# Patient Record
Sex: FEMALE | Race: WHITE | NOT HISPANIC OR LATINO | Employment: OTHER | URBAN - METROPOLITAN AREA
[De-identification: names, ages, dates, MRNs, and addresses within clinical notes are randomized per-mention and may not be internally consistent; named-entity substitution may affect disease eponyms.]

---

## 2017-01-11 ENCOUNTER — ANTICOAGULATION VISIT (OUTPATIENT)
Dept: CARDIOLOGY | Facility: CLINIC | Age: 67
End: 2017-01-11

## 2017-01-11 ENCOUNTER — OFFICE VISIT (OUTPATIENT)
Dept: CARDIOLOGY | Facility: CLINIC | Age: 67
End: 2017-01-11

## 2017-01-11 VITALS
HEIGHT: 67 IN | DIASTOLIC BLOOD PRESSURE: 50 MMHG | WEIGHT: 186 LBS | BODY MASS INDEX: 29.19 KG/M2 | SYSTOLIC BLOOD PRESSURE: 100 MMHG | HEART RATE: 71 BPM

## 2017-01-11 DIAGNOSIS — E78.5 DYSLIPIDEMIA: ICD-10-CM

## 2017-01-11 DIAGNOSIS — I10 ESSENTIAL HYPERTENSION: ICD-10-CM

## 2017-01-11 DIAGNOSIS — R60.0 LOCALIZED EDEMA: ICD-10-CM

## 2017-01-11 DIAGNOSIS — R00.2 PALPITATIONS: Primary | ICD-10-CM

## 2017-01-11 DIAGNOSIS — Q21.12 PATENT FORAMEN OVALE: ICD-10-CM

## 2017-01-11 DIAGNOSIS — Q21.12 PFO (PATENT FORAMEN OVALE): Primary | ICD-10-CM

## 2017-01-11 LAB — INR PPP: 2.5 (ref 0.9–1.1)

## 2017-01-11 PROCEDURE — 99213 OFFICE O/P EST LOW 20 MIN: CPT | Performed by: INTERNAL MEDICINE

## 2017-01-11 PROCEDURE — 85610 PROTHROMBIN TIME: CPT | Performed by: INTERNAL MEDICINE

## 2017-01-11 RX ORDER — FUROSEMIDE 40 MG/1
40 TABLET ORAL DAILY
Qty: 90 TABLET | Refills: 3 | Status: SHIPPED | OUTPATIENT
Start: 2017-01-11 | End: 2017-01-11 | Stop reason: SDUPTHER

## 2017-01-11 RX ORDER — FUROSEMIDE 40 MG/1
40 TABLET ORAL DAILY
Qty: 90 TABLET | Refills: 1 | Status: SHIPPED | OUTPATIENT
Start: 2017-01-11 | End: 2017-01-12 | Stop reason: SDUPTHER

## 2017-01-11 NOTE — PROGRESS NOTES
Leela Muller  1950  [phone]      01/11/2017    Connor Ritter MD    Chief Complaint   Patient presents with   • Coronary Artery Disease   • Hypertension     IDENTIFICATION:   A 66-year-old white female from [place], [place].    PROBLEM LIST:  1.  Left cerebrovascular accident:  a. Right cerebrovascular accident  in July or August 2010, evaluated at [facility].   b. Admission to [facility] on 09/28/2010 with headache and stuttering, consistent with TIA.   c. Chronic Coumadin therapy, initiated following bilateral pulmonary emboli  in 2007 after fall and hip replacement.  She was already on 81 mg of aspirin as well, 09/2010.   d. MRI of the brain on 09/28/2010 revealing old right parietal cerebrovascular accident.   e. MRA revealing normal carotids, middle anterior and posterior  cerebral arteries with minimal ostial stenosis of both vertebral arteries.   f. GENARO by Dr. Oliver Mobley on 09/28/2010:  patent foramen ovale with a small amount of right to left shunting.  Normal LVEF and normal valvular structures.    g. Patent foramen  ovale closure using a 25 mm. Amplatzer cribriform occluder, 10/05/2010.    h. Echocardiogram, 06/23/2014:  LVEF (55% to 60%) with and Amplatzer device noted to be well-seated in the interatrial septum, trace MR, and mild TR.  2. Abnormal myocardial perfusion study:    a. 08/02/2010, Cardiolite GXT by Dr. Monteiro revealing a small area of ischemia in the mid anteroseptal, mid inferior, and apical lateral regions.  LVEF (68%).  b.  Cardiac catheterization by Dr. Monteiro, 08/09/2010:  Normal coronary arteries with normal LV systolic function.  3.  Type 2 diabetes mellitus.   4. Bilateral pulmonary emboli:  a. Following  hip replacement in 2007.   b. No evidence of prior anti-coagulable workup.   c. Chronic Coumadin therapy.   5.  Hypertension.   6. Dyslipidemia.   7. Pancreatitis.  a. Recent episode  in March 2013.  8. Bowenoid papulosis, followed by   Svetich.    9.  Surgical history:  a. Hip replacement.  b. Hysterectomy.  c. Tonsillectomy.  d.  Appendectomy.  e. Cholecystectomy.    Allergies   Allergen Reactions   • Atorvastatin    • Other      Ranexa nausea   • Tetanus Toxoid        Current Medications:      Current Outpatient Prescriptions:   •  acetaminophen (TYLENOL) 500 MG tablet, Take  by mouth Every 6 (Six) Hours As Needed., Disp: , Rfl:   •  aspirin 81 MG tablet, Take  by mouth daily., Disp: , Rfl:   •  baclofen (LIORESAL) 10 MG tablet, Take 1 tablet by mouth 3 (three) times a day., Disp: 90 tablet, Rfl: 11  •  betamethasone, augmented, (DIPROLENE) 0.05 % cream, Apply  topically., Disp: , Rfl:   •  Brimonidine Tartrate (MIRVASO) 0.33 % gel, Apply  topically., Disp: , Rfl:   •  butalbital-acetaminophen-caffeine (FIORICET, ESGIC) -40 MG per tablet, Take 1 tablet by mouth daily as needed for headaches., Disp: 30 tablet, Rfl: 2  •  clidinium-chlordiazepoxide (LIBRAX) 5-2.5 MG per capsule, Take  by mouth 2 (Two) Times a Day., Disp: , Rfl:   •  DIGOX 250 MCG tablet, TAKE ONE TABLET BY MOUTH EVERY DAY, Disp: 90 tablet, Rfl: 2  •  estradiol (VIVELLE-DOT) 0.05 MG/24HR patch, Place  on the skin., Disp: , Rfl:   •  furosemide (LASIX) 20 MG tablet, Take 1 tablet by mouth Daily., Disp: 90 tablet, Rfl: 1  •  gabapentin (NEURONTIN) 800 MG tablet, Take  by mouth 3 (three) times a day., Disp: , Rfl:   •  glipiZIDE (GLUCOTROL) 10 MG 24 hr tablet, Take 20 mg by mouth., Disp: , Rfl:   •  HYDROcodone-acetaminophen (LORTAB 7.5) 7.5-500 MG per tablet, Take  by mouth Every 8 (Eight) Hours As Needed., Disp: , Rfl:   •  insulin glargine (LANTUS) 100 UNIT/ML injection, Inject  under the skin., Disp: , Rfl:   •  ketorolac (ACULAR) 0.5 % ophthalmic solution, , Disp: , Rfl:   •  meclizine (ANTIVERT) 25 MG tablet, Take  by mouth., Disp: , Rfl:   •  metoclopramide (REGLAN) 10 MG tablet, Take 1 tablet by mouth daily as needed (migraine)., Disp: 30 tablet, Rfl: 11  •  metoprolol  "succinate XL (TOPROL-XL) 50 MG 24 hr tablet, TAKE ONE TABLET BY MOUTH TWICE A DAY, Disp: 180 tablet, Rfl: 1  •  nitroglycerin (NITROSTAT) 0.4 MG SL tablet, Place  under the tongue., Disp: , Rfl:   •  ofloxacin (OCUFLOX) 0.3 % ophthalmic solution, , Disp: , Rfl:   •  oxyCODONE-acetaminophen (PERCOCET) 5-325 MG per tablet, Take  by mouth Every 8 (Eight) Hours As Needed., Disp: , Rfl:   •  pancrelipase, Lip-Prot-Amyl, (PANCREAZE) 96346 UNITS capsule delayed-release particles capsule, Take  by mouth As Needed., Disp: , Rfl:   •  pioglitazone (ACTOS) 30 MG tablet, Take  by mouth Daily., Disp: , Rfl:   •  potassium chloride (KLOR-CON) 10 MEQ CR tablet, Take  by mouth Every Other Day., Disp: , Rfl:   •  promethazine (PHENERGAN) 25 MG tablet, Take  by mouth Every 6 (Six) Hours As Needed., Disp: , Rfl:   •  RABEprazole (ACIPHEX) 20 MG EC tablet, Take  by mouth daily., Disp: , Rfl:   •  rosuvastatin (CRESTOR) 10 MG tablet, Take  by mouth Daily., Disp: , Rfl:   •  senna (SENOKOT) 8.6 MG tablet, Take  by mouth As Needed., Disp: , Rfl:   •  sitaGLIPtin (JANUVIA) 100 MG tablet, Take  by mouth Daily., Disp: , Rfl:   •  topiramate (TOPAMAX) 25 MG tablet, Take 1 tablet by mouth daily., Disp: 30 tablet, Rfl: 11  •  warfarin (COUMADIN) 5 MG tablet, TAKE ONE TABLET DAILY OR AS DIRECTED, Disp: 100 tablet, Rfl: 0  •  metroNIDAZOLE (METROCREAM) 0.75 % cream, Apply  topically., Disp: , Rfl:   •  prednisoLONE acetate (PRED FORTE) 1 % ophthalmic suspension, , Disp: , Rfl:   •  triamcinolone (KENALOG) 0.1 % cream, Apply  topically., Disp: , Rfl:     HPI    Ms. Muller presents today for 12 month follow up with history of CVA, PFO status post closure, bilateral pulmonary emboli, hypertension, and hyperlipidemia. Since last visit, she recounts that she has been experiencing occasional episodes of  palpitations/ \"her heart jumping out of rhythm\" while lying down at night. Episodes occur approximately once per month, and her most recent episode " "occured two nights ago. She notes that her lower extremity edema has greatly improved with lasix. Patient denies chest pain, shortness of breath, PND, orthopnea, dizziness, and syncope. She recently had pneumonia and is recovering.    The following portions of the patient's history were reviewed and updated as appropriate: allergies, current medications and problem list.    Pertinent positives as listed in the HPI.  All other systems reviewed are negative.    Vitals:    01/11/17 1254   BP: 100/50   BP Location: Right arm   Patient Position: Sitting   Pulse: 71   Weight: 186 lb (84.4 kg)   Height: 67\" (170.2 cm)       General: Alert and oriented  Neck: Jugular venous pressure is within normal limits. Carotids have normal upstrokes without bruits.   Cardiovascular: Heart has a nondisplaced focal PMI. Regular rate and rhythm without murmur, gallop or rub.  Lungs: Clear without rales or wheezes. Equal expansion is noted.   Extremities: Shows trace to 1+ edema.  Skin: warm and dry.  Neurologic: nonfocal      Diagnostic Data:    Lab Results   Component Value Date    INR 2.50 (A) 01/11/2017    INR 2.30 12/28/2016    INR 2.60 12/19/2016    PROTIME 10.7 06/30/2014     Lab Results   Component Value Date    GLUCOSE 116 (H) 08/11/2016    CALCIUM 9.2 11/03/2016     11/03/2016    K 4.5 11/03/2016    CO2 27 11/03/2016     11/03/2016    BUN 17 11/03/2016    CREATININE 0.8 11/03/2016    EGFRIFNONA 55 (L) 08/11/2016    BCR 14.0 08/11/2016    ANIONGAP 16 11/03/2016     Procedures    Assessment:      ICD-10-CM ICD-9-CM   1. Palpitations, etiology undetermined  R00.2 785.1   2. Patent foramen ovale, s/p closure Q21.1 745.5   3. Essential hypertension I10 401.9   4. Dyslipidemia E78.5 272.4   5. edema    Plan:    1. Increased lasix to 40 mg daily.   2. Continue all other current medications as prescribed.  3. F/up in 12 months or sooner if needed.    Scribed for Rika Sullivan MD by Rubi Platt. 1/11/2017  2:17 " PM    I Rika Sullivan MD personally performed the services described in this documentation as scribed by the above individual in my presence, and it is both accurate and complete.

## 2017-01-11 NOTE — MR AVS SNAPSHOT
"                        Leela Muller   1/11/2017 1:15 PM   Office Visit    Dept Phone:  [phone]   Encounter #:  [id number]    Provider:  Rika Sullivan MD   Department:  [facility] CARDIOLOGY                Your Full Care Plan              Today's Medication Changes          These changes are accurate as of: 1/11/17  1:50 PM.  If you have any questions, ask your nurse or doctor.               Medication(s)that have changed:     LORTAB 7.5 7.5-500 MG per tablet   Generic drug:  HYDROcodone-acetaminophen   Take  by mouth Every 8 (Eight) Hours As Needed.   What changed:  Another medication with the same name was removed. Continue taking this medication, and follow the directions you see here.   Changed by:  Rafiq Beckett MA         Stop taking medication(s)listed here:     BD INSULIN SYRINGE ULTRAFINE 31G X 5/16\" 0.3 ML misc   Generic drug:  Insulin Syringe-Needle U-100   Stopped by:  Rika Sullivan MD           enoxaparin 80 MG/0.8ML solution syringe   Commonly known as:  LOVENOX   Stopped by:  Rika Sullivan MD           FLONASE 50 MCG/ACT nasal spray   Generic drug:  fluticasone   Stopped by:  Rika Sullivan MD           flunisolide 25 MCG/ACT (0.025%) solution nasal spray   Commonly known as:  NASALIDE   Stopped by:  Rika Sullivan MD           hydrocortisone 25 MG suppository   Commonly known as:  ANUSOL-HC   Stopped by:  Rika Sullivan MD           HYDROmorphone 8 MG tablet   Commonly known as:  DILAUDID   Stopped by:  Rika Sullivan MD           ONE TOUCH ULTRA TEST test strip   Generic drug:  glucose blood   Stopped by:  Rika Sullivan MD           ONETOUCH ULTRA BLUE VI   Stopped by:  Rika Sullivan MD           predniSONE 10 MG tablet   Commonly known as:  DELTASONE   Stopped by:  Rika Sullivan MD           venlafaxine XR 75 MG 24 hr capsule   Commonly known as:  EFFEXOR-XR   Stopped by:  " Rika Sullivan MD                      Your Updated Medication List          This list is accurate as of: 1/11/17  1:50 PM.  Always use your most recent med list.                ACIPHEX 20 MG EC tablet   Generic drug:  RABEprazole       aspirin 81 MG tablet       baclofen 10 MG tablet   Commonly known as:  LIORESAL   Take 1 tablet by mouth 3 (three) times a day.       betamethasone (augmented) 0.05 % cream   Commonly known as:  DIPROLENE       butalbital-acetaminophen-caffeine -40 MG per tablet   Commonly known as:  FIORICET, ESGIC   Take 1 tablet by mouth daily as needed for headaches.       clidinium-chlordiazePOXIDE 5-2.5 MG per capsule   Commonly known as:  LIBRAX       CRESTOR 10 MG tablet   Generic drug:  rosuvastatin       DIGOX 250 MCG tablet   Generic drug:  digoxin   TAKE ONE TABLET BY MOUTH EVERY DAY       furosemide 20 MG tablet   Commonly known as:  LASIX   Take 1 tablet by mouth Daily.       gabapentin 800 MG tablet   Commonly known as:  NEURONTIN       glipiZIDE 10 MG 24 hr tablet   Commonly known as:  GLUCOTROL       JANUVIA 100 MG tablet   Generic drug:  SITagliptin       ketorolac 0.5 % ophthalmic solution   Commonly known as:  ACULAR       KLOR-CON 10 MEQ CR tablet   Generic drug:  potassium chloride       LANTUS 100 UNIT/ML injection   Generic drug:  insulin glargine       LORTAB 7.5 7.5-500 MG per tablet   Generic drug:  HYDROcodone-acetaminophen       meclizine 25 MG tablet   Commonly known as:  ANTIVERT       metoclopramide 10 MG tablet   Commonly known as:  REGLAN   Take 1 tablet by mouth daily as needed (migraine).       metoprolol succinate XL 50 MG 24 hr tablet   Commonly known as:  TOPROL-XL   TAKE ONE TABLET BY MOUTH TWICE A DAY       metroNIDAZOLE 0.75 % cream   Commonly known as:  METROCREAM       MIRVASO 0.33 % gel   Generic drug:  Brimonidine Tartrate       NITROSTAT 0.4 MG SL tablet   Generic drug:  nitroglycerin       ofloxacin 0.3 % ophthalmic solution   Commonly  known as:  OCUFLOX       oxyCODONE-acetaminophen 5-325 MG per tablet   Commonly known as:  PERCOCET       PANCREAZE 45985 UNITS capsule delayed-release particles capsule   Generic drug:  pancrelipase (Lip-Prot-Amyl)       pioglitazone 30 MG tablet   Commonly known as:  ACTOS       prednisoLONE acetate 1 % ophthalmic suspension   Commonly known as:  PRED FORTE       promethazine 25 MG tablet   Commonly known as:  PHENERGAN       SENOKOT 8.6 MG tablet   Generic drug:  senna       topiramate 25 MG tablet   Commonly known as:  TOPAMAX   Take 1 tablet by mouth daily.       triamcinolone 0.1 % cream   Commonly known as:  KENALOG       TYLENOL 500 MG tablet   Generic drug:  acetaminophen       VIVELLE-DOT 0.05 MG/24HR patch   Generic drug:  estradiol       warfarin 5 MG tablet   Commonly known as:  COUMADIN   TAKE ONE TABLET DAILY OR AS DIRECTED               You Were Diagnosed With        Codes Comments    Palpitations    -  Primary ICD-10-CM: R00.2  ICD-9-CM: 785.1     Patent foramen ovale     ICD-10-CM: Q21.1  ICD-9-CM: 745.5     Essential hypertension     ICD-10-CM: I10  ICD-9-CM: 401.9     Dyslipidemia     ICD-10-CM: E78.5  ICD-9-CM: 272.4       Instructions     None    Patient Instructions History      Upcoming Appointments     Visit Type Date Time Department    FOLLOW UP 1/11/2017  1:15 PM [facility]    FOLLOW UP 3/8/2017 11:15 AM [facility]      MyChart Signup     Our records indicate that you have declined [facility] signup. If you would like to sign up for [facility], please email [email or web address] or call [phone] to obtain an activation code.             Other Info from Your Visit           Your Appointments     Mar 08, 2017 11:15 AM EST   Follow Up with Devaughn Lewis MD   [facility] (--)    [address]  [place] [address] [phone]           Arrive 15 minutes prior to appointment.            Jan 17, 2018   "1:30 PM EST   Follow Up with Rika Sullivan MD   [facility] CARDIOLOGY (--)    [address]  [place] [address] [phone]           Arrive 15 minutes prior to appointment.              Allergies     Atorvastatin      Other      Ranexa nausea    Tetanus Toxoid        Reason for Visit     Coronary Artery Disease     Hypertension           Vital Signs     Blood Pressure Pulse Height Weight Body Mass Index Smoking Status    100/50 (BP Location: Right arm, Patient Position: Sitting) 71 67\" (170.2 cm) 186 lb (84.4 kg) 29.13 kg/m2 Never Smoker      Problems and Diagnoses Noted     Dyslipidemia    High blood pressure    Congenital heart disease    Palpitations    -  Primary        "

## 2017-01-12 RX ORDER — FUROSEMIDE 40 MG/1
40 TABLET ORAL DAILY
Qty: 90 TABLET | Refills: 1 | Status: SHIPPED | OUTPATIENT
Start: 2017-01-12 | End: 2017-05-11

## 2017-01-13 ENCOUNTER — HOSPITAL ENCOUNTER (OUTPATIENT)
Dept: GENERAL RADIOLOGY | Facility: HOSPITAL | Age: 67
Discharge: HOME | End: 2017-01-13
Attending: INTERNAL MEDICINE

## 2017-01-23 LAB — INR PPP: 2.6

## 2017-01-24 ENCOUNTER — ANTICOAGULATION VISIT (OUTPATIENT)
Dept: CARDIOLOGY | Facility: CLINIC | Age: 67
End: 2017-01-24

## 2017-02-06 ENCOUNTER — ANTICOAGULATION VISIT (OUTPATIENT)
Dept: CARDIOLOGY | Facility: CLINIC | Age: 67
End: 2017-02-06

## 2017-02-06 LAB — INR PPP: 2.4

## 2017-02-13 RX ORDER — GABAPENTIN 800 MG/1
800 TABLET ORAL 3 TIMES DAILY
Qty: 90 TABLET | Refills: 0 | OUTPATIENT
Start: 2017-02-13 | End: 2017-06-06 | Stop reason: SDUPTHER

## 2017-02-13 RX ORDER — PIOGLITAZONEHYDROCHLORIDE 30 MG/1
30 TABLET ORAL DAILY
Qty: 30 TABLET | Refills: 5 | Status: CANCELLED | OUTPATIENT
Start: 2017-02-13

## 2017-02-14 RX ORDER — GABAPENTIN 800 MG/1
800 TABLET ORAL 3 TIMES DAILY
Qty: 90 TABLET | Refills: 0 | Status: SHIPPED | OUTPATIENT
Start: 2017-02-14 | End: 2017-06-06 | Stop reason: SDUPTHER

## 2017-02-14 RX ORDER — ROSUVASTATIN CALCIUM 10 MG/1
10 TABLET, COATED ORAL DAILY
Qty: 30 TABLET | Refills: 5 | Status: CANCELLED | OUTPATIENT
Start: 2017-02-14

## 2017-02-14 RX ORDER — GLIPIZIDE 10 MG/1
20 TABLET, FILM COATED, EXTENDED RELEASE ORAL DAILY
Qty: 60 TABLET | Refills: 5 | Status: CANCELLED | OUTPATIENT
Start: 2017-02-14

## 2017-02-14 RX ORDER — PIOGLITAZONEHYDROCHLORIDE 30 MG/1
30 TABLET ORAL DAILY
Qty: 30 TABLET | Refills: 5 | Status: CANCELLED | OUTPATIENT
Start: 2017-02-13

## 2017-02-14 RX ORDER — WARFARIN SODIUM 5 MG/1
TABLET ORAL
Qty: 100 TABLET | Refills: 1 | Status: SHIPPED | OUTPATIENT
Start: 2017-02-14 | End: 2017-05-08 | Stop reason: SDUPTHER

## 2017-02-20 ENCOUNTER — ANTICOAGULATION VISIT (OUTPATIENT)
Dept: CARDIOLOGY | Facility: CLINIC | Age: 67
End: 2017-02-20

## 2017-02-20 LAB — INR PPP: 2.2

## 2017-02-22 RX ORDER — ROSUVASTATIN CALCIUM 10 MG/1
10 TABLET, COATED ORAL DAILY
Qty: 30 TABLET | Refills: 5 | Status: CANCELLED | OUTPATIENT
Start: 2017-02-14

## 2017-03-02 RX ORDER — ROSUVASTATIN CALCIUM 10 MG/1
10 TABLET, COATED ORAL DAILY
Qty: 30 TABLET | Refills: 5 | Status: CANCELLED | OUTPATIENT
Start: 2017-02-14

## 2017-03-06 ENCOUNTER — ANTICOAGULATION VISIT (OUTPATIENT)
Dept: CARDIOLOGY | Facility: CLINIC | Age: 67
End: 2017-03-06

## 2017-03-06 LAB — INR PPP: 1.5

## 2017-03-06 RX ORDER — GLIPIZIDE 10 MG/1
20 TABLET, FILM COATED, EXTENDED RELEASE ORAL DAILY
Qty: 60 TABLET | Refills: 5 | Status: CANCELLED | OUTPATIENT
Start: 2017-03-06

## 2017-03-06 RX ORDER — ROSUVASTATIN CALCIUM 10 MG/1
10 TABLET, COATED ORAL DAILY
Qty: 30 TABLET | Refills: 5 | Status: CANCELLED | OUTPATIENT
Start: 2017-02-14

## 2017-03-08 ENCOUNTER — OFFICE VISIT (OUTPATIENT)
Dept: NEUROLOGY | Facility: CLINIC | Age: 67
End: 2017-03-08

## 2017-03-08 VITALS
BODY MASS INDEX: 27.13 KG/M2 | DIASTOLIC BLOOD PRESSURE: 80 MMHG | OXYGEN SATURATION: 99 % | HEIGHT: 68 IN | WEIGHT: 179 LBS | SYSTOLIC BLOOD PRESSURE: 124 MMHG | HEART RATE: 74 BPM

## 2017-03-08 DIAGNOSIS — M54.16 LUMBAR RADICULOPATHY: ICD-10-CM

## 2017-03-08 DIAGNOSIS — G44.209 TENSION HEADACHE: Primary | ICD-10-CM

## 2017-03-08 DIAGNOSIS — M54.12 CERVICAL RADICULOPATHY: ICD-10-CM

## 2017-03-08 DIAGNOSIS — M47.812 SPONDYLOSIS OF CERVICAL REGION WITHOUT MYELOPATHY OR RADICULOPATHY: ICD-10-CM

## 2017-03-08 PROCEDURE — 99213 OFFICE O/P EST LOW 20 MIN: CPT | Performed by: PSYCHIATRY & NEUROLOGY

## 2017-03-08 RX ORDER — DOXEPIN HYDROCHLORIDE 10 MG/1
10 CAPSULE ORAL NIGHTLY
Qty: 30 CAPSULE | Refills: 11 | Status: SHIPPED | OUTPATIENT
Start: 2017-03-08 | End: 2017-08-28 | Stop reason: SDUPTHER

## 2017-03-08 NOTE — PROGRESS NOTES
Subjective:     Patient ID: Leela Muller is a 67 y.o. female.    History of Present Illness  The following portions of the patient's history were reviewed and updated as appropriate: allergies, current medications, past family history, past medical history, past social history, past surgical history and problem list.  Denies new concerns, neck and low back stable, has had gastroenteritis and recurrent pancreatitis. Headaches come and go, has been doing cervical traction at home that appears helps, gets neck massage.  Review of Systems   Constitutional: Negative for chills, fatigue, fever and unexpected weight change.   HENT: Negative for ear pain, hearing loss, nosebleeds, rhinorrhea and sore throat.    Eyes: Negative for photophobia, pain, discharge, itching and visual disturbance.   Respiratory: Negative for cough, chest tightness, shortness of breath and wheezing.    Cardiovascular: Negative for chest pain, palpitations and leg swelling.   Gastrointestinal: Negative for abdominal pain, blood in stool, constipation, diarrhea, nausea and vomiting.   Genitourinary: Negative for dysuria, frequency, hematuria and urgency.   Musculoskeletal: Positive for joint swelling (& STIFFNESS). Negative for arthralgias, back pain, gait problem, myalgias, neck pain and neck stiffness.        LIMB PAIN   Skin: Negative for rash and wound.        DRY SKIN   Allergic/Immunologic: Negative for environmental allergies and food allergies.   Neurological: Positive for headaches. Negative for dizziness, tremors, seizures, syncope, speech difficulty, weakness, light-headedness and numbness.        DIFFICULTY WALKING   Hematological: Negative for adenopathy. Does not bruise/bleed easily.   Psychiatric/Behavioral: Positive for sleep disturbance. Negative for agitation, confusion, decreased concentration, hallucinations and suicidal ideas. The patient is not nervous/anxious.         Objective:    Neurologic Exam     Mental Status   Oriented  to person, place, and time.       Physical Exam   Constitutional: She is oriented to person, place, and time. She appears well-developed and well-nourished.   Cardiovascular: Normal rate and regular rhythm.    Pulmonary/Chest: Effort normal.   Neurological: She is alert and oriented to person, place, and time. She has normal reflexes.   Psychiatric: She has a normal mood and affect. Her behavior is normal. Thought content normal.       Assessment/Plan:     Leela was seen today for difficulty walking.    Diagnoses and all orders for this visit:    Tension headache  -     doxepin (SINEquan) 10 MG capsule; Take 1 capsule by mouth Every Night.    Spondylosis of cervical region without myelopathy or radiculopathy  -     doxepin (SINEquan) 10 MG capsule; Take 1 capsule by mouth Every Night.    Cervical radiculopathy  -     doxepin (SINEquan) 10 MG capsule; Take 1 capsule by mouth Every Night.    Lumbar radiculopathy  -     doxepin (SINEquan) 10 MG capsule; Take 1 capsule by mouth Every Night.

## 2017-03-09 NOTE — PROGRESS NOTES
I have reviewed the anticoagulation track calender, labs, and dosage adjustments made by Mariella Issa RN and I agree.    Electronically signed by WEST Lan 03/09/17 4:14 PM

## 2017-03-13 ENCOUNTER — ANTICOAGULATION VISIT (OUTPATIENT)
Dept: CARDIOLOGY | Facility: CLINIC | Age: 67
End: 2017-03-13

## 2017-03-13 LAB — INR PPP: 1.9

## 2017-03-27 ENCOUNTER — ANTICOAGULATION VISIT (OUTPATIENT)
Dept: CARDIOLOGY | Facility: CLINIC | Age: 67
End: 2017-03-27

## 2017-03-27 LAB — INR PPP: 2.2

## 2017-04-10 ENCOUNTER — ANTICOAGULATION VISIT (OUTPATIENT)
Dept: CARDIOLOGY | Facility: CLINIC | Age: 67
End: 2017-04-10

## 2017-04-10 LAB — INR PPP: 2

## 2017-04-12 ENCOUNTER — OFFICE VISIT (OUTPATIENT)
Dept: NEUROLOGY | Facility: CLINIC | Age: 67
End: 2017-04-12

## 2017-04-12 ENCOUNTER — APPOINTMENT (OUTPATIENT)
Dept: LAB | Facility: HOSPITAL | Age: 67
End: 2017-04-12

## 2017-04-12 VITALS
SYSTOLIC BLOOD PRESSURE: 110 MMHG | BODY MASS INDEX: 27.58 KG/M2 | WEIGHT: 182 LBS | DIASTOLIC BLOOD PRESSURE: 62 MMHG | HEART RATE: 81 BPM | HEIGHT: 68 IN | OXYGEN SATURATION: 99 %

## 2017-04-12 DIAGNOSIS — M54.2 NECK PAIN: ICD-10-CM

## 2017-04-12 DIAGNOSIS — G44.209 TENSION HEADACHE: Primary | ICD-10-CM

## 2017-04-12 LAB — CRP SERPL-MCNC: 0.16 MG/DL (ref 0–1)

## 2017-04-12 PROCEDURE — 36415 COLL VENOUS BLD VENIPUNCTURE: CPT | Performed by: PSYCHIATRY & NEUROLOGY

## 2017-04-12 PROCEDURE — 99213 OFFICE O/P EST LOW 20 MIN: CPT | Performed by: PSYCHIATRY & NEUROLOGY

## 2017-04-12 PROCEDURE — 86140 C-REACTIVE PROTEIN: CPT | Performed by: PSYCHIATRY & NEUROLOGY

## 2017-04-12 RX ORDER — PREDNISONE 10 MG/1
TABLET ORAL
Qty: 30 TABLET | Refills: 1 | Status: SHIPPED | OUTPATIENT
Start: 2017-04-12 | End: 2018-03-08

## 2017-04-12 NOTE — PROGRESS NOTES
Subjective:     Patient ID: Leela Muller is a 67 y.o. female.    History of Present Illness  The following portions of the patient's history were reviewed and updated as appropriate: allergies, current medications, past family history, past medical history, past social history, past surgical history and problem list.  Patient could not tolerate doxepin because of GI upset, has been having more left sided neck pain, has developed left temporal and ear tenderness, some radiation toward left shoulder. Continues on warfarin.  Review of Systems   Constitutional: Negative for chills, fatigue, fever and unexpected weight change.         FEELING POORLY   HENT: Negative for ear pain, hearing loss, nosebleeds, rhinorrhea and sore throat.    Eyes: Negative for photophobia, pain, discharge, itching and visual disturbance.   Respiratory: Positive for cough. Negative for chest tightness, shortness of breath and wheezing.    Cardiovascular: Positive for palpitations. Negative for chest pain and leg swelling.   Gastrointestinal: Negative for abdominal pain, blood in stool, constipation, diarrhea, nausea and vomiting.   Genitourinary: Negative for dysuria, frequency, hematuria and urgency.   Musculoskeletal: Positive for gait problem. Negative for arthralgias, back pain, joint swelling, myalgias, neck pain and neck stiffness.        JOINT STIFFNESS   Skin: Negative for rash and wound.   Allergic/Immunologic: Negative for environmental allergies and food allergies.   Neurological: Positive for headaches. Negative for dizziness, tremors, seizures, syncope, speech difficulty, weakness, light-headedness and numbness.   Hematological: Negative for adenopathy. Does not bruise/bleed easily.   Psychiatric/Behavioral: Positive for sleep disturbance. Negative for agitation, confusion, decreased concentration, hallucinations and suicidal ideas. The patient is not nervous/anxious.         Objective:    Neurologic Exam     Mental Status    Oriented to person, place, and time.       Physical Exam   Constitutional: She is oriented to person, place, and time. She appears well-developed and well-nourished.   HENT:   Left TM clear   Cardiovascular: Normal rate and regular rhythm.    Pulmonary/Chest: Effort normal.   Musculoskeletal:   Some left temporal and retroauricular scalp tenderness, no vascular enlargement noted.   Neurological: She is alert and oriented to person, place, and time. She has normal reflexes.   Psychiatric: She has a normal mood and affect. Her behavior is normal. Thought content normal.       Assessment/Plan:     Leela was seen today for neck pain and headache.    Diagnoses and all orders for this visit:    Tension headache  -     C-reactive Protein  -     predniSONE (DELTASONE) 10 MG tablet; 4/dx3d, 3/dx3d, 2/dx3d, 1/x3d    Neck pain  -     predniSONE (DELTASONE) 10 MG tablet; 4/dx3d, 3/dx3d, 2/dx3d, 1/x3d

## 2017-04-24 ENCOUNTER — ANTICOAGULATION VISIT (OUTPATIENT)
Dept: CARDIOLOGY | Facility: CLINIC | Age: 67
End: 2017-04-24

## 2017-04-24 LAB — INR PPP: 3.1

## 2017-05-02 ENCOUNTER — ANTICOAGULATION VISIT (OUTPATIENT)
Dept: CARDIOLOGY | Facility: CLINIC | Age: 67
End: 2017-05-02

## 2017-05-02 LAB — INR PPP: 3.1

## 2017-05-04 ENCOUNTER — TRANSCRIBE ORDERS (OUTPATIENT)
Dept: ADMINISTRATIVE | Facility: HOSPITAL | Age: 67
End: 2017-05-04

## 2017-05-04 DIAGNOSIS — Z12.31 VISIT FOR SCREENING MAMMOGRAM: Primary | ICD-10-CM

## 2017-05-08 ENCOUNTER — ANTICOAGULATION VISIT (OUTPATIENT)
Dept: CARDIOLOGY | Facility: CLINIC | Age: 67
End: 2017-05-08

## 2017-05-08 LAB — INR PPP: 2.5

## 2017-05-08 RX ORDER — WARFARIN SODIUM 5 MG/1
TABLET ORAL
Qty: 90 TABLET | Refills: 0 | Status: SHIPPED | OUTPATIENT
Start: 2017-05-08 | End: 2017-05-10

## 2017-05-10 RX ORDER — WARFARIN SODIUM 5 MG/1
TABLET ORAL
Qty: 90 TABLET | Refills: 0 | Status: SHIPPED | OUTPATIENT
Start: 2017-05-10 | End: 2017-09-05 | Stop reason: SDUPTHER

## 2017-05-11 RX ORDER — FUROSEMIDE 40 MG/1
40 TABLET ORAL DAILY
Qty: 135 TABLET | Refills: 2 | Status: SHIPPED | OUTPATIENT
Start: 2017-05-11 | End: 2017-05-16 | Stop reason: SDUPTHER

## 2017-05-16 RX ORDER — FUROSEMIDE 40 MG/1
40 TABLET ORAL DAILY
Qty: 135 TABLET | Refills: 2 | Status: SHIPPED | OUTPATIENT
Start: 2017-05-16 | End: 2018-03-02 | Stop reason: SDUPTHER

## 2017-05-22 ENCOUNTER — ANTICOAGULATION VISIT (OUTPATIENT)
Dept: CARDIOLOGY | Facility: CLINIC | Age: 67
End: 2017-05-22

## 2017-05-22 LAB — INR PPP: 2.6

## 2017-05-22 RX ORDER — METOPROLOL SUCCINATE 50 MG/1
TABLET, EXTENDED RELEASE ORAL
Qty: 180 TABLET | Refills: 2 | Status: SHIPPED | OUTPATIENT
Start: 2017-05-22 | End: 2018-04-18 | Stop reason: SDUPTHER

## 2017-05-22 RX ORDER — DIGOXIN 250 MCG
TABLET ORAL
Qty: 90 TABLET | Refills: 2 | Status: SHIPPED | OUTPATIENT
Start: 2017-05-22 | End: 2018-04-18 | Stop reason: SDUPTHER

## 2017-06-06 ENCOUNTER — ANTICOAGULATION VISIT (OUTPATIENT)
Dept: CARDIOLOGY | Facility: CLINIC | Age: 67
End: 2017-06-06

## 2017-06-06 ENCOUNTER — OFFICE VISIT (OUTPATIENT)
Dept: NEUROLOGY | Facility: CLINIC | Age: 67
End: 2017-06-06

## 2017-06-06 VITALS
WEIGHT: 180 LBS | HEIGHT: 68 IN | SYSTOLIC BLOOD PRESSURE: 121 MMHG | DIASTOLIC BLOOD PRESSURE: 64 MMHG | BODY MASS INDEX: 27.28 KG/M2 | HEART RATE: 69 BPM | OXYGEN SATURATION: 98 %

## 2017-06-06 DIAGNOSIS — G43.009 MIGRAINE WITHOUT AURA AND WITHOUT STATUS MIGRAINOSUS, NOT INTRACTABLE: ICD-10-CM

## 2017-06-06 DIAGNOSIS — M54.2 NECK PAIN: ICD-10-CM

## 2017-06-06 LAB — INR PPP: 2.1

## 2017-06-06 PROCEDURE — 99213 OFFICE O/P EST LOW 20 MIN: CPT | Performed by: PSYCHIATRY & NEUROLOGY

## 2017-06-06 RX ORDER — TOPIRAMATE 25 MG/1
25 TABLET ORAL DAILY
Qty: 30 TABLET | Refills: 11 | Status: SHIPPED | OUTPATIENT
Start: 2017-06-06 | End: 2017-06-06 | Stop reason: SDUPTHER

## 2017-06-06 RX ORDER — GABAPENTIN 800 MG/1
800 TABLET ORAL 3 TIMES DAILY
Qty: 90 TABLET | Refills: 5 | Status: SHIPPED | OUTPATIENT
Start: 2017-06-06 | End: 2017-09-18

## 2017-06-06 RX ORDER — BACLOFEN 10 MG/1
10 TABLET ORAL 3 TIMES DAILY
Qty: 90 TABLET | Refills: 11 | Status: SHIPPED | OUTPATIENT
Start: 2017-06-06 | End: 2018-04-13 | Stop reason: SDUPTHER

## 2017-06-06 RX ORDER — BUTALBITAL, ACETAMINOPHEN AND CAFFEINE 50; 325; 40 MG/1; MG/1; MG/1
1 TABLET ORAL DAILY PRN
Qty: 30 TABLET | Refills: 2
Start: 2017-06-06 | End: 2018-04-15 | Stop reason: SDUPTHER

## 2017-06-06 RX ORDER — TOPIRAMATE 25 MG/1
25 TABLET ORAL DAILY
Qty: 30 TABLET | Refills: 11 | Status: SHIPPED | OUTPATIENT
Start: 2017-06-06 | End: 2018-04-13 | Stop reason: SDUPTHER

## 2017-06-06 NOTE — PROGRESS NOTES
Subjective:     Patient ID: Leela Muller is a 67 y.o. female.    History of Present Illness  The following portions of the patient's history were reviewed and updated as appropriate: allergies, current medications, past family history, past medical history, past social history, past surgical history and problem list.  Headaches better with prednisone which has increased her BS, needs refills. Complains of fatigue, drinks lots of water. She has had more frequent chest pains, followed by cardiology.  Review of Systems   Constitutional: Negative for chills, fatigue, fever and unexpected weight change.   HENT: Negative for ear pain, hearing loss, nosebleeds, rhinorrhea and sore throat.    Eyes: Positive for visual disturbance. Negative for photophobia, pain, discharge and itching.   Respiratory: Negative for cough, chest tightness, shortness of breath and wheezing.    Cardiovascular: Negative for chest pain, palpitations and leg swelling.        HEART RATE IS FAST   Gastrointestinal: Negative for abdominal pain, blood in stool, constipation, diarrhea, nausea and vomiting.   Endocrine:        FEELINGS OF WEAKNESS, MUSCLE WEAKNESS   Genitourinary: Negative for dysuria, frequency, hematuria and urgency.   Musculoskeletal: Negative for arthralgias, back pain, gait problem, joint swelling, myalgias, neck pain and neck stiffness.        JOINT STIFNESS   Skin: Negative for rash and wound.   Allergic/Immunologic: Negative for environmental allergies and food allergies.   Neurological: Positive for headaches. Negative for dizziness, tremors, seizures, syncope, speech difficulty, weakness, light-headedness and numbness.        DIFFICULTY WALKING   Hematological: Positive for adenopathy. Does not bruise/bleed easily.   Psychiatric/Behavioral: Positive for sleep disturbance. Negative for agitation, confusion, decreased concentration, hallucinations and suicidal ideas. The patient is not nervous/anxious.          Objective:    Neurologic Exam     Mental Status   Oriented to person, place, and time.       Physical Exam   Constitutional: She is oriented to person, place, and time. She appears well-developed and well-nourished.   Cardiovascular: Normal rate and regular rhythm.    Pulmonary/Chest: Effort normal.   Neurological: She is alert and oriented to person, place, and time. She has normal reflexes.   Psychiatric: She has a normal mood and affect. Her behavior is normal. Thought content normal.       Assessment/Plan:     Leela was seen today for neck pain and headache.    Diagnoses and all orders for this visit:    Migraine without aura and without status migrainosus, not intractable  -     Discontinue: topiramate (TOPAMAX) 25 MG tablet; Take 1 tablet by mouth Daily.  -     butalbital-acetaminophen-caffeine (FIORICET, ESGIC) -40 MG per tablet; Take 1 tablet by mouth Daily As Needed for Headache.  -     topiramate (TOPAMAX) 25 MG tablet; Take 1 tablet by mouth Daily.    Neck pain  -     gabapentin (NEURONTIN) 800 MG tablet; Take 1 tablet by mouth 3 (Three) Times a Day.  -     baclofen (LIORESAL) 10 MG tablet; Take 1 tablet by mouth 3 (three) times a day.       The patient has read and signed the [facility] [employer] Substance Contract.  I will continue to see patient for regular follow up appointments.  They are well controlled on their medication.  [facility] is updated every 3 months. The patient is aware of the potential for addiction and dependence.    The patient has read and signed the [facility] Controlled Substance Contract.  I will continue to see patient for regular follow up appointments.  They are well controlled on their medication.  [facility] is updated every 3 months. The patient is aware of the potential for addiction. The patient has read and signed the [facility] Controlled Substance Contract.  I will continue to see patient for regular follow up appointments.  They are well controlled on  their medication.  [facility] is updated every 3 months. The patient is aware of the potential for addiction and dependence.on and dependence.    Recommended B complex vitamins, follow up with cardiology, call for problems.

## 2017-06-07 ENCOUNTER — TELEPHONE (OUTPATIENT)
Dept: NEUROLOGY | Facility: CLINIC | Age: 67
End: 2017-06-07

## 2017-06-07 NOTE — TELEPHONE ENCOUNTER
PT WAS INFORMED THAT WE WILL NOT REFILL CONTROLLED MEDICATIONS OVER THE PHONE OR THROUGH PHARMACY REQUESTS ANYMORE. PT UNDERSTANDS THAT THEY MUST MAKE THEIR FOLLOW UP APPT IN ORDER TO CONTINUE TO RECEIVE THEIR CONTROLLED MEDICATION. PT VERBALIZED UNDERSTANDING.

## 2017-06-13 ENCOUNTER — APPOINTMENT (OUTPATIENT)
Dept: MAMMOGRAPHY | Facility: HOSPITAL | Age: 67
End: 2017-06-13

## 2017-06-20 ENCOUNTER — APPOINTMENT (OUTPATIENT)
Dept: MAMMOGRAPHY | Facility: HOSPITAL | Age: 67
End: 2017-06-20

## 2017-06-20 ENCOUNTER — ANTICOAGULATION VISIT (OUTPATIENT)
Dept: CARDIOLOGY | Facility: CLINIC | Age: 67
End: 2017-06-20

## 2017-06-20 LAB — INR PPP: 2.4

## 2017-07-05 ENCOUNTER — HOSPITAL ENCOUNTER (OUTPATIENT)
Dept: MAMMOGRAPHY | Facility: HOSPITAL | Age: 67
Discharge: HOME | End: 2017-07-05
Admitting: INTERNAL MEDICINE

## 2017-07-05 ENCOUNTER — ANTICOAGULATION VISIT (OUTPATIENT)
Dept: CARDIOLOGY | Facility: CLINIC | Age: 67
End: 2017-07-05

## 2017-07-05 DIAGNOSIS — Z12.31 VISIT FOR SCREENING MAMMOGRAM: ICD-10-CM

## 2017-07-05 LAB — INR PPP: 1.8

## 2017-07-05 PROCEDURE — G0202 SCR MAMMO BI INCL CAD: HCPCS

## 2017-07-05 PROCEDURE — 77063 BREAST TOMOSYNTHESIS BI: CPT

## 2017-07-05 PROCEDURE — G0202 SCR MAMMO BI INCL CAD: HCPCS | Performed by: RADIOLOGY

## 2017-07-05 PROCEDURE — 77063 BREAST TOMOSYNTHESIS BI: CPT | Performed by: RADIOLOGY

## 2017-07-10 ENCOUNTER — ANTICOAGULATION VISIT (OUTPATIENT)
Dept: CARDIOLOGY | Facility: CLINIC | Age: 67
End: 2017-07-10

## 2017-07-10 LAB — INR PPP: 1.9

## 2017-07-17 ENCOUNTER — ANTICOAGULATION VISIT (OUTPATIENT)
Dept: CARDIOLOGY | Facility: CLINIC | Age: 67
End: 2017-07-17

## 2017-07-17 LAB — INR PPP: 1.8

## 2017-07-19 NOTE — PROGRESS NOTES
I have reviewed the anticoagulation track calender, labs, and dosage adjustments made by Mariella Issa RN and I agree.    Electronically signed by WEST Lan 07/19/17 8:44 AM

## 2017-07-24 ENCOUNTER — ANTICOAGULATION VISIT (OUTPATIENT)
Dept: CARDIOLOGY | Facility: CLINIC | Age: 67
End: 2017-07-24

## 2017-07-24 LAB — INR PPP: 2.3

## 2017-08-07 ENCOUNTER — ANTICOAGULATION VISIT (OUTPATIENT)
Dept: CARDIOLOGY | Facility: CLINIC | Age: 67
End: 2017-08-07

## 2017-08-07 LAB — INR PPP: 3.8

## 2017-08-07 NOTE — PATIENT INSTRUCTIONS
Patient says that she is on a medicine for her Pancreas.  To hold her dose today and then recheck Friday    AH

## 2017-08-09 NOTE — PROGRESS NOTES
I have reviewed the anticoagulation track calender, labs, and dosage adjustments made by Mariella Issa RN and I agree.    Electronically signed by WEST Lan 08/09/17 9:38 AM

## 2017-08-11 ENCOUNTER — ANTICOAGULATION VISIT (OUTPATIENT)
Dept: CARDIOLOGY | Facility: CLINIC | Age: 67
End: 2017-08-11

## 2017-08-11 LAB — INR PPP: 2.7

## 2017-08-14 NOTE — PROGRESS NOTES
I have reviewed the anticoagulation track calender, labs, and dosage adjustments made by Mariella Issa RN and I agree.    Electronically signed by WEST Lan 08/14/17 3:45 PM

## 2017-08-18 ENCOUNTER — TRANSCRIBE ORDERS (OUTPATIENT)
Dept: ADMINISTRATIVE | Facility: HOSPITAL | Age: 67
End: 2017-08-18

## 2017-08-18 ENCOUNTER — ANTICOAGULATION VISIT (OUTPATIENT)
Dept: CARDIOLOGY | Facility: CLINIC | Age: 67
End: 2017-08-18

## 2017-08-18 DIAGNOSIS — Z12.31 VISIT FOR SCREENING MAMMOGRAM: Primary | ICD-10-CM

## 2017-08-18 DIAGNOSIS — I74.9 EMBOLISM AND THROMBOSIS OF UNSPECIFIED SITE: ICD-10-CM

## 2017-08-18 LAB — INR PPP: 2.8

## 2017-08-28 ENCOUNTER — TELEPHONE (OUTPATIENT)
Dept: PHARMACY | Facility: HOSPITAL | Age: 67
End: 2017-08-28

## 2017-08-28 ENCOUNTER — ANTICOAGULATION VISIT (OUTPATIENT)
Dept: PHARMACY | Facility: HOSPITAL | Age: 67
End: 2017-08-28

## 2017-08-28 ENCOUNTER — HOSPITAL ENCOUNTER (OUTPATIENT)
Dept: ULTRASOUND IMAGING | Facility: HOSPITAL | Age: 67
Discharge: HOME | End: 2017-08-28
Attending: INTERNAL MEDICINE | Admitting: INTERNAL MEDICINE

## 2017-08-28 ENCOUNTER — OFFICE VISIT (OUTPATIENT)
Dept: NEUROLOGY | Facility: CLINIC | Age: 67
End: 2017-08-28

## 2017-08-28 ENCOUNTER — TRANSCRIBE ORDERS (OUTPATIENT)
Dept: ADMINISTRATIVE | Facility: HOSPITAL | Age: 67
End: 2017-08-28

## 2017-08-28 VITALS
OXYGEN SATURATION: 97 % | SYSTOLIC BLOOD PRESSURE: 108 MMHG | BODY MASS INDEX: 28.09 KG/M2 | DIASTOLIC BLOOD PRESSURE: 57 MMHG | HEART RATE: 73 BPM | HEIGHT: 67 IN | WEIGHT: 179 LBS

## 2017-08-28 DIAGNOSIS — T14.8XXA HEMATOMA: Primary | ICD-10-CM

## 2017-08-28 DIAGNOSIS — M54.12 CERVICAL RADICULOPATHY: ICD-10-CM

## 2017-08-28 DIAGNOSIS — G44.209 TENSION HEADACHE: ICD-10-CM

## 2017-08-28 DIAGNOSIS — M47.812 SPONDYLOSIS OF CERVICAL REGION WITHOUT MYELOPATHY OR RADICULOPATHY: ICD-10-CM

## 2017-08-28 DIAGNOSIS — G43.009 MIGRAINE WITHOUT AURA AND WITHOUT STATUS MIGRAINOSUS, NOT INTRACTABLE: ICD-10-CM

## 2017-08-28 DIAGNOSIS — I74.9 EMBOLISM AND THROMBOSIS OF UNSPECIFIED SITE: ICD-10-CM

## 2017-08-28 DIAGNOSIS — G89.4 CHRONIC PAIN SYNDROME: Primary | ICD-10-CM

## 2017-08-28 DIAGNOSIS — T14.8XXA HEMATOMA: ICD-10-CM

## 2017-08-28 DIAGNOSIS — M54.16 LUMBAR RADICULOPATHY: ICD-10-CM

## 2017-08-28 DIAGNOSIS — F41.9 ANXIETY: ICD-10-CM

## 2017-08-28 LAB — INR PPP: 2.5

## 2017-08-28 PROCEDURE — 99213 OFFICE O/P EST LOW 20 MIN: CPT | Performed by: PSYCHIATRY & NEUROLOGY

## 2017-08-28 PROCEDURE — 76882 US LMTD JT/FCL EVL NVASC XTR: CPT

## 2017-08-28 RX ORDER — DOXEPIN HYDROCHLORIDE 10 MG/1
10 CAPSULE ORAL NIGHTLY
Qty: 30 CAPSULE | Refills: 11 | Status: SHIPPED | OUTPATIENT
Start: 2017-08-28 | End: 2017-08-28 | Stop reason: SDUPTHER

## 2017-08-28 RX ORDER — DOXEPIN HYDROCHLORIDE 25 MG/1
25 CAPSULE ORAL NIGHTLY
Qty: 30 CAPSULE | Refills: 11 | Status: SHIPPED | OUTPATIENT
Start: 2017-08-28 | End: 2018-03-08

## 2017-08-28 NOTE — PROGRESS NOTES
Anticoagulation Clinic - Remote Progress Note  [facility]   Frequency of Monitorin days    Patient Contact Info: [phone]    Indication: Bilateral PE, stroke syndrome, embolism and thromosis of unspecified site  Referring Provider: Dr. Rika Sullivan  Initial Warfarin Start Date:   Planned Duration of Therapy: lifelong  Goal INR: 2-3  Current Drug Interactions: doxepin d/c'd on ; new antidepressant- patient is unsure of medication  Other: [PREVIOUS ANTICOAGULATION, ETC]  Bleed Risk: [HASBLED, if appropriate; HIGH/MODERATE/LOW + REASON, H/O BLEED]    Diet: [GLV INTAKE]  Alcohol:   Tobacco:       INR History:  Date            Total Weekly Dose 27.5           INR 2.5           Notes hematoma?; stop doxepin               Phone Interview:  Tablet Strength: pt has 5mg tablets    Current Maintenance Dose: 5mg daily except 2.5mg MWF  Patient Findings      Positives Signs/symptoms of bleeding, Change in health, Change in medications, Bruising     Negatives Signs/symptoms of thrombosis, Laboratory test error suspected, Change in alcohol use, Change in activity, Upcoming invasive procedure, Emergency department visit, Upcoming dental procedure, Missed doses, Extra doses, Change in diet/appetite, Hospital admission, Other complaints     Comments Patient stated that she had recently stopped taking Pancrelipase and Doxepin (doxepin per Dr. Lewis's notes ).  Patient also reported that she has been sick recently and has experienced a loss of appetite. Patient is starting new medication (antidepressant) tomorrow from [facility] but she is unsure of the name of the medication. Discussed possible DDI with new medication and D/C of Doxepin. Patient also stated that she has hematoma on her right leg per Dr. Lewis (neurologist). She is going to her PCP today (Dr. Ritter) and is going to show him. Stressed importance of patient going to the ED if unable to show PCP today. Called Pauline and told  patient I will call her back if there are any other changes needed to her regimen.         Addendum: Patient has a confirmed hematoma by Dr. Ritter today. Called Pauline to discuss course of action given patient history of embolisms. Per Pauline Dr. Sullivan does not recommend a dose hold at this time. Will instruct patient to continue warfarin and to contact [facility], cardiology, or go to the ED if the hematoma gets bigger. Patient voiced understanding    Plan:  1. INR is therapeutic at 2.5. Instructed pt to continue 5mg daily except 2.5mg MWF.  2. Repeat INR in one week due to DDI with D/C of doxepin and initiation of new antidepressant. Patient typically checks every 14 days however, she states she has been monitoring more frequently.  3. Discuss refill requests next time speak with patient.  4. Verbal information provided over the phone to Mrs. Muller. Mrs. Muller expresses understanding and has no further questions at this time.      Alanna Bermudez, PharmD  8/28/2017  1:08 PM

## 2017-08-28 NOTE — PROGRESS NOTES
Subjective:     Patient ID: Leela Muller is a 67 y.o. female.    HPI:   History of Present Illness  The following portions of the patient's history were reviewed and updated as appropriate: allergies, current medications, past family history, past medical history, past social history, past surgical history and problem list.     Patient complains of chronic pain, neck and left arm pain, has been on Norco. Complains of easy bruising. She has lipomas on her left forearm, a deep bruise on her right upper thigh. Last INR 2.5. She has had surgery for rectal cancer and pancreatitis.    Past Medical History:   Diagnosis Date   • Anxiety    • Arm pain    • Arthritis    • Depression    • Diabetes mellitus    • Hyperlipidemia    • Hypertension    • Neck pain on left side    • Pancreatitis    • Pulmonary embolism    • Stroke syndrome 9/14/2016    · Assessed By: Devaughn Lewis (Neurology); Last Assessed: 16 Jun 2015  Right cerebrovascular accident in July or August 2010, evaluated at [facility].  Admission to [facility] on 09/28/2010 with headache and stuttering, consistent with TIA.  Chronic Coumadin therapy, initiated following bilateral pulmonary emboli in 2007 after fall and hip replacement.  She was already on 81 mg of aspirin as well, 09/2010.  MRI of the brain on 09/28/2010 revealing old right parietal cerebrovascular accident.  MRA revealing normal carotids, middle anterior and posterior cerebral arteries with minimal ostial stenosis of both vertebral arteries.  GENARO by Dr. Oliver Mobley on 09/28/2010:  patent foramen ovale with a small amount of right to left shunting.  Normal LVEF and normal valvular structures.   Patent foramen ovale closure using a 25 mm. Amplatzer cribriform occluder, 10/05/2010.   Echocardiogram, 06/23/2014:  LVEF (55% to 60%) with and Amplatzer device noted to be well-seated in    • Thrombocytopenia    • Transient cerebral ischemia        Past Surgical History:    Procedure Laterality Date   • APPENDECTOMY     • BREAST BIOPSY Left 2012    benign   • BREAST EXCISIONAL BIOPSY Left     benign   • BREAST EXCISIONAL BIOPSY Right     benign   • CHOLECYSTECTOMY     • HYSTERECTOMY     • TONSILLECTOMY     • TOTAL HIP ARTHROPLASTY REVISION         Social History     Social History   • Marital status:      Spouse name: N/A   • Number of children: N/A   • Years of education: N/A     Occupational History   • Not on file.     Social History Main Topics   • Smoking status: Never Smoker   • Smokeless tobacco: Not on file   • Alcohol use No   • Drug use: No   • Sexual activity: Defer     Other Topics Concern   • Not on file     Social History Narrative       Family History   Problem Relation Age of Onset   • Alzheimer's disease Mother    • Cancer Mother    • Coronary artery disease Other    • Diabetes Other    • Hypertension Other    • Stroke Other    • Cancer Other    • Dementia Other    • Heart attack Father    • Breast cancer Neg Hx    • Ovarian cancer Neg Hx         Review of Systems   Constitutional: Negative for chills, fatigue, fever and unexpected weight change.   HENT: Negative for ear pain, hearing loss, nosebleeds, rhinorrhea and sore throat.    Eyes: Negative for photophobia, pain, discharge, itching and visual disturbance.   Respiratory: Negative for cough, chest tightness, shortness of breath and wheezing.    Cardiovascular: Negative for chest pain, palpitations and leg swelling.   Gastrointestinal: Negative for abdominal pain, blood in stool, constipation, diarrhea, nausea and vomiting.   Genitourinary: Negative for dysuria, frequency, hematuria and urgency.   Musculoskeletal: Positive for arthralgias, back pain, gait problem, neck pain and neck stiffness. Negative for joint swelling and myalgias.   Skin: Negative for rash and wound.   Allergic/Immunologic: Negative for environmental allergies and food allergies.   Neurological: Positive for weakness. Negative for  dizziness, tremors, seizures, syncope, speech difficulty, light-headedness, numbness and headaches.   Hematological: Negative for adenopathy. Does not bruise/bleed easily.   Psychiatric/Behavioral: Negative for agitation, confusion, decreased concentration, hallucinations, sleep disturbance and suicidal ideas. The patient is not nervous/anxious.         Objective:    Neurologic Exam     Mental Status   Oriented to person, place, and time.       Physical Exam   Constitutional: She is oriented to person, place, and time. She appears well-developed and well-nourished.   Cardiovascular: Normal rate and regular rhythm.    Pulmonary/Chest: Effort normal.   Neurological: She is alert and oriented to person, place, and time. She has normal reflexes.   Psychiatric: She has a normal mood and affect. Her behavior is normal. Thought content normal.       Assessment/Plan:       Leela was seen today for migraine.    Diagnoses and all orders for this visit:    Chronic pain syndrome  -     Discontinue: doxepin (SINEquan) 10 MG capsule; Take 1 capsule by mouth Every Night.  -     doxepin (SINEquan) 25 MG capsule; Take 1 capsule by mouth Every Night.    Cervical radiculopathy  -     Discontinue: doxepin (SINEquan) 10 MG capsule; Take 1 capsule by mouth Every Night.  -     doxepin (SINEquan) 25 MG capsule; Take 1 capsule by mouth Every Night.    Migraine without aura and without status migrainosus, not intractable    Anxiety  -     Discontinue: doxepin (SINEquan) 10 MG capsule; Take 1 capsule by mouth Every Night.  -     doxepin (SINEquan) 25 MG capsule; Take 1 capsule by mouth Every Night.    Tension headache  -     Discontinue: doxepin (SINEquan) 10 MG capsule; Take 1 capsule by mouth Every Night.  -     doxepin (SINEquan) 25 MG capsule; Take 1 capsule by mouth Every Night.    Spondylosis of cervical region without myelopathy or radiculopathy  -     Discontinue: doxepin (SINEquan) 10 MG capsule; Take 1 capsule by mouth Every  Night.  -     doxepin (SINEquan) 25 MG capsule; Take 1 capsule by mouth Every Night.    Lumbar radiculopathy  -     Discontinue: doxepin (SINEquan) 10 MG capsule; Take 1 capsule by mouth Every Night.  -     doxepin (SINEquan) 25 MG capsule; Take 1 capsule by mouth Every Night.           Devaughn Lewis MD  8/28/2017

## 2017-09-04 LAB — INR PPP: 2.2

## 2017-09-05 ENCOUNTER — ANTICOAGULATION VISIT (OUTPATIENT)
Dept: PHARMACY | Facility: HOSPITAL | Age: 67
End: 2017-09-05

## 2017-09-05 DIAGNOSIS — I74.9 EMBOLISM AND THROMBOSIS OF UNSPECIFIED SITE: ICD-10-CM

## 2017-09-05 RX ORDER — WARFARIN SODIUM 5 MG/1
TABLET ORAL
Qty: 90 TABLET | Refills: 0 | OUTPATIENT
Start: 2017-09-05 | End: 2017-10-02 | Stop reason: SDUPTHER

## 2017-09-05 NOTE — PROGRESS NOTES
Anticoagulation Clinic - Remote Progress Note  [[facility] vs REMOTE LAB]  Frequency of Monitoring (PST, ONLY): 14 days    Patient Contact Info: [phone]    Indication: Bilateral PE, stroke syndrome, embolism and thromosis of unspecified site  Referring Provider: Dr. Rika Sullivan  Initial Warfarin Start Date:   Planned Duration of Therapy: lifelong  Goal INR: 2-3  Current Drug Interactions: doxepin d/c'd on 8/28; new antidepressant- patient is unsure of medication  Other: [PREVIOUS ANTICOAGULATION, ETC]  Bleed Risk: [HASBLED, if appropriate; HIGH/MODERATE/LOW + REASON, H/O BLEED]    Diet: [GLV INTAKE]  Alcohol:   Tobacco:       INR History:  Date 8/28 9/4          Total Weekly Dose 27.5 27.5          INR 2.5 2.2          Notes hematoma?; stop doxepin Patient stopped Doxepin on 8/29              Phone Interview:  Tablet Strength: pt has 5mg tablets  Current Maintenance Dose: 2.5 mg on Mon, Wed, Fri; 5 mg all other days  Patient Findings      Negatives Signs/symptoms of thrombosis, Signs/symptoms of bleeding, Laboratory test error suspected, Change in health, Change in alcohol use, Change in activity, Upcoming invasive procedure, Emergency department visit, Upcoming dental procedure, Missed doses, Extra doses, Change in medications, Change in diet/appetite, Hospital admission, Bruising, Other complaints     Comments Patient stated that she is having a screening for rectal cancer in a couple of weeks but she does not have to hold her Warfarin for this procedure.        Patient Contact Info: [phone] (Home)              [phone] (Cell)  Lab Contact Info: Romeo     Plan:  1. INR is 2.2. Instructed pt to continue 5MG daily except 2.5 MWF.   2. Repeat INR in 14 days (9/18/17).  3. Pt requests warfarin refills.  4. Verbal information provided over the phone to patient. Patient RBV dosing instructions, expresses understanding, and has no further questions at this time.      Tavni BRODY  Cindy Bermudez, PharmD  9/5/2017  10:31 AM         IAlanna, PharmD, have reviewed the note in full and agree with the assessment and plan.  09/05/17  11:39 AM

## 2017-09-07 ENCOUNTER — TELEPHONE (OUTPATIENT)
Dept: PHARMACY | Facility: HOSPITAL | Age: 67
End: 2017-09-07

## 2017-09-07 RX ORDER — WARFARIN SODIUM 5 MG/1
TABLET ORAL
Qty: 90 TABLET | Refills: 0 | OUTPATIENT
Start: 2017-09-07

## 2017-09-18 DIAGNOSIS — M54.2 NECK PAIN: ICD-10-CM

## 2017-09-18 LAB — INR PPP: 2.4

## 2017-09-18 RX ORDER — GABAPENTIN 800 MG/1
800 TABLET ORAL 3 TIMES DAILY
Qty: 90 TABLET | Refills: 1 | OUTPATIENT
Start: 2017-09-18 | End: 2018-04-15 | Stop reason: SDUPTHER

## 2017-09-19 ENCOUNTER — ANTICOAGULATION VISIT (OUTPATIENT)
Dept: PHARMACY | Facility: HOSPITAL | Age: 67
End: 2017-09-19

## 2017-09-19 DIAGNOSIS — I74.9 EMBOLISM AND THROMBOSIS OF UNSPECIFIED SITE: ICD-10-CM

## 2017-09-19 NOTE — PROGRESS NOTES
Anticoagulation Clinic - Remote Progress Note  [facility]   Frequency of Monitorin days    Indication: Bilateral PE, stroke syndrome, embolism and thromosis of unspecified site  Referring Provider: Dr. Rika Sullivan  Initial Warfarin Start Date:   Planned Duration of Therapy: lifelong  Goal INR: 2-3  Current Drug Interactions: doxepin d/c'd on ; ASA  Other: [PREVIOUS ANTICOAGULATION, ETC]  Bleed Risk: [HASBLED, if appropriate; HIGH/MODERATE/LOW + REASON, H/O BLEED]    Diet: Low GLV intake  Alcohol: None  Tobacco: None    INR History:  Date          Total Weekly Dose 27.5mg 27.5mg 27.5mg         INR 2.5 2.2 2.4         Notes hematoma?; stop doxepin Patient stopped Doxepin on               Phone Interview:  Tablet Strength: pt has 5mg tablets  Current Maintenance Dose: 2.5 mg on Mon, Wed, Fri; 5 mg all other days    Patient Contact Info: [phone] (Home)              [phone] (Cell)  Lab Contact Info: Romeo   Patient Findings      Positives Change in health, Change in medications     Negatives Signs/symptoms of thrombosis, Signs/symptoms of bleeding, Laboratory test error suspected, Change in alcohol use, Change in activity, Upcoming invasive procedure, Emergency department visit, Upcoming dental procedure, Missed doses, Extra doses, Change in diet/appetite, Hospital admission, Bruising, Other complaints     Comments Patient reports that she has been sick (vomiting and diarrhea) last week. She does states she is feeling better and appetite is returning. She took imodium and phenergan. Patient will call the clinic if she starts any abx when she sees PCP on Thursday.     Plan:  1. INR is therapeutic (2.4). Instructed pt to continue 5MG daily except 2.5 MWF.   2. Repeat INR in 7 days (17) to ensure WNL with illness.  3. Pt declines warfarin refills.  4. Verbal information provided over the phone to patient. Patient RBV dosing instructions, expresses understanding,  and has no further questions at this time.      Tanvi Bermudez, PharmD  9/19/2017  12:30 PM         I, Alanna Bermudez, PharmD, have reviewed the note in full and agree with the assessment and plan.  09/19/17  12:44 PM

## 2017-10-02 ENCOUNTER — ANTICOAGULATION VISIT (OUTPATIENT)
Dept: PHARMACY | Facility: HOSPITAL | Age: 67
End: 2017-10-02

## 2017-10-02 LAB — INR PPP: 2.8

## 2017-10-02 RX ORDER — WARFARIN SODIUM 5 MG/1
TABLET ORAL
Qty: 90 TABLET | Refills: 0 | OUTPATIENT
Start: 2017-10-02 | End: 2017-12-11 | Stop reason: SDUPTHER

## 2017-10-02 NOTE — PROGRESS NOTES
"Anticoagulation Clinic - Remote Progress Note  [facility]   Frequency of Monitorin days    Indication: Bilateral PE, stroke syndrome, embolism and thromosis of unspecified site  Referring Provider: Dr. Rika Sullivan  Initial Warfarin Start Date:   Planned Duration of Therapy: lifelong  Goal INR: 2-3  Current Drug Interactions: doxepin d/c'd on ; ASA  Other: [PREVIOUS ANTICOAGULATION, ETC]  Bleed Risk: [HASBLED, if appropriate; HIGH/MODERATE/LOW + REASON, H/O BLEED]    Diet: Low GLV intake  Alcohol: None  Tobacco: None  OTC Pain Medications: APAP    INR History:  Date 8/28 9/4 9/18 10/2        Total Weekly Dose 27.5mg 27.5mg 27.5mg 27.5 mg        INR 2.5 2.2 2.4 2.8        Notes hematoma?; stop doxepin Patient stopped Doxepin on               Phone Interview:  Tablet Strength: pt has 5mg tablets  Current Maintenance Dose: 2.5 mg on Mon, Wed, Fri; 5 mg all other days     Patient Findings      Positives Change in health, Emergency department visit, Change in medications     Negatives Signs/symptoms of thrombosis, Signs/symptoms of bleeding, Laboratory test error suspected, Change in alcohol use, Change in activity, Upcoming invasive procedure, Upcoming dental procedure, Missed doses, Extra doses, Change in diet/appetite, Hospital admission, Bruising, Other complaints     Comments Says she is starting vitamin for 'energy' from Dr. Lewis, however, Mrs. Muller does cannot recall what it is called. Patient reports a large knot/bruise on top of her leg. Her PCP sent her to Hospital for an xray, where it was reported that it is a \"small mass of blood\". Pt reports it is getting smaller/healing, but if it doesn't heal soon her PCP will look further into it.  Mrs. Muller reports falling in a seated position on Thursday, and reports some minor bruising.    Spoke with Rafiq at Dr. Lewis's office and recommended that she take a B vitamin complex.     Patient Contact Info: [phone] (Home)      "         [phone] (Cell)  Lab Contact Info: Abigailre       Plan:  1. INR is therapeutic (2.8). Instructed pt to continue 5mg daily except 2.5mg on MonWedFri.   2. Repeat INR in 2 weeks (10/6/17)   3. Pt requests refills called into [facility] in [place] [phone]  4. Called [facility] retail pharmacy to determine vitamin however, they do not have records of it. Spoke with Rafiq at Dr. Lewis's office and recommended that she take a B vitamin complex.  4. Verbal information provided over the phone to patient. Patient RBV dosing instructions, expresses understanding, and has no further questions at this time.        Alanna Bermudez, PharmD  10/2/2017  9:41 AM

## 2017-10-09 ENCOUNTER — ANTICOAGULATION VISIT (OUTPATIENT)
Dept: PHARMACY | Facility: HOSPITAL | Age: 67
End: 2017-10-09

## 2017-10-09 DIAGNOSIS — I74.9 EMBOLISM AND THROMBOSIS (HCC): ICD-10-CM

## 2017-10-09 LAB — INR PPP: 3.2

## 2017-10-09 NOTE — PROGRESS NOTES
Anticoagulation Clinic - Remote Progress Note  [facility]   Frequency of Monitorin days    Indication: Bilateral PE, stroke syndrome, embolism and thromosis of unspecified site  Referring Provider: Dr. Rika Sullivan  Initial Warfarin Start Date:   Planned Duration of Therapy: lifelong  Goal INR: 2-3  Current Drug Interactions: doxepin d/c'd on ; ASA  Other: [PREVIOUS ANTICOAGULATION, ETC]  Bleed Risk: [HASBLED, if appropriate; HIGH/MODERATE/LOW + REASON, H/O BLEED]    Diet: Low GLV intake  Alcohol: None  Tobacco: None  OTC Pain Medications: APAP    INR History:  Date 8/28 9/4 9/18 10/2        Total Weekly Dose 27.5mg 27.5mg 27.5mg 27.5 mg        INR 2.5 2.2 2.4 2.8        Notes hematoma?; stop doxepin Patient stopped Doxepin on               Phone Interview:  Tablet Strength: pt has 5mg tablets  Current Maintenance Dose: 2.5 mg on Mon, Wed, Fri; 5 mg all other days  Patient Findings      Positives Change in health, Change in medications, Change in diet/appetite, Bruising     Negatives Signs/symptoms of thrombosis, Signs/symptoms of bleeding, Laboratory test error suspected, Change in alcohol use, Change in activity, Upcoming invasive procedure, Emergency department visit, Upcoming dental procedure, Missed doses, Extra doses, Hospital admission, Other complaints     Comments Patient reports that she has experienced N/V/D since last Tuesday. She has been taking phenergan and imodium PRN. She is planning to call PCP for appointment today. She has had a decrease in appetite. Patient was scheduled to have a check up today with oncology, however, she couldn't attend due to illness. Advised patient drink plenty of water/fluids to attempt to avoid dehydration. Patient denies fluid restrictions.     Patient Contact Info: [phone] (Home)              [phone] (Cell)  Lab Contact Info: Romeo       Plan:  1. INR is SUPRAtherapeutic (3.2)- possibly due to N/V/D. Given patient is due for 1/2  tablet tonight, instructed pt to hold dose tonight (9% decrease) then continue 5mg daily except 2.5mg on MonWedFri.   2. Repeat INR Friday to ensure remains WNL prior to going into weekend. (10/6/17) Patient will call the clinic if she is started on an antibiotic.  3. Pt declines refills.  4. Verbal information provided over the phone to patient. Patient RBV dosing instructions, expresses understanding, and has no further questions at this time.      Alanna Bermudez, PharmD  10/9/2017  9:26 AM

## 2017-10-13 ENCOUNTER — ANTICOAGULATION VISIT (OUTPATIENT)
Dept: PHARMACY | Facility: HOSPITAL | Age: 67
End: 2017-10-13

## 2017-10-13 ENCOUNTER — TELEPHONE (OUTPATIENT)
Dept: PHARMACY | Facility: HOSPITAL | Age: 67
End: 2017-10-13

## 2017-10-13 DIAGNOSIS — I74.9 EMBOLISM AND THROMBOSIS (HCC): ICD-10-CM

## 2017-10-13 LAB — INR PPP: 3.2

## 2017-10-13 NOTE — PROGRESS NOTES
Anticoagulation Clinic - Remote Progress Note  [facility]   Frequency of Monitorin days    Indication: Bilateral PE, stroke syndrome, embolism and thromosis of unspecified site  Referring Provider: Dr. Rika Sullivan  Initial Warfarin Start Date:   Planned Duration of Therapy: lifelong  Goal INR: 2-3  Current Drug Interactions: doxepin d/c'd on ; ASA    Diet: Low GLV intake  Alcohol: None  Tobacco: None  OTC Pain Medications: APAP    INR History:  Date 8/28 9/4 9/18 10/2 10/9 10/13      Total Weekly Dose 27.5mg 27.5mg 27.5mg 27.5 mg 27.5 mg 25 mg      INR 2.5 2.2 2.4 2.8 3.2 3.2      Notes hematoma?; stop doxepin Patient stopped Doxepin on               Phone Interview:  Tablet Strength: pt has 5mg tablets  Current Maintenance Dose: 2.5 mg on Mon, Wed, Fri; 5 mg all other days  Patient Findings      Positives Change in health, Missed doses, Change in diet/appetite, Bruising     Negatives Signs/symptoms of thrombosis, Signs/symptoms of bleeding, Laboratory test error suspected, Change in alcohol use, Change in activity, Upcoming invasive procedure, Emergency department visit, Upcoming dental procedure, Extra doses, Change in medications, Hospital admission, Other complaints     Comments PT has been sick this week, which is likely contributing to her increased INR. She reports diarrhea has subsided, but vomited yesterday evening. She held  dose due to the elevation, and has been instructed to hold todays dose as well ( will receive 5mg less than usual this week ). Has noticed she bruised easier this week as well, but reports no signs of bleeding.        Patient Contact Info: [phone] (Home)              [phone] (Cell)  Lab Contact Info: Alere       Plan:  1. INR is SUPRAtherapeutic (3.2) again today- PT remains feeling ill with inadequate appetite, thought better than earlier in the week. PT held dose Monday due to similar circumstance, and I have instructed her to hold  tonights dose (2.5 mg). Will resume home regimen tomorrow, as the INR increase is likely due to acute illness.  2. Repeat INR in 1 week to ensure stabilization following illness.   3. Pt declines refills.  4. Verbal information provided over the phone to patient. Patient RBV dosing instructions, expresses understanding, and has no further questions at this time.    Susie Mathis, PharmD  10/13/17  11:15 AM

## 2017-10-20 ENCOUNTER — ANTICOAGULATION VISIT (OUTPATIENT)
Dept: PHARMACY | Facility: HOSPITAL | Age: 67
End: 2017-10-20

## 2017-10-20 DIAGNOSIS — I74.9 EMBOLISM AND THROMBOSIS (HCC): ICD-10-CM

## 2017-10-20 LAB — INR PPP: 2.2

## 2017-10-20 NOTE — PROGRESS NOTES
Anticoagulation Clinic - Remote Progress Note  [facility]   Frequency of Monitorin days    Indication: Bilateral PE, stroke syndrome, embolism and thromosis of unspecified site  Referring Provider: Dr. Rika Sullivan  Initial Warfarin Start Date:   Planned Duration of Therapy: lifelong  Goal INR: 2-3  Current Drug Interactions: doxepin d/c'd on ; ASA    Diet: Low GLV intake  Alcohol: None  Tobacco: None  OTC Pain Medications: APAP    INR History:  Date 8/28 9/4 9/18 10/2 10/9 10/13 10/20     Total Weekly Dose 27.5mg 27.5mg 27.5mg 27.5 mg 27.5 mg 25 mg 25mg     INR 2.5 2.2 2.4 2.8 3.2 3.2 2.2     Notes hematoma?; stop doxepin Patient stopped Doxepin on               Phone Interview:  Tablet Strength: pt has 5mg tablets  Current Maintenance Dose: 2.5 mg on Mon, Wed, Fri; 5 mg all other days  Patient Findings      Positives Signs/symptoms of bleeding, Change in medications, Change in diet/appetite     Negatives Signs/symptoms of thrombosis, Laboratory test error suspected, Change in health, Change in alcohol use, Change in activity, Upcoming invasive procedure, Emergency department visit, Upcoming dental procedure, Missed doses, Extra doses, Hospital admission, Bruising, Other complaints     Comments Patient reports she is still tired and is taking phenergan for N/V. Patient reports diarrhea has subsided. Mrs. Muller admits to a loss in appetite. She reports she has experienced more bruising, however, she thinks she has seen blood in her urine. She has talked with Dr. Ritter and they will call her back today to follow up with a plan. She plans to go to the ED if it is persistent. Voiced concern of blood in urine, however, patient will wait for instructions per Dr. Ritter. She also reports an ear ache. She may start Claritin.     Patient will likely have spots on skin removed by Dr. Yoo, dermatologist. She will call the clinic when they are scheduled and we will work with Dr. Yoo  office to determine if a dose hold is necessary.     Patient Contact Info: [phone] (Home)              [phone] (Cell)  Lab Contact Info: Romeo     Plan:  1. INR is therapeutic today following illness/dose holds. Mrs. Muller is still not feeling well. Will instruct patient to continue 5mg daily except 2.5mg MWF.  2. Repeat INR in on Wednesday 10/25 to ensure remains WNL. May consider dose decrease to 25mg/ week.   3. Pt declines refills.  4. Verbal information provided over the phone to patient. Patient RBV dosing instructions, expresses understanding, and has no further questions at this time.      Alanna Bermudez, PharmD  10/20/17  1:43 PM

## 2017-10-27 ENCOUNTER — ANTICOAGULATION VISIT (OUTPATIENT)
Dept: PHARMACY | Facility: HOSPITAL | Age: 67
End: 2017-10-27

## 2017-10-27 DIAGNOSIS — I74.9 EMBOLISM AND THROMBOSIS (HCC): ICD-10-CM

## 2017-10-27 LAB — INR PPP: 1.9

## 2017-10-27 NOTE — PROGRESS NOTES
Anticoagulation Clinic - Remote Progress Note  [facility]   Frequency of Monitorin days    Indication: Bilateral PE, stroke syndrome, embolism and thromosis of unspecified site  Referring Provider: Dr. Rika Sullivan  Initial Warfarin Start Date:   Planned Duration of Therapy: lifelong  Goal INR: 2-3  Current Drug Interactions: doxepin d/c'd on ; ASA    Diet: Low GLV intake  Alcohol: None  Tobacco: None  OTC Pain Medications: APAP    INR History:  Date 8/28 9/4 9/18 10/2 10/9 10/13 10/20 10/27    Total Weekly Dose 27.5mg 27.5mg 27.5mg 27.5 mg 27.5 mg 25 mg 25mg 25mg    INR 2.5 2.2 2.4 2.8 3.2 3.2 2.2 1.9    Notes hematoma?; stop doxepin Patient stopped Doxepin on       sick; nose bleed      Phone Interview:  Tablet Strength: pt has 5mg tablets  Current Maintenance Dose: 2.5 mg on Mon, Wed, Fri; 5 mg all other days  Patient Findings 10/20     Positives Signs/symptoms of bleeding, Change in medications, Change in diet/appetite     Negatives Signs/symptoms of thrombosis, Laboratory test error suspected, Change in health, Change in alcohol use, Change in activity, Upcoming invasive procedure, Emergency department visit, Upcoming dental procedure, Missed doses, Extra doses, Hospital admission, Bruising, Other complaints     Comments Patient reports she is still tired and is taking phenergan for N/V. Patient reports diarrhea has subsided. Mrs. Muller admits to a loss in appetite. She reports she has experienced more bruising, however, she thinks she has seen blood in her urine. She has talked with Dr. Ritter and they will call her back today to follow up with a plan. She plans to go to the ED if it is persistent. Voiced concern of blood in urine, however, patient will wait for instructions per Dr. Ritter. She also reports an ear ache. She may start Claritin.     Patient will likely have spots on skin removed by Dr. Yoo, dermatologist. She will call the clinic when they are scheduled and  "we will work with Dr. Yoo office to determine if a dose hold is necessary.     Patient Findings 10/27     Positives Signs/symptoms of bleeding, Change in health, Change in medications     Negatives Signs/symptoms of thrombosis, Laboratory test error suspected, Change in alcohol use, Change in activity, Upcoming invasive procedure, Emergency department visit, Upcoming dental procedure, Missed doses, Extra doses, Change in diet/appetite, Hospital admission, Bruising, Other complaints     Comments Mrs. Muller called reporting that she has been very sick since Mon/Tues with food poisoning -- she was prescribed Phenergan but has otherwise just had much less of an appetite. She continues to have diarrhea but vomiting has resolved. She had a nosebleed this AM, but she denies other s/sx of bleeding (zoila no blood in her stools, which she reports are \"just water\" at this point). She has missed some of her medications due to nausea, but she has not missed any doses of warfarin. She required two test strips today, and she verified that she did re-stick herself for the second test.     Patient Contact Info: [phone] (Home)              [phone] (Cell)    Plan:  1. INR is slightly subtherapeutic today, but given recent trend + pt's ongoing illness / diarrhea, instructed her to continue warfarin 5mg daily except 2.5mg MWF.  2. Repeat INR in one week. Encouraged her to test again sooner, or call us if she experiences any other s/sx of bleeding (epistaxis today).  3. Verbal information provided over the phone to Mrs. Muller. She RBV dosing instructions, expresses understanding, and has no further questions at this time.    Yasmeen River RP  10/27/17  1:43 PM  "

## 2017-11-03 ENCOUNTER — ANTICOAGULATION VISIT (OUTPATIENT)
Dept: PHARMACY | Facility: HOSPITAL | Age: 67
End: 2017-11-03

## 2017-11-03 DIAGNOSIS — I74.9 EMBOLISM AND THROMBOSIS (HCC): ICD-10-CM

## 2017-11-03 LAB — INR PPP: 2.5

## 2017-11-03 RX ORDER — TRIAMCINOLONE ACETONIDE 55 UG/1
2 SPRAY, METERED NASAL
COMMUNITY
End: 2018-03-08

## 2017-11-03 NOTE — PROGRESS NOTES
Anticoagulation Clinic - Remote Progress Note  [facility]   Frequency of Monitorin days    Indication: Bilateral PE, stroke syndrome, embolism and thromosis of unspecified site  Referring Provider: Dr. Rika Sullivan  Initial Warfarin Start Date:   Planned Duration of Therapy: lifelong  Goal INR: 2-3  Current Drug Interactions: doxepin d/c'd on ; ASA    Diet: Low GLV intake  Alcohol: None  Tobacco: None  OTC Pain Medications: APAP    INR History:  Date 8/28 9/4 9/18 10/2 10/9 10/13 10/20 10/27 11/3   Total Weekly Dose 27.5mg 27.5mg 27.5 mg 27.5 mg 27.5 mg 25 mg 25mg 25mg 25mg   INR 2.5 2.2 2.4 2.8 3.2 3.2 2.2 1.9 2.5   Notes hematoma?;stop doxepin stopped doxepin on       sick; nose bleed sick; nose bleed     Phone Interview:  Tablet Strength: pt has 5mg tablets  Current Maintenance Dose: 2.5 mg on Mon, Wed, Fri; 5 mg all other days  Patient Findings      Positives Signs/symptoms of bleeding, Change in diet/appetite     Negatives Signs/symptoms of thrombosis, Laboratory test error suspected, Change in health, Change in alcohol use, Change in activity, Upcoming invasive procedure, Emergency department visit, Upcoming dental procedure, Missed doses, Extra doses, Change in medications, Hospital admission, Bruising, Other complaints     Comments Mrs. Muller reports that she is slowly starting to feel better. She has been eating a little more (baked potato with cheese, otherwise still mostly clear liquids) but continues taking promethazine and is still having some trouble taking / tolerating all her medications (not warfarin, however). She had another nosebleed earlier this week, out of her left nostril. She has been ensuring nares are moist, using Vaseline on a q-tip, so if this continues, may consider cauterization.     Patient Contact Info: [phone] (Home)              [phone] (Cell)    Plan:  1. INR is therapeutic today. Instructed Mrs. Muller to continue warfarin 5mg daily except  2.5mg MWF.  2. Repeat INR in 1 week (more frequently while she continues recovering from her illness).  3. Verbal information provided over the phone to Mrs. Muller. She RBV dosing instructions, expresses understanding, and has no further questions at this time.    Ann Cowden Mayer, PharmD  11/3/2017  12:33 PM

## 2017-11-13 ENCOUNTER — OFFICE VISIT (OUTPATIENT)
Dept: NEUROLOGY | Facility: CLINIC | Age: 67
End: 2017-11-13

## 2017-11-13 ENCOUNTER — HOSPITAL ENCOUNTER (OUTPATIENT)
Dept: CT IMAGING | Facility: HOSPITAL | Age: 67
Discharge: HOME | End: 2017-11-13
Attending: PSYCHIATRY & NEUROLOGY | Admitting: PSYCHIATRY & NEUROLOGY

## 2017-11-13 VITALS
BODY MASS INDEX: 28.09 KG/M2 | DIASTOLIC BLOOD PRESSURE: 44 MMHG | HEIGHT: 67 IN | SYSTOLIC BLOOD PRESSURE: 108 MMHG | OXYGEN SATURATION: 98 % | HEART RATE: 67 BPM | WEIGHT: 179 LBS

## 2017-11-13 DIAGNOSIS — M54.12 CERVICAL RADICULOPATHY: ICD-10-CM

## 2017-11-13 DIAGNOSIS — G44.221 CHRONIC TENSION-TYPE HEADACHE, INTRACTABLE: Primary | ICD-10-CM

## 2017-11-13 DIAGNOSIS — M54.2 NECK PAIN: ICD-10-CM

## 2017-11-13 PROCEDURE — 70450 CT HEAD/BRAIN W/O DYE: CPT

## 2017-11-13 PROCEDURE — 72125 CT NECK SPINE W/O DYE: CPT

## 2017-11-13 PROCEDURE — 99214 OFFICE O/P EST MOD 30 MIN: CPT | Performed by: PSYCHIATRY & NEUROLOGY

## 2017-11-13 NOTE — PROGRESS NOTES
Subjective:     Patient ID: Leela Muller is a 67 y.o. female.    CC:   Chief Complaint   Patient presents with   • Pain     chronic       HPI:   History of Present Illness  The following portions of the patient's history were reviewed and updated as appropriate: allergies, current medications, past family history, past medical history, past social history, past surgical history and problem list.     Complains of persistent mostly tension headaches, neck pain and spasm radiating toward left trapezius and left shoulder. Needs refills on compounded cream. Continues on gabapentin, doxepin, prn Fioricet. Recently had food poisoning.    Past Medical History:   Diagnosis Date   • Anxiety    • Arm pain    • Arthritis    • Depression    • Diabetes mellitus    • Hyperlipidemia    • Hypertension    • Neck pain on left side    • Pancreatitis    • Pulmonary embolism    • Stroke syndrome 9/14/2016    · Assessed By: Devaughn Lewis (Neurology); Last Assessed: 16 Jun 2015  Right cerebrovascular accident in July or August 2010, evaluated at [facility].  Admission to [facility] on 09/28/2010 with headache and stuttering, consistent with TIA.  Chronic Coumadin therapy, initiated following bilateral pulmonary emboli in 2007 after fall and hip replacement.  She was already on 81 mg of aspirin as well, 09/2010.  MRI of the brain on 09/28/2010 revealing old right parietal cerebrovascular accident.  MRA revealing normal carotids, middle anterior and posterior cerebral arteries with minimal ostial stenosis of both vertebral arteries.  GENARO by Dr. Oliver Mobley on 09/28/2010:  patent foramen ovale with a small amount of right to left shunting.  Normal LVEF and normal valvular structures.   Patent foramen ovale closure using a 25 mm. Amplatzer cribriform occluder, 10/05/2010.   Echocardiogram, 06/23/2014:  LVEF (55% to 60%) with and Amplatzer device noted to be well-seated in    • Thrombocytopenia    •  Transient cerebral ischemia        Past Surgical History:   Procedure Laterality Date   • APPENDECTOMY     • BREAST BIOPSY Left 2012    benign   • BREAST EXCISIONAL BIOPSY Left     benign   • BREAST EXCISIONAL BIOPSY Right     benign   • CHOLECYSTECTOMY     • HYSTERECTOMY     • TONSILLECTOMY     • TOTAL HIP ARTHROPLASTY REVISION         Social History     Social History   • Marital status:      Spouse name: N/A   • Number of children: N/A   • Years of education: N/A     Occupational History   • Not on file.     Social History Main Topics   • Smoking status: Never Smoker   • Smokeless tobacco: Not on file   • Alcohol use No   • Drug use: No   • Sexual activity: Defer     Other Topics Concern   • Not on file     Social History Narrative       Family History   Problem Relation Age of Onset   • Alzheimer's disease Mother    • Cancer Mother    • Coronary artery disease Other    • Diabetes Other    • Hypertension Other    • Stroke Other    • Cancer Other    • Dementia Other    • Heart attack Father    • Breast cancer Neg Hx    • Ovarian cancer Neg Hx         Review of Systems   Constitutional: Negative.  Negative for chills, fatigue, fever and unexpected weight change.   HENT: Negative for ear pain, hearing loss, nosebleeds, rhinorrhea and sore throat.    Eyes: Negative.  Negative for photophobia, pain, discharge, itching and visual disturbance.   Respiratory: Negative.  Negative for cough, chest tightness, shortness of breath and wheezing.    Cardiovascular: Positive for leg swelling. Negative for chest pain and palpitations.   Gastrointestinal: Negative.  Negative for abdominal pain, blood in stool, constipation, diarrhea, nausea and vomiting.   Endocrine: Positive for cold intolerance and heat intolerance.   Genitourinary: Positive for difficulty urinating. Negative for dysuria, frequency, hematuria and urgency.   Musculoskeletal: Positive for arthralgias, back pain, joint swelling, neck pain and neck stiffness.  Negative for gait problem and myalgias.   Skin: Negative.  Negative for rash and wound.   Allergic/Immunologic: Negative.  Negative for environmental allergies and food allergies.   Neurological: Positive for headaches. Negative for dizziness, tremors, seizures, syncope, speech difficulty, weakness, light-headedness and numbness.   Hematological: Negative for adenopathy. Bruises/bleeds easily.   Psychiatric/Behavioral: Positive for sleep disturbance. Negative for agitation, confusion, decreased concentration, hallucinations and suicidal ideas. The patient is not nervous/anxious.         Objective:    Neurologic Exam     Mental Status   Oriented to person, place, and time.       Physical Exam   Constitutional: She is oriented to person, place, and time. She appears well-developed and well-nourished.   Cardiovascular: Normal rate and regular rhythm.    Pulmonary/Chest: Effort normal.   Neurological: She is alert and oriented to person, place, and time. She has normal reflexes.   Decreased ROM cspine   Psychiatric: She has a normal mood and affect. Her behavior is normal. Thought content normal.       Assessment/Plan:       Leela was seen today for pain.    Diagnoses and all orders for this visit:    Chronic tension-type headache, intractable  -     CT Head Without Contrast    Neck pain  -     CT Cervical Spine Without Contrast    Cervical radiculopathy  -     CT Cervical Spine Without Contrast           Devaughn Lewis MD  11/13/2017

## 2017-11-16 ENCOUNTER — TELEPHONE (OUTPATIENT)
Dept: NEUROLOGY | Facility: CLINIC | Age: 67
End: 2017-11-16

## 2017-11-16 NOTE — TELEPHONE ENCOUNTER
----- Message from Devaughn Lewis MD sent at 11/14/2017  3:36 PM EST -----  CTs show old stroke and arthritis, keep f/u, thanks.

## 2017-11-16 NOTE — TELEPHONE ENCOUNTER
PATIENT CALLED IN AND I GAVE HER THE RESULTS AND SHE STATED SHE NEW THAT SHE IS WANTING TO KNOW ABOUT THE ARTERY THAT MADE AN ARTERY AND THE BLOOD CLOTS.

## 2017-11-17 ENCOUNTER — TELEPHONE (OUTPATIENT)
Dept: NEUROLOGY | Facility: CLINIC | Age: 67
End: 2017-11-17

## 2017-11-17 NOTE — TELEPHONE ENCOUNTER
----- Message from Dorene Saenz sent at 11/16/2017  3:05 PM EST -----  Regarding: DR. PALOMO  PATIENT CALLED IN, AND WAS WONDERING ABOUT THE STRONGER CREAM DR. PALOMO CALLED IN.  IT WAS VERY EXPENSIVE, SO SHE WAS WANTING 1/2 OF THE DOSAGE.  WAS THE ORDER CORRECTED?  THANKS

## 2017-11-18 LAB — INR PPP: 2.7

## 2017-11-20 ENCOUNTER — ANTICOAGULATION VISIT (OUTPATIENT)
Dept: PHARMACY | Facility: HOSPITAL | Age: 67
End: 2017-11-20

## 2017-11-20 DIAGNOSIS — I74.9 EMBOLISM AND THROMBOSIS (HCC): ICD-10-CM

## 2017-11-20 NOTE — PROGRESS NOTES
Anticoagulation Clinic - Remote Progress Note  [facility]   Frequency of Monitorin days    Indication: Bilateral PE, stroke syndrome, embolism and thromosis of unspecified site  Referring Provider: Dr. Rika Sullivan  Initial Warfarin Start Date:   Planned Duration of Therapy: lifelong  Goal INR: 2-3  Current Drug Interactions: doxepin d/c'd on ; ASA    Diet: Low GLV intake  Alcohol: None  Tobacco: None  OTC Pain Medications: APAP    INR History:  Date 8/28 9/4 9/18 10/2 10/9 10/13 10/20 10/27 11/3 11/18     Total Weekly Dose 27.5mg 27.5mg 27.5 mg 27.5 mg 27.5 mg 25 mg 25mg 25mg 25mg 25mg     INR 2.5 2.2 2.4 2.8 3.2 3.2 2.2 1.9 2.5 2.7     Notes hematoma?;stop doxepin stopped doxepin on       sick; nose bleed sick; nose bleed        Phone Interview:  Tablet Strength: pt has 5mg tablets  Current Maintenance Dose: 2.5 mg on Mon, Wed, Fri; 5 mg all other days  Patient Findings      Negatives Signs/symptoms of thrombosis, Signs/symptoms of bleeding, Laboratory test error suspected, Change in health, Change in alcohol use, Change in activity, Upcoming invasive procedure, Emergency department visit, Upcoming dental procedure, Missed doses, Extra doses, Change in medications, Change in diet/appetite, Hospital admission, Bruising, Other complaints     Patient Contact Info: [phone] (Home)              [phone] (Cell)    Plan:  1. INR is therapeutic today. Instructed Mrs. Muller to continue warfarin 5mg daily except 2.5mg MWF.  2. Repeat INR in 2 weeks ()  3. Verbal information provided over the phone to Mrs. Muller. She RBV dosing instructions, expresses understanding through teachback, and has no further questions at this time.  4. Patient declined warfarin refills      Tanvi White, Pharmacy Technician   2017  8:20 AM     Alanna BOCANEGRA, PharmD, have reviewed the note in full and agree with the assessment and plan.  17  8:43 AM

## 2017-12-04 ENCOUNTER — ANTICOAGULATION VISIT (OUTPATIENT)
Dept: PHARMACY | Facility: HOSPITAL | Age: 67
End: 2017-12-04

## 2017-12-04 DIAGNOSIS — I74.9 EMBOLISM AND THROMBOSIS (HCC): ICD-10-CM

## 2017-12-04 LAB — INR PPP: 2.4

## 2017-12-04 NOTE — PROGRESS NOTES
Anticoagulation Clinic - Remote Progress Note  [facility]   Frequency of Monitorin days    Indication: Bilateral PE, stroke syndrome, embolism and thromosis of unspecified site  Referring Provider: Dr. Rika Sullivan  Initial Warfarin Start Date:   Planned Duration of Therapy: lifelong  Goal INR: 2-3  Current Drug Interactions: doxepin d/c'd on ; ASA    Diet: Low GLV intake  Alcohol: None  Tobacco: None  OTC Pain Medications: APAP    INR History:  Date 8/28 9/4 9/18 10/2 10/9 10/13 10/20 10/27 11/3 11/18 12/4    Total Weekly Dose 27.5mg 27.5mg 27.5 mg 27.5 mg 27.5 mg 25 mg 25mg 25mg 25mg 25mg 25mg    INR 2.5 2.2 2.4 2.8 3.2 3.2 2.2 1.9 2.5 2.7 2.4    Notes hematoma?;stop doxepin stopped doxepin on       sick; nose bleed sick; nose bleed        Phone Interview:  Tablet Strength: pt has 5mg tablets  Current Maintenance Dose: warfarin 5mg daily except 2.5mg MonWedFri  Patient Findings      Positives Signs/symptoms of bleeding, Bruising     Negatives Signs/symptoms of thrombosis, Laboratory test error suspected, Change in health, Change in alcohol use, Change in activity, Upcoming invasive procedure, Emergency department visit, Upcoming dental procedure, Missed doses, Extra doses, Change in medications, Change in diet/appetite, Hospital admission, Other complaints     Comments Pt was concerned about excessive amount of bruising on arms and legs. On the advice of Yasmeen/Alanna, instructed patient to keep eye on bruises and if they get darker, larger, dont appear to be healing, then she should go to her PCP or a [facility].     Says nose has been bleeding, it stops relatively quickly. Pt says she has been using a sodium chloride nasal spray (generic, [employer] brand) to keep her nasal passages moist.     Patient Contact Info: [phone] (Home)              [phone] (Cell)  Lab Contact Info: Romeo    Plan:  1. INR is therapeutic today. Instructed Mrs. Muller to continue warfarin 5mg daily except  2.5mg MonWedFri.  2. Repeat INR in 1 week. Will continue close monitoring given nose bleeds/bruising.    3. Verbal information provided over the phone to Mrs. Muller. She RBV dosing instructions, expresses understanding through teachback, and has no further questions at this time.  4. Patient declined warfarin refills    Apollo Shelton CPhT  12/4/2017  10:03 AM      Alanna BOCANEGRA, PharmD, have reviewed the note in full and agree with the assessment and plan.  12/04/17  10:50 AM

## 2017-12-11 ENCOUNTER — ANTICOAGULATION VISIT (OUTPATIENT)
Dept: PHARMACY | Facility: HOSPITAL | Age: 67
End: 2017-12-11

## 2017-12-11 DIAGNOSIS — I74.9 EMBOLISM AND THROMBOSIS (HCC): ICD-10-CM

## 2017-12-11 LAB — INR PPP: 3

## 2017-12-11 RX ORDER — WARFARIN SODIUM 5 MG/1
TABLET ORAL
Qty: 90 TABLET | Refills: 0 | OUTPATIENT
Start: 2017-12-11 | End: 2018-03-08 | Stop reason: SDUPTHER

## 2017-12-11 NOTE — PROGRESS NOTES
Anticoagulation Clinic - Remote Progress Note  [employer]   Frequency of Monitorin days    Indication: Bilateral PE, stroke syndrome, embolism and thromosis of unspecified site  Referring Provider: Dr. Rika Sullivan  Initial Warfarin Start Date:   Planned Duration of Therapy: lifelong  Goal INR: 2-3  Current Drug Interactions: doxepin d/c'd on ; ASA    Diet: Low GLV intake  Alcohol: None  Tobacco: None  OTC Pain Medications: APAP    INR History:  Date 8/28 9/4 9/18 10/2 10/9 10/13 10/20 10/27 11/3 11/18 12/4 12/11   Total Weekly Dose 27.5mg 27.5mg 27.5 mg 27.5 mg 27.5 mg 25 mg 25mg 25mg 25mg 25mg 27.5mg 27.5mg   INR 2.5 2.2 2.4 2.8 3.2 3.2 2.2 1.9 2.5 2.7 2.4 3.0   Notes hematoma?;stop doxepin stopped doxepin on       sick; nose bleed sick; nose bleed        Date               Total Weekly Dose               INR               Notes                 Phone Interview:  Tablet Strength: pt has 5mg tablets  Current Maintenance Dose: warfarin 5mg daily except 2.5mg MonWedFri    Patient Findings      Positives Signs/symptoms of bleeding     Negatives Signs/symptoms of thrombosis, Laboratory test error suspected, Change in health, Change in alcohol use, Change in activity, Upcoming invasive procedure, Emergency department visit, Upcoming dental procedure, Missed doses, Extra doses, Change in medications, Change in diet/appetite, Hospital admission, Bruising, Other complaints     Comments Last nose bleed was on , says it lasted maybe 5 minutes but 'quite a bit of blood'. Had a few other episodes the week of , usually at night. Requested patient maybe eat some GLV tonight to help bring down INR a bit, but she said she didn't have any in the house and wasn't able to go out to get any.     Also requested patient start a log of when/what time her nose bleeds and for how long it bleeds, will discuss this at next encounter        Patient Contact Info: [phone] (Home)              [phone] (Cell)  Lab Contact Info: Romeo    Plan:  1. INR is therapeutic today. Instructed Mrs. Muller to continue warfarin 5mg daily except 2.5mg MonWedFri.  2. Repeat INR in 1 week. Will continue close monitoring given nose bleeds/bruising.    3. Verbal information provided over the phone to Mrs. Muller. She RBV dosing instructions, expresses understanding through teachback, and has no further questions at this time.  4. Patient requests warfarin refills. Sent refills of warfarin 5 mg tablets into [facility].     Apollo Shelton CPhT  12/11/2017  3:16 PM     IErnestine [facility], have reviewed the note in full and agree with the assessment and plan.  12/11/17  3:49 PM

## 2017-12-13 RX ORDER — WARFARIN SODIUM 5 MG/1
TABLET ORAL
Qty: 90 TABLET | Refills: 0 | Status: SHIPPED | OUTPATIENT
Start: 2017-12-13 | End: 2018-03-08

## 2017-12-18 ENCOUNTER — ANTICOAGULATION VISIT (OUTPATIENT)
Dept: PHARMACY | Facility: HOSPITAL | Age: 67
End: 2017-12-18

## 2017-12-18 DIAGNOSIS — I74.9 EMBOLISM AND THROMBOSIS (HCC): ICD-10-CM

## 2017-12-18 LAB — INR PPP: 2.5

## 2017-12-18 NOTE — PROGRESS NOTES
Anticoagulation Clinic - Remote Progress Note  [employer]   Frequency of Monitorin days    Indication: Bilateral PE, stroke syndrome, embolism and thromosis of unspecified site  Referring Provider: Dr. Rika Sullivan  Initial Warfarin Start Date:   Planned Duration of Therapy: lifelong  Goal INR: 2-3  Current Drug Interactions: doxepin d/c'd on ; ASA    Diet: Low GLV intake  Alcohol: None  Tobacco: None  OTC Pain Medications: APAP    INR History:  Date 8/28 9/4 9/18 10/2 10/9 10/13 10/20 10/27 11/3 11/18 12/4 12/11   Total Weekly Dose 27.5mg 27.5mg 27.5 mg 27.5 mg 27.5 mg 25 mg 25mg 25mg 25mg 25mg 27.5mg 27.5mg   INR 2.5 2.2 2.4 2.8 3.2 3.2 2.2 1.9 2.5 2.7 2.4 3.0   Notes hematoma?;stop doxepin stopped doxepin on       sick; nose bleed sick; nose bleed        Date               Total Weekly Dose 27.5mg              INR 2.5              Notes                 Phone Interview:  Tablet Strength: pt has 5mg tablets  Current Maintenance Dose: warfarin 5mg daily except 2.5mg MonWedFri  Patient Findings      Positives Signs/symptoms of bleeding, Change in diet/appetite     Negatives Signs/symptoms of thrombosis, Laboratory test error suspected, Change in health, Change in alcohol use, Change in activity, Upcoming invasive procedure, Emergency department visit, Upcoming dental procedure, Missed doses, Extra doses, Change in medications, Hospital admission, Bruising, Other complaints     Comments Patient has eaten 2 (15oz) cans of mixed greens (glory simply seasoned mixed greens real and southern style) given nose bleeds out of left nostril. Patient reports feeling weak and not feeling well. Dr. Connor Ritter (PCP) is aware of nose bleeds. She continues nasal saline spray and vasoline ~ 1 week. Discussed possibility of nasal cauterization     Nose bleeds is always out of the left nostril.   Mon @1810, ate half can of mixed greens, again at 2350, 'bad bleed' ~10minutes   Tu@1400,  more mixed greens, again at 2110   Thurs, 1405, very hard time to quit bleeding   Fri, 1330         Patient Contact Info: [phone] (Home)              [phone] (Cell)  Lab Contact Info: Romeo    Plan:  1. INR is therapeutic today, however, experiencing nose bleeds. She discussed that she has started using vasoline and it has helped. No bleeding since Friday. At this time, instructed Mrs. Muller to resume shahzad GLV intake and continue warfarin 5mg daily except 2.5mg MonWedFri.  2. Repeat INR in 1 week. Will continue close monitoring given nose bleeds/bruising.  Patient will recheck INR and call clinic and eat a serving of GLV is supratherapeutic if bleeding resumes.   3. Verbal information provided over the phone to Mrs. Muller. She RBV dosing instructions, expresses understanding through teachback, and has no further questions at this time.      Apollo Shelton CPhT  12/18/2017  3:20 PM    Spoke with patient once bleeding was discussed.   IAlanna, PharmD, assisted in the patient interview. I have reviewed the note in full and agree with the assessment and plan.  12/18/17  3:36 PM

## 2017-12-26 ENCOUNTER — ANTICOAGULATION VISIT (OUTPATIENT)
Dept: PHARMACY | Facility: HOSPITAL | Age: 67
End: 2017-12-26

## 2017-12-26 DIAGNOSIS — I74.9 EMBOLISM AND THROMBOSIS (HCC): ICD-10-CM

## 2017-12-26 LAB — INR PPP: 2.3

## 2017-12-26 NOTE — PROGRESS NOTES
Anticoagulation Clinic - Remote Progress Note  [employer]   Frequency of Monitorin days    Indication: Bilateral PE, stroke syndrome, embolism and thromosis of unspecified site  Referring Provider: Dr. Rika Sullivan  Initial Warfarin Start Date:   Planned Duration of Therapy: lifelong  Goal INR: 2-3  Current Drug Interactions: doxepin d/c'd on ; ASA    Diet: Low GLV intake  Alcohol: None  Tobacco: None  OTC Pain Medications: APAP    INR History:  Date 8/28 9/4 9/18 10/2 10/9 10/13 10/20 10/27 11/3 11/18 12/4 12/11   Total Weekly Dose 27.5mg 27.5mg 27.5 mg 27.5 mg 27.5 mg 25 mg 25mg 25mg 25mg 25mg 27.5mg 27.5mg   INR 2.5 2.2 2.4 2.8 3.2 3.2 2.2 1.9 2.5 2.7 2.4 3.0   Notes hematoma?;stop doxepin stopped doxepin on       sick; nose bleed sick; nose bleed        Date              Total Weekly Dose 27.5mg 27.5             INR 2.5 2.3             Notes                 Phone Interview:  Tablet Strength: pt has 5mg tablets  Current Maintenance Dose: warfarin 5mg daily except 2.5mg MonWedFri  Patient Findings      Negatives Signs/symptoms of thrombosis, Signs/symptoms of bleeding, Laboratory test error suspected, Change in health, Change in alcohol use, Change in activity, Upcoming invasive procedure, Emergency department visit, Upcoming dental procedure, Missed doses, Extra doses, Change in medications, Change in diet/appetite, Hospital admission, Bruising, Other complaints     Comments Patient has eaten spinach artichoke dip and a serving of broccoli casserole. Patient reports feeling weak and not feeling well. Dr. Connor Ritter (PCP) is aware of nose bleeds. She continues nasal saline spray and vasoline ~ 1 week. Discussed possibility of nasal cauterization. She reports that it is now coming from both nostrils and has had 4 nose bleeds within the last week. Patient reports that the longest bleed has been 5 minutes. Patient denies weakness or dizziness.      Patient Contact Info:  [phone] (Home)              [phone] (Cell)  Lab Contact Info: Romeo    Plan:  1. INR is therapeutic today, however, experiencing nose bleeds. Offered to call Dr. Almazan's office however, they are out of the office for the holiday. Instructed Ms. Muller to go to the urgent care/ED if bleeding becomes longer>10 min, or she experiences dizziness/ weakness. From an anticoagulation standpoint, INR is WNL, therefore instructed Ms. Muller to continue warfarin 5mg daily except 2.5mg MonWedFri.  2. Repeat INR in 1 week. Will continue close monitoring given nose bleeds.  Encouraged patient to check INR sooner and call the clinic if she notices more bleeding or is concerned for an elevated INR.   3. Verbal information provided over the phone to Mrs. Muller. She RBV dosing instructions, expresses understanding through teachback, and has no further questions at this time.      Alanna Bermudez, PharmD  12/26/2017  2:46 PM

## 2017-12-31 LAB — INR PPP: 2.2

## 2018-01-02 ENCOUNTER — ANTICOAGULATION VISIT (OUTPATIENT)
Dept: PHARMACY | Facility: HOSPITAL | Age: 68
End: 2018-01-02

## 2018-01-02 DIAGNOSIS — I74.9 EMBOLISM AND THROMBOSIS (HCC): ICD-10-CM

## 2018-01-02 LAB — INR PPP: 2.5

## 2018-01-02 NOTE — PROGRESS NOTES
Anticoagulation Clinic - Remote Progress Note  [employer]   Frequency of Monitorin days    Indication: Bilateral PE, stroke syndrome, embolism and thromosis of unspecified site  Referring Provider: Dr. Rika Sullivan  Initial Warfarin Start Date:   Planned Duration of Therapy: lifelong  Goal INR: 2-3  Current Drug Interactions: doxepin d/c'd on ; ASA    Diet: Low GLV intake  Alcohol: None  Tobacco: None  OTC Pain Medications: APAP    INR History:  Date 8/28 9/4 9/18 10/2 10/9 10/13 10/20 10/27 11/3 11/18 12/4 12/11   Total Weekly Dose 27.5mg 27.5mg 27.5 mg 27.5 mg 27.5 mg 25 mg 25mg 25mg 25mg 25mg 27.5mg 27.5mg   INR 2.5 2.2 2.4 2.8 3.2 3.2 2.2 1.9 2.5 2.7 2.4 3.0   Notes hematoma?;stop doxepin stopped doxepin on       sick; nose bleed sick; nose bleed        Date            Total Weekly Dose 27.5mg 27.5 27.5mg 27.5mg           INR 2.5 2.3 2.2 2.5           Notes                 Phone Interview:  Tablet Strength: pt has 5mg tablets  Current Maintenance Dose: warfarin 5mg daily except 2.5mg MonWedFri  Patient Findings      Positives Signs/symptoms of bleeding     Negatives Signs/symptoms of thrombosis, Laboratory test error suspected, Change in health, Change in alcohol use, Change in activity, Upcoming invasive procedure, Emergency department visit, Upcoming dental procedure, Missed doses, Extra doses, Change in medications, Change in diet/appetite, Hospital admission, Bruising, Other complaints     Comments Patient reports that nose bleeds last about 10-15min. She reports that she blows out the blood clot. She denies being light headed/ weakness. Suggested that patient should get CBC, she reports Dr. Jac Muhammad got CBC one week ago. ([phone]). She reports that he should have sent it to PCP. Connor Ritter (PCP) is aware of nose bleeds. She continues nasal saline spray and vicks vapor rub under nose. Encouraged patient to use vasoline to help keep it moist.  Patient denies weakness or dizziness.   Concern that meter is not working correctly. Patient will plan to go to Dr. Ritter's office to ensure WNL. Patient will also bring meter to appointment on 1/17    Called and LVM for lab results at Dr. De La Paz's office.     Patient Contact Info: [phone] (Home)              [phone] (Cell)  Lab Contact Info: Abigailre    Plan:  1. INR is therapeutic today, however, experiencing nose bleeds. Offered to call Dr. Almazan's office however, they are out of the office for the holiday. Instructed Ms. Muller to go to the urgent care/ED if bleeding becomes longer>10 min, or she experiences dizziness/ weakness. From an anticoagulation standpoint, INR is WNL, however, concerned for bleeding. Patient does not report any, therefore, instructed Ms. Muller to continue warfarin 5mg daily except 2.5mg MonWedFri.  2. Repeat INR in 1 week. Will continue close monitoring given nose bleeds.  Encouraged patient to check INR sooner and call the clinic if she notices more bleeding or is concerned for an elevated INR.   3. Verbal information provided over the phone to Mrs. Muller. She RBV dosing instructions, expresses understanding through teachback, and has no further questions at this time.      Alanna Bermudez, PharmD  1/2/2018  3:18 PM

## 2018-01-10 ENCOUNTER — ANTICOAGULATION VISIT (OUTPATIENT)
Dept: PHARMACY | Facility: HOSPITAL | Age: 68
End: 2018-01-10

## 2018-01-10 DIAGNOSIS — I74.9 EMBOLISM AND THROMBOSIS (HCC): ICD-10-CM

## 2018-01-10 LAB — INR PPP: 2.4

## 2018-01-10 NOTE — PROGRESS NOTES
Anticoagulation Clinic - Remote Progress Note  [employer]   Frequency of Monitorin days    Indication: Bilateral PE, stroke syndrome, embolism and thromosis of unspecified site  Referring Provider: Dr. Rika Sullivan  Initial Warfarin Start Date:   Planned Duration of Therapy: lifelong  Goal INR: 2-3  Current Drug Interactions: doxepin d/c'd on ; ASA    Diet: Low GLV intake  Alcohol: None  Tobacco: None  OTC Pain Medications: APAP    INR History:  Date 8/28 9/4 9/18 10/2 10/9 10/13 10/20 10/27 11/3 11/18 12/4 12/11   Total Weekly Dose 27.5mg 27.5mg 27.5 mg 27.5 mg 27.5 mg 25 mg 25mg 25mg 25mg 25mg 27.5mg 27.5mg   INR 2.5 2.2 2.4 2.8 3.2 3.2 2.2 1.9 2.5 2.7 2.4 3.0   Notes hematoma?;stop doxepin stopped doxepin on       sick; nose bleed sick; nose bleed        Date           Total Weekly Dose 27.5mg 27.5 27.5mg 27.5mg 27.5mg          INR 2.5 2.3 2.2 2.5 2.4          Notes                 Phone Interview:  Tablet Strength: pt has 5mg tablets  Current Maintenance Dose: warfarin 5mg daily except 2.5mg   Patient Findings      Positives Signs/symptoms of bleeding     Negatives Signs/symptoms of thrombosis, Laboratory test error suspected, Change in health, Change in alcohol use, Change in activity, Upcoming invasive procedure, Emergency department visit, Upcoming dental procedure, Missed doses, Extra doses, Change in medications, Change in diet/appetite, Hospital admission, Bruising, Other complaints     Comments Patient is going to see Dr. Noel for pain in her joint in her left arm. She reports that she has had 2-3 nose bleeds this past week that were shorter than normal. She has continued to use a nasal saline, Vaseline, and sleep-right strips.  Encouraged her to discuss symptoms of nose bleeds on Monday with Dr. Ritter. She will go to the ED if bleeding becomes more frequent/ longer/ she is concerned. She denies symptoms of fatigue, syncope, or dizziness.      Contact Info: [phone] (Home)              [phone] (Cell)  Lab Contact Info: Romeo    Plan:  1. INR is therapeutic today, however, experiencing nose bleeds. Pt is scheduled with Dr. Ritter on Monday. Instructed Ms. Muller to go to the urgent care/ED if bleeding becomes longer>10 min, or she experiences dizziness/ weakness. From an anticoagulation standpoint, INR is WNL, however, concerned for bleeding. Instructed Ms. Muller to continue warfarin 5mg daily except 2.5mg MonWedFri.  2. Repeat INR in 1 week. Will continue close monitoring given nose bleeds.  Encouraged patient to check INR sooner and call the clinic if she notices more bleeding or is concerned for an elevated INR.   3. Verbal information provided over the phone to Mrs. Muller. She RBV dosing instructions, expresses understanding through teachback, and has no further questions at this time.      Alanna Bermudez, PharmD  1/10/2018  11:26 AM

## 2018-01-12 ENCOUNTER — TELEPHONE (OUTPATIENT)
Dept: PHARMACY | Facility: HOSPITAL | Age: 68
End: 2018-01-12

## 2018-01-12 NOTE — TELEPHONE ENCOUNTER
Mrs. Muller called to report that she was given two injections in her shoulder yesterday, and her BG was in the 400s this AM. She has spoken with her PCP and was instructed to take extra insulin today -- unable to reach Dr. Carlos Noel, but expect she received steroid injections. We discussed the possible DDI with steroids and warfarin. Mrs. Muller had a slight nose bleed last night but none additionally since then. For now, she will continue her usual warfarin dose, and keep her appt in the clinic next Wednesday.

## 2018-01-17 ENCOUNTER — ANTICOAGULATION VISIT (OUTPATIENT)
Dept: PHARMACY | Facility: HOSPITAL | Age: 68
End: 2018-01-17

## 2018-01-17 ENCOUNTER — APPOINTMENT (OUTPATIENT)
Dept: PHARMACY | Facility: HOSPITAL | Age: 68
End: 2018-01-17

## 2018-01-17 DIAGNOSIS — I74.9 EMBOLISM AND THROMBOSIS (HCC): ICD-10-CM

## 2018-01-17 LAB — INR PPP: 2.9

## 2018-01-17 RX ORDER — NITROGLYCERIN 0.4 MG/1
0.4 TABLET SUBLINGUAL
Qty: 25 TABLET | Refills: 6 | Status: SHIPPED | OUTPATIENT
Start: 2018-01-17 | End: 2019-04-24 | Stop reason: SDUPTHER

## 2018-01-17 NOTE — PROGRESS NOTES
Anticoagulation Clinic - Remote Progress Note  [employer]   Frequency of Monitorin days    Indication: Bilateral PE, stroke syndrome, embolism and thromosis of unspecified site  Referring Provider: Dr. Rika Sullivan  Initial Warfarin Start Date:   Planned Duration of Therapy: lifelong  Goal INR: 2-3  Current Drug Interactions: doxepin d/c'd on ; ASA    Diet: Low GLV intake  Alcohol: None  Tobacco: None  OTC Pain Medications: APAP    INR History:  Date 8/28 9/4 9/18 10/2 10/9 10/13 10/20 10/27 11/3 11/18 12/4 12/11   Total Weekly Dose 27.5mg 27.5mg 27.5 mg 27.5 mg 27.5 mg 25 mg 25mg 25mg 25mg 25mg 27.5mg 27.5mg   INR 2.5 2.2 2.4 2.8 3.2 3.2 2.2 1.9 2.5 2.7 2.4 3.0   Notes hematoma?;stop doxepin stopped doxepin on       sick; nose bleed sick; nose bleed        Date          Total Weekly Dose 27.5mg 27.5 27.5mg 27.5mg 27.5mg 27.5mg         INR 2.5 2.3 2.2 2.5 2.4 2.9         Notes                 Phone Interview:  Tablet Strength: pt has 5mg tablets  Current Maintenance Dose: warfarin 5mg daily except 2.5mg MonWedFri  Patient Findings      Positives Change in health     Negatives Signs/symptoms of thrombosis, Signs/symptoms of bleeding, Laboratory test error suspected, Change in alcohol use, Change in activity, Upcoming invasive procedure, Emergency department visit, Upcoming dental procedure, Missed doses, Extra doses, Change in medications, Change in diet/appetite, Hospital admission, Bruising, Other complaints     Comments Patient attributes higher INR to steroid injections she received last week.      Contact Info: [phone] (Home)              [phone] (Cell)  Lab Contact Info: Romeo    Plan:  1. Patient's INR is therapeutic today at 2.9. Instructed patient to continue maintenance dose of warfarin 5mg daily except 2.5mg MonWedFri unless an [facility] called and said otherwise.   2. Repeat INR in one week.   3. Verbal information provided over the phone  to patient. Patient RBV dosing, expresses understanding through teach back, and has no further questions at this time.       Tanvi White, Pharmacy Technician  1/17/2018  3:55 PM    I, Alanna Bermudez, PharmD, have reviewed the note in full and agree with the assessment and plan.  01/17/18  5:00 PM

## 2018-01-24 ENCOUNTER — ANTICOAGULATION VISIT (OUTPATIENT)
Dept: PHARMACY | Facility: HOSPITAL | Age: 68
End: 2018-01-24

## 2018-01-24 DIAGNOSIS — I74.9 EMBOLISM AND THROMBOSIS (HCC): ICD-10-CM

## 2018-01-24 LAB — INR PPP: 2.6

## 2018-01-24 NOTE — PROGRESS NOTES
Anticoagulation Clinic - Remote Progress Note  [employer]   Frequency of Monitorin days    Indication: Bilateral PE, stroke syndrome, embolism and thromosis of unspecified site  Referring Provider: Dr. Rika Sullivan  Initial Warfarin Start Date:   Planned Duration of Therapy: lifelong  Goal INR: 2-3  Current Drug Interactions: doxepin d/c'd on ; ASA    Diet: Low GLV intake  Alcohol: None  Tobacco: None  OTC Pain Medications: APAP    INR History:  Date 8/28 9/4 9/18 10/2 10/9 10/13 10/20 10/27 11/3 11/18 12/4 12/11   Total Weekly Dose 27.5mg 27.5mg 27.5 mg 27.5 mg 27.5 mg 25 mg 25mg 25mg 25mg 25mg 27.5mg 27.5mg   INR 2.5 2.2 2.4 2.8 3.2 3.2 2.2 1.9 2.5 2.7 2.4 3.0   Notes hematoma?;stop doxepin stopped doxepin on       sick; nose bleed sick; nose bleed        Date         Total Weekly Dose 27.5mg 27.5 27.5mg 27.5mg 27.5mg 27.5mg 27.5mg        INR 2.5 2.3 2.2 2.5 2.4 2.9 2.6        Notes                 Phone Interview:  Tablet Strength: pt has 5mg tablets  Current Maintenance Dose: warfarin 5mg daily except 2.5mg MonWedFri  Patient Findings      Positives Signs/symptoms of bleeding     Negatives Signs/symptoms of thrombosis, Laboratory test error suspected, Change in health, Change in alcohol use, Change in activity, Upcoming invasive procedure, Emergency department visit, Upcoming dental procedure, Missed doses, Extra doses, Change in medications, Change in diet/appetite, Hospital admission, Bruising, Other complaints     Comments  night pt had another nose bleed which has been a reported problem for a few months nut she says this one was worse than the others. Previously recommended different treatments to keep nares moist and to go to the ED if necessary. Patient says she will be scheduling an appt to see ENT in ~one week.      Contact Info: [phone] (Home) // [phone] (Cell)  Lab Contact Info: Romeo    Plan:  1. Patient's INR is  therapeutic today at 2.6. Instructed patient to continue maintenance dose of warfarin 5mg daily except 2.5mg MonWedFri.  2. Repeat INR in one week.   3. Verbal information provided over the phone to patient. Patient RBV dosing, expresses understanding through teach back, and has no further questions at this time.       Sydnie Rivera, Pharmacy Intern  1/24/2018  11:21 AM     .Jennifer BOCANEGRA [facility], have reviewed the note in full and agree with the assessment and plan.  01/24/18  11:26 AM

## 2018-01-31 ENCOUNTER — ANTICOAGULATION VISIT (OUTPATIENT)
Dept: PHARMACY | Facility: HOSPITAL | Age: 68
End: 2018-01-31

## 2018-01-31 DIAGNOSIS — I74.9 EMBOLISM AND THROMBOSIS (HCC): ICD-10-CM

## 2018-01-31 LAB — INR PPP: 3.8

## 2018-01-31 NOTE — PROGRESS NOTES
Anticoagulation Clinic - Remote Progress Note  [employer]ITOR   Frequency of Monitorin days    Indication: Bilateral PE, stroke syndrome, embolism and thromosis of unspecified site  Referring Provider: Dr. Rika Sullivan  Initial Warfarin Start Date:   Planned Duration of Therapy: lifelong  Goal INR: 2-3  Current Drug Interactions: doxepin d/c'd on ; ASA    Diet: Low GLV intake  Alcohol: None  Tobacco: None  OTC Pain Medications: APAP    INR History:  Date 8/28 9/4 9/18 10/2 10/9 10/13 10/20 10/27 11/3 11/18 12/4 12/11   Total Weekly Dose 27.5mg 27.5mg 27.5 mg 27.5 mg 27.5 mg 25 mg 25mg 25mg 25mg 25mg 27.5mg 27.5mg   INR 2.5 2.2 2.4 2.8 3.2 3.2 2.2 1.9 2.5 2.7 2.4 3.0   Notes hematoma?;stop doxepin stopped doxepin on       sick; nose bleed sick; nose bleed        Date        Total Weekly Dose 27.5mg 27.5 27.5mg 27.5mg 27.5mg 27.5mg 27.5mg 27.5mg       INR 2.5 2.3 2.2 2.5 2.4 2.9 2.6 3.8       Notes        Hold; nose bleeds continue         Phone Interview:  Tablet Strength: pt has 5mg tablets  Current Maintenance Dose: warfarin 5mg daily except 2.5mg MonWedFri  Patient Findings      Positives Change in diet/appetite     Negatives Signs/symptoms of thrombosis, Signs/symptoms of bleeding, Laboratory test error suspected, Change in health, Change in alcohol use, Change in activity, Upcoming invasive procedure, Emergency department visit, Upcoming dental procedure, Missed doses, Extra doses, Change in medications, Hospital admission, Bruising, Other complaints     Comments Patient reports nose bleed today ~5 minutes long. She reports that nose bleeds are dripping. Previously recommended different treatments to keep nares moist- she has been using vasoline. Discussed when to go to the ED if necessary. Patient says she will plan to schedule an appointment with ENT tomorrow. Ms. Muller reports that she has been sick recently and her appetite has decreased.      Contact Info: [phone] (Home) // [phone] (Cell)  Lab Contact Info: Romeo    Plan:  1. Patient's INR is SUPRAtherapeutic tonight possibly due to recent illness and decrease in appetite. Instructed patient to HOLD her warfarin tonight and resume GLV intake then continue maintenance dose of warfarin 5mg daily except 2.5mg MonWedFri. Patient is encouraged to call doctor tomorrow to schedule an appointment with and ENT. She confirms this is her plan.  2. Repeat INR in one week.    3. Verbal information provided over the phone to patient. Patient RBV dosing, expresses understanding through teach back, and has no further questions at this time.     Alanna Bermudez, PharmD  1/31/2018  4:26 PM

## 2018-02-07 ENCOUNTER — ANTICOAGULATION VISIT (OUTPATIENT)
Dept: PHARMACY | Facility: HOSPITAL | Age: 68
End: 2018-02-07

## 2018-02-07 DIAGNOSIS — I74.9 EMBOLISM AND THROMBOSIS (HCC): ICD-10-CM

## 2018-02-07 LAB — INR PPP: 2.3

## 2018-02-07 NOTE — PROGRESS NOTES
Anticoagulation Clinic - Remote Progress Note  [employer]   Frequency of Monitorin days    Indication: Bilateral PE, stroke syndrome, embolism and thromosis of unspecified site  Referring Provider: Dr. Rika Sullivan  Initial Warfarin Start Date:   Planned Duration of Therapy: lifelong  Goal INR: 2-3  Current Drug Interactions: doxepin d/c'd on ; ASA    Diet: Low GLV intake  Alcohol: None  Tobacco: None  OTC Pain Medications: APAP    INR History:  Date 8/28 9/4 9/18 10/2 10/9 10/13 10/20 10/27 11/3 11/18 12/4 12/11   Total Weekly Dose 27.5mg 27.5mg 27.5 mg 27.5 mg 27.5 mg 25 mg 25mg 25mg 25mg 25mg 27.5mg 27.5mg   INR 2.5 2.2 2.4 2.8 3.2 3.2 2.2 1.9 2.5 2.7 2.4 3.0   Notes hematoma?;stop doxepin stopped doxepin on       sick; nose bleed sick; nose bleed        Date       Total Weekly Dose 27.5mg 27.5 27.5mg 27.5mg 27.5mg 27.5mg 27.5mg 27.5mg 25mg      INR 2.5 2.3 2.2 2.5 2.4 2.9 2.6 3.8 2.3      Notes        Hold; nose bleeds continue         Phone Interview:  Tablet Strength: pt has 5mg tablets  Current Maintenance Dose: warfarin 5mg daily except 2.5mg MonWedFri  Patient Findings      Positives Change in diet/appetite     Negatives Signs/symptoms of thrombosis, Signs/symptoms of bleeding, Laboratory test error suspected, Change in health, Change in alcohol use, Change in activity, Upcoming invasive procedure, Emergency department visit, Upcoming dental procedure, Missed doses, Extra doses, Change in medications, Hospital admission, Bruising, Other complaints     Comments Patient reports one dripping bleed from nose. She continues using vasoline to keep nares moist. Again encouraged patient to call PCP or ENT to schedule an appointment. Stressed to her the importance. Ms. Muller reports that her appetite has resumed and she has eaten brussel sprouts twice this week, which is more than she typically eats.     Contact Info: [phone] (home) //  [phone] (Cell)  Lab Contact Info: Romeo    Plan:  1. Patient's INR is therapeutic tonight following dose hold. As patient has resumed prior appetite, will instruct Ms. Muller to continue maintenance dose of warfarin 5mg daily except 2.5mg MonWedFri. Patient is encouraged to call doctor tomorrow to schedule an appointment with and ENT. She confirms this is her plan.  2. Repeat INR in one week.    3. Verbal information provided over the phone to patient. Patient RBV dosing, expresses understanding through teach back, and has no further questions at this time.     Alanna Bermudez, PharmD  2/7/2018  4:33 PM

## 2018-02-14 ENCOUNTER — ANTICOAGULATION VISIT (OUTPATIENT)
Dept: PHARMACY | Facility: HOSPITAL | Age: 68
End: 2018-02-14

## 2018-02-14 DIAGNOSIS — I74.9 EMBOLISM AND THROMBOSIS (HCC): ICD-10-CM

## 2018-02-14 LAB — INR PPP: 2.3

## 2018-02-14 NOTE — PROGRESS NOTES
Anticoagulation Clinic - Remote Progress Note  [employer]   Frequency of Monitorin days    Indication: Bilateral PE, stroke syndrome, embolism and thromosis of unspecified site  Referring Provider: Dr. Rika Sullivan  Initial Warfarin Start Date:   Planned Duration of Therapy: lifelong  Goal INR: 2-3  Current Drug Interactions: doxepin d/c'd on ; ASA    Diet: Low GLV intake  Alcohol: None  Tobacco: None  OTC Pain Medications: APAP    INR History:  Date 8/28 9/4 9/18 10/2 10/9 10/13 10/20 10/27 11/3 11/18 12/4 12/11   Total Weekly Dose 27.5mg 27.5mg 27.5 mg 27.5 mg 27.5 mg 25 mg 25mg 25mg 25mg 25mg 27.5mg 27.5mg   INR 2.5 2.2 2.4 2.8 3.2 3.2 2.2 1.9 2.5 2.7 2.4 3.0   Notes hematoma?;stop doxepin stopped doxepin on       sick; nose bleed sick; nose bleed        Date      Total Weekly Dose 27.5mg 27.5 27.5mg 27.5mg 27.5mg 27.5mg 27.5mg 27.5mg 25mg 27.5mg     INR 2.5 2.3 2.2 2.5 2.4 2.9 2.6 3.8 2.3 2.3     Notes        Hold; nose bleeds continue         Phone Interview:  Tablet Strength: pt has 5mg tablets  Current Maintenance Dose: warfarin 5mg daily except 2.5mg   Patient Findings      Positives Signs/symptoms of bleeding, Change in diet/appetite     Negatives Signs/symptoms of thrombosis, Laboratory test error suspected, Change in health, Change in alcohol use, Change in activity, Upcoming invasive procedure, Emergency department visit, Upcoming dental procedure, Missed doses, Extra doses, Change in medications, Hospital admission, Bruising, Other complaints     Comments Patient reports having a nose bleed this week so she ate an extra serving of GLV this past weekend and Monday. Patient reports that she scheduled an appointment with her PCP next Friday to address the nose bleeds. Patient denies any other changes in medications.      Contact Info: [phone] (Home) // [phone] (Cell)  Lab Contact Info: Romeo    Plan:  1.  Patient's INR is therapeutic this week at 2.3. Instructed Ms. Muller to continue maintenance dose of warfarin 5mg daily except 2.5mg MonWedFri.  2. Repeat INR in one week.    3. Verbal information provided over the phone to patient. Patient RBV dosing, expresses understanding through teach back, and has no further questions at this time.     Marlon Ventura, Pharmacy Intern  2/14/2018  4:13 PM     I, Alanna Bermudez, PharmD, have reviewed the note in full and agree with the assessment and plan.  02/14/18  5:16 PM

## 2018-02-15 RX ORDER — ESTRADIOL 0.05 MG/D
FILM, EXTENDED RELEASE TRANSDERMAL
Qty: 8 PATCH | Refills: 12 | OUTPATIENT
Start: 2018-02-15 | End: 2019-04-24

## 2018-02-15 RX ORDER — ESTRADIOL 0.05 MG/D
FILM, EXTENDED RELEASE TRANSDERMAL
Qty: 8 PATCH | Refills: 2 | Status: CANCELLED | OUTPATIENT
Start: 2018-02-15

## 2018-02-21 ENCOUNTER — ANTICOAGULATION VISIT (OUTPATIENT)
Dept: PHARMACY | Facility: HOSPITAL | Age: 68
End: 2018-02-21

## 2018-02-21 DIAGNOSIS — I74.9 EMBOLISM AND THROMBOSIS (HCC): ICD-10-CM

## 2018-02-21 LAB — INR PPP: 1.9

## 2018-02-21 NOTE — PROGRESS NOTES
Anticoagulation Clinic - Remote Progress Note  [employer]   Frequency of Monitorin days    Indication: Bilateral PE, stroke syndrome, embolism and thrombosis of unspecified site  Referring Provider: Dr. Rika Sullivan  Initial Warfarin Start Date:   Planned Duration of Therapy: lifelong  Goal INR: 2-3  Current Drug Interactions: doxepin d/c'd on ; ASA    Diet: Low GLV intake  Alcohol: None  Tobacco: None  OTC Pain Medications: APAP    INR History:  Date 8/28 9/4 9/18 10/2 10/9 10/13 10/20 10/27 11/3 11/18 12/4 12/11   Total Weekly Dose 27.5mg 27.5mg 27.5 mg 27.5 mg 27.5 mg 25 mg 25mg 25mg 25mg 25mg 27.5mg 27.5mg   INR 2.5 2.2 2.4 2.8 3.2 3.2 2.2 1.9 2.5 2.7 2.4 3.0   Notes hematoma?;stop doxepin stopped doxepin on       sick; nose bleed sick; nose bleed        Date     Total Weekly Dose 27.5mg 27.5 27.5mg 27.5mg 27.5mg 27.5mg 27.5mg 27.5mg 25mg 27.5mg 27.5    INR 2.5 2.3 2.2 2.5 2.4 2.9 2.6 3.8 2.3 2.3 1.9    Notes        Hold; nose bleeds continue   ill      Phone Interview:  Tablet Strength: pt has 5mg tablets  Current Maintenance Dose: warfarin 5mg daily except 2.5mg MonWedFri  Patient Findings      Positives Change in health, Change in medications     Negatives Signs/symptoms of thrombosis, Signs/symptoms of bleeding, Laboratory test error suspected, Change in alcohol use, Change in activity, Upcoming invasive procedure, Emergency department visit, Upcoming dental procedure, Missed doses, Extra doses, Change in diet/appetite, Hospital admission, Bruising, Other complaints     Comments Mrs. Muller has NOT had a nose bleed this week. She has an appt with her PCP Dr. Donovan on  who will address her history of nose bleeds. She reports she started feeling ill with episodes of diarrhea this past Friday. She took 2 doses of Immodium and 1 dose of phenergan this past weekend. Her Bowel movements returned to normal yesterday. She  reports that she did have brussels sprouts today. She also reports that her Doctor officially adjusted her Effexor to once a day this week, but she had self adjusted to this dose for two months already.       Contact Info: [phone] (Home) // [phone] (Cell)  Lab Contact Info: Romeo    Plan:  1. Patient's INR is slightly subtherapeutic this week at 1.9. Given patient's epistaxis and recent illness, instructed Ms. Muller to continue maintenance dose of warfarin 5mg daily except 2.5mg MonWedFri.  2. Repeat INR in one week.    3. Verbal information provided over the phone to patient. Patient RBV dosing, expresses understanding through teach back, and has no further questions at this time.     Marlon Ventura, Pharmacy Intern  2/21/2018  1:32 PM     I, Alanna Bermudez, PharmD, have reviewed the note in full and agree with the assessment and plan.  02/21/18  3:24 PM

## 2018-02-28 ENCOUNTER — TELEPHONE (OUTPATIENT)
Dept: PHARMACY | Facility: HOSPITAL | Age: 68
End: 2018-02-28

## 2018-02-28 ENCOUNTER — ANTICOAGULATION VISIT (OUTPATIENT)
Dept: PHARMACY | Facility: HOSPITAL | Age: 68
End: 2018-02-28

## 2018-02-28 DIAGNOSIS — I74.9 EMBOLISM AND THROMBOSIS (HCC): ICD-10-CM

## 2018-02-28 LAB — INR PPP: 2.2

## 2018-03-02 RX ORDER — FUROSEMIDE 40 MG/1
TABLET ORAL
Qty: 75 TABLET | Refills: 0 | Status: SHIPPED | OUTPATIENT
Start: 2018-03-02 | End: 2018-06-29 | Stop reason: SDUPTHER

## 2018-03-07 LAB — INR PPP: 2.1

## 2018-03-08 ENCOUNTER — ANTICOAGULATION VISIT (OUTPATIENT)
Dept: PHARMACY | Facility: HOSPITAL | Age: 68
End: 2018-03-08

## 2018-03-08 DIAGNOSIS — I74.9 EMBOLISM AND THROMBOSIS (HCC): ICD-10-CM

## 2018-03-08 RX ORDER — WARFARIN SODIUM 5 MG/1
TABLET ORAL
Qty: 90 TABLET | Refills: 1 | OUTPATIENT
Start: 2018-03-08 | End: 2018-11-16 | Stop reason: SDUPTHER

## 2018-03-08 RX ORDER — VITAMIN B COMPLEX
1 CAPSULE ORAL DAILY
COMMUNITY
Start: 2018-03-08 | End: 2019-08-28

## 2018-03-08 RX ORDER — CREAM BASE NO.58
CREAM (GRAM) MISCELLANEOUS AS NEEDED
COMMUNITY
Start: 2017-12-05 | End: 2022-08-05

## 2018-03-08 NOTE — PROGRESS NOTES
Anticoagulation Clinic - Remote Progress Note  [employer]   Frequency of Monitorin days    Indication: Bilateral PE, stroke syndrome, embolism and thrombosis of unspecified site   Referring Provider: Dr. Rika Sullivan  Initial Warfarin Start Date:   Planned Duration of Therapy: lifelong  Goal INR: 2-3  Current Drug Interactions: doxepin d/c'd on ; ASA    Diet: Low GLV intake  Alcohol: None  Tobacco: None  OTC Pain Medications: APAP    INR History:  Date 8/28 9/4 9/18 10/2 10/9 10/13 10/20 10/27 11/3 11/18 12/4 12/11   Total Weekly Dose 27.5mg 27.5mg 27.5 mg 27.5 mg 27.5 mg 25 mg 25mg 25mg 25mg 25mg 27.5mg 27.5mg   INR 2.5 2.2 2.4 2.8 3.2 3.2 2.2 1.9 2.5 2.7 2.4 3.0   Notes hematoma?;stop doxepin stopped doxepin on       sick; nose bleed sick; nose bleed        Date  1   Total Weekly Dose 27.5mg 27.5mg 27.5mg 27.5mg 27.5mg 27.5mg 27.5 mg 27.5mg 25mg 27.5mg 27.5 mg 27.5mg   INR 2.5 2.3 2.2 2.5 2.4 2.9 2.6 3.8 2.3 2.3 1.9 2.3   Notes        Hold; nose bleeds continue   ill      Date 3/8 3/15             Total Weekly Dose 27.5mg              INR 2.1              Notes                 Phone Interview:  Tablet Strength: 5mg tablets  Current Maintenance Dose: warfarin 5mg daily except 2.5mg MonWedFri  Contact Info: [phone] (Home) // [phone] (Cell)  Patient Findings      Negatives Signs/symptoms of thrombosis, Signs/symptoms of bleeding, Laboratory test error suspected, Change in health, Change in alcohol use, Change in activity, Upcoming invasive procedure, Emergency department visit, Upcoming dental procedure, Missed doses, Extra doses, Change in medications, Change in diet/appetite, Hospital admission, Bruising, Other complaints     Plan:  1. INR is therapeutic at 2.3, so instructed Ms. Muller to continue warfarin 5mg daily except 2.5mg MonWedFri.  2. Repeat INR in one week (3/15).   3. Verbal information provided over the  phone. Ms. Muller RBV dosing, expresses understanding through teach back, and has no further questions at this time.   4. Patient requests refills on warfarin be called into [facility]  5. The following perioperative dosing instructions were given to patient and will be confirmed with patient again on 3/15:     3/17: Warfarin Hold   3/18: Warfarin Hold  3/19: Warfarin Hold  3/20: Colonoscopy; Warfarin 7.5mg (boost)  3/21: Boost Warfarin; Warfarin 5mg (boost)  3/22: Resume Warfarin 5mg daily except 2.5mg MonWedFri.    Recheck INR on Monday 3/26.     IYasmeen, [facility], have reviewed the note in full and agree with the assessment and plan.  03/08/18  1:50 PM

## 2018-03-15 ENCOUNTER — ANTICOAGULATION VISIT (OUTPATIENT)
Dept: PHARMACY | Facility: HOSPITAL | Age: 68
End: 2018-03-15

## 2018-03-15 DIAGNOSIS — I74.9 EMBOLISM AND THROMBOSIS (HCC): ICD-10-CM

## 2018-03-15 LAB — INR PPP: 1.5

## 2018-03-15 NOTE — PROGRESS NOTES
Anticoagulation Clinic - Remote Progress Note  [employer]   Frequency of Monitorin days    Indication: Bilateral PE, stroke syndrome, embolism and thrombosis of unspecified site   Referring Provider: Dr. Rika Sullivan  Initial Warfarin Start Date:   Planned Duration of Therapy: lifelong  Goal INR: 2-3  Current Drug Interactions: doxepin d/c'd on ; ASA    Diet: Low GLV intake  Alcohol: None  Tobacco: None  OTC Pain Medications: APAP    INR History:  Date 8/28 9/4 9/18 10/2 10/9 10/13 10/20 10/27 11/3 11/18 12/4 12/11   Total Weekly Dose 27.5mg 27.5mg 27.5 mg 27.5 mg 27.5 mg 25 mg 25mg 25mg 25mg 25mg 27.5mg 27.5mg   INR 2.5 2.2 2.4 2.8 3.2 3.2 2.2 1.9 2.5 2.7 2.4 3.0   Notes hematoma?;stop doxepin stopped doxepin on       sick; nose bleed sick; nose bleed        Date    Total Weekly Dose 27.5mg 27.5mg 27.5mg 27.5mg 27.5mg 27.5mg 27.5 mg 27.5mg 25mg 27.5mg 27.5 mg 27.5mg   INR 2.5 2.3 2.2 2.5 2.4 2.9 2.6 3.8 2.3 2.3 1.9 2.3   Notes        Hold; nose bleeds continue   ill      Date 3/8 3/15             Total Weekly Dose 27.5mg 27.5mg             INR 2.1 1.5             Notes                 Phone Interview:  Tablet Strength: 5mg tablets  Current Maintenance Dose: warfarin 5mg daily except 2.5mg MonWedFri  Contact Info: [phone] (Home) // [phone] (Cell)    Plan:  1. INR is subtherapeutic at 1.5. Patient will be holding her warfarin starting Saturday for a colonoscopy- no changes were made to her regimen until then. See plan below regarding restarting instructions- discussed extensively with patient over the phone.  2. Repeat INR 3/26  3. Verbal information provided over the phone. Ms. Muller RBV dosing, expresses understanding through teach back, and has no further questions at this time.   4. The following perioperative dosing instructions were given to patient and was confirmed with patient again on 3/15:     3/17:  Warfarin Hold   3/18: Warfarin Hold  3/19: Warfarin Hold  3/20: Colonoscopy; Warfarin 7.5mg (boost)  3/21: Boost Warfarin; Warfarin 5mg (boost)  3/22: Resume Warfarin 5mg daily except 2.5mg MonWedFri.    Recheck INR on Monday 3/26.       Susie Mathis, PharmD  03/15/18  12:49 PM

## 2018-03-22 NOTE — PROGRESS NOTES
Ms. Muller reports she had bleeding after the colonoscopy and altered her warfarin regimen as listed below. She reports that she is experiencing a lot of diarrhea and her appetite has decreased. Given these variables, will encourage patient to resume prior maintenance dose and repeat INR on Monday.     Tuesday: 5mg  Wednesday: 7.5mg (18% BOOST or prior maintenance dose)  Thur: resume prior maintenance dose

## 2018-03-26 ENCOUNTER — ANTICOAGULATION VISIT (OUTPATIENT)
Dept: PHARMACY | Facility: HOSPITAL | Age: 68
End: 2018-03-26

## 2018-03-26 DIAGNOSIS — I74.9 EMBOLISM AND THROMBOSIS (HCC): ICD-10-CM

## 2018-03-26 LAB — INR PPP: 2.5

## 2018-03-26 NOTE — PROGRESS NOTES
Anticoagulation Clinic - Remote Progress Note  [employer]   Frequency of Monitorin days    Indication: Bilateral PE, stroke syndrome, embolism and thrombosis of unspecified site   Referring Provider: Dr. Rika Sullivan  Initial Warfarin Start Date:   Planned Duration of Therapy: lifelong  Goal INR: 2-3  Current Drug Interactions: doxepin d/c'd on ; ASA    Diet: Low GLV intake  Alcohol: None  Tobacco: None  OTC Pain Medications: APAP    INR History:  Date 8/28 9/4 9/18 10/2 10/9 10/13 10/20 10/27 11/3 11/18 12/4 12/11   Total Weekly Dose 27.5mg 27.5mg 27.5 mg 27.5 mg 27.5 mg 25 mg 25mg 25mg 25mg 25mg 27.5mg 27.5mg   INR 2.5 2.2 2.4 2.8 3.2 3.2 2.2 1.9 2.5 2.7 2.4 3.0   Notes hematoma?;stop doxepin stopped doxepin on       sick; nose bleed sick; nose bleed        Date  1   Total Weekly Dose 27.5mg 27.5mg 27.5mg 27.5mg 27.5mg 27.5mg 27.5 mg 27.5mg 25mg 27.5mg 27.5 mg 27.5mg   INR 2.5 2.3 2.2 2.5 2.4 2.9 2.6 3.8 2.3 2.3 1.9 2.3   Notes        Hold; nose bleeds continue   ill      Date 3/8 3/15 3/26           Total Weekly Dose 27.5mg 27.5mg 30mg           INR 2.1 1.5 2.5           Notes   Boost post colonoscopy             Phone Interview:  Tablet Strength: 5mg tablets  Current Maintenance Dose: warfarin 5mg daily except 2.5mg MonWedFri  Contact Info: [phone] (Home) // [phone] (Cell)    Patient Findings:   Negatives Signs/symptoms of thrombosis, Signs/symptoms of bleeding, Laboratory test error suspected, Change in health, Change in alcohol use, Change in activity, Upcoming invasive procedure, Emergency department visit, Upcoming dental procedure, Missed doses, Extra doses, Change in medications, Change in diet/appetite, Hospital admission, Bruising, Other complaints     Plan:  1. INR is therapeutic today at 2.5. Patient confirmed that she followed the dosing around her colonoscopy perfectly. Instructed patient to resume  maintenance dose of warfarin 5mg daily except 2.5 on MWF.    2. Repeat INR in 1 week. (4/2)  3. Verbal information provided over the phone. Ms. Muller RBV dosing, expresses understanding through teach back, and has no further questions at this time.      Tanvi White  03/26/18  1:56 PM    I, Alanna Bermudez, PharmD, have reviewed the note in full and agree with the assessment and plan.  03/27/18  8:18 AM

## 2018-04-01 LAB — INR PPP: 1.8

## 2018-04-02 ENCOUNTER — ANTICOAGULATION VISIT (OUTPATIENT)
Dept: PHARMACY | Facility: HOSPITAL | Age: 68
End: 2018-04-02

## 2018-04-02 DIAGNOSIS — I74.9 EMBOLISM AND THROMBOSIS (HCC): ICD-10-CM

## 2018-04-02 NOTE — PROGRESS NOTES
Anticoagulation Clinic - Remote Progress Note  [employer]ITOR   Frequency of Monitorin days    Indication: Bilateral PE, stroke syndrome, embolism and thrombosis of unspecified site   Referring Provider: Dr. Rika Sullivan  Initial Warfarin Start Date:   Planned Duration of Therapy: lifelong  Goal INR: 2-3  Current Drug Interactions: doxepin d/c'd on ; ASA    Diet: Low GLV intake  Alcohol: None  Tobacco: None  OTC Pain Medications: APAP    INR History:  Date 8/28 9/4 9/18 10/2 10/9 10/13 10/20 10/27 11/3 11/18 12/4 12/11   Total Weekly Dose 27.5mg 27.5mg 27.5 mg 27.5 mg 27.5 mg 25 mg 25mg 25mg 25mg 25mg 27.5mg 27.5mg   INR 2.5 2.2 2.4 2.8 3.2 3.2 2.2 1.9 2.5 2.7 2.4 3.0   Notes hematoma?;stop doxepin stopped doxepin on       sick; nose bleed sick; nose bleed        Date    Total Weekly Dose 27.5mg 27.5mg 27.5mg 27.5mg 27.5mg 27.5mg 27.5 mg 27.5mg 25mg 27.5mg 27.5 mg 27.5mg   INR 2.5 2.3 2.2 2.5 2.4 2.9 2.6 3.8 2.3 2.3 1.9 2.3   Notes        Hold; nose bleeds continue   ill      Date 3/8 3/15 3/26 4/1          Total Weekly Dose 27.5mg 27.5mg 30mg 30 mg          INR 2.1 1.5 2.5 1.8          Notes   Boost post colonoscopy (boost)            Phone Interview:  Tablet Strength: 5mg tablets  Current Maintenance Dose: warfarin 5mg daily except 2.5mg MonWedFri  Contact Info: [phone] (Home) // [phone] (Cell)    Patient Findings     Positives Change in health   Negatives Signs/symptoms of thrombosis, Signs/symptoms of bleeding, Laboratory test error suspected, Change in alcohol use, Change in activity, Upcoming invasive procedure, Emergency department visit, Upcoming dental procedure, Missed doses, Extra doses, Change in medications, Change in diet/appetite, Hospital admission, Bruising, Other complaints   Comments She has Thursday and Friday appointments with doctors; she has not been feeling as well, and isn't eating as well as she  has in the past.       Plan:  1. INR is subtherapeutic from yesterday at 1.8, despite not eating as well, and not feeling as well. Instructed patient to BOOST today's dose to 5 mg (9% increase) and resume maintenance dose of warfarin 5mg daily except 2.5 on MWF.  Asked her to call us if any medications changed after doctor's appointments on Thursday or Friday.  2. Repeat INR in 1 week. (4/9)  3. Verbal information provided over the phone. Ms. Muller RBV dosing, expresses understanding through teach back, and has no further questions at this time.      Apollo Vicente, [facility]  04/02/18  1:27 PM

## 2018-04-09 ENCOUNTER — ANTICOAGULATION VISIT (OUTPATIENT)
Dept: PHARMACY | Facility: HOSPITAL | Age: 68
End: 2018-04-09

## 2018-04-09 DIAGNOSIS — I74.9 EMBOLISM AND THROMBOSIS (HCC): ICD-10-CM

## 2018-04-09 LAB — INR PPP: 1.5

## 2018-04-09 NOTE — PROGRESS NOTES
Anticoagulation Clinic - Remote Progress Note  [employer] MONIT[employer]   Frequency of Monitorin days    Indication: Bilateral PE, stroke syndrome, embolism and thrombosis of unspecified site   Referring Provider: Dr. Rika Sullivan  Initial Warfarin Start Date:   Planned Duration of Therapy: lifelong  Goal INR: 2-3  Current Drug Interactions: doxepin d/c'd on ; ASA    Diet: Low GLV intake  Alcohol: None  Tobacco: None  OTC Pain Medications: APAP    INR History:  Date 8/28 9/4 9/18 10/2 10/9 10/13 10/20 10/27 11/3 11/18 12/4 12/11   Total Weekly Dose 27.5mg 27.5mg 27.5 mg 27.5 mg 27.5 mg 25 mg 25mg 25mg 25mg 25mg 27.5mg 27.5mg   INR 2.5 2.2 2.4 2.8 3.2 3.2 2.2 1.9 2.5 2.7 2.4 3.0   Notes hematoma?;stop doxepin stopped doxepin on       sick; nose bleed sick; nose bleed        Date    Total Weekly Dose 27.5mg 27.5mg 27.5mg 27.5mg 27.5mg 27.5mg 27.5 mg 27.5mg 25mg 27.5mg 27.5 mg 27.5mg   INR 2.5 2.3 2.2 2.5 2.4 2.9 2.6 3.8 2.3 2.3 1.9 2.3   Notes        Hold; nose bleeds continue   ill      Date 3/8 3/15 3/26 4/1 4/9         Total Weekly Dose 27.5mg 27.5mg 30mg 30 mg 30mg         INR 2.1 1.5 2.5 1.8 1.5         Notes   Boost post colonoscopy (boost)            Phone Interview:  Tablet Strength: 5mg tablets  Current Maintenance Dose: warfarin 5mg daily except 2.5mg MonWedFri  Contact Info: [phone] (Home) // [phone] (Cell)    Patient Findings   Positives Change in health, Change in diet/appetite   Negatives Signs/symptoms of thrombosis, Signs/symptoms of bleeding, Laboratory test error suspected, Change in alcohol use, Change in activity, Upcoming invasive procedure, Emergency department visit, Upcoming dental procedure, Missed doses, Extra doses, Change in medications, Hospital admission, Bruising, Other complaints   Comments Ms. Muller has had a serving of broccoli and [employer] this last week. Discussed vit K content of these  foods can possible cause of decrease, and she does not plan to continue to eat this way. Ms. Muller reports she has been feeling nauseous, however, has not gotten sick.      Plan:  1. INR is subtherapeutic at 1.5, possibly due to increase in GLV this week. Instructed patient to BOOST today's dose to 5 mg (9% increase) and resume maintenance dose of warfarin 5mg daily except 2.5 on MWF.     2. Repeat INR in on 4/13 to ensure back WNL. May need additional boost. Patient is going to see Dr. Sullivan on 4/18- consider in clinic appointment at that time.  3. Verbal information provided over the phone. Ms. Muller RBV dosing, expresses understanding through teach back, and has no further questions at this time.      Alanna Bermudez, PharmD  04/09/18  2:53 PM

## 2018-04-12 ENCOUNTER — OFFICE VISIT (OUTPATIENT)
Dept: NEUROLOGY | Facility: CLINIC | Age: 68
End: 2018-04-12

## 2018-04-12 VITALS — SYSTOLIC BLOOD PRESSURE: 128 MMHG | OXYGEN SATURATION: 97 % | HEART RATE: 87 BPM | DIASTOLIC BLOOD PRESSURE: 64 MMHG

## 2018-04-12 DIAGNOSIS — G43.009 MIGRAINE WITHOUT AURA AND WITHOUT STATUS MIGRAINOSUS, NOT INTRACTABLE: ICD-10-CM

## 2018-04-12 DIAGNOSIS — G44.229 CHRONIC TENSION-TYPE HEADACHE, NOT INTRACTABLE: ICD-10-CM

## 2018-04-12 DIAGNOSIS — M54.12 CERVICAL RADICULOPATHY: ICD-10-CM

## 2018-04-12 DIAGNOSIS — M54.2 NECK PAIN: Primary | ICD-10-CM

## 2018-04-12 PROCEDURE — 99214 OFFICE O/P EST MOD 30 MIN: CPT | Performed by: PSYCHIATRY & NEUROLOGY

## 2018-04-12 NOTE — PROGRESS NOTES
Subjective:     Patient ID: Leela Muller is a 68 y.o. female.    CC:   Chief Complaint   Patient presents with   • Headache     refills       HPI:   History of Present Illness  The following portions of the patient's history were reviewed and updated as appropriate: allergies, current medications, past family history, past medical history, past social history, past surgical history and problem list.     Complains of increase in headaches and neck pain, left shoulder pain, denies stroke symptoms, continues on warfarin.    Past Medical History:   Diagnosis Date   • Anxiety    • Arm pain    • Arthritis    • Depression    • Diabetes mellitus    • Hyperlipidemia    • Hypertension    • Neck pain on left side    • Pancreatitis    • Pulmonary embolism    • Stroke syndrome 9/14/2016    · Assessed By: Devaughn Lewis (Neurology); Last Assessed: 16 Jun 2015  Right cerebrovascular accident in July or August 2010, evaluated at [facility].  Admission to [facility] on 09/28/2010 with headache and stuttering, consistent with TIA.  Chronic Coumadin therapy, initiated following bilateral pulmonary emboli in 2007 after fall and hip replacement.  She was already on 81 mg of aspirin as well, 09/2010.  MRI of the brain on 09/28/2010 revealing old right parietal cerebrovascular accident.  MRA revealing normal carotids, middle anterior and posterior cerebral arteries with minimal ostial stenosis of both vertebral arteries.  GENARO by Dr. Oliver Mobley on 09/28/2010:  patent foramen ovale with a small amount of right to left shunting.  Normal LVEF and normal valvular structures.   Patent foramen ovale closure using a 25 mm. Amplatzer cribriform occluder, 10/05/2010.   Echocardiogram, 06/23/2014:  LVEF (55% to 60%) with and Amplatzer device noted to be well-seated in    • Thrombocytopenia    • Transient cerebral ischemia        Past Surgical History:   Procedure Laterality Date   • APPENDECTOMY     • BREAST  BIOPSY Left 2012    benign   • BREAST EXCISIONAL BIOPSY Left     benign   • BREAST EXCISIONAL BIOPSY Right     benign   • CHOLECYSTECTOMY     • HYSTERECTOMY     • TONSILLECTOMY     • TOTAL HIP ARTHROPLASTY REVISION         Social History     Social History   • Marital status:      Spouse name: N/A   • Number of children: N/A   • Years of education: N/A     Occupational History   • Not on file.     Social History Main Topics   • Smoking status: Never Smoker   • Smokeless tobacco: Not on file   • Alcohol use No   • Drug use: No   • Sexual activity: Defer     Other Topics Concern   • Not on file     Social History Narrative   • No narrative on file       Family History   Problem Relation Age of Onset   • Alzheimer's disease Mother    • Cancer Mother    • Coronary artery disease Other    • Diabetes Other    • Hypertension Other    • Stroke Other    • Cancer Other    • Dementia Other    • Heart attack Father    • Breast cancer Neg Hx    • Ovarian cancer Neg Hx         Review of Systems   Constitutional: Positive for fatigue. Negative for chills, fever and unexpected weight change.   HENT: Negative for ear pain, hearing loss, nosebleeds, rhinorrhea and sore throat.    Eyes: Negative for photophobia, pain, discharge, itching and visual disturbance.   Respiratory: Negative for cough, chest tightness, shortness of breath and wheezing.    Cardiovascular: Positive for chest pain. Negative for palpitations and leg swelling.   Gastrointestinal: Negative for abdominal pain, blood in stool, constipation, diarrhea, nausea and vomiting.   Genitourinary: Negative for dysuria, frequency, hematuria and urgency.   Musculoskeletal: Positive for back pain, gait problem, neck pain and neck stiffness. Negative for arthralgias, joint swelling and myalgias.   Skin: Negative for rash and wound.   Allergic/Immunologic: Negative for environmental allergies and food allergies.   Neurological: Positive for light-headedness and headaches.  Negative for dizziness, tremors, seizures, syncope, speech difficulty, weakness and numbness.   Hematological: Negative for adenopathy. Does not bruise/bleed easily.   Psychiatric/Behavioral: Positive for sleep disturbance. Negative for agitation, confusion, decreased concentration, hallucinations and suicidal ideas. The patient is not nervous/anxious.         Objective:    Neurologic Exam     Mental Status   Oriented to person, place, and time.       Physical Exam   Constitutional: She is oriented to person, place, and time. She appears well-developed and well-nourished.   Cardiovascular: Normal rate and regular rhythm.    Pulmonary/Chest: Effort normal.   Neurological: She is alert and oriented to person, place, and time. She has normal reflexes.   Psychiatric: She has a normal mood and affect. Her behavior is normal. Thought content normal.       Assessment/Plan:       Leela was seen today for headache.    Diagnoses and all orders for this visit:    Neck pain  -     gabapentin (NEURONTIN) 800 MG tablet; Take 1 tablet by mouth 3 (Three) Times a Day.  -     Ambulatory Referral to Physical Therapy    Chronic tension-type headache, not intractable  -     butalbital-acetaminophen-caffeine (FIORICET, ESGIC) -40 MG per tablet; Take 1 - 2 tablets by mouth once daily as needed for headache  -     Ambulatory Referral to Physical Therapy    Cervical radiculopathy  -     Ambulatory Referral to Physical Therapy    Migraine without aura and without status migrainosus, not intractable    The patient has read and signed the [facility] Controlled Substance Contract.  I will continue to see patient for regular follow up appointments.  They are well controlled on their medication.  LORRI is updated every 3 months. The patient is aware of the potential for addiction and dependence.           Devaughn Lewis MD  4/15/2018

## 2018-04-13 ENCOUNTER — ANTICOAGULATION VISIT (OUTPATIENT)
Dept: PHARMACY | Facility: HOSPITAL | Age: 68
End: 2018-04-13

## 2018-04-13 DIAGNOSIS — G43.009 MIGRAINE WITHOUT AURA AND WITHOUT STATUS MIGRAINOSUS, NOT INTRACTABLE: ICD-10-CM

## 2018-04-13 DIAGNOSIS — I74.9 EMBOLISM AND THROMBOSIS (HCC): ICD-10-CM

## 2018-04-13 DIAGNOSIS — M54.2 NECK PAIN: ICD-10-CM

## 2018-04-13 LAB — INR PPP: 1.6

## 2018-04-13 RX ORDER — METOCLOPRAMIDE 10 MG/1
10 TABLET ORAL DAILY PRN
Qty: 30 TABLET | Refills: 11 | Status: SHIPPED | OUTPATIENT
Start: 2018-04-13 | End: 2019-04-23 | Stop reason: SDUPTHER

## 2018-04-13 RX ORDER — BACLOFEN 10 MG/1
10 TABLET ORAL 3 TIMES DAILY
Qty: 90 TABLET | Refills: 11 | Status: SHIPPED | OUTPATIENT
Start: 2018-04-13 | End: 2019-04-23 | Stop reason: SDUPTHER

## 2018-04-13 RX ORDER — TOPIRAMATE 25 MG/1
25 TABLET ORAL DAILY
Qty: 30 TABLET | Refills: 11 | Status: SHIPPED | OUTPATIENT
Start: 2018-04-13 | End: 2019-04-23 | Stop reason: SDUPTHER

## 2018-04-13 NOTE — PROGRESS NOTES
Anticoagulation Clinic - Remote Progress Note  ALERE HOME MONITOR   Frequency of Monitorin days    Indication: Bilateral PE, stroke syndrome, embolism and thrombosis of unspecified site   Referring Provider: Dr. Rika Sullivan  Initial Warfarin Start Date:   Planned Duration of Therapy: lifelong  Goal INR: 2-3  Current Drug Interactions: doxepin d/c'd on ; ASA    Diet: Low GLV intake  Alcohol: None  Tobacco: None  OTC Pain Medications: APAP    INR History:  Date 8/28 9/4 9/18 10/2 10/9 10/13 10/20 10/27 11/3 11/18 12/4 12/11   Total Weekly Dose 27.5mg 27.5mg 27.5 mg 27.5 mg 27.5 mg 25 mg 25mg 25mg 25mg 25mg 27.5mg 27.5mg   INR 2.5 2.2 2.4 2.8 3.2 3.2 2.2 1.9 2.5 2.7 2.4 3.0   Notes hematoma?;stop doxepin stopped doxepin on       sick; nose bleed sick; nose bleed        Date    Total Weekly Dose 27.5mg 27.5mg 27.5mg 27.5mg 27.5mg 27.5mg 27.5 mg 27.5mg 25mg 27.5mg 27.5 mg 27.5 mg   INR 2.5 2.3 2.2 2.5 2.4 2.9 2.6 3.8 2.3 2.3 1.9 2.3   Notes        hold; nose bleeds continue   ill      Date 3/8 3/15 3/26 4/1 4/9 4/13        Total Weekly Dose 27.5mg 27.5mg 30mg 30 mg 30mg 30mg 32.5 mg       INR 2.1 1.5 2.5 1.8 1.5 1.6        Notes   Boost post colonoscopy    clinic         Phone Interview:  Tablet Strength: 5mg tablets  Current Maintenance Dose: warfarin 5mg daily except 2.5mg MonWedFri  Contact Info: [phone] (Home) // [phone] (Cell)    Patient Findings:  Negatives Signs/symptoms of thrombosis, Signs/symptoms of bleeding, Laboratory test error suspected, Change in health, Change in alcohol use, Change in activity, Upcoming invasive procedure, Emergency department visit, Upcoming dental procedure, Missed doses, Extra doses, Change in medications, Change in diet/appetite, Hospital admission, Bruising, Other complaints   Comments Mrs. Muller saw Dr. Lewis yesterday, but he did not make any med changes, only recommended that she  start PT.      Plan:  1. INR is subtherapeutic again today despite boost dose. For now, instructed Mrs. Muller to take warfarin 5mg daily until follow up (32.5mg by recheck).   2. Repeat INR in clinic next Wednesday -- Mrs. Muller will come in before or after her appt with Dr. Sullivan, pending her schedule.  3. Verbal information provided over the phone. Ms. Muller RBV dosing, expresses understanding through teach back, and has no further questions at this time.      Yasmeen River RPH  04/13/18  9:35 AM

## 2018-04-15 RX ORDER — BUTALBITAL, ACETAMINOPHEN AND CAFFEINE 50; 325; 40 MG/1; MG/1; MG/1
TABLET ORAL
Qty: 30 TABLET | Refills: 5 | Status: SHIPPED | OUTPATIENT
Start: 2018-04-15 | End: 2018-07-24 | Stop reason: SDUPTHER

## 2018-04-15 RX ORDER — GABAPENTIN 800 MG/1
800 TABLET ORAL 3 TIMES DAILY
Qty: 90 TABLET | Refills: 1 | OUTPATIENT
Start: 2018-04-15 | End: 2018-07-24 | Stop reason: SDUPTHER

## 2018-04-16 ENCOUNTER — TRANSCRIBE ORDERS (OUTPATIENT)
Dept: PHARMACY | Facility: HOSPITAL | Age: 68
End: 2018-04-16

## 2018-04-16 DIAGNOSIS — I26.99 PE (PULMONARY THROMBOEMBOLISM) (HCC): Primary | ICD-10-CM

## 2018-04-18 ENCOUNTER — ANTICOAGULATION VISIT (OUTPATIENT)
Dept: PHARMACY | Facility: HOSPITAL | Age: 68
End: 2018-04-18

## 2018-04-18 ENCOUNTER — OFFICE VISIT (OUTPATIENT)
Dept: CARDIOLOGY | Facility: CLINIC | Age: 68
End: 2018-04-18

## 2018-04-18 VITALS
SYSTOLIC BLOOD PRESSURE: 138 MMHG | WEIGHT: 187 LBS | HEART RATE: 86 BPM | BODY MASS INDEX: 28.34 KG/M2 | HEIGHT: 68 IN | DIASTOLIC BLOOD PRESSURE: 58 MMHG

## 2018-04-18 DIAGNOSIS — Q21.12 PATENT FORAMEN OVALE: ICD-10-CM

## 2018-04-18 DIAGNOSIS — I10 ESSENTIAL HYPERTENSION: ICD-10-CM

## 2018-04-18 DIAGNOSIS — R00.2 PALPITATIONS: Primary | ICD-10-CM

## 2018-04-18 DIAGNOSIS — I74.9 EMBOLISM AND THROMBOSIS (HCC): ICD-10-CM

## 2018-04-18 DIAGNOSIS — I25.10 CORONARY ARTERY DISEASE INVOLVING NATIVE HEART WITHOUT ANGINA PECTORIS, UNSPECIFIED VESSEL OR LESION TYPE: ICD-10-CM

## 2018-04-18 LAB
INR PPP: 2.5 (ref 0.91–1.09)
PROTHROMBIN TIME: 29.8 SECONDS (ref 10–13.8)

## 2018-04-18 PROCEDURE — 99213 OFFICE O/P EST LOW 20 MIN: CPT | Performed by: NURSE PRACTITIONER

## 2018-04-18 PROCEDURE — 85610 PROTHROMBIN TIME: CPT

## 2018-04-18 PROCEDURE — G0463 HOSPITAL OUTPT CLINIC VISIT: HCPCS

## 2018-04-18 PROCEDURE — 36416 COLLJ CAPILLARY BLOOD SPEC: CPT

## 2018-04-18 RX ORDER — DIGOXIN 250 MCG
TABLET ORAL
Qty: 90 TABLET | Refills: 3 | Status: SHIPPED | OUTPATIENT
Start: 2018-04-18 | End: 2019-05-06

## 2018-04-18 RX ORDER — METOPROLOL SUCCINATE 50 MG/1
TABLET, EXTENDED RELEASE ORAL
Qty: 180 TABLET | Refills: 3 | Status: SHIPPED | OUTPATIENT
Start: 2018-04-18 | End: 2019-02-07 | Stop reason: SDUPTHER

## 2018-04-18 NOTE — PROGRESS NOTES
Leela Muller  1950  [phone]      04/18/2018    Connor Ritter MD    Chief Complaint   Patient presents with   • Coronary Artery Disease       Problem List:  1. Left cerebrovascular accident:  a. Right cerebrovascular accident  in July or August 2010, evaluated at [facility].   b. Admission to [facility] on 09/28/2010 with headache and stuttering, consistent with TIA.   c. Chronic Coumadin therapy, initiated following bilateral pulmonary emboli  in 2007 after fall and hip replacement.  She was already on 81 mg of aspirin as well, 09/2010.   d. MRI of the brain on 09/28/2010 revealing old right parietal cerebrovascular accident.   e. MRA revealing normal carotids, middle anterior and posterior  cerebral arteries with minimal ostial stenosis of both vertebral arteries.   f. GENARO by Dr. Oliver Mobley on 09/28/2010:  patent foramen ovale with a small amount of right to left shunting.  Normal LVEF and normal valvular structures.    g. Patent foramen  ovale closure using a 25 mm. Amplatzer cribriform occluder, 10/05/2010.    h. Echocardiogram, 06/23/2014:  LVEF (55% to 60%) with and Amplatzer device noted to be well-seated in the interatrial septum, trace MR, and mild TR.  2. Abnormal myocardial perfusion study:    a. 08/02/2010, Cardiolite GXT by Dr. Monteiro revealing a small area of ischemia in the mid anteroseptal, mid inferior, and apical lateral regions.  LVEF (68%).  b.  Cardiac catheterization by Dr. Monteiro, 08/09/2010:  Normal coronary arteries with normal LV systolic function.  3.  Type 2 diabetes mellitus.   4. Bilateral pulmonary emboli:  a. Following  hip replacement in 2007.   b. No evidence of prior anti-coagulable workup.   c. Chronic Coumadin therapy.   5.  Hypertension.   6. Dyslipidemia.   7. Pancreatitis.  a. Recent episode  in March 2013.  8. 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    9. Rectal cancer; surgically removed by Dr Martinez.  10.  Surgical  history:  a. Hip replacement.  b. Hysterectomy.  c. Tonsillectomy.  d.  Appendectomy.  e. Cholecystectomy.    Allergies   Allergen Reactions   • Atorvastatin    • Other      Ranexa nausea   • Tetanus Toxoid        Current Medications:      Current Outpatient Prescriptions:   •  acetaminophen (TYLENOL) 500 MG tablet, Take  by mouth Every 6 (Six) Hours As Needed., Disp: , Rfl:   •  aspirin 81 MG tablet, Take  by mouth daily., Disp: , Rfl:   •  B Complex Vitamins (VITAMIN B COMPLEX) capsule capsule, Take 1 tablet by mouth Daily., Disp: , Rfl:   •  baclofen (LIORESAL) 10 MG tablet, Take 1 tablet by mouth 3 (three) times a day., Disp: 90 tablet, Rfl: 11  •  butalbital-acetaminophen-caffeine (FIORICET, ESGIC) -40 MG per tablet, Take 1 - 2 tablets by mouth once daily as needed for headache, Disp: 30 tablet, Rfl: 5  •  clidinium-chlordiazePOXIDE (LIBRAX) 5-2.5 MG per capsule, Take 1 capsule by mouth 3 (Three) Times a Day. (Patient taking differently: Take 1 capsule by mouth 2 (Two) Times a Day.), Disp: 90 capsule, Rfl: 5  •  Cream Base Liposomic (PCCA CUSTOM LIPO-MAX) cream, , Disp: , Rfl:   •  digoxin (LANOXIN) 250 MCG tablet, Take 1 tablet by mouth daily, Disp: 90 tablet, Rfl: 3  •  estradiol (VIVELLE-DOT) 0.05 MG/24HR patch, Apply 1 patch topically twice weekly (Patient taking differently: Place  on the skin 1 (One) Time Per Week.), Disp: 8 patch, Rfl: 12  •  furosemide (LASIX) 40 MG tablet, Take 1 tablet by mouth Daily. May have extra 1/2 to 1 tablet daily if needed for edema, Disp: 75 tablet, Rfl: 0  •  gabapentin (NEURONTIN) 800 MG tablet, Take 1 tablet by mouth 3 (Three) Times a Day., Disp: 90 tablet, Rfl: 1  •  glipiZIDE (GLIPIZIDE XL) 10 MG 24 hr tablet, Take 2 tablets by mouth Daily, Disp: 60 tablet, Rfl: 5  •  glipiZIDE (GLUCOTROL XL) 10 MG 24 hr tablet, Take 2 tablets by mouth Daily., Disp: 60 tablet, Rfl: 5  •  glucose blood (ONE TOUCH ULTRA TEST) test strip, Use to test blood glucose as directed 3 times  a day, Disp: 100 each, Rfl: 5  •  HYDROcodone-acetaminophen (NORCO) 7.5-325 MG per tablet, Take 1 tablet by mouth 3 (Three) Times a Day., Disp: 90 tablet, Rfl: 0  •  hydrocortisone (GRX HICORT 25) 25 MG suppository, Insert 1 Suppository rectally twice a day as needed (remove wrapper and moisten with water), Disp: 12 suppository, Rfl: 1  •  Insulin Glargine (BASAGLAR KWIKPEN) 100 UNIT/ML injection pen, Inject 15 - 20 units subcutaneously once in the morning and 46 - 50 units in the evening at bedtime, Disp: 15 mL, Rfl: 5  •  Insulin Pen Needle (B-D UF III MINI PEN NEEDLES) 31G X 5 MM misc, use as directed to inject insulin twice daily, Disp: 100 each, Rfl: 12  •  Insulin Pen Needle (B-D UF III MINI PEN NEEDLES) 31G X 5 MM misc, Use to inject insulin twice a day as directed, Disp: 100 each, Rfl: 12  •  meclizine (ANTIVERT) 25 MG tablet, TAKE 1 TABLET BY MOUTH 3 (THREE) TIMES A DAY AS NEEDED, Disp: 30 tablet, Rfl: 3  •  metoclopramide (REGLAN) 10 MG tablet, Take 1 tablet by mouth Daily As Needed for migraine., Disp: 30 tablet, Rfl: 11  •  metoprolol succinate XL (TOPROL-XL) 50 MG 24 hr tablet, Take 1 tablet by mouth 2 (two) times a day, Disp: 180 tablet, Rfl: 3  •  metroNIDAZOLE (METROCREAM) 0.75 % cream, Apply to affected areas on the face once daily at bedime, Disp: 45 g, Rfl: 0  •  nitroglycerin (NITROSTAT) 0.4 MG SL tablet, Place 1 tablet under the tongue Every 5 Minutes As Needed for Chest Pain for up to 3 total doses. Call 911 if pain remains after 1 dose., Disp: 25 tablet, Rfl: 6  •  pancrelipase, Lip-Prot-Amyl, (PANCREAZE) 58357 UNITS capsule delayed-release particles capsule, Take 1 capsule by mouth with each meal and each snack, Disp: 100 capsule, Rfl: 11  •  pioglitazone (ACTOS) 15 MG tablet, Take 1 tablet by mouth once Daily, Disp: 30 tablet, Rfl: 2  •  pioglitazone (ACTOS) 15 MG tablet, Take 1 tablet by mouth once Daily, Disp: 30 tablet, Rfl: 2  •  potassium chloride (KLOR-CON) 10 MEQ CR tablet, Take  by  mouth As Needed (pt says takes PRN)., Disp: , Rfl:   •  promethazine (PHENERGAN) 25 MG tablet, TAKE 1 TABLET BY MOUTH 4 (FOUR) TIMES A DAY AS NEEDED FOR NAUSEA, Disp: 30 tablet, Rfl: 3  •  RABEprazole (ACIPHEX) 20 MG EC tablet, Take 1 tablet by mouth Daily., Disp: 30 tablet, Rfl: 11  •  rosuvastatin (CRESTOR) 10 MG tablet, Take 1 tablet by mouth Daily., Disp: 30 tablet, Rfl: 5  •  senna (SENOKOT) 8.6 MG tablet, Take  by mouth As Needed., Disp: , Rfl:   •  SITagliptin (JANUVIA) 100 MG tablet, Take 1 tablet by mouth Daily., Disp: 30 tablet, Rfl: 5  •  topiramate (TOPAMAX) 25 MG tablet, Take 1 tablet by mouth Daily., Disp: 30 tablet, Rfl: 11  •  venlafaxine XR (EFFEXOR-XR) 75 MG 24 hr capsule, Take 1 capsule by mouth once daily., Disp: 30 capsule, Rfl: 12  •  warfarin (COUMADIN) 5 MG tablet, Take 1/2 to 1 tablet by mouth daily or as directed by anticoagulation pharmacist, Disp: 90 tablet, Rfl: 1    HPI    Leela Muller 's a pleasant 68-year-old female who presents today for 12 month follow up of left CVA, hypertension, and dyslipidemia. Since last visit, patient has been having palpitations more frequently typically occurring at night.  She states that they wake her up in the middle of the night and that when she checks her blood pressure, it is low and her heart rate is up over 100.  She states that when this occurs it causes her to feel anxious and then she has difficulty going back to sleep.  She is chronically anticoagulated on Coumadin for history of pulmonary emboli.  Therefore, if this is any kind of recurrence of atrial fibrillation she is protected at this time.  However we still need to discern whether or not this is just episodes of her frequent PVCs or if this is some different arrhythmia.  She currently denies having any chest pain, shortness of breath, dyspnea on exertion, edema, fatigue, dizziness and syncope.      The following portions of the patient's history were reviewed and updated as appropriate:  "allergies, current medications and problem list.    Pertinent positives as listed in the HPI.  All other systems reviewed are negative.    Vitals:    04/18/18 1453   BP: 138/58   BP Location: Right arm   Patient Position: Sitting   Pulse: 86   Weight: 84.8 kg (187 lb)   Height: 172.7 cm (68\")       Physical Exam:  GENERAL: well-developed, well-nourished; in no acute distress.   NECK:  There is no jugular venous distention at 30°.  Carotid upstrokes are 2+ and  symmetrical without bruits.   LUNGS: Clear to auscultation bilaterally without wheezing, rhonchi, or rales noted.   CARDIOVASCULAR: The heart has a regular rate with a normal S1 and S2. There is no murmur, gallop, rub, or click appreciated. The PMI is nondisplaced.   ABDOMEN: Soft and nontender  NEUROLOGICAL: Nonfocal; Alert and oriented  PERIPHERAL VASCULAR:  Posterior tibial and dorsalis pedis pulses are 2+ and symmetrical. There is no peripheral edema.   MUSCULOSKELETAL:  Normal ROM  SKIN:  Warm and dry  PSYCHIATRIC: normal mood and affect; behavior appropriate    Diagnostic Data:    Procedures    Assessment:      ICD-10-CM ICD-9-CM   1. Palpitations R00.2 785.1   2. Coronary artery disease involving native heart without angina pectoris, unspecified vessel or lesion type I25.10 414.01   3. Embolism and thrombosis of unspecified site [I74.9]- on warfarin I74.9 453.9   4. Essential hypertension I10 401.9   5. Patent foramen ovale Q21.1 745.5       Plan:  1. 14 day monitor to assess palpitations  2. Continue current medications.  3. F/up in 12 months or sooner if needed.    Seen independently by WEST Castillo on . 4/18/2018  5:16 PM    "

## 2018-04-18 NOTE — PROGRESS NOTES
Anticoagulation Clinic - Remote Progress Note  ALERE HOME MONITOR   Frequency of Monitorin days    Indication: Bilateral PE, stroke syndrome, embolism and thrombosis of unspecified site   Referring Provider: Dr. Rika Sullivan  Initial Warfarin Start Date:   Planned Duration of Therapy: lifelong  Goal INR: 2-3  Current Drug Interactions: doxepin d/c'd on ; ASA    Diet: Low GLV intake  Alcohol: None  Tobacco: None  OTC Pain Medications: APAP    INR History:  Date 8/28 9/4 9/18 10/2 10/9 10/13 10/20 10/27 11/3 11/18 12/4 12/11   Total Weekly Dose 27.5mg 27.5mg 27.5 mg 27.5 mg 27.5 mg 25 mg 25mg 25mg 25mg 25mg 27.5mg 27.5mg   INR 2.5 2.2 2.4 2.8 3.2 3.2 2.2 1.9 2.5 2.7 2.4 3.0   Notes hematoma?;stop doxepin stopped doxepin on       sick; nose bleed sick; nose bleed        Date  1   Total Weekly Dose 27.5mg 27.5mg 27.5mg 27.5mg 27.5mg 27.5mg 27.5 mg 27.5mg 25mg 27.5mg 27.5 mg 27.5 mg   INR 2.5 2.3 2.2 2.5 2.4 2.9 2.6 3.8 2.3 2.3 1.9 2.3   Notes        hold; nose bleeds continue   ill      Date 3/8 3/15 3/26 4/1 4/9 4/13 4/18       Total Weekly Dose 27.5mg 27.5mg 30mg 30 mg 30mg 30mg 32.5 mg       INR 2.1 1.5 2.5 1.8 1.5 1.6 2.5       Notes   Boost post colonoscopy    Clinic; boost         Phone Interview:  Tablet Strength: 5mg tablets  Current Maintenance Dose: warfarin 5mg daily except 2.5mg MonWedFri  Contact Info: [phone] (Home) // [phone] (Cell)    Patient Findings   Positives Signs/symptoms of bleeding   Negatives Signs/symptoms of thrombosis, Laboratory test error suspected, Change in health, Change in alcohol use, Change in activity, Upcoming invasive procedure, Emergency department visit, Upcoming dental procedure, Missed doses, Extra doses, Change in medications, Change in diet/appetite, Hospital admission, Bruising, Other complaints   Comments Patient is still experiencing some nose bleeds and Dr. Ritter plans to send  Mrs. Muller to ENT for nostril cauterization. She forgot home monitor today, therefore unable to determine if yields similar results as monitor in clinic. She reports that she is going to  begin Physical Therapy on Friday for back pain by Dr. Lewis. Discussed GLV per patient request today. She reports she has been staying away from GLV. Discussed typically is stable therapeutic- therefore, to continue consistency of GLV.      Plan:  1. INR is therapeutic. For now, instructed Mrs. Muller to increase maintenance dose to 5mg daily except 2.5mg WedSat- however, will repeat PT/INR on Monday to determine if new dose is appropriate.  2. Repeat INR using home monitor on Monday 4/23 to ensure WNL.  3. Verbal and written information provided in the clinic. Ms. Muller RBV dosing, expresses understanding through teach back, and has no further questions at this time.      Alanna Bermudez, PharmD  04/18/18  12:43 PM

## 2018-04-23 ENCOUNTER — ANTICOAGULATION VISIT (OUTPATIENT)
Dept: PHARMACY | Facility: HOSPITAL | Age: 68
End: 2018-04-23

## 2018-04-23 DIAGNOSIS — I74.9 EMBOLISM AND THROMBOSIS (HCC): ICD-10-CM

## 2018-04-23 LAB — INR PPP: 2.3

## 2018-04-23 NOTE — PROGRESS NOTES
Anticoagulation Clinic - Remote Progress Note  ALERE HOME MONITOR   Frequency of Monitorin days    Indication: Bilateral PE, stroke syndrome, embolism and thrombosis of unspecified site   Referring Provider: Dr. Rika Sullivan  Initial Warfarin Start Date:   Planned Duration of Therapy: lifelong  Goal INR: 2-3  Current Drug Interactions: doxepin d/c'd on ; ASA    Diet: Low GLV intake  Alcohol: None  Tobacco: None  OTC Pain Medications: APAP    INR History:  Date 8/28 9/4 9/18 10/2 10/9 10/13 10/20 10/27 11/3 11/18 12/4 12/11   Total Weekly Dose 27.5mg 27.5mg 27.5 mg 27.5 mg 27.5 mg 25 mg 25mg 25mg 25mg 25mg 27.5mg 27.5mg   INR 2.5 2.2 2.4 2.8 3.2 3.2 2.2 1.9 2.5 2.7 2.4 3.0   Notes hematoma?;stop doxepin stopped doxepin on       sick; nose bleed sick; nose bleed        Date  1   Total Weekly Dose 27.5mg 27.5mg 27.5mg 27.5mg 27.5mg 27.5mg 27.5 mg 27.5mg 25mg 27.5mg 27.5 mg 27.5 mg   INR 2.5 2.3 2.2 2.5 2.4 2.9 2.6 3.8 2.3 2.3 1.9 2.3   Notes        hold; nose bleeds continue   ill      Date 3/8 3/15 3/26 4/1 4/9 4/13 4/18 4/23      Total Weekly Dose 27.5mg 27.5mg 30mg 30 mg 30mg 30mg 32.5 mg 30mg      INR 2.1 1.5 2.5 1.8 1.5 1.6 2.5 2.3      Notes   Boost post colonoscopy    Clinic; boost         Phone Interview:  Tablet Strength: 5mg tablets  Current Maintenance Dose: 5mg daily except 2.5mg WedSat  Contact Info: [phone] (Home) // [phone] (Cell)    Patient Findings:  Negatives Signs/symptoms of thrombosis, Signs/symptoms of bleeding, Laboratory test error suspected, Change in health, Change in alcohol use, Change in activity, Upcoming invasive procedure, Emergency department visit, Upcoming dental procedure, Missed doses, Extra doses, Change in medications, Change in diet/appetite, Hospital admission, Bruising, Other complaints     Plan:  1. INR is therapeutic today at 2.3. For now, instructed Mrs. Muller to continue new  maintenance dose of warfarin 5mg daily except 2.5mg WedSat.  2. Repeat INR in 1 week.  3. Verbal information provided over the phone. Mrs. Muller RBV dosing instructions, expresses understanding by teach back, and has no further questions at this time.    Apollo Shelton, Dot  4/23/2018  10:26 AM    IYasmeen [facility], have reviewed the note in full and agree with the assessment and plan.  04/23/18  10:48 AM

## 2018-04-30 ENCOUNTER — ANTICOAGULATION VISIT (OUTPATIENT)
Dept: PHARMACY | Facility: HOSPITAL | Age: 68
End: 2018-04-30

## 2018-04-30 DIAGNOSIS — I74.9 EMBOLISM AND THROMBOSIS (HCC): ICD-10-CM

## 2018-04-30 LAB — INR PPP: 2.3

## 2018-05-04 NOTE — PROGRESS NOTES
I have reviewed the progress note and plan below, and agree.     I, Shaunna Campbell [facility], have reviewed the note in full and agree with the assessment and plan.  05/04/18  2:44 PM

## 2018-05-14 ENCOUNTER — ANTICOAGULATION VISIT (OUTPATIENT)
Dept: PHARMACY | Facility: HOSPITAL | Age: 68
End: 2018-05-14

## 2018-05-14 DIAGNOSIS — I74.9 EMBOLISM AND THROMBOSIS (HCC): ICD-10-CM

## 2018-05-14 LAB — INR PPP: 2.3

## 2018-05-14 NOTE — PROGRESS NOTES
Anticoagulation Clinic - Remote Progress Note  [facility]   Frequency of Monitorin days    Indication: Bilateral PE, stroke syndrome, embolism and thrombosis of unspecified site   Referring Provider: Dr. Rika Sullivan  Initial Warfarin Start Date:   Planned Duration of Therapy: lifelong  Goal INR: 2-3  Current Drug Interactions: doxepin d/c'd on ; ASA    Diet: Low GLV intake  Alcohol: None  Tobacco: None  OTC Pain Medications: APAP    INR History:  Date 8/28 9/4 9/18 10/2 10/9 10/13 10/20 10/27 11/3 11/18 12/4 12/11   Total Weekly Dose 27.5mg 27.5mg 27.5 mg 27.5 mg 27.5 mg 25 mg 25mg 25mg 25mg 25mg 27.5mg 27.5mg   INR 2.5 2.2 2.4 2.8 3.2 3.2 2.2 1.9 2.5 2.7 2.4 3.0   Notes hematoma?;stop doxepin stopped doxepin on       sick; nose bleed sick; nose bleed        Date  1   Total Weekly Dose 27.5mg 27.5mg 27.5mg 27.5mg 27.5mg 27.5mg 27.5 mg 27.5mg 25mg 27.5mg 27.5 mg 27.5 mg   INR 2.5 2.3 2.2 2.5 2.4 2.9 2.6 3.8 2.3 2.3 1.9 2.3   Notes        hold; nose bleeds continue   ill      Date 3/8 3/15 3/26 4/1 4/9 4/13 4/18 4/23 4/30 5/14    Total Weekly Dose 27.5mg 27.5mg 30mg 30 mg 30mg 30mg 32.5 mg 30mg 30mg 30mg    INR 2.1 1.5 2.5 1.8 1.5 1.6 2.5 2.3 2.3 2.3    Notes   Boost post colonoscopy    Clinic; boost         Phone Interview:  Tablet Strength: 5mg tablets  Current Maintenance Dose: 5mg daily except 2.5mg WedSat  Contact Info: [phone] (Home) // [phone] (Cell)    Patient Findings:   Negatives Signs/symptoms of thrombosis, Signs/symptoms of bleeding, Laboratory test error suspected, Change in health, Change in alcohol use, Change in activity, Upcoming invasive procedure, Emergency department visit, Upcoming dental procedure, Missed doses, Extra doses, Change in medications, Change in diet/appetite, Hospital admission, Bruising, Other complaints   Comments Mrs. Muller had a steroid shot in her shoulder on Friday, but she  otherwise denies changes or issues since last check.      Plan:  1. INR is therapeutic again today, stable at 2.3. Instructed Mrs. Muller to continue warfarin 5mg daily except 2.5mg WedSat.  2. Repeat INR in two weeks.  3. Verbal information provided over the phone. Mrs. Muller expresses understanding through teach back and has no further questions at this time.     Ann Cowden Mayer, PharmD  5/14/2018  2:51 PM

## 2018-05-16 ENCOUNTER — TELEPHONE (OUTPATIENT)
Dept: CARDIOLOGY | Facility: CLINIC | Age: 68
End: 2018-05-16

## 2018-05-24 ENCOUNTER — TELEPHONE (OUTPATIENT)
Dept: PHARMACY | Facility: HOSPITAL | Age: 68
End: 2018-05-24

## 2018-05-29 ENCOUNTER — ANTICOAGULATION VISIT (OUTPATIENT)
Dept: PHARMACY | Facility: HOSPITAL | Age: 68
End: 2018-05-29

## 2018-05-29 DIAGNOSIS — I74.9 EMBOLISM AND THROMBOSIS (HCC): ICD-10-CM

## 2018-05-29 LAB — INR PPP: 1.9

## 2018-06-04 ENCOUNTER — ANTICOAGULATION VISIT (OUTPATIENT)
Dept: PHARMACY | Facility: HOSPITAL | Age: 68
End: 2018-06-04

## 2018-06-04 DIAGNOSIS — I74.9 EMBOLISM AND THROMBOSIS (HCC): ICD-10-CM

## 2018-06-04 LAB — INR PPP: 2.3

## 2018-06-04 NOTE — PROGRESS NOTES
Anticoagulation Clinic - Remote Progress Note  [facility]   Frequency of Monitorin days    Indication: Bilateral PE, stroke syndrome, embolism and thrombosis of unspecified site   Referring Provider: Dr. Rika Sullivan  Initial Warfarin Start Date:   Planned Duration of Therapy: lifelong  Goal INR: 2-3  Current Drug Interactions: doxepin d/c'd on ; ASA    Diet: Low GLV intake  Alcohol: None  Tobacco: None  OTC Pain Medications: APAP    INR History:  Date 8/28 9/4 9/18 10/2 10/9 10/13 10/20 10/27 11/3 11/18 12/4 12/11   Total Weekly Dose 27.5mg 27.5mg 27.5 mg 27.5 mg 27.5 mg 25 mg 25mg 25mg 25mg 25mg 27.5mg 27.5mg   INR 2.5 2.2 2.4 2.8 3.2 3.2 2.2 1.9 2.5 2.7 2.4 3.0   Notes hematoma?;stop doxepin stopped doxepin on       sick; nose bleed sick; nose bleed        Date    Total Weekly Dose 27.5mg 27.5mg 27.5mg 27.5mg 27.5mg 27.5mg 27.5 mg 27.5mg 25mg 27.5mg 27.5 mg 27.5 mg   INR 2.5 2.3 2.2 2.5 2.4 2.9 2.6 3.8 2.3 2.3 1.9 2.3   Notes        hold; nose bleeds continue   ill      Date 3/8 3/15 3/26 4/1 4/9 4/13 4/18 4/23 4/30 5/14 5/29   Total Weekly Dose 27.5mg 27.5mg 30mg 30 mg 30mg 30mg 32.5 mg 30mg 30mg 30mg 30 mg   INR 2.1 1.5 2.5 1.8 1.5 1.6 2.5 2.3 2.3 2.3 1.9   Notes   Boost post colonoscopy    Clinic; boost         Date      Total Weekly Dose 32.5mg     INR 2.3     Notes        Phone Interview:  Tablet Strength: 5mg tablets  Current Maintenance Dose: 5mg daily except 2.5mg WedSa  Contact Info: [phone] (Home) // [phone] (Cell)    Patient Findings:  Negatives:   Signs/symptoms of thrombosis, Signs/symptoms of bleeding, Laboratory test error suspected, Change in health, Change in alcohol use, Change in activity, Upcoming invasive procedure, Emergency department visit, Upcoming dental procedure, Missed doses, Extra doses, Change in medications, Change in diet/appetite, Hospital admission, Bruising, Other complaints    Comments:   Spoke with patient who states she finished her medication for shingles, otherwise no changes since last encounter     Plan:  1. INR is back WNL today (6/4) at 2.3. Instructed Mrs. Muller to continue warfarin 5mg daily except 2.5mg WedSat.  2. Repeat INR in two weeks (6/18).  3. Verbal information provided over the phone. Mrs. Muller expresses understanding through teach back and has no further questions at this time.   4. Patient declines refills at this time.    Glory Reyes, Dot  6/4/2018  11:46 AM     I, Alanna Bermudez, PharmD, have reviewed the note in full and agree with the assessment and plan.  06/04/18  3:22 PM

## 2018-06-19 LAB — INR PPP: 2.9

## 2018-06-20 ENCOUNTER — ANTICOAGULATION VISIT (OUTPATIENT)
Dept: PHARMACY | Facility: HOSPITAL | Age: 68
End: 2018-06-20

## 2018-06-20 DIAGNOSIS — I74.9 EMBOLISM AND THROMBOSIS (HCC): ICD-10-CM

## 2018-06-20 NOTE — PROGRESS NOTES
Anticoagulation Clinic - Remote Progress Note  [facility]   Frequency of Monitorin days    Indication: Bilateral PE, stroke syndrome, embolism and thrombosis of unspecified site   Referring Provider: Dr. Rika Sullivan  Initial Warfarin Start Date:   Planned Duration of Therapy: lifelong  Goal INR: 2-3  Current Drug Interactions: doxepin d/c'd on ; ASA    Diet: Low GLV intake  Alcohol: None  Tobacco: None  OTC Pain Medications: APAP    INR History:  Date 8/28 9/4 9/18 10/2 10/9 10/13 10/20 10/27 11/3 11/18 12/4 12/11   Total Weekly Dose 27.5mg 27.5mg 27.5 mg 27.5 mg 27.5 mg 25 mg 25mg 25mg 25mg 25mg 27.5mg 27.5mg   INR 2.5 2.2 2.4 2.8 3.2 3.2 2.2 1.9 2.5 2.7 2.4 3.0   Notes hematoma?;stop doxepin stopped doxepin on       sick; nose bleed sick; nose bleed        Date  1   Total Weekly Dose 27.5mg 27.5mg 27.5mg 27.5mg 27.5mg 27.5mg 27.5 mg 27.5mg 25mg 27.5mg 27.5 mg 27.5 mg   INR 2.5 2.3 2.2 2.5 2.4 2.9 2.6 3.8 2.3 2.3 1.9 2.3   Notes        hold; nose bleeds continue   ill      Date 3/8 3/15 3/26 4/1 4/9 4/13 4/18 4/23 4/30 5/14 5/29   Total Weekly Dose 27.5mg 27.5mg 30mg 30 mg 30mg 30mg 32.5 mg 30mg 30mg 30mg 30 mg   INR 2.1 1.5 2.5 1.8 1.5 1.6 2.5 2.3 2.3 2.3 1.9   Notes   Boost post colonoscopy    Clinic; boost         Date     Total Weekly Dose 32.5mg 30mg    INR 2.3 2.9    Notes        Phone Interview:  Tablet Strength: 5mg tablets  Current Maintenance Dose: 5mg daily except 2.5mg WedSat  Contact Info: [phone] (Home) // [phone] (Cell)    Patient Findings:  Negatives:   Signs/symptoms of thrombosis, Signs/symptoms of bleeding, Laboratory test error suspected, Change in health, Change in alcohol use, Change in activity, Upcoming invasive procedure, Emergency department visit, Upcoming dental procedure, Missed doses, Extra doses, Change in medications, Change in diet/appetite, Hospital admission, Bruising, Other  complaints   Comments:   Spoke with patient who denies any changes since last encounter     Plan:  1. INR was therapeutic on 6/19 at 2.9. Instructed Mrs. Muller to continue warfarin 5mg daily except 2.5mg WedSat.  2. Repeat INR in two weeks (7/3).  3. Verbal information provided over the phone. Mrs. Muller expresses understanding through teach back and has no further questions at this time.   4. Patient declines refills at this time.    Glory Reyes, Dot  6/20/2018  8:07 AM     I, Apollo Vicente, [facility], have reviewed the note in full and agree with the assessment and plan.  06/20/18  8:30 AM

## 2018-06-29 RX ORDER — FUROSEMIDE 40 MG/1
TABLET ORAL
Qty: 75 TABLET | Refills: 0 | Status: SHIPPED | OUTPATIENT
Start: 2018-06-29 | End: 2018-08-02 | Stop reason: SDUPTHER

## 2018-07-02 ENCOUNTER — ANTICOAGULATION VISIT (OUTPATIENT)
Dept: PHARMACY | Facility: HOSPITAL | Age: 68
End: 2018-07-02

## 2018-07-02 DIAGNOSIS — I74.9 EMBOLISM AND THROMBOSIS (HCC): ICD-10-CM

## 2018-07-02 LAB — INR PPP: 2.6

## 2018-07-02 NOTE — PROGRESS NOTES
Anticoagulation Clinic - Remote Progress Note  [facility]   Frequency of Monitorin days    Indication: Bilateral PE, stroke syndrome, embolism and thrombosis of unspecified site   Referring Provider: Dr. Rika Sullivan  Initial Warfarin Start Date:   Planned Duration of Therapy: lifelong  Goal INR: 2-3  Current Drug Interactions: doxepin d/c'd on ; ASA    Diet: Low GLV intake  Alcohol: None  Tobacco: None  OTC Pain Medications: APAP    INR History:  Date 8/28 9/4 9/18 10/2 10/9 10/13 10/20 10/27 11/3 11/18 12/4 12/11   Total Weekly Dose 27.5mg 27.5mg 27.5 mg 27.5 mg 27.5 mg 25 mg 25mg 25mg 25mg 25mg 27.5mg 27.5mg   INR 2.5 2.2 2.4 2.8 3.2 3.2 2.2 1.9 2.5 2.7 2.4 3.0   Notes hematoma?;stop doxepin stopped doxepin on       sick; nose bleed sick; nose bleed        Date    Total Weekly Dose 27.5mg 27.5mg 27.5mg 27.5mg 27.5mg 27.5mg 27.5 mg 27.5mg 25mg 27.5mg 27.5 mg 27.5 mg   INR 2.5 2.3 2.2 2.5 2.4 2.9 2.6 3.8 2.3 2.3 1.9 2.3   Notes        hold; nose bleeds continue   ill      Date 3/8 3/15 3/26 4/1 4/9 4/13 4/18 4/23 4/30 5/14 5/29   Total Weekly Dose 27.5mg 27.5mg 30mg 30 mg 30mg 30mg 32.5 mg 30mg 30mg 30mg 30 mg   INR 2.1 1.5 2.5 1.8 1.5 1.6 2.5 2.3 2.3 2.3 1.9   Notes   Boost post colonoscopy    Clinic; boost         Date    Total Weekly Dose 32.5mg 30mg 30mg   INR 2.3 2.9 2.6   Notes        Phone Interview:  Tablet Strength: 5mg tablets  Current Maintenance Dose: 5mg daily except 2.5mg WedSat  Contact Info: [phone] (Home) // [phone] (Cell)    Patient Findings:  Positives:   Signs/symptoms of bleeding, Change in health, Change in medications, Change in diet/appetite   Negatives:   Signs/symptoms of thrombosis, Laboratory test error suspected, Change in alcohol use, Change in activity, Upcoming invasive procedure, Emergency department visit, Upcoming dental procedure, Missed doses, Extra doses, Hospital  admission, Bruising, Other complaints   Comments:   Patient states she has an inner ear problem for which she has been prescribed Meclizine TID prn. According to [employer], there are no DDI. States she had a nose bleed this last week, so she ate some broccoli last Wednesday.      Plan:  1. INR is therapeutic today at 2.6. Instructed Mrs. Muller to continue warfarin 5mg daily except 2.5mg WedSat.  2. Repeat INR in two weeks (7/16).  3. Verbal information provided over the phone. Mrs. Muller expresses understanding through teach back and has no further questions at this time.   4. Patient declines refills at this time.    Glory Reyes, Dot  7/2/2018  2:11 PM     IYasmeen, [facility], have reviewed the note in full and agree with the assessment and plan.  07/02/18  6:20 PM

## 2018-07-06 ENCOUNTER — HOSPITAL ENCOUNTER (OUTPATIENT)
Dept: MAMMOGRAPHY | Facility: HOSPITAL | Age: 68
Discharge: HOME | End: 2018-07-06
Admitting: INTERNAL MEDICINE

## 2018-07-06 DIAGNOSIS — Z12.31 VISIT FOR SCREENING MAMMOGRAM: ICD-10-CM

## 2018-07-06 PROCEDURE — 77063 BREAST TOMOSYNTHESIS BI: CPT | Performed by: RADIOLOGY

## 2018-07-06 PROCEDURE — 77063 BREAST TOMOSYNTHESIS BI: CPT

## 2018-07-06 PROCEDURE — 77067 SCR MAMMO BI INCL CAD: CPT | Performed by: RADIOLOGY

## 2018-07-06 PROCEDURE — 77067 SCR MAMMO BI INCL CAD: CPT

## 2018-07-16 ENCOUNTER — HOSPITAL ENCOUNTER (OUTPATIENT)
Dept: MAMMOGRAPHY | Facility: HOSPITAL | Age: 68
Discharge: HOME | End: 2018-07-16
Admitting: RADIOLOGY

## 2018-07-16 ENCOUNTER — HOSPITAL ENCOUNTER (OUTPATIENT)
Dept: ULTRASOUND IMAGING | Facility: HOSPITAL | Age: 68
Discharge: HOME | End: 2018-07-16

## 2018-07-16 DIAGNOSIS — R92.8 ABNORMAL MAMMOGRAM: ICD-10-CM

## 2018-07-16 PROCEDURE — G0279 TOMOSYNTHESIS, MAMMO: HCPCS

## 2018-07-16 PROCEDURE — 76642 ULTRASOUND BREAST LIMITED: CPT

## 2018-07-16 PROCEDURE — 76642 ULTRASOUND BREAST LIMITED: CPT | Performed by: RADIOLOGY

## 2018-07-16 PROCEDURE — 77065 DX MAMMO INCL CAD UNI: CPT

## 2018-07-16 PROCEDURE — 77065 DX MAMMO INCL CAD UNI: CPT | Performed by: RADIOLOGY

## 2018-07-16 PROCEDURE — G0279 TOMOSYNTHESIS, MAMMO: HCPCS | Performed by: RADIOLOGY

## 2018-07-17 ENCOUNTER — ANTICOAGULATION VISIT (OUTPATIENT)
Dept: PHARMACY | Facility: HOSPITAL | Age: 68
End: 2018-07-17

## 2018-07-17 DIAGNOSIS — I74.9 EMBOLISM AND THROMBOSIS (HCC): ICD-10-CM

## 2018-07-17 LAB — INR PPP: 3.4

## 2018-07-17 NOTE — PROGRESS NOTES
Anticoagulation Clinic - Remote Progress Note  [facility]   Frequency of Monitorin days    Indication: Bilateral PE, stroke syndrome, embolism and thrombosis of unspecified site   Referring Provider: Dr. Rika Sullivan  Initial Warfarin Start Date:   Planned Duration of Therapy: lifelong  Goal INR: 2-3  Current Drug Interactions: doxepin d/c'd on ; ASA    Diet: Low GLV intake  Alcohol: None  Tobacco: None  OTC Pain Medications: APAP    INR History:  Date 8/28 9/4 9/18 10/2 10/9 10/13 10/20 10/27 11/3 11/18 12/4 12/11   Total Weekly Dose 27.5mg 27.5mg 27.5 mg 27.5 mg 27.5 mg 25 mg 25mg 25mg 25mg 25mg 27.5mg 27.5mg   INR 2.5 2.2 2.4 2.8 3.2 3.2 2.2 1.9 2.5 2.7 2.4 3.0   Notes hematoma?;stop doxepin stopped doxepin on       sick; nose bleed sick; nose bleed        Date    Total Weekly Dose 27.5mg 27.5mg 27.5mg 27.5mg 27.5mg 27.5mg 27.5 mg 27.5mg 25mg 27.5mg 27.5 mg 27.5 mg   INR 2.5 2.3 2.2 2.5 2.4 2.9 2.6 3.8 2.3 2.3 1.9 2.3   Notes        hold; nose bleeds continue   ill      Date 3/8 3/15 3/26 4/1 4/9 4/13 4/18 4/23 4/30 5/14 5/29   Total Weekly Dose 27.5mg 27.5mg 30mg 30 mg 30mg 30mg 32.5 mg 30mg 30mg 30mg 30 mg   INR 2.1 1.5 2.5 1.8 1.5 1.6 2.5 2.3 2.3 2.3 1.9   Notes   Boost post colonoscopy    Clinic; boost         Date       Total Weekly Dose 32.5mg 30mg 30mg 30mg 27.5mg     INR 2.3 2.9 2.6 3.4      Notes            Phone Interview:  Tablet Strength: 5mg tablets  Current Maintenance Dose: 5mg daily except 2.5mg WedSat  Contact Info: [phone] (Home) // [phone] (Cell)    Patient Findings:  Positives:   Signs/symptoms of bleeding, Bruising   Negatives:   Signs/symptoms of thrombosis, Laboratory test error suspected, Change in health, Change in alcohol use, Change in activity, Upcoming invasive procedure, Emergency department visit, Upcoming dental procedure, Missed doses, Extra doses, Change in  medications, Change in diet/appetite, Hospital admission, Other complaints   Comments:   Mrs. Muller reports having a nose bleed over the weekend that has resolved and has a large bruise on her arm and leg. She plans to eat some brussel sprouts today due to higher INR but otherwise her diet has remained consistent. Reports no other changes.        Plan:  1. INR is supratherapeutic at 3.4. Mrs. Muller reports having a nose bleed over the weekend but no other changes in her diet/meidcations. Instructed Mrs. Muller to take Warfarin 2.5mg today and continue warfarin 5mg daily except 2.5mg WedSat.  2. Repeat INR in one week (7/24).  3. Verbal information provided over the phone. Mrs. Muller expresses understanding through teach back and has no further questions at this time.   4. Patient declines refills at this time.    Dania Weiner, PharmD  Pharmacy Resident  7/17/2018  10:16 AM

## 2018-07-24 ENCOUNTER — APPOINTMENT (OUTPATIENT)
Dept: LAB | Facility: HOSPITAL | Age: 68
End: 2018-07-24

## 2018-07-24 ENCOUNTER — OFFICE VISIT (OUTPATIENT)
Dept: NEUROLOGY | Facility: CLINIC | Age: 68
End: 2018-07-24

## 2018-07-24 VITALS
BODY MASS INDEX: 27.67 KG/M2 | DIASTOLIC BLOOD PRESSURE: 65 MMHG | OXYGEN SATURATION: 98 % | HEART RATE: 72 BPM | WEIGHT: 182 LBS | SYSTOLIC BLOOD PRESSURE: 135 MMHG

## 2018-07-24 DIAGNOSIS — Z51.81 MEDICATION MONITORING ENCOUNTER: Primary | ICD-10-CM

## 2018-07-24 DIAGNOSIS — M54.2 NECK PAIN: ICD-10-CM

## 2018-07-24 DIAGNOSIS — G44.229 CHRONIC TENSION-TYPE HEADACHE, NOT INTRACTABLE: ICD-10-CM

## 2018-07-24 LAB
AMPHET+METHAMPHET UR QL: NEGATIVE
AMPHETAMINES UR QL: NEGATIVE
BARBITURATES UR QL SCN: POSITIVE
BENZODIAZ UR QL SCN: POSITIVE
BUPRENORPHINE SERPL-MCNC: NEGATIVE NG/ML
CANNABINOIDS SERPL QL: NEGATIVE
COCAINE UR QL: NEGATIVE
METHADONE UR QL SCN: NEGATIVE
OPIATES UR QL: POSITIVE
OXYCODONE UR QL SCN: NEGATIVE
PCP UR QL SCN: NEGATIVE
PROPOXYPH UR QL: NEGATIVE
TRICYCLICS UR QL SCN: NEGATIVE

## 2018-07-24 PROCEDURE — 36415 COLL VENOUS BLD VENIPUNCTURE: CPT | Performed by: PSYCHIATRY & NEUROLOGY

## 2018-07-24 PROCEDURE — 80306 DRUG TEST PRSMV INSTRMNT: CPT | Performed by: PSYCHIATRY & NEUROLOGY

## 2018-07-24 PROCEDURE — 80171 DRUG SCREEN QUANT GABAPENTIN: CPT | Performed by: PSYCHIATRY & NEUROLOGY

## 2018-07-24 PROCEDURE — 99213 OFFICE O/P EST LOW 20 MIN: CPT | Performed by: PSYCHIATRY & NEUROLOGY

## 2018-07-24 RX ORDER — GABAPENTIN 800 MG/1
800 TABLET ORAL 3 TIMES DAILY
Qty: 90 TABLET | Refills: 1
Start: 2018-07-24 | End: 2018-09-25 | Stop reason: SDUPTHER

## 2018-07-24 RX ORDER — BUTALBITAL, ACETAMINOPHEN AND CAFFEINE 50; 325; 40 MG/1; MG/1; MG/1
TABLET ORAL
Qty: 30 TABLET | Refills: 2
Start: 2018-07-24 | End: 2018-09-19

## 2018-07-24 NOTE — PROGRESS NOTES
Subjective:     Patient ID: Leela Muller is a 68 y.o. female.    CC:   Chief Complaint   Patient presents with   • Neck Pain   • Headache       HPI:   History of Present Illness  The following portions of the patient's history were reviewed and updated as appropriate: allergies, current medications, past family history, past medical history, past social history, past surgical history and problem list.     Reports increase in tension headaches, recent break out of shingles on her left shoulder, needs refills.      Past Medical History:   Diagnosis Date   • Anxiety    • Arm pain    • Arthritis    • Depression    • Diabetes mellitus (CMS/HCC)    • Hyperlipidemia    • Hypertension    • Neck pain on left side    • Palpitations 4/18/2018   • Pancreatitis    • Pulmonary embolism (CMS/HCC)    • Stroke syndrome (CMS/HCC) 9/14/2016    · Assessed By: Devaughn Lewis (Neurology); Last Assessed: 16 Jun 2015  Right cerebrovascular accident in July or August 2010, evaluated at [facility].  Admission to [facility] on 09/28/2010 with headache and stuttering, consistent with TIA.  Chronic Coumadin therapy, initiated following bilateral pulmonary emboli in 2007 after fall and hip replacement.  She was already on 81 mg of aspirin as well, 09/2010.  MRI of the brain on 09/28/2010 revealing old right parietal cerebrovascular accident.  MRA revealing normal carotids, middle anterior and posterior cerebral arteries with minimal ostial stenosis of both vertebral arteries.  GENARO by Dr. Oliver Mobley on 09/28/2010:  patent foramen ovale with a small amount of right to left shunting.  Normal LVEF and normal valvular structures.   Patent foramen ovale closure using a 25 mm. Amplatzer cribriform occluder, 10/05/2010.   Echocardiogram, 06/23/2014:  LVEF (55% to 60%) with and Amplatzer device noted to be well-seated in    • Thrombocytopenia (CMS/HCC)    • Transient cerebral ischemia        Past Surgical History:    Procedure Laterality Date   • APPENDECTOMY     • BREAST BIOPSY Left 2012    benign   • BREAST EXCISIONAL BIOPSY Left     benign   • BREAST EXCISIONAL BIOPSY Right     benign   • CHOLECYSTECTOMY     • HYSTERECTOMY     • OOPHORECTOMY     • TONSILLECTOMY     • TOTAL HIP ARTHROPLASTY REVISION         Social History     Social History   • Marital status:      Spouse name: N/A   • Number of children: N/A   • Years of education: N/A     Occupational History   • Not on file.     Social History Main Topics   • Smoking status: Never Smoker   • Smokeless tobacco: Never Used   • Alcohol use No   • Drug use: No   • Sexual activity: Defer     Other Topics Concern   • Not on file     Social History Narrative   • No narrative on file       Family History   Problem Relation Age of Onset   • Alzheimer's disease Mother    • Cancer Mother    • Coronary artery disease Other    • Diabetes Other    • Hypertension Other    • Stroke Other    • Cancer Other    • Dementia Other    • Heart attack Father    • Breast cancer Neg Hx    • Ovarian cancer Neg Hx         Review of Systems   Constitutional: Negative for chills, fatigue, fever and unexpected weight change.   HENT: Positive for nosebleeds. Negative for ear pain, hearing loss, rhinorrhea and sore throat.    Eyes: Positive for visual disturbance. Negative for photophobia, pain, discharge and itching.   Respiratory: Positive for cough. Negative for chest tightness, shortness of breath and wheezing.    Cardiovascular: Positive for leg swelling. Negative for chest pain and palpitations.   Gastrointestinal: Negative for abdominal pain, blood in stool, constipation, diarrhea, nausea and vomiting.   Genitourinary: Negative for dysuria, frequency, hematuria and urgency.   Musculoskeletal: Positive for arthralgias, back pain, joint swelling, myalgias, neck pain and neck stiffness. Negative for gait problem.   Skin: Negative for rash and wound.   Allergic/Immunologic: Negative for  environmental allergies and food allergies.   Neurological: Negative for dizziness, tremors, seizures, syncope, speech difficulty, weakness, light-headedness, numbness and headaches.   Hematological: Negative for adenopathy. Bruises/bleeds easily.   Psychiatric/Behavioral: Positive for self-injury and sleep disturbance. Negative for agitation, confusion, decreased concentration, hallucinations and suicidal ideas. The patient is not nervous/anxious.    All other systems reviewed and are negative.       Objective:    Neurologic Exam     Mental Status   Oriented to person, place, and time.       Physical Exam   Constitutional: She is oriented to person, place, and time. She appears well-developed and well-nourished.   Cardiovascular: Normal rate and regular rhythm.    Pulmonary/Chest: Effort normal.   Neurological: She is alert and oriented to person, place, and time. She has normal reflexes.   Psychiatric: She has a normal mood and affect. Her behavior is normal. Thought content normal.       Assessment/Plan:       Leela was seen today for neck pain and headache.    Diagnoses and all orders for this visit:    Medication monitoring encounter  -     Urine Drug Screen - Urine, Clean Catch  -     Gabapentin Level    Neck pain  -     gabapentin (NEURONTIN) 800 MG tablet; Take 1 tablet by mouth 3 (Three) Times a Day.    Chronic tension-type headache, not intractable  -     butalbital-acetaminophen-caffeine (FIORICET, ESGIC) -40 MG per tablet; Take 1 - 2 tablets by mouth once daily as needed for headache         The patient has read and signed the [facility] Controlled Substance Contract.  I will continue to see patient for regular follow up appointments.  They are well controlled on their medication.  LORRI is updated every 3 months. The patient is aware of the potential for addiction and dependence.      Devaughn Lewis MD  7/24/2018

## 2018-07-25 ENCOUNTER — ANTICOAGULATION VISIT (OUTPATIENT)
Dept: PHARMACY | Facility: HOSPITAL | Age: 68
End: 2018-07-25

## 2018-07-25 DIAGNOSIS — I74.9 EMBOLISM AND THROMBOSIS (HCC): ICD-10-CM

## 2018-07-25 LAB — INR PPP: 2.3

## 2018-07-25 NOTE — PROGRESS NOTES
Anticoagulation Clinic - Remote Progress Note  [facility]   Frequency of Monitorin days    Indication: Bilateral PE, stroke syndrome, embolism and thrombosis of unspecified site   Referring Provider: Dr. Rika Sullivan  Initial Warfarin Start Date:   Planned Duration of Therapy: lifelong  Goal INR: 2-3  Current Drug Interactions: doxepin d/c'd on ; ASA    Diet: Low GLV intake  Alcohol: None  Tobacco: None  OTC Pain Medications: APAP    INR History:  Date 8/28 9/4 9/18 10/2 10/9 10/13 10/20 10/27 11/3 11/18 12/4 12/11   Total Weekly Dose 27.5mg 27.5mg 27.5 mg 27.5 mg 27.5 mg 25 mg 25mg 25mg 25mg 25mg 27.5mg 27.5mg   INR 2.5 2.2 2.4 2.8 3.2 3.2 2.2 1.9 2.5 2.7 2.4 3.0   Notes hematoma?;stop doxepin stopped doxepin on       sick; nose bleed sick; nose bleed        Date    Total Weekly Dose 27.5mg 27.5mg 27.5mg 27.5mg 27.5mg 27.5mg 27.5 mg 27.5mg 25mg 27.5mg 27.5 mg 27.5 mg   INR 2.5 2.3 2.2 2.5 2.4 2.9 2.6 3.8 2.3 2.3 1.9 2.3   Notes        hold; nose bleeds continue   ill      Date 3/8 3/15 3/26 4/1 4/9 4/13 4/18 4/23 4/30 5/14 5/29   Total Weekly Dose 27.5mg 27.5mg 30mg 30 mg 30mg 30mg 32.5 mg 30mg 30mg 30mg 30 mg   INR 2.1 1.5 2.5 1.8 1.5 1.6 2.5 2.3 2.3 2.3 1.9   Notes   Boost post colonoscopy    Clinic; boost         Date      Total Weekly Dose 32.5mg 30mg 30mg 30mg 30mg     INR 2.3 2.9 2.6 3.4 2.3     Notes            Phone Interview:  Tablet Strength: 5mg tablets  Current Maintenance Dose: 5mg daily except 2.5mg WedSat  Contact Info: [phone] (Home) // [phone] (Cell)    Patient Findings:  Positives:   Change in diet/appetite   Negatives:   Signs/symptoms of thrombosis, Signs/symptoms of bleeding, Laboratory test error suspected, Change in health, Change in alcohol use, Change in activity, Upcoming invasive procedure, Emergency department visit, Upcoming dental procedure, Missed doses, Extra  doses, Change in medications, Hospital admission, Bruising, Other complaints   Comments:   Patient ate some brussel sprouts as instructed per last encounter. Otherwise no changes.     Plan:  1. INR is back WNL today at 2.3. Instructed Mrs. Muller to continue maintenance dose of warfarin 5mg daily except 2.5mg WedSat.  2. Repeat INR in one week, 8/1.  3. Verbal information provided over the phone. Mrs. Muller expresses understanding through teach back and has no further questions at this time.   4. Patient declines refills at this time.    Glory Reyes, Dot  7/25/2018  2:37 PM    IZenaida, PharmD, have reviewed the note in full and agree with the assessment and plan.  07/25/18  2:50 PM

## 2018-07-26 ENCOUNTER — TELEPHONE (OUTPATIENT)
Dept: NEUROLOGY | Facility: CLINIC | Age: 68
End: 2018-07-26

## 2018-07-26 NOTE — TELEPHONE ENCOUNTER
----- Message from Devaughn Lewis MD sent at 7/25/2018  3:56 PM EDT -----  Regarding: UDS  Ok to call in gabapentin and Fioricet with refills, thanks.  ----- Message -----  From: Lab, Background User  Sent: 7/24/2018   7:51 PM  To: Devaughn Lewis MD

## 2018-07-27 LAB — GABAPENTIN SERPLBLD-MCNC: 1.8 UG/ML (ref 4–16)

## 2018-08-02 DIAGNOSIS — I74.9 EMBOLIC INFARCTION (HCC): ICD-10-CM

## 2018-08-02 DIAGNOSIS — Z79.899 HIGH RISK MEDICATION USE: Primary | ICD-10-CM

## 2018-08-02 RX ORDER — FUROSEMIDE 40 MG/1
TABLET ORAL
Qty: 135 TABLET | Refills: 0 | Status: SHIPPED | OUTPATIENT
Start: 2018-08-02 | End: 2019-04-24 | Stop reason: SDUPTHER

## 2018-08-08 ENCOUNTER — ANTICOAGULATION VISIT (OUTPATIENT)
Dept: PHARMACY | Facility: HOSPITAL | Age: 68
End: 2018-08-08

## 2018-08-08 DIAGNOSIS — I74.9 EMBOLISM AND THROMBOSIS (HCC): ICD-10-CM

## 2018-08-08 LAB — INR PPP: 2.3

## 2018-08-08 NOTE — PROGRESS NOTES
Anticoagulation Clinic - Remote Progress Note  [facility]   Frequency of Monitorin days    Indication: Bilateral PE, stroke syndrome, embolism and thrombosis of unspecified site   Referring Provider: Dr. Rika Sullivan  Initial Warfarin Start Date:   Planned Duration of Therapy: lifelong  Goal INR: 2-3  Current Drug Interactions: doxepin d/c'd on ; ASA    Diet: Low GLV intake  Alcohol: None  Tobacco: None  OTC Pain Medications: APAP    INR History:  Date 8/28 9/4 9/18 10/2 10/9 10/13 10/20 10/27 11/3 11/18 12/4 12/11   Total Weekly Dose 27.5mg 27.5mg 27.5 mg 27.5 mg 27.5 mg 25 mg 25mg 25mg 25mg 25mg 27.5mg 27.5mg   INR 2.5 2.2 2.4 2.8 3.2 3.2 2.2 1.9 2.5 2.7 2.4 3.0   Notes hematoma?;stop doxepin stopped doxepin on       sick; nose bleed sick; nose bleed        Date    Total Weekly Dose 27.5mg 27.5mg 27.5mg 27.5mg 27.5mg 27.5mg 27.5 mg 27.5mg 25mg 27.5mg 27.5 mg 27.5 mg   INR 2.5 2.3 2.2 2.5 2.4 2.9 2.6 3.8 2.3 2.3 1.9 2.3   Notes        hold; nose bleeds continue   ill      Date 3/8 3/15 3/26 4/1 4/9 4/13 4/18 4/23 4/30 5/14 5/29   Total Weekly Dose 27.5mg 27.5mg 30mg 30 mg 30mg 30mg 32.5 mg 30mg 30mg 30mg 30 mg   INR 2.1 1.5 2.5 1.8 1.5 1.6 2.5 2.3 2.3 2.3 1.9   Notes   Boost post colonoscopy    Clinic; boost         Date     Total Weekly Dose 32.5mg 30mg 30mg 30mg 30mg  30 mg    INR 2.3 2.9 2.6 3.4 2.3  2.3    Notes            Phone Interview:  Tablet Strength: 5mg tablets  Current Maintenance Dose: 5mg daily except 2.5mg WedSat  Contact Info: [phone] (Home) // [phone] (Cell)    Patient Findings     Positives:   Signs/symptoms of bleeding, Upcoming dental procedure, Change in diet/appetite   Negatives:   Signs/symptoms of thrombosis, Laboratory test error suspected, Change in health, Change in alcohol use, Change in activity, Upcoming invasive procedure, Emergency department visit, Missed  doses, Extra doses, Change in medications, Hospital admission, Bruising, Other complaints   Comments:   Denies changes with the exception of eating brussel sprouts and broccoli this week because she had a nose bleed.   She stated that she is trying to schedule an appointment with her dentist.  She thinks she may need a root canal or tooth removed.  I asked her to contact the clinic as soon as possible with information so a plan can be developed with her dentist and Dr. Sullivan.      Plan:  1. INR is back WNL today at 2.3. Instructed Mrs. Muller to continue maintenance dose of warfarin 5mg daily except 2.5mg WedSat.  She may need to have a dental procedure done.  She agreed to call the clinic as soon as she knows the plan.  2. Repeat INR in two weeks, 8/21.  3. Verbal information provided over the phone. Mrs. Muller expresses understanding through teach back and has no further questions at this time.   4. Patient declines refills at this time.    Zenaida Live, PharmD  8/8/2018  1:32 PM

## 2018-08-20 ENCOUNTER — ANTICOAGULATION VISIT (OUTPATIENT)
Dept: PHARMACY | Facility: HOSPITAL | Age: 68
End: 2018-08-20

## 2018-08-20 DIAGNOSIS — I74.9 EMBOLISM AND THROMBOSIS (HCC): ICD-10-CM

## 2018-08-20 LAB — INR PPP: 2.7

## 2018-08-20 NOTE — PROGRESS NOTES
Anticoagulation Clinic - Remote Progress Note  [facility]   Frequency of Monitorin days    Indication: Bilateral PE, stroke syndrome, embolism and thrombosis of unspecified site   Referring Provider: Dr. Rika Sullivan  Initial Warfarin Start Date:   Planned Duration of Therapy: lifelong  Goal INR: 2-3  Current Drug Interactions: doxepin d/c'd on ; ASA    Diet: Low GLV intake  Alcohol: None  Tobacco: None  OTC Pain Medications: APAP    INR History:  Date 8/28 9/4 9/18 10/2 10/9 10/13 10/20 10/27 11/3 11/18 12/4 12/11   Total Weekly Dose 27.5mg 27.5mg 27.5 mg 27.5 mg 27.5 mg 25 mg 25mg 25mg 25mg 25mg 27.5mg 27.5mg   INR 2.5 2.2 2.4 2.8 3.2 3.2 2.2 1.9 2.5 2.7 2.4 3.0   Notes hematoma?;stop doxepin stopped doxepin on       sick; nose bleed sick; nose bleed        Date    Total Weekly Dose 27.5mg 27.5mg 27.5mg 27.5mg 27.5mg 27.5mg 27.5 mg 27.5mg 25mg 27.5mg 27.5 mg 27.5 mg   INR 2.5 2.3 2.2 2.5 2.4 2.9 2.6 3.8 2.3 2.3 1.9 2.3   Notes        hold; nose bleeds continue   ill      Date 3/8 3/15 3/26 4/1 4/9 4/13 4/18 4/23 4/30 5/14 5/29   Total Weekly Dose 27.5mg 27.5mg 30mg 30 mg 30mg 30mg 32.5 mg 30mg 30mg 30mg 30 mg   INR 2.1 1.5 2.5 1.8 1.5 1.6 2.5 2.3 2.3 2.3 1.9   Notes   Boost post colonoscopy    Clinic; boost         Date        Total Weekly Dose 32.5mg 30mg 30mg 30mg 30mg  30 mg 30mg       INR 2.3 2.9 2.6 3.4 2.3  2.3 2.7       Notes                Phone Interview:  Tablet Strength: 5mg tablets  Current Maintenance Dose: 5mg daily except 2.5mg WedSat  Contact Info: [phone] (Home) // [phone] (Cell)    Patient Findings:  Negatives:   Signs/symptoms of thrombosis, Signs/symptoms of bleeding, Laboratory test error suspected, Change in health, Change in alcohol use, Change in activity, Upcoming invasive procedure, Emergency department visit, Upcoming dental procedure, Missed doses, Extra  doses, Change in medications, Change in diet/appetite, Hospital admission, Bruising, Other complaints   Comments:   Patient denies any changes since last encounter - she states she did recently have a biopsy of her rectum done.     Plan:  1. INR is therapeutic today at 2.7. Instructed Mrs. Muller to continue maintenance dose of warfarin 5mg daily except 2.5mg WedSat.  2. Repeat INR in two weeks, 9/4 (closed the 3rd for Labor Day).  3. Verbal information provided over the phone. Mrs. Muller expresses understanding through teach back and has no further questions at this time.   4. Patient declines refills at this time.    Glory Reyes, Dot  8/20/2018  9:28 AM    I, Zenaida Live, PharmD, have reviewed the note in full and agree with the assessment and plan.  08/20/18  11:48 AM

## 2018-09-04 LAB — INR PPP: 1.7

## 2018-09-05 ENCOUNTER — ANTICOAGULATION VISIT (OUTPATIENT)
Dept: PHARMACY | Facility: HOSPITAL | Age: 68
End: 2018-09-05

## 2018-09-05 DIAGNOSIS — I74.9 EMBOLISM AND THROMBOSIS (HCC): ICD-10-CM

## 2018-09-05 NOTE — PROGRESS NOTES
Anticoagulation Clinic - Remote Progress Note  [facility]   Frequency of Monitorin days    Indication: Bilateral PE, stroke syndrome, embolism and thrombosis of unspecified site   Referring Provider: Dr. Rika Sullivan  Initial Warfarin Start Date:   Planned Duration of Therapy: lifelong  Goal INR: 2-3  Current Drug Interactions: doxepin d/c'd on ; ASA    Diet: Low GLV intake  Alcohol: None  Tobacco: None  OTC Pain Medications: APAP    INR History:  Date 8/28 9/4 9/18 10/2 10/9 10/13 10/20 10/27 11/3 11/18 12/4 12/11   Total Weekly Dose 27.5mg 27.5mg 27.5 mg 27.5 mg 27.5 mg 25 mg 25mg 25mg 25mg 25mg 27.5mg 27.5mg   INR 2.5 2.2 2.4 2.8 3.2 3.2 2.2 1.9 2.5 2.7 2.4 3.0   Notes hematoma?;stop doxepin stopped doxepin on       sick; nose bleed sick; nose bleed        Date    Total Weekly Dose 27.5mg 27.5mg 27.5mg 27.5mg 27.5mg 27.5mg 27.5 mg 27.5mg 25mg 27.5mg 27.5 mg 27.5 mg   INR 2.5 2.3 2.2 2.5 2.4 2.9 2.6 3.8 2.3 2.3 1.9 2.3   Notes        hold; nose bleeds continue   ill      Date 3/8 3/15 3/26 4/1 4/9 4/13 4/18 4/23 4/30 5/14 5/29   Total Weekly Dose 27.5mg 27.5mg 30mg 30 mg 30mg 30mg 32.5 mg 30mg 30mg 30mg 30 mg   INR 2.1 1.5 2.5 1.8 1.5 1.6 2.5 2.3 2.3 2.3 1.9   Notes   Boost post colonoscopy    Clinic; boost         Date       Total Weekly Dose 32.5mg 30mg 30mg 30mg 30mg  30 mg 30mg 30mg      INR 2.3 2.9 2.6 3.4 2.3  2.3 2.7 1.7      Notes        acyclovir        Phone Interview:  Tablet Strength: 5mg tablets  Current Maintenance Dose: 5mg daily except 2.5mg WedSat  Contact Info: [phone] (Home) // [phone] (Cell)    Patient Findings:  Positives:   Change in health, Change in medications   Negatives:   Signs/symptoms of thrombosis, Signs/symptoms of bleeding, Laboratory test error suspected, Change in alcohol use, Change in activity, Upcoming invasive procedure, Emergency department  "visit, Upcoming dental procedure, Missed doses, Extra doses, Change in diet/appetite, Hospital admission, Bruising, Other complaints   Comments:   Patient recently started on acyclovir 8/23 for shingles--states she will be taking it for \"some time.\" Patient endorses diarrhea with this medication, recommended over the counter Imodium if patient desires treatment. Patient denies other changes since last encounter.      Plan:  1. INR is subtherapeutic today at 1.7. Instructed Mrs. Muller to boost dose to 5 mg tonight, then continue maintenance dose of warfarin 5mg daily except 2.5mg WedSat.  2. Repeat INR in one week to ensure WNL, 9/11.  3. Verbal information provided over the phone. Mrs. Muller expresses understanding through teach back and has no further questions at this time.   4. Patient declines refills at this time.    Shaunna Campbell, PharmD  9/5/2018  8:07 AM   "

## 2018-09-11 ENCOUNTER — ANTICOAGULATION VISIT (OUTPATIENT)
Dept: PHARMACY | Facility: HOSPITAL | Age: 68
End: 2018-09-11

## 2018-09-11 DIAGNOSIS — I74.9 EMBOLISM AND THROMBOSIS (HCC): ICD-10-CM

## 2018-09-11 LAB — INR PPP: 2.7

## 2018-09-11 NOTE — PROGRESS NOTES
Anticoagulation Clinic - Remote Progress Note  [facility]   Frequency of Monitorin days    Indication: Bilateral PE, stroke syndrome, embolism and thrombosis of unspecified site   Referring Provider: Dr. Rika Sullivan  Initial Warfarin Start Date:   Planned Duration of Therapy: lifelong  Goal INR: 2.0-3.0  Current Drug Interactions: doxepin d/c'd on ; ASA    Diet: Low GLV intake  Alcohol: None  Tobacco: None  OTC Pain Medications: APAP    INR History:  Date 8/28 9/4 9/18 10/2 10/9 10/13 10/20 10/27 11/3 11/18 12/4 12/11   Total Weekly Dose 27.5mg 27.5mg 27.5 mg 27.5 mg 27.5 mg 25 mg 25mg 25mg 25mg 25mg 27.5mg 27.5mg   INR 2.5 2.2 2.4 2.8 3.2 3.2 2.2 1.9 2.5 2.7 2.4 3.0   Notes hematoma?;stop doxepin stopped doxepin on       sick; nose bleed sick; nose bleed        Date    Total Weekly Dose 27.5mg 27.5mg 27.5mg 27.5mg 27.5mg 27.5mg 27.5 mg 27.5mg 25mg 27.5mg 27.5 mg 27.5 mg   INR 2.5 2.3 2.2 2.5 2.4 2.9 2.6 3.8 2.3 2.3 1.9 2.3   Notes        hold; nose bleeds continue   ill      Date 3/8 3/15 3/26 4/1 4/9 4/13 4/18 4/23 4/30 5/14 5/29   Total Weekly Dose 27.5mg 27.5mg 30mg 30mg 30mg 30mg 32.5mg 30mg 30mg 30mg 30 mg   INR 2.1 1.5 2.5 1.8 1.5 1.6 2.5 2.3 2.3 2.3 1.9   Notes   Boost post colonoscopy    Clinic; boost         Date       Total Weekly Dose 32.5mg 30mg 30mg 30mg 30mg  30 mg 30mg 30mg 32.5  mg      INR 2.3 2.9 2.6 3.4 2.3  2.3 2.7 1.7 2.7      Notes        acyclovir         Phone Interview:  Tablet Strength: 5mg tablets  Current Maintenance Dose: 5mg daily except 2.5mg WedSat  Contact Info: [phone] (Home) // [phone] (Cell)    Patient Findings:  Positives:   Change in medications   Negatives:   Signs/symptoms of thrombosis, Signs/symptoms of bleeding, Laboratory test error suspected, Change in health, Change in alcohol use, Change in activity, Upcoming invasive procedure,  Emergency department visit, Upcoming dental procedure, Missed doses, Extra doses, Change in diet/appetite, Hospital admission, Bruising, Other complaints   Comments:   Continues to take acyclovir 800mg QD for shingles. 30 day Rx was filled on 8/23 but patient did not start until 8/29.      Plan:  1. INR is therapeutic and back WNL today at 2.7. Instructed Mrs. Muller to continue maintenance dose of warfarin 5mg daily except 2.5mg WedSat.  2. Repeat INR in one week to ensure WNL, 9/18.  3. Verbal information provided over the phone. Leela Muller RBV dosing instructions, expresses understanding by teach back, and has no further questions at this time.  4. Patient declines the need for warfain refills at this time.    Apollo Shelton CPhT  9/11/2018  4:01 PM     I, Yasmeen River, [facility], have reviewed the note in full and agree with the assessment and plan.  09/12/18  7:57 AM

## 2018-09-18 ENCOUNTER — ANTICOAGULATION VISIT (OUTPATIENT)
Dept: PHARMACY | Facility: HOSPITAL | Age: 68
End: 2018-09-18

## 2018-09-18 DIAGNOSIS — I74.9 EMBOLISM AND THROMBOSIS (HCC): ICD-10-CM

## 2018-09-18 LAB — INR PPP: 2.3

## 2018-09-18 NOTE — PROGRESS NOTES
Anticoagulation Clinic - Remote Progress Note  [facility]   Frequency of Monitorin days    Indication: Bilateral PE, stroke syndrome, embolism and thrombosis of unspecified site   Referring Provider: Dr. Rika Sullivan  Initial Warfarin Start Date:   Planned Duration of Therapy: lifelong  Goal INR: 2.0-3.0  Current Drug Interactions: doxepin d/c'd on ; ASA    Diet: Low GLV intake  Alcohol: None  Tobacco: None  OTC Pain Medications: APAP    INR History:  Date 8/28 9/4 9/18 10/2 10/9 10/13 10/20 10/27 11/3 11/18 12/4 12/11   Total Weekly Dose 27.5mg 27.5mg 27.5 mg 27.5 mg 27.5 mg 25 mg 25mg 25mg 25mg 25mg 27.5mg 27.5mg   INR 2.5 2.2 2.4 2.8 3.2 3.2 2.2 1.9 2.5 2.7 2.4 3.0   Notes hematoma?;stop doxepin stopped doxepin on       sick; nose bleed sick; nose bleed        Date    Total Weekly Dose 27.5mg 27.5mg 27.5mg 27.5mg 27.5mg 27.5mg 27.5 mg 27.5mg 25mg 27.5mg 27.5 mg 27.5 mg   INR 2.5 2.3 2.2 2.5 2.4 2.9 2.6 3.8 2.3 2.3 1.9 2.3   Notes        hold; nose bleeds continue   ill      Date 3/8 3/15 3/26 4/1 4/9 4/13 4/18 4/23 4/30 5/14 5/29   Total Weekly Dose 27.5mg 27.5mg 30mg 30mg 30mg 30mg 32.5mg 30mg 30mg 30mg 30 mg   INR 2.1 1.5 2.5 1.8 1.5 1.6 2.5 2.3 2.3 2.3 1.9   Notes   Boost post colonoscopy    Clinic; boost         Date      Total Weekly Dose 32.5mg 30mg 30mg 30mg 30mg  30 mg 30mg 30mg 32.5  mg 30mg     INR 2.3 2.9 2.6 3.4 2.3  2.3 2.7 1.7 2.7 2.3     Notes        acyclovir acyclovir; boost acyclovir       Phone Interview:  Tablet Strength: 5mg tablets  Current Maintenance Dose: 5mg daily except 2.5mg WedSat  Contact Info: [phone] (Home) // [phone] (Cell)    Patient Findings:  Positives:   Change in medications   Negatives:   Signs/symptoms of thrombosis, Signs/symptoms of bleeding, Laboratory test error suspected, Change in health, Change in alcohol use, Change in  activity, Upcoming invasive procedure, Emergency department visit, Upcoming dental procedure, Missed doses, Extra doses, Change in diet/appetite, Hospital admission, Bruising, Other complaints   Comments:   Continues to take acyclovir 800mg QD for shingles. 30 day Rx was filled on 8/23 but patient did not start until 8/29.     Patient says she has been eating a few small dill pickles recently and was curious how it may have affected her results. Dill pickels have 22.5mg per ~1 cup, patient doesn't believe she's eaten anywhere near a cup of pickles yet.     Plan:  1. INR is therapeutic today at 2.3. Instructed Mrs. Muller to continue maintenance dose of warfarin 5mg daily except 2.5mg WedSat.  2. Repeat INR in one week, 9/25  3. Verbal information provided over the phone. Leela Muller RBV dosing instructions, expresses understanding by teach back, and has no further questions at this time.  4. Patient declines the need for warfain refills at this time.    Apollo Shelton CPhT  9/18/2018  1:20 PM     Verify med list at follow up, if possible. Deleted a few duplicates from Epic list -- is pt taking Invokana, Zyrtec?  I, Yasmeen River, [facility], have reviewed the note in full and agree with the assessment and plan.  09/19/18  8:40 AM

## 2018-09-25 ENCOUNTER — ANTICOAGULATION VISIT (OUTPATIENT)
Dept: PHARMACY | Facility: HOSPITAL | Age: 68
End: 2018-09-25

## 2018-09-25 DIAGNOSIS — I74.9 EMBOLISM AND THROMBOSIS (HCC): ICD-10-CM

## 2018-09-25 LAB — INR PPP: 2.4

## 2018-09-25 RX ORDER — POLYETHYLENE GLYCOL 3350 17 G/17G
17 POWDER, FOR SOLUTION ORAL AS NEEDED
COMMUNITY
End: 2021-09-28

## 2018-09-25 NOTE — PROGRESS NOTES
Anticoagulation Clinic - Remote Progress Note  [facility]   Frequency of Monitorin days    Indication: Bilateral PE, stroke syndrome, embolism and thrombosis of unspecified site   Referring Provider: Dr. Rika Sullivan  Initial Warfarin Start Date:   Planned Duration of Therapy: lifelong  Goal INR: 2.0-3.0  Current Drug Interactions: doxepin d/c'd on ; ASA    Diet: Low GLV intake  Alcohol: None  Tobacco: None  OTC Pain Medications: APAP    INR History:  Date 8/28 9/4 9/18 10/2 10/9 10/13 10/20 10/27 11/3 11/18 12/4 12/11   Total Weekly Dose 27.5mg 27.5mg 27.5 mg 27.5 mg 27.5 mg 25 mg 25mg 25mg 25mg 25mg 27.5mg 27.5mg   INR 2.5 2.2 2.4 2.8 3.2 3.2 2.2 1.9 2.5 2.7 2.4 3.0   Notes hematoma?;stop doxepin stopped doxepin on       sick; nose bleed sick; nose bleed        Date    Total Weekly Dose 27.5mg 27.5mg 27.5mg 27.5mg 27.5mg 27.5mg 27.5 mg 27.5mg 25mg 27.5mg 27.5 mg 27.5 mg   INR 2.5 2.3 2.2 2.5 2.4 2.9 2.6 3.8 2.3 2.3 1.9 2.3   Notes        hold; nose bleeds continue   ill      Date 3/8 3/15 3/26 4/1 4/9 4/13 4/18 4/23 4/30 5/14 5/29   Total Weekly Dose 27.5mg 27.5mg 30mg 30mg 30mg 30mg 32.5mg 30mg 30mg 30mg 30 mg   INR 2.1 1.5 2.5 1.8 1.5 1.6 2.5 2.3 2.3 2.3 1.9   Notes   Boost post colonoscopy    Clinic; boost         Date  9/25    Total Weekly Dose 32.5mg 30mg 30mg 30mg 30mg  30 mg 30mg 30mg 32.5  mg 30mg 30mg    INR 2.3 2.9 2.6 3.4 2.3  2.3 2.7 1.7 2.7 2.3 2.4    Notes        acyclovir acyclovir; boost acyclovir       Phone Interview:  Tablet Strength: 5mg tablets  Current Maintenance Dose: 5mg daily except 2.5mg WedSat  Contact Info: [phone] (Home) // [phone] (Cell)    Patient Findings   Positives:   Signs/symptoms of thrombosis, Change in diet/appetite, Other complaints   Negatives:   Signs/symptoms of bleeding, Laboratory test error suspected, Change in health,  Change in alcohol use, Change in activity, Upcoming invasive procedure, Emergency department visit, Upcoming dental procedure, Missed doses, Extra doses, Change in medications, Hospital admission, Bruising   Comments:   She reports that her pancreas has flared up since Sunday. She has been in contact with her doctor. She did have her GLV before this flared up. She has had an increase in diarrhea. She does report a nose bleed last week but no other sign or symptoms of bleeding. She reports that she does have two more doses of acyclovir 800mg QD for shingles. She has not been eating food at this time.     Plan:  1. INR is therapeutic today at 2.3. Instructed Mrs. Muller to continue maintenance dose of warfarin 5mg daily except 2.5mg WedSat.  2. Repeat INR on Friday to ensure WNL given pancrease flare and loss of appetite.  3. Medications reviewed and updated in epic.   3. Verbal information provided over the phone. Leela Muller RBV dosing instructions, expresses understanding by teach back, and has no further questions at this time.  4. Patient declines the need for warfain refills at this time.    Apollo Shelton, Dot  9/25/2018  1:35 PM     Alanna BOCANEGRA, PharmD, have reviewed the note in full and agree with the assessment and plan.  09/25/18  1:48 PM

## 2018-09-28 ENCOUNTER — ANTICOAGULATION VISIT (OUTPATIENT)
Dept: PHARMACY | Facility: HOSPITAL | Age: 68
End: 2018-09-28

## 2018-09-28 DIAGNOSIS — I74.9 EMBOLISM AND THROMBOSIS (HCC): ICD-10-CM

## 2018-09-28 LAB — INR PPP: 2.8

## 2018-09-28 NOTE — PROGRESS NOTES
Anticoagulation Clinic - Remote Progress Note  [facility]   Frequency of Monitorin days    Indication: Bilateral PE, stroke syndrome, embolism and thrombosis of unspecified site   Referring Provider: Dr. Rika Sullivan  Initial Warfarin Start Date:   Planned Duration of Therapy: lifelong  Goal INR: 2.0-3.0  Current Drug Interactions: doxepin d/c'd on ; ASA    Diet: Low GLV intake  Alcohol: None  Tobacco: None  OTC Pain Medications: APAP    INR History:  Date 8/28 9/4 9/18 10/2 10/9 10/13 10/20 10/27 11/3 11/18 12/4 12/11   Total Weekly Dose 27.5mg 27.5mg 27.5 mg 27.5 mg 27.5 mg 25 mg 25mg 25mg 25mg 25mg 27.5mg 27.5mg   INR 2.5 2.2 2.4 2.8 3.2 3.2 2.2 1.9 2.5 2.7 2.4 3.0   Notes hematoma?;stop doxepin stopped doxepin on       sick; nose bleed sick; nose bleed        Date    Total Weekly Dose 27.5mg 27.5mg 27.5mg 27.5mg 27.5mg 27.5mg 27.5 mg 27.5mg 25mg 27.5mg 27.5 mg 27.5 mg   INR 2.5 2.3 2.2 2.5 2.4 2.9 2.6 3.8 2.3 2.3 1.9 2.3   Notes        hold; nose bleeds continue   ill      Date 3/8 3/15 3/26 4/1 4/9 4/13 4/18 4/23 4/30 5/14 5/29   Total Weekly Dose 27.5mg 27.5mg 30mg 30mg 30mg 30mg 32.5mg 30mg 30mg 30mg 30 mg   INR 2.1 1.5 2.5 1.8 1.5 1.6 2.5 2.3 2.3 2.3 1.9   Notes   Boost post colonoscopy    Clinic; boost         Date  9/25    Total Weekly Dose 32.5mg 30mg 30mg 30mg 30mg  30 mg 30mg 30mg 32.5  mg 30mg 30mg    INR 2.3 2.9 2.6 3.4 2.3  2.3 2.7 1.7 2.7 2.3 2.4    Notes        acyclovir acyclovir; boost acyclovir acyclovir      Phone Interview:  Tablet Strength: 5mg tablets  Current Maintenance Dose: 5mg daily except 2.5mg WedSat  Contact Info: [phone] (Home) // [phone] (Cell)    Patient Findings:  Negatives:   Signs/symptoms of thrombosis, Signs/symptoms of bleeding, Laboratory test error suspected, Change in health, Change in alcohol use, Change in activity, Upcoming  invasive procedure, Emergency department visit, Upcoming dental procedure, Missed doses, Extra doses, Change in medications, Change in diet/appetite, Hospital admission, Bruising, Other complaints   Comments:   Mrs. Muller will take her last dose of acyclovir today (stopped by Dr. Almazan with the hopes that this would help resolve her diarrhea). She otherwise denies any additional nosebleeds -- diarrhea continues, but she denies signs of blood in her stool.      Plan:  1. INR is therapeutic again today, so instructed Mrs. Muller to continue warfarin 5mg daily except 2.5mg WedSat.  2. Repeat INR in one week.  3. Verbal information provided over the phone. Leela Muller RBV dosing instructions, expresses understanding by teach back, and has no further questions at this time.    Ann Cowden Mayer, PharmD  9/28/2018  12:58 PM

## 2018-10-02 ENCOUNTER — OFFICE VISIT (OUTPATIENT)
Dept: NEUROLOGY | Facility: CLINIC | Age: 68
End: 2018-10-02

## 2018-10-02 VITALS
DIASTOLIC BLOOD PRESSURE: 74 MMHG | BODY MASS INDEX: 27.22 KG/M2 | OXYGEN SATURATION: 98 % | HEART RATE: 74 BPM | WEIGHT: 179 LBS | SYSTOLIC BLOOD PRESSURE: 130 MMHG

## 2018-10-02 DIAGNOSIS — G43.009 MIGRAINE WITHOUT AURA AND WITHOUT STATUS MIGRAINOSUS, NOT INTRACTABLE: Primary | ICD-10-CM

## 2018-10-02 DIAGNOSIS — R27.0 ATAXIA: ICD-10-CM

## 2018-10-02 DIAGNOSIS — M54.40 CHRONIC LOW BACK PAIN WITH SCIATICA, SCIATICA LATERALITY UNSPECIFIED, UNSPECIFIED BACK PAIN LATERALITY: ICD-10-CM

## 2018-10-02 DIAGNOSIS — M54.12 CERVICAL RADICULOPATHY: ICD-10-CM

## 2018-10-02 DIAGNOSIS — G89.29 CHRONIC LOW BACK PAIN WITH SCIATICA, SCIATICA LATERALITY UNSPECIFIED, UNSPECIFIED BACK PAIN LATERALITY: ICD-10-CM

## 2018-10-02 PROCEDURE — 99214 OFFICE O/P EST MOD 30 MIN: CPT | Performed by: PSYCHIATRY & NEUROLOGY

## 2018-10-02 NOTE — PROGRESS NOTES
Subjective:     Patient ID: Leela Muller is a 68 y.o. female.    CC:   Chief Complaint   Patient presents with   • Gait Problem   • Headache       HPI:   History of Present Illness  The following portions of the patient's history were reviewed and updated as appropriate: allergies, current medications, past family history, past medical history, past social history, past surgical history and problem list.     Patient has been to the ER for rectal pain/lesion/infection, HSV? She reports that left shoulder pain related to shingles has improved. She has taken an antiviral medication. On Sunday she had a headache and diarrhea, on Monday she had a headache and neck pain, improved with Fioricet. Her gait is unsteady, she has had a another fall, thinks she cracked some ribs.      Past Medical History:   Diagnosis Date   • Anxiety    • Arm pain    • Arthritis    • Depression    • Diabetes mellitus (CMS/HCC)    • Hyperlipidemia    • Hypertension    • Neck pain on left side    • Palpitations 4/18/2018   • Pancreatitis    • Pulmonary embolism (CMS/HCC)    • Stroke syndrome 9/14/2016    · Assessed By: Devaughn Lewis (Neurology); Last Assessed: 16 Jun 2015  Right cerebrovascular accident in July or August 2010, evaluated at [facility].  Admission to [facility] on 09/28/2010 with headache and stuttering, consistent with TIA.  Chronic Coumadin therapy, initiated following bilateral pulmonary emboli in 2007 after fall and hip replacement.  She was already on 81 mg of aspirin as well, 09/2010.  MRI of the brain on 09/28/2010 revealing old right parietal cerebrovascular accident.  MRA revealing normal carotids, middle anterior and posterior cerebral arteries with minimal ostial stenosis of both vertebral arteries.  GENARO by Dr. Oliver Mobley on 09/28/2010:  patent foramen ovale with a small amount of right to left shunting.  Normal LVEF and normal valvular structures.   Patent foramen ovale closure  using a 25 mm. Amplatzer cribriform occluder, 10/05/2010.   Echocardiogram, 06/23/2014:  LVEF (55% to 60%) with and Amplatzer device noted to be well-seated in    • Thrombocytopenia (CMS/HCC)    • Transient cerebral ischemia        Past Surgical History:   Procedure Laterality Date   • APPENDECTOMY     • BREAST BIOPSY Left 2012    benign   • BREAST EXCISIONAL BIOPSY Left     benign   • BREAST EXCISIONAL BIOPSY Right     benign   • CHOLECYSTECTOMY     • HYSTERECTOMY     • OOPHORECTOMY     • TONSILLECTOMY     • TOTAL HIP ARTHROPLASTY REVISION         Social History     Social History   • Marital status:      Spouse name: N/A   • Number of children: N/A   • Years of education: N/A     Occupational History   • Not on file.     Social History Main Topics   • Smoking status: Never Smoker   • Smokeless tobacco: Never Used   • Alcohol use No   • Drug use: No   • Sexual activity: Defer     Other Topics Concern   • Not on file     Social History Narrative   • No narrative on file       Family History   Problem Relation Age of Onset   • Alzheimer's disease Mother    • Cancer Mother    • Coronary artery disease Other    • Diabetes Other    • Hypertension Other    • Stroke Other    • Cancer Other    • Dementia Other    • Heart attack Father    • Breast cancer Neg Hx    • Ovarian cancer Neg Hx         Review of Systems   Constitutional: Positive for fatigue. Negative for chills, fever and unexpected weight change.   HENT: Positive for nosebleeds. Negative for ear pain, hearing loss, rhinorrhea and sore throat.    Eyes: Negative for photophobia, pain, discharge, itching and visual disturbance.   Respiratory: Negative for cough, chest tightness, shortness of breath and wheezing.    Cardiovascular: Positive for leg swelling. Negative for chest pain and palpitations.   Gastrointestinal: Negative for abdominal pain, blood in stool, constipation, diarrhea, nausea and vomiting.   Genitourinary: Negative for dysuria, frequency,  hematuria and urgency.   Musculoskeletal: Positive for arthralgias, back pain, gait problem, joint swelling, myalgias, neck pain and neck stiffness.   Skin: Negative for rash and wound.   Allergic/Immunologic: Negative for environmental allergies and food allergies.   Neurological: Positive for headaches. Negative for dizziness, tremors, seizures, syncope, speech difficulty, weakness, light-headedness and numbness.   Hematological: Negative for adenopathy. Bruises/bleeds easily.   Psychiatric/Behavioral: Positive for sleep disturbance. Negative for agitation, confusion, decreased concentration, hallucinations and suicidal ideas. The patient is not nervous/anxious.    All other systems reviewed and are negative.       Objective:    Neurologic Exam     Mental Status   Oriented to person, place, and time.       Physical Exam   Constitutional: She is oriented to person, place, and time. She appears well-developed and well-nourished.   Cardiovascular: Normal rate and regular rhythm.    Pulmonary/Chest: Effort normal.   Musculoskeletal:   Uses a cane, gait unsteady.   Neurological: She is alert and oriented to person, place, and time. She has normal reflexes.   Psychiatric: She has a normal mood and affect. Her behavior is normal. Thought content normal.       Assessment/Plan:       Leela was seen today for gait problem and headache.    Diagnoses and all orders for this visit:    Migraine without aura and without status migrainosus, not intractable    Cervical radiculopathy  -     Ambulatory Referral to Physical Therapy    Chronic low back pain with sciatica, sciatica laterality unspecified, unspecified back pain laterality  -     Ambulatory Referral to Physical Therapy    Ataxia  -     Ambulatory Referral to Physical Therapy             Devaughn Lewis MD  10/2/2018

## 2018-10-08 ENCOUNTER — ANTICOAGULATION VISIT (OUTPATIENT)
Dept: PHARMACY | Facility: HOSPITAL | Age: 68
End: 2018-10-08

## 2018-10-08 DIAGNOSIS — I74.9 EMBOLISM AND THROMBOSIS (HCC): ICD-10-CM

## 2018-10-08 LAB — INR PPP: 2.3

## 2018-10-08 NOTE — PROGRESS NOTES
Anticoagulation Clinic - Remote Progress Note  [facility]   Frequency of Monitorin days    Indication: Bilateral PE, stroke syndrome, embolism and thrombosis of unspecified site   Referring Provider: Dr. Rika Sullivan  Initial Warfarin Start Date:   Planned Duration of Therapy: lifelong  Goal INR: 2.0-3.0  Current Drug Interactions: doxepin d/c'd on ; ASA    Diet: Low GLV intake  Alcohol: None  Tobacco: None  OTC Pain Medications: APAP    INR History:  Date 8/28 9/4 9/18 10/2 10/9 10/13 10/20 10/27 11/3 11/18 12/4 12/11   Total Weekly Dose 27.5mg 27.5mg 27.5 mg 27.5 mg 27.5 mg 25mg 25mg 25mg 25mg 25mg 27.5mg 27.5mg   INR 2.5 2.2 2.4 2.8 3.2 3.2 2.2 1.9 2.5 2.7 2.4 3.0   Notes hematoma?;stop doxepin stopped doxepin on       sick; nose bleed sick; nose bleed        Date    Total Weekly Dose 27.5mg 27.5mg 27.5mg 27.5mg 27.5mg 27.5mg 27.5 mg 27.5mg 25mg 27.5mg 27.5 mg 27.5mg   INR 2.5 2.3 2.2 2.5 2.4 2.9 2.6 3.8 2.3 2.3 1.9 2.3   Notes        hold; nose bleeds continue   ill      Date 3/8 3/15 3/26 4/1 4/9 4/13 4/18 4/23 4/30 5/14 5/29   Total Weekly Dose 27.5mg 27.5mg 30mg 30mg 30mg 30mg 32.5mg 30mg 30mg 30mg 30mg   INR 2.1 1.5 2.5 1.8 1.5 1.6 2.5 2.3 2.3 2.3 1.9   Notes   Boost post colonoscopy    Clinic; boost         Date  9/25 10/8   Total Weekly Dose 32.5mg 30mg 30mg 30mg 30mg  30mg 30mg 30mg 32.5  mg 30mg 30mg 30mg   INR 2.3 2.9 2.6 3.4 2.3  2.3 2.7 1.7 2.7 2.3 2.4 2.3   Notes        acyclovir acyclovir; boost acyclovir acyclovir nosebleed     Date               Total Weekly Dose               INR               Notes                 Phone Interview:  Tablet Strength: 5mg tablets  Current Maintenance Dose: 5mg daily except 2.5mg   Contact Info: [phone] (Home) // [phone] (Cell)    Patient Findings   Positives:   Signs/symptoms of bleeding, Change in medications    Negatives:   Signs/symptoms of thrombosis, Laboratory test error suspected, Change in health, Change in alcohol use, Change in activity, Upcoming invasive procedure, Emergency department visit, Upcoming dental procedure, Missed doses, Extra doses, Change in diet/appetite, Hospital admission, Bruising, Other complaints   Comments:   Dr Ritter had patient stop acyclovir on Fri, 10/5.     Patient said she had another nosebleed on Sun, lasted ~30min. Patient was afraid her INR was too high and had ~1/2 cup of broccoli that night. Patient has a hx of nosebleeds, again advised patient that if the nosebleed last >10min that we would advise patient to go ED. Patient voiced understanding.     Plan:  1. INR is therapeutic today at 2.3. Instructed Mrs. Muller to continue warfarin 5mg daily except 2.5mg WedSat.  2. Repeat INR in one week, 10/15. If therapeutic at next check, consider [id number]  3. Verbal information provided over the phone. Leela Muller RBV dosing instructions, expresses understanding by teach back, and has no further questions at this time.    Apollo Shelton, Dot  10/8/2018  10:18 AM    IAlanna, PharmD, have reviewed the note in full and agree with the assessment and plan.  10/08/18  10:23 AM

## 2018-10-15 ENCOUNTER — ANTICOAGULATION VISIT (OUTPATIENT)
Dept: PHARMACY | Facility: HOSPITAL | Age: 68
End: 2018-10-15

## 2018-10-15 DIAGNOSIS — I74.9 EMBOLISM AND THROMBOSIS (HCC): ICD-10-CM

## 2018-10-15 LAB — INR PPP: 2.8

## 2018-10-15 NOTE — PROGRESS NOTES
Anticoagulation Clinic - Remote Progress Note  [facility]   Frequency of Monitorin days    Indication: Bilateral PE, stroke syndrome, embolism and thrombosis of unspecified site   Referring Provider: Dr. Rika Sullivan  Initial Warfarin Start Date:   Planned Duration of Therapy: lifelong  Goal INR: 2.0-3.0  Current Drug Interactions: doxepin d/c'd on ; ASA    Diet: Low GLV intake  Alcohol: None  Tobacco: None  OTC Pain Medications: APAP    INR History:  Date 8/28 9/4 9/18 10/2 10/9 10/13 10/20 10/27 11/3 11/18 12/4 12/11   Total Weekly Dose 27.5mg 27.5mg 27.5 mg 27.5 mg 27.5 mg 25mg 25mg 25mg 25mg 25mg 27.5mg 27.5mg   INR 2.5 2.2 2.4 2.8 3.2 3.2 2.2 1.9 2.5 2.7 2.4 3.0   Notes hematoma?;stop doxepin stopped doxepin on       sick; nose bleed sick; nose bleed        Date    Total Weekly Dose 27.5mg 27.5mg 27.5mg 27.5mg 27.5mg 27.5mg 27.5 mg 27.5mg 25mg 27.5mg 27.5 mg 27.5mg   INR 2.5 2.3 2.2 2.5 2.4 2.9 2.6 3.8 2.3 2.3 1.9 2.3   Notes        hold; nose bleeds continue   ill      Date 3/8 3/15 3/26 4/1 4/9 4/13 4/18 4/23 4/30 5/14 5/29   Total Weekly Dose 27.5mg 27.5mg 30mg 30mg 30mg 30mg 32.5mg 30mg 30mg 30mg 30mg   INR 2.1 1.5 2.5 1.8 1.5 1.6 2.5 2.3 2.3 2.3 1.9   Notes   Boost post colonoscopy    Clinic; boost         Date  9/25 10/8   Total Weekly Dose 32.5mg 30mg 30mg 30mg 30mg  30mg 30mg 30mg 32.5  mg 30mg 30mg 30mg   INR 2.3 2.9 2.6 3.4 2.3  2.3 2.7 1.7 2.7 2.3 2.4 2.3   Notes        acyclovir acyclovir; boost acyclovir acyclovir nosebleed     Date 10/15              Total Weekly Dose 30mg              INR 2.8              Notes                 Phone Interview:  Tablet Strength: 5mg tablets  Current Maintenance Dose: 5mg daily except 2.5mg WedSat  Contact Info: [phone] (Home) // [phone] (Cell)    Patient Findings   Negatives:   Signs/symptoms of thrombosis,  Signs/symptoms of bleeding, Laboratory test error suspected, Change in health, Change in alcohol use, Change in activity, Upcoming invasive procedure, Emergency department visit, Upcoming dental procedure, Missed doses, Extra doses, Change in medications, Change in diet/appetite, Hospital admission, Bruising, Other complaints   Comments:   Mrs. Muller reports that she is having issues with her pancreas, and Dr. Paul continues to follow with her. She also noted nose bleed last week that lasted <10min. In the past have discussed possibility of cauterization if PCP is agreeable.     Plan:  1. INR is therapeutic today at 2.8. Instructed Mrs. Muller to continue warfarin 5mg daily except 2.5mg WedSat.  2. Repeat INR in one week, 10/22. If therapeutic at next check, consider [id number]  3. Verbal information provided over the phone. Leela LOBO Renzo RBV dosing instructions, expresses understanding by teach back, and has no further questions at this time.    Alanna Bermudez, PharmD  10/15/2018  10:45 AM

## 2018-10-22 ENCOUNTER — ANTICOAGULATION VISIT (OUTPATIENT)
Dept: PHARMACY | Facility: HOSPITAL | Age: 68
End: 2018-10-22

## 2018-10-22 DIAGNOSIS — I74.9 EMBOLISM AND THROMBOSIS (HCC): ICD-10-CM

## 2018-10-22 LAB — INR PPP: 2.2

## 2018-10-22 NOTE — PROGRESS NOTES
Anticoagulation Clinic - Remote Progress Note  [facility]   Frequency of Monitorin days    Indication: Bilateral PE, stroke syndrome, embolism and thrombosis of unspecified site   Referring Provider: Dr. Rika Sullivan  Initial Warfarin Start Date:   Planned Duration of Therapy: lifelong  Goal INR: 2.0-3.0  Current Drug Interactions: doxepin d/c'd on ; ASA    Diet: Low GLV intake  Alcohol: None  Tobacco: None  OTC Pain Medications: APAP    INR History:  Date 8/28 9/4 9/18 10/2 10/9 10/13 10/20 10/27 11/3 11/18 12/4 12/11   Total Weekly Dose 27.5mg 27.5mg 27.5 mg 27.5 mg 27.5 mg 25mg 25mg 25mg 25mg 25mg 27.5mg 27.5mg   INR 2.5 2.2 2.4 2.8 3.2 3.2 2.2 1.9 2.5 2.7 2.4 3.0   Notes hematoma?;stop doxepin stopped doxepin on       sick; nose bleed sick; nose bleed        Date    Total Weekly Dose 27.5mg 27.5mg 27.5mg 27.5mg 27.5mg 27.5mg 27.5 mg 27.5mg 25mg 27.5mg 27.5 mg 27.5mg   INR 2.5 2.3 2.2 2.5 2.4 2.9 2.6 3.8 2.3 2.3 1.9 2.3   Notes        hold; nose bleeds continue   ill      Date 3/8 3/15 3/26 4/1 4/9 4/13 4/18 4/23 4/30 5/14 5/29   Total Weekly Dose 27.5mg 27.5mg 30mg 30mg 30mg 30mg 32.5mg 30mg 30mg 30mg 30mg   INR 2.1 1.5 2.5 1.8 1.5 1.6 2.5 2.3 2.3 2.3 1.9   Notes   Boost post colonoscopy    Clinic; boost         Date  9/25 10/8   Total Weekly Dose 32.5mg 30mg 30mg 30mg 30mg  30mg 30mg 30mg 32.5  mg 30mg 30mg 30mg   INR 2.3 2.9 2.6 3.4 2.3  2.3 2.7 1.7 2.7 2.3 2.4 2.3   Notes        acyclovir acyclovir; boost acyclovir acyclovir nosebleed     Date 10/15 10/22              Total Weekly Dose 30mg 30mg              INR 2.8 2.2              Notes                  Phone Interview:  Tablet Strength: 5mg tablets  Current Maintenance Dose: 5mg daily except 2.5mg WedSat  Contact Info: [phone] (Home) // [phone] (Cell)  Lab Contact Info: Romeo    Patient Findings   Negatives:    Signs/symptoms of thrombosis, Signs/symptoms of bleeding, Laboratory test error suspected, Change in health, Change in alcohol use, Change in activity, Upcoming invasive procedure, Emergency department visit, Upcoming dental procedure, Missed doses, Extra doses, Change in medications, Change in diet/appetite, Hospital admission, Bruising, Other complaints     Plan:  1. INR is therapeutic today at 2.2. Instructed Mrs. Muller to continue warfarin 5mg daily except 2.5mg WedSat.  2. Repeat INR in 2 weeks, 11/5  3. Verbal information provided over the phone. Leela Muller RBV dosing instructions, expresses understanding by teach back, and has no further questions at this time.    Apollo Shelton, Dot  10/22/2018  3:22 PM    I, Zenaida Live, PharmD, have reviewed the note in full and agree with the assessment and plan.  10/22/18  4:16 PM

## 2018-11-05 ENCOUNTER — ANTICOAGULATION VISIT (OUTPATIENT)
Dept: PHARMACY | Facility: HOSPITAL | Age: 68
End: 2018-11-05

## 2018-11-05 DIAGNOSIS — I74.9 EMBOLISM AND THROMBOSIS (HCC): ICD-10-CM

## 2018-11-05 LAB — INR PPP: 2

## 2018-11-05 NOTE — PROGRESS NOTES
Anticoagulation Clinic - Remote Progress Note  [facility]   Frequency of Monitorin days    Indication: Bilateral PE, stroke syndrome, embolism and thrombosis of unspecified site   Referring Provider: Dr. Rika Sullivan  Initial Warfarin Start Date:   Planned Duration of Therapy: lifelong  Goal INR: 2.0-3.0  Current Drug Interactions: doxepin d/c'd on ; ASA    Diet: Low GLV intake  Alcohol: None  Tobacco: None  OTC Pain Medications: APAP    INR History:  Date 8/28 9/4 9/18 10/2 10/9 10/13 10/20 10/27 11/3 11/18 12/4 12/11   Total Weekly Dose 27.5mg 27.5mg 27.5 mg 27.5 mg 27.5 mg 25mg 25mg 25mg 25mg 25mg 27.5mg 27.5mg   INR 2.5 2.2 2.4 2.8 3.2 3.2 2.2 1.9 2.5 2.7 2.4 3.0   Notes hematoma?;stop doxepin stopped doxepin on       sick; nose bleed sick; nose bleed        Date    Total Weekly Dose 27.5mg 27.5mg 27.5mg 27.5mg 27.5mg 27.5mg 27.5 mg 27.5mg 25mg 27.5mg 27.5 mg 27.5mg   INR 2.5 2.3 2.2 2.5 2.4 2.9 2.6 3.8 2.3 2.3 1.9 2.3   Notes        hold; nose bleeds continue   ill      Date 3/8 3/15 3/26 4/1 4/9 4/13 4/18 4/23 4/30 5/14 5/29   Total Weekly Dose 27.5mg 27.5mg 30mg 30mg 30mg 30mg 32.5mg 30mg 30mg 30mg 30mg   INR 2.1 1.5 2.5 1.8 1.5 1.6 2.5 2.3 2.3 2.3 1.9   Notes   Boost post colonoscopy    Clinic; boost         Date  9/25 10/8   Total Weekly Dose 32.5mg 30mg 30mg 30mg 30mg  30mg 30mg 30mg 32.5  mg 30mg 30mg 30mg   INR 2.3 2.9 2.6 3.4 2.3  2.3 2.7 1.7 2.7 2.3 2.4 2.3   Notes        acyclovir acyclovir; boost acyclovir acyclovir nosebleed     Date 10/15 10/22 11/5             Total Weekly Dose 30mg 30mg 30mg             INR 2.8 2.2 2.0             Notes                  Phone Interview:  Tablet Strength: 5mg tablets  Contact Info: [phone] (Home) // [phone] (Cell)  Lab Contact Info: Alere    Patient Findings   Positives:   Signs/symptoms of bleeding   Negatives:    Signs/symptoms of thrombosis, Laboratory test error suspected, Change in health, Change in alcohol use, Change in activity, Upcoming invasive procedure, Emergency department visit, Upcoming dental procedure, Missed doses, Extra doses, Change in medications, Change in diet/appetite, Hospital admission, Bruising, Other complaints   Comments:   Patient has had more nosebleeds recently, FriSatSun, that lasted ~10 minutes. Has spoken to her PCP about this     Plan:  1. INR is therapeutic today at 2.0, though it is at the lower limit of her INR goal range. Instructed Mrs. Muller to continue warfarin 5mg daily except 2.5mg WedSat.  2. Repeat INR in 1 week, 11/13   3. Verbal information provided over the phone. Leela Muller RBV dosing instructions, expresses understanding by teach back, and has no further questions at this time.  4. Leela Muller reports that her Roche Test Strip lot number is [id number]. This is one of the lot numbers that is affected by the FDA Class 1 and Roche recall. Leela Muller will no longer use these test strips and voices understanding. she will request new test strips from [employer] [facility] [employer]. she will plan to call us the day before her next scheduled INR if she has not yet received a new lot. We will then send in a standing order to a lab of her preference.      Apollo Shelton CPhT  11/5/2018  1:20 PM

## 2018-11-16 ENCOUNTER — ANTICOAGULATION VISIT (OUTPATIENT)
Dept: PHARMACY | Facility: HOSPITAL | Age: 68
End: 2018-11-16

## 2018-11-16 DIAGNOSIS — I74.9 EMBOLISM AND THROMBOSIS (HCC): ICD-10-CM

## 2018-11-16 LAB — INR PPP: 2.3

## 2018-11-16 RX ORDER — WARFARIN SODIUM 5 MG/1
TABLET ORAL
Qty: 90 TABLET | Refills: 1 | Status: SHIPPED | OUTPATIENT
Start: 2018-11-16 | End: 2019-04-24 | Stop reason: SDUPTHER

## 2018-11-16 NOTE — PROGRESS NOTES
Anticoagulation Clinic - Remote Progress Note  [facility]   Frequency of Monitorin days    Indication: Bilateral PE, stroke syndrome, embolism and thrombosis of unspecified site   Referring Provider: Dr. Rika Sullivan  Initial Warfarin Start Date:   Planned Duration of Therapy: lifelong  Goal INR: 2.0-3.0  Current Drug Interactions: doxepin d/c'd on ; ASA    Diet: Low GLV intake  Alcohol: None  Tobacco: None  OTC Pain Medications: APAP    INR History:  Date 8/28 9/4 9/18 10/2 10/9 10/13 10/20 10/27 11/3 11/18 12/4 12/11   Total Weekly Dose 27.5mg 27.5mg 27.5 mg 27.5 mg 27.5 mg 25mg 25mg 25mg 25mg 25mg 27.5mg 27.5mg   INR 2.5 2.2 2.4 2.8 3.2 3.2 2.2 1.9 2.5 2.7 2.4 3.0   Notes hematoma?;stop doxepin stopped doxepin on       sick; nose bleed sick; nose bleed        Date    Total Weekly Dose 27.5mg 27.5mg 27.5mg 27.5mg 27.5mg 27.5mg 27.5 mg 27.5mg 25mg 27.5mg 27.5 mg 27.5mg   INR 2.5 2.3 2.2 2.5 2.4 2.9 2.6 3.8 2.3 2.3 1.9 2.3   Notes        hold; nose bleeds continue   ill      Date 3/8 3/15 3/26 4/1 4/9 4/13 4/18 4/23 4/30 5/14 5/29   Total Weekly Dose 27.5mg 27.5mg 30mg 30mg 30mg 30mg 32.5mg 30mg 30mg 30mg 30mg   INR 2.1 1.5 2.5 1.8 1.5 1.6 2.5 2.3 2.3 2.3 1.9   Notes   Boost post colonoscopy    Clinic; boost         Date  9/25 10/8   Total Weekly Dose 32.5mg 30mg 30mg 30mg 30mg  30mg 30mg 30mg 32.5  mg 30mg 30mg 30mg   INR 2.3 2.9 2.6 3.4 2.3  2.3 2.7 1.7 2.7 2.3 2.4 2.3   Notes        acyclovir acyclovir; boost acyclovir acyclovir nosebleed     Date 10/15 10/22 11/5 11/15            Total Weekly Dose 30mg 30mg 30mg 30mg            INR 2.8 2.2 2.0 2.3            Notes                  Phone Interview:  Tablet Strength: 5mg tablets  Contact Info: [phone] (Home) // [phone] (Cell)  Lab Contact Info: Romeo    Patient Findings:  Negatives:  Signs/symptoms of thrombosis,  Signs/symptoms of bleeding, Laboratory test error suspected, Change in health, Change in alcohol use, Change in activity, Upcoming invasive procedure, Emergency department visit, Upcoming dental procedure, Missed doses, Extra doses, Change in medications, Change in diet/appetite, Hospital admission, Bruising, Other complaints     Plan:  1. INR was therapeutic on 11/15 at 2.3. Instructed Mrs. Muller to continue warfarin 5mg daily except 2.5mg WedSat.  2. Repeat INR in two weeks, 11/29.  3. Verbal information provided over the phone. Leela Muller RBV dosing instructions, expresses understanding by teach back, and has no further questions at this time.  4. Patient requests refills of warfarin 5mg be sent to [facility]. Separate encounter created.  5. Of note, patient received new test strips earlier this week.    Glory Reyes CPhT  11/16/2018  8:04 AM     Refills sent  I, Alanna Bermudez, PharmD, have reviewed the note in full and agree with the assessment and plan.  11/16/18  8:31 AM

## 2018-11-16 NOTE — TELEPHONE ENCOUNTER
Patient requests refills of warfarin 5mg be sent to [facility] - patient states she is completely out of warfarin.

## 2018-11-27 ENCOUNTER — ANTICOAGULATION VISIT (OUTPATIENT)
Dept: PHARMACY | Facility: HOSPITAL | Age: 68
End: 2018-11-27

## 2018-11-27 DIAGNOSIS — I74.9 EMBOLISM AND THROMBOSIS (HCC): ICD-10-CM

## 2018-11-27 LAB — INR PPP: 3.3

## 2018-11-27 NOTE — PROGRESS NOTES
Anticoagulation Clinic - Remote Progress Note  [facility]   Frequency of Monitorin days    Indication: Bilateral PE, stroke syndrome, embolism and thrombosis of unspecified site   Referring Provider: Dr. Rika Sullivan  Initial Warfarin Start Date:   Planned Duration of Therapy: lifelong  Goal INR: 2.0-3.0  Current Drug Interactions: doxepin d/c'd on ; ASA    Diet: Low GLV intake  Alcohol: None  Tobacco: None  OTC Pain Medications: APAP    INR History:  Date 8/28 9/4 9/18 10/2 10/9 10/13 10/20 10/27 11/3 11/18 12/4 12/11   Total Weekly Dose 27.5mg 27.5mg 27.5 mg 27.5 mg 27.5 mg 25mg 25mg 25mg 25mg 25mg 27.5mg 27.5mg   INR 2.5 2.2 2.4 2.8 3.2 3.2 2.2 1.9 2.5 2.7 2.4 3.0   Notes hematoma?;stop doxepin stopped doxepin on       sick; nose bleed sick; nose bleed        Date    Total Weekly Dose 27.5mg 27.5mg 27.5mg 27.5mg 27.5mg 27.5mg 27.5 mg 27.5mg 25mg 27.5mg 27.5 mg 27.5mg   INR 2.5 2.3 2.2 2.5 2.4 2.9 2.6 3.8 2.3 2.3 1.9 2.3   Notes        hold; nose bleeds continue   ill      Date 3/8 3/15 3/26 4/1 4/9 4/13 4/18 4/23 4/30 5/14 5/29   Total Weekly Dose 27.5mg 27.5mg 30mg 30mg 30mg 30mg 32.5mg 30mg 30mg 30mg 30mg   INR 2.1 1.5 2.5 1.8 1.5 1.6 2.5 2.3 2.3 2.3 1.9   Notes   Boost post colonoscopy    Clinic; boost         Date  9/25 10/8   Total Weekly Dose 32.5mg 30mg 30mg 30mg 30mg  30mg 30mg 30mg 32.5  mg 30mg 30mg 30mg   INR 2.3 2.9 2.6 3.4 2.3  2.3 2.7 1.7 2.7 2.3 2.4 2.3   Notes        acyclovir acyclovir; boost acyclovir acyclovir nosebleed     Date 10/15 10/22 11/5 11/15 11/27           Total Weekly Dose 30mg 30mg 30mg 30mg 30mg           INR 2.8 2.2 2.0 2.3 3.3           Notes     pancreatitis             Phone Interview:  Tablet Strength: 5mg tablets  Contact Info: [phone] (Home) // [phone] (Cell)  Lab Contact Info: Romeo    Patient Findings   Positives:  Change in  health   Negatives:  Signs/symptoms of thrombosis, Signs/symptoms of bleeding, Laboratory test error suspected, Change in alcohol use, Change in activity, Upcoming invasive procedure, Emergency department visit, Upcoming dental procedure, Missed doses, Extra doses, Change in medications, Change in diet/appetite, Hospital admission, Bruising, Other complaints   Comments:  Mrs. Muller reports that she has had pancreatitis x 4 weeks. She reports that she had a nose bleed last night and over the weekend that lasted 3-4 minutes. She reports that she has talked with PCP about nose bleeds and he gave her a cream to put intranasally to stop the bleeding. MRs. Muller reports it has been a while since she has had the nose bleeds and encouraged her to talk with her PCP if she continues.      Plan:  1. INR is SUPRAtherapeutic today. Instructed Mrs. Muller to continue warfarin 5mg daily except 2.5mg WedSat.  2. Repeat INR in one week.  3. Verbal information provided over the phone. Leela Muller RBV dosing instructions, expresses understanding by teach back, and has no further questions at this time.  4. Of note, patient received new test strips earlier this week.    Glory Reyes, Dot  11/27/2018  9:16 AM     I, Alanna Bermudez, PharmD, have reviewed the note in full and agree with the assessment and plan.  11/27/18  12:19 PM

## 2018-12-10 LAB — INR PPP: 2.4

## 2018-12-11 ENCOUNTER — ANTICOAGULATION VISIT (OUTPATIENT)
Dept: PHARMACY | Facility: HOSPITAL | Age: 68
End: 2018-12-11

## 2018-12-11 DIAGNOSIS — I74.9 EMBOLISM AND THROMBOSIS (HCC): ICD-10-CM

## 2018-12-11 NOTE — PROGRESS NOTES
Anticoagulation Clinic - Remote Progress Note  [facility]   Frequency of Monitorin days    Indication: Bilateral PE, stroke syndrome, embolism and thrombosis of unspecified site   Referring Provider: Dr. Rika Sullivan  Initial Warfarin Start Date:   Planned Duration of Therapy: lifelong  Goal INR: 2.0-3.0  Current Drug Interactions: doxepin d/c'd on ; ASA    Diet: Low GLV intake  Alcohol: None  Tobacco: None  OTC Pain Medications: APAP    INR History:  Date 8/28 9/4 9/18 10/2 10/9 10/13 10/20 10/27 11/3 11/18 12/4 12/11   Total Weekly Dose 27.5mg 27.5mg 27.5 mg 27.5 mg 27.5 mg 25mg 25mg 25mg 25mg 25mg 27.5mg 27.5mg   INR 2.5 2.2 2.4 2.8 3.2 3.2 2.2 1.9 2.5 2.7 2.4 3.0   Notes hematoma?;stop doxepin stopped doxepin on       sick; nose bleed sick; nose bleed        Date    Total Weekly Dose 27.5mg 27.5mg 27.5mg 27.5mg 27.5mg 27.5mg 27.5 mg 27.5mg 25mg 27.5mg 27.5 mg 27.5mg   INR 2.5 2.3 2.2 2.5 2.4 2.9 2.6 3.8 2.3 2.3 1.9 2.3   Notes        hold; nose bleeds continue   ill      Date 3/8 3/15 3/26 4/1 4/9 4/13 4/18 4/23 4/30 5/14 5/29   Total Weekly Dose 27.5mg 27.5mg 30mg 30mg 30mg 30mg 32.5mg 30mg 30mg 30mg 30mg   INR 2.1 1.5 2.5 1.8 1.5 1.6 2.5 2.3 2.3 2.3 1.9   Notes   Boost post colonoscopy    Clinic; boost         Date  9/25 10/8   Total Weekly Dose 32.5mg 30mg 30mg 30mg 30mg  30mg 30mg 30mg 32.5  mg 30mg 30mg 30mg   INR 2.3 2.9 2.6 3.4 2.3  2.3 2.7 1.7 2.7 2.3 2.4 2.3   Notes        acyclovir acyclovir; boost acyclovir acyclovir nosebleed     Date 10/15 10/22 11/5 11/15 11/27 12/11         Total Weekly Dose 30mg 30mg 30mg 30mg 30mg 30mg         INR 2.8 2.2 2.0 2.3 3.3 2.4         Notes     pancreatitis            Phone Interview:  Tablet Strength: 5mg tablets  Contact Info: [phone] (Home) // [phone] (Cell)  Lab Contact Info: Romeo    Patient Findings   Positives:   Signs/symptoms of bleeding, Change in medications   Negatives:  Signs/symptoms of thrombosis, Laboratory test error suspected, Change in health, Change in alcohol use, Change in activity, Upcoming invasive procedure, Emergency department visit, Upcoming dental procedure, Missed doses, Extra doses, Change in diet/appetite, Hospital admission, Bruising, Other complaints   Comments:  Patient reports she's been trying to stay very consistent with her GLV in take, did report she had 1-2 cups broccoli on Fri, 12/7.     Patient also reports having another nosebleed on Sun, 12/10, lasting <5 minutes.      Plan:  1. INR is therapeutic and back WNL today at 2.4. Instructed Mrs. Muller to continue warfarin 5mg daily except 2.5mg WedSat.  2. Repeat INR in one week, 12/18, to make sure patient stays WNL  3. Verbal information provided over the phone. Leela Muller RBV dosing instructions, expresses understanding by teach back, and has no further questions at this time.    Apollo Shelton, Dot  12/11/2018  9:07 AM    IAlanna, PharmD, have reviewed the note in full and agree with the assessment and plan.  12/11/18  9:34 AM

## 2018-12-18 ENCOUNTER — ANTICOAGULATION VISIT (OUTPATIENT)
Dept: PHARMACY | Facility: HOSPITAL | Age: 68
End: 2018-12-18

## 2018-12-18 DIAGNOSIS — I74.9 EMBOLISM AND THROMBOSIS (HCC): ICD-10-CM

## 2018-12-18 LAB — INR PPP: 2.4

## 2018-12-18 NOTE — PROGRESS NOTES
Anticoagulation Clinic - Remote Progress Note  [facility]   Frequency of Monitorin days    Indication: Bilateral PE, stroke syndrome, embolism and thrombosis of unspecified site   Referring Provider: Dr. Rika Sullivan  Initial Warfarin Start Date:   Planned Duration of Therapy: lifelong  Goal INR: 2.0-3.0  Current Drug Interactions: doxepin d/c'd on ; ASA    Diet: Low GLV intake  Alcohol: None  Tobacco: None  OTC Pain Medications: APAP    INR History:  Date 8/28 9/4 9/18 10/2 10/9 10/13 10/20 10/27 11/3 11/18 12/4 12/11   Total Weekly Dose 27.5mg 27.5mg 27.5 mg 27.5 mg 27.5 mg 25mg 25mg 25mg 25mg 25mg 27.5mg 27.5mg   INR 2.5 2.2 2.4 2.8 3.2 3.2 2.2 1.9 2.5 2.7 2.4 3.0   Notes hematoma?;stop doxepin stopped doxepin on       sick; nose bleed sick; nose bleed        Date    Total Weekly Dose 27.5mg 27.5mg 27.5mg 27.5mg 27.5mg 27.5mg 27.5 mg 27.5mg 25mg 27.5mg 27.5 mg 27.5mg   INR 2.5 2.3 2.2 2.5 2.4 2.9 2.6 3.8 2.3 2.3 1.9 2.3   Notes        hold; nose bleeds continue   ill      Date 3/8 3/15 3/26 4/1 4/9 4/13 4/18 4/23 4/30 5/14 5/29   Total Weekly Dose 27.5mg 27.5mg 30mg 30mg 30mg 30mg 32.5mg 30mg 30mg 30mg 30mg   INR 2.1 1.5 2.5 1.8 1.5 1.6 2.5 2.3 2.3 2.3 1.9   Notes   Boost post colonoscopy    Clinic; boost         Date  9/25 10/8   Total Weekly Dose 32.5mg 30mg 30mg 30mg 30mg  30mg 30mg 30mg 32.5  mg 30mg 30mg 30mg   INR 2.3 2.9 2.6 3.4 2.3  2.3 2.7 1.7 2.7 2.3 2.4 2.3   Notes        acyclovir acyclovir; boost acyclovir acyclovir nosebleed     Date 10/15 10/22 11/5 11/15 11/27 12/11 12/17        Total Weekly Dose 30mg 30mg 30mg 30mg 30mg 30mg 30 mg        INR 2.8 2.2 2.0 2.3 3.3 2.4 2.4        Notes     pancreatitis            Phone Interview:  Tablet Strength: 5mg tablets  Contact Info: [phone] (Home) // [phone] (Cell)  Lab Contact Info: Alere    Patient Findings    Positives:  Change in medications   Negatives:  Signs/symptoms of thrombosis, Signs/symptoms of bleeding, Laboratory test error suspected, Change in health, Change in alcohol use, Change in activity, Upcoming invasive procedure, Emergency department visit, Upcoming dental procedure, Missed doses, Extra doses, Change in diet/appetite, Hospital admission, Bruising, Other complaints   Comments:  Steroid shot in arm on Friday with Dr. Noel.   No recent nosebleeds.       Plan:  1. INR is therapeutic and back WNL today at 2.4. As Ms. Muller is having a steroid injection on Friday, so will reduce her dose on Monday from 5 to 2.5 mg in an attempt to help moderate and change in INR that the injection may cause. Instructed Mrs. Muller to reduce her 12/24 dose to 2.5 mg and otherwise continue warfarin 5mg daily except 2.5mg WedSat.  She has taken the steroid injection previously in May 2018 but it did not seem to affect her INR.   2. Repeat INR in one week, 12/26 to make sure patient stays WNL and see the change the injection may cause on INR.  3. Verbal information provided over the phone. Leela LOBO Muller RBV dosing instructions, expresses understanding by teach back, and has no further questions at this time.    Apollo Vicente [facility]  12/18/2018  11:06 AM

## 2019-01-07 ENCOUNTER — ANTICOAGULATION VISIT (OUTPATIENT)
Dept: PHARMACY | Facility: HOSPITAL | Age: 69
End: 2019-01-07

## 2019-01-07 DIAGNOSIS — I74.9 EMBOLISM AND THROMBOSIS (HCC): ICD-10-CM

## 2019-01-07 LAB — INR PPP: 2.3

## 2019-01-07 NOTE — TELEPHONE ENCOUNTER
She called about starting the acyclovir for 10 days for active shingles. Recommend to continue with Monday's INR test. Consider Shingrix in the near future.   chlorhexidine

## 2019-01-07 NOTE — PROGRESS NOTES
Anticoagulation Clinic - Remote Progress Note  [facility]   Frequency of Monitorin days    Indication: Bilateral PE, stroke syndrome, embolism and thrombosis of unspecified site   Referring Provider: Dr. Rika Sullivan  Initial Warfarin Start Date:   Planned Duration of Therapy: lifelong  Goal INR: 2.0-3.0  Current Drug Interactions: doxepin d/c'd on ; ASA    Diet: Low GLV intake  Alcohol: None  Tobacco: None  OTC Pain Medications: APAP    INR History:  Date 8/28 9/4 9/18 10/2 10/9 10/13 10/20 10/27 11/3 11/18 12/4 12/11   Total Weekly Dose 27.5mg 27.5mg 27.5 mg 27.5 mg 27.5 mg 25mg 25mg 25mg 25mg 25mg 27.5mg 27.5mg   INR 2.5 2.2 2.4 2.8 3.2 3.2 2.2 1.9 2.5 2.7 2.4 3.0   Notes hematoma?;stop doxepin stopped doxepin on       sick; nose bleed sick; nose bleed        Date    Total Weekly Dose 27.5mg 27.5mg 27.5mg 27.5mg 27.5mg 27.5mg 27.5 mg 27.5mg 25mg 27.5mg 27.5 mg 27.5mg   INR 2.5 2.3 2.2 2.5 2.4 2.9 2.6 3.8 2.3 2.3 1.9 2.3   Notes        hold; nose bleeds continue   ill      Date 3/8 3/15 3/26 4/1 4/9 4/13 4/18 4/23 4/30 5/14 5/29   Total Weekly Dose 27.5mg 27.5mg 30mg 30mg 30mg 30mg 32.5mg 30mg 30mg 30mg 30mg   INR 2.1 1.5 2.5 1.8 1.5 1.6 2.5 2.3 2.3 2.3 1.9   Notes   Boost post colonoscopy    Clinic; boost         Date  9/25 10/8   Total Weekly Dose 32.5mg 30mg 30mg 30mg 30mg  30mg 30mg 30mg 32.5  mg 30mg 30mg 30mg   INR 2.3 2.9 2.6 3.4 2.3  2.3 2.7 1.7 2.7 2.3 2.4 2.3   Notes        acyclovir acyclovir; boost acyclovir acyclovir nosebleed     Date 10/15 10/22 11/5 11/15 11/27 12/11 12/17 1/7/19       Total Weekly Dose 30mg 30mg 30mg 30mg 30mg 30mg 30 mg 30mg       INR 2.8 2.2 2.0 2.3 3.3 2.4 2.4 2.3       Notes     pancreatitis   sick         Phone Interview:  Tablet Strength: 5mg tablets  Contact Info: [phone] (Home) // [phone] (Cell)  Lab Contact Info:  "Alere    Patient Findings:  Positives:  Change in health, Change in medications   Negatives:  Signs/symptoms of thrombosis, Signs/symptoms of bleeding, Laboratory test error suspected, Change in alcohol use, Change in activity, Upcoming invasive procedure, Emergency department visit, Upcoming dental procedure, Missed doses, Extra doses, Change in diet/appetite, Hospital admission, Bruising, Other complaints   Comments:  Patient is 12 days out post \"rectal surgery\". She states she is not having any complications from this procedure and only has some pain from time to time. She currently has a sinus infection and has been using an OTC \"nasal decongestant\" (possibly Sudafed, patient did not have box with her). She is calling her physician today to check in and may possibly be put on new medications for this. She will call us if any new medication is started. Patient advised she may have to recheck INR sooner if this happens.     Plan:  1. INR is therapeutic today at 2.3. Instructed Mrs. Muller to continue warfarin 5mg daily except 2.5mg WedSat.   2. Repeat INR in two weeks, 1/21.  3. Verbal information provided over the phone. Leela Muller RBV dosing instructions, expresses understanding by teach back, and has no further questions at this time.    Glory Reyes,  1/7/2019  9:07 AM      I, Apollo Vicente [facility], have reviewed the note in full and agree with the assessment and plan.  01/07/19  10:27 AM      "

## 2019-01-08 ENCOUNTER — OFFICE VISIT (OUTPATIENT)
Dept: NEUROLOGY | Facility: CLINIC | Age: 69
End: 2019-01-08

## 2019-01-08 VITALS
HEART RATE: 82 BPM | BODY MASS INDEX: 27.13 KG/M2 | WEIGHT: 179 LBS | OXYGEN SATURATION: 98 % | HEIGHT: 68 IN | DIASTOLIC BLOOD PRESSURE: 76 MMHG | SYSTOLIC BLOOD PRESSURE: 130 MMHG

## 2019-01-08 DIAGNOSIS — M54.40 CHRONIC LOW BACK PAIN WITH SCIATICA, SCIATICA LATERALITY UNSPECIFIED, UNSPECIFIED BACK PAIN LATERALITY: ICD-10-CM

## 2019-01-08 DIAGNOSIS — M54.12 CERVICAL RADICULOPATHY: Primary | ICD-10-CM

## 2019-01-08 DIAGNOSIS — G89.29 CHRONIC LOW BACK PAIN WITH SCIATICA, SCIATICA LATERALITY UNSPECIFIED, UNSPECIFIED BACK PAIN LATERALITY: ICD-10-CM

## 2019-01-08 PROCEDURE — 99213 OFFICE O/P EST LOW 20 MIN: CPT | Performed by: PSYCHIATRY & NEUROLOGY

## 2019-01-08 RX ORDER — CETIRIZINE HYDROCHLORIDE 10 MG/1
1 TABLET ORAL AS NEEDED
COMMUNITY
End: 2021-06-24

## 2019-01-08 NOTE — PROGRESS NOTES
Subjective:     Patient ID: Leela Muller is a 68 y.o. female.    CC:   Chief Complaint   Patient presents with   • Migraine       HPI:   History of Present Illness  The following portions of the patient's history were reviewed and updated as appropriate: allergies, current medications, past family history, past medical history, past social history, past surgical history and problem list.     Patient went to PT, had rectal surgery since last seen, had to stop PT, ready to resume she has had shingles. She continues on Topamax, baclofen, gabapentin, prn Fioricet, currently does not need refills. She has had a left shoulder injection by Dr. Noel, reports doing better.    Past Medical History:   Diagnosis Date   • Anxiety    • Arm pain    • Arthritis    • Depression    • Diabetes mellitus (CMS/[facility])    • Hyperlipidemia    • Hypertension    • Neck pain on left side    • Palpitations 4/18/2018   • Pancreatitis    • Pulmonary embolism (CMS/[facility])    • Stroke syndrome 9/14/2016    · Assessed By: Devaughn Lewis (Neurology); Last Assessed: 16 Jun 2015  Right cerebrovascular accident in July or August 2010, evaluated at [facility].  Admission to [facility] on 09/28/2010 with headache and stuttering, consistent with TIA.  Chronic Coumadin therapy, initiated following bilateral pulmonary emboli in 2007 after fall and hip replacement.  She was already on 81 mg of aspirin as well, 09/2010.  MRI of the brain on 09/28/2010 revealing old right parietal cerebrovascular accident.  MRA revealing normal carotids, middle anterior and posterior cerebral arteries with minimal ostial stenosis of both vertebral arteries.  GENARO by Dr. Oliver Mobley on 09/28/2010:  patent foramen ovale with a small amount of right to left shunting.  Normal LVEF and normal valvular structures.   Patent foramen ovale closure using a 25 mm. Amplatzer cribriform occluder, 10/05/2010.   Echocardiogram, 06/23/2014:  LVEF (55% to 60%)  with and Amplatzer device noted to be well-seated in    • Thrombocytopenia (CMS/HCC)    • Transient cerebral ischemia        Past Surgical History:   Procedure Laterality Date   • APPENDECTOMY     • BREAST BIOPSY Left 2012    benign   • BREAST EXCISIONAL BIOPSY Left     benign   • BREAST EXCISIONAL BIOPSY Right     benign   • CHOLECYSTECTOMY     • HYSTERECTOMY     • OOPHORECTOMY     • TONSILLECTOMY     • TOTAL HIP ARTHROPLASTY REVISION         Social History     Socioeconomic History   • Marital status:      Spouse name: Not on file   • Number of children: Not on file   • Years of education: Not on file   • Highest education level: Not on file   Social Needs   • Financial resource strain: Not on file   • Food insecurity - worry: Not on file   • Food insecurity - inability: Not on file   • Transportation needs - medical: Not on file   • Transportation needs - non-medical: Not on file   Occupational History   • Not on file   Tobacco Use   • Smoking status: Never Smoker   • Smokeless tobacco: Never Used   Substance and Sexual Activity   • Alcohol use: No   • Drug use: No   • Sexual activity: Defer   Other Topics Concern   • Not on file   Social History Narrative   • Not on file       Family History   Problem Relation Age of Onset   • Alzheimer's disease Mother    • Cancer Mother    • Coronary artery disease Other    • Diabetes Other    • Hypertension Other    • Stroke Other    • Cancer Other    • Dementia Other    • Heart attack Father    • Breast cancer Neg Hx    • Ovarian cancer Neg Hx         Review of Systems   Constitutional: Negative for chills, fatigue, fever and unexpected weight change.   HENT: Negative for ear pain, hearing loss, nosebleeds, rhinorrhea and sore throat.    Eyes: Negative for photophobia, pain, discharge, itching and visual disturbance.   Respiratory: Negative for cough, chest tightness, shortness of breath and wheezing.    Cardiovascular: Negative for chest pain, palpitations and leg  swelling.   Gastrointestinal: Negative for abdominal pain, blood in stool, constipation, diarrhea, nausea and vomiting.   Genitourinary: Negative for dysuria, frequency, hematuria and urgency.   Musculoskeletal: Negative for arthralgias, back pain, gait problem, joint swelling, myalgias, neck pain and neck stiffness.   Skin: Negative for rash and wound.   Allergic/Immunologic: Negative for environmental allergies and food allergies.   Neurological: Negative for dizziness, tremors, seizures, syncope, speech difficulty, weakness, light-headedness, numbness and headaches.   Hematological: Negative for adenopathy. Does not bruise/bleed easily.   Psychiatric/Behavioral: Negative for agitation, confusion, decreased concentration, hallucinations, sleep disturbance and suicidal ideas. The patient is not nervous/anxious.         Objective:    Neurologic Exam     Mental Status   Oriented to person, place, and time.       Physical Exam   Constitutional: She is oriented to person, place, and time. She appears well-developed and well-nourished.   Cardiovascular: Normal rate and regular rhythm.   Pulmonary/Chest: Effort normal.   Musculoskeletal:   Walks with a cane.   Neurological: She is alert and oriented to person, place, and time. She has normal reflexes.   Psychiatric: She has a normal mood and affect. Her behavior is normal. Thought content normal.       Assessment/Plan:       Leela was seen today for migraine.    Diagnoses and all orders for this visit:    Cervical radiculopathy  -     Ambulatory Referral to Physical Therapy    Chronic low back pain with sciatica, sciatica laterality unspecified, unspecified back pain laterality  -     Ambulatory Referral to Physical Therapy             Devaughn Lewis MD  1/8/2019

## 2019-01-14 ENCOUNTER — ANTICOAGULATION VISIT (OUTPATIENT)
Dept: PHARMACY | Facility: HOSPITAL | Age: 69
End: 2019-01-14

## 2019-01-14 DIAGNOSIS — I74.9 EMBOLISM AND THROMBOSIS (HCC): ICD-10-CM

## 2019-01-14 LAB — INR PPP: 2.6

## 2019-01-14 NOTE — PROGRESS NOTES
Anticoagulation Clinic - Remote Progress Note  [facility]   Frequency of Monitorin days    Indication: Bilateral PE, stroke syndrome, embolism and thrombosis of unspecified site   Referring Provider: Dr. Rika Sullivan  Initial Warfarin Start Date:   Planned Duration of Therapy: lifelong  Goal INR: 2.0-3.0  Current Drug Interactions: doxepin d/c'd on ; ASA    Diet: Low GLV intake  Alcohol: None  Tobacco: None  OTC Pain Medications: APAP    INR History:  Date 8/28 9/4 9/18 10/2 10/9 10/13 10/20 10/27 11/3 11/18 12/4 12/11   Total Weekly Dose 27.5mg 27.5mg 27.5 mg 27.5 mg 27.5 mg 25mg 25mg 25mg 25mg 25mg 27.5mg 27.5mg   INR 2.5 2.2 2.4 2.8 3.2 3.2 2.2 1.9 2.5 2.7 2.4 3.0   Notes hematoma?;stop doxepin stopped doxepin on       sick; nose bleed sick; nose bleed        Date    Total Weekly Dose 27.5mg 27.5mg 27.5mg 27.5mg 27.5mg 27.5mg 27.5 mg 27.5mg 25mg 27.5mg 27.5 mg 27.5mg   INR 2.5 2.3 2.2 2.5 2.4 2.9 2.6 3.8 2.3 2.3 1.9 2.3   Notes        hold; nose bleeds continue   ill      Date 3/8 3/15 3/26 4/1 4/9 4/13 4/18 4/23 4/30 5/14 5/29   Total Weekly Dose 27.5mg 27.5mg 30mg 30mg 30mg 30mg 32.5mg 30mg 30mg 30mg 30mg   INR 2.1 1.5 2.5 1.8 1.5 1.6 2.5 2.3 2.3 2.3 1.9   Notes   Boost post colonoscopy    Clinic; boost         Date  9/25 10/8   Total Weekly Dose 32.5mg 30mg 30mg 30mg 30mg  30mg 30mg 30mg 32.5  mg 30mg 30mg 30mg   INR 2.3 2.9 2.6 3.4 2.3  2.3 2.7 1.7 2.7 2.3 2.4 2.3   Notes        acyclovir acyclovir; boost acyclovir acyclovir nosebleed     Date 10/15 10/22 11/5 11/15 11/27 12/11 12/17 1/7/19 1/14      Total Weekly Dose 30mg 30mg 30mg 30mg 30mg 30mg 30 mg 30mg 30mg      INR 2.8 2.2 2.0 2.3 3.3 2.4 2.4 2.3 2.6      Notes     pancreatitis   sick sick, Ceftin, fall        Phone Interview:  Tablet Strength: 5mg tablets  Contact Info: [phone] (Home) // [phone] (Cell)  Lab  "Contact Info: Romeo    Patient Findings:  Positives:  Signs/symptoms of bleeding, Change in health, Change in activity, Change in medications, Bruising, Other complaints   Negatives:  Signs/symptoms of thrombosis, Laboratory test error suspected, Change in alcohol use, Upcoming invasive procedure, Emergency department visit, Upcoming dental procedure, Missed doses, Extra doses, Change in diet/appetite, Hospital admission   Comments:  Patient was put on Ceftin last Thursday for sinus infection (did not call clinic) but she has been instructed to stop today by PCP due to severe stomach upset caused by this antibiotic. Since last encounter, she has also had a bad fall in which she injured her arm, back and leg. She had a scan of her back Friday and goes to see a \"bone specialist\" this Friday because she might have a \"crack\" in her leg bone. She states she can barely walk and has not been very mobile - her foot is so swollen she cannot put her shoe on. She states she has had very bad bruising to her arm and leg from this epidsode as well. She is taking some left over hydrocodone (roughly one half tablet twice daily) for pain management from this fall. This past Sunday at [facility] she had a small nose bleed that resolved quickly on it's own.     Plan:  1. INR is therapeutic today. Instructed Mrs. Muller to continue warfarin 5mg daily except 2.5mg WedSat.   2. Repeat INR in one week, 1/21.  3. Verbal information provided over the phone. Leela Muller RBV dosing instructions, expresses understanding by teach back, and has no further questions at this time.    Glory Reyes, Dot  1/14/2019  1:37 PM    Mrs. Muller is aware of the importance of in-depth evaluation after any fall, given her higher risk of bleeding. She has follow up with her PCP and the \"bone specialist\" later this week. Will continue to monitor her closely. She affirms that she will call our clinic with any further changes (zoila. medications).  IYasmeen, " RP, have reviewed the note in full and agree with the assessment and plan.  01/14/19  2:09 PM

## 2019-01-28 ENCOUNTER — ANTICOAGULATION VISIT (OUTPATIENT)
Dept: PHARMACY | Facility: HOSPITAL | Age: 69
End: 2019-01-28

## 2019-01-28 DIAGNOSIS — I74.9 EMBOLISM AND THROMBOSIS (HCC): ICD-10-CM

## 2019-01-28 LAB — INR PPP: 2.4

## 2019-01-28 NOTE — PROGRESS NOTES
Anticoagulation Clinic - Remote Progress Note  [facility]   Frequency of Monitorin days    Indication: Bilateral PE, stroke syndrome, embolism and thrombosis of unspecified site   Referring Provider: Dr. Rika Sullivan  Initial Warfarin Start Date:   Planned Duration of Therapy: lifelong  Goal INR: 2.0-3.0  Current Drug Interactions: doxepin d/c'd on ; ASA    Diet: Low GLV intake  Alcohol: None  Tobacco: None  OTC Pain Medications: APAP    INR History:  Date 8/28 9/4 9/18 10/2 10/9 10/13 10/20 10/27 11/3 11/18 12/4 12/11   Total Weekly Dose 27.5mg 27.5mg 27.5 mg 27.5 mg 27.5 mg 25mg 25mg 25mg 25mg 25mg 27.5mg 27.5mg   INR 2.5 2.2 2.4 2.8 3.2 3.2 2.2 1.9 2.5 2.7 2.4 3.0   Notes hematoma?;stop doxepin stopped doxepin on       sick; nose bleed sick; nose bleed        Date    Total Weekly Dose 27.5mg 27.5mg 27.5mg 27.5mg 27.5mg 27.5mg 27.5 mg 27.5mg 25mg 27.5mg 27.5 mg 27.5mg   INR 2.5 2.3 2.2 2.5 2.4 2.9 2.6 3.8 2.3 2.3 1.9 2.3   Notes        hold; nose bleeds continue   ill      Date 3/8 3/15 3/26 4/1 4/9 4/13 4/18 4/23 4/30 5/14 5/29   Total Weekly Dose 27.5mg 27.5mg 30mg 30mg 30mg 30mg 32.5mg 30mg 30mg 30mg 30mg   INR 2.1 1.5 2.5 1.8 1.5 1.6 2.5 2.3 2.3 2.3 1.9   Notes   Boost post colonoscopy    Clinic; boost         Date  9/25 10/8   Total Weekly Dose 32.5mg 30mg 30mg 30mg 30mg  30mg 30mg 30mg 32.5  mg 30mg 30mg 30mg   INR 2.3 2.9 2.6 3.4 2.3  2.3 2.7 1.7 2.7 2.3 2.4 2.3   Notes        acyclovir acyclovir; boost acyclovir acyclovir nosebleed     Date 10/15 10/22 11/5 11/15 11/27 12/11 12/17 1/7/19 1/14 1/28     Total Weekly Dose 30mg 30mg 30mg 30mg 30mg 30mg 30 mg 30mg 30mg 30 mg     INR 2.8 2.2 2.0 2.3 3.3 2.4 2.4 2.3 2.6 2.4     Notes     pancreatitis   sick sick, Ceftin, fall fluconx1       Phone Interview:  Tablet Strength: 5mg tablets  Contact Info: [phone] (Home) // [phone] (Cell)  Lab Contact Info: Romeo    Patient Findings:  Positives:  Change in activity, Change in medications   Negatives:  Signs/symptoms of thrombosis, Signs/symptoms of bleeding, Laboratory test error suspected, Change in health, Change in alcohol use, Upcoming invasive procedure, Emergency department visit, Upcoming dental procedure, Missed doses, Extra doses, Change in diet/appetite, Hospital admission, Bruising, Other complaints   Comments:  No broken bones but the bone was indented, really bad fall from last visit. She did have a hematoma on the leg and she says a clot below the hematoma. She will wear compression stockings for another week. She goes 2/1, Friday for a follow up of her leg. She is to continue on the warfarin and has a few excercises that she does in her bed as she isn't moving much currently due to the fall. She also had a onetime dose of fluconazole last week.        Plan:  1. INR is therapeutic today at 2.4. Instructed Mrs. Muller to continue warfarin 5mg daily except 2.5mg WedSat.  The fluconazole seems to not have had an effect on her INR. She is to call us on Friday with any changes to her medications or any additional information that she may have.  2. Repeat INR in two weeks 2/11..  3. Verbal information provided over the phone. Leela Muller RBV dosing instructions, expresses understanding by teach back, and has no further questions at this time.    Apollo Vicente, [facility]  1/28/2019  9:37 AM      She calls back to mention that she has had to take two nitroglycerin tablets last week in the middle of the night and her chest pain subsided after the second dose. She sees her family doctor on Friday in addition to ortho and will mention it to him.  9:55 AM

## 2019-02-04 RX ORDER — INSULIN GLARGINE 100 [IU]/ML
INJECTION, SOLUTION SUBCUTANEOUS
Qty: 15 ML | Refills: 5 | Status: CANCELLED | OUTPATIENT
Start: 2019-02-04

## 2019-02-05 ENCOUNTER — TELEPHONE (OUTPATIENT)
Dept: CARDIOLOGY | Facility: CLINIC | Age: 69
End: 2019-02-05

## 2019-02-05 NOTE — TELEPHONE ENCOUNTER
Lm on vm to call me back - we got referral from PCP regarding CP- I would like to get more info from PT - will await her return her return call

## 2019-02-06 NOTE — TELEPHONE ENCOUNTER
PT has had 2 episodes of CP over the last month- 1 episode she was asleep and it woke her up- she took 2 NTG and the pain was relieved- she states that she checked her BP and BP was 160's/50's . The second episode happened while she was dusting the house- she took 1 NTG and the pain was relieved- both episodes that pain was in the center of her chest and did not radiate- BP was 160's/60's - she saw PCP 2 days ago and BP was 150's/?? HR 80's- she does not check her BP regularly at home but will start checking it for me- she also reports that she has been battling shingles for the past 3 months but the shingles and pain with them are in her back-   She saw PCP this week and he thought it was worth mentioning and to see if she needs any further testing- he next f/u is in April

## 2019-02-07 RX ORDER — METOPROLOL SUCCINATE 50 MG/1
TABLET, EXTENDED RELEASE ORAL
Qty: 270 TABLET | Refills: 1 | Status: SHIPPED | OUTPATIENT
Start: 2019-02-07 | End: 2019-04-24 | Stop reason: SDUPTHER

## 2019-02-11 ENCOUNTER — ANTICOAGULATION VISIT (OUTPATIENT)
Dept: PHARMACY | Facility: HOSPITAL | Age: 69
End: 2019-02-11

## 2019-02-11 DIAGNOSIS — I74.9 EMBOLISM AND THROMBOSIS (HCC): ICD-10-CM

## 2019-02-11 LAB — INR PPP: 2.5

## 2019-02-11 NOTE — PROGRESS NOTES
Anticoagulation Clinic - Remote Progress Note  [facility]   Frequency of Monitorin days    Indication: Bilateral PE, stroke syndrome, embolism and thrombosis of unspecified site   Referring Provider: Dr. Rika Sullivan  Initial Warfarin Start Date:   Planned Duration of Therapy: lifelong  Goal INR: 2.0-3.0  Current Drug Interactions: doxepin d/c'd on ; ASA    Diet: Low GLV intake  Alcohol: None  Tobacco: None  OTC Pain Medications: APAP    INR History:  Date 8/28 9/4 9/18 10/2 10/9 10/13 10/20 10/27 11/3 11/18 12/4 12/11   Total Weekly Dose 27.5mg 27.5mg 27.5 mg 27.5 mg 27.5 mg 25mg 25mg 25mg 25mg 25mg 27.5mg 27.5mg   INR 2.5 2.2 2.4 2.8 3.2 3.2 2.2 1.9 2.5 2.7 2.4 3.0   Notes hematoma?;stop doxepin stopped doxepin on       sick; nose bleed sick; nose bleed        Date    Total Weekly Dose 27.5mg 27.5mg 27.5mg 27.5mg 27.5mg 27.5mg 27.5 mg 27.5mg 25mg 27.5mg 27.5 mg 27.5mg   INR 2.5 2.3 2.2 2.5 2.4 2.9 2.6 3.8 2.3 2.3 1.9 2.3   Notes        hold; nose bleeds continue   ill      Date 3/8 3/15 3/26 4/1 4/9 4/13 4/18 4/23 4/30 5/14 5/29   Total Weekly Dose 27.5mg 27.5mg 30mg 30mg 30mg 30mg 32.5mg 30mg 30mg 30mg 30mg   INR 2.1 1.5 2.5 1.8 1.5 1.6 2.5 2.3 2.3 2.3 1.9   Notes   Boost post colonoscopy    Clinic; boost         Date  9/25 10/8   Total Weekly Dose 32.5mg 30mg 30mg 30mg 30mg  30mg 30mg 30mg 32.5  mg 30mg 30mg 30mg   INR 2.3 2.9 2.6 3.4 2.3  2.3 2.7 1.7 2.7 2.3 2.4 2.3   Notes        acyclovir acyclovir; boost acyclovir acyclovir nosebleed     Date 10/15 10/22 11/5 11/15 11/27 12/11 12/17 1/7/19 1/14 1/28 2/11    Total Weekly Dose 30mg 30mg 30mg 30mg 30mg 30mg 30 mg 30mg 30mg 30 mg 30mg    INR 2.8 2.2 2.0 2.3 3.3 2.4 2.4 2.3 2.6 2.4 2.5    Notes     pancreatitis   sick sick, Ceftin, fall fluconx1       Phone Interview:  Tablet Strength: 5mg tablets  Contact Info: [phone]  (Home) // [phone] (Cell)  Lab Contact Info: Romeo     Patient Findings   Negatives:  Signs/symptoms of thrombosis, Signs/symptoms of bleeding, Laboratory test error suspected, Change in health, Change in alcohol use, Change in activity, Upcoming invasive procedure, Emergency department visit, Upcoming dental procedure, Missed doses, Extra doses, Change in medications, Change in diet/appetite, Hospital admission, Bruising, Other complaints   Comments:  She continues to wear compression stockings.      Plan:  1. INR is therapeutic today. Instructed Mrs. Muller to continue warfarin 5mg daily except 2.5mg WedSat.  2. Repeat INR in two weeks.  3. Verbal information provided over the phone. Leela Muller RBV dosing instructions, expresses understanding by teach back, and has no further questions at this time.    Alanna Bermudez, PharmD  2/11/2019  3:42 PM

## 2019-02-25 ENCOUNTER — ANTICOAGULATION VISIT (OUTPATIENT)
Dept: PHARMACY | Facility: HOSPITAL | Age: 69
End: 2019-02-25

## 2019-02-25 DIAGNOSIS — I74.9 EMBOLISM AND THROMBOSIS (HCC): ICD-10-CM

## 2019-02-25 LAB — INR PPP: 2.2

## 2019-02-25 NOTE — PROGRESS NOTES
Anticoagulation Clinic - Remote Progress Note  [facility]   Frequency of Monitorin days    Indication: Bilateral PE, stroke syndrome, embolism and thrombosis of unspecified site   Referring Provider: Dr. Rika Sullivan  Initial Warfarin Start Date:   Planned Duration of Therapy: lifelong  Goal INR: 2.0-3.0  Current Drug Interactions: doxepin d/c'd on ; ASA    Diet: Low GLV intake  Alcohol: None  Tobacco: None  OTC Pain Medications: APAP    INR History:  Date 8/28 9/4 9/18 10/2 10/9 10/13 10/20 10/27 11/3 11/18 12/4 12/11   Total Weekly Dose 27.5mg 27.5mg 27.5 mg 27.5 mg 27.5 mg 25mg 25mg 25mg 25mg 25mg 27.5mg 27.5mg   INR 2.5 2.2 2.4 2.8 3.2 3.2 2.2 1.9 2.5 2.7 2.4 3.0   Notes hematoma?;stop doxepin stopped doxepin on       sick; nose bleed sick; nose bleed        Date    Total Weekly Dose 27.5mg 27.5mg 27.5mg 27.5mg 27.5mg 27.5mg 27.5 mg 27.5mg 25mg 27.5mg 27.5 mg 27.5mg   INR 2.5 2.3 2.2 2.5 2.4 2.9 2.6 3.8 2.3 2.3 1.9 2.3   Notes        hold; nose bleeds continue   ill      Date 3/8 3/15 3/26 4/1 4/9 4/13 4/18 4/23 4/30 5/14 5/29   Total Weekly Dose 27.5mg 27.5mg 30mg 30mg 30mg 30mg 32.5mg 30mg 30mg 30mg 30mg   INR 2.1 1.5 2.5 1.8 1.5 1.6 2.5 2.3 2.3 2.3 1.9   Notes   Boost post colonoscopy    Clinic; boost         Date  9/25 10/8   Total Weekly Dose 32.5mg 30mg 30mg 30mg 30mg  30mg 30mg 30mg 32.5  mg 30mg 30mg 30mg   INR 2.3 2.9 2.6 3.4 2.3  2.3 2.7 1.7 2.7 2.3 2.4 2.3   Notes        acyclovir acyclovir; boost acyclovir acyclovir nosebleed     Date 10/15 10/22 11/5 11/15 11/27 12/11 12/17 1/7/19 1/14 1/28 2/11 2/25   Total Weekly Dose 30mg 30mg 30mg 30mg 30mg 30mg 30 mg 30mg 30mg 30mg 30mg 30mg   INR 2.8 2.2 2.0 2.3 3.3 2.4 2.4 2.3 2.6 2.4 2.5 2.2   Notes     pancreatitis   sick sick, Ceftin, fall flucon x1       Date               Total Weekly Dose               INR                Notes                 Phone Interview:  Tablet Strength: 5mg tablets  Contact Info: [phone] (Home) // [phone] (Cell)  Lab Contact Info: Romeo    Patient Findings   Negatives:  Signs/symptoms of thrombosis, Signs/symptoms of bleeding, Laboratory test error suspected, Change in health, Change in alcohol use, Change in activity, Upcoming invasive procedure, Emergency department visit, Upcoming dental procedure, Missed doses, Extra doses, Change in medications, Change in diet/appetite, Hospital admission, Bruising, Other complaints     Plan:  1. INR is therapeutic today. Instructed Mrs. Muller to continue warfarin 5mg daily except 2.5mg WedSat.  2. Repeat INR in two weeks  3. Verbal information provided over the phone. Leela Muller RBV dosing instructions, expresses understanding by teach back, and has no further questions at this time.    Apollo Shelton CPhT  2/25/2019  1:59 PM    I, Apollo Vicente, [facility], have reviewed the note in full and agree with the assessment and plan.  02/25/19  3:17 PM

## 2019-02-28 ENCOUNTER — TRANSCRIBE ORDERS (OUTPATIENT)
Dept: ADMINISTRATIVE | Facility: HOSPITAL | Age: 69
End: 2019-02-28

## 2019-03-07 RX ORDER — GABAPENTIN 800 MG/1
800 TABLET ORAL 3 TIMES DAILY
Qty: 90 TABLET | Refills: 5 | OUTPATIENT
Start: 2019-03-07

## 2019-03-11 ENCOUNTER — ANTICOAGULATION VISIT (OUTPATIENT)
Dept: PHARMACY | Facility: HOSPITAL | Age: 69
End: 2019-03-11

## 2019-03-11 DIAGNOSIS — I74.9 EMBOLISM AND THROMBOSIS (HCC): ICD-10-CM

## 2019-03-11 LAB — INR PPP: 2.3

## 2019-03-11 NOTE — PROGRESS NOTES
Anticoagulation Clinic - Remote Progress Note  [facility]   Frequency of Monitorin days    Indication: Bilateral PE, stroke syndrome, embolism and thrombosis of unspecified site   Referring Provider: Dr. Rika Sullivan  Initial Warfarin Start Date:   Planned Duration of Therapy: lifelong  Goal INR: 2.0-3.0  Current Drug Interactions: doxepin d/c'd on ; ASA    Diet: Low GLV intake  Alcohol: None  Tobacco: None  OTC Pain Medications: APAP    INR History:  Date 8/28 9/4 9/18 10/2 10/9 10/13 10/20 10/27 11/3 11/18 12/4 12/11   Total Weekly Dose 27.5mg 27.5mg 27.5 mg 27.5 mg 27.5 mg 25mg 25mg 25mg 25mg 25mg 27.5mg 27.5mg   INR 2.5 2.2 2.4 2.8 3.2 3.2 2.2 1.9 2.5 2.7 2.4 3.0   Notes hematoma?;stop doxepin stopped doxepin on       sick; nose bleed sick; nose bleed        Date    Total Weekly Dose 27.5mg 27.5mg 27.5mg 27.5mg 27.5mg 27.5mg 27.5 mg 27.5mg 25mg 27.5mg 27.5 mg 27.5mg   INR 2.5 2.3 2.2 2.5 2.4 2.9 2.6 3.8 2.3 2.3 1.9 2.3   Notes        hold; nose bleeds continue   ill      Date 3/8 3/15 3/26 4/1 4/9 4/13 4/18 4/23 4/30 5/14 5/29   Total Weekly Dose 27.5mg 27.5mg 30mg 30mg 30mg 30mg 32.5mg 30mg 30mg 30mg 30mg   INR 2.1 1.5 2.5 1.8 1.5 1.6 2.5 2.3 2.3 2.3 1.9   Notes   Boost post colonoscopy    Clinic; boost         Date  9/25 10/8   Total Weekly Dose 32.5mg 30mg 30mg 30mg 30mg  30mg 30mg 30mg 32.5  mg 30mg 30mg 30mg   INR 2.3 2.9 2.6 3.4 2.3  2.3 2.7 1.7 2.7 2.3 2.4 2.3   Notes        acyclovir acyclovir; boost acyclovir acyclovir nosebleed     Date 10/15 10/22 11/5 11/15 11/27 12/11 12/17 1/7/19 1/14 1/28 2/11 2/25   Total Weekly Dose 30mg 30mg 30mg 30mg 30mg 30mg 30 mg 30mg 30mg 30mg 30mg 30mg   INR 2.8 2.2 2.0 2.3 3.3 2.4 2.4 2.3 2.6 2.4 2.5 2.2   Notes     pancreatitis   sick sick, Ceftin, fall flucon x1       Date 3/11              Total Weekly Dose 30mg              INR 2.3               Notes                 Phone Interview:  Tablet Strength: 5mg tablets  Contact Info: [phone] (Home) // [phone] (Cell)    Patient Findings   Negatives:  Signs/symptoms of thrombosis, Signs/symptoms of bleeding, Laboratory test error suspected, Change in health, Change in alcohol use, Change in activity, Upcoming invasive procedure, Emergency department visit, Upcoming dental procedure, Missed doses, Extra doses, Change in medications, Change in diet/appetite, Hospital admission, Bruising, Other complaints     Plan:  1. INR is therapeutic again today, so instructed Mrs. Muller to continue warfarin 5mg daily except 2.5mg WedSat.  2. Repeat INR in two weeks.  3. Verbal information provided over the phone. Leela Muller RBV dosing instructions, expresses understanding by teach back, and has no further questions at this time.    Ann Cowden Mayer, PharmD  3/11/2019  12:18 PM

## 2019-03-25 LAB — INR PPP: 2.4

## 2019-03-25 RX ORDER — VENLAFAXINE HYDROCHLORIDE 75 MG/1
CAPSULE, EXTENDED RELEASE ORAL
Qty: 30 CAPSULE | Refills: 12 | Status: CANCELLED | OUTPATIENT
Start: 2019-03-25

## 2019-03-26 ENCOUNTER — ANTICOAGULATION VISIT (OUTPATIENT)
Dept: PHARMACY | Facility: HOSPITAL | Age: 69
End: 2019-03-26

## 2019-03-26 DIAGNOSIS — I74.9 EMBOLISM AND THROMBOSIS (HCC): ICD-10-CM

## 2019-03-26 NOTE — PROGRESS NOTES
Anticoagulation Clinic - Remote Progress Note  [facility]   Frequency of Monitorin days    Indication: Bilateral PE, stroke syndrome, embolism and thrombosis of unspecified site   Referring Provider: Dr. Rika Sullivan  Initial Warfarin Start Date:   Planned Duration of Therapy: lifelong  Goal INR: 2.0-3.0  Current Drug Interactions: doxepin d/c'd on ; ASA    Diet: Low GLV intake  Alcohol: None  Tobacco: None  OTC Pain Medications: APAP    INR History:  Date 8/28 9/4 9/18 10/2 10/9 10/13 10/20 10/27 11/3 11/18 12/4 12/11   Total Weekly Dose 27.5mg 27.5mg 27.5 mg 27.5 mg 27.5 mg 25mg 25mg 25mg 25mg 25mg 27.5mg 27.5mg   INR 2.5 2.2 2.4 2.8 3.2 3.2 2.2 1.9 2.5 2.7 2.4 3.0   Notes hematoma?;stop doxepin stopped doxepin on       sick; nose bleed sick; nose bleed        Date    Total Weekly Dose 27.5mg 27.5mg 27.5mg 27.5mg 27.5mg 27.5mg 27.5 mg 27.5mg 25mg 27.5mg 27.5 mg 27.5mg   INR 2.5 2.3 2.2 2.5 2.4 2.9 2.6 3.8 2.3 2.3 1.9 2.3   Notes        hold; nose bleeds continue   ill      Date 3/8 3/15 3/26 4/1 4/9 4/13 4/18 4/23 4/30 5/14 5/29   Total Weekly Dose 27.5mg 27.5mg 30mg 30mg 30mg 30mg 32.5mg 30mg 30mg 30mg 30mg   INR 2.1 1.5 2.5 1.8 1.5 1.6 2.5 2.3 2.3 2.3 1.9   Notes   Boost post colonoscopy    Clinic; boost         Date 18 9/25 10/8   Total Weekly Dose 32.5mg 30mg 30mg 30mg 30mg  30mg 30mg 30mg 32.5  mg 30mg 30mg 30mg   INR 2.3 2.9 2.6 3.4 2.3  2.3 2.7 1.7 2.7 2.3 2.4 2.3   Notes        acyclovir acyclovir; boost acyclovir acyclovir nosebleed     Date 10/15 10/22 11/5 11/15 11/27 12/11 12/17 1/7/19 1/14 1/28 2/11 2/25   Total Weekly Dose 30mg 30mg 30mg 30mg 30mg 30mg 30 mg 30mg 30mg 30mg 30mg 30mg   INR 2.8 2.2 2.0 2.3 3.3 2.4 2.4 2.3 2.6 2.4 2.5 2.2   Notes     pancreatitis   sick sick, ceftin, fall flucon x1       Date 3/11 3/25             Total Weekly Dose 30mg 30mg             INR  2.3 2.4             Notes                 Phone Interview:  Tablet Strength: 5mg tablets  Contact Info: [phone] (Home) // [phone] (Cell)    Patient Findings   Negatives:  Signs/symptoms of thrombosis, Signs/symptoms of bleeding, Laboratory test error suspected, Change in health, Change in alcohol use, Change in activity, Upcoming invasive procedure, Emergency department visit, Upcoming dental procedure, Missed doses, Extra doses, Change in medications, Change in diet/appetite, Hospital admission, Bruising, Other complaints     Plan:  1. INR is therapeutic again at 2.4. Instructed Mrs. Muller to continue warfarin 5mg daily except 2.5mg WedSat.  2. Repeat INR in 2 weeks.  3. Verbal information provided over the phone. Leela Muller RBV dosing instructions, expresses understanding by teach back, and has no further questions at this time.    Apollo Shelton CPhT  3/26/2019  9:36 AM     IKenneth, [facility], have reviewed the note in full and agree with the assessment and plan.  03/26/19  11:41 AM

## 2019-04-08 ENCOUNTER — ANTICOAGULATION VISIT (OUTPATIENT)
Dept: PHARMACY | Facility: HOSPITAL | Age: 69
End: 2019-04-08

## 2019-04-08 DIAGNOSIS — I74.9 EMBOLISM AND THROMBOSIS (HCC): ICD-10-CM

## 2019-04-08 LAB — INR PPP: 4

## 2019-04-08 NOTE — PROGRESS NOTES
Anticoagulation Clinic - Remote Progress Note  [facility]   Frequency of Monitorin days    Indication: Bilateral PE, stroke syndrome, embolism and thrombosis of unspecified site   Referring Provider: Dr. Rika Sullivan  Initial Warfarin Start Date:   Planned Duration of Therapy: lifelong  Goal INR: 2.0-3.0  Current Drug Interactions: doxepin d/c'd on ; ASA    Diet: Low GLV intake  Alcohol: None  Tobacco: None  OTC Pain Medications: APAP    INR History:  Date 8/28 9/4 9/18 10/2 10/9 10/13 10/20 10/27 11/3 11/18 12/4 12/11   Total Weekly Dose 27.5mg 27.5mg 27.5 mg 27.5 mg 27.5 mg 25mg 25mg 25mg 25mg 25mg 27.5mg 27.5mg   INR 2.5 2.2 2.4 2.8 3.2 3.2 2.2 1.9 2.5 2.7 2.4 3.0   Notes hematoma?;stop doxepin stopped doxepin on       sick; nose bleed sick; nose bleed        Date    Total Weekly Dose 27.5mg 27.5mg 27.5mg 27.5mg 27.5mg 27.5mg 27.5 mg 27.5mg 25mg 27.5mg 27.5 mg 27.5mg   INR 2.5 2.3 2.2 2.5 2.4 2.9 2.6 3.8 2.3 2.3 1.9 2.3   Notes        hold; nose bleeds continue   ill      Date 3/8 3/15 3/26 4/1 4/9 4/13 4/18 4/23 4/30 5/14 5/29   Total Weekly Dose 27.5mg 27.5mg 30mg 30mg 30mg 30mg 32.5mg 30mg 30mg 30mg 30mg   INR 2.1 1.5 2.5 1.8 1.5 1.6 2.5 2.3 2.3 2.3 1.9   Notes   Boost post colonoscopy    Clinic; boost         Date 18 9/25 10/8   Total Weekly Dose 32.5mg 30mg 30mg 30mg 30mg  30mg 30mg 30mg 32.5  mg 30mg 30mg 30mg   INR 2.3 2.9 2.6 3.4 2.3  2.3 2.7 1.7 2.7 2.3 2.4 2.3   Notes        acyclovir acyclovir; boost acyclovir acyclovir nosebleed     Date 10/15 10/22 11/5 11/15 11/27 12/11 12/17 1/7/19 1/14 1/28 2/11 2/25   Total Weekly Dose 30mg 30mg 30mg 30mg 30mg 30mg 30 mg 30mg 30mg 30mg 30mg 30mg   INR 2.8 2.2 2.0 2.3 3.3 2.4 2.4 2.3 2.6 2.4 2.5 2.2   Notes     pancreatitis   sick sick, ceftin, fall flucon x1       Date 3/11 3/25 4/8            Total Weekly Dose 30mg 30mg 30 mg             INR 2.3 2.4 4.0            Notes   Steroid inj              Phone Interview:  Tablet Strength: 5mg tablets  Contact Info: [phone] (Home) // [phone] (Cell)    Patient Findings:   Positives:  Change in health, Change in medications, Bruising   Negatives:  Signs/symptoms of thrombosis, Signs/symptoms of bleeding, Laboratory test error suspected, Change in alcohol use, Change in activity, Upcoming invasive procedure, Emergency department visit, Upcoming dental procedure, Missed doses, Extra doses, Change in diet/appetite, Hospital admission, Other complaints   Comments:  Mrs. Muller reports she has been receiving steroid shots after a fall. Her first shot was in her shoulder on 3/29 and her second shot was in her leg on 4/4. She will have to repeat the shot in her arm but is not sure when. She will call to let us know when the next shot is scheduled. She is also wearing compression socks to help with the swelling and reduce the risk for clots. She did have bruising from the fall but it is healing.  Discussed the potential DDI interaction between warfarin and steroids. Also encouraged patient to call when any changes are made with medications, diet, or health.        Plan:  1. INR is supratherapeutic today at 4.0. Instructed Mrs. Muller to hold warfarin tonight then continue warfarin 5mg daily except 2.5mg WedSat.  2. Repeat INR on Thursday.  3. Verbal information provided over the phone. Leela Muller RBV dosing instructions, expresses understanding by teach back, and has no further questions at this time.    Dania Weiner, PharmD  Pharmacy Resident  4/8/2019  10:22 AM

## 2019-04-11 ENCOUNTER — ANTICOAGULATION VISIT (OUTPATIENT)
Dept: PHARMACY | Facility: HOSPITAL | Age: 69
End: 2019-04-11

## 2019-04-11 DIAGNOSIS — I74.9 EMBOLISM AND THROMBOSIS (HCC): ICD-10-CM

## 2019-04-11 LAB — INR PPP: 2.5

## 2019-04-11 NOTE — PROGRESS NOTES
Anticoagulation Clinic - Remote Progress Note  [facility]   Frequency of Monitorin days    Indication: Bilateral PE, stroke syndrome, embolism and thrombosis of unspecified site   Referring Provider: Dr. Rika Sullivan  Initial Warfarin Start Date:   Planned Duration of Therapy: lifelong  Goal INR: 2.0-3.0  Current Drug Interactions: doxepin d/c'd on ; ASA    Diet: Low GLV intake  Alcohol: None  Tobacco: None  OTC Pain Medications: APAP    INR History:  Date 8/28 9/4 9/18 10/2 10/9 10/13 10/20 10/27 11/3 11/18 12/4 12/11   Total Weekly Dose 27.5mg 27.5mg 27.5 mg 27.5 mg 27.5 mg 25mg 25mg 25mg 25mg 25mg 27.5mg 27.5mg   INR 2.5 2.2 2.4 2.8 3.2 3.2 2.2 1.9 2.5 2.7 2.4 3.0   Notes hematoma?;stop doxepin stopped doxepin on       sick; nose bleed sick; nose bleed        Date    Total Weekly Dose 27.5mg 27.5mg 27.5mg 27.5mg 27.5mg 27.5mg 27.5 mg 27.5mg 25mg 27.5mg 27.5 mg 27.5mg   INR 2.5 2.3 2.2 2.5 2.4 2.9 2.6 3.8 2.3 2.3 1.9 2.3   Notes        hold; nose bleeds continue   ill      Date 3/8 3/15 3/26 4/1 4/9 4/13 4/18 4/23 4/30 5/14 5/29   Total Weekly Dose 27.5mg 27.5mg 30mg 30mg 30mg 30mg 32.5mg 30mg 30mg 30mg 30mg   INR 2.1 1.5 2.5 1.8 1.5 1.6 2.5 2.3 2.3 2.3 1.9   Notes   Boost post colonoscopy    Clinic; boost         Date 18 9/25 10/8   Total Weekly Dose 32.5mg 30mg 30mg 30mg 30mg  30mg 30mg 30mg 32.5  mg 30mg 30mg 30mg   INR 2.3 2.9 2.6 3.4 2.3  2.3 2.7 1.7 2.7 2.3 2.4 2.3   Notes        acyclovir acyclovir; boost acyclovir acyclovir nosebleed     Date 10/15 10/22 11/5 11/15 11/27 12/11 12/17 1/7/19 1/14 1/28 2/11 2/25   Total Weekly Dose 30mg 30mg 30mg 30mg 30mg 30mg 30 mg 30mg 30mg 30mg 30mg 30mg   INR 2.8 2.2 2.0 2.3 3.3 2.4 2.4 2.3 2.6 2.4 2.5 2.2   Notes     pancreatitis   sick sick, ceftin, fall flucon x1       Date 3/11 3/25 4/8 4/11           Total Weekly Dose 30mg 30mg 30 mg  25mg 30mg          INR 2.3 2.4 4.0 2.5           Notes   Steroid inj              Phone Interview:  Tablet Strength: 5mg tablets  Contact Info: [phone] (Home) // [phone] (Cell)    Patient Findings:   Negatives:  Signs/symptoms of thrombosis, Signs/symptoms of bleeding, Laboratory test error suspected, Change in health, Change in alcohol use, Change in activity, Upcoming invasive procedure, Emergency department visit, Upcoming dental procedure, Missed doses, Extra doses, Change in medications, Change in diet/appetite, Hospital admission, Bruising, Other complaints   Comments:  Patient states she is still waiting to get her last steroid shot. She has to call and set up an appointment which she hasn't been able to do. She will let us know when she sets up an appointment.     Plan:  1. INR is therapeutic today at 2.5. Instructed Mrs. Muller to continue warfarin 5mg daily except 2.5mg WedSat.   2. Repeat INR in one week.  3. Verbal information provided over the phone. Leela Muller RBV dosing instructions, expresses understanding by teach back, and has no further questions at this time.    Rayshawn Lala, PharmD Candidate  4/11/2019  8:17 AM     I, Apollo Vicente, [facility], have reviewed the note in full and agree with the assessment and plan.  04/11/19  4:01 PM

## 2019-04-22 ENCOUNTER — ANTICOAGULATION VISIT (OUTPATIENT)
Dept: PHARMACY | Facility: HOSPITAL | Age: 69
End: 2019-04-22

## 2019-04-22 DIAGNOSIS — I74.9 EMBOLISM AND THROMBOSIS (HCC): ICD-10-CM

## 2019-04-22 LAB — INR PPP: 3.2

## 2019-04-22 NOTE — PROGRESS NOTES
Anticoagulation Clinic - Remote Progress Note  [facility]   Frequency of Monitorin days    Indication: Bilateral PE, stroke syndrome, embolism and thrombosis of unspecified site   Referring Provider: Dr. Rika Sullivan  Initial Warfarin Start Date:   Planned Duration of Therapy: lifelong  Goal INR: 2.0-3.0  Current Drug Interactions: doxepin d/c'd on ; ASA    Diet: Low GLV intake  Alcohol: None  Tobacco: None  OTC Pain Medications: APAP    INR History:  Date 8/28 9/4 9/18 10/2 10/9 10/13 10/20 10/27 11/3 11/18 12/4 12/11   Total Weekly Dose 27.5mg 27.5mg 27.5 mg 27.5 mg 27.5 mg 25mg 25mg 25mg 25mg 25mg 27.5mg 27.5mg   INR 2.5 2.2 2.4 2.8 3.2 3.2 2.2 1.9 2.5 2.7 2.4 3.0   Notes hematoma?;stop doxepin stopped doxepin on       sick; nose bleed sick; nose bleed        Date    Total Weekly Dose 27.5mg 27.5mg 27.5mg 27.5mg 27.5mg 27.5mg 27.5 mg 27.5mg 25mg 27.5mg 27.5 mg 27.5mg   INR 2.5 2.3 2.2 2.5 2.4 2.9 2.6 3.8 2.3 2.3 1.9 2.3   Notes        hold; nose bleeds continue   ill      Date 3/8 3/15 3/26 4/1 4/9 4/13 4/18 4/23 4/30 5/14 5/29   Total Weekly Dose 27.5mg 27.5mg 30mg 30mg 30mg 30mg 32.5mg 30mg 30mg 30mg 30mg   INR 2.1 1.5 2.5 1.8 1.5 1.6 2.5 2.3 2.3 2.3 1.9   Notes   Boost post colonoscopy    Clinic; boost         Date 18 9/25 10/8   Total Weekly Dose 32.5mg 30mg 30mg 30mg 30mg  30mg 30mg 30mg 32.5  mg 30mg 30mg 30mg   INR 2.3 2.9 2.6 3.4 2.3  2.3 2.7 1.7 2.7 2.3 2.4 2.3   Notes        acyclovir acyclovir; boost acyclovir acyclovir nosebleed     Date 10/15 10/22 11/5 11/15 11/27 12/11 12/17 1/7/19 1/14 1/28 2/11 2/25   Total Weekly Dose 30mg 30mg 30mg 30mg 30mg 30mg 30mg 30mg 30mg 30mg 30mg 30mg   INR 2.8 2.2 2.0 2.3 3.3 2.4 2.4 2.3 2.6 2.4 2.5 2.2   Notes     pancreatitis   sick sick, ceftin, fall flucon x1       Date 3/11 3/25 4/8 4/11 4/22          Total Weekly Dose 30mg 30mg 30mg  "25mg 30mg          INR 2.3 2.4 4.0 2.5 3.2          Notes   Steroid inj   1x decr dose           Phone Interview:  Tablet Strength: 5mg tablets  Contact Info: [phone] (Home) // [phone] (Cell)      Patient Findings   Negatives:  Signs/symptoms of thrombosis, Signs/symptoms of bleeding, Laboratory test error suspected, Change in health, Change in alcohol use, Change in activity, Upcoming invasive procedure, Emergency department visit, Upcoming dental procedure, Missed doses, Extra doses, Change in medications, Change in diet/appetite, Hospital admission, Bruising, Other complaints   Comments:  Patient reports eating a \"bite\" of kale and broccoli casserole last week while out at a restaurant, otherwise denies all findings.     Patients f/u steroid inj has not been scheduled yet, will call and let us know when she decides to have it done     Plan:  1. INR is SUPRAtherapeutic today. After consulting with Alanna Bermudez, PharmD, instructed Mrs. Muller to DECREASE tonight's dose to warfarin 2.5mg and then continue warfarin 5mg daily except 2.5mg WedSat.   2. Repeat INR in 2 weeks  3. Verbal information provided over the phone. Leela Muller RBV dosing instructions, expresses understanding by teach back, and has no further questions at this time.    Apollo Shelton CPhT  4/22/2019  11:03 AM     I, Apollo Vicente [facility], have reviewed the note in full and agree with the assessment and plan.  04/22/19  11:38 AM      "

## 2019-04-23 ENCOUNTER — OFFICE VISIT (OUTPATIENT)
Dept: NEUROLOGY | Facility: CLINIC | Age: 69
End: 2019-04-23

## 2019-04-23 VITALS
OXYGEN SATURATION: 97 % | SYSTOLIC BLOOD PRESSURE: 140 MMHG | BODY MASS INDEX: 27.58 KG/M2 | WEIGHT: 182 LBS | HEIGHT: 68 IN | HEART RATE: 75 BPM | DIASTOLIC BLOOD PRESSURE: 76 MMHG

## 2019-04-23 DIAGNOSIS — G43.009 MIGRAINE WITHOUT AURA AND WITHOUT STATUS MIGRAINOSUS, NOT INTRACTABLE: ICD-10-CM

## 2019-04-23 DIAGNOSIS — B02.29 POSTHERPETIC NEURALGIA: ICD-10-CM

## 2019-04-23 DIAGNOSIS — E08.42 DIABETIC POLYNEUROPATHY ASSOCIATED WITH DIABETES MELLITUS DUE TO UNDERLYING CONDITION (HCC): Primary | ICD-10-CM

## 2019-04-23 DIAGNOSIS — M54.16 LUMBAR RADICULOPATHY: ICD-10-CM

## 2019-04-23 DIAGNOSIS — M54.2 NECK PAIN: ICD-10-CM

## 2019-04-23 PROCEDURE — 99214 OFFICE O/P EST MOD 30 MIN: CPT | Performed by: PSYCHIATRY & NEUROLOGY

## 2019-04-23 RX ORDER — BUTALBITAL, ACETAMINOPHEN AND CAFFEINE 50; 325; 40 MG/1; MG/1; MG/1
1 TABLET ORAL DAILY PRN
Qty: 30 TABLET | Refills: 2
Start: 2019-04-23 | End: 2019-05-13

## 2019-04-23 RX ORDER — BACLOFEN 10 MG/1
10 TABLET ORAL 3 TIMES DAILY
Qty: 90 TABLET | Refills: 11 | Status: SHIPPED | OUTPATIENT
Start: 2019-04-23 | End: 2020-02-04 | Stop reason: SDUPTHER

## 2019-04-23 RX ORDER — TOPIRAMATE 25 MG/1
25 TABLET ORAL DAILY
Qty: 30 TABLET | Refills: 11 | Status: CANCELLED | OUTPATIENT
Start: 2019-04-23

## 2019-04-23 RX ORDER — GABAPENTIN 800 MG/1
800 TABLET ORAL 3 TIMES DAILY
Qty: 90 TABLET | Refills: 5
Start: 2019-04-23 | End: 2019-04-24

## 2019-04-23 RX ORDER — VENLAFAXINE HYDROCHLORIDE 75 MG/1
CAPSULE, EXTENDED RELEASE ORAL
Qty: 30 CAPSULE | Refills: 12 | Status: CANCELLED | OUTPATIENT
Start: 2019-04-23

## 2019-04-23 RX ORDER — BACLOFEN 10 MG/1
10 TABLET ORAL 3 TIMES DAILY
Qty: 90 TABLET | Refills: 11 | Status: CANCELLED | OUTPATIENT
Start: 2019-04-23

## 2019-04-23 RX ORDER — TOPIRAMATE 25 MG/1
25 TABLET ORAL DAILY
Qty: 30 TABLET | Refills: 11 | Status: SHIPPED | OUTPATIENT
Start: 2019-04-23 | End: 2020-02-05 | Stop reason: SDUPTHER

## 2019-04-23 RX ORDER — METOCLOPRAMIDE 10 MG/1
10 TABLET ORAL DAILY PRN
Qty: 30 TABLET | Refills: 5 | Status: SHIPPED | OUTPATIENT
Start: 2019-04-23 | End: 2019-04-23 | Stop reason: SDUPTHER

## 2019-04-23 RX ORDER — CHLORDIAZEPOXIDE HYDROCHLORIDE AND CLIDINIUM BROMIDE 5; 2.5 MG/1; MG/1
1 CAPSULE ORAL 3 TIMES DAILY
Qty: 90 CAPSULE | Refills: 5 | Status: CANCELLED | OUTPATIENT
Start: 2019-04-23

## 2019-04-23 NOTE — PROGRESS NOTES
Subjective:     Patient ID: Lelea Muller is a 69 y.o. female.    CC:   Chief Complaint   Patient presents with   • Neck Pain       HPI:   History of Present Illness  The following portions of the patient's history were reviewed and updated as appropriate: allergies, current medications, past family history, past medical history, past social history, past surgical history and problem list.     Patient reports burning pain on the right triceps and midthoracic region where she has shingles, has a neuropathy cream. Neck and back pain, headaches unchanged. She has tried stopping her gabapentin and her symptoms worsened, also started having increase in leg pain.    Past Medical History:   Diagnosis Date   • Anxiety    • Arm pain    • Arthritis    • Depression    • Diabetes mellitus (CMS/HCC)    • Hyperlipidemia    • Hypertension    • Neck pain on left side    • Palpitations 4/18/2018   • Pancreatitis    • Pulmonary embolism (CMS/HCC)    • Stroke syndrome 9/14/2016    · Assessed By: Devaughn Lewis (Neurology); Last Assessed: 16 Jun 2015  Right cerebrovascular accident in July or August 2010, evaluated at [facility].  Admission to [facility] on 09/28/2010 with headache and stuttering, consistent with TIA.  Chronic Coumadin therapy, initiated following bilateral pulmonary emboli in 2007 after fall and hip replacement.  She was already on 81 mg of aspirin as well, 09/2010.  MRI of the brain on 09/28/2010 revealing old right parietal cerebrovascular accident.  MRA revealing normal carotids, middle anterior and posterior cerebral arteries with minimal ostial stenosis of both vertebral arteries.  GENARO by Dr. Oliver Mobley on 09/28/2010:  patent foramen ovale with a small amount of right to left shunting.  Normal LVEF and normal valvular structures.   Patent foramen ovale closure using a 25 mm. Amplatzer cribriform occluder, 10/05/2010.   Echocardiogram, 06/23/2014:  LVEF (55% to 60%) with and  Amplatzer device noted to be well-seated in    • Thrombocytopenia (CMS/HCC)    • Transient cerebral ischemia        Past Surgical History:   Procedure Laterality Date   • APPENDECTOMY     • BREAST BIOPSY Left 2012    benign   • BREAST EXCISIONAL BIOPSY Left     benign   • BREAST EXCISIONAL BIOPSY Right     benign   • CHOLECYSTECTOMY     • HYSTERECTOMY     • OOPHORECTOMY     • TONSILLECTOMY     • TOTAL HIP ARTHROPLASTY REVISION         Social History     Socioeconomic History   • Marital status:      Spouse name: Not on file   • Number of children: Not on file   • Years of education: Not on file   • Highest education level: Not on file   Tobacco Use   • Smoking status: Never Smoker   • Smokeless tobacco: Never Used   Substance and Sexual Activity   • Alcohol use: No   • Drug use: No   • Sexual activity: Defer       Family History   Problem Relation Age of Onset   • Alzheimer's disease Mother    • Cancer Mother    • Coronary artery disease Other    • Diabetes Other    • Hypertension Other    • Stroke Other    • Cancer Other    • Dementia Other    • Heart attack Father    • Breast cancer Neg Hx    • Ovarian cancer Neg Hx         Review of Systems   Constitutional: Negative for chills, fatigue, fever and unexpected weight change.   HENT: Negative for ear pain, hearing loss, nosebleeds, rhinorrhea and sore throat.    Eyes: Negative for photophobia, pain, discharge, itching and visual disturbance.   Respiratory: Negative for cough, chest tightness, shortness of breath and wheezing.    Cardiovascular: Negative for chest pain, palpitations and leg swelling.   Gastrointestinal: Negative for abdominal pain, blood in stool, constipation, diarrhea, nausea and vomiting.   Genitourinary: Negative for dysuria, frequency, hematuria and urgency.   Musculoskeletal: Positive for neck pain. Negative for arthralgias, back pain, gait problem, joint swelling, myalgias and neck stiffness.        SHOULDER PAIN   Skin: Negative for  rash and wound.   Allergic/Immunologic: Negative for environmental allergies and food allergies.   Neurological: Negative for dizziness, tremors, seizures, syncope, speech difficulty, weakness, light-headedness, numbness and headaches.   Hematological: Negative for adenopathy. Does not bruise/bleed easily.   Psychiatric/Behavioral: Negative for agitation, confusion, decreased concentration, hallucinations, sleep disturbance and suicidal ideas. The patient is not nervous/anxious.         Objective:    Neurologic Exam     Mental Status   Oriented to person, place, and time.       Physical Exam   Constitutional: She is oriented to person, place, and time. She appears well-developed and well-nourished.   Cardiovascular: Normal rate and regular rhythm.   Pulmonary/Chest: Effort normal.   Neurological: She is alert and oriented to person, place, and time.   Psychiatric: She has a normal mood and affect. Her behavior is normal. Thought content normal.   Vitals reviewed.      Assessment/Plan:       Leela was seen today for neck pain.    Diagnoses and all orders for this visit:    Diabetic polyneuropathy associated with diabetes mellitus due to underlying condition (CMS/[facility])  -     gabapentin (NEURONTIN) 800 MG tablet; Take 1 tablet by mouth 3 (Three) Times a Day.    Neck pain  -     baclofen (LIORESAL) 10 MG tablet; Take 1 tablet by mouth 3 (three) times a day.  -     gabapentin (NEURONTIN) 800 MG tablet; Take 1 tablet by mouth 3 (Three) Times a Day.    Migraine without aura and without status migrainosus, not intractable  -     butalbital-acetaminophen-caffeine (FIORICET, ESGIC) -40 MG per tablet; Take 1 tablet by mouth Daily As Needed for headache  -     topiramate (TOPAMAX) 25 MG tablet; Take 1 tablet by mouth Daily.    Postherpetic neuralgia  -     gabapentin (NEURONTIN) 800 MG tablet; Take 1 tablet by mouth 3 (Three) Times a Day.    Lumbar radiculopathy  -     gabapentin (NEURONTIN) 800 MG tablet; Take 1 tablet by  mouth 3 (Three) Times a Day.    The patient has read and signed the [facility] Controlled Substance Contract.  I will continue to see patient for regular follow up appointments.  They are well controlled on their medication.  LORRI is updated every 3 months. The patient is aware of the potential for addiction and dependence.         Devaughn Lewis MD  4/23/2019

## 2019-04-24 ENCOUNTER — OFFICE VISIT (OUTPATIENT)
Dept: CARDIOLOGY | Facility: CLINIC | Age: 69
End: 2019-04-24

## 2019-04-24 ENCOUNTER — LAB (OUTPATIENT)
Dept: LAB | Facility: HOSPITAL | Age: 69
End: 2019-04-24

## 2019-04-24 VITALS
SYSTOLIC BLOOD PRESSURE: 120 MMHG | WEIGHT: 193.2 LBS | BODY MASS INDEX: 29.28 KG/M2 | HEART RATE: 72 BPM | DIASTOLIC BLOOD PRESSURE: 62 MMHG | HEIGHT: 68 IN

## 2019-04-24 DIAGNOSIS — R06.02 SHORTNESS OF BREATH: ICD-10-CM

## 2019-04-24 DIAGNOSIS — R00.2 PALPITATIONS: ICD-10-CM

## 2019-04-24 DIAGNOSIS — R06.09 DYSPNEA ON EXERTION: Primary | ICD-10-CM

## 2019-04-24 DIAGNOSIS — I10 ESSENTIAL HYPERTENSION: ICD-10-CM

## 2019-04-24 DIAGNOSIS — Q21.12 PATENT FORAMEN OVALE: ICD-10-CM

## 2019-04-24 DIAGNOSIS — E78.5 DYSLIPIDEMIA: ICD-10-CM

## 2019-04-24 DIAGNOSIS — Z86.711 HISTORY OF PULMONARY EMBOLISM: ICD-10-CM

## 2019-04-24 LAB
ANION GAP SERPL CALCULATED.3IONS-SCNC: 9 MMOL/L
BUN BLD-MCNC: 14 MG/DL (ref 8–23)
BUN/CREAT SERPL: 15.2 (ref 7–25)
CALCIUM SPEC-SCNC: 8.6 MG/DL (ref 8.6–10.5)
CHLORIDE SERPL-SCNC: 99 MMOL/L (ref 98–107)
CO2 SERPL-SCNC: 28 MMOL/L (ref 22–29)
CREAT BLD-MCNC: 0.92 MG/DL (ref 0.57–1)
GFR SERPL CREATININE-BSD FRML MDRD: 60 ML/MIN/1.73
GLUCOSE BLD-MCNC: 227 MG/DL (ref 65–99)
POTASSIUM BLD-SCNC: 4.7 MMOL/L (ref 3.5–5.2)
SODIUM BLD-SCNC: 136 MMOL/L (ref 136–145)

## 2019-04-24 PROCEDURE — 99214 OFFICE O/P EST MOD 30 MIN: CPT | Performed by: INTERNAL MEDICINE

## 2019-04-24 PROCEDURE — 80048 BASIC METABOLIC PNL TOTAL CA: CPT

## 2019-04-24 RX ORDER — FUROSEMIDE 40 MG/1
TABLET ORAL
Qty: 135 TABLET | Refills: 2 | Status: SHIPPED | OUTPATIENT
Start: 2019-04-24 | End: 2020-05-08

## 2019-04-24 RX ORDER — WARFARIN SODIUM 5 MG/1
TABLET ORAL
Qty: 90 TABLET | Refills: 0 | Status: SHIPPED | OUTPATIENT
Start: 2019-04-24 | End: 2019-06-11 | Stop reason: SDUPTHER

## 2019-04-24 RX ORDER — POTASSIUM CHLORIDE 750 MG/1
10 TABLET, EXTENDED RELEASE ORAL DAILY
Qty: 90 TABLET | Refills: 2 | Status: SHIPPED | OUTPATIENT
Start: 2019-04-24 | End: 2021-06-25 | Stop reason: SDUPTHER

## 2019-04-24 RX ORDER — METOCLOPRAMIDE 10 MG/1
10 TABLET ORAL DAILY PRN
Qty: 30 TABLET | Refills: 5 | Status: SHIPPED | OUTPATIENT
Start: 2019-04-24 | End: 2020-05-08

## 2019-04-24 RX ORDER — METOPROLOL SUCCINATE 50 MG/1
TABLET, EXTENDED RELEASE ORAL
Qty: 270 TABLET | Refills: 3 | Status: ON HOLD | OUTPATIENT
Start: 2019-04-24 | End: 2019-07-02 | Stop reason: SDUPTHER

## 2019-04-24 RX ORDER — NITROGLYCERIN 0.4 MG/1
0.4 TABLET SUBLINGUAL
Qty: 25 TABLET | Refills: 6 | Status: SHIPPED | OUTPATIENT
Start: 2019-04-24 | End: 2021-06-29 | Stop reason: SDUPTHER

## 2019-04-24 NOTE — PROGRESS NOTES
Leela Muller  1950  69 y.o.  [phone]  [phone]      Date: 04/24/2019    PCP: Connor Ritter MD    Chief Complaint   Patient presents with   • Palpitations   • Shortness of Breath       Problem List:  1. Cerebrovascular accident:  a. Right CVA in July or August 2010, evaluated at [facility].   b. Admission to [facility], 09/28/2010 with headache and stuttering, c/w TIA.   c. Chronic Coumadin therapy, initiated following bilateral pulmonary emboli  in 2007 after fall and hip replacement. She was already on 81 mg of aspirin as well, 09/2010.   d. MRI of the brain, 09/28/2010 revealing old right parietal CVA.   e. MRA revealing normal carotids, middle anterior and posterior cerebral arteries with minimal ostial stenosis of both vertebral arteries.   f. GENARO by Dr. Oliver Mobley, 09/28/2010: PFO with a small amount of right to left shunting. Normal LVEF and normal valvular structures.  g. PFO closure using a 25 mm. Amplatzer cribriform occluder, 10/05/2010.    h. Echocardiogram, 06/23/2014: EF 55-60% with and Amplatzer device noted to be well-seated in the interatrial septum, trace MR, and mild TR.  2. Abnormal myocardial perfusion study:    a. Cardiolite GXT, 08/02/2010, Dr. Monteiro: Small area of ischemia in the mid anteroseptal, mid inferior, and apical lateral regions. EF 68%.  b. LHC, 08/09/2010, Dr. Monteiro: Normal coronary arteries with normal LV systolic function.  3. Type 2 diabetes mellitus.   4. Bilateral pulmonary emboli:  a. Following hip replacement in 2007.   b. No evidence of prior anti-coagulable workup.   c. Chronic Coumadin therapy.   5. Palpitations:  a. 2 week Holter, 04/23/2018: Normal study.  6. Hypertension.   7. Dyslipidemia.   8. Pancreatitis:  a. Recent episode  in March 2013.  9. 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    10. Rectal cancer; surgically removed by Dr Martinez.  11.  Surgical history:  a. Hip  replacement.  b. Hysterectomy.  c. Tonsillectomy  d. Appendectomy.  e. Cholecystectomy.    Allergies   Allergen Reactions   • Atorvastatin    • Other      Ranexa nausea   • Tetanus Toxoid        Current Medications:      Current Outpatient Medications:   •  acetaminophen (TYLENOL) 500 MG tablet, Take  by mouth Every 6 (Six) Hours As Needed., Disp: , Rfl:   •  acyclovir (ZOVIRAX) 5 % ointment, Apply 1 unit topically to the affected area twice a day, Disp: 30 g, Rfl: 2  •  aspirin 81 MG tablet, Take  by mouth daily., Disp: , Rfl:   •  B Complex Vitamins (VITAMIN B COMPLEX) capsule capsule, Take 1 tablet by mouth Daily., Disp: , Rfl:   •  baclofen (LIORESAL) 10 MG tablet, Take 1 tablet by mouth 3 (three) times a day., Disp: 90 tablet, Rfl: 11  •  butalbital-acetaminophen-caffeine (FIORICET, ESGIC) -40 MG per tablet, Take 1 tablet by mouth Daily As Needed for headache, Disp: 30 tablet, Rfl: 2  •  butalbital-acetaminophen-caffeine (FIORICET, ESGIC) -40 MG per tablet, Take 1-2 tablets by mouth Daily as needed, Disp: 30 tablet, Rfl: 2  •  cetirizine (zyrTEC) 10 MG tablet, Take 1 tablet by mouth Daily., Disp: , Rfl:   •  clidinium-chlordiazePOXIDE (LIBRAX) 5-2.5 MG per capsule, Take 1 capsule by mouth 3 (Three) Times a Day., Disp: 90 capsule, Rfl: 5  •  Cream Base Liposomic (PCCA CUSTOM LIPO-MAX) cream, , Disp: , Rfl:   •  digoxin (LANOXIN) 250 MCG tablet, Take 1 tablet by mouth daily, Disp: 90 tablet, Rfl: 3  •  fluticasone (FLONASE) 50 MCG/ACT nasal spray, Instill 2 sprays in each nostril once a day, Disp: 16 g, Rfl: 3  •  furosemide (LASIX) 40 MG tablet, Take 1 tablet by mouth Daily. May have extra 1/2 to 1 tablet daily if needed for edema, Disp: 135 tablet, Rfl: 0  •  gabapentin (NEURONTIN) 800 MG tablet, Take 1 tablet by mouth 3 (Three) Times a Day., Disp: 90 tablet, Rfl: 5  •  glipiZIDE (GLUCOTROL XL) 10 MG 24 hr tablet, Take 2 tablets by mouth Daily, Disp: 60 tablet, Rfl: 5  •  glucose blood (ONE TOUCH  ULTRA TEST) test strip, Use to test blood glucose as directed 3 times a day, Disp: 100 each, Rfl: 5  •  HYDROcodone-acetaminophen (NORCO) 7.5-325 MG per tablet, Take 1 tablet by mouth 3 (Three) Times a Day., Disp: 90 tablet, Rfl: 0  •  hydrocortisone (GRX HICORT 25) 25 MG suppository, Insert 1 Suppository rectally twice a day as needed (remove wrapper and moisten with water), Disp: 12 suppository, Rfl: 1  •  Insulin Glargine (BASAGLAR KWIKPEN) 100 UNIT/ML injection pen, Inject 15 - 20 units subcutaneously once in the morning and 46 - 50 units in the evening at bedtime, Disp: 15 mL, Rfl: 5  •  Insulin Glargine (BASAGLAR KWIKPEN) 100 UNIT/ML injection pen, Inject 25 units under the skin once every morning, then 65 units in the evening at bedtime., Disp: 30 mL, Rfl: 5  •  Insulin Pen Needle (B-D UF III MINI PEN NEEDLES) 31G X 5 MM misc, Use to inject insulin twice a day as directed, Disp: 100 each, Rfl: 12  •  Insulin Syringe-Needle U-100 29G 0.5 ML misc, Inject 0.3 mL as directed Daily., Disp: , Rfl:   •  meclizine (ANTIVERT) 25 MG tablet, TAKE 1 TABLET BY MOUTH 3 (THREE) TIMES A DAY AS NEEDED, Disp: 30 tablet, Rfl: 3  •  meclizine (ANTIVERT) 25 MG tablet, Take 1 tablet by mouth 3 (Three) Times a Day as needed, Disp: 30 tablet, Rfl: 3  •  metoclopramide (REGLAN) 10 MG tablet, Take 1 tablet by mouth Daily As Needed for migraine., Disp: 30 tablet, Rfl: 5  •  metoprolol succinate XL (TOPROL-XL) 50 MG 24 hr tablet, Take 1 & 1/2 tablet by mouth every 12 hours, Disp: 270 tablet, Rfl: 1  •  metroNIDAZOLE (METROCREAM) 0.75 % cream, Apply to affected areas on the face once daily at bedime, Disp: 45 g, Rfl: 0  •  nitroglycerin (NITROSTAT) 0.4 MG SL tablet, Place 1 tablet under the tongue Every 5 Minutes As Needed for Chest Pain for up to 3 total doses. Call 911 if pain remains after 1 dose., Disp: 25 tablet, Rfl: 6  •  pancrelipase, Lip-Prot-Amyl, (PANCREAZE) 82846 UNITS capsule delayed-release particles capsule, Take 1 capsule  by mouth with each meal and each snack, Disp: 100 capsule, Rfl: 11  •  pioglitazone (ACTOS) 15 MG tablet, Take 1 tablet by mouth once Daily, Disp: 30 tablet, Rfl: 5  •  polyethylene glycol (MIRALAX) packet, Take 17 g by mouth Daily., Disp: , Rfl:   •  potassium chloride (KLOR-CON) 10 MEQ CR tablet, Take  by mouth As Needed (pt says takes PRN)., Disp: , Rfl:   •  promethazine (PHENERGAN) 25 MG tablet, TAKE 1 TABLET BY MOUTH 4 (FOUR) TIMES A DAY AS NEEDED FOR NAUSEA, Disp: 30 tablet, Rfl: 3  •  promethazine (PHENERGAN) 25 MG tablet, Take 1 tablet by mouth 4 (Four) Times a Day As Needed for nausea, Disp: 30 tablet, Rfl: 3  •  RABEprazole (ACIPHEX) 20 MG EC tablet, Take 1 tablet by mouth Daily., Disp: 30 tablet, Rfl: 11  •  rosuvastatin (CRESTOR) 10 MG tablet, Take 1 tablet by mouth Daily., Disp: 30 tablet, Rfl: 5  •  SITagliptin (JANUVIA) 100 MG tablet, Take 1 tablet by mouth once Daily, Disp: 30 tablet, Rfl: 5  •  topiramate (TOPAMAX) 25 MG tablet, Take 1 tablet by mouth Daily., Disp: 30 tablet, Rfl: 11  •  venlafaxine XR (EFFEXOR-XR) 75 MG 24 hr capsule, Take 1 capsule by mouth once daily., Disp: 30 capsule, Rfl: 12  •  warfarin (COUMADIN) 5 MG tablet, Take 1/2 to 1 tablet by mouth daily or as directed by anticoagulation pharmacist, Disp: 90 tablet, Rfl: 1    HPI    Leela Muller is a 69 y.o. female who presents today for annual follow up of palpitations, PFO, hypertension, dyslipidemia, and history of bilateral PE. She has been experiencing some lower extremity edema and increased shortness of breath on exertion for the past six months. She has also been experiencing some left leg pain.  She did have a fall and hit her left leg.  She states that her left leg swelled quite a bit for about 2 weeks.  Patient denies chest pain, palpitations, dizziness, orthopnea, PND, and syncope.  She only sleeps on one pillow at night.    The following portions of the patient's history were reviewed and updated as appropriate:  "allergies, current medications and problem list.    Pertinent positives as listed in the HPI.  All other systems reviewed are negative.    Vitals:    04/24/19 1532   BP: 120/62   BP Location: Right arm   Patient Position: Sitting   Pulse: 72   Weight: 87.6 kg (193 lb 3.2 oz)   Height: 172.7 cm (68\")       Physical Exam:    General: Alert and oriented.  Neck: Jugular venous pressure is within normal limits. Carotids have normal upstrokes without bruits.   Cardiovascular: Heart has a nondisplaced focal PMI. Regular rate and rhythm without murmur, gallop or rub.  Lungs: Clear without rales or wheezes. Equal expansion is noted.   Extremities: Show trace edema.  Skin: Warm and dry.  Neurologic: Nonfocal.    Diagnostic Data:  No recent lab results available for review.     Procedures    Assessment:      ICD-10-CM ICD-9-CM   1. Dyspnea on exertion R06.09 786.09   2. Patent foramen ovale Q21.1 745.5   3. Palpitations R00.2 785.1   4. History of pulmonary embolism Z86.711 V12.55   5. Essential hypertension I10 401.9   6. Dyslipidemia E78.5 272.4   7. Shortness of breath R06.02 786.05     Lab results found above were reviewed with the patient.    Plan:    1. Repeat echocardiogram for shortness of breath on exertion.  2. BMP today to evaluate her renal function and her potassium.  3. Continue metoprolol and furosemide for hypertension and edema.  4. Continue warfarin 5 mg for history of PE.  5. Continue rosuvastatin 10 mg for dyslipidemia  6. Continue all other current medications.  7. F/up in 2 months or sooner if needed.    Scribed for Rika Sullivan MD by Baylee Menendez. 4/24/2019  3:56 PM     I Rika Sullivan MD personally performed the services described in this documentation as scribed by the above individual in my presence, and it is both accurate and complete.    Rika Sullivan MD, FACC              "

## 2019-05-01 ENCOUNTER — HOSPITAL ENCOUNTER (OUTPATIENT)
Dept: CARDIOLOGY | Facility: HOSPITAL | Age: 69
End: 2019-05-01

## 2019-05-06 ENCOUNTER — ANTICOAGULATION VISIT (OUTPATIENT)
Dept: PHARMACY | Facility: HOSPITAL | Age: 69
End: 2019-05-06

## 2019-05-06 ENCOUNTER — HOSPITAL ENCOUNTER (OUTPATIENT)
Dept: CARDIOLOGY | Facility: HOSPITAL | Age: 69
Discharge: HOME | End: 2019-05-06
Admitting: INTERNAL MEDICINE

## 2019-05-06 VITALS
HEIGHT: 68 IN | WEIGHT: 180.78 LBS | DIASTOLIC BLOOD PRESSURE: 66 MMHG | SYSTOLIC BLOOD PRESSURE: 128 MMHG | BODY MASS INDEX: 27.4 KG/M2

## 2019-05-06 DIAGNOSIS — R06.02 SHORTNESS OF BREATH: ICD-10-CM

## 2019-05-06 DIAGNOSIS — Q21.12 PATENT FORAMEN OVALE: ICD-10-CM

## 2019-05-06 DIAGNOSIS — I74.9 EMBOLISM AND THROMBOSIS (HCC): ICD-10-CM

## 2019-05-06 LAB — INR PPP: 2.1

## 2019-05-06 PROCEDURE — 25010000002 SULFUR HEXAFLUORIDE MICROSPH 60.7-25 MG RECONSTITUTED SUSPENSION: Performed by: INTERNAL MEDICINE

## 2019-05-06 PROCEDURE — 93306 TTE W/DOPPLER COMPLETE: CPT

## 2019-05-06 PROCEDURE — 93306 TTE W/DOPPLER COMPLETE: CPT | Performed by: INTERNAL MEDICINE

## 2019-05-06 RX ORDER — DIGOXIN 250 MCG
TABLET ORAL
Qty: 90 TABLET | Refills: 3 | Status: SHIPPED | OUTPATIENT
Start: 2019-05-06 | End: 2020-05-08

## 2019-05-06 RX ADMIN — SULFUR HEXAFLUORIDE 2 ML: KIT at 14:46

## 2019-05-06 NOTE — PROGRESS NOTES
Anticoagulation Clinic - Remote Progress Note  [facility]   Frequency of Monitorin days    Indication: Bilateral PE, stroke syndrome, embolism and thrombosis of unspecified site   Referring Provider: Dr. Rika Sullivan  Initial Warfarin Start Date:   Planned Duration of Therapy: lifelong  Goal INR: 2.0-3.0  Current Drug Interactions: doxepin d/c'd on ; ASA    Diet: Low GLV intake  Alcohol: None  Tobacco: None  OTC Pain Medications: APAP    INR History:  Date 8/28 9/4 9/18 10/2 10/9 10/13 10/20 10/27 11/3 11/18 12/4 12/11   Total Weekly Dose 27.5mg 27.5mg 27.5 mg 27.5 mg 27.5 mg 25mg 25mg 25mg 25mg 25mg 27.5mg 27.5mg   INR 2.5 2.2 2.4 2.8 3.2 3.2 2.2 1.9 2.5 2.7 2.4 3.0   Notes hematoma?;stop doxepin stopped doxepin on       sick; nose bleed sick; nose bleed        Date 18   Total Weekly Dose 27.5  mg 27.5  mg 27.5  mg 27.5  mg 27.5  mg 27.5  mg 27.5 mg 27.5  mg 25mg 27.5  mg 27.5 mg 27.5mg   INR 2.5 2.3 2.2 2.5 2.4 2.9 2.6 3.8 2.3 2.3 1.9 2.3   Notes        hold; nose bleeds continue   ill      Date 3/8 3/15 3/26 4/1 4/9 4/13 4/18 4/23 4/30 5/14 5/29   Total Weekly Dose 27.5mg 27.5mg 30mg 30mg 30mg 30mg 32.5mg 30mg 30mg 30mg 30mg   INR 2.1 1.5 2.5 1.8 1.5 1.6 2.5 2.3 2.3 2.3 1.9   Notes   Boost post colonoscopy    Clinic; boost         Date  9/18 9/25 10/8   Total Weekly Dose 32.5mg 30mg 30mg 30mg 30mg  30mg 30mg 30mg 32.5  mg 30mg 30mg 30mg   INR 2.3 2.9 2.6 3.4 2.3  2.3 2.7 1.7 2.7 2.3 2.4 2.3   Notes        acyclovir acyclovir; boost acyclovir acyclovir nosebleed     Date 10/15 10/22 11/5 11/15 11/27 12/11 12/17 1/7/19 1/14 1/28 2/11 2/25   Total Weekly Dose 30mg 30mg 30mg 30mg 30mg 30mg 30mg 30mg 30mg 30mg 30mg 30mg   INR 2.8 2.2 2.0 2.3 3.3 2.4 2.4 2.3 2.6 2.4 2.5 2.2   Notes     pancreatitis   sick sick, ceftin, fall flucon x1       Date 3/11 3/25 4/8 4/11 4/22 5/6         Total  Weekly Dose 30mg 30mg 30mg 25mg 30mg 30mg         INR 2.3 2.4 4.0 2.5 3.2 2.1         Notes   Steroid inj   1x decr dose           Phone Interview:  Tablet Strength: 5mg tablets  Contact Info: [phone] (Home) // [phone] (Cell)    Patient Findings   Negatives:  Signs/symptoms of thrombosis, Signs/symptoms of bleeding, Laboratory test error suspected, Change in health, Change in alcohol use, Change in activity, Upcoming invasive procedure, Emergency department visit, Upcoming dental procedure, Missed doses, Extra doses, Change in medications, Change in diet/appetite, Hospital admission, Bruising, Other complaints     Plan:  1. INR is therapeutic and back WNL today. Instructed Mrs. Muller to continue warfarin 5mg daily except 2.5mg WedSat.   2. Repeat INR in 1 week  3. Verbal information provided over the phone. Leela Muller RBV dosing instructions, expresses understanding by teach back, and has no further questions at this time.    Aopllo Shelton, Dot  5/6/2019  1:16 PM     IAlanna, PharmD, have reviewed the note in full and agree with the assessment and plan.  05/06/19  3:48 PM

## 2019-05-07 LAB
BH CV ECHO MEAS - AO MAX PG (FULL): 4 MMHG
BH CV ECHO MEAS - AO MAX PG: 7 MMHG
BH CV ECHO MEAS - AO MEAN PG (FULL): 1 MMHG
BH CV ECHO MEAS - AO MEAN PG: 3 MMHG
BH CV ECHO MEAS - AO ROOT AREA (BSA CORRECTED): 1.5
BH CV ECHO MEAS - AO ROOT AREA: 6.6 CM^2
BH CV ECHO MEAS - AO ROOT DIAM: 2.9 CM
BH CV ECHO MEAS - AO V2 MAX: 136 CM/SEC
BH CV ECHO MEAS - AO V2 MEAN: 84 CM/SEC
BH CV ECHO MEAS - AO V2 VTI: 27.3 CM
BH CV ECHO MEAS - AVA(I,A): 2 CM^2
BH CV ECHO MEAS - AVA(I,D): 2 CM^2
BH CV ECHO MEAS - AVA(V,A): 1.8 CM^2
BH CV ECHO MEAS - AVA(V,D): 1.8 CM^2
BH CV ECHO MEAS - BSA(HAYCOCK): 2 M^2
BH CV ECHO MEAS - BSA: 2 M^2
BH CV ECHO MEAS - BZI_BMI: 27.7 KILOGRAMS/M^2
BH CV ECHO MEAS - BZI_METRIC_HEIGHT: 172.7 CM
BH CV ECHO MEAS - BZI_METRIC_WEIGHT: 82.6 KG
BH CV ECHO MEAS - EDV(CUBED): 48.2 ML
BH CV ECHO MEAS - EDV(TEICH): 55.9 ML
BH CV ECHO MEAS - EF(CUBED): 66.8 %
BH CV ECHO MEAS - EF(MOD-BP): 60 %
BH CV ECHO MEAS - EF(TEICH): 59.3 %
BH CV ECHO MEAS - ESV(CUBED): 16 ML
BH CV ECHO MEAS - ESV(TEICH): 22.8 ML
BH CV ECHO MEAS - FS: 30.8 %
BH CV ECHO MEAS - IVS/LVPW: 0.88
BH CV ECHO MEAS - IVSD: 0.71 CM
BH CV ECHO MEAS - LA DIMENSION: 3.8 CM
BH CV ECHO MEAS - LA/AO: 1.3
BH CV ECHO MEAS - LAD MAJOR: 5.3 CM
BH CV ECHO MEAS - LAT PEAK E' VEL: 13.2 CM/SEC
BH CV ECHO MEAS - LATERAL E/E' RATIO: 5.8
BH CV ECHO MEAS - LV MASS(C)D: 74.5 GRAMS
BH CV ECHO MEAS - LV MASS(C)DI: 37.9 GRAMS/M^2
BH CV ECHO MEAS - LV MAX PG: 3 MMHG
BH CV ECHO MEAS - LV MEAN PG: 2 MMHG
BH CV ECHO MEAS - LV V1 MAX: 86.9 CM/SEC
BH CV ECHO MEAS - LV V1 MEAN: 56.6 CM/SEC
BH CV ECHO MEAS - LV V1 VTI: 18.9 CM
BH CV ECHO MEAS - LVIDD: 3.6 CM
BH CV ECHO MEAS - LVIDS: 2.5 CM
BH CV ECHO MEAS - LVOT AREA (M): 2.8 CM^2
BH CV ECHO MEAS - LVOT AREA: 2.8 CM^2
BH CV ECHO MEAS - LVOT DIAM: 1.9 CM
BH CV ECHO MEAS - LVPWD: 0.81 CM
BH CV ECHO MEAS - MED PEAK E' VEL: 4.8 CM/SEC
BH CV ECHO MEAS - MEDIAL E/E' RATIO: 16
BH CV ECHO MEAS - MV A MAX VEL: 97.1 CM/SEC
BH CV ECHO MEAS - MV DEC SLOPE: 350 CM/SEC^2
BH CV ECHO MEAS - MV DEC TIME: 0.16 SEC
BH CV ECHO MEAS - MV E MAX VEL: 76.8 CM/SEC
BH CV ECHO MEAS - MV E/A: 0.79
BH CV ECHO MEAS - MV P1/2T MAX VEL: 98.8 CM/SEC
BH CV ECHO MEAS - MV P1/2T: 82.7 MSEC
BH CV ECHO MEAS - MVA P1/2T LCG: 2.2 CM^2
BH CV ECHO MEAS - MVA(P1/2T): 2.7 CM^2
BH CV ECHO MEAS - PA ACC SLOPE: 846 CM/SEC^2
BH CV ECHO MEAS - PA ACC TIME: 0.15 SEC
BH CV ECHO MEAS - PA PR(ACCEL): 12.4 MMHG
BH CV ECHO MEAS - RAP SYSTOLE: 3 MMHG
BH CV ECHO MEAS - RVSP: 20 MMHG
BH CV ECHO MEAS - SI(AO): 91.8 ML/M^2
BH CV ECHO MEAS - SI(CUBED): 16.4 ML/M^2
BH CV ECHO MEAS - SI(LVOT): 27.3 ML/M^2
BH CV ECHO MEAS - SI(TEICH): 16.9 ML/M^2
BH CV ECHO MEAS - SV(AO): 180.3 ML
BH CV ECHO MEAS - SV(CUBED): 32.2 ML
BH CV ECHO MEAS - SV(LVOT): 53.6 ML
BH CV ECHO MEAS - SV(TEICH): 33.1 ML
BH CV ECHO MEAS - TAPSE (>1.6): 2.3 CM2
BH CV ECHO MEAS - TR MAX PG: 17 MMHG
BH CV ECHO MEAS - TR MAX VEL: 206 CM/SEC
BH CV ECHO MEASUREMENTS AVERAGE E/E' RATIO: 8.53
BH CV VAS BP LEFT ARM: NORMAL MMHG
BH CV XLRA - RV BASE: 2.8 CM
BH CV XLRA - RV LENGTH: 5.4 CM
BH CV XLRA - RV MID: 2.3 CM
LEFT ATRIUM VOLUME INDEX: 34.6 ML/M^2
LEFT ATRIUM VOLUME: 68 ML
LV EF 2D ECHO EST: 60 %
MAXIMAL PREDICTED HEART RATE: 149 BPM
STRESS TARGET HR: 127 BPM

## 2019-05-13 ENCOUNTER — ANTICOAGULATION VISIT (OUTPATIENT)
Dept: PHARMACY | Facility: HOSPITAL | Age: 69
End: 2019-05-13

## 2019-05-13 DIAGNOSIS — I74.9 EMBOLISM AND THROMBOSIS (HCC): ICD-10-CM

## 2019-05-13 LAB — INR PPP: 1.9

## 2019-05-13 RX ORDER — LOPERAMIDE HYDROCHLORIDE 2 MG/1
2 CAPSULE ORAL 4 TIMES DAILY PRN
COMMUNITY
End: 2021-09-24 | Stop reason: SDUPTHER

## 2019-05-13 NOTE — PROGRESS NOTES
Anticoagulation Clinic - Remote Progress Note  [facility]   Frequency of Monitorin days    Indication: Bilateral PE, stroke syndrome, embolism and thrombosis of unspecified site   Referring Provider: Dr. Rika Sullivan  Planned Duration of Therapy: lifelong  Goal INR: 2.0-3.0  Current Drug Interactions: ASA    Diet: Low GLV intake  Alcohol: None  Tobacco: None  OTC Pain Medications: APAP    INR History:  Date 8/28 9/4 9/18 10/2 10/9 10/13 10/20 10/27 11/3 11/18 12/4 12/11   Total Weekly Dose 27.5mg 27.5mg 27.5 mg 27.5 mg 27.5 mg 25mg 25mg 25mg 25mg 25mg 27.5mg 27.5mg   INR 2.5 2.2 2.4 2.8 3.2 3.2 2.2 1.9 2.5 2.7 2.4 3.0   Notes hematoma?;stop doxepin stopped doxepin on       sick; nose bleed sick; nose bleed        Date 18   Total Weekly Dose 27.5  mg 27.5  mg 27.5  mg 27.5  mg 27.5  mg 27.5  mg 27.5 mg 27.5  mg 25mg 27.5  mg 27.5 mg 27.5mg   INR 2.5 2.3 2.2 2.5 2.4 2.9 2.6 3.8 2.3 2.3 1.9 2.3   Notes        hold; nose bleeds continue   ill      Date 3/8 3/15 3/26 4/1 4/9 4/13 4/18 4/23 4/30 5/14 5/29   Total Weekly Dose 27.5mg 27.5mg 30mg 30mg 30mg 30mg 32.5mg 30mg 30mg 30mg 30mg   INR 2.1 1.5 2.5 1.8 1.5 1.6 2.5 2.3 2.3 2.3 1.9   Notes   Boost post colonoscopy    Clinic; boost         Date 6/4 6/19 7/2 7/16 7/25  8/8 8/20 9/4 9/11 9/18 9/25 10/8   Total Weekly Dose 32.5mg 30mg 30mg 30mg 30mg  30mg 30mg 30mg 32.5  mg 30mg 30mg 30mg   INR 2.3 2.9 2.6 3.4 2.3  2.3 2.7 1.7 2.7 2.3 2.4 2.3   Notes        acyclovir acyclovir; boost acyclovir acyclovir nosebleed     Date 10/15 10/22 11/5 11/15 11/27 12/11 12/17 1/7/19 1/14 1/28 2/11 2/25   Total Weekly Dose 30mg 30mg 30mg 30mg 30mg 30mg 30mg 30mg 30mg 30mg 30mg 30mg   INR 2.8 2.2 2.0 2.3 3.3 2.4 2.4 2.3 2.6 2.4 2.5 2.2   Notes     pancreatitis   sick sick, ceftin, fall flucon x1       Date 3/11 3/25 4/8 4/11 4/22 5/6 5/13        Total Weekly Dose 30mg 30mg 30mg 25mg 30mg 30mg 30mg 30mg        INR 2.3 2.4 4.0 2.5 3.2 2.1 1.9        Notes   Steroid inj    diarrhea          Phone Interview:  Tablet Strength: 5mg tablets  Contact Info: [phone] (Home) // [phone] (Cell)    Patient Findings:  Positives:  Change in health, Change in medications, Change in diet/appetite   Negatives:  Signs/symptoms of thrombosis, Signs/symptoms of bleeding, Laboratory test error suspected, Change in alcohol use, Change in activity, Upcoming invasive procedure, Emergency department visit, Upcoming dental procedure, Missed doses, Extra doses, Hospital admission, Bruising, Other complaints   Comments:  Mrs. Muller reports she had diarrhea yesterday and took some Imodium -- now resolved. She has also been eating less over the past few days.      Plan:  1. INR is slightly subtherapeutic today, but based on recent trend and diet, instructed Mrs. Muller to continue warfarin 5mg daily except 2.5mg WedSat.   2. Repeat INR in 1 week.  3. Verbal information provided over the phone. Leela Muller RBV dosing instructions, expresses understanding by teach back, and has no further questions at this time.    Ann Cowden Mayer, PharmD  5/13/2019  3:03 PM

## 2019-05-20 ENCOUNTER — ANTICOAGULATION VISIT (OUTPATIENT)
Dept: PHARMACY | Facility: HOSPITAL | Age: 69
End: 2019-05-20

## 2019-05-20 DIAGNOSIS — I74.9 EMBOLISM AND THROMBOSIS (HCC): ICD-10-CM

## 2019-05-20 LAB — INR PPP: 3

## 2019-05-20 NOTE — PROGRESS NOTES
Anticoagulation Clinic - Remote Progress Note  [facility]   Frequency of Monitorin days    Indication: Bilateral PE, stroke syndrome, embolism and thrombosis of unspecified site   Referring Provider: Dr. Rika Sullivan  Planned Duration of Therapy: lifelong  Goal INR: 2.0-3.0  Current Drug Interactions: ASA    Diet: Low GLV intake  Alcohol: None  Tobacco: None  OTC Pain Medications: APAP    INR History:  Date 8/28 9/4 9/18 10/2 10/9 10/13 10/20 10/27 11/3 11/18 12/4 12/11   Total Weekly Dose 27.5mg 27.5mg 27.5 mg 27.5 mg 27.5 mg 25mg 25mg 25mg 25mg 25mg 27.5mg 27.5mg   INR 2.5 2.2 2.4 2.8 3.2 3.2 2.2 1.9 2.5 2.7 2.4 3.0   Notes hematoma?;stop doxepin stopped doxepin on       sick; nose bleed sick; nose bleed        Date 18   Total Weekly Dose 27.5  mg 27.5  mg 27.5  mg 27.5  mg 27.5  mg 27.5  mg 27.5 mg 27.5  mg 25mg 27.5  mg 27.5 mg 27.5mg   INR 2.5 2.3 2.2 2.5 2.4 2.9 2.6 3.8 2.3 2.3 1.9 2.3   Notes        hold; nose bleeds continue   ill      Date 3/8 3/15 3/26 4/1 4/9 4/13 4/18 4/23 4/30 5/14 5/29   Total Weekly Dose 27.5mg 27.5mg 30mg 30mg 30mg 30mg 32.5mg 30mg 30mg 30mg 30mg   INR 2.1 1.5 2.5 1.8 1.5 1.6 2.5 2.3 2.3 2.3 1.9   Notes   Boost post colonoscopy    Clinic; boost         Date 6/4 6/19 7/2 7/16 7/25  8/8 8/20 9/4 9/11 9/18 9/25 10/8   Total Weekly Dose 32.5mg 30mg 30mg 30mg 30mg  30mg 30mg 30mg 32.5  mg 30mg 30mg 30mg   INR 2.3 2.9 2.6 3.4 2.3  2.3 2.7 1.7 2.7 2.3 2.4 2.3   Notes        acyclovir acyclovir; boost acyclovir acyclovir nosebleed     Date 10/15 10/22 11/5 11/15 11/27 12/11 12/17 1/7/19 1/14 1/28 2/11 2/25   Total Weekly Dose 30mg 30mg 30mg 30mg 30mg 30mg 30mg 30mg 30mg 30mg 30mg 30mg   INR 2.8 2.2 2.0 2.3 3.3 2.4 2.4 2.3 2.6 2.4 2.5 2.2   Notes     pancreatitis   sick sick, ceftin, fall flucon x1       Date 3/11 3/25 4/8 4/11 4/22 5/6 5/13 5/20       Total Weekly Dose 30mg 30mg 30mg 25mg 30mg 30mg 30mg 30mg        INR 2.3 2.4 4.0 2.5 3.2 2.1 1.9 3.0       Notes   Steroid inj    diarrhea          Phone Interview:  Tablet Strength: 5mg tablets  Contact Info: [phone] (Home) // [phone] (Cell)    Patient Findings:  Negatives:  Signs/symptoms of thrombosis, Signs/symptoms of bleeding, Laboratory test error suspected, Change in health, Change in alcohol use, Change in activity, Upcoming invasive procedure, Emergency department visit, Upcoming dental procedure, Missed doses, Extra doses, Change in medications, Change in diet/appetite, Hospital admission, Bruising, Other complaints   Comments:  Going to dentist for tooth cleaning, does not expect any procedure to occur. Ms. Muller always reminds dentist that she is taking warfairn.     Plan:  1. INR at high end of goal range at 3.0. INR showing lability as her current dose also gave her an INR of 1.9 last week. Due to previously low INR and today's INR being on high end of range, instructed Mrs. Muller to continue warfarin 5mg daily except 2.5mg WedSat.   2. Repeat INR in 1 week, 5/28.  3. Verbal information provided over the phone. Leela Muller RBV dosing instructions, expresses understanding by teach back, and has no further questions at this time.    Davion Encarnacion, PharmD  Pharmacy Resident  5/20/2019  2:12 PM

## 2019-05-28 ENCOUNTER — ANTICOAGULATION VISIT (OUTPATIENT)
Dept: PHARMACY | Facility: HOSPITAL | Age: 69
End: 2019-05-28

## 2019-05-28 DIAGNOSIS — I74.9 EMBOLISM AND THROMBOSIS (HCC): ICD-10-CM

## 2019-05-28 LAB — INR PPP: 3.2

## 2019-05-28 NOTE — PROGRESS NOTES
Anticoagulation Clinic - Remote Progress Note  [facility]   Frequency of Monitorin days    Indication: Bilateral PE, stroke syndrome, embolism and thrombosis of unspecified site   Referring Provider: Dr. Rika Sullivan  Planned Duration of Therapy: lifelong  Goal INR: 2.0-3.0  Current Drug Interactions: ASA    Diet: Low GLV intake  Alcohol: None  Tobacco: None  OTC Pain Medications: APAP    INR History:  Date 8/28 9/4 9/18 10/2 10/9 10/13 10/20 10/27 11/3 11/18 12/4 12/11   Total Weekly Dose 27.5mg 27.5mg 27.5 mg 27.5 mg 27.5 mg 25mg 25mg 25mg 25mg 25mg 27.5mg 27.5mg   INR 2.5 2.2 2.4 2.8 3.2 3.2 2.2 1.9 2.5 2.7 2.4 3.0   Notes hematoma?;stop doxepin stopped doxepin on       sick; nose bleed sick; nose bleed        Date 18   Total Weekly Dose 27.5  mg 27.5  mg 27.5  mg 27.5  mg 27.5  mg 27.5  mg 27.5 mg 27.5  mg 25mg 27.5  mg 27.5 mg 27.5mg   INR 2.5 2.3 2.2 2.5 2.4 2.9 2.6 3.8 2.3 2.3 1.9 2.3   Notes        hold; nose bleeds continue   ill      Date 3/8 3/15 3/26 4/1 4/9 4/13 4/18 4/23 4/30 5/14 5/29   Total Weekly Dose 27.5mg 27.5mg 30mg 30mg 30mg 30mg 32.5mg 30mg 30mg 30mg 30mg   INR 2.1 1.5 2.5 1.8 1.5 1.6 2.5 2.3 2.3 2.3 1.9   Notes   Boost post colonoscopy    Clinic; boost         Date 6/4 6/19 7/2 7/16 7/25  8/8 8/20 9/4 9/11 9/18 9/25 10/8   Total Weekly Dose 32.5mg 30mg 30mg 30mg 30mg  30mg 30mg 30mg 32.5  mg 30mg 30mg 30mg   INR 2.3 2.9 2.6 3.4 2.3  2.3 2.7 1.7 2.7 2.3 2.4 2.3   Notes        acyclovir acyclovir; boost acyclovir acyclovir nosebleed     Date 10/15 10/22 11/5 11/15 11/27 12/11 12/17 1/7/19 1/14 1/28 2/11 2/25   Total Weekly Dose 30mg 30mg 30mg 30mg 30mg 30mg 30mg 30mg 30mg 30mg 30mg 30mg   INR 2.8 2.2 2.0 2.3 3.3 2.4 2.4 2.3 2.6 2.4 2.5 2.2   Notes     pancreatitis   sick sick, ceftin, fall flucon x1       Date 3/11 3/25 4/8 4/11 4/22 5/6 5/13 5/20 5/28      Total Weekly Dose 30mg 30mg 30mg 25mg 30mg 30mg 30mg  30mg 30mg      INR 2.3 2.4 4.0 2.5 3.2 2.1 1.9 3.0 3.2      Notes   Steroid inj   x1 decr dose diarrhea          Phone Interview:  Tablet Strength: 5mg tablets  Contact Info: [phone] (Home) // [phone] (Cell)    Patient Findings:  Positives:  Bruising   Negatives:  Signs/symptoms of thrombosis, Signs/symptoms of bleeding, Laboratory test error suspected, Change in health, Change in alcohol use, Change in activity, Upcoming invasive procedure, Emergency department visit, Upcoming dental procedure, Missed doses, Extra doses, Change in medications, Change in diet/appetite, Hospital admission, Other complaints   Comments:  Mrs. Muller hit her leg last week on something and said she has a bruise on it. She stated it is healing and not getting any worse. She reported no other changes.     Plan:  1. INR is slightly supratherapeutic today at 3.2. Instructed Mrs. Muller to decrease tonight's dose to 2.5mg (8.3% dec) and then continue warfarin 5mg daily except 2.5mg WedSat.   2. Repeat INR in 1 week, 6/3.  3. Verbal information provided over the phone. Leela Muller RBV dosing instructions, expresses understanding by teach back, and has no further questions at this time.    Calos Zhong, Pharmacy Intern  5/28/2019  4:06 PM     Given recent history- may need to consider dose decrease to 27.5mg/week.  IAlanna, PharmD, have reviewed the note in full and agree with the assessment and plan.  05/28/19  5:10 PM

## 2019-06-03 ENCOUNTER — ANTICOAGULATION VISIT (OUTPATIENT)
Dept: PHARMACY | Facility: HOSPITAL | Age: 69
End: 2019-06-03

## 2019-06-03 DIAGNOSIS — I74.9 EMBOLISM AND THROMBOSIS (HCC): ICD-10-CM

## 2019-06-03 LAB — INR PPP: 2

## 2019-06-03 NOTE — PROGRESS NOTES
Anticoagulation Clinic - Remote Progress Note  [facility]   Frequency of Monitorin days    Indication: Bilateral PE, stroke syndrome, embolism and thrombosis of unspecified site   Referring Provider: Dr. Rika Sullivan  Planned Duration of Therapy: lifelong  Goal INR: 2.0-3.0  Current Drug Interactions: ASA    Diet: Low GLV intake  Alcohol: None  Tobacco: None  OTC Pain Medications: APAP    INR History:  Date 8/28 9/4 9/18 10/2 10/9 10/13 10/20 10/27 11/3 11/18 12/4 12/11   Total Weekly Dose 27.5mg 27.5mg 27.5 mg 27.5 mg 27.5 mg 25mg 25mg 25mg 25mg 25mg 27.5mg 27.5mg   INR 2.5 2.2 2.4 2.8 3.2 3.2 2.2 1.9 2.5 2.7 2.4 3.0   Notes hematoma?;stop doxepin stopped doxepin on       sick; nose bleed sick; nose bleed        Date 18   Total Weekly Dose 27.5  mg 27.5  mg 27.5  mg 27.5  mg 27.5  mg 27.5  mg 27.5 mg 27.5  mg 25mg 27.5  mg 27.5 mg 27.5mg   INR 2.5 2.3 2.2 2.5 2.4 2.9 2.6 3.8 2.3 2.3 1.9 2.3   Notes        hold; nose bleeds continue   ill      Date 3/8 3/15 3/26 4/1 4/9 4/13 4/18 4/23 4/30 5/14 5/29   Total Weekly Dose 27.5mg 27.5mg 30mg 30mg 30mg 30mg 32.5mg 30mg 30mg 30mg 30mg   INR 2.1 1.5 2.5 1.8 1.5 1.6 2.5 2.3 2.3 2.3 1.9   Notes   Boost post colonoscopy    Clinic; boost         Date 6/4 6/19 7/2 7/16 7/25  8/8 8/20 9/4 9/11 9/18 9/25 10/8   Total Weekly Dose 32.5mg 30mg 30mg 30mg 30mg  30mg 30mg 30mg 32.5  mg 30mg 30mg 30mg   INR 2.3 2.9 2.6 3.4 2.3  2.3 2.7 1.7 2.7 2.3 2.4 2.3   Notes        acyclovir acyclovir; boost acyclovir acyclovir nosebleed     Date 10/15 10/22 11/5 11/15 11/27 12/11 12/17 1/7/19 1/14 1/28 2/11 2/25   Total Weekly Dose 30mg 30mg 30mg 30mg 30mg 30mg 30mg 30mg 30mg 30mg 30mg 30mg   INR 2.8 2.2 2.0 2.3 3.3 2.4 2.4 2.3 2.6 2.4 2.5 2.2   Notes     pancreatitis   sick sick, ceftin, fall flucon x1       Date 3/11 3/25 4/8 4/11 4/22 5/6 5/13 5/20 5/28 6/3     Total Weekly Dose 30mg 30mg 30mg 25mg 30mg 30mg  30mg 30mg 30mg 27.5 mg     INR 2.3 2.4 4.0 2.5 3.2 2.1 1.9 3.0 3.2 2.0     Notes   Steroid inj   x1 decr dose diarrhea   1 dose reduction       Phone Interview:  Tablet Strength: 5mg tablets  Contact Info: [phone] (Home) // [phone] (Cell)    Patient Findings:  Positives:  Signs/symptoms of bleeding, Change in diet/appetite   Negatives:  Signs/symptoms of thrombosis, Laboratory test error suspected, Change in health, Change in alcohol use, Change in activity, Upcoming invasive procedure, Emergency department visit, Upcoming dental procedure, Missed doses, Extra doses, Change in medications, Hospital admission, Bruising, Other complaints   Comments:  She did experience a nose bleed this past week. She applied pressure and will call us if it occurs again.   She was to take only 2.5 mg last Tuesday but forgot and instead reduced her dose on Thursday, otherwise followed our instructions.     She also has been eating more dannon light and fit yogurt peach flavor sometimes twice a day. (minimal vitamin K). She continues with the iceberg lettuce and tomatoes salad with balsamic vinaigerette.        Plan:  1. INR is therapeutic today at 2.0. Instructed Mrs. Muller to continue warfarin 5mg daily except 2.5mg WedSat.   2. Repeat INR in 1 week, 6/10.   3. Verbal information provided over the phone. Leela Muller RBV dosing instructions, expresses understanding by teach back, and has no further questions at this time.    Apollo Vicente RPH  6/3/2019  10:53 AM

## 2019-06-05 ENCOUNTER — TRANSCRIBE ORDERS (OUTPATIENT)
Dept: ADMINISTRATIVE | Facility: HOSPITAL | Age: 69
End: 2019-06-05

## 2019-06-05 DIAGNOSIS — Z12.31 VISIT FOR SCREENING MAMMOGRAM: Primary | ICD-10-CM

## 2019-06-11 ENCOUNTER — ANTICOAGULATION VISIT (OUTPATIENT)
Dept: PHARMACY | Facility: HOSPITAL | Age: 69
End: 2019-06-11

## 2019-06-11 DIAGNOSIS — I74.9 EMBOLISM AND THROMBOSIS (HCC): ICD-10-CM

## 2019-06-11 LAB — INR PPP: 2.1

## 2019-06-11 RX ORDER — WARFARIN SODIUM 5 MG/1
TABLET ORAL
Qty: 90 TABLET | Refills: 0 | Status: SHIPPED | OUTPATIENT
Start: 2019-06-11 | End: 2019-07-02 | Stop reason: HOSPADM

## 2019-06-11 NOTE — TELEPHONE ENCOUNTER
Warfarin 5 mg tablet #90 Rx sent to [facility] pharmacy per patient request.    Zenaida Live, PharmD

## 2019-06-11 NOTE — PROGRESS NOTES
Anticoagulation Clinic - Remote Progress Note  [facility]   Frequency of Monitorin days    Indication: Bilateral PE, stroke syndrome, embolism and thrombosis of unspecified site   Referring Provider: Dr. Rika Sullivan  Planned Duration of Therapy: lifelong  Goal INR: 2.0-3.0  Current Drug Interactions: ASA    Diet: Low GLV intake  Alcohol: None  Tobacco: None  OTC Pain Medications: APAP    INR History:  Date 8/28 9/4 9/18 10/2 10/9 10/13 10/20 10/27 11/3 11/18 12/4 12/11   Total Weekly Dose 27.5mg 27.5mg 27.5 mg 27.5 mg 27.5 mg 25mg 25mg 25mg 25mg 25mg 27.5mg 27.5mg   INR 2.5 2.2 2.4 2.8 3.2 3.2 2.2 1.9 2.5 2.7 2.4 3.0   Notes hematoma?;stop doxepin stopped doxepin on       sick; nose bleed sick; nose bleed        Date 18   Total Weekly Dose 27.5  mg 27.5  mg 27.5  mg 27.5  mg 27.5  mg 27.5  mg 27.5 mg 27.5  mg 25mg 27.5  mg 27.5 mg 27.5mg   INR 2.5 2.3 2.2 2.5 2.4 2.9 2.6 3.8 2.3 2.3 1.9 2.3   Notes        hold; nose bleeds continue   ill      Date 3/8 3/15 3/26 4/1 4/9 4/13 4/18 4/23 4/30 5/14 5/29   Total Weekly Dose 27.5mg 27.5mg 30mg 30mg 30mg 30mg 32.5mg 30mg 30mg 30mg 30mg   INR 2.1 1.5 2.5 1.8 1.5 1.6 2.5 2.3 2.3 2.3 1.9   Notes   Boost post colonoscopy    Clinic; boost         Date 6/4 6/19 7/2 7/16 7/25  8/8 8/20 9/4 9/11 9/18 9/25 10/8   Total Weekly Dose 32.5mg 30mg 30mg 30mg 30mg  30mg 30mg 30mg 32.5  mg 30mg 30mg 30mg   INR 2.3 2.9 2.6 3.4 2.3  2.3 2.7 1.7 2.7 2.3 2.4 2.3   Notes        acyclovir acyclovir; boost acyclovir acyclovir nosebleed     Date 10/15 10/22 11/5 11/15 11/27 12/11 12/17 1/7/19 1/14 1/28 2/11 2/25   Total Weekly Dose 30mg 30mg 30mg 30mg 30mg 30mg 30mg 30mg 30mg 30mg 30mg 30mg   INR 2.8 2.2 2.0 2.3 3.3 2.4 2.4 2.3 2.6 2.4 2.5 2.2   Notes     pancreatitis   sick sick, ceftin, fall flucon x1       Date 3/11 3/25 4/8 4/11 4/22 5/6 5/13 5/20 5/28 6/3     Total Weekly Dose 30mg 30mg 30mg 25mg 30mg 30mg  30mg 30mg 30mg 27.5 mg     INR 2.3 2.4 4.0 2.5 3.2 2.1 1.9 3.0 3.2 2.0     Notes   Steroid inj   x1 decr dose diarrhea   1 dose reduction       Phone Interview:  Tablet Strength: 5mg tablets  Contact Info: [phone] (Home) // [phone] (Cell)    Patient Findings     Positives:  Change in health   Negatives:  Signs/symptoms of thrombosis, Signs/symptoms of bleeding, Laboratory test error suspected, Change in alcohol use, Change in activity, Upcoming invasive procedure, Emergency department visit, Upcoming dental procedure, Missed doses, Extra doses, Change in medications, Change in diet/appetite, Hospital admission, Bruising, Other complaints   Comments:  She had to take her pancreaze MT last night as sick to her stomach and doesn't feel well.  She feels like her pancreas has flared up again.  If she goes to the doctor and has any changes she will let us know.     She stated that she has not had any nose bleeds since last INR check.    She has 2 MD appointments next week so may have some changes at that time.  Her oncologist is Monday and next Thursday she is scheduled to receive a steroid shot in her arm.         Plan:  1. INR is therapeutic today at 2.0. Instructed Mrs. Muller to continue warfarin 5mg oral daily except 2.5mg WedSat.   2. Repeat INR in 1 week, 6/17.   3. Verbal information provided over the phone. Leela Muller RBV dosing instructions, expresses understanding by teach back, and has no further questions at this time.    Zenaida Live, PharmD  6/11/2019  9:27 AM

## 2019-06-17 ENCOUNTER — ANTICOAGULATION VISIT (OUTPATIENT)
Dept: PHARMACY | Facility: HOSPITAL | Age: 69
End: 2019-06-17

## 2019-06-17 DIAGNOSIS — I74.9 EMBOLISM AND THROMBOSIS (HCC): ICD-10-CM

## 2019-06-17 LAB — INR PPP: 2.7

## 2019-06-17 NOTE — PROGRESS NOTES
Anticoagulation Clinic - Remote Progress Note  [facility]   Frequency of Monitorin days    Indication: Bilateral PE, stroke syndrome, embolism and thrombosis of unspecified site   Referring Provider: Dr. Rika Sullivan  Planned Duration of Therapy: lifelong  Goal INR: 2.0-3.0  Current Drug Interactions: ASA    Diet: Low GLV intake (19)  Alcohol: None  Tobacco: None  OTC Pain Medications: APAP    INR History:  Date 8/28 9/4 9/18 10/2 10/9 10/13 10/20 10/27 11/3 11/18 12/4 12/11   Total Weekly Dose 27.5mg 27.5mg 27.5 mg 27.5 mg 27.5 mg 25mg 25mg 25mg 25mg 25mg 27.5mg 27.5mg   INR 2.5 2.2 2.4 2.8 3.2 3.2 2.2 1.9 2.5 2.7 2.4 3.0   Notes hematoma?;stop doxepin stopped doxepin on       sick; nose bleed sick; nose bleed        Date 18   Total Weekly Dose 27.5  mg 27.5  mg 27.5  mg 27.5  mg 27.5  mg 27.5  mg 27.5 mg 27.5  mg 25mg 27.5  mg 27.5 mg 27.5mg   INR 2.5 2.3 2.2 2.5 2.4 2.9 2.6 3.8 2.3 2.3 1.9 2.3   Notes        hold; nose bleeds continue   ill      Date 3/8 3/15 3/26 4/1 4/9 4/13 4/18 4/23 4/30 5/14 5/29   Total Weekly Dose 27.5mg 27.5mg 30mg 30mg 30mg 30mg 32.5mg 30mg 30mg 30mg 30mg   INR 2.1 1.5 2.5 1.8 1.5 1.6 2.5 2.3 2.3 2.3 1.9   Notes   Boost post colonoscopy    Clinic; boost         Date 6/4 6/19 7/2 7/16 7/25  8/8 8/20 9/4 9/11 9/18 9/25 10/8   Total Weekly Dose 32.5mg 30mg 30mg 30mg 30mg  30mg 30mg 30mg 32.5  mg 30mg 30mg 30mg   INR 2.3 2.9 2.6 3.4 2.3  2.3 2.7 1.7 2.7 2.3 2.4 2.3   Notes        acyclovir acyclovir; boost acyclovir acyclovir nosebleed     Date 10/15 10/22 11/5 11/15 11/27 12/11 12/17 1/7/19 1/14 1/28 2/11 2/25   Total Weekly Dose 30mg 30mg 30mg 30mg 30mg 30mg 30mg 30mg 30mg 30mg 30mg 30mg   INR 2.8 2.2 2.0 2.3 3.3 2.4 2.4 2.3 2.6 2.4 2.5 2.2   Notes     pancreatitis   sick sick, ceftin, fall flucon x1       Date 3/11 3/25 4/8 4/11 4/22 5/6 5/13 5/20 5/28 6/3 6/17    Total Weekly Dose 30mg 30mg 30mg  25mg 30mg 30mg 30mg 30mg 30mg 27.5 mg 30mg    INR 2.3 2.4 4.0 2.5 3.2 2.1 1.9 3.0 3.2 2.0 2.7    Notes   Steroid inj   x1 decr dose diarrhea   1 dose reduction diarrhea      Phone Interview:  Tablet Strength: 5mg tablets  Contact Info: [phone] (Home) // [phone] (Cell)    Patient Findings:  Positives:  Change in health, Change in diet/appetite   Negatives:  Signs/symptoms of thrombosis, Signs/symptoms of bleeding, Laboratory test error suspected, Change in alcohol use, Change in activity, Upcoming invasive procedure, Emergency department visit, Upcoming dental procedure, Missed doses, Extra doses, Change in medications, Hospital admission, Bruising, Other complaints   Comments:  Patient is having a flare up of pancreatitis. She has had diarrhea on and off for about a week. She is trying to stay hydrated and has been taking Imodium AD as needed. As a result of not feeling well, her appetite has also decreased. She is trying to eat at least something every day.     Plan:  1. INR is therapeutic today. Instructed Mrs. Muller to continue warfarin 5mg daily except 2.5mg WedSat.   2. Repeat INR on Friday.   3. Verbal information provided over the phone. Leela Muller RBV dosing instructions, expresses understanding by teach back, and has no further questions at this time.    Glory Reyes, Dot  6/17/2019  11:43 AM     Spoke with Mrs. Muller and she reports that she has spoken with her MD and they are aware. This has been going on for a week.  She is going to repeat her INR on Friday given she has recently lost her appetite, had diarrhea, and have seen a increase in her INR results.  I, Alanna Bermudez, PharmD, have reviewed the note in full and agree with the assessment and plan.  06/17/19  5:02 PM

## 2019-06-21 ENCOUNTER — ANTICOAGULATION VISIT (OUTPATIENT)
Dept: PHARMACY | Facility: HOSPITAL | Age: 69
End: 2019-06-21

## 2019-06-21 DIAGNOSIS — I74.9 EMBOLISM AND THROMBOSIS (HCC): ICD-10-CM

## 2019-06-21 LAB — INR PPP: 2.7

## 2019-06-21 NOTE — PROGRESS NOTES
Anticoagulation Clinic - Remote Progress Note  [facility]   Frequency of Monitorin days    Indication: Bilateral PE, stroke syndrome, embolism and thrombosis of unspecified site   Referring Provider: Dr. Rika Sullivan  Planned Duration of Therapy: lifelong  Goal INR: 2.0-3.0  Current Drug Interactions: ASA    Diet: Low GLV intake (19)  Alcohol: None  Tobacco: None  OTC Pain Medications: APAP    INR History:  Date 8/28 9/4 9/18 10/2 10/9 10/13 10/20 10/27 11/3 11/18 12/4 12/11   Total Weekly Dose 27.5mg 27.5mg 27.5 mg 27.5 mg 27.5 mg 25mg 25mg 25mg 25mg 25mg 27.5mg 27.5mg   INR 2.5 2.2 2.4 2.8 3.2 3.2 2.2 1.9 2.5 2.7 2.4 3.0   Notes hematoma?;stop doxepin stopped doxepin on       sick; nose bleed sick; nose bleed        Date 18   Total Weekly Dose 27.5  mg 27.5  mg 27.5  mg 27.5  mg 27.5  mg 27.5  mg 27.5 mg 27.5  mg 25mg 27.5  mg 27.5 mg 27.5mg   INR 2.5 2.3 2.2 2.5 2.4 2.9 2.6 3.8 2.3 2.3 1.9 2.3   Notes        hold; nose bleeds continue   ill      Date 3/8 3/15 3/26 4/1 4/9 4/13 4/18 4/23 4/30 5/14 5/29   Total Weekly Dose 27.5mg 27.5mg 30mg 30mg 30mg 30mg 32.5mg 30mg 30mg 30mg 30mg   INR 2.1 1.5 2.5 1.8 1.5 1.6 2.5 2.3 2.3 2.3 1.9   Notes   Boost post colonoscopy    Clinic; boost         Date 6/4 6/19 7/2 7/16 7/25  8/8 8/20 9/4 9/11 9/18 9/25 10/8   Total Weekly Dose 32.5mg 30mg 30mg 30mg 30mg  30mg 30mg 30mg 32.5  mg 30mg 30mg 30mg   INR 2.3 2.9 2.6 3.4 2.3  2.3 2.7 1.7 2.7 2.3 2.4 2.3   Notes        acyclovir acyclovir; boost acyclovir acyclovir nosebleed     Date 10/15 10/22 11/5 11/15 11/27 12/11 12/17 1/7/19 1/14 1/28 2/11 2/25   Total Weekly Dose 30mg 30mg 30mg 30mg 30mg 30mg 30mg 30mg 30mg 30mg 30mg 30mg   INR 2.8 2.2 2.0 2.3 3.3 2.4 2.4 2.3 2.6 2.4 2.5 2.2   Notes     pancreatitis   sick sick, ceftin, fall flucon x1       Date 3/11 3/25 4/8 4/11 4/22 5/6 5/13 5/20 5/28 6/3 6/17 6/21   Total Weekly Dose 30mg 30mg 30mg  25mg 30mg 30mg 30mg 30mg 30mg 27.5 mg 30mg 30mg   INR 2.3 2.4 4.0 2.5 3.2 2.1 1.9 3.0 3.2 2.0 2.7 2.7   Notes   Steroid inj   x1 decr dose diarrhea   1 dose reduction diarrhea Steroid inj; pancreatitis     Phone Interview:  Tablet Strength: 5mg tablets  Contact Info: [phone] (Home) // [phone] (Cell)    Patient Findings  Positives:  Change in medications   Negatives:  Signs/symptoms of thrombosis, Signs/symptoms of bleeding, Laboratory test error suspected, Change in health, Change in alcohol use, Change in activity, Upcoming invasive procedure, Emergency department visit, Upcoming dental procedure, Missed doses, Extra doses, Change in diet/appetite, Hospital admission, Bruising, Other complaints   Comments:  Mrs. Muller reports she is still not feeling well and does not have much of an appetite due to her pancreatitis. She also reports she received a steroid injection in her shoulder this week. Mrs. Muller feels like her blood is on the thinner side because she bled more while testing her INR today.     Plan:  1. INR is therapeutic today. Instructed Mrs. Muller to continue warfarin 5mg daily except 2.5mg on WedSat.   2. Repeat INR in 1 week due to pancreatitis / decreased appetite.   3. Verbal information provided over the phone. Leela Muller RBV dosing instructions, expresses understanding by teach back, and has no further questions at this time.    Kenneth Alexandre, PharmD, BCPS  6/21/2019  1:26 PM

## 2019-06-27 ENCOUNTER — APPOINTMENT (OUTPATIENT)
Dept: CT IMAGING | Facility: HOSPITAL | Age: 69
End: 2019-06-27

## 2019-06-27 ENCOUNTER — APPOINTMENT (OUTPATIENT)
Dept: MRI IMAGING | Facility: HOSPITAL | Age: 69
End: 2019-06-27

## 2019-06-27 ENCOUNTER — OFFICE VISIT (OUTPATIENT)
Dept: NEUROLOGY | Facility: CLINIC | Age: 69
End: 2019-06-27

## 2019-06-27 ENCOUNTER — TELEPHONE (OUTPATIENT)
Dept: NEUROLOGY | Facility: CLINIC | Age: 69
End: 2019-06-27

## 2019-06-27 ENCOUNTER — HOSPITAL ENCOUNTER (OUTPATIENT)
Facility: HOSPITAL | Age: 69
Setting detail: OBSERVATION
Discharge: HOME HEALTH | End: 2019-07-02
Attending: INTERNAL MEDICINE | Admitting: INTERNAL MEDICINE

## 2019-06-27 VITALS
DIASTOLIC BLOOD PRESSURE: 70 MMHG | WEIGHT: 180 LBS | HEIGHT: 68 IN | SYSTOLIC BLOOD PRESSURE: 122 MMHG | BODY MASS INDEX: 27.28 KG/M2

## 2019-06-27 DIAGNOSIS — I95.1 ORTHOSTATIC HYPOTENSION: ICD-10-CM

## 2019-06-27 DIAGNOSIS — E86.0 DEHYDRATION: ICD-10-CM

## 2019-06-27 DIAGNOSIS — Z91.81 STATUS POST FALL: ICD-10-CM

## 2019-06-27 DIAGNOSIS — Z74.09 IMPAIRED FUNCTIONAL MOBILITY, BALANCE, GAIT, AND ENDURANCE: ICD-10-CM

## 2019-06-27 DIAGNOSIS — Z91.81 STATUS POST FALL: Primary | ICD-10-CM

## 2019-06-27 DIAGNOSIS — Z74.09 IMPAIRED MOBILITY AND ADLS: Primary | ICD-10-CM

## 2019-06-27 DIAGNOSIS — R27.0 ATAXIA: ICD-10-CM

## 2019-06-27 DIAGNOSIS — Z78.9 IMPAIRED MOBILITY AND ADLS: Primary | ICD-10-CM
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
## 2019-06-27 PROBLEM — R29.6 FALLS: Status: ACTIVE | Noted: 2019-06-27

## 2019-06-27 LAB
ALBUMIN SERPL-MCNC: 3.9 G/DL (ref 3.5–5.2)
ALBUMIN/GLOB SERPL: 1.2 G/DL
ALP SERPL-CCNC: 77 U/L (ref 39–117)
ALT SERPL W P-5'-P-CCNC: 30 U/L (ref 1–33)
ANION GAP SERPL CALCULATED.3IONS-SCNC: 12 MMOL/L (ref 5–15)
AST SERPL-CCNC: 27 U/L (ref 1–32)
BASOPHILS # BLD AUTO: 0.07 10*3/MM3 (ref 0–0.2)
BASOPHILS NFR BLD AUTO: 0.7 % (ref 0–1.5)
BILIRUB SERPL-MCNC: 0.2 MG/DL (ref 0.2–1.2)
BUN BLD-MCNC: 15 MG/DL (ref 8–23)
BUN/CREAT SERPL: 14.3 (ref 7–25)
CALCIUM SPEC-SCNC: 9.1 MG/DL (ref 8.6–10.5)
CHLORIDE SERPL-SCNC: 100 MMOL/L (ref 98–107)
CO2 SERPL-SCNC: 25 MMOL/L (ref 22–29)
CREAT BLD-MCNC: 1.05 MG/DL (ref 0.57–1)
DEPRECATED RDW RBC AUTO: 48.2 FL (ref 37–54)
EOSINOPHIL # BLD AUTO: 0.25 10*3/MM3 (ref 0–0.4)
EOSINOPHIL NFR BLD AUTO: 2.4 % (ref 0.3–6.2)
ERYTHROCYTE [DISTWIDTH] IN BLOOD BY AUTOMATED COUNT: 14.5 % (ref 12.3–15.4)
GFR SERPL CREATININE-BSD FRML MDRD: 52 ML/MIN/1.73
GLOBULIN UR ELPH-MCNC: 3.2 GM/DL
GLUCOSE BLD-MCNC: 168 MG/DL (ref 65–99)
GLUCOSE BLDC GLUCOMTR-MCNC: 171 MG/DL (ref 70–130)
HBA1C MFR BLD: 7.9 % (ref 4.8–5.6)
HCT VFR BLD AUTO: 45 % (ref 34–46.6)
HGB BLD-MCNC: 14.6 G/DL (ref 12–15.9)
IMM GRANULOCYTES # BLD AUTO: 0.02 10*3/MM3 (ref 0–0.05)
IMM GRANULOCYTES NFR BLD AUTO: 0.2 % (ref 0–0.5)
INR PPP: 1.82 (ref 0.85–1.16)
LYMPHOCYTES # BLD AUTO: 3.32 10*3/MM3 (ref 0.7–3.1)
LYMPHOCYTES NFR BLD AUTO: 32.1 % (ref 19.6–45.3)
MCH RBC QN AUTO: 29.3 PG (ref 26.6–33)
MCHC RBC AUTO-ENTMCNC: 32.4 G/DL (ref 31.5–35.7)
MCV RBC AUTO: 90.2 FL (ref 79–97)
MONOCYTES # BLD AUTO: 0.84 10*3/MM3 (ref 0.1–0.9)
MONOCYTES NFR BLD AUTO: 8.1 % (ref 5–12)
NEUTROPHILS # BLD AUTO: 5.85 10*3/MM3 (ref 1.7–7)
NEUTROPHILS NFR BLD AUTO: 56.5 % (ref 42.7–76)
NRBC BLD AUTO-RTO: 0 /100 WBC (ref 0–0.2)
PLATELET # BLD AUTO: 202 10*3/MM3 (ref 140–450)
PMV BLD AUTO: 12.2 FL (ref 6–12)
POTASSIUM BLD-SCNC: 4.6 MMOL/L (ref 3.5–5.2)
PROT SERPL-MCNC: 7.1 G/DL (ref 6–8.5)
PROTHROMBIN TIME: 20.3 SECONDS (ref 11.2–14.3)
RBC # BLD AUTO: 4.99 10*6/MM3 (ref 3.77–5.28)
SODIUM BLD-SCNC: 137 MMOL/L (ref 136–145)
TSH SERPL DL<=0.05 MIU/L-ACNC: 2.21 MIU/ML (ref 0.27–4.2)
WBC NRBC COR # BLD: 10.35 10*3/MM3 (ref 3.4–10.8)

## 2019-06-27 PROCEDURE — 85025 COMPLETE CBC W/AUTO DIFF WBC: CPT | Performed by: NURSE PRACTITIONER

## 2019-06-27 PROCEDURE — 63710000001 INSULIN LISPRO (HUMAN) PER 5 UNITS: Performed by: NURSE PRACTITIONER

## 2019-06-27 PROCEDURE — 93005 ELECTROCARDIOGRAM TRACING: CPT | Performed by: NURSE PRACTITIONER

## 2019-06-27 PROCEDURE — 70551 MRI BRAIN STEM W/O DYE: CPT

## 2019-06-27 PROCEDURE — 85610 PROTHROMBIN TIME: CPT | Performed by: NURSE PRACTITIONER

## 2019-06-27 PROCEDURE — 83036 HEMOGLOBIN GLYCOSYLATED A1C: CPT | Performed by: NURSE PRACTITIONER

## 2019-06-27 PROCEDURE — G0378 HOSPITAL OBSERVATION PER HR: HCPCS

## 2019-06-27 PROCEDURE — 82962 GLUCOSE BLOOD TEST: CPT

## 2019-06-27 PROCEDURE — 99214 OFFICE O/P EST MOD 30 MIN: CPT | Performed by: PSYCHIATRY & NEUROLOGY

## 2019-06-27 PROCEDURE — 70486 CT MAXILLOFACIAL W/O DYE: CPT

## 2019-06-27 PROCEDURE — 99220 PR INITIAL OBSERVATION CARE/DAY 70 MINUTES: CPT | Performed by: INTERNAL MEDICINE

## 2019-06-27 PROCEDURE — 70548 MR ANGIOGRAPHY NECK W/DYE: CPT

## 2019-06-27 PROCEDURE — 93010 ELECTROCARDIOGRAM REPORT: CPT | Performed by: INTERNAL MEDICINE

## 2019-06-27 PROCEDURE — 84443 ASSAY THYROID STIM HORMONE: CPT | Performed by: NURSE PRACTITIONER

## 2019-06-27 PROCEDURE — 0 GADOBENATE DIMEGLUMINE 529 MG/ML SOLUTION: Performed by: INTERNAL MEDICINE

## 2019-06-27 PROCEDURE — 63710000001 INSULIN DETEMIR PER 5 UNITS: Performed by: NURSE PRACTITIONER

## 2019-06-27 PROCEDURE — 80053 COMPREHEN METABOLIC PANEL: CPT | Performed by: NURSE PRACTITIONER

## 2019-06-27 PROCEDURE — A9577 INJ MULTIHANCE: HCPCS | Performed by: INTERNAL MEDICINE

## 2019-06-27 PROCEDURE — 70544 MR ANGIOGRAPHY HEAD W/O DYE: CPT

## 2019-06-27 RX ORDER — PANTOPRAZOLE SODIUM 40 MG/1
40 TABLET, DELAYED RELEASE ORAL
Status: DISCONTINUED | OUTPATIENT
Start: 2019-06-28 | End: 2019-07-02 | Stop reason: HOSPADM

## 2019-06-27 RX ORDER — NICOTINE POLACRILEX 4 MG
15 LOZENGE BUCCAL
Status: DISCONTINUED | OUTPATIENT
Start: 2019-06-27 | End: 2019-07-02 | Stop reason: HOSPADM

## 2019-06-27 RX ORDER — ONDANSETRON 4 MG/1
4 TABLET, FILM COATED ORAL EVERY 6 HOURS PRN
Status: DISCONTINUED | OUTPATIENT
Start: 2019-06-27 | End: 2019-07-02 | Stop reason: HOSPADM

## 2019-06-27 RX ORDER — SODIUM CHLORIDE 0.9 % (FLUSH) 0.9 %
3-10 SYRINGE (ML) INJECTION AS NEEDED
Status: DISCONTINUED | OUTPATIENT
Start: 2019-06-27 | End: 2019-07-02 | Stop reason: HOSPADM

## 2019-06-27 RX ORDER — SODIUM CHLORIDE 0.9 % (FLUSH) 0.9 %
3 SYRINGE (ML) INJECTION EVERY 12 HOURS SCHEDULED
Status: DISCONTINUED | OUTPATIENT
Start: 2019-06-27 | End: 2019-07-02 | Stop reason: HOSPADM

## 2019-06-27 RX ORDER — CHLORDIAZEPOXIDE HYDROCHLORIDE AND CLIDINIUM BROMIDE 5; 2.5 MG/1; MG/1
1 CAPSULE ORAL 3 TIMES DAILY
Status: DISCONTINUED | OUTPATIENT
Start: 2019-06-27 | End: 2019-07-02 | Stop reason: HOSPADM

## 2019-06-27 RX ORDER — ACETAMINOPHEN 325 MG/1
650 TABLET ORAL EVERY 4 HOURS PRN
Status: DISCONTINUED | OUTPATIENT
Start: 2019-06-27 | End: 2019-07-02 | Stop reason: HOSPADM

## 2019-06-27 RX ORDER — MECLIZINE HYDROCHLORIDE 25 MG/1
25 TABLET ORAL 3 TIMES DAILY
Status: DISCONTINUED | OUTPATIENT
Start: 2019-06-27 | End: 2019-06-27

## 2019-06-27 RX ORDER — ACETAMINOPHEN 650 MG/1
650 SUPPOSITORY RECTAL EVERY 4 HOURS PRN
Status: DISCONTINUED | OUTPATIENT
Start: 2019-06-27 | End: 2019-07-02 | Stop reason: HOSPADM

## 2019-06-27 RX ORDER — MECLIZINE HYDROCHLORIDE 25 MG/1
25 TABLET ORAL 3 TIMES DAILY PRN
Status: DISCONTINUED | OUTPATIENT
Start: 2019-06-27 | End: 2019-06-30

## 2019-06-27 RX ORDER — BISACODYL 10 MG
10 SUPPOSITORY, RECTAL RECTAL DAILY PRN
Status: DISCONTINUED | OUTPATIENT
Start: 2019-06-27 | End: 2019-07-02 | Stop reason: HOSPADM

## 2019-06-27 RX ORDER — DEXTROSE MONOHYDRATE 25 G/50ML
25 INJECTION, SOLUTION INTRAVENOUS
Status: DISCONTINUED | OUTPATIENT
Start: 2019-06-27 | End: 2019-07-02 | Stop reason: HOSPADM

## 2019-06-27 RX ORDER — ASPIRIN 81 MG/1
81 TABLET, CHEWABLE ORAL DAILY
Status: DISCONTINUED | OUTPATIENT
Start: 2019-06-28 | End: 2019-07-02 | Stop reason: HOSPADM

## 2019-06-27 RX ORDER — SENNA AND DOCUSATE SODIUM 50; 8.6 MG/1; MG/1
2 TABLET, FILM COATED ORAL 2 TIMES DAILY PRN
Status: DISCONTINUED | OUTPATIENT
Start: 2019-06-27 | End: 2019-07-02 | Stop reason: HOSPADM

## 2019-06-27 RX ORDER — GABAPENTIN 400 MG/1
400 CAPSULE ORAL
Status: DISCONTINUED | OUTPATIENT
Start: 2019-06-28 | End: 2019-06-28

## 2019-06-27 RX ORDER — TOPIRAMATE 25 MG/1
25 TABLET ORAL DAILY
Status: DISCONTINUED | OUTPATIENT
Start: 2019-06-28 | End: 2019-06-28

## 2019-06-27 RX ORDER — BISACODYL 5 MG/1
5 TABLET, DELAYED RELEASE ORAL DAILY PRN
Status: DISCONTINUED | OUTPATIENT
Start: 2019-06-27 | End: 2019-07-02 | Stop reason: HOSPADM

## 2019-06-27 RX ORDER — HYDROCODONE BITARTRATE AND ACETAMINOPHEN 7.5; 325 MG/1; MG/1
1 TABLET ORAL EVERY 8 HOURS PRN
Status: DISCONTINUED | OUTPATIENT
Start: 2019-06-27 | End: 2019-07-02 | Stop reason: HOSPADM

## 2019-06-27 RX ORDER — GABAPENTIN 400 MG/1
800 CAPSULE ORAL DAILY
Status: DISCONTINUED | OUTPATIENT
Start: 2019-06-28 | End: 2019-06-28

## 2019-06-27 RX ORDER — DIGOXIN 250 MCG
250 TABLET ORAL
Status: DISCONTINUED | OUTPATIENT
Start: 2019-06-28 | End: 2019-07-02 | Stop reason: HOSPADM

## 2019-06-27 RX ORDER — VENLAFAXINE HYDROCHLORIDE 75 MG/1
75 CAPSULE, EXTENDED RELEASE ORAL DAILY
Status: DISCONTINUED | OUTPATIENT
Start: 2019-06-28 | End: 2019-07-02 | Stop reason: HOSPADM

## 2019-06-27 RX ORDER — ROSUVASTATIN CALCIUM 10 MG/1
10 TABLET, COATED ORAL NIGHTLY
Status: DISCONTINUED | OUTPATIENT
Start: 2019-06-27 | End: 2019-07-02 | Stop reason: HOSPADM

## 2019-06-27 RX ORDER — ONDANSETRON 2 MG/ML
4 INJECTION INTRAMUSCULAR; INTRAVENOUS EVERY 6 HOURS PRN
Status: DISCONTINUED | OUTPATIENT
Start: 2019-06-27 | End: 2019-07-02 | Stop reason: HOSPADM

## 2019-06-27 RX ADMIN — METRONIDAZOLE: 7.5 CREAM TOPICAL at 22:33

## 2019-06-27 RX ADMIN — HYDROCODONE BITARTRATE AND ACETAMINOPHEN 1 TABLET: 7.5; 325 TABLET ORAL at 22:34

## 2019-06-27 RX ADMIN — SODIUM CHLORIDE, PRESERVATIVE FREE 3 ML: 5 INJECTION INTRAVENOUS at 22:34

## 2019-06-27 RX ADMIN — INSULIN LISPRO 2 UNITS: 100 INJECTION, SOLUTION INTRAVENOUS; SUBCUTANEOUS at 22:33

## 2019-06-27 RX ADMIN — INSULIN DETEMIR 10 UNITS: 100 INJECTION, SOLUTION SUBCUTANEOUS at 22:35

## 2019-06-27 RX ADMIN — GADOBENATE DIMEGLUMINE 16 ML: 529 INJECTION, SOLUTION INTRAVENOUS at 21:30

## 2019-06-27 RX ADMIN — ROSUVASTATIN CALCIUM 10 MG: 10 TABLET, COATED ORAL at 22:34

## 2019-06-27 NOTE — PROGRESS NOTES
Subjective:     Patient ID: Leela Muller is a 69 y.o. female.    CC:   Chief Complaint   Patient presents with   • Peripheral Neuropathy       HPI:   History of Present Illness  The following portions of the patient's history were reviewed and updated as appropriate: allergies, current medications, past family history, past medical history, past social history, past surgical history and problem list.     Patient returns for urgern follow up. She has had multiple falls in recent days, one today at [facility]. She feels dizzy on standing up, also some vertigo on turning in bed. She has multiple bruises, has hat her head, bit her lip, broke her nose, bruised her right kness. She describes a loss of balance with a tendency to fall to the right, denies syncope or focal symptoms. Denies being physically abused. She has had a recent bout of recurrent pancreatitis, has not been eating or drinking well, has had diarrhea. She is on warfarin for h/o multiple PEs.      Past Medical History:   Diagnosis Date   • Anxiety    • Arm pain    • Arthritis    • Depression    • Diabetes mellitus (CMS/HCC)    • Hyperlipidemia    • Hypertension    • Neck pain on left side    • Palpitations 4/18/2018   • Pancreatitis    • Pulmonary embolism (CMS/HCC)    • Rectal cancer (CMS/HCC)    • Stroke syndrome 9/14/2016    · Assessed By: Devaughn Lewis (Neurology); Last Assessed: 16 Jun 2015  Right cerebrovascular accident in July or August 2010, evaluated at [facility].  Admission to [facility] on 09/28/2010 with headache and stuttering, consistent with TIA.  Chronic Coumadin therapy, initiated following bilateral pulmonary emboli in 2007 after fall and hip replacement.  She was already on 81 mg of aspirin as well, 09/2010.  MRI of the brain on 09/28/2010 revealing old right parietal cerebrovascular accident.  MRA revealing normal carotids, middle anterior and posterior cerebral arteries with minimal ostial  stenosis of both vertebral arteries.  GENARO by Dr. Oliver Mobley on 09/28/2010:  patent foramen ovale with a small amount of right to left shunting.  Normal LVEF and normal valvular structures.   Patent foramen ovale closure using a 25 mm. Amplatzer cribriform occluder, 10/05/2010.   Echocardiogram, 06/23/2014:  LVEF (55% to 60%) with and Amplatzer device noted to be well-seated in    • Thrombocytopenia (CMS/HCC)    • Transient cerebral ischemia        Past Surgical History:   Procedure Laterality Date   • APPENDECTOMY     • BREAST BIOPSY Left 2012    benign   • BREAST EXCISIONAL BIOPSY Left     benign   • BREAST EXCISIONAL BIOPSY Right     benign   • CHOLECYSTECTOMY     • HYSTERECTOMY     • OOPHORECTOMY     • RECTAL SURGERY     • TONSILLECTOMY     • TOTAL HIP ARTHROPLASTY REVISION         Social History     Socioeconomic History   • Marital status:      Spouse name: Not on file   • Number of children: Not on file   • Years of education: Not on file   • Highest education level: Not on file   Tobacco Use   • Smoking status: Never Smoker   • Smokeless tobacco: Never Used   Substance and Sexual Activity   • Alcohol use: No   • Drug use: No   • Sexual activity: Defer       Family History   Problem Relation Age of Onset   • Alzheimer's disease Mother    • Cancer Mother    • Coronary artery disease Other    • Diabetes Other    • Hypertension Other    • Stroke Other    • Cancer Other    • Dementia Other    • Heart attack Father    • Breast cancer Neg Hx    • Ovarian cancer Neg Hx         Review of Systems   Constitutional: Negative for chills, fatigue, fever and unexpected weight change.   HENT: Negative for ear pain, hearing loss, nosebleeds, rhinorrhea and sore throat.    Eyes: Negative for photophobia, pain, discharge, itching and visual disturbance.   Respiratory: Negative for cough, chest tightness, shortness of breath and wheezing.    Cardiovascular: Negative for chest pain, palpitations and leg swelling.    Gastrointestinal: Negative for abdominal pain, blood in stool, constipation, diarrhea, nausea and vomiting.   Genitourinary: Negative for dysuria, frequency, hematuria and urgency.   Musculoskeletal: Positive for back pain and gait problem. Negative for arthralgias, joint swelling, myalgias, neck pain and neck stiffness.   Skin: Negative for rash and wound.   Allergic/Immunologic: Negative for environmental allergies and food allergies.   Neurological: Negative for dizziness, tremors, seizures, syncope, speech difficulty, weakness, light-headedness, numbness and headaches.   Hematological: Negative for adenopathy. Does not bruise/bleed easily.   Psychiatric/Behavioral: Negative for agitation, confusion, decreased concentration, hallucinations, sleep disturbance and suicidal ideas. The patient is not nervous/anxious.         Objective:    Neurologic Exam     Mental Status   Oriented to person, place, and time.     Motor Exam     Strength   Strength 5/5 throughout.       Physical Exam   Constitutional: She is oriented to person, place, and time. She appears well-developed and well-nourished.   Cardiovascular: Normal rate and regular rhythm.   BP falls from 130/80 to 110/60 in left arm from sitting to standing   Pulmonary/Chest: Effort normal.   Neurological: She is alert and oriented to person, place, and time. She has normal strength and normal reflexes. She displays normal reflexes. No cranial nerve deficit or sensory deficit. She exhibits normal muscle tone. Coordination normal. She displays no Babinski's sign on the right side. She displays no Babinski's sign on the left side.   Ataxic gait, uses a cane, nonfocal exam.   Skin:   Multiple bruises on the nose, right lip, right knee, all limbs.   Psychiatric: She has a normal mood and affect. Her behavior is normal. Thought content normal.       Assessment/Plan:       Leela was seen today for peripheral neuropathy.    Diagnoses and all orders for this  visit:    Status post fall    - Admit for observation  Ataxia with loss of balance    - MRI brain, MRA head and neck.    - Fall precautions.  Dehydration   - IVF  Orthostatic hypotension   - IVF       The patient is on long term AC and at risk for internal hemorrhage with recurrent falls with head trauma. She is to be admitted for observation, stroke work up, IVF. Suspect that we need to reevaluate the safety of warfarin therapy and need for inpatient rehab.    Devaughn Lewis MD  6/27/2019

## 2019-06-27 NOTE — TELEPHONE ENCOUNTER
Pt is scheduled to see you on 7/09 and she stated that she is having spells where she can't control her balance and has fallen once and almost fell into a fireplace. She wants to know what she needs to do.

## 2019-06-28 ENCOUNTER — APPOINTMENT (OUTPATIENT)
Dept: NEUROLOGY | Facility: HOSPITAL | Age: 69
End: 2019-06-28

## 2019-06-28 ENCOUNTER — APPOINTMENT (OUTPATIENT)
Dept: MRI IMAGING | Facility: HOSPITAL | Age: 69
End: 2019-06-28

## 2019-06-28 ENCOUNTER — APPOINTMENT (OUTPATIENT)
Dept: CARDIOLOGY | Facility: HOSPITAL | Age: 69
End: 2019-06-28

## 2019-06-28 LAB
ANION GAP SERPL CALCULATED.3IONS-SCNC: 8 MMOL/L (ref 5–15)
BASOPHILS # BLD AUTO: 0.05 10*3/MM3 (ref 0–0.2)
BASOPHILS NFR BLD AUTO: 0.6 % (ref 0–1.5)
BH CV LOWER VASCULAR LEFT COMMON FEMORAL AUGMENT: NORMAL
BH CV LOWER VASCULAR LEFT COMMON FEMORAL COMPRESS: NORMAL
BH CV LOWER VASCULAR LEFT COMMON FEMORAL PHASIC: NORMAL
BH CV LOWER VASCULAR LEFT COMMON FEMORAL SPONT: NORMAL
BH CV LOWER VASCULAR LEFT DISTAL FEMORAL AUGMENT: NORMAL
BH CV LOWER VASCULAR LEFT DISTAL FEMORAL COMPRESS: NORMAL
BH CV LOWER VASCULAR LEFT DISTAL FEMORAL PHASIC: NORMAL
BH CV LOWER VASCULAR LEFT DISTAL FEMORAL SPONT: NORMAL
BH CV LOWER VASCULAR LEFT GASTRONEMIUS COMPRESS: NORMAL
BH CV LOWER VASCULAR LEFT GREATER SAPH AK COMPRESS: NORMAL
BH CV LOWER VASCULAR LEFT GREATER SAPH BK COMPRESS: NORMAL
BH CV LOWER VASCULAR LEFT LESSER SAPH COMPRESS: NORMAL
BH CV LOWER VASCULAR LEFT MID FEMORAL AUGMENT: NORMAL
BH CV LOWER VASCULAR LEFT MID FEMORAL COMPRESS: NORMAL
BH CV LOWER VASCULAR LEFT MID FEMORAL PHASIC: NORMAL
BH CV LOWER VASCULAR LEFT MID FEMORAL SPONT: NORMAL
BH CV LOWER VASCULAR LEFT PERONEAL COMPRESS: NORMAL
BH CV LOWER VASCULAR LEFT POPLITEAL AUGMENT: NORMAL
BH CV LOWER VASCULAR LEFT POPLITEAL COMPRESS: NORMAL
BH CV LOWER VASCULAR LEFT POPLITEAL PHASIC: NORMAL
BH CV LOWER VASCULAR LEFT POPLITEAL SPONT: NORMAL
BH CV LOWER VASCULAR LEFT POSTERIOR TIBIAL COMPRESS: NORMAL
BH CV LOWER VASCULAR LEFT PROFUNDA FEMORAL AUGMENT: NORMAL
BH CV LOWER VASCULAR LEFT PROFUNDA FEMORAL COMPRESS: NORMAL
BH CV LOWER VASCULAR LEFT PROFUNDA FEMORAL PHASIC: NORMAL
BH CV LOWER VASCULAR LEFT PROFUNDA FEMORAL SPONT: NORMAL
BH CV LOWER VASCULAR LEFT PROXIMAL FEMORAL AUGMENT: NORMAL
BH CV LOWER VASCULAR LEFT PROXIMAL FEMORAL COMPRESS: NORMAL
BH CV LOWER VASCULAR LEFT PROXIMAL FEMORAL PHASIC: NORMAL
BH CV LOWER VASCULAR LEFT PROXIMAL FEMORAL SPONT: NORMAL
BH CV LOWER VASCULAR LEFT SAPHENOFEMORAL JUNCTION AUGMENT: NORMAL
BH CV LOWER VASCULAR LEFT SAPHENOFEMORAL JUNCTION COMPRESS: NORMAL
BH CV LOWER VASCULAR LEFT SAPHENOFEMORAL JUNCTION PHASIC: NORMAL
BH CV LOWER VASCULAR LEFT SAPHENOFEMORAL JUNCTION SPONT: NORMAL
BH CV LOWER VASCULAR RIGHT COMMON FEMORAL AUGMENT: NORMAL
BH CV LOWER VASCULAR RIGHT COMMON FEMORAL COMPRESS: NORMAL
BH CV LOWER VASCULAR RIGHT COMMON FEMORAL PHASIC: NORMAL
BH CV LOWER VASCULAR RIGHT COMMON FEMORAL SPONT: NORMAL
BH CV LOWER VASCULAR RIGHT DISTAL FEMORAL AUGMENT: NORMAL
BH CV LOWER VASCULAR RIGHT DISTAL FEMORAL COMPRESS: NORMAL
BH CV LOWER VASCULAR RIGHT DISTAL FEMORAL PHASIC: NORMAL
BH CV LOWER VASCULAR RIGHT DISTAL FEMORAL SPONT: NORMAL
BH CV LOWER VASCULAR RIGHT GASTRONEMIUS COMPRESS: NORMAL
BH CV LOWER VASCULAR RIGHT GREATER SAPH AK COMPRESS: NORMAL
BH CV LOWER VASCULAR RIGHT GREATER SAPH BK COMPRESS: NORMAL
BH CV LOWER VASCULAR RIGHT LESSER SAPH COMPRESS: NORMAL
BH CV LOWER VASCULAR RIGHT MID FEMORAL AUGMENT: NORMAL
BH CV LOWER VASCULAR RIGHT MID FEMORAL COMPRESS: NORMAL
BH CV LOWER VASCULAR RIGHT MID FEMORAL PHASIC: NORMAL
BH CV LOWER VASCULAR RIGHT MID FEMORAL SPONT: NORMAL
BH CV LOWER VASCULAR RIGHT PERONEAL COMPRESS: NORMAL
BH CV LOWER VASCULAR RIGHT POPLITEAL AUGMENT: NORMAL
BH CV LOWER VASCULAR RIGHT POPLITEAL COMPRESS: NORMAL
BH CV LOWER VASCULAR RIGHT POPLITEAL PHASIC: NORMAL
BH CV LOWER VASCULAR RIGHT POPLITEAL SPONT: NORMAL
BH CV LOWER VASCULAR RIGHT POSTERIOR TIBIAL COMPRESS: NORMAL
BH CV LOWER VASCULAR RIGHT PROFUNDA FEMORAL AUGMENT: NORMAL
BH CV LOWER VASCULAR RIGHT PROFUNDA FEMORAL COMPRESS: NORMAL
BH CV LOWER VASCULAR RIGHT PROFUNDA FEMORAL PHASIC: NORMAL
BH CV LOWER VASCULAR RIGHT PROFUNDA FEMORAL SPONT: NORMAL
BH CV LOWER VASCULAR RIGHT PROXIMAL FEMORAL AUGMENT: NORMAL
BH CV LOWER VASCULAR RIGHT PROXIMAL FEMORAL COMPRESS: NORMAL
BH CV LOWER VASCULAR RIGHT PROXIMAL FEMORAL PHASIC: NORMAL
BH CV LOWER VASCULAR RIGHT PROXIMAL FEMORAL SPONT: NORMAL
BH CV LOWER VASCULAR RIGHT SAPHENOFEMORAL JUNCTION AUGMENT: NORMAL
BH CV LOWER VASCULAR RIGHT SAPHENOFEMORAL JUNCTION COMPRESS: NORMAL
BH CV LOWER VASCULAR RIGHT SAPHENOFEMORAL JUNCTION PHASIC: NORMAL
BH CV LOWER VASCULAR RIGHT SAPHENOFEMORAL JUNCTION SPONT: NORMAL
BUN BLD-MCNC: 13 MG/DL (ref 8–23)
BUN/CREAT SERPL: 16.9 (ref 7–25)
CALCIUM SPEC-SCNC: 8.5 MG/DL (ref 8.6–10.5)
CHLORIDE SERPL-SCNC: 105 MMOL/L (ref 98–107)
CHOLEST SERPL-MCNC: 97 MG/DL (ref 0–200)
CO2 SERPL-SCNC: 27 MMOL/L (ref 22–29)
CREAT BLD-MCNC: 0.77 MG/DL (ref 0.57–1)
DEPRECATED RDW RBC AUTO: 48.6 FL (ref 37–54)
EOSINOPHIL # BLD AUTO: 0.15 10*3/MM3 (ref 0–0.4)
EOSINOPHIL NFR BLD AUTO: 1.7 % (ref 0.3–6.2)
ERYTHROCYTE [DISTWIDTH] IN BLOOD BY AUTOMATED COUNT: 14.4 % (ref 12.3–15.4)
GFR SERPL CREATININE-BSD FRML MDRD: 74 ML/MIN/1.73
GLUCOSE BLD-MCNC: 137 MG/DL (ref 65–99)
GLUCOSE BLDC GLUCOMTR-MCNC: 113 MG/DL (ref 70–130)
GLUCOSE BLDC GLUCOMTR-MCNC: 137 MG/DL (ref 70–130)
GLUCOSE BLDC GLUCOMTR-MCNC: 213 MG/DL (ref 70–130)
GLUCOSE BLDC GLUCOMTR-MCNC: 265 MG/DL (ref 70–130)
HCT VFR BLD AUTO: 41.3 % (ref 34–46.6)
HDLC SERPL-MCNC: 34 MG/DL (ref 40–60)
HGB BLD-MCNC: 13 G/DL (ref 12–15.9)
IMM GRANULOCYTES # BLD AUTO: 0.03 10*3/MM3 (ref 0–0.05)
IMM GRANULOCYTES NFR BLD AUTO: 0.3 % (ref 0–0.5)
INR PPP: 1.63 (ref 0.85–1.16)
LDLC SERPL CALC-MCNC: 29 MG/DL (ref 0–100)
LDLC/HDLC SERPL: 0.85 {RATIO}
LYMPHOCYTES # BLD AUTO: 2.83 10*3/MM3 (ref 0.7–3.1)
LYMPHOCYTES NFR BLD AUTO: 32.1 % (ref 19.6–45.3)
MCH RBC QN AUTO: 28.8 PG (ref 26.6–33)
MCHC RBC AUTO-ENTMCNC: 31.5 G/DL (ref 31.5–35.7)
MCV RBC AUTO: 91.6 FL (ref 79–97)
MONOCYTES # BLD AUTO: 0.82 10*3/MM3 (ref 0.1–0.9)
MONOCYTES NFR BLD AUTO: 9.3 % (ref 5–12)
NEUTROPHILS # BLD AUTO: 4.93 10*3/MM3 (ref 1.7–7)
NEUTROPHILS NFR BLD AUTO: 56 % (ref 42.7–76)
NRBC BLD AUTO-RTO: 0 /100 WBC (ref 0–0.2)
PLATELET # BLD AUTO: 154 10*3/MM3 (ref 140–450)
PMV BLD AUTO: 12 FL (ref 6–12)
POTASSIUM BLD-SCNC: 4 MMOL/L (ref 3.5–5.2)
PROTHROMBIN TIME: 18.6 SECONDS (ref 11.2–14.3)
RBC # BLD AUTO: 4.51 10*6/MM3 (ref 3.77–5.28)
SODIUM BLD-SCNC: 140 MMOL/L (ref 136–145)
TRIGL SERPL-MCNC: 171 MG/DL (ref 0–150)
VLDLC SERPL-MCNC: 34.2 MG/DL
WBC NRBC COR # BLD: 8.81 10*3/MM3 (ref 3.4–10.8)

## 2019-06-28 PROCEDURE — 80061 LIPID PANEL: CPT | Performed by: NURSE PRACTITIONER

## 2019-06-28 PROCEDURE — 99215 OFFICE O/P EST HI 40 MIN: CPT | Performed by: PSYCHIATRY & NEUROLOGY

## 2019-06-28 PROCEDURE — 72141 MRI NECK SPINE W/O DYE: CPT

## 2019-06-28 PROCEDURE — 99214 OFFICE O/P EST MOD 30 MIN: CPT | Performed by: INTERNAL MEDICINE

## 2019-06-28 PROCEDURE — 63710000001 INSULIN LISPRO (HUMAN) PER 5 UNITS: Performed by: NURSE PRACTITIONER

## 2019-06-28 PROCEDURE — 85610 PROTHROMBIN TIME: CPT | Performed by: NURSE PRACTITIONER

## 2019-06-28 PROCEDURE — G0108 DIAB MANAGE TRN  PER INDIV: HCPCS | Performed by: REGISTERED NURSE

## 2019-06-28 PROCEDURE — G0378 HOSPITAL OBSERVATION PER HR: HCPCS

## 2019-06-28 PROCEDURE — 93970 EXTREMITY STUDY: CPT

## 2019-06-28 PROCEDURE — 63710000001 INSULIN DETEMIR PER 5 UNITS: Performed by: NURSE PRACTITIONER

## 2019-06-28 PROCEDURE — 85025 COMPLETE CBC W/AUTO DIFF WBC: CPT | Performed by: NURSE PRACTITIONER

## 2019-06-28 PROCEDURE — 95886 MUSC TEST DONE W/N TEST COMP: CPT

## 2019-06-28 PROCEDURE — 93970 EXTREMITY STUDY: CPT | Performed by: INTERNAL MEDICINE

## 2019-06-28 PROCEDURE — 97165 OT EVAL LOW COMPLEX 30 MIN: CPT

## 2019-06-28 PROCEDURE — 97116 GAIT TRAINING THERAPY: CPT

## 2019-06-28 PROCEDURE — 99225 PR SBSQ OBSERVATION CARE/DAY 25 MINUTES: CPT | Performed by: INTERNAL MEDICINE

## 2019-06-28 PROCEDURE — 97535 SELF CARE MNGMENT TRAINING: CPT

## 2019-06-28 PROCEDURE — 92523 SPEECH SOUND LANG COMPREHEN: CPT

## 2019-06-28 PROCEDURE — 80048 BASIC METABOLIC PNL TOTAL CA: CPT | Performed by: NURSE PRACTITIONER

## 2019-06-28 PROCEDURE — 95910 NRV CNDJ TEST 7-8 STUDIES: CPT

## 2019-06-28 PROCEDURE — 97163 PT EVAL HIGH COMPLEX 45 MIN: CPT

## 2019-06-28 PROCEDURE — 82962 GLUCOSE BLOOD TEST: CPT

## 2019-06-28 RX ORDER — FLUDROCORTISONE ACETATE 0.1 MG/1
100 TABLET ORAL DAILY
Status: DISCONTINUED | OUTPATIENT
Start: 2019-06-28 | End: 2019-07-02 | Stop reason: HOSPADM

## 2019-06-28 RX ORDER — GABAPENTIN 400 MG/1
400 CAPSULE ORAL EVERY 8 HOURS SCHEDULED
Status: DISCONTINUED | OUTPATIENT
Start: 2019-06-28 | End: 2019-07-02 | Stop reason: HOSPADM

## 2019-06-28 RX ADMIN — ACETAMINOPHEN 650 MG: 325 TABLET, FILM COATED ORAL at 13:26

## 2019-06-28 RX ADMIN — SODIUM CHLORIDE, PRESERVATIVE FREE 3 ML: 5 INJECTION INTRAVENOUS at 08:40

## 2019-06-28 RX ADMIN — GABAPENTIN 800 MG: 400 CAPSULE ORAL at 08:40

## 2019-06-28 RX ADMIN — PANTOPRAZOLE SODIUM 40 MG: 40 TABLET, DELAYED RELEASE ORAL at 08:40

## 2019-06-28 RX ADMIN — ROSUVASTATIN CALCIUM 10 MG: 10 TABLET, COATED ORAL at 21:16

## 2019-06-28 RX ADMIN — INSULIN LISPRO 3 UNITS: 100 INJECTION, SOLUTION INTRAVENOUS; SUBCUTANEOUS at 12:29

## 2019-06-28 RX ADMIN — CHLORDIAZEPOXIDE HYDROCHLORIDE AND CLIDINIUM BROMIDE 1 CAPSULE: 5; 2.5 CAPSULE ORAL at 15:01

## 2019-06-28 RX ADMIN — INSULIN LISPRO 3 UNITS: 100 INJECTION, SOLUTION INTRAVENOUS; SUBCUTANEOUS at 21:17

## 2019-06-28 RX ADMIN — INSULIN DETEMIR 10 UNITS: 100 INJECTION, SOLUTION SUBCUTANEOUS at 21:17

## 2019-06-28 RX ADMIN — GABAPENTIN 400 MG: 400 CAPSULE ORAL at 14:52

## 2019-06-28 RX ADMIN — SODIUM CHLORIDE, PRESERVATIVE FREE 3 ML: 5 INJECTION INTRAVENOUS at 21:18

## 2019-06-28 RX ADMIN — CHLORDIAZEPOXIDE HYDROCHLORIDE AND CLIDINIUM BROMIDE 1 CAPSULE: 5; 2.5 CAPSULE ORAL at 01:03

## 2019-06-28 RX ADMIN — CHLORDIAZEPOXIDE HYDROCHLORIDE AND CLIDINIUM BROMIDE 1 CAPSULE: 5; 2.5 CAPSULE ORAL at 21:16

## 2019-06-28 RX ADMIN — PANCRELIPASE 18000 UNITS OF LIPASE: 30000; 6000; 19000 CAPSULE, DELAYED RELEASE PELLETS ORAL at 08:40

## 2019-06-28 RX ADMIN — FLUDROCORTISONE ACETATE 100 MCG: 0.1 TABLET ORAL at 12:25

## 2019-06-28 RX ADMIN — VENLAFAXINE HYDROCHLORIDE 75 MG: 75 CAPSULE, EXTENDED RELEASE ORAL at 08:40

## 2019-06-28 RX ADMIN — TOPIRAMATE 25 MG: 25 TABLET, FILM COATED ORAL at 08:40

## 2019-06-28 RX ADMIN — DIGOXIN 250 MCG: 250 TABLET ORAL at 12:25

## 2019-06-28 RX ADMIN — PANCRELIPASE 18000 UNITS OF LIPASE: 30000; 6000; 19000 CAPSULE, DELAYED RELEASE PELLETS ORAL at 12:25

## 2019-06-28 RX ADMIN — HYDROCODONE BITARTRATE AND ACETAMINOPHEN 1 TABLET: 7.5; 325 TABLET ORAL at 14:52

## 2019-06-28 RX ADMIN — PANCRELIPASE 18000 UNITS OF LIPASE: 30000; 6000; 19000 CAPSULE, DELAYED RELEASE PELLETS ORAL at 17:48

## 2019-06-28 RX ADMIN — ACETAMINOPHEN 650 MG: 325 TABLET, FILM COATED ORAL at 02:05

## 2019-06-28 RX ADMIN — ASPIRIN 81 MG CHEWABLE TABLET 81 MG: 81 TABLET CHEWABLE at 08:40

## 2019-06-28 RX ADMIN — CHLORDIAZEPOXIDE HYDROCHLORIDE AND CLIDINIUM BROMIDE 1 CAPSULE: 5; 2.5 CAPSULE ORAL at 08:40

## 2019-06-28 RX ADMIN — GABAPENTIN 400 MG: 400 CAPSULE ORAL at 21:17

## 2019-06-29 LAB
ANION GAP SERPL CALCULATED.3IONS-SCNC: 12 MMOL/L (ref 5–15)
BASOPHILS # BLD AUTO: 0.05 10*3/MM3 (ref 0–0.2)
BASOPHILS NFR BLD AUTO: 0.9 % (ref 0–1.5)
BUN BLD-MCNC: 12 MG/DL (ref 8–23)
BUN/CREAT SERPL: 16.9 (ref 7–25)
CALCIUM SPEC-SCNC: 8.7 MG/DL (ref 8.6–10.5)
CHLORIDE SERPL-SCNC: 101 MMOL/L (ref 98–107)
CO2 SERPL-SCNC: 25 MMOL/L (ref 22–29)
CREAT BLD-MCNC: 0.71 MG/DL (ref 0.57–1)
DEPRECATED RDW RBC AUTO: 48.6 FL (ref 37–54)
EOSINOPHIL # BLD AUTO: 0.14 10*3/MM3 (ref 0–0.4)
EOSINOPHIL NFR BLD AUTO: 2.5 % (ref 0.3–6.2)
ERYTHROCYTE [DISTWIDTH] IN BLOOD BY AUTOMATED COUNT: 14.5 % (ref 12.3–15.4)
GFR SERPL CREATININE-BSD FRML MDRD: 81 ML/MIN/1.73
GLUCOSE BLD-MCNC: 200 MG/DL (ref 65–99)
GLUCOSE BLDC GLUCOMTR-MCNC: 213 MG/DL (ref 70–130)
GLUCOSE BLDC GLUCOMTR-MCNC: 226 MG/DL (ref 70–130)
GLUCOSE BLDC GLUCOMTR-MCNC: 248 MG/DL (ref 70–130)
GLUCOSE BLDC GLUCOMTR-MCNC: 265 MG/DL (ref 70–130)
HCT VFR BLD AUTO: 40.8 % (ref 34–46.6)
HGB BLD-MCNC: 12.8 G/DL (ref 12–15.9)
IMM GRANULOCYTES # BLD AUTO: 0.01 10*3/MM3 (ref 0–0.05)
IMM GRANULOCYTES NFR BLD AUTO: 0.2 % (ref 0–0.5)
INR PPP: 1.39 (ref 0.85–1.16)
LYMPHOCYTES # BLD AUTO: 1.6 10*3/MM3 (ref 0.7–3.1)
LYMPHOCYTES NFR BLD AUTO: 28.4 % (ref 19.6–45.3)
MCH RBC QN AUTO: 28.4 PG (ref 26.6–33)
MCHC RBC AUTO-ENTMCNC: 31.4 G/DL (ref 31.5–35.7)
MCV RBC AUTO: 90.7 FL (ref 79–97)
MONOCYTES # BLD AUTO: 0.58 10*3/MM3 (ref 0.1–0.9)
MONOCYTES NFR BLD AUTO: 10.3 % (ref 5–12)
NEUTROPHILS # BLD AUTO: 3.25 10*3/MM3 (ref 1.7–7)
NEUTROPHILS NFR BLD AUTO: 57.7 % (ref 42.7–76)
NRBC BLD AUTO-RTO: 0 /100 WBC (ref 0–0.2)
PLATELET # BLD AUTO: 141 10*3/MM3 (ref 140–450)
PMV BLD AUTO: 12.3 FL (ref 6–12)
POTASSIUM BLD-SCNC: 4 MMOL/L (ref 3.5–5.2)
PROTHROMBIN TIME: 16.4 SECONDS (ref 11.2–14.3)
RBC # BLD AUTO: 4.5 10*6/MM3 (ref 3.77–5.28)
SODIUM BLD-SCNC: 138 MMOL/L (ref 136–145)
WBC NRBC COR # BLD: 5.63 10*3/MM3 (ref 3.4–10.8)

## 2019-06-29 PROCEDURE — 99214 OFFICE O/P EST MOD 30 MIN: CPT | Performed by: PHYSICIAN ASSISTANT

## 2019-06-29 PROCEDURE — 63710000001 INSULIN DETEMIR PER 5 UNITS: Performed by: NURSE PRACTITIONER

## 2019-06-29 PROCEDURE — 99213 OFFICE O/P EST LOW 20 MIN: CPT | Performed by: PSYCHIATRY & NEUROLOGY

## 2019-06-29 PROCEDURE — 82962 GLUCOSE BLOOD TEST: CPT

## 2019-06-29 PROCEDURE — 85025 COMPLETE CBC W/AUTO DIFF WBC: CPT | Performed by: NURSE PRACTITIONER

## 2019-06-29 PROCEDURE — 99225 PR SBSQ OBSERVATION CARE/DAY 25 MINUTES: CPT | Performed by: INTERNAL MEDICINE

## 2019-06-29 PROCEDURE — 80048 BASIC METABOLIC PNL TOTAL CA: CPT | Performed by: NURSE PRACTITIONER

## 2019-06-29 PROCEDURE — G0378 HOSPITAL OBSERVATION PER HR: HCPCS

## 2019-06-29 PROCEDURE — 85610 PROTHROMBIN TIME: CPT | Performed by: NURSE PRACTITIONER

## 2019-06-29 PROCEDURE — 63710000001 INSULIN LISPRO (HUMAN) PER 5 UNITS: Performed by: NURSE PRACTITIONER

## 2019-06-29 RX ORDER — TOPIRAMATE 25 MG/1
25 TABLET ORAL DAILY
Status: DISCONTINUED | OUTPATIENT
Start: 2019-06-29 | End: 2019-07-02 | Stop reason: HOSPADM

## 2019-06-29 RX ORDER — TOPIRAMATE 25 MG/1
25 TABLET ORAL NIGHTLY
Status: DISCONTINUED | OUTPATIENT
Start: 2019-06-29 | End: 2019-06-29

## 2019-06-29 RX ADMIN — ROSUVASTATIN CALCIUM 10 MG: 10 TABLET, COATED ORAL at 21:43

## 2019-06-29 RX ADMIN — CHLORDIAZEPOXIDE HYDROCHLORIDE AND CLIDINIUM BROMIDE 1 CAPSULE: 5; 2.5 CAPSULE ORAL at 08:55

## 2019-06-29 RX ADMIN — VENLAFAXINE HYDROCHLORIDE 75 MG: 75 CAPSULE, EXTENDED RELEASE ORAL at 08:56

## 2019-06-29 RX ADMIN — INSULIN LISPRO 3 UNITS: 100 INJECTION, SOLUTION INTRAVENOUS; SUBCUTANEOUS at 08:57

## 2019-06-29 RX ADMIN — DIGOXIN 250 MCG: 250 TABLET ORAL at 12:00

## 2019-06-29 RX ADMIN — PANCRELIPASE 18000 UNITS OF LIPASE: 30000; 6000; 19000 CAPSULE, DELAYED RELEASE PELLETS ORAL at 17:03

## 2019-06-29 RX ADMIN — INSULIN LISPRO 3 UNITS: 100 INJECTION, SOLUTION INTRAVENOUS; SUBCUTANEOUS at 12:00

## 2019-06-29 RX ADMIN — GABAPENTIN 400 MG: 400 CAPSULE ORAL at 06:34

## 2019-06-29 RX ADMIN — TOPIRAMATE 25 MG: 25 TABLET, FILM COATED ORAL at 17:03

## 2019-06-29 RX ADMIN — INSULIN LISPRO 4 UNITS: 100 INJECTION, SOLUTION INTRAVENOUS; SUBCUTANEOUS at 17:03

## 2019-06-29 RX ADMIN — HYDROCODONE BITARTRATE AND ACETAMINOPHEN 1 TABLET: 7.5; 325 TABLET ORAL at 02:05

## 2019-06-29 RX ADMIN — ASPIRIN 81 MG CHEWABLE TABLET 81 MG: 81 TABLET CHEWABLE at 08:59

## 2019-06-29 RX ADMIN — METOPROLOL TARTRATE 25 MG: 25 TABLET ORAL at 21:43

## 2019-06-29 RX ADMIN — METOPROLOL TARTRATE 25 MG: 25 TABLET ORAL at 08:57

## 2019-06-29 RX ADMIN — PANTOPRAZOLE SODIUM 40 MG: 40 TABLET, DELAYED RELEASE ORAL at 06:34

## 2019-06-29 RX ADMIN — INSULIN LISPRO 3 UNITS: 100 INJECTION, SOLUTION INTRAVENOUS; SUBCUTANEOUS at 21:43

## 2019-06-29 RX ADMIN — GABAPENTIN 400 MG: 400 CAPSULE ORAL at 14:01

## 2019-06-29 RX ADMIN — PANCRELIPASE 18000 UNITS OF LIPASE: 30000; 6000; 19000 CAPSULE, DELAYED RELEASE PELLETS ORAL at 08:58

## 2019-06-29 RX ADMIN — INSULIN DETEMIR 10 UNITS: 100 INJECTION, SOLUTION SUBCUTANEOUS at 21:43

## 2019-06-29 RX ADMIN — CHLORDIAZEPOXIDE HYDROCHLORIDE AND CLIDINIUM BROMIDE 1 CAPSULE: 5; 2.5 CAPSULE ORAL at 21:43

## 2019-06-29 RX ADMIN — PANCRELIPASE 18000 UNITS OF LIPASE: 30000; 6000; 19000 CAPSULE, DELAYED RELEASE PELLETS ORAL at 12:00

## 2019-06-29 RX ADMIN — SODIUM CHLORIDE, PRESERVATIVE FREE 3 ML: 5 INJECTION INTRAVENOUS at 21:46

## 2019-06-29 RX ADMIN — FLUDROCORTISONE ACETATE 100 MCG: 0.1 TABLET ORAL at 08:57

## 2019-06-29 RX ADMIN — HYDROCODONE BITARTRATE AND ACETAMINOPHEN 1 TABLET: 7.5; 325 TABLET ORAL at 21:47

## 2019-06-29 RX ADMIN — ACETAMINOPHEN 650 MG: 325 TABLET, FILM COATED ORAL at 14:01

## 2019-06-29 RX ADMIN — CHLORDIAZEPOXIDE HYDROCHLORIDE AND CLIDINIUM BROMIDE 1 CAPSULE: 5; 2.5 CAPSULE ORAL at 17:03

## 2019-06-29 RX ADMIN — SODIUM CHLORIDE, PRESERVATIVE FREE 3 ML: 5 INJECTION INTRAVENOUS at 08:58

## 2019-06-29 RX ADMIN — MECLIZINE HYDROCHLORIDE 25 MG: 25 TABLET ORAL at 15:03

## 2019-06-29 RX ADMIN — GABAPENTIN 400 MG: 400 CAPSULE ORAL at 21:43

## 2019-06-30 LAB
ANION GAP SERPL CALCULATED.3IONS-SCNC: 11 MMOL/L (ref 5–15)
BASOPHILS # BLD AUTO: 0.04 10*3/MM3 (ref 0–0.2)
BASOPHILS NFR BLD AUTO: 0.8 % (ref 0–1.5)
BUN BLD-MCNC: 12 MG/DL (ref 8–23)
BUN/CREAT SERPL: 17.4 (ref 7–25)
CALCIUM SPEC-SCNC: 8.8 MG/DL (ref 8.6–10.5)
CHLORIDE SERPL-SCNC: 103 MMOL/L (ref 98–107)
CO2 SERPL-SCNC: 26 MMOL/L (ref 22–29)
CREAT BLD-MCNC: 0.69 MG/DL (ref 0.57–1)
DEPRECATED RDW RBC AUTO: 48.7 FL (ref 37–54)
EOSINOPHIL # BLD AUTO: 0.13 10*3/MM3 (ref 0–0.4)
EOSINOPHIL NFR BLD AUTO: 2.6 % (ref 0.3–6.2)
ERYTHROCYTE [DISTWIDTH] IN BLOOD BY AUTOMATED COUNT: 14.4 % (ref 12.3–15.4)
GFR SERPL CREATININE-BSD FRML MDRD: 84 ML/MIN/1.73
GLUCOSE BLD-MCNC: 239 MG/DL (ref 65–99)
GLUCOSE BLDC GLUCOMTR-MCNC: 225 MG/DL (ref 70–130)
GLUCOSE BLDC GLUCOMTR-MCNC: 270 MG/DL (ref 70–130)
GLUCOSE BLDC GLUCOMTR-MCNC: 302 MG/DL (ref 70–130)
GLUCOSE BLDC GLUCOMTR-MCNC: 320 MG/DL (ref 70–130)
HCT VFR BLD AUTO: 40.4 % (ref 34–46.6)
HGB BLD-MCNC: 12.7 G/DL (ref 12–15.9)
IMM GRANULOCYTES # BLD AUTO: 0 10*3/MM3 (ref 0–0.05)
IMM GRANULOCYTES NFR BLD AUTO: 0 % (ref 0–0.5)
INR PPP: 1.19 (ref 0.85–1.16)
LYMPHOCYTES # BLD AUTO: 1.81 10*3/MM3 (ref 0.7–3.1)
LYMPHOCYTES NFR BLD AUTO: 36.6 % (ref 19.6–45.3)
MCH RBC QN AUTO: 28.9 PG (ref 26.6–33)
MCHC RBC AUTO-ENTMCNC: 31.4 G/DL (ref 31.5–35.7)
MCV RBC AUTO: 92 FL (ref 79–97)
MONOCYTES # BLD AUTO: 0.51 10*3/MM3 (ref 0.1–0.9)
MONOCYTES NFR BLD AUTO: 10.3 % (ref 5–12)
NEUTROPHILS # BLD AUTO: 2.46 10*3/MM3 (ref 1.7–7)
NEUTROPHILS NFR BLD AUTO: 49.7 % (ref 42.7–76)
NRBC BLD AUTO-RTO: 0 /100 WBC (ref 0–0.2)
PLATELET # BLD AUTO: 139 10*3/MM3 (ref 140–450)
PMV BLD AUTO: 12.3 FL (ref 6–12)
POTASSIUM BLD-SCNC: 4.1 MMOL/L (ref 3.5–5.2)
PROTHROMBIN TIME: 14.6 SECONDS (ref 11.2–14.3)
RBC # BLD AUTO: 4.39 10*6/MM3 (ref 3.77–5.28)
SODIUM BLD-SCNC: 140 MMOL/L (ref 136–145)
WBC NRBC COR # BLD: 4.95 10*3/MM3 (ref 3.4–10.8)

## 2019-06-30 PROCEDURE — 85610 PROTHROMBIN TIME: CPT | Performed by: NURSE PRACTITIONER

## 2019-06-30 PROCEDURE — 63710000001 INSULIN LISPRO (HUMAN) PER 5 UNITS: Performed by: NURSE PRACTITIONER

## 2019-06-30 PROCEDURE — 99212 OFFICE O/P EST SF 10 MIN: CPT | Performed by: PSYCHIATRY & NEUROLOGY

## 2019-06-30 PROCEDURE — 99212 OFFICE O/P EST SF 10 MIN: CPT | Performed by: PHYSICIAN ASSISTANT

## 2019-06-30 PROCEDURE — 80048 BASIC METABOLIC PNL TOTAL CA: CPT | Performed by: NURSE PRACTITIONER

## 2019-06-30 PROCEDURE — 82962 GLUCOSE BLOOD TEST: CPT

## 2019-06-30 PROCEDURE — G0378 HOSPITAL OBSERVATION PER HR: HCPCS

## 2019-06-30 PROCEDURE — 63710000001 INSULIN DETEMIR PER 5 UNITS: Performed by: PHYSICIAN ASSISTANT

## 2019-06-30 PROCEDURE — 99213 OFFICE O/P EST LOW 20 MIN: CPT | Performed by: PHYSICIAN ASSISTANT

## 2019-06-30 PROCEDURE — 85025 COMPLETE CBC W/AUTO DIFF WBC: CPT | Performed by: NURSE PRACTITIONER

## 2019-06-30 PROCEDURE — 99225 PR SBSQ OBSERVATION CARE/DAY 25 MINUTES: CPT | Performed by: PHYSICIAN ASSISTANT

## 2019-06-30 RX ORDER — BUTALBITAL, ACETAMINOPHEN AND CAFFEINE 50; 325; 40 MG/1; MG/1; MG/1
1 TABLET ORAL DAILY PRN
Status: DISCONTINUED | OUTPATIENT
Start: 2019-06-30 | End: 2019-07-02 | Stop reason: HOSPADM

## 2019-06-30 RX ORDER — MECLIZINE HYDROCHLORIDE 25 MG/1
25 TABLET ORAL EVERY 8 HOURS SCHEDULED
Status: DISCONTINUED | OUTPATIENT
Start: 2019-06-30 | End: 2019-07-02 | Stop reason: HOSPADM

## 2019-06-30 RX ORDER — METOCLOPRAMIDE 10 MG/1
10 TABLET ORAL DAILY PRN
Status: DISCONTINUED | OUTPATIENT
Start: 2019-06-30 | End: 2019-07-02 | Stop reason: HOSPADM

## 2019-06-30 RX ADMIN — ACETAMINOPHEN 650 MG: 325 TABLET, FILM COATED ORAL at 11:42

## 2019-06-30 RX ADMIN — BUTALBITAL, ACETAMINOPHEN AND CAFFEINE 1 TABLET: 50; 325; 40 TABLET ORAL at 13:52

## 2019-06-30 RX ADMIN — ASPIRIN 81 MG CHEWABLE TABLET 81 MG: 81 TABLET CHEWABLE at 08:29

## 2019-06-30 RX ADMIN — PANCRELIPASE 18000 UNITS OF LIPASE: 30000; 6000; 19000 CAPSULE, DELAYED RELEASE PELLETS ORAL at 18:42

## 2019-06-30 RX ADMIN — SODIUM CHLORIDE, PRESERVATIVE FREE 3 ML: 5 INJECTION INTRAVENOUS at 08:30

## 2019-06-30 RX ADMIN — GABAPENTIN 400 MG: 400 CAPSULE ORAL at 06:07

## 2019-06-30 RX ADMIN — HYDROCODONE BITARTRATE AND ACETAMINOPHEN 1 TABLET: 7.5; 325 TABLET ORAL at 20:46

## 2019-06-30 RX ADMIN — VENLAFAXINE HYDROCHLORIDE 75 MG: 75 CAPSULE, EXTENDED RELEASE ORAL at 08:30

## 2019-06-30 RX ADMIN — METOPROLOL TARTRATE 25 MG: 25 TABLET ORAL at 20:46

## 2019-06-30 RX ADMIN — MECLIZINE HYDROCHLORIDE 25 MG: 25 TABLET ORAL at 20:45

## 2019-06-30 RX ADMIN — GABAPENTIN 400 MG: 400 CAPSULE ORAL at 13:52

## 2019-06-30 RX ADMIN — CHLORDIAZEPOXIDE HYDROCHLORIDE AND CLIDINIUM BROMIDE 1 CAPSULE: 5; 2.5 CAPSULE ORAL at 20:46

## 2019-06-30 RX ADMIN — DIGOXIN 250 MCG: 250 TABLET ORAL at 11:42

## 2019-06-30 RX ADMIN — PANCRELIPASE 18000 UNITS OF LIPASE: 30000; 6000; 19000 CAPSULE, DELAYED RELEASE PELLETS ORAL at 11:42

## 2019-06-30 RX ADMIN — ROSUVASTATIN CALCIUM 10 MG: 10 TABLET, COATED ORAL at 20:46

## 2019-06-30 RX ADMIN — TOPIRAMATE 25 MG: 25 TABLET, FILM COATED ORAL at 08:30

## 2019-06-30 RX ADMIN — FLUDROCORTISONE ACETATE 100 MCG: 0.1 TABLET ORAL at 08:30

## 2019-06-30 RX ADMIN — PANTOPRAZOLE SODIUM 40 MG: 40 TABLET, DELAYED RELEASE ORAL at 06:07

## 2019-06-30 RX ADMIN — INSULIN LISPRO 5 UNITS: 100 INJECTION, SOLUTION INTRAVENOUS; SUBCUTANEOUS at 18:42

## 2019-06-30 RX ADMIN — GABAPENTIN 400 MG: 400 CAPSULE ORAL at 20:46

## 2019-06-30 RX ADMIN — INSULIN LISPRO 3 UNITS: 100 INJECTION, SOLUTION INTRAVENOUS; SUBCUTANEOUS at 08:35

## 2019-06-30 RX ADMIN — INSULIN LISPRO 4 UNITS: 100 INJECTION, SOLUTION INTRAVENOUS; SUBCUTANEOUS at 20:45

## 2019-06-30 RX ADMIN — CHLORDIAZEPOXIDE HYDROCHLORIDE AND CLIDINIUM BROMIDE 1 CAPSULE: 5; 2.5 CAPSULE ORAL at 16:36

## 2019-06-30 RX ADMIN — CHLORDIAZEPOXIDE HYDROCHLORIDE AND CLIDINIUM BROMIDE 1 CAPSULE: 5; 2.5 CAPSULE ORAL at 08:29

## 2019-06-30 RX ADMIN — METOPROLOL TARTRATE 25 MG: 25 TABLET ORAL at 08:29

## 2019-06-30 RX ADMIN — METRONIDAZOLE: 7.5 CREAM TOPICAL at 20:45

## 2019-06-30 RX ADMIN — INSULIN LISPRO 5 UNITS: 100 INJECTION, SOLUTION INTRAVENOUS; SUBCUTANEOUS at 11:44

## 2019-06-30 RX ADMIN — INSULIN DETEMIR 16 UNITS: 100 INJECTION, SOLUTION SUBCUTANEOUS at 20:45

## 2019-06-30 RX ADMIN — MECLIZINE HYDROCHLORIDE 25 MG: 25 TABLET ORAL at 08:30

## 2019-06-30 RX ADMIN — PANCRELIPASE 18000 UNITS OF LIPASE: 30000; 6000; 19000 CAPSULE, DELAYED RELEASE PELLETS ORAL at 08:29

## 2019-06-30 RX ADMIN — ACETAMINOPHEN 650 MG: 325 TABLET, FILM COATED ORAL at 02:16

## 2019-07-01 LAB
ANION GAP SERPL CALCULATED.3IONS-SCNC: 10 MMOL/L (ref 5–15)
BASOPHILS # BLD AUTO: 0.05 10*3/MM3 (ref 0–0.2)
BASOPHILS NFR BLD AUTO: 0.9 % (ref 0–1.5)
BUN BLD-MCNC: 12 MG/DL (ref 8–23)
BUN/CREAT SERPL: 15.6 (ref 7–25)
CALCIUM SPEC-SCNC: 8.6 MG/DL (ref 8.6–10.5)
CHLORIDE SERPL-SCNC: 103 MMOL/L (ref 98–107)
CO2 SERPL-SCNC: 28 MMOL/L (ref 22–29)
CREAT BLD-MCNC: 0.77 MG/DL (ref 0.57–1)
DEPRECATED RDW RBC AUTO: 47.5 FL (ref 37–54)
EOSINOPHIL # BLD AUTO: 0.14 10*3/MM3 (ref 0–0.4)
EOSINOPHIL NFR BLD AUTO: 2.6 % (ref 0.3–6.2)
ERYTHROCYTE [DISTWIDTH] IN BLOOD BY AUTOMATED COUNT: 14 % (ref 12.3–15.4)
GFR SERPL CREATININE-BSD FRML MDRD: 74 ML/MIN/1.73
GLUCOSE BLD-MCNC: 278 MG/DL (ref 65–99)
GLUCOSE BLDC GLUCOMTR-MCNC: 234 MG/DL (ref 70–130)
GLUCOSE BLDC GLUCOMTR-MCNC: 281 MG/DL (ref 70–130)
GLUCOSE BLDC GLUCOMTR-MCNC: 319 MG/DL (ref 70–130)
GLUCOSE BLDC GLUCOMTR-MCNC: 372 MG/DL (ref 70–130)
HCT VFR BLD AUTO: 40.7 % (ref 34–46.6)
HGB BLD-MCNC: 12.6 G/DL (ref 12–15.9)
IMM GRANULOCYTES # BLD AUTO: 0.01 10*3/MM3 (ref 0–0.05)
IMM GRANULOCYTES NFR BLD AUTO: 0.2 % (ref 0–0.5)
INR PPP: 1.07 (ref 0.85–1.16)
LYMPHOCYTES # BLD AUTO: 1.77 10*3/MM3 (ref 0.7–3.1)
LYMPHOCYTES NFR BLD AUTO: 32.8 % (ref 19.6–45.3)
MCH RBC QN AUTO: 28.6 PG (ref 26.6–33)
MCHC RBC AUTO-ENTMCNC: 31 G/DL (ref 31.5–35.7)
MCV RBC AUTO: 92.3 FL (ref 79–97)
MONOCYTES # BLD AUTO: 0.49 10*3/MM3 (ref 0.1–0.9)
MONOCYTES NFR BLD AUTO: 9.1 % (ref 5–12)
NEUTROPHILS # BLD AUTO: 2.94 10*3/MM3 (ref 1.7–7)
NEUTROPHILS NFR BLD AUTO: 54.4 % (ref 42.7–76)
NRBC BLD AUTO-RTO: 0 /100 WBC (ref 0–0.2)
PLATELET # BLD AUTO: 139 10*3/MM3 (ref 140–450)
PMV BLD AUTO: 12.5 FL (ref 6–12)
POTASSIUM BLD-SCNC: 4.5 MMOL/L (ref 3.5–5.2)
PROTHROMBIN TIME: 13.4 SECONDS (ref 11.2–14.3)
RBC # BLD AUTO: 4.41 10*6/MM3 (ref 3.77–5.28)
SODIUM BLD-SCNC: 141 MMOL/L (ref 136–145)
WBC NRBC COR # BLD: 5.4 10*3/MM3 (ref 3.4–10.8)

## 2019-07-01 PROCEDURE — 82962 GLUCOSE BLOOD TEST: CPT

## 2019-07-01 PROCEDURE — 99213 OFFICE O/P EST LOW 20 MIN: CPT | Performed by: INTERNAL MEDICINE

## 2019-07-01 PROCEDURE — G0378 HOSPITAL OBSERVATION PER HR: HCPCS

## 2019-07-01 PROCEDURE — 97116 GAIT TRAINING THERAPY: CPT

## 2019-07-01 PROCEDURE — 97530 THERAPEUTIC ACTIVITIES: CPT

## 2019-07-01 PROCEDURE — 63710000001 INSULIN LISPRO (HUMAN) PER 5 UNITS: Performed by: NURSE PRACTITIONER

## 2019-07-01 PROCEDURE — 99225 PR SBSQ OBSERVATION CARE/DAY 25 MINUTES: CPT | Performed by: INTERNAL MEDICINE

## 2019-07-01 PROCEDURE — 80048 BASIC METABOLIC PNL TOTAL CA: CPT | Performed by: NURSE PRACTITIONER

## 2019-07-01 PROCEDURE — 85025 COMPLETE CBC W/AUTO DIFF WBC: CPT | Performed by: NURSE PRACTITIONER

## 2019-07-01 PROCEDURE — 85610 PROTHROMBIN TIME: CPT | Performed by: NURSE PRACTITIONER

## 2019-07-01 PROCEDURE — 63710000001 INSULIN DETEMIR PER 5 UNITS: Performed by: PHYSICIAN ASSISTANT

## 2019-07-01 PROCEDURE — 97110 THERAPEUTIC EXERCISES: CPT

## 2019-07-01 PROCEDURE — 99212 OFFICE O/P EST SF 10 MIN: CPT | Performed by: PSYCHIATRY & NEUROLOGY

## 2019-07-01 RX ORDER — CYCLOBENZAPRINE HCL 10 MG
10 TABLET ORAL EVERY 8 HOURS SCHEDULED
Status: DISCONTINUED | OUTPATIENT
Start: 2019-07-01 | End: 2019-07-02 | Stop reason: HOSPADM

## 2019-07-01 RX ORDER — BACLOFEN 10 MG/1
5 TABLET ORAL 3 TIMES DAILY
Status: DISCONTINUED | OUTPATIENT
Start: 2019-07-01 | End: 2019-07-02 | Stop reason: HOSPADM

## 2019-07-01 RX ORDER — BACLOFEN 10 MG/1
10 TABLET ORAL 3 TIMES DAILY
Status: DISCONTINUED | OUTPATIENT
Start: 2019-07-01 | End: 2019-07-01

## 2019-07-01 RX ADMIN — MECLIZINE HYDROCHLORIDE 25 MG: 25 TABLET ORAL at 20:28

## 2019-07-01 RX ADMIN — ASPIRIN 81 MG CHEWABLE TABLET 81 MG: 81 TABLET CHEWABLE at 08:23

## 2019-07-01 RX ADMIN — FLUDROCORTISONE ACETATE 100 MCG: 0.1 TABLET ORAL at 08:23

## 2019-07-01 RX ADMIN — PANCRELIPASE 18000 UNITS OF LIPASE: 30000; 6000; 19000 CAPSULE, DELAYED RELEASE PELLETS ORAL at 17:24

## 2019-07-01 RX ADMIN — INSULIN LISPRO 6 UNITS: 100 INJECTION, SOLUTION INTRAVENOUS; SUBCUTANEOUS at 17:26

## 2019-07-01 RX ADMIN — MECLIZINE HYDROCHLORIDE 25 MG: 25 TABLET ORAL at 14:41

## 2019-07-01 RX ADMIN — CYCLOBENZAPRINE HYDROCHLORIDE 10 MG: 10 TABLET, FILM COATED ORAL at 12:59

## 2019-07-01 RX ADMIN — BACLOFEN 5 MG: 10 TABLET ORAL at 20:28

## 2019-07-01 RX ADMIN — PANCRELIPASE 18000 UNITS OF LIPASE: 30000; 6000; 19000 CAPSULE, DELAYED RELEASE PELLETS ORAL at 12:59

## 2019-07-01 RX ADMIN — METOPROLOL TARTRATE 25 MG: 25 TABLET ORAL at 08:23

## 2019-07-01 RX ADMIN — METOPROLOL TARTRATE 25 MG: 25 TABLET ORAL at 20:27

## 2019-07-01 RX ADMIN — INSULIN LISPRO 5 UNITS: 100 INJECTION, SOLUTION INTRAVENOUS; SUBCUTANEOUS at 20:28

## 2019-07-01 RX ADMIN — INSULIN DETEMIR 16 UNITS: 100 INJECTION, SOLUTION SUBCUTANEOUS at 20:31

## 2019-07-01 RX ADMIN — CHLORDIAZEPOXIDE HYDROCHLORIDE AND CLIDINIUM BROMIDE 1 CAPSULE: 5; 2.5 CAPSULE ORAL at 08:23

## 2019-07-01 RX ADMIN — METRONIDAZOLE: 7.5 CREAM TOPICAL at 20:38

## 2019-07-01 RX ADMIN — PANTOPRAZOLE SODIUM 40 MG: 40 TABLET, DELAYED RELEASE ORAL at 06:06

## 2019-07-01 RX ADMIN — ROSUVASTATIN CALCIUM 10 MG: 10 TABLET, COATED ORAL at 20:28

## 2019-07-01 RX ADMIN — INSULIN LISPRO 4 UNITS: 100 INJECTION, SOLUTION INTRAVENOUS; SUBCUTANEOUS at 13:00

## 2019-07-01 RX ADMIN — CHLORDIAZEPOXIDE HYDROCHLORIDE AND CLIDINIUM BROMIDE 1 CAPSULE: 5; 2.5 CAPSULE ORAL at 17:24

## 2019-07-01 RX ADMIN — BACLOFEN 5 MG: 10 TABLET ORAL at 17:25

## 2019-07-01 RX ADMIN — INSULIN LISPRO 3 UNITS: 100 INJECTION, SOLUTION INTRAVENOUS; SUBCUTANEOUS at 08:25

## 2019-07-01 RX ADMIN — CHLORDIAZEPOXIDE HYDROCHLORIDE AND CLIDINIUM BROMIDE 1 CAPSULE: 5; 2.5 CAPSULE ORAL at 20:27

## 2019-07-01 RX ADMIN — PANCRELIPASE 18000 UNITS OF LIPASE: 30000; 6000; 19000 CAPSULE, DELAYED RELEASE PELLETS ORAL at 08:23

## 2019-07-01 RX ADMIN — CYCLOBENZAPRINE HYDROCHLORIDE 10 MG: 10 TABLET, FILM COATED ORAL at 20:28

## 2019-07-01 RX ADMIN — TOPIRAMATE 25 MG: 25 TABLET, FILM COATED ORAL at 08:23

## 2019-07-01 RX ADMIN — VENLAFAXINE HYDROCHLORIDE 75 MG: 75 CAPSULE, EXTENDED RELEASE ORAL at 08:22

## 2019-07-01 RX ADMIN — DIGOXIN 250 MCG: 250 TABLET ORAL at 12:59

## 2019-07-01 RX ADMIN — BACLOFEN 10 MG: 10 TABLET ORAL at 08:23

## 2019-07-01 RX ADMIN — MECLIZINE HYDROCHLORIDE 25 MG: 25 TABLET ORAL at 06:06

## 2019-07-01 RX ADMIN — GABAPENTIN 400 MG: 400 CAPSULE ORAL at 20:28

## 2019-07-01 RX ADMIN — GABAPENTIN 400 MG: 400 CAPSULE ORAL at 06:06

## 2019-07-01 RX ADMIN — GABAPENTIN 400 MG: 400 CAPSULE ORAL at 14:41

## 2019-07-01 RX ADMIN — PANTOPRAZOLE SODIUM 40 MG: 40 TABLET, DELAYED RELEASE ORAL at 08:23

## 2019-07-01 NOTE — PROGRESS NOTES
"[facility] Daily Note       LOS: 0 days   Patient Care Team:  Connor Ritter MD as PCP - General  Connor Ritter MD as PCP - Family Medicine  Knuckles, Danya LEONARDO MD as PCP - Claims Attributed    Chief Complaint: Falls, orthostasis, long-term anticoagulation for remote bilateral pulmonary emboli    Subjective     Subjective:     Review of Systems:   As above.    Medications:    aspirin 81 mg Oral Daily   clidinium-chlordiazePOXIDE 1 capsule Oral TID   digoxin 250 mcg Oral Daily   fludrocortisone 100 mcg Oral Daily   gabapentin 400 mg Oral Q8H   insulin detemir 16 Units Subcutaneous Nightly   insulin lispro 0-7 Units Subcutaneous 4x Daily With Meals & Nightly   meclizine 25 mg Oral Q8H   metoprolol tartrate 25 mg Oral Q12H   metroNIDAZOLE  Topical Nightly   pancrelipase (Lip-Prot-Amyl) 18,000 units of lipase Oral TID With Meals   pantoprazole 40 mg Oral QAM AC   rosuvastatin 10 mg Oral Nightly   sodium chloride 3 mL Intravenous Q12H   topiramate 25 mg Oral Daily   venlafaxine XR 75 mg Oral Daily       Objective     Vital Sign Min/Max for last 24 hours  Temp  Min: 97.6 °F (36.4 °C)  Max: 97.9 °F (36.6 °C)   BP  Min: 115/60  Max: 147/64   Pulse  Min: 66  Max: 79   Resp  Min: 16  Max: 18   SpO2  Min: 93 %  Max: 97 %   No Data Recorded   No Data Recorded    No intake or output data in the 24 hours ending 07/01/19 0801     Flowsheet Rows      First Filed Value   Admission Height  172.7 cm (68\") Documented at 06/27/2019 1804   Admission Weight  83.9 kg (185 lb) Documented at 06/27/2019 1804          Physical Exam:    General: Alert and oriented.   Cardiovascular: Heart has a nondisplaced focal PMI. Regular rate and rhythm without murmur, gallop or rub.  Lungs: Clear without rales or wheezes. Equal expansion is noted.   Abdomen: Soft, nontender.  Extremities: Show no edema.   Skin: warm and dry.     Results Review:    I reviewed the patient's new clinical results.  EKG:  Tele:    Labs:    Results " from last 7 days   Lab Units 07/01/19 0443 06/30/19  0528 06/29/19  0421  06/27/19  1811   SODIUM mmol/L 141 140 138   < > 137   POTASSIUM mmol/L 4.5 4.1 4.0   < > 4.6   CHLORIDE mmol/L 103 103 101   < > 100   CO2 mmol/L 28.0 26.0 25.0   < > 25.0   BUN mg/dL 12 12 12   < > 15   CREATININE mg/dL 0.77 0.69 0.71   < > 1.05*   CALCIUM mg/dL 8.6 8.8 8.7   < > 9.1   BILIRUBIN mg/dL  --   --   --   --  0.2   ALK PHOS U/L  --   --   --   --  77   ALT (SGPT) U/L  --   --   --   --  30   AST (SGOT) U/L  --   --   --   --  27   GLUCOSE mg/dL 278* 239* 200*   < > 168*    < > = values in this interval not displayed.     Results from last 7 days   Lab Units 07/01/19 0443 06/30/19 0528 06/29/19  0421   WBC 10*3/mm3 5.40 4.95 5.63   HEMOGLOBIN g/dL 12.6 12.7 12.8   HEMATOCRIT % 40.7 40.4 40.8   PLATELETS 10*3/mm3 139* 139* 141     No results found for: TROPONINI, TROPONINT  Lab Results   Component Value Date    CHOL 97 06/28/2019     Lab Results   Component Value Date    TRIG 171 (H) 06/28/2019     Lab Results   Component Value Date    HDL 34 (L) 06/28/2019     No components found for: LDLCALC  Lab Results   Component Value Date    INR 1.07 07/01/2019    INR 1.19 (H) 06/30/2019    INR 1.39 (H) 06/29/2019    PROTIME 13.4 07/01/2019    PROTIME 14.6 (H) 06/30/2019    PROTIME 16.4 (H) 06/29/2019         Ejection Fraction:  60%    Assessment   Assessment:    1. Frequent falls with injury  2. History of bilateral pulmonary embolism following surgery in 2007 on chronic Coumadin therapy  3. History of CVA  4. Status post PFO closure 10/2010 using an Amplatzer PFO closure device  5. Echo 5/2019: EF 60% with Amplatzer device well-seated without flow across device  6. Hypertension  7. Hyperlipidemia      Plan:    Continue Florinef at 0.1 mg daily  Continue metoprolol 25 mg every 12 hours  Warfarin has been discontinued as the pulmonary emboli were postoperative her and her bilateral lower extremity venous duplex this admission was negative  for DVT.      Rika Sullivan MD  07/01/19  8:01 AM

## 2019-07-01 NOTE — PLAN OF CARE
Problem: Patient Care Overview  Goal: Plan of Care Review  Outcome: Ongoing (interventions implemented as appropriate)   07/01/19 4114   Plan of Care Review   Progress improving   OTHER   Outcome Summary VSS; Pt supervision for bed mobility, SBA for STS and CGA/RW EOB to chair. Pt tolerated BUE strengthening ex's and gentle neck stretches to address headache. Pt progressing toward fxl goals. Continue per OT POC.    Coping/Psychosocial   Plan of Care Reviewed With patient

## 2019-07-01 NOTE — PLAN OF CARE
Problem: Patient Care Overview  Goal: Plan of Care Review  Outcome: Ongoing (interventions implemented as appropriate)   07/01/19 8970   Plan of Care Review   Progress improving   OTHER   Outcome Summary INCREASED DISTANCE AMBULATED WITH R WALKER  FEET BUT POOR SAFETY AWARENESS AND HAD 2 EPISODES OF LOB WITHOUT WARNING. BP STABLE IN STANDING.  CONTINUES TO C/O HA/NECK DISCOMFORT. RECOMMEND HOME WITH FAMILY ASSIST, HHPT AND R WALKER.    Coping/Psychosocial   Plan of Care Reviewed With patient;spouse

## 2019-07-01 NOTE — PROGRESS NOTES
Neurology       Patient Care Team:  Connor Ritter MD as PCP - General  Connor Ritter MD as PCP - Family Medicine  KnuckDanya blount MD as PCP - Claims Attributed    Chief complaint: Neck pain and headache    History: Patient's cervical spine shows some mild cervical stenosis.    Neurosurgery seen her and do not feel that she is an operative candidate.    She complains of pain in her neck rating up the back of her head.    It is not throbbing.    She got some relief with massage from the physical therapist today.  Past Medical History:   Diagnosis Date   • Anxiety    • Arm pain    • Arthritis    • Depression    • Diabetes mellitus (CMS/HCC)    • Hyperlipidemia    • Hypertension    • Neck pain on left side    • Palpitations 4/18/2018   • Pancreatitis    • Pulmonary embolism (CMS/HCC)    • Rectal cancer (CMS/HCC)    • Stroke syndrome 9/14/2016    · Assessed By: Devaughn Lewis (Neurology); Last Assessed: 16 Jun 2015  Right cerebrovascular accident in July or August 2010, evaluated at [facility].  Admission to [facility] on 09/28/2010 with headache and stuttering, consistent with TIA.  Chronic Coumadin therapy, initiated following bilateral pulmonary emboli in 2007 after fall and hip replacement.  She was already on 81 mg of aspirin as well, 09/2010.  MRI of the brain on 09/28/2010 revealing old right parietal cerebrovascular accident.  MRA revealing normal carotids, middle anterior and posterior cerebral arteries with minimal ostial stenosis of both vertebral arteries.  GENARO by Dr. Oliver Mobley on 09/28/2010:  patent foramen ovale with a small amount of right to left shunting.  Normal LVEF and normal valvular structures.   Patent foramen ovale closure using a 25 mm. Amplatzer cribriform occluder, 10/05/2010.   Echocardiogram, 06/23/2014:  LVEF (55% to 60%) with and Amplatzer device noted to be well-seated in    • Thrombocytopenia (CMS/HCC)    • Transient cerebral  ischemia        Vital Signs   Vitals:    06/30/19 1908 07/01/19 0312 07/01/19 0700 07/01/19 1115   BP: 119/57 132/58 147/64 128/53   BP Location: Right arm Right arm Right arm Right arm   Patient Position: Lying Lying Lying Sitting   Pulse: 66 76 73    Resp: 16 16 16 16   Temp: 97.6 °F (36.4 °C) 97.8 °F (36.6 °C) 97.9 °F (36.6 °C) 97.8 °F (36.6 °C)   TempSrc: Oral Oral Oral Oral   SpO2: 95% 97% 93% 96%   Weight:       Height:           Physical Exam:   General: Pleasant and alert              Neuro: Tenderness in the trapezius muscles bilaterally.    Results Review:  Cervical MRI is personally reviewed and shows a minimal retrolithiasis C4 on C5 likely congenital stenosis greatest at C4-5 and 5 6.      Results from last 7 days   Lab Units 07/01/19  0443   WBC 10*3/mm3 5.40   HEMOGLOBIN g/dL 12.6   HEMATOCRIT % 40.7   PLATELETS 10*3/mm3 139*     Results from last 7 days   Lab Units 07/01/19  0443 06/30/19  0528 06/29/19  0421  06/27/19  1811   SODIUM mmol/L 141 140 138   < > 137   POTASSIUM mmol/L 4.5 4.1 4.0   < > 4.6   CHLORIDE mmol/L 103 103 101   < > 100   CO2 mmol/L 28.0 26.0 25.0   < > 25.0   BUN mg/dL 12 12 12   < > 15   CREATININE mg/dL 0.77 0.69 0.71   < > 1.05*   CALCIUM mg/dL 8.6 8.8 8.7   < > 9.1   BILIRUBIN mg/dL  --   --   --   --  0.2   ALK PHOS U/L  --   --   --   --  77   ALT (SGPT) U/L  --   --   --   --  30   AST (SGOT) U/L  --   --   --   --  27   GLUCOSE mg/dL 278* 239* 200*   < > 168*    < > = values in this interval not displayed.       Imaging Results (last 24 hours)     ** No results found for the last 24 hours. **          Assessment:  Cervicogenic headaches    Plan:  Flexeril 10 mg every 8 hours.    Decrease baclofen to 5 mg every 8 hours.        Comment:  Hopefully home soon          I discussed the patients findings and my recommendations with patient    Wesly Joiner MD  07/01/19  12:41 PM

## 2019-07-01 NOTE — PROGRESS NOTES
Continued Stay Note  [facility]     Patient Name: Leela Muller  MRN: [id number]  Today's Date: 7/1/2019    Admit Date: 6/27/2019    Discharge Plan     Row Name 07/01/19 1317       Plan    Plan  Home w/ [facility]     Patient/Family in Agreement with Plan  yes    Plan Comments  Spoke w/ patient at bedside. Plan is still home w/ HH. Denies any needs at this time. CM will continue to follow.    Final Discharge Disposition Code  06 - home with home health care        Discharge Codes    No documentation.       Expected Discharge Date and Time     Expected Discharge Date Expected Discharge Time    Jul 1, 2019             Heriberto Muller RN

## 2019-07-01 NOTE — PROGRESS NOTES
[facility] Medicine Services  PROGRESS NOTE    Patient Name: Leela Muller  : 1950  MRN: [id number]    Date of Admission: 2019  Length of Stay: 0  Primary Care Physician: Connor Ritter MD    Subjective     CC: f/u frequent falls    HPI:  Reports that she has a terrible HA with neck spasms this morning. Turned PT away due to pain. States she is not ready to leave the hospital. BP has been adequate, no orthostasis noted but patient did not ambulate with PT yesterday.     Review of Systems      Otherwise ROS is negative except as mentioned in the HPI.    Objective     Vital Signs:   Temp:  [97.6 °F (36.4 °C)-97.9 °F (36.6 °C)] 97.9 °F (36.6 °C)  Heart Rate:  [66-79] 73  Resp:  [16-18] 16  BP: (117-147)/(55-68) 147/64  Total (NIH Stroke Scale): 0     Physical Exam:  GEN- in visible distress, resting in chair, reports significant AH   HEENT- bruising present over nose as well as upper lip   NECK- supple, trachea midline, no masses  RESP: ctab, normal effort  CV: no murmurs, s1/s2, rrr, /90  MSK: no edema noted, spontaneous movement of all extremities  NEURO: alert, oriented, no focal deficits  SKIN: no rashes  PSYCH: appropriate mood and affect     Results Reviewed:  I have personally reviewed current lab, radiology, and data and agree.    Results from last 7 days   Lab Units 19   WBC 10*3/mm3 5.40 4.95 5.63   HEMOGLOBIN g/dL 12.6 12.7 12.8   HEMATOCRIT % 40.7 40.4 40.8   PLATELETS 10*3/mm3 139* 139* 141   INR  1.07 1.19* 1.39*     Results from last 7 days   Lab Units 19  1811   SODIUM mmol/L 141 140 138   < > 137   POTASSIUM mmol/L 4.5 4.1 4.0   < > 4.6   CHLORIDE mmol/L 103 103 101   < > 100   CO2 mmol/L 28.0 26.0 25.0   < > 25.0   BUN mg/dL 12 12 12   < > 15   CREATININE mg/dL 0.77 0.69 0.71   < > 1.05*   GLUCOSE mg/dL 278* 239* 200*   < > 168*   CALCIUM mg/dL 8.6 8.8 8.7    < > 9.1   ALT (SGPT) U/L  --   --   --   --  30   AST (SGOT) U/L  --   --   --   --  27    < > = values in this interval not displayed.     Estimated Creatinine Clearance: 73.2 mL/min (by C-G formula based on SCr of 0.77 mg/dL).    Microbiology Results Abnormal     None        Imaging Results (last 24 hours)     ** No results found for the last 24 hours. **        Results for orders placed during the hospital encounter of 05/06/19   Adult Transthoracic Echo Complete W/ Cont if Necessary Per Protocol    Narrative · Estimated EF = 60%.  · Left ventricular systolic function is normal.  · Trace mitral and tricuspid regurgitation  · Intact Amplatzer septal occluder with no evidence of flow across the   device.        I have reviewed the medications:  Scheduled Meds:    aspirin 81 mg Oral Daily   baclofen 10 mg Oral TID   clidinium-chlordiazePOXIDE 1 capsule Oral TID   digoxin 250 mcg Oral Daily   fludrocortisone 100 mcg Oral Daily   gabapentin 400 mg Oral Q8H   insulin detemir 16 Units Subcutaneous Nightly   insulin lispro 0-7 Units Subcutaneous 4x Daily With Meals & Nightly   meclizine 25 mg Oral Q8H   metoprolol tartrate 25 mg Oral Q12H   metroNIDAZOLE  Topical Nightly   pancrelipase (Lip-Prot-Amyl) 18,000 units of lipase Oral TID With Meals   pantoprazole 40 mg Oral QAM AC   rosuvastatin 10 mg Oral Nightly   sodium chloride 3 mL Intravenous Q12H   topiramate 25 mg Oral Daily   venlafaxine XR 75 mg Oral Daily     Continuous Infusions:      PRN Meds:  •  acetaminophen **OR** acetaminophen  •  bisacodyl  •  bisacodyl  •  butalbital-acetaminophen-caffeine  •  dextrose  •  dextrose  •  glucagon (human recombinant)  •  HYDROcodone-acetaminophen  •  metoclopramide  •  ondansetron **OR** ondansetron  •  sennosides-docusate sodium  •  sodium chloride    Assessment / Plan     Active Hospital Problems    Diagnosis POA   • Falls [W19.XXXA] Yes   • Dyslipidemia [E78.5] Yes   • Hypertension [I10] Yes     Low diastolic pressure       • Ataxia [R27.0] Yes     · Assessed By: Devaughn Lewis (Neurology); Last Assessed: 04 Nov 2014     • CVA (cerebral vascular accident) (CMS/[facility]) [I63.9] Unknown     · Assessed By: Devaughn Lewis (Neurology); Last Assessed: 16 Jun 2015   a. Right cerebrovascular accident in July or August 2010, evaluated at [facility].   b. Admission to [facility] on 09/28/2010 with headache and stuttering, consistent with TIA.   c. Chronic Coumadin therapy, initiated following bilateral pulmonary emboli in 2007 after fall and hip replacement.  She was already on 81 mg of aspirin as well, 09/2010.   d. MRI of the brain on 09/28/2010 revealing old right parietal cerebrovascular accident.   e. MRA revealing normal carotids, middle anterior and posterior cerebral arteries with minimal ostial stenosis of both vertebral arteries.   f. GENARO by Dr. Oliver Mobley on 09/28/2010:  patent foramen ovale with a small amount of right to left shunting.  Normal LVEF and normal valvular structures.    g. Patent foramen ovale closure using a 25 mm. Amplatzer cribriform occluder, 10/05/2010.    h. Echocardiogram, 06/23/2014:  LVEF (55% to 60%) with and Amplatzer device noted to be well-seated in the interatrial septum, trace MR, and mild TR.          Brief Hospital Course to date:  Leela Muller is a 69 y.o. female directly admitted to [facility] from Dr. Lewis's office on 6/27/19 for evaluation of multiple falls in several days. Orthostatic in the office (130/80 - 110/60 with sitting to standing)    Frequent falls  Orthostatic Hypotension  Traumatic possible nasal bone fracture  ---stroke workup obtained on admission essentially negative (negative MRI/MRA/TTE-deferred given patient had recent one in May 2019- this ECHO reviewed)  ---CT facial boneswith some concern about possible non-displaced fracture in setting of trauma  ---PT/OT to see, HH at d/c recommended and patient agreeable   ---CT C-spine obtained 6/28  with some concern of spinal stenosis-NSGY opinion requested regarding this read and rec for outpatient f/u no surgical management at this time   ---holding coumadin indefinitely given frequent falls, appreciate help of Dr Sullivan as well as neurology in this case   ---have restarted metoprolol at half home dose (25 BID) and florinef initiated 6/28     History of multiple blood clots   --on chronic anticoagulation as above, holding   - BLE duplex on 6/28 was negative     HA  -in part favored to be traumatic s/p fall- a/w neck pain and muscle spasms-- have restarted home fioricet as well as home baclofen, monitor    HTN  --have added back metoprolol at half home dose as above     HLD  ---continue statin    DM2  ---continue basal/bolus, monitor and titrate as needed    DVT Prophylaxis: Mechanical   Disposition: I expect the patient to be discharged home with HH/PT/OT tomorrow. She has declined inpatient rehabilitation. Reports not feeling well enough to leave the hospital today but am hopeful for next 24h discharge.     CODE STATUS:   Code Status and Medical Interventions:   Ordered at: 06/27/19 9951     Level Of Support Discussed With:    Patient     Code Status:    CPR     Medical Interventions (Level of Support Prior to Arrest):    Full         Electronically signed by Itzel Storey MD, 07/01/19, 10:23 AM.

## 2019-07-01 NOTE — THERAPY TREATMENT NOTE
Acute Care - Physical Therapy Treatment Note  [facility]     Patient Name: Leela Muller  : 1950  MRN: [id number]  Today's Date: 2019  Onset of Illness/Injury or Date of Surgery: 19  Date of Referral to PT: 19  Referring Physician: WEST Rodrigues    Admit Date: 2019    Visit Dx:    ICD-10-CM ICD-9-CM   1. Impaired mobility and ADLs Z74.09 799.89   2. Ataxia R27.0 781.3   3. Status post fall Z91.81 V15.88   4. Impaired functional mobility, balance, gait, and endurance Z74.09 V49.89     Patient Active Problem List   Diagnosis   • Migraine without aura and without status migrainosus, not intractable   • Neck pain   • Chronic low back pain   • Anxiety   • Ataxia   • Atypical chest pain   • Coronary artery disease   • Headache   • Lumbar radiculopathy   • Myalgia and myositis   • Nausea   • Numbness   • Patent foramen ovale   • Status post fall   • CVA (cerebral vascular accident) (CMS/HCC)   • Pancreatitis   • Tingling   • Cervical radiculopathy   • Dyslipidemia   • Hypertension   • Edema   • Embolism and thrombosis of unspecified site [I74.9]   • Palpitations   • Medication monitoring encounter   • Diabetic polyneuropathy associated with diabetes mellitus due to underlying condition (CMS/HCC)   • Postherpetic neuralgia   • History of pulmonary embolism   • Dyspnea on exertion   • Dehydration   • Falls       Therapy Treatment    Rehabilitation Treatment Summary     Row Name 19 1400 19 0801          Treatment Time/Intention    Discipline  physical therapist  -CD  occupational therapist  -TA     Document Type  therapy note (daily note)  -CD  therapy note (daily note)  -TA     Subjective Information  complains of;weakness;pain;dizziness DIZZINESS ON 2 EPISODES DURING GAIT IN [place].   -CD  complains of;pain HA  -TA     Mode of Treatment  physical therapy  -CD  occupational therapy  -TA     Patient/Family Observations  PT ATTEMPTING TO GET OOB WITHOUT ASSIST DUE TO NEEDING TO GET TO  BATHROOM QUICKLY FOR BM. C/O STOMACH IS CRAMPING.   -CD  Pt supine in bed, RA, SCDs  -TA     Care Plan Review  care plan/treatment goals reviewed;patient/other agree to care plan  -CD  care plan/treatment goals reviewed;risks/benefits reviewed;patient/other agree to care plan  -TA     Therapy Frequency (PT Clinical Impression)  daily  -CD  --     Patient Effort  good  -CD  good  -TA     Existing Precautions/Restrictions  fall PT IS IMPULSIVE WITH POOR SAFETY AWARENESS.   -CD  fall  -TA     Recorded by [CD] Sherin Chaney, PT 07/01/19 1449 [TA] Mike Matthews, OT 07/01/19 0925     Row Name 07/01/19 1400 07/01/19 0801          Vital Signs    Pre Systolic BP Rehab  --  147  -TA     Pre Treatment Diastolic BP  --  64  -TA     Post Systolic BP Rehab  138  -CD  --     Post Treatment Diastolic BP  61  -CD  --     Pretreatment Heart Rate (beats/min)  --  78  -TA     Posttreatment Heart Rate (beats/min)  95  -CD  --     Pre SpO2 (%)  --  94  -TA     O2 Delivery Pre Treatment  --  room air  -TA     Intra SpO2 (%)  --  94  -TA     O2 Delivery Intra Treatment  --  room air  -TA     Post SpO2 (%)  94  -CD  96  -TA     O2 Delivery Post Treatment  room air  -CD  room air  -TA     Pre Patient Position  Sitting  -CD  Supine  -TA     Intra Patient Position  Standing  -CD  Standing  -TA     Post Patient Position  Supine  -CD  Sitting  -TA     Recorded by [CD] Sherin Chaney, PT 07/01/19 1449 [TA] Mike Matthews, OT 07/01/19 0925     Row Name 07/01/19 1400 07/01/19 0801          Cognitive Assessment/Intervention- PT/OT    Affect/Mental Status (Cognitive)  WFL  -CD  WFL  -TA     Orientation Status (Cognition)  oriented x 4  -CD  oriented x 4  -TA     Follows Commands (Cognition)  --  WFL  -TA     Cognitive Function (Cognitive)  safety deficit  -CD  safety deficit  -TA     Safety Deficit (Cognitive)  impulsivity;at risk behavior observed;insight into deficits/self awareness;safety precautions follow-through/compliance  -CD   mild deficit;safety precautions awareness;safety precautions follow-through/compliance  -TA     Personal Safety Interventions  fall prevention program maintained;gait belt;muscle strengthening facilitated;nonskid shoes/slippers when out of bed;supervised activity  -CD  fall prevention program maintained;gait belt;muscle strengthening facilitated;nonskid shoes/slippers when out of bed;supervised activity  -TA     Recorded by [CD] Sherin Chaney, PT 07/01/19 1449 [TA] Mike Matthews, OT 07/01/19 0925     Row Name 07/01/19 1400 07/01/19 0801          Safety Issues, Functional Mobility    Safety Issues Affecting Function (Mobility)  safety precaution awareness;insight into deficits/self awareness;impulsivity;safety precautions follow-through/compliance  -CD  safety precaution awareness;safety precautions follow-through/compliance  -TA     Impairments Affecting Function (Mobility)  balance;endurance/activity tolerance;strength;pain  -CD  balance;endurance/activity tolerance;pain;sensation/sensory awareness;strength  -TA     Recorded by [CD] Sherin Chaney, PT 07/01/19 1449 [TA] Mike Matthews, OT 07/01/19 0925     Row Name 07/01/19 1400 07/01/19 0801          Bed Mobility Assessment/Treatment    Bed Mobility Assessment/Treatment  supine-sit;sit-supine  -CD  supine-sit  -TA     Rolling Left [place] (Bed Mobility)  supervision  -CD  --     Rolling Right [place] (Bed Mobility)  supervision  -CD  --     Supine-Sit [place] (Bed Mobility)  --  supervision  -TA     Bed Mobility, Safety Issues  --  decreased use of arms for pushing/pulling;decreased use of legs for bridging/pushing  -TA     Assistive Device (Bed Mobility)  bed rails;head of bed elevated  -CD  bed rails;head of bed elevated  -TA     Comment (Bed Mobility)  INCREASED TIME AND EFFORT.   -CD  --     Recorded by [CD] Sherin Chaney, PT 07/01/19 1449 [TA] Mike Matthews, OT 07/01/19 0925     Row Name 07/01/19 0801             Functional  Mobility    Functional Mobility- Ind. Level  contact guard assist;verbal cues required  -TA      Functional Mobility- Device  rolling walker  -TA      Functional Mobility-Distance (Feet)  -- EOB to chair  -TA      Functional Mobility- Safety Issues  step length decreased  -TA      Recorded by [TA] Mike Matthews, OT 07/01/19 0925      Row Name 07/01/19 1400 07/01/19 0801          Transfer Assessment/Treatment    Transfer Assessment/Treatment  sit-stand transfer;stand-sit transfer;toilet transfer  -CD  sit-stand transfer;stand-sit transfer  -TA     Comment (Transfers)  CUES FOR HAND PLACEMENT AND SAFETY WITH WALKER.   -CD  VCs for safe technique  -TA     Recorded by [CD] Sherin Chaney, PT 07/01/19 1449 [TA] Mike Matthews, OT 07/01/19 0925     Row Name 07/01/19 1400 07/01/19 0801          Sit-Stand Transfer    Sit-Stand [place] (Transfers)  contact guard;verbal cues  -CD  stand by assist;verbal cues  -TA     Assistive Device (Sit-Stand Transfers)  walker, front-wheeled  -CD  walker, front-wheeled  -TA     Recorded by [CD] Sherin Chaney, PT 07/01/19 1449 [TA] Mike Matthews, OT 07/01/19 0925     Row Name 07/01/19 1400 07/01/19 0801          Stand-Sit Transfer    Stand-Sit [place] (Transfers)  contact guard;verbal cues  -CD  stand by assist;verbal cues  -TA     Assistive Device (Stand-Sit Transfers)  walker, front-wheeled  -CD  walker, front-wheeled  -TA     Recorded by [CD] Sherin Chaney, PT 07/01/19 1449 [TA] Mike Matthews, OT 07/01/19 0925     Row Name 07/01/19 1400             Toilet Transfer    Type (Toilet Transfer)  sit-stand;stand-sit PERFORMED X 2 REPS.   -CD      [place] Level (Toilet Transfer)  contact guard  -CD      Assistive Device (Toilet Transfer)  walker, front-wheeled;grab bars/safety frame  -CD      Recorded by [CD] Sherin Chaney, PT 07/01/19 1449      Row Name 07/01/19 1400             Gait/Stairs Assessment/Training    16495 - Gait Training Minutes   15  -CD       Gait/Stairs Assessment/Training  gait/ambulation independence;gait/ambulation assistive device;distance ambulated;gait pattern  -CD      [place] Level (Gait)  minimum assist (75% patient effort)  -CD      Assistive Device (Gait)  walker, front-wheeled  -CD      Distance in Feet (Gait)  350   -CD      Pattern (Gait)  step-through  -CD      Deviations/Abnormal Patterns (Gait)  stride length decreased;jerrod decreased  -CD      Bilateral Gait Deviations  forward flexed posture  -CD      Comment (Gait/Stairs)  PRIMARILY CGA BUT REQUIRED MIN ASSIST ON 2 OCCASIONS DUE TO LOB WITHOUT WARNING. PT C/O DIZZINESS/LIGHTHEADEDNESS. BP IN STANDING STABLE.   -CD      Recorded by [CD] Sherin Chaney, PT 07/01/19 1449      Row Name 07/01/19 0801             ADL Assessment/Intervention    BADL Assessment/Intervention  upper body dressing  -TA      Recorded by [TA] Mike Matthews, OT 07/01/19 0925      Row Name 07/01/19 0801             Upper Body Dressing Assessment/Training    Upper Body Dressing [place] Level  don;front opening garment;supervision;set up  -TA      Upper Body Dressing Position  edge of bed sitting  -TA      Recorded by [TA] Mike Matthews, OT 07/01/19 0925      Row Name 07/01/19 0801             BADL Safety/Performance    Impairments, BADL Safety/Performance  balance;endurance/activity tolerance  -TA      Skilled BADL Treatment/Intervention  energy conservation  -TA      Recorded by [TA] Mike Matthews, OT 07/01/19 0925      Row Name 07/01/19 1400 07/01/19 0801          Motor Skills Assessment/Interventions    Additional Documentation  Balance (Group);Balance Interventions (Group)  -CD  Balance (Group);Balance Interventions (Group);Therapeutic Exercise (Group);Therapeutic Exercise Interventions (Group)  -TA     Recorded by [CD] Sherin Chaney, PT 07/01/19 1449 [TA] Mike Matthews, OT 07/01/19 0925     Row Name 07/01/19 1400 07/01/19 0801          Therapeutic Exercise    Therapeutic  Exercise  --  seated, upper extremities  -TA     Additional Documentation  --  Therapeutic Exercise (Row)  -TA     92210 - PT Therapeutic Activity Minutes  14  -CD  --     23644 - OT Therapeutic Exercise Minutes  --  11  -TA     05039 - OT Therapeutic Activity Minutes  --  12  -TA     Recorded by [CD] Sherin Chaney, PT 07/01/19 1449 [TA] Mike Matthews, OT 07/01/19 0925     Row Name 07/01/19 0801             Upper Extremity Seated Therapeutic Exercise    Performed, Seated Upper Extremity (Therapeutic Exercise)  shoulder flexion/extension;shoulder horizontal abduction/adduction;scapular protraction/retraction;elbow flexion/extension;other (see comments) gentle lateral cervical stretches, neck rolls  -TA      Exercise Type, Seated Upper Extremity (Therapeutic Exercise)  AROM (active range of motion)  -TA      Expected Outcomes, Seated Upper Extremity (Therapeutic Exercise)  improve functional tolerance, self-care activity;improve performance, BADLs;improve performance, transfer skills  -TA      Sets/Reps Detail, Seated Upper Extremity (Therapeutic Exercise)  1/10  -TA      Recorded by [TA] Mike Matthews, OT 07/01/19 0925      Row Name 07/01/19 0801             Balance    Balance  static sitting balance;dynamic standing balance  -TA      Recorded by [TA] Mike Matthews, OT 07/01/19 0925      Row Name 07/01/19 0801             Static Sitting Balance    Level of [place] (Unsupported Sitting, Static Balance)  conditional independence  -TA      Sitting Position (Unsupported Sitting, Static Balance)  sitting on edge of bed  -TA      Time Able to Maintain Position (Unsupported Sitting, Static Balance)  more than 5 minutes  -TA      Recorded by [TA] Mike Matthews, OT 07/01/19 0925      Row Name 07/01/19 1400             Static Standing Balance    Level of [place] (Supported Standing, Static Balance)  contact guard assist  -CD      Assistive Device Utilized (Supported Standing, Static Balance)   walker, rolling  -CD      Recorded by [CD] Sherin Chaney, PT 07/01/19 1449      Row Name 07/01/19 1400 07/01/19 0801          Dynamic Standing Balance    Level of [place], Reaches Outside Midline (Standing, Dynamic Balance)  minimal assist, 75% patient effort  -CD  contact guard assist  -TA     Time Able to Maintain Position, Reaches Outside Midline (Standing, Dynamic Balance)  --  1 to 2 minutes  -TA     Assistive Device Utilized (Supported Standing, Dynamic Balance)  walker, rolling  -CD  walker, rolling  -TA     Recorded by [CD] Sherin Chaney, PT 07/01/19 1449 [TA] Mike Matthews, OT 07/01/19 0925     Row Name 07/01/19 1400 07/01/19 0801          Positioning and Restraints    Pre-Treatment Position  in bed  -CD  in bed  -TA     Post Treatment Position  bed  -CD  chair  -TA     In Bed  supine;call light within reach;exit alarm on;with family/caregiver PILLOW UNDER L HIP TO UNWEIGHT BUTTOCKS/COCCYX  -CD  --     In Chair  --  notified nsg;reclined;call light within reach;encouraged to call for assist;exit alarm on;legs elevated  -TA     Recorded by [CD] Sherin Chaney, PT 07/01/19 1449 [TA] Mike Matthews, OT 07/01/19 0925     Row Name 07/01/19 0801             Pain Assessment    Additional Documentation  Pain Scale: Numbers Pre/Post-Treatment (Group)  -TA      Recorded by [TA] Mike Matthews, OT 07/01/19 0925      Row Name 07/01/19 1400 07/01/19 0801          Pain Scale: Numbers Pre/Post-Treatment    Pain Scale: Numbers, Pretreatment  8/10  -CD  6/10  -TA     Pain Scale: Numbers, Post-Treatment  8/10  -CD  6/10  -TA     Pain Location - Side  --  Bilateral  -TA     Pain Location - Orientation  posterior  -CD  posterior  -TA     Pain Location  hand  -CD  head  -TA     Pre/Post Treatment Pain Comment  --  Premedicated for tx, tolerated  -TA     Pain Intervention(s)  Ambulation/increased activity;Repositioned NOTIFIED NSG PT REQUESTING MASSAGE THERAPIST AS PER O.T.   -CD   Repositioned;Ambulation/increased activity;Back rub  -TA     Recorded by [CD] Sherin Chaney, PT 07/01/19 1449 [TA] Mike Matthews, OT 07/01/19 0925     Row Name 07/01/19 0801             Coping    Observed Emotional State  calm;cooperative  -TA      Verbalized Emotional State  acceptance  -TA      Recorded by [TA] Mike Matthews, OT 07/01/19 0925      Row Name 07/01/19 1400 07/01/19 0801          Plan of Care Review    Plan of Care Reviewed With  patient;spouse CONTACTED CM TO ORDER R WALKER FOR HOME.   -CD  patient  -TA     Recorded by [CD] Sherin Chaney, PT 07/01/19 1449 [TA] Mike Matthews, OT 07/01/19 0925     Row Name 07/01/19 0801             Outcome Summary/Treatment Plan (OT)    Daily Summary of Progress (OT)  progress toward functional goals is good  -TA      Plan for Continued Treatment (OT)  Continue per OT POC  -TA      Anticipated Discharge Disposition (OT)  home with assist;home with home health  -TA      Recorded by [TA] Mike Matthews, OT 07/01/19 0925      Row Name 07/01/19 1400             Outcome Summary/Treatment Plan (PT)    Daily Summary of Progress (PT)  progress toward functional goals is good  -CD      Recorded by [CD] Sherin Chaney, PT 07/01/19 1449        User Key  (r) = Recorded By, (t) = Taken By, (c) = Cosigned By    Initials Name Effective Dates Discipline    CD Sherin Chaney, PT 06/19/15 -  PT    TA Mike Matthews, OT 03/14/16 -  OT                   Physical Therapy Education     Title: PT OT SLP Therapies (Done)     Topic: Physical Therapy (Done)     Point: Mobility training (Done)     Learning Progress Summary           Patient Acceptance, E, VU,NR by CD at 7/1/2019  2:49 PM    Comment:  SAFETY AWARENESS WITH USE OF R WALKER AT ALL TIMES, CALLING OUT FOR ASSIST, D/C PLANNING, GAIT TRAINING WITH R WALKER.                   Point: Home exercise program (Done)     Learning Progress Summary           Patient Acceptance, E, VU,NR by CD at 7/1/2019  2:49 PM     Comment:  SAFETY AWARENESS WITH USE OF R WALKER AT ALL TIMES, CALLING OUT FOR ASSIST, D/C PLANNING, GAIT TRAINING WITH R WALKER.                   Point: Body mechanics (Done)     Learning Progress Summary           Patient Acceptance, E, VU,NR by CD at 7/1/2019  2:49 PM    Comment:  SAFETY AWARENESS WITH USE OF R WALKER AT ALL TIMES, CALLING OUT FOR ASSIST, D/C PLANNING, GAIT TRAINING WITH R WALKER.                   Point: Precautions (Done)     Learning Progress Summary           Patient Acceptance, E, VU,NR by CD at 7/1/2019  2:49 PM    Comment:  SAFETY AWARENESS WITH USE OF R WALKER AT ALL TIMES, CALLING OUT FOR ASSIST, D/C PLANNING, GAIT TRAINING WITH R WALKER.                               User Key     Initials Effective Dates Name Provider Type Discipline    CD 06/19/15 -  Sherin Chaney, PT Physical Therapist PT                PT Recommendation and Plan  Therapy Frequency (PT Clinical Impression): daily  Outcome Summary/Treatment Plan (PT)  Daily Summary of Progress (PT): progress toward functional goals is good  Plan of Care Reviewed With: patient, spouse  Progress: improving  Outcome Summary: INCREASED DISTANCE AMBULATED WITH R WALKER BUT POOR SAFETY AWARENESS. CONTINUES TO C/O HA/NECK DISCOMFORT. RECOMMEND HOME WITH FAMILY ASSIST, HHPT AND R WALKER.   Outcome Measures     Row Name 07/01/19 1400 07/01/19 0801          How much help from another person do you currently need...    Turning from your back to your side while in flat bed without using bedrails?  4  -CD  --     Moving from lying on back to sitting on the side of a flat bed without bedrails?  4  -CD  --     Moving to and from a bed to a chair (including a wheelchair)?  3  -CD  --     Standing up from a chair using your arms (e.g., wheelchair, bedside chair)?  3  -CD  --     Climbing 3-5 steps with a railing?  2  -CD  --     To walk in hospital room?  3  -CD  --     AM-PAC 6 Clicks Score  19  -CD  --        How much help from another is  currently needed...    Putting on and taking off regular lower body clothing?  --  3  -TA     Bathing (including washing, rinsing, and drying)  --  3  -TA     Toileting (which includes using toilet bed pan or urinal)  --  3  -TA     Putting on and taking off regular upper body clothing  --  4  -TA     Taking care of personal grooming (such as brushing teeth)  --  4  -TA     Eating meals  --  4  -TA     Score  --  21  -TA        Functional Assessment    Outcome Measure Options  AM-PAC 6 Clicks Basic Mobility (PT)  -CD  AM-PAC 6 Clicks Daily Activity (OT)  -TA       User Key  (r) = Recorded By, (t) = Taken By, (c) = Cosigned By    Initials Name Provider Type    Sherin Mays, PT Physical Therapist    TA Mike Matthews, OT Occupational Therapist         Time Calculation:   PT Charges     Row Name 07/01/19 1400             Time Calculation    Start Time  1400  -CD      PT Received On  07/01/19  -CD      PT Goal Re-Cert Due Date  08/08/19  -CD         Time Calculation- PT    Total Timed Code Minutes- PT  29 minute(s)  -CD         Timed Charges    97492 - Gait Training Minutes   15  -CD      55559 - PT Therapeutic Activity Minutes  14  -CD        User Key  (r) = Recorded By, (t) = Taken By, (c) = Cosigned By    Initials Name Provider Type    Sherin Mays, PT Physical Therapist        Therapy Charges for Today     Code Description Service Date Service Provider Modifiers Qty    96762607791 HC GAIT TRAINING EA 15 MIN 7/1/2019 Sherin Chaney, PT GP 1    20942068161 HC PT THERAPEUTIC ACT EA 15 MIN 7/1/2019 Sherin Chaney, PT GP 1          PT G-Codes  Outcome Measure Options: AM-PAC 6 Clicks Basic Mobility (PT)  AM-PAC 6 Clicks Score: 19  Score: 21    Sherin Chaney, PT  7/1/2019

## 2019-07-01 NOTE — THERAPY TREATMENT NOTE
Acute Care - Occupational Therapy Treatment Note  [facility]     Patient Name: Leela Muller  : 1950  MRN: [id number]  Today's Date: 2019  Onset of Illness/Injury or Date of Surgery: 19  Date of Referral to OT: 19  Referring Physician: WEST Rodrigues    Admit Date: 2019       ICD-10-CM ICD-9-CM   1. Impaired mobility and ADLs Z74.09 799.89   2. Ataxia R27.0 781.3   3. Status post fall Z91.81 V15.88   4. Impaired functional mobility, balance, gait, and endurance Z74.09 V49.89     Patient Active Problem List   Diagnosis   • Migraine without aura and without status migrainosus, not intractable   • Neck pain   • Chronic low back pain   • Anxiety   • Ataxia   • Atypical chest pain   • Coronary artery disease   • Headache   • Lumbar radiculopathy   • Myalgia and myositis   • Nausea   • Numbness   • Patent foramen ovale   • Status post fall   • CVA (cerebral vascular accident) (CMS/HCC)   • Pancreatitis   • Tingling   • Cervical radiculopathy   • Dyslipidemia   • Hypertension   • Edema   • Embolism and thrombosis of unspecified site [I74.9]   • Palpitations   • Medication monitoring encounter   • Diabetic polyneuropathy associated with diabetes mellitus due to underlying condition (CMS/HCC)   • Postherpetic neuralgia   • History of pulmonary embolism   • Dyspnea on exertion   • Dehydration   • Falls     Past Medical History:   Diagnosis Date   • Anxiety    • Arm pain    • Arthritis    • Depression    • Diabetes mellitus (CMS/HCC)    • Hyperlipidemia    • Hypertension    • Neck pain on left side    • Palpitations 2018   • Pancreatitis    • Pulmonary embolism (CMS/HCC)    • Rectal cancer (CMS/HCC)    • Stroke syndrome 2016    · Assessed By: Devaughn Lewis (Neurology); Last Assessed: 2015  Right cerebrovascular accident in July or 2010, evaluated at [facility].  Admission to [facility] on 2010 with headache and stuttering,  consistent with TIA.  Chronic Coumadin therapy, initiated following bilateral pulmonary emboli in 2007 after fall and hip replacement.  She was already on 81 mg of aspirin as well, 09/2010.  MRI of the brain on 09/28/2010 revealing old right parietal cerebrovascular accident.  MRA revealing normal carotids, middle anterior and posterior cerebral arteries with minimal ostial stenosis of both vertebral arteries.  GENARO by Dr. Oliver Mobley on 09/28/2010:  patent foramen ovale with a small amount of right to left shunting.  Normal LVEF and normal valvular structures.   Patent foramen ovale closure using a 25 mm. Amplatzer cribriform occluder, 10/05/2010.   Echocardiogram, 06/23/2014:  LVEF (55% to 60%) with and Amplatzer device noted to be well-seated in    • Thrombocytopenia (CMS/HCC)    • Transient cerebral ischemia      Past Surgical History:   Procedure Laterality Date   • APPENDECTOMY     • BREAST BIOPSY Left 2012    benign   • BREAST EXCISIONAL BIOPSY Left     benign   • BREAST EXCISIONAL BIOPSY Right     benign   • CHOLECYSTECTOMY     • HYSTERECTOMY     • OOPHORECTOMY     • RECTAL SURGERY     • TONSILLECTOMY     • TOTAL HIP ARTHROPLASTY REVISION         Therapy Treatment    Rehabilitation Treatment Summary     Row Name 07/01/19 0801             Treatment Time/Intention    Discipline  occupational therapist  -TA      Document Type  therapy note (daily note)  -TA      Subjective Information  complains of;pain HA  -TA      Mode of Treatment  occupational therapy  -TA      Patient/Family Observations  Pt supine in bed, RA, SCDs  -TA      Care Plan Review  care plan/treatment goals reviewed;risks/benefits reviewed;patient/other agree to care plan  -TA      Patient Effort  good  -TA      Existing Precautions/Restrictions  fall  -TA      Recorded by [TA] Mike Matthews OT 07/01/19 0978      Row Name 07/01/19 0801             Vital Signs    Pre Systolic BP Rehab  147  -TA      Pre Treatment Diastolic BP  64  -TA       Pretreatment Heart Rate (beats/min)  78  -TA      Pre SpO2 (%)  94  -TA      O2 Delivery Pre Treatment  room air  -TA      Intra SpO2 (%)  94  -TA      O2 Delivery Intra Treatment  room air  -TA      Post SpO2 (%)  96  -TA      O2 Delivery Post Treatment  room air  -TA      Pre Patient Position  Supine  -TA      Intra Patient Position  Standing  -TA      Post Patient Position  Sitting  -TA      Recorded by [TA] Mike Matthews, OT 07/01/19 0925      Row Name 07/01/19 0801             Cognitive Assessment/Intervention- PT/OT    Affect/Mental Status (Cognitive)  WFL  -TA      Orientation Status (Cognition)  oriented x 4  -TA      Follows Commands (Cognition)  WFL  -TA      Cognitive Function (Cognitive)  safety deficit  -TA      Safety Deficit (Cognitive)  mild deficit;safety precautions awareness;safety precautions follow-through/compliance  -TA      Personal Safety Interventions  fall prevention program maintained;gait belt;muscle strengthening facilitated;nonskid shoes/slippers when out of bed;supervised activity  -TA      Recorded by [TA] Mike Matthews, OT 07/01/19 0925      Row Name 07/01/19 0801             Safety Issues, Functional Mobility    Safety Issues Affecting Function (Mobility)  safety precaution awareness;safety precautions follow-through/compliance  -TA      Impairments Affecting Function (Mobility)  balance;endurance/activity tolerance;pain;sensation/sensory awareness;strength  -TA      Recorded by [TA] Mike Matthews, OT 07/01/19 0925      Row Name 07/01/19 0801             Bed Mobility Assessment/Treatment    Bed Mobility Assessment/Treatment  supine-sit  -TA      Supine-Sit [place] (Bed Mobility)  supervision  -TA      Bed Mobility, Safety Issues  decreased use of arms for pushing/pulling;decreased use of legs for bridging/pushing  -TA      Assistive Device (Bed Mobility)  bed rails;head of bed elevated  -TA      Recorded by [TA] Mike Matthews, OT 07/01/19 0925       Row Name 07/01/19 0801             Functional Mobility    Functional Mobility- Ind. Level  contact guard assist;verbal cues required  -TA      Functional Mobility- Device  rolling walker  -TA      Functional Mobility-Distance (Feet)  -- EOB to chair  -TA      Functional Mobility- Safety Issues  step length decreased  -TA      Recorded by [TA] Mike Matthews, OT 07/01/19 0925      Row Name 07/01/19 0801             Transfer Assessment/Treatment    Transfer Assessment/Treatment  sit-stand transfer;stand-sit transfer  -TA      Comment (Transfers)  VCs for safe technique  -TA      Recorded by [TA] Mike Matthews, OT 07/01/19 0925      Row Name 07/01/19 0801             Sit-Stand Transfer    Sit-Stand [place] (Transfers)  stand by assist;verbal cues  -TA      Assistive Device (Sit-Stand Transfers)  walker, front-wheeled  -TA      Recorded by [TA] Mike Mathtews, OT 07/01/19 0925      Row Name 07/01/19 0801             Stand-Sit Transfer    Stand-Sit Kindred (Transfers)  stand by assist;verbal cues  -TA      Assistive Device (Stand-Sit Transfers)  walker, front-wheeled  -TA      Recorded by [TA] Mike Matthews, OT 07/01/19 0925      Row Name 07/01/19 0801             ADL Assessment/Intervention    BADL Assessment/Intervention  upper body dressing  -TA      Recorded by [TA] Mike Matthews, OT 07/01/19 0925      Row Name 07/01/19 0801             Upper Body Dressing Assessment/Training    Upper Body Dressing Kindred Level  don;front opening garment;supervision;set up  -TA      Upper Body Dressing Position  edge of bed sitting  -TA      Recorded by [TA] Mike Matthews, OT 07/01/19 0925      Row Name 07/01/19 0801             BADL Safety/Performance    Impairments, BADL Safety/Performance  balance;endurance/activity tolerance  -TA      Skilled BADL Treatment/Intervention  energy conservation  -TA      Recorded by [TA] Mike Matthews, OT 07/01/19 0925      Row Name 07/01/19  0801             Motor Skills Assessment/Interventions    Additional Documentation  Balance (Group);Balance Interventions (Group);Therapeutic Exercise (Group);Therapeutic Exercise Interventions (Group)  -TA      Recorded by [TA] Mike Matthews, OT 07/01/19 0925      Row Name 07/01/19 0801             Therapeutic Exercise    Therapeutic Exercise  seated, upper extremities  -TA      Additional Documentation  Therapeutic Exercise (Row)  -TA      82121 - OT Therapeutic Exercise Minutes  11  -TA      72979 - OT Therapeutic Activity Minutes  12  -TA      Recorded by [TA] Mike Matthews, OT 07/01/19 0925      Row Name 07/01/19 0801             Upper Extremity Seated Therapeutic Exercise    Performed, Seated Upper Extremity (Therapeutic Exercise)  shoulder flexion/extension;shoulder horizontal abduction/adduction;scapular protraction/retraction;elbow flexion/extension;other (see comments) gentle lateral cervical stretches, neck rolls  -TA      Exercise Type, Seated Upper Extremity (Therapeutic Exercise)  AROM (active range of motion)  -TA      Expected Outcomes, Seated Upper Extremity (Therapeutic Exercise)  improve functional tolerance, self-care activity;improve performance, BADLs;improve performance, transfer skills  -TA      Sets/Reps Detail, Seated Upper Extremity (Therapeutic Exercise)  1/10  -TA      Recorded by [TA] Mike Matthews, OT 07/01/19 0925      Row Name 07/01/19 0801             Balance    Balance  static sitting balance;dynamic standing balance  -TA      Recorded by [TA] Mike Matthews, OT 07/01/19 0925      Row Name 07/01/19 0801             Static Sitting Balance    Level of [place] (Unsupported Sitting, Static Balance)  conditional independence  -TA      Sitting Position (Unsupported Sitting, Static Balance)  sitting on edge of bed  -TA      Time Able to Maintain Position (Unsupported Sitting, Static Balance)  more than 5 minutes  -TA      Recorded by [TA] Mike Matthews,  OT 07/01/19 0925      Row Name 07/01/19 0801             Dynamic Standing Balance    Level of [place], Reaches Outside Midline (Standing, Dynamic Balance)  contact guard assist  -TA      Time Able to Maintain Position, Reaches Outside Midline (Standing, Dynamic Balance)  1 to 2 minutes  -TA      Assistive Device Utilized (Supported Standing, Dynamic Balance)  walker, rolling  -TA      Recorded by [TA] Mike Matthews, OT 07/01/19 0925      Row Name 07/01/19 0801             Positioning and Restraints    Pre-Treatment Position  in bed  -TA      Post Treatment Position  chair  -TA      In Chair  notified nsg;reclined;call light within reach;encouraged to call for assist;exit alarm on;legs elevated  -TA      Recorded by [TA] Mike Matthews, OT 07/01/19 0925      Row Name 07/01/19 0801             Pain Assessment    Additional Documentation  Pain Scale: Numbers Pre/Post-Treatment (Group)  -TA      Recorded by [TA] Mike Matthews, OT 07/01/19 0925      Row Name 07/01/19 0801             Pain Scale: Numbers Pre/Post-Treatment    Pain Scale: Numbers, Pretreatment  6/10  -TA      Pain Scale: Numbers, Post-Treatment  6/10  -TA      Pain Location - Side  Bilateral  -TA      Pain Location - Orientation  posterior  -TA      Pain Location  head  -TA      Pre/Post Treatment Pain Comment  Premedicated for tx, tolerated  -TA      Pain Intervention(s)  Repositioned;Ambulation/increased activity;Back rub  -TA      Recorded by [TA] Mike Matthews, OT 07/01/19 0925      Row Name 07/01/19 0801             Coping    Observed Emotional State  calm;cooperative  -TA      Verbalized Emotional State  acceptance  -TA      Recorded by [TA] Mike Matthews, OT 07/01/19 0925      Row Name 07/01/19 0801             Plan of Care Review    Plan of Care Reviewed With  patient  -TA      Recorded by [TA] Mike Matthews, OT 07/01/19 0925      Row Name 07/01/19 0801             Outcome Summary/Treatment Plan (OT)    Daily  Summary of Progress (OT)  progress toward functional goals is good  -TA      Plan for Continued Treatment (OT)  Continue per OT POC  -TA      Anticipated Discharge Disposition (OT)  home with assist;home with home health  -TA      Recorded by [TA] Mike Matthews OT 07/01/19 0925        User Key  (r) = Recorded By, (t) = Taken By, (c) = Cosigned By    Initials Name Effective Dates Discipline    TA Mike Matthews OT 03/14/16 -  OT             Occupational Therapy Education     Title: PT OT SLP Therapies (In Progress)     Topic: Occupational Therapy (Done)     Point: ADL training (Done)     Description: Instruct learner(s) on proper safety adaptation and remediation techniques during self care or transfers.   Instruct in proper use of assistive devices.    Learning Progress Summary           Patient Eager, E,D, VU,NR by AZAM at 6/28/2019 11:39 AM    Acceptance, E, VU by NOHEMY at 6/28/2019  8:08 AM    Comment:  reason for consult, noted deficits, BP drop and danger                   Point: Home exercise program (Done)     Description: Instruct learner(s) on appropriate technique for monitoring, assisting and/or progressing therapeutic exercises/activities.    Learning Progress Summary           Patient Acceptance, E,D, VU,DU by TA at 7/1/2019  9:26 AM    Comment:  BUE HEP and gentle neck stretches; reinforced need for call for assist with OOB activities.                   Point: Precautions (Done)     Description: Instruct learner(s) on prescribed precautions during self-care and functional transfers.    Learning Progress Summary           Patient Eager, E,D, VU,NR by AA at 6/28/2019 11:39 AM    Acceptance, E, VU by NOHEMY at 6/28/2019  8:08 AM    Comment:  reason for consult, noted deficits, BP drop and danger                   Point: Body mechanics (Done)     Description: Instruct learner(s) on proper positioning and spine alignment during self-care, functional mobility activities and/or exercises.    Learning  Progress Summary           Patient Jonahwilliam, PEARL,IGLESIA, VU,NR by AA at 6/28/2019 11:39 AM                               User Key     Initials Effective Dates Name Provider Type Discipline    NOHEMY 06/08/18 -  Fallon Everett, OT Occupational Therapist OT    TA 03/14/16 -  Mike Matthews, OT Occupational Therapist OT    AZAM 04/02/18 -  Flakita Leal, PT Physical Therapist PT                OT Recommendation and Plan  Outcome Summary/Treatment Plan (OT)  Daily Summary of Progress (OT): progress toward functional goals is good  Plan for Continued Treatment (OT): Continue per OT POC  Anticipated Discharge Disposition (OT): home with assist, home with home health  Daily Summary of Progress (OT): progress toward functional goals is good  Plan of Care Review  Plan of Care Reviewed With: patient  Plan of Care Reviewed With: patient  Outcome Summary: VSS; Pt supervision for bed mobility, SBA for STS and CGA/RW EOB to chair. Pt tolerated BUE strengthening ex's and gentle neck stretches to address headache. Pt progressing toward fxl goals. Continue per OT POC.   Outcome Measures     Row Name 07/01/19 0801 06/28/19 1038          How much help from another person do you currently need...    Turning from your back to your side while in flat bed without using bedrails?  --  4  -AA     Moving from lying on back to sitting on the side of a flat bed without bedrails?  --  4  -AA     Moving to and from a bed to a chair (including a wheelchair)?  --  3  -AA     Standing up from a chair using your arms (e.g., wheelchair, bedside chair)?  --  3  -AA     Climbing 3-5 steps with a railing?  --  2  -AA     To walk in hospital room?  --  3  -AA     AM-PAC 6 Clicks Score  --  19  -AA        How much help from another is currently needed...    Putting on and taking off regular lower body clothing?  3  -TA  --     Bathing (including washing, rinsing, and drying)  3  -TA  --     Toileting (which includes using toilet bed pan or urinal)  3  -TA  --      Putting on and taking off regular upper body clothing  4  -TA  --     Taking care of personal grooming (such as brushing teeth)  4  -TA  --     Eating meals  4  -TA  --     Score  21  -TA  --        Functional Assessment    Outcome Measure Options  AM-PAC 6 Clicks Daily Activity (OT)  -TA  AM-PAC 6 Clicks Basic Mobility (PT)  -AA       User Key  (r) = Recorded By, (t) = Taken By, (c) = Cosigned By    Initials Name Provider Type    Mike England OT Occupational Therapist    Flakita Del Valle, PT Physical Therapist           Time Calculation:   Time Calculation- OT     Row Name 07/01/19 0929 07/01/19 0801          Time Calculation- OT    OT Start Time  0801 ttc 24 minutes  -TA  --     Total Timed Code Minutes- OT  24 minute(s)  -TA  --     OT Received On  07/01/19  -TA  --     OT Goal Re-Cert Due Date  07/08/19  -TA  --        Timed Charges    53612 - OT Therapeutic Exercise Minutes  --  11  -TA     89089 - OT Therapeutic Activity Minutes  --  12  -TA       User Key  (r) = Recorded By, (t) = Taken By, (c) = Cosigned By    Initials Name Provider Type    Mike England OT Occupational Therapist        Therapy Charges for Today     Code Description Service Date Service Provider Modifiers Qty    [id number]  OT THER PROC EA 15 MIN 7/1/2019 Mike Matthews OT GO 1    [id number]  OT THERAPEUTIC ACT EA 15 MIN 7/1/2019 Mike Matthews OT GO 1               Mike Matthews OT  7/1/2019

## 2019-07-01 NOTE — PROGRESS NOTES
Continued Stay Note  [facility]     Patient Name: Leela Muller  MRN: [id number]  Today's Date: 7/1/2019    Admit Date: 6/27/2019    Discharge Plan     Row Name 07/01/19 1443       Plan    Plan  Home w/ [facility]    Patient/Family in Agreement with Plan  yes    Plan Comments  Spoke w/ Kate from PT and pt needs a rolling walker. Called Aguila at [facility] and he will deliver the walker today. CM will continue tomfollow.    Final Discharge Disposition Code  06 - home with home health care    Row Name 07/01/19 1317       Plan    Plan  Home w/ [facility]    Patient/Family in Agreement with Plan  yes    Plan Comments  Spoke w/ patient at bedside. Plan is still home w/ HH. Denies any needs at this time. CM will continue to follow.    Final Discharge Disposition Code  06 - home with home health care        Discharge Codes    No documentation.       Expected Discharge Date and Time     Expected Discharge Date Expected Discharge Time    Jul 1, 2019             Heriberto Muller RN

## 2019-07-01 NOTE — DISCHARGE PLACEMENT REQUEST
"Britt Alvarenga (69 y.o. Female)   Hermilo Alvarenga RN Case Management  [phone]    Date of Birth Social Security Number Address Home Phone MRN    1950  [address] [place] [id number] [phone] [id number]    Amish Marital Status          Yarsani        Admission Date Admission Type Admitting Provider Attending Provider Department, Room/Bed    6/27/19 Elective Itzel Storey MD Hunter, Sarah M, MD [facility] 3E, S331/1    Discharge Date Discharge Disposition Discharge Destination                       Attending Provider:  Itzel Storey MD    Allergies:  Atorvastatin, Ranexa [Ranolazine Er], Tetanus Toxoid    Isolation:  None   Infection:  None   Code Status:  CPR    Ht:  172.7 cm (68\")   Wt:  83.9 kg (185 lb)    Admission Cmt:  None   Principal Problem:  None                Active Insurance as of 6/27/2019     Primary Coverage     Payor Plan Insurance Group Employer/Plan Group    MEDICARE MEDICARE A & B      Payor Plan Address Payor Plan Phone Number Payor Plan Fax Number Effective Dates    [address] [phone]  4/1/2008 - None Entered    [facility] [id number]       Subscriber Name Subscriber Birth Date Member ID       BRITT ALVARENGA 1950 [id number]           Secondary Coverage     Payor Plan Insurance Group Employer/Plan Group    [place] [id number]     Payor Plan Address Payor Plan Phone Number Payor Plan Fax Number Effective Dates    [address]   1/3/1999 - None Entered    [facility] [id number]       Subscriber Name Subscriber Birth Date Member ID       BRITT ALVARENGA 1950 [id number]                 Emergency Contacts      (Rel.) Home Phone Work Phone Mobile Phone    Talat Alvarenga (Spouse) [phone] -- [phone]        [facility] 3E  [address] [facility] [address]  Dept. Phone:  [phone]  Dept. Fax:  [phone] Date Ordered: Jul 1, 2019         Patient:  Britt Alvarenga MRN:  [id number] " "[address] [address] :  1950  SSN:    Phone: [phone] Sex:  F     Weight: 83.9 kg (185 lb)         Ht Readings from Last 1 Encounters:   19 172.7 cm (68\")         Walker               (Order ID: [id number])    Diagnosis:  Impaired mobility and ADLs (Z74.09 [ICD-10-CM] 799.89 [ICD-9-CM])  Status post fall (Z91.81 [ICD-10-CM] V15.88 [ICD-9-CM])  Impaired functional mobility, balance, gait, and endurance (Z74.09 [ICD-10-CM] V49.89 [ICD-9-CM])   Quantity:  1     Equipment:  Walker Folding with Wheels  Length of Need (99 Months = Lifetime): 99 Months = Lifetime        Authorizing Provider's Phone: [phone]   Verbal Order Mode: Verbal with readback   Authorizing Provider: Itzel Storey MD  Authorizing Provider's NPI: [id number]     Order Entered By: Heriberto Muller RN 2019  2:40 PM     Electronically signed by:                   History & Physical      Delano Hightower MD at 2019  5:43 PM              [facility] Medicine Services  HISTORY AND PHYSICAL    Patient Name: Leela Muller  : 1950  MRN: [id number]  Primary Care Physician: Connor Ritter MD  Date of admission: 2019      Subjective   Subjective     Chief Complaint:  Falls, ataxia with loss of balance     HPI:  Leela Muller is a 69 y.o. female with PMH of anxiety, CAD, DM, HLD, HTN, pancreatis, PE, rectal cancer, Stroke. Patient was sent as a direct admit from Dr. Lewis office for falls, ataxia and orthostatic blood pressures in office. Patient had a recent multiple falls mostly recent one was today. She has multiple bruises from falls.  Patient has been occ feeling dizzy when standing up and vertigo when turning in bed. She feels unbalanced when she stands up and tends to go to the right or fall forward. She has been noticing BLE weakness, she chronically had numbness and weakness in right LE since hip surgery. She does have decreased sensation on " right side of face which is new. She denies any change in vision or speech difficulty.     Review of Systems   Constitutional: Positive for activity change. Negative for appetite change and fever.   Respiratory: Negative for shortness of breath.    Cardiovascular: Positive for chest pain. Negative for leg swelling.   Gastrointestinal: Positive for diarrhea. Negative for abdominal pain, nausea and vomiting.   Genitourinary: Negative for dysuria.   Musculoskeletal: Positive for gait problem.   Neurological: Positive for dizziness, weakness, numbness and headaches. Negative for syncope, facial asymmetry and speech difficulty.   Psychiatric/Behavioral: Negative for confusion.        Otherwise complete ROS reviewed and is negative except as mentioned in the HPI.    Personal History     Past Medical History:   Diagnosis Date   • Anxiety    • Arm pain    • Arthritis    • Depression    • Diabetes mellitus (CMS/HCC)    • Hyperlipidemia    • Hypertension    • Neck pain on left side    • Palpitations 4/18/2018   • Pancreatitis    • Pulmonary embolism (CMS/HCC)    • Rectal cancer (CMS/HCC)    • Stroke syndrome 9/14/2016    · Assessed By: Devaughn Lewis (Neurology); Last Assessed: 16 Jun 2015  Right cerebrovascular accident in July or August 2010, evaluated at [facility].  Admission to [facility] on 09/28/2010 with headache and stuttering, consistent with TIA.  Chronic Coumadin therapy, initiated following bilateral pulmonary emboli in 2007 after fall and hip replacement.  She was already on 81 mg of aspirin as well, 09/2010.  MRI of the brain on 09/28/2010 revealing old right parietal cerebrovascular accident.  MRA revealing normal carotids, middle anterior and posterior cerebral arteries with minimal ostial stenosis of both vertebral arteries.  GENARO by Dr. Oliver Mobley on 09/28/2010:  patent foramen ovale with a small amount of right to left shunting.  Normal LVEF and normal valvular  structures.   Patent foramen ovale closure using a 25 mm. Amplatzer cribriform occluder, 10/05/2010.   Echocardiogram, 06/23/2014:  LVEF (55% to 60%) with and Amplatzer device noted to be well-seated in    • Thrombocytopenia (CMS/HCC)    • Transient cerebral ischemia        Past Surgical History:   Procedure Laterality Date   • APPENDECTOMY     • BREAST BIOPSY Left 2012    benign   • BREAST EXCISIONAL BIOPSY Left     benign   • BREAST EXCISIONAL BIOPSY Right     benign   • CHOLECYSTECTOMY     • HYSTERECTOMY     • OOPHORECTOMY     • RECTAL SURGERY     • TONSILLECTOMY     • TOTAL HIP ARTHROPLASTY REVISION         Family History: family history includes Alzheimer's disease in her mother; Cancer in her mother and other; Coronary artery disease in her other; Dementia in her other; Diabetes in her other; Heart attack in her father; Hypertension in her other; Stroke in her other. Otherwise pertinent FHx was reviewed and unremarkable.     Social History:  reports that she has never smoked. She has never used smokeless tobacco. She reports that she does not drink alcohol or use drugs.  Social History     Social History Narrative   • Not on file       Medications:    Available home medication information reviewed.  Medications Prior to Admission   Medication Sig Dispense Refill Last Dose   • acetaminophen (TYLENOL) 500 MG tablet Take  by mouth Every 6 (Six) Hours As Needed.   Past Week at Unknown time   • acyclovir (ZOVIRAX) 5 % ointment Apply 1 unit topically to the affected area twice a day 30 g 2 Past Week at Unknown time   • aspirin 81 MG tablet Take  by mouth daily.   6/26/2019 at Unknown time   • B Complex Vitamins (VITAMIN B COMPLEX) capsule capsule Take 1 tablet by mouth Daily.   Past Week at Unknown time   • baclofen (LIORESAL) 10 MG tablet Take 1 tablet by mouth 3 (three) times a day. 90 tablet 11 6/26/2019 at Unknown time   • butalbital-acetaminophen-caffeine (FIORICET, ESGIC) -40 MG per tablet Take 1-2  tablets by mouth Daily as needed 30 tablet 2 Past Week at Unknown time   • cetirizine (zyrTEC) 10 MG tablet Take 1 tablet by mouth Daily As Needed.   Past Week at Unknown time   • clidinium-chlordiazePOXIDE (LIBRAX) 5-2.5 MG per capsule Take 1 capsule by mouth 3 (Three) Times a Day. 90 capsule 5 6/26/2019 at Unknown time   • digoxin (LANOXIN) 250 MCG tablet Take 1 tablet by mouth daily 90 tablet 3 6/26/2019 at Unknown time   • fluticasone (FLONASE) 50 MCG/ACT nasal spray Instill 2 sprays in each nostril once a day 16 g 3 Past Week at Unknown time   • furosemide (LASIX) 40 MG tablet Take 1 tablet by mouth Daily. May have extra 1/2 to 1 tablet daily if needed for edema 135 tablet 2 6/26/2019 at Unknown time   • gabapentin (NEURONTIN) 800 MG tablet Take 1 tablet by mouth 3 (Three) Times a Day. (Patient taking differently: Take 800 mg by mouth Daily.) 90 tablet 5 6/26/2019 at Unknown time   • glipiZIDE (GLUCOTROL XL) 10 MG 24 hr tablet Take 2 tablets by mouth Daily 60 tablet 5 6/26/2019 at Unknown time   • HYDROcodone-acetaminophen (NORCO) 7.5-325 MG per tablet Take 1 tablet by mouth three times a day 90 tablet 0 6/27/2019 at Unknown time   • hydrocortisone (GRX HICORT 25) 25 MG suppository Insert 1 Suppository rectally twice a day as needed (remove wrapper and moisten with water) 12 suppository 1 Past Month at Unknown time   • Insulin Glargine (BASAGLAR KWIKPEN) 100 UNIT/ML injection pen Inject 25 units under the skin once every morning, then 65 units in the evening at bedtime. 30 mL 5 6/27/2019 at 0800   • loperamide (IMODIUM) 2 MG capsule Take 2 mg by mouth 4 (Four) Times a Day As Needed for Diarrhea.   Past Week at Unknown time   • meclizine (ANTIVERT) 25 MG tablet Take 1 tablet by mouth 3 (Three) Times a Day as needed 30 tablet 3 Past Week at Unknown time   • metoclopramide (REGLAN) 10 MG tablet Take 1 tablet by mouth Daily As Needed for migraine. 30 tablet 5 Past Week at Unknown time   • metoprolol succinate XL  (TOPROL-XL) 50 MG 24 hr tablet Take 1 & 1/2 tablet by mouth every 12 hours (Patient taking differently: Take 50 mg by mouth 2 (Two) Times a Day. Take 1 & 1/2 tablet by mouth every 12 hours) 270 tablet 3 6/27/2019 at 0800   • metroNIDAZOLE (METROCREAM) 0.75 % cream Apply to affected areas on the face once daily at bedime 45 g 0 6/26/2019 at Unknown time   • nitroglycerin (NITROSTAT) 0.4 MG SL tablet Place 1 tablet under the tongue Every 5 Minutes As Needed for Chest Pain for up to 3 total doses. Call 911 if pain remains after 1 dose. 25 tablet 6 Past Week at Unknown time   • pancrelipase, Lip-Prot-Amyl, (PANCREAZE) 51710 UNITS capsule delayed-release particles capsule Take 1 capsule by mouth with each meal and each snack 100 capsule 11 6/26/2019 at Unknown time   • pioglitazone (ACTOS) 15 MG tablet Take 1 tablet by mouth once Daily 30 tablet 5 6/26/2019 at Unknown time   • polyethylene glycol (MIRALAX) packet Take 17 g by mouth Daily.   Past Month at Unknown time   • potassium chloride (KLOR-CON) 10 MEQ CR tablet Take 1 tablet by mouth Daily as directed 90 tablet 2 Past Week at Unknown time   • promethazine (PHENERGAN) 25 MG tablet Take 1 tablet by mouth 4 (Four) Times a Day As Needed for nausea 30 tablet 3 6/26/2019 at Unknown time   • RABEprazole (ACIPHEX) 20 MG EC tablet Take 1 tablet by mouth Daily. 30 tablet 11 6/26/2019 at Unknown time   • rosuvastatin (CRESTOR) 10 MG tablet Take 1 tablet by mouth Daily. 30 tablet 5 6/26/2019 at Unknown time   • SITagliptin (JANUVIA) 100 MG tablet Take 1 tablet by mouth once Daily 30 tablet 5 6/26/2019 at Unknown time   • topiramate (TOPAMAX) 25 MG tablet Take 1 tablet by mouth Daily. 30 tablet 11 6/26/2019 at Unknown time   • venlafaxine XR (EFFEXOR-XR) 75 MG 24 hr capsule Take 1 capsule by mouth Daily. 30 capsule 11 6/26/2019 at Unknown time   • warfarin (COUMADIN) 5 MG tablet Take 1/2 to 1 tablet by mouth daily or as directed by anti-coagulation pharmacist (Patient taking  differently: Take 5 mg by mouth Every Night. Take 1/2 to 1 tablet by mouth daily or as directed by anti-coagulation pharmacist) 90 tablet 0 6/26/2019 at Unknown time   • Cream Base Liposomic (PCCA CUSTOM LIPO-MAX) cream    Taking   • glucose blood (ONE TOUCH ULTRA TEST) test strip Use to test blood glucose as directed 3 times a day 100 each 5 Taking   • Insulin Pen Needle (B-D UF III MINI PEN NEEDLES) 31G X 5 MM misc Use to inject insulin twice a day as directed 100 each 12 Taking   • Insulin Syringe-Needle U-100 29G 0.5 ML misc Inject 0.3 mL as directed Daily.   Taking       Allergies   Allergen Reactions   • Atorvastatin Swelling   • Other Nausea And Vomiting     Ranexa nausea   • Tetanus Toxoid Hives       Objective   Objective     Vital Signs:   Temp:  [98.6 °F (37 °C)] 98.6 °F (37 °C)  Heart Rate:  [85] 85  Resp:  [16] 16  BP: (122-150)/(70-73) 150/73   Total (NIH Stroke Scale): 0    Physical Exam   Constitutional: Awake, alert  Eyes: PERRLA, sclerae anicteric, no conjunctival injection  HENT: NCAT, mucous membranes moist, bruise under right eye and bridge of nose, slight swelling noted on nose   Neck: Supple, trachea midline  Respiratory: Clear to auscultation bilaterally, nonlabored respirations, room air    Cardiovascular: RRR, no murmurs, rubs, or gallops, palpable pedal pulses bilaterally  Gastrointestinal: Positive bowel sounds, soft, nontender, nondistended  Musculoskeletal: No bilateral ankle edema, no clubbing or cyanosis to extremities  Psychiatric: Appropriate affect, cooperative  Neurologic: Oriented x 3, strength symmetric in all extremities, Cranial Nerves grossly intact to confrontation, speech clear, decreased sensation on right side of face, chronic weakness and decreased sensation in RLE. BUE hand  equal RLE weaker than LLE  Skin: No rashes    Results Reviewed:  I have personally reviewed current lab, radiology, and data and agree.    Results from last 7 days   Lab Units 06/27/19  1957    WBC 10*3/mm3 10.35   HEMOGLOBIN g/dL 14.6   HEMATOCRIT % 45.0   PLATELETS 10*3/mm3 202   INR  1.82*     Results from last 7 days   Lab Units 06/27/19  1811   SODIUM mmol/L 137   POTASSIUM mmol/L 4.6   CHLORIDE mmol/L 100   CO2 mmol/L 25.0   BUN mg/dL 15   CREATININE mg/dL 1.05*   GLUCOSE mg/dL 168*   CALCIUM mg/dL 9.1   ALT (SGPT) U/L 30   AST (SGOT) U/L 27     Estimated Creatinine Clearance: 55.8 mL/min (A) (by C-G formula based on SCr of 1.05 mg/dL (H)).  Brief Urine Lab Results     None        Imaging Results (last 24 hours)     ** No results found for the last 24 hours. **        Results for orders placed during the hospital encounter of 05/06/19   Adult Transthoracic Echo Complete W/ Cont if Necessary Per Protocol    Narrative · Estimated EF = 60%.  · Left ventricular systolic function is normal.  · Trace mitral and tricuspid regurgitation  · Intact Amplatzer septal occluder with no evidence of flow across the   device.          Assessment/Plan   Assessment / Plan     Active Hospital Problems    Diagnosis POA   • Falls [W19.XXXA] Yes   • Dyslipidemia [E78.5] Yes   • Hypertension [I10] Yes     Low diastolic pressure      • Ataxia [R27.0] Yes     · Assessed By: Devaughn Lewis (Neurology); Last Assessed: 04 Nov 2014     • CVA (cerebral vascular accident) (CMS/[facility]) [I63.9] Unknown     · Assessed By: Devaughn Lewis (Neurology); Last Assessed: 16 Jun 2015   a. Right cerebrovascular accident in July or August 2010, evaluated at [facility].   b. Admission to [facility] on 09/28/2010 with headache and stuttering, consistent with TIA.   c. Chronic Coumadin therapy, initiated following bilateral pulmonary emboli in 2007 after fall and hip replacement.  She was already on 81 mg of aspirin as well, 09/2010.   d. MRI of the brain on 09/28/2010 revealing old right parietal cerebrovascular accident.   e. MRA revealing normal carotids, middle anterior and posterior cerebral arteries  with minimal ostial stenosis of both vertebral arteries.   f. GENARO by Dr. Oliver Mobley on 09/28/2010:  patent foramen ovale with a small amount of right to left shunting.  Normal LVEF and normal valvular structures.    g. Patent foramen ovale closure using a 25 mm. Amplatzer cribriform occluder, 10/05/2010.    h. Echocardiogram, 06/23/2014:  LVEF (55% to 60%) with and Amplatzer device noted to be well-seated in the interatrial septum, trace MR, and mild TR.         Falls  Ataxia/orthostasic bp   Rule out stroke hx of previous stroke 2010  -- consult neurology  -- consult PT/OT/Cm  -- MRA of head and neck  -- MRI of brain   --ECHO   -- hold coumadin for now   -- had PFO closure on 2010  -- had Holter monitor in 2018 normal study  --check CT of facial bones   -- cont 81 mg ASA and statin crestor     Hx PE 2007  -- hold coumadin for now     Dehydration  -- check labs  -- may need IVF's overnight   -- hold lasix for now     DM  -- hold home meds  -- check A1C   --  levemir 10 units  and low dose s/s insulin   -- follows with endocrinologist  -- cont diab educator     HTN  -- hold bp meds for now   -- follows with Dr. Sullivan     Hx of palpitations  -- Holter monitor 2018 normal study  -- cont digoxin   -- follows with Dr. Sullivan     HLD  -- check lipids  -- cont statin crestor   --cannot take lipitor severe reaction per pt     Hx of pancreatitis  --cont pancreaze     DVT prophylaxis:  [facility] coumadin on hold for now     CODE STATUS:    Code Status and Medical Interventions:   Ordered at: 06/27/19 1809     Level Of Support Discussed With:    Patient     Code Status:    CPR     Medical Interventions (Level of Support Prior to Arrest):    Full       Electronically signed by WEST Holliday, 06/27/19, 5:43 PM.    Brief Attending Admission Attestation     I have seen and examined the patient, performing an independent face-to-face diagnostic evaluation with plan of care reviewed and developed with the  advanced practice clinician (APC).      Brief Summary Statement:   Leela Muller is a 69 y.o. female, very pleasant, but presents to the emergency room with complaint of multiple falls in several days.  Last one was today at [facility].  She was sent by Dr. Lewis for direct admission.  Patient tells me she does not feel dizzy but she unable to control her balance and she falls either forward or to the right. She has also experienced some vertigo when she turns in bed. She has multiple bruises and believes she has broken her nose.  Vitals obtain in the primary provide's office today showed static hypotension.  MRI of the brain, head and neck obtained upon presentation at [facility] with essentially unremarkable.  However, there is evidence of volume loss with the right occipital cortical encephalomalacia from old infarct or injury.  CT scan of the facial bone is currently pending.  Patient has no focal neurologic deficits.  She is on warfarin for h/o multiple PEs.  Will admit for observation and consult neurology in the morning.  Also, PT and OT assessment.  We will continue gentle IV hydration and monitor blood pressure closely.  Fall precautions.  Further treatment plan as noted above.   Remainder of detailed HPI is as noted above and has been reviewed and/or edited by me for completeness.      Attending Physical Exam:  Constitutional: Awake, alert and oriented x4.  Eyes: PERRLA, EOMI, sclerae anicteric, no conjunctival injection  HENT: Normocephalic, the bridge of nose is very tender to palpation and appears swollen, mucous membranes moist.  There is right facial bruise.  Neck: Supple, no thyromegaly, no lymphadenopathy, trachea midline  Respiratory: Clear to auscultation bilaterally, nonlabored respirations   Cardiovascular: RRR, no murmurs, rubs, or gallops, palpable pedal pulses bilaterally  Gastrointestinal: Positive bowel sounds, soft, nontender, nondistended  Musculoskeletal: No bilateral  ankle edema, no clubbing or cyanosis to extremities  Psychiatric: Appropriate affect, cooperative  Neurologic: Strength symmetric in all extremities, Cranial Nerves grossly intact to confrontation, speech clear  Skin: Multiple recent bruises noted over the extremities.    Brief Assessment/Plan :  See above for further detailed assessment and plan developed with APC which I have reviewed and/or edited for completeness.      Electronically signed by Delano Hightower MD, 06/27/19, 10:29 PM.             Electronically signed by Delano Hightower MD at 6/27/2019 11:03 PM       ICU Vital Signs     Row Name 07/01/19 1200 07/01/19 1115 07/01/19 0800 07/01/19 0700 07/01/19 0400       Vitals    Temp  --  97.8 °F (36.6 °C)  --  97.9 °F (36.6 °C)  --    Temp src  --  Oral  --  Oral  --    Pulse  --  --  --  73  --    Heart Rate Source  --  Monitor  --  Monitor  --    Resp  --  16  --  16  --    Resp Rate Source  --  Visual  --  Visual  --    BP  --  128/53  --  147/64  --    Noninvasive MAP (mmHg)  --  88  --  90  --    BP Location  --  Right arm  --  Right arm  --    BP Method  --  Automatic  --  Automatic  --    Patient Position  --  Sitting  --  Lying  --       Oxygen Therapy    SpO2  --  96 %  --  93 %  --    Device (Oxygen Therapy)  room air  room air  room air  room air  room air    Row Name 07/01/19 0312 07/01/19 0200 07/01/19 0005 06/30/19 2200 06/30/19 2000       Vitals    Temp  97.8 °F (36.6 °C)  --  --  --  --    Temp src  Oral  --  --  --  --    Pulse  76  --  --  --  --    Heart Rate Source  Monitor  --  --  --  --    Resp  16  --  --  --  --    Resp Rate Source  Visual  --  --  --  --    BP  132/58  --  --  --  --    Noninvasive MAP (mmHg)  92  --  --  --  --    BP Location  Right arm  --  --  --  --    BP Method  Automatic  --  --  --  --    Patient Position  Lying  --  --  --  --       Oxygen Therapy    SpO2  97 %  --  --  --  --    Device (Oxygen Therapy)  --  room air  room air  room air  room air    Row  Name 06/30/19 1908 06/30/19 1646 06/30/19 1644 06/30/19 1642 06/30/19 1546       Vitals    Temp  97.6 °F (36.4 °C)  --  --  --  97.9 °F (36.6 °C)    Temp src  Oral  --  --  --  Oral    Pulse  66  --  --  --  79    Heart Rate Source  Monitor  --  --  --  Monitor    Resp  16  --  --  --  18    Resp Rate Source  Visual  --  --  --  Visual    BP  119/57  117/68  --  118/55  123/56    Noninvasive MAP (mmHg)  67  --  --  --  79    BP Location  Right arm  --  --  --  Right arm    BP Method  Automatic  --  --  --  Automatic    Patient Position  Lying  Standing  Sitting  Lying  Lying       Oxygen Therapy    SpO2  95 %  --  --  --  93 %    Device (Oxygen Therapy)  --  --  --  --  room air           Physician Progress Notes (most recent note)      Wesly Joiner MD at 7/1/2019 12:40 PM          Neurology       Patient Care Team:  Connor Ritter MD as PCP - General  Connor Rtiter MD as PCP - Family Medicine  KnucklesDanya MD as PCP - Claims Attributed    Chief complaint: Neck pain and headache    History: Patient's cervical spine shows some mild cervical stenosis.    Neurosurgery seen her and do not feel that she is an operative candidate.    She complains of pain in her neck rating up the back of her head.    It is not throbbing.    She got some relief with massage from the physical therapist today.  Past Medical History:   Diagnosis Date   • Anxiety    • Arm pain    • Arthritis    • Depression    • Diabetes mellitus (CMS/HCC)    • Hyperlipidemia    • Hypertension    • Neck pain on left side    • Palpitations 4/18/2018   • Pancreatitis    • Pulmonary embolism (CMS/HCC)    • Rectal cancer (CMS/HCC)    • Stroke syndrome 9/14/2016    · Assessed By: Devaughn Lewis (Neurology); Last Assessed: 16 Jun 2015  Right cerebrovascular accident in July or August 2010, evaluated at [facility].  Admission to [facility] on 09/28/2010 with headache and stuttering, consistent with  TIA.  Chronic Coumadin therapy, initiated following bilateral pulmonary emboli in 2007 after fall and hip replacement.  She was already on 81 mg of aspirin as well, 09/2010.  MRI of the brain on 09/28/2010 revealing old right parietal cerebrovascular accident.  MRA revealing normal carotids, middle anterior and posterior cerebral arteries with minimal ostial stenosis of both vertebral arteries.  GENARO by Dr. Oliver Mobley on 09/28/2010:  patent foramen ovale with a small amount of right to left shunting.  Normal LVEF and normal valvular structures.   Patent foramen ovale closure using a 25 mm. Amplatzer cribriform occluder, 10/05/2010.   Echocardiogram, 06/23/2014:  LVEF (55% to 60%) with and Amplatzer device noted to be well-seated in    • Thrombocytopenia (CMS/HCC)    • Transient cerebral ischemia        Vital Signs   Vitals:    06/30/19 1908 07/01/19 0312 07/01/19 0700 07/01/19 1115   BP: 119/57 132/58 147/64 128/53   BP Location: Right arm Right arm Right arm Right arm   Patient Position: Lying Lying Lying Sitting   Pulse: 66 76 73    Resp: 16 16 16 16   Temp: 97.6 °F (36.4 °C) 97.8 °F (36.6 °C) 97.9 °F (36.6 °C) 97.8 °F (36.6 °C)   TempSrc: Oral Oral Oral Oral   SpO2: 95% 97% 93% 96%   Weight:       Height:           Physical Exam:   General: Pleasant and alert              Neuro: Tenderness in the trapezius muscles bilaterally.    Results Review:  Cervical MRI is personally reviewed and shows a minimal retrolithiasis C4 on C5 likely congenital stenosis greatest at C4-5 and 5 6.      Results from last 7 days   Lab Units 07/01/19  0443   WBC 10*3/mm3 5.40   HEMOGLOBIN g/dL 12.6   HEMATOCRIT % 40.7   PLATELETS 10*3/mm3 139*     Results from last 7 days   Lab Units 07/01/19  0443 06/30/19  0528 06/29/19  0421  06/27/19  1811   SODIUM mmol/L 141 140 138   < > 137   POTASSIUM mmol/L 4.5 4.1 4.0   < > 4.6   CHLORIDE mmol/L 103 103 101   < > 100   CO2 mmol/L 28.0 26.0 25.0   < > 25.0   BUN mg/dL 12 12 12   < > 15    CREATININE mg/dL 0.77 0.69 0.71   < > 1.05*   CALCIUM mg/dL 8.6 8.8 8.7   < > 9.1   BILIRUBIN mg/dL  --   --   --   --  0.2   ALK PHOS U/L  --   --   --   --  77   ALT (SGPT) U/L  --   --   --   --  30   AST (SGOT) U/L  --   --   --   --  27   GLUCOSE mg/dL 278* 239* 200*   < > 168*    < > = values in this interval not displayed.       Imaging Results (last 24 hours)     ** No results found for the last 24 hours. **          Assessment:  Cervicogenic headaches    Plan:  Flexeril 10 mg every 8 hours.    Decrease baclofen to 5 mg every 8 hours.        Comment:  Hopefully home soon          I discussed the patients findings and my recommendations with patient    Wesly Joiner MD  19  12:41 PM          Electronically signed by Wesly Joiner MD at 2019 12:43 PM          Physical Therapy Notes (most recent note)      Flakita Leal PT at 2019 11:40 AM  Version 1 of 1         Acute Care - Physical Therapy Initial Evaluation  [facility]     Patient Name: Leela Muller  : 1950  MRN: [id number]  Today's Date: 2019   Onset of Illness/Injury or Date of Surgery: 19  Date of Referral to PT: 19  Referring Physician: WEST Rodrigues      Admit Date: 2019    Visit Dx:     ICD-10-CM ICD-9-CM   1. Impaired mobility and ADLs Z74.09 799.89   2. Ataxia R27.0 781.3   3. Status post fall Z91.81 V15.88   4. Impaired functional mobility, balance, gait, and endurance Z74.09 V49.89     Patient Active Problem List   Diagnosis   • Migraine without aura and without status migrainosus, not intractable   • Neck pain   • Chronic low back pain   • Anxiety   • Ataxia   • Atypical chest pain   • Coronary artery disease   • Headache   • Lumbar radiculopathy   • Myalgia and myositis   • Nausea   • Numbness   • Patent foramen ovale   • Status post fall   • CVA (cerebral vascular accident) (CMS/HCC)   • Pancreatitis   • Tingling   • Cervical radiculopathy   • Dyslipidemia   • Hypertension   •  Edema   • Embolism and thrombosis of unspecified site [I74.9]   • Palpitations   • Medication monitoring encounter   • Diabetic polyneuropathy associated with diabetes mellitus due to underlying condition (CMS/HCC)   • Postherpetic neuralgia   • History of pulmonary embolism   • Dyspnea on exertion   • Dehydration   • Falls     Past Medical History:   Diagnosis Date   • Anxiety    • Arm pain    • Arthritis    • Depression    • Diabetes mellitus (CMS/HCC)    • Hyperlipidemia    • Hypertension    • Neck pain on left side    • Palpitations 4/18/2018   • Pancreatitis    • Pulmonary embolism (CMS/HCC)    • Rectal cancer (CMS/HCC)    • Stroke syndrome 9/14/2016    · Assessed By: Devaughn Lewis (Neurology); Last Assessed: 16 Jun 2015  Right cerebrovascular accident in July or August 2010, evaluated at [facility].  Admission to [facility] on 09/28/2010 with headache and stuttering, consistent with TIA.  Chronic Coumadin therapy, initiated following bilateral pulmonary emboli in 2007 after fall and hip replacement.  She was already on 81 mg of aspirin as well, 09/2010.  MRI of the brain on 09/28/2010 revealing old right parietal cerebrovascular accident.  MRA revealing normal carotids, middle anterior and posterior cerebral arteries with minimal ostial stenosis of both vertebral arteries.  GENARO by Dr. Oliver Mobley on 09/28/2010:  patent foramen ovale with a small amount of right to left shunting.  Normal LVEF and normal valvular structures.   Patent foramen ovale closure using a 25 mm. Amplatzer cribriform occluder, 10/05/2010.   Echocardiogram, 06/23/2014:  LVEF (55% to 60%) with and Amplatzer device noted to be well-seated in    • Thrombocytopenia (CMS/HCC)    • Transient cerebral ischemia      Past Surgical History:   Procedure Laterality Date   • APPENDECTOMY     • BREAST BIOPSY Left 2012    benign   • BREAST EXCISIONAL BIOPSY Left     benign   • BREAST EXCISIONAL BIOPSY Right      benign   • CHOLECYSTECTOMY     • HYSTERECTOMY     • OOPHORECTOMY     • RECTAL SURGERY     • TONSILLECTOMY     • TOTAL HIP ARTHROPLASTY REVISION          PT ASSESSMENT (last 12 hours)      Physical Therapy Evaluation     Row Name 06/28/19 1038          PT Evaluation Time/Intention    Subjective Information  complains of;pain;weakness  -AA     Document Type  evaluation  -AA     Mode of Treatment  physical therapy;individual therapy  -AA     Patient Effort  good  -AA     Row Name 06/28/19 1038          General Information    Patient Profile Reviewed?  yes  -AA     Onset of Illness/Injury or Date of Surgery  06/27/19  -AA     Referring Physician  WEST Rodrigues  -AZAM     Patient Observations  alert;cooperative;agree to therapy  -AA     Patient/Family Observations  no family present  -AA     General Observations of Patient  pt supine, exit alarm on, SCDs applied  -AA     Prior Level of Function  independent:;all household mobility;community mobility  -AA     Equipment Currently Used at Home  grab bar;cane, straight  -AA     Pertinent History of Current Functional Problem  Pt presents to ED with frequent falls, ataxia, and LOB.  No dizziness reported.  Vertigo in bed reported.    -AA     Existing Precautions/Restrictions  fall;other (see comments) sudden falls with no symptoms per pt report prior to fall  -AA     Limitations/Impairments  sensory  -AA     Risks Reviewed  patient:;LOB;nausea/vomiting;dizziness;increased discomfort;change in vital signs  -AA     Benefits Reviewed  patient:;improve function;increase independence;increase strength;increase balance  -AA     Barriers to Rehab  none identified  -AA     Row Name 06/28/19 1038          Relationship/Environment    Primary Source of Support/Comfort  spouse  -AA     Lives With  spouse  -AA     Family Caregiver if Needed  spouse  -AA     Row Name 06/28/19 1038          Resource/Environmental Concerns    Current Living Arrangements  home/apartment/condo  -AA      Resource/Environmental Concerns  home accessibility  -AA     Home Accessibility Concerns  stairs to enter home  -AA     Transportation Concerns  car, none  -     Row Name 06/28/19 1038          Home Main Entrance    Number of Stairs, Main Entrance  two  -AA     Stair Railings, Main Entrance  railing on left side (ascending)  -     Row Name 06/28/19 1038          Cognitive Assessment/Interventions    Additional Documentation  Cognitive Assessment/Intervention (Group)  -     Row Name 06/28/19 1038          Cognitive Assessment/Intervention- PT/OT    Affect/Mental Status (Cognitive)  WFL  -AA     Orientation Status (Cognition)  oriented x 4  -AA     Follows Commands (Cognition)  WFL  -AA     Cognitive Function (Cognitive)  safety deficit  -     Safety Deficit (Cognitive)  mild deficit;safety precautions awareness  -     Personal Safety Interventions  fall prevention program maintained;gait belt;muscle strengthening facilitated;nonskid shoes/slippers when out of bed  -AA     Row Name 06/28/19 1038          Safety Issues, Functional Mobility    Safety Issues Affecting Function (Mobility)  safety precaution awareness;insight into deficits/self awareness  -     Impairments Affecting Function (Mobility)  balance;sensation/sensory awareness;strength;pain;endurance/activity tolerance  -     Row Name 06/28/19 1038          Bed Mobility Assessment/Treatment    Bed Mobility Assessment/Treatment  rolling left;rolling right;scooting/bridging;supine-sit;sit-supine  -AA     Rolling Left [place] (Bed Mobility)  conditional independence  -AA     Rolling Right [place] (Bed Mobility)  conditional independence  -AA     Scooting/Bridging [place] (Bed Mobility)  independent  -AA     Supine-Sit [place] (Bed Mobility)  supervision;verbal cues  -AA     Sit-Supine [place] (Bed Mobility)  verbal cues;supervision  -     Bed Mobility, Safety Issues  impaired trunk control for bed mobility;decreased use of  legs for bridging/pushing;decreased use of arms for pushing/pulling  -AA     Assistive Device (Bed Mobility)  bed rails;head of bed elevated  -AA     Comment (Bed Mobility)  VC for slow movement to decrease risk of falls  -AA     Row Name 06/28/19 1038          Transfer Assessment/Treatment    Transfer Assessment/Treatment  sit-stand transfer;stand-sit transfer  -AA     Comment (Transfers)  VC for hand and foot placement and posture  -AA     Sit-Stand [place] (Transfers)  contact guard;verbal cues  -AA     Stand-Sit [place] (Transfers)  contact guard;verbal cues  -AA     Row Name 06/28/19 1038          Sit-Stand Transfer    Assistive Device (Sit-Stand Transfers)  walker, front-wheeled  -AA     Row Name 06/28/19 1038          Stand-Sit Transfer    Assistive Device (Stand-Sit Transfers)  walker, front-wheeled  -AA     Row Name 06/28/19 1038          Gait/Stairs Assessment/Training    87925 - Gait Training Minutes   12  -AA     Gait/Stairs Assessment/Training  gait/ambulation independence;distance ambulated;gait pattern  -AA     [place] Level (Gait)  minimum assist (75% patient effort);verbal cues  -AA     Assistive Device (Gait)  walker, front-wheeled  -AA     Distance in Feet (Gait)  50x2  -AA     Pattern (Gait)  step-through  -AA     Deviations/Abnormal Patterns (Gait)  bilateral deviations;jerrod decreased;stride length decreased;steppage  -AA     Bilateral Gait Deviations  forward flexed posture  -AA     Comment (Gait/Stairs)  pt ambulated with RW and min A with cues for proper stepping during turns and 3 LOB during turns, no dizziness reported, VC for proper stepping and posture.  No drop in BP during gait.    -AA     Row Name 06/28/19 1038          General ROM    GENERAL ROM COMMENTS  BLE WNL  -AA     Row Name 06/28/19 1038          MMT (Manual Muscle Testing)    Rt Lower Ext  Rt Hip Flexion;Rt Hip ABduction;Rt Hip ADduction;Rt Knee Extension;Rt Knee Flexion;Rt Ankle Plantarflexion;Rt Ankle  Dorsiflexion  -AA     Lt Lower Ext  Lt Hip Flexion;Lt Hip ABduction;Lt Hip ADduction;Lt Knee Extension;Lt Knee Flexion;Lt Ankle Plantarflexion;Lt Ankle Dorsiflexion  -AA     Row Name 06/28/19 1038          MMT Right Lower Ext    Rt Hip Flexion MMT, Gross Movement  (3+/5) fair plus  -AA     Rt Hip ABduction MMT, Gross Movement  (4/5) good  -AA     Rt Hip ADduction MMT, Gross Movement  (4-/5) good minus  -AA     Rt Knee Extension MMT, Gross Movement  (4-/5) good minus  -AA     Rt Knee Flexion MMT, Gross Movement  (4-/5) good minus  -AA     Rt Ankle Plantarflexion MMT, Gross Movement  (4-/5) good minus  -AA     Rt Ankle Dorsiflexion MMT, Gross Movement  (3+/5) fair plus  -AA     Row Name 06/28/19 1038          MMT Left Lower Ext    Lt Hip Flexion MMT, Gross Movement  (4-/5) good minus  -AA     Lt Hip ABduction MMT, Gross Movement  (4/5) good  -AA     Lt Hip ADduction MMT, Gross Movement  (4-/5) good minus  -AA     Lt Knee Extension MMT, Gross Movement  (4/5) good  -AA     Lt Knee Flexion MMT, Gross Movement  (4/5) good  -AA     Lt Ankle Plantarflexion MMT, Gross Movement  (4/5) good  -AA     Lt Ankle Dorsiflexion MMT, Gross Movement  (4-/5) good minus  -AA     Row Name 06/28/19 1038          Motor Assessment/Intervention    Additional Documentation  Balance (Group)  -     Row Name 06/28/19 1038          Balance    Balance  static sitting balance;static standing balance;dynamic sitting balance;dynamic standing balance  -[facility] Name 06/28/19 1038          Static Sitting Balance    Level of [place] (Unsupported Sitting, Static Balance)  conditional independence  -AA     Sitting Position (Unsupported Sitting, Static Balance)  sitting on edge of bed  -AA     Time Able to Maintain Position (Unsupported Sitting, Static Balance)  more than 5 minutes  -     Row Name 06/28/19 1038          Dynamic Sitting Balance    Level of [place], Reaches Outside Midline (Sitting, Dynamic Balance)  supervision  -AA      Sitting Position, Reaches Outside Midline (Sitting, Dynamic Balance)  sitting on edge of bed  -AA     [facility] Name 06/28/19 1038          Static Standing Balance    Level of [place] (Supported Standing, Static Balance)  contact guard assist  -AA     Time Able to Maintain Position (Supported Standing, Static Balance)  3 to 4 minutes  -AA     Assistive Device Utilized (Supported Standing, Static Balance)  walker, rolling  -AA     Row Name 06/28/19 1038          Dynamic Standing Balance    Level of [place], Reaches Outside Midline (Standing, Dynamic Balance)  minimal assist, 75% patient effort  -AA     Time Able to Maintain Position, Reaches Outside Midline (Standing, Dynamic Balance)  1 to 2 minutes  -AA     Assistive Device Utilized (Supported Standing, Dynamic Balance)  walker, rolling  -AA     Row Name 06/28/19 1038          Sensory Assessment/Intervention    Sensory General Assessment  light touch sensation deficits identified  -AA     Row Name 06/28/19 1038          Light Touch Sensation Assessment    Right Lower Extremity: Light Touch Sensation Assessment  moderate impairment, 50 to 74% correct responses  -AA     Comment, Right Lower Extremity: Light Touch Sensation Assessment  lateral foot, plantar surface  -AA     Row Name 06/28/19 1038          Pain Assessment    Additional Documentation  Pain Scale: Numbers Pre/Post-Treatment (Group)  -AA     Row Name 06/28/19 1038          Pain Scale: Numbers Pre/Post-Treatment    Pain Scale: Numbers, Pretreatment  6/10  -AA     Pain Scale: Numbers, Post-Treatment  6/10  -AA     Pain Location - Side  Bilateral  -AA     Pain Location - Orientation  posterior  -AA     Pain Location  neck  -AA     Pre/Post Treatment Pain Comment  pain 7/10 RLE during standing  -AA     Pain Intervention(s)  Repositioned  -AA     Row Name 06/28/19 1038          Coping    Observed Emotional State  accepting;pleasant;calm;cooperative  -AA     Verbalized Emotional State  acceptance  -AA      Row Name 06/28/19 1038          Plan of Care Review    Plan of Care Reviewed With  patient  -AA     Row Name 06/28/19 1038          Physical Therapy Clinical Impression    Date of Referral to PT  06/27/19  -AA     PT Diagnosis (PT Clinical Impression)  weakness  -AA     Criteria for Skilled Interventions Met (PT Clinical Impression)  yes;treatment indicated  -AA     Impairments Found (describe specific impairments)  gait, locomotion, and balance  -AA     Rehab Potential (PT Clinical Summary)  good, to achieve stated therapy goals  -AA     Predicted Duration of Therapy (PT)  10 days  -AA     Care Plan Review (PT)  evaluation/treatment results reviewed;care plan/treatment goals reviewed;risks/benefits reviewed;current/potential barriers reviewed;patient/other agree to care plan  -AA     Row Name 06/28/19 1038          Vital Signs    Pre Systolic BP Rehab  145  -AA     Pre Treatment Diastolic BP  67  -AA     Intra Systolic BP Rehab  159  -AA     Intra Treatment Diastolic BP  66  -AA     Post Systolic BP Rehab  159  -AA     Post Treatment Diastolic BP  68  -AA     Pretreatment Heart Rate (beats/min)  73  -AA     Intratreatment Heart Rate (beats/min)  84  -AA     Posttreatment Heart Rate (beats/min)  80  -AA     Pre SpO2 (%)  99  -AA     O2 Delivery Pre Treatment  room air  -AA     Intra SpO2 (%)  97  -AA     O2 Delivery Intra Treatment  room air  -AA     Post SpO2 (%)  99  -AA     O2 Delivery Post Treatment  room air  -AA     Pre Patient Position  Supine  -AA     Intra Patient Position  Standing  -AA     Post Patient Position  Supine  -AA     Row Name 06/28/19 1038          Physical Therapy Goals    Transfer Goal Selection (PT)  transfer, PT goal 1  -AA     Gait Training Goal Selection (PT)  gait training, PT goal 1  -AA     Stairs Goal Selection (PT)  stairs, PT goal 1  -AA     Additional Documentation  Stairs Goal Selection (PT) (Row)  -AA     Row Name 06/28/19 1038          Transfer Goal 1 (PT)    Activity/Assistive  Device (Transfer Goal 1, PT)  sit-to-stand/stand-to-sit;bed-to-chair/chair-to-bed  -AA     [place] Level/Cues Needed (Transfer Goal 1, PT)  conditional independence  -AA     Time Frame (Transfer Goal 1, PT)  10 days  -AA     Row Name 06/28/19 1038          Gait Training Goal 1 (PT)    Activity/Assistive Device (Gait Training Goal 1, PT)  gait (walking locomotion);improve balance and speed;increase endurance/gait distance;walker, rolling  -AA     [place] Level (Gait Training Goal 1, PT)  conditional independence  -AA     Distance (Gait Goal 1, PT)  500 feet  -AA     Time Frame (Gait Training Goal 1, PT)  10 days  -AA     Row Name 06/28/19 1038          Stairs Goal 1 (PT)    Activity/Assistive Device (Stairs Goal 1, PT)  stairs, all skills  -AA     [place] Level/Cues Needed (Stairs Goal 1, PT)  supervision required  -AA     Number of Stairs (Stairs Goal 1, PT)  2  -AA     Time Frame (Stairs Goal 1, PT)  10 days  -AA     Row Name 06/28/19 1038          Positioning and Restraints    Pre-Treatment Position  in bed  -AA     Post Treatment Position  bed  -AA     In Bed  notified nsg;fowlers;call light within reach;encouraged to call for assist;exit alarm on;SCD pump applied  -AA     Row Name 06/28/19 1038          Living Environment    Home Accessibility  stairs to enter home  -AA       User Key  (r) = Recorded By, (t) = Taken By, (c) = Cosigned By    Initials Name Provider Type    Flakita Del Valle L, PT Physical Therapist          PT Recommendation and Plan  Anticipated Discharge Disposition (PT): home with assist, home with home health  Therapy Frequency (PT Clinical Impression): daily  Outcome Summary/Treatment Plan (PT)  Anticipated Discharge Disposition (PT): home with assist, home with home health  Plan of Care Reviewed With: patient  Progress: improving  Outcome Summary: PT eval complete.  Pt presents with deficits including gait, balance, pain, and strength warranting skilled IPPT services.  Pt with  "no BP deficits during standing or gait.  Reports of ringing in the R ear with standing that subsides asfter 30 sec.  RLE pain that pt reports as \"cramping\" that subsides with rest.  Pt ambulated 50 feet x2 with RW and min A with 3 LOB noted and no dizziness reported.  Recommend DC to home with assist and home health upon DC   Outcome Measures     Row Name 06/28/19 1038 06/28/19 0808          How much help from another person do you currently need...    Turning from your back to your side while in flat bed without using bedrails?  4  -AA  --     Moving from lying on back to sitting on the side of a flat bed without bedrails?  4  -AA  --     Moving to and from a bed to a chair (including a wheelchair)?  3  -AA  --     Standing up from a chair using your arms (e.g., wheelchair, bedside chair)?  3  -AA  --     Climbing 3-5 steps with a railing?  2  -AA  --     To walk in hospital room?  3  -AA  --     AM-PAC 6 Clicks Score  19  -AA  --        How much help from another is currently needed...    Putting on and taking off regular lower body clothing?  --  3 all standing task pt. needs S to CGA due to high fall risk  -NOHEMY     Bathing (including washing, rinsing, and drying)  --  3  -NOHEMY     Toileting (which includes using toilet bed pan or urinal)  --  3  -NOHEMY     Putting on and taking off regular upper body clothing  --  4  -NOHEMY     Taking care of personal grooming (such as brushing teeth)  --  3  -NOHEMY     Eating meals  --  4  -NOHEMY     Score  --  20  -NOHEMY        Functional Assessment    Outcome Measure Options  AM-PAC 6 Clicks Basic Mobility (PT)  -AA  AM-PAC 6 Clicks Daily Activity (OT)  -NHOEMY       User Key  (r) = Recorded By, (t) = Taken By, (c) = Cosigned By    Initials Name Provider Type    Fallon Ray, OT Occupational Therapist    Flakita Del Valle, PT Physical Therapist         Time Calculation:   PT Charges     Row Name 06/28/19 1038             Time Calculation    Start Time  1038  -AA      PT Received On  06/28/19 "  -AA      PT Goal Re-Cert Due Date  19  -AA         Time Calculation- PT    Total Timed Code Minutes- PT  12 minute(s)  -AA         Timed Charges    74680 - Gait Training Minutes   12  -AA        User Key  (r) = Recorded By, (t) = Taken By, (c) = Cosigned By    Initials Name Provider Type    AA Flakita Leal, PT Physical Therapist        Therapy Charges for Today     Code Description Service Date Service Provider Modifiers Qty    31446675238 HC GAIT TRAINING EA 15 MIN 2019 Flakita Leal, PT GP 1    76355876435 HC PT EVAL HIGH COMPLEXITY 4 2019 Flakita Leal, PT GP 1          PT G-Codes  Outcome Measure Options: AM-PAC 6 Clicks Basic Mobility (PT)  AM-PAC 6 Clicks Score: 19  Score: 20      Flakita Leal PT  2019         Electronically signed by Flakita Leal PT at 2019 11:40 AM          Occupational Therapy Notes (most recent note)      Mike Matthews, OT at 2019  9:30 AM          Acute Care - Occupational Therapy Treatment Note  [facility]     Patient Name: Leela Muller  : 1950  MRN: [id number]  Today's Date: 2019  Onset of Illness/Injury or Date of Surgery: 19  Date of Referral to OT: 19  Referring Physician: WEST Rodrigues    Admit Date: 2019       ICD-10-CM ICD-9-CM   1. Impaired mobility and ADLs Z74.09 799.89   2. Ataxia R27.0 781.3   3. Status post fall Z91.81 V15.88   4. Impaired functional mobility, balance, gait, and endurance Z74.09 V49.89     Patient Active Problem List   Diagnosis   • Migraine without aura and without status migrainosus, not intractable   • Neck pain   • Chronic low back pain   • Anxiety   • Ataxia   • Atypical chest pain   • Coronary artery disease   • Headache   • Lumbar radiculopathy   • Myalgia and myositis   • Nausea   • Numbness   • Patent foramen ovale   • Status post fall   • CVA (cerebral vascular accident) (CMS/HCC)   • Pancreatitis   • Tingling   • Cervical radiculopathy   • Dyslipidemia   • Hypertension    • Edema   • Embolism and thrombosis of unspecified site [I74.9]   • Palpitations   • Medication monitoring encounter   • Diabetic polyneuropathy associated with diabetes mellitus due to underlying condition (CMS/HCC)   • Postherpetic neuralgia   • History of pulmonary embolism   • Dyspnea on exertion   • Dehydration   • Falls     Past Medical History:   Diagnosis Date   • Anxiety    • Arm pain    • Arthritis    • Depression    • Diabetes mellitus (CMS/HCC)    • Hyperlipidemia    • Hypertension    • Neck pain on left side    • Palpitations 4/18/2018   • Pancreatitis    • Pulmonary embolism (CMS/HCC)    • Rectal cancer (CMS/HCC)    • Stroke syndrome 9/14/2016    · Assessed By: Devaughn Lewis (Neurology); Last Assessed: 16 Jun 2015  Right cerebrovascular accident in July or August 2010, evaluated at [facility].  Admission to [facility] on 09/28/2010 with headache and stuttering, consistent with TIA.  Chronic Coumadin therapy, initiated following bilateral pulmonary emboli in 2007 after fall and hip replacement.  She was already on 81 mg of aspirin as well, 09/2010.  MRI of the brain on 09/28/2010 revealing old right parietal cerebrovascular accident.  MRA revealing normal carotids, middle anterior and posterior cerebral arteries with minimal ostial stenosis of both vertebral arteries.  GENARO by Dr. Oliver Mobley on 09/28/2010:  patent foramen ovale with a small amount of right to left shunting.  Normal LVEF and normal valvular structures.   Patent foramen ovale closure using a 25 mm. Amplatzer cribriform occluder, 10/05/2010.   Echocardiogram, 06/23/2014:  LVEF (55% to 60%) with and Amplatzer device noted to be well-seated in    • Thrombocytopenia (CMS/HCC)    • Transient cerebral ischemia      Past Surgical History:   Procedure Laterality Date   • APPENDECTOMY     • BREAST BIOPSY Left 2012    benign   • BREAST EXCISIONAL BIOPSY Left     benign   • BREAST EXCISIONAL BIOPSY Right      benign   • CHOLECYSTECTOMY     • HYSTERECTOMY     • OOPHORECTOMY     • RECTAL SURGERY     • TONSILLECTOMY     • TOTAL HIP ARTHROPLASTY REVISION         Therapy Treatment    Rehabilitation Treatment Summary     Row Name 07/01/19 0801             Treatment Time/Intention    Discipline  occupational therapist  -TA      Document Type  therapy note (daily note)  -TA      Subjective Information  complains of;pain HA  -TA      Mode of Treatment  occupational therapy  -TA      Patient/Family Observations  Pt supine in bed, RA, SCDs  -TA      Care Plan Review  care plan/treatment goals reviewed;risks/benefits reviewed;patient/other agree to care plan  -TA      Patient Effort  good  -TA      Existing Precautions/Restrictions  fall  -TA      Recorded by [TA] Mike Matthews, OT 07/01/19 0925      Row Name 07/01/19 0801             Vital Signs    Pre Systolic BP Rehab  147  -TA      Pre Treatment Diastolic BP  64  -TA      Pretreatment Heart Rate (beats/min)  78  -TA      Pre SpO2 (%)  94  -TA      O2 Delivery Pre Treatment  room air  -TA      Intra SpO2 (%)  94  -TA      O2 Delivery Intra Treatment  room air  -TA      Post SpO2 (%)  96  -TA      O2 Delivery Post Treatment  room air  -TA      Pre Patient Position  Supine  -TA      Intra Patient Position  Standing  -TA      Post Patient Position  Sitting  -TA      Recorded by [TA] Mike Matthews, OT 07/01/19 0925      Row Name 07/01/19 0801             Cognitive Assessment/Intervention- PT/OT    Affect/Mental Status (Cognitive)  WFL  -TA      Orientation Status (Cognition)  oriented x 4  -TA      Follows Commands (Cognition)  WFL  -TA      Cognitive Function (Cognitive)  safety deficit  -TA      Safety Deficit (Cognitive)  mild deficit;safety precautions awareness;safety precautions follow-through/compliance  -TA      Personal Safety Interventions  fall prevention program maintained;gait belt;muscle strengthening facilitated;nonskid shoes/slippers when out of  bed;supervised activity  -TA      Recorded by [TA] Mkie Matthews, OT 07/01/19 0925      Row Name 07/01/19 0801             Safety Issues, Functional Mobility    Safety Issues Affecting Function (Mobility)  safety precaution awareness;safety precautions follow-through/compliance  -TA      Impairments Affecting Function (Mobility)  balance;endurance/activity tolerance;pain;sensation/sensory awareness;strength  -TA      Recorded by [TA] Mike Matthews, OT 07/01/19 0925      Row Name 07/01/19 0801             Bed Mobility Assessment/Treatment    Bed Mobility Assessment/Treatment  supine-sit  -TA      Supine-Sit [place] (Bed Mobility)  supervision  -TA      Bed Mobility, Safety Issues  decreased use of arms for pushing/pulling;decreased use of legs for bridging/pushing  -TA      Assistive Device (Bed Mobility)  bed rails;head of bed elevated  -TA      Recorded by [TA] Mike Matthews, OT 07/01/19 0925      Row Name 07/01/19 0801             Functional Mobility    Functional Mobility- Ind. Level  contact guard assist;verbal cues required  -TA      Functional Mobility- Device  rolling walker  -TA      Functional Mobility-Distance (Feet)  -- EOB to chair  -TA      Functional Mobility- Safety Issues  step length decreased  -TA      Recorded by [TA] Mike Matthews, OT 07/01/19 0925      Row Name 07/01/19 0801             Transfer Assessment/Treatment    Transfer Assessment/Treatment  sit-stand transfer;stand-sit transfer  -TA      Comment (Transfers)  VCs for safe technique  -TA      Recorded by [TA] Mike Matthews, OT 07/01/19 0925      Row Name 07/01/19 0801             Sit-Stand Transfer    Sit-Stand [place] (Transfers)  stand by assist;verbal cues  -TA      Assistive Device (Sit-Stand Transfers)  walker, front-wheeled  -TA      Recorded by [TA] Mike Matthews, OT 07/01/19 0925      Row Name 07/01/19 0801             Stand-Sit Transfer    Stand-Sit [place] (Transfers)  stand by  assist;verbal cues  -TA      Assistive Device (Stand-Sit Transfers)  walker, front-wheeled  -TA      Recorded by [TA] Mike Matthews, OT 07/01/19 0925      Row Name 07/01/19 0801             ADL Assessment/Intervention    BADL Assessment/Intervention  upper body dressing  -TA      Recorded by [TA] Mike Matthews, OT 07/01/19 0925      Row Name 07/01/19 0801             Upper Body Dressing Assessment/Training    Upper Body Dressing [place] Level  don;front opening garment;supervision;set up  -TA      Upper Body Dressing Position  edge of bed sitting  -TA      Recorded by [TA] Mike Matthews, OT 07/01/19 0925      Row Name 07/01/19 0801             BADL Safety/Performance    Impairments, BADL Safety/Performance  balance;endurance/activity tolerance  -TA      Skilled BADL Treatment/Intervention  energy conservation  -TA      Recorded by [TA] Mike Matthews, OT 07/01/19 0925      Row Name 07/01/19 0801             Motor Skills Assessment/Interventions    Additional Documentation  Balance (Group);Balance Interventions (Group);Therapeutic Exercise (Group);Therapeutic Exercise Interventions (Group)  -TA      Recorded by [TA] Mike Matthews, OT 07/01/19 0925      Row Name 07/01/19 0801             Therapeutic Exercise    Therapeutic Exercise  seated, upper extremities  -TA      Additional Documentation  Therapeutic Exercise (Row)  -TA      44498 - OT Therapeutic Exercise Minutes  11  -TA      69863 - OT Therapeutic Activity Minutes  12  -TA      Recorded by [TA] Mike Matthews, OT 07/01/19 0925      Row Name 07/01/19 0801             Upper Extremity Seated Therapeutic Exercise    Performed, Seated Upper Extremity (Therapeutic Exercise)  shoulder flexion/extension;shoulder horizontal abduction/adduction;scapular protraction/retraction;elbow flexion/extension;other (see comments) gentle lateral cervical stretches, neck rolls  -TA      Exercise Type, Seated Upper Extremity (Therapeutic  Exercise)  AROM (active range of motion)  -TA      Expected Outcomes, Seated Upper Extremity (Therapeutic Exercise)  improve functional tolerance, self-care activity;improve performance, BADLs;improve performance, transfer skills  -TA      Sets/Reps Detail, Seated Upper Extremity (Therapeutic Exercise)  1/10  -TA      Recorded by [TA] Mike Matthews, OT 07/01/19 0925      Row Name 07/01/19 0801             Balance    Balance  static sitting balance;dynamic standing balance  -TA      Recorded by [TA] Mike Matthews, OT 07/01/19 0925      Row Name 07/01/19 0801             Static Sitting Balance    Level of [place] (Unsupported Sitting, Static Balance)  conditional independence  -TA      Sitting Position (Unsupported Sitting, Static Balance)  sitting on edge of bed  -TA      Time Able to Maintain Position (Unsupported Sitting, Static Balance)  more than 5 minutes  -TA      Recorded by [TA] Mike Matthews, OT 07/01/19 0925      Row Name 07/01/19 0801             Dynamic Standing Balance    Level of [place], Reaches Outside Midline (Standing, Dynamic Balance)  contact guard assist  -TA      Time Able to Maintain Position, Reaches Outside Midline (Standing, Dynamic Balance)  1 to 2 minutes  -TA      Assistive Device Utilized (Supported Standing, Dynamic Balance)  walker, rolling  -TA      Recorded by [TA] Mike Matthews, OT 07/01/19 0925      Row Name 07/01/19 0801             Positioning and Restraints    Pre-Treatment Position  in bed  -TA      Post Treatment Position  chair  -TA      In Chair  notified nsg;reclined;call light within reach;encouraged to call for assist;exit alarm on;legs elevated  -TA      Recorded by [TA] Mike Matthews, OT 07/01/19 0925      Row Name 07/01/19 0801             Pain Assessment    Additional Documentation  Pain Scale: Numbers Pre/Post-Treatment (Group)  -TA      Recorded by [TA] Mike Matthews, OT 07/01/19 0925      Row Name 07/01/19 0801              Pain Scale: Numbers Pre/Post-Treatment    Pain Scale: Numbers, Pretreatment  6/10  -TA      Pain Scale: Numbers, Post-Treatment  6/10  -TA      Pain Location - Side  Bilateral  -TA      Pain Location - Orientation  posterior  -TA      Pain Location  head  -TA      Pre/Post Treatment Pain Comment  Premedicated for tx, tolerated  -TA      Pain Intervention(s)  Repositioned;Ambulation/increased activity;Back rub  -TA      Recorded by [TA] Mike Matthews, OT 07/01/19 0925      Row Name 07/01/19 0801             Coping    Observed Emotional State  calm;cooperative  -TA      Verbalized Emotional State  acceptance  -TA      Recorded by [TA] Mike Matthews, OT 07/01/19 0925      Row Name 07/01/19 0801             Plan of Care Review    Plan of Care Reviewed With  patient  -TA      Recorded by [TA] Mike Matthews, OT 07/01/19 0925      Row Name 07/01/19 0801             Outcome Summary/Treatment Plan (OT)    Daily Summary of Progress (OT)  progress toward functional goals is good  -TA      Plan for Continued Treatment (OT)  Continue per OT POC  -TA      Anticipated Discharge Disposition (OT)  home with assist;home with home health  -TA      Recorded by [TA] Mike Matthews, OT 07/01/19 0925        User Key  (r) = Recorded By, (t) = Taken By, (c) = Cosigned By    Initials Name Effective Dates Discipline    TA Mike Matthews, OT 03/14/16 -  OT             Occupational Therapy Education     Title: PT OT SLP Therapies (In Progress)     Topic: Occupational Therapy (Done)     Point: ADL training (Done)     Description: Instruct learner(s) on proper safety adaptation and remediation techniques during self care or transfers.   Instruct in proper use of assistive devices.    Learning Progress Summary           Patient PEARL Cullen D, LUDWIG,NR by AZAM at 6/28/2019 11:39 AM    PEARL Franklin VU by NOHEMY at 6/28/2019  8:08 AM    Comment:  reason for consult, noted deficits, BP drop and danger                   Point: Home  exercise program (Done)     Description: Instruct learner(s) on appropriate technique for monitoring, assisting and/or progressing therapeutic exercises/activities.    Learning Progress Summary           Patient Acceptance, PEARL,IGLESIA, LUDWIG,DU by RETA at 7/1/2019  9:26 AM    Comment:  BUE HEP and gentle neck stretches; reinforced need for call for assist with OOB activities.                   Point: Precautions (Done)     Description: Instruct learner(s) on prescribed precautions during self-care and functional transfers.    Learning Progress Summary           Patient Subhash, PEARL,IGLESIA, VU,NR by AZAM at 6/28/2019 11:39 AM    Acceptance, LUDWIG KANG by NOHEMY at 6/28/2019  8:08 AM    Comment:  reason for consult, noted deficits, BP drop and danger                   Point: Body mechanics (Done)     Description: Instruct learner(s) on proper positioning and spine alignment during self-care, functional mobility activities and/or exercises.    Learning Progress Summary           Patient PEARL Cullen D, LUDWIG,NR by AZAM at 6/28/2019 11:39 AM                               User Key     Initials Effective Dates Name Provider Type Discipline    NOHEMY 06/08/18 -  Fallon Everett, OT Occupational Therapist OT    RETA 03/14/16 -  Mike Matthews OT Occupational Therapist OT    AZAM 04/02/18 -  Flakita Leal, PT Physical Therapist PT                OT Recommendation and Plan  Outcome Summary/Treatment Plan (OT)  Daily Summary of Progress (OT): progress toward functional goals is good  Plan for Continued Treatment (OT): Continue per OT POC  Anticipated Discharge Disposition (OT): home with assist, home with home health  Daily Summary of Progress (OT): progress toward functional goals is good  Plan of Care Review  Plan of Care Reviewed With: patient  Plan of Care Reviewed With: patient  Outcome Summary: VSS; Pt supervision for bed mobility, SBA for STS and CGA/RW EOB to chair. Pt tolerated BUE strengthening ex's and gentle neck stretches to address headache. Pt  progressing toward fxl goals. Continue per OT POC.   Outcome Measures     Row Name 07/01/19 0801 06/28/19 1038          How much help from another person do you currently need...    Turning from your back to your side while in flat bed without using bedrails?  --  4  -AA     Moving from lying on back to sitting on the side of a flat bed without bedrails?  --  4  -AA     Moving to and from a bed to a chair (including a wheelchair)?  --  3  -AA     Standing up from a chair using your arms (e.g., wheelchair, bedside chair)?  --  3  -AA     Climbing 3-5 steps with a railing?  --  2  -AA     To walk in hospital room?  --  3  -AA     AM-PAC 6 Clicks Score  --  19  -AA        How much help from another is currently needed...    Putting on and taking off regular lower body clothing?  3  -TA  --     Bathing (including washing, rinsing, and drying)  3  -TA  --     Toileting (which includes using toilet bed pan or urinal)  3  -TA  --     Putting on and taking off regular upper body clothing  4  -TA  --     Taking care of personal grooming (such as brushing teeth)  4  -TA  --     Eating meals  4  -TA  --     Score  21  -TA  --        Functional Assessment    Outcome Measure Options  AM-PAC 6 Clicks Daily Activity (OT)  -TA  AM-PAC 6 Clicks Basic Mobility (PT)  -AA       User Key  (r) = Recorded By, (t) = Taken By, (c) = Cosigned By    Initials Name Provider Type    TA Mike Matthews, OT Occupational Therapist    Flakita Del Valle, PT Physical Therapist           Time Calculation:   Time Calculation- OT     Row Name 07/01/19 0929 07/01/19 0801          Time Calculation- OT    OT Start Time  0801 ttc 24 minutes  -TA  --     Total Timed Code Minutes- OT  24 minute(s)  -TA  --     OT Received On  07/01/19  -TA  --     OT Goal Re-Cert Due Date  07/08/19  -TA  --        Timed Charges    69485 - OT Therapeutic Exercise Minutes  --  11  -TA     85710 - OT Therapeutic Activity Minutes  --  12  -TA       User Key  (r) = Recorded By,  (t) = Taken By, (c) = Cosigned By    Initials Name Provider Type    TA Mike Matthews, OT Occupational Therapist        Therapy Charges for Today     Code Description Service Date Service Provider Modifiers Qty    [id number]  OT THER PROC EA 15 MIN 7/1/2019 Mike Matthews, OT GO 1    [id number]  OT THERAPEUTIC ACT EA 15 MIN 7/1/2019 Mike Matthews, OT GO 1               Mike Matthews OT  7/1/2019    Electronically signed by Mike Matthews OT at 7/1/2019  9:31 AM

## 2019-07-02 VITALS
OXYGEN SATURATION: 95 % | HEART RATE: 86 BPM | SYSTOLIC BLOOD PRESSURE: 128 MMHG | HEIGHT: 68 IN | DIASTOLIC BLOOD PRESSURE: 51 MMHG | TEMPERATURE: 98.4 F | WEIGHT: 185 LBS | RESPIRATION RATE: 18 BRPM | BODY MASS INDEX: 28.04 KG/M2

## 2019-07-02 LAB
ANION GAP SERPL CALCULATED.3IONS-SCNC: 11 MMOL/L (ref 5–15)
BASOPHILS # BLD AUTO: 0.04 10*3/MM3 (ref 0–0.2)
BASOPHILS NFR BLD AUTO: 0.6 % (ref 0–1.5)
BUN BLD-MCNC: 11 MG/DL (ref 8–23)
BUN/CREAT SERPL: 16.2 (ref 7–25)
CALCIUM SPEC-SCNC: 8.7 MG/DL (ref 8.6–10.5)
CHLORIDE SERPL-SCNC: 103 MMOL/L (ref 98–107)
CO2 SERPL-SCNC: 25 MMOL/L (ref 22–29)
CREAT BLD-MCNC: 0.68 MG/DL (ref 0.57–1)
DEPRECATED RDW RBC AUTO: 45.8 FL (ref 37–54)
EOSINOPHIL # BLD AUTO: 0.16 10*3/MM3 (ref 0–0.4)
EOSINOPHIL NFR BLD AUTO: 2.4 % (ref 0.3–6.2)
ERYTHROCYTE [DISTWIDTH] IN BLOOD BY AUTOMATED COUNT: 13.8 % (ref 12.3–15.4)
GFR SERPL CREATININE-BSD FRML MDRD: 85 ML/MIN/1.73
GLUCOSE BLD-MCNC: 248 MG/DL (ref 65–99)
GLUCOSE BLDC GLUCOMTR-MCNC: 238 MG/DL (ref 70–130)
GLUCOSE BLDC GLUCOMTR-MCNC: 379 MG/DL (ref 70–130)
HCT VFR BLD AUTO: 40.3 % (ref 34–46.6)
HGB BLD-MCNC: 12.9 G/DL (ref 12–15.9)
IMM GRANULOCYTES # BLD AUTO: 0.01 10*3/MM3 (ref 0–0.05)
IMM GRANULOCYTES NFR BLD AUTO: 0.2 % (ref 0–0.5)
LYMPHOCYTES # BLD AUTO: 2.01 10*3/MM3 (ref 0.7–3.1)
LYMPHOCYTES NFR BLD AUTO: 30.2 % (ref 19.6–45.3)
MCH RBC QN AUTO: 29.1 PG (ref 26.6–33)
MCHC RBC AUTO-ENTMCNC: 32 G/DL (ref 31.5–35.7)
MCV RBC AUTO: 90.8 FL (ref 79–97)
MONOCYTES # BLD AUTO: 0.53 10*3/MM3 (ref 0.1–0.9)
MONOCYTES NFR BLD AUTO: 8 % (ref 5–12)
NEUTROPHILS # BLD AUTO: 3.9 10*3/MM3 (ref 1.7–7)
NEUTROPHILS NFR BLD AUTO: 58.6 % (ref 42.7–76)
NRBC BLD AUTO-RTO: 0 /100 WBC (ref 0–0.2)
PLATELET # BLD AUTO: 131 10*3/MM3 (ref 140–450)
PMV BLD AUTO: 12.2 FL (ref 6–12)
POTASSIUM BLD-SCNC: 4 MMOL/L (ref 3.5–5.2)
RBC # BLD AUTO: 4.44 10*6/MM3 (ref 3.77–5.28)
SODIUM BLD-SCNC: 139 MMOL/L (ref 136–145)
WBC NRBC COR # BLD: 6.65 10*3/MM3 (ref 3.4–10.8)

## 2019-07-02 PROCEDURE — 99217 PR OBSERVATION CARE DISCHARGE MANAGEMENT: CPT | Performed by: INTERNAL MEDICINE

## 2019-07-02 PROCEDURE — 99214 OFFICE O/P EST MOD 30 MIN: CPT | Performed by: NURSE PRACTITIONER

## 2019-07-02 PROCEDURE — 63710000001 INSULIN LISPRO (HUMAN) PER 5 UNITS: Performed by: NURSE PRACTITIONER

## 2019-07-02 PROCEDURE — 85025 COMPLETE CBC W/AUTO DIFF WBC: CPT | Performed by: NURSE PRACTITIONER

## 2019-07-02 PROCEDURE — 80048 BASIC METABOLIC PNL TOTAL CA: CPT | Performed by: NURSE PRACTITIONER

## 2019-07-02 PROCEDURE — 82962 GLUCOSE BLOOD TEST: CPT

## 2019-07-02 PROCEDURE — 99212 OFFICE O/P EST SF 10 MIN: CPT | Performed by: PSYCHIATRY & NEUROLOGY

## 2019-07-02 PROCEDURE — G0378 HOSPITAL OBSERVATION PER HR: HCPCS

## 2019-07-02 RX ORDER — FLUDROCORTISONE ACETATE 0.1 MG/1
0.1 TABLET ORAL DAILY
Qty: 30 TABLET | Refills: 1 | Status: SHIPPED | OUTPATIENT
Start: 2019-07-03 | End: 2019-08-05

## 2019-07-02 RX ORDER — METOPROLOL SUCCINATE 50 MG/1
50 TABLET, EXTENDED RELEASE ORAL DAILY
Qty: 270 TABLET | Refills: 3 | Status: SHIPPED | OUTPATIENT
Start: 2019-07-02 | End: 2019-08-28

## 2019-07-02 RX ORDER — GABAPENTIN 800 MG/1
400 TABLET ORAL 3 TIMES DAILY
Qty: 60 TABLET | Refills: 1 | Status: SHIPPED | OUTPATIENT
Start: 2019-07-02 | End: 2020-02-04 | Stop reason: SDUPTHER

## 2019-07-02 RX ORDER — CYCLOBENZAPRINE HCL 10 MG
10 TABLET ORAL 3 TIMES DAILY PRN
Qty: 30 TABLET | Refills: 1 | Status: SHIPPED | OUTPATIENT
Start: 2019-07-02 | End: 2019-08-28

## 2019-07-02 RX ADMIN — CHLORDIAZEPOXIDE HYDROCHLORIDE AND CLIDINIUM BROMIDE 1 CAPSULE: 5; 2.5 CAPSULE ORAL at 08:22

## 2019-07-02 RX ADMIN — FLUDROCORTISONE ACETATE 100 MCG: 0.1 TABLET ORAL at 08:22

## 2019-07-02 RX ADMIN — CYCLOBENZAPRINE HYDROCHLORIDE 10 MG: 10 TABLET, FILM COATED ORAL at 05:53

## 2019-07-02 RX ADMIN — VENLAFAXINE HYDROCHLORIDE 75 MG: 75 CAPSULE, EXTENDED RELEASE ORAL at 08:21

## 2019-07-02 RX ADMIN — METOPROLOL TARTRATE 25 MG: 25 TABLET ORAL at 08:22

## 2019-07-02 RX ADMIN — HYDROCODONE BITARTRATE AND ACETAMINOPHEN 1 TABLET: 7.5; 325 TABLET ORAL at 03:52

## 2019-07-02 RX ADMIN — PANCRELIPASE 18000 UNITS OF LIPASE: 30000; 6000; 19000 CAPSULE, DELAYED RELEASE PELLETS ORAL at 08:21

## 2019-07-02 RX ADMIN — GABAPENTIN 400 MG: 400 CAPSULE ORAL at 05:53

## 2019-07-02 RX ADMIN — TOPIRAMATE 25 MG: 25 TABLET, FILM COATED ORAL at 08:22

## 2019-07-02 RX ADMIN — PANTOPRAZOLE SODIUM 40 MG: 40 TABLET, DELAYED RELEASE ORAL at 08:22

## 2019-07-02 RX ADMIN — INSULIN LISPRO 3 UNITS: 100 INJECTION, SOLUTION INTRAVENOUS; SUBCUTANEOUS at 08:23

## 2019-07-02 RX ADMIN — BACLOFEN 5 MG: 10 TABLET ORAL at 08:22

## 2019-07-02 RX ADMIN — ASPIRIN 81 MG CHEWABLE TABLET 81 MG: 81 TABLET CHEWABLE at 08:22

## 2019-07-02 RX ADMIN — SODIUM CHLORIDE, PRESERVATIVE FREE 3 ML: 5 INJECTION INTRAVENOUS at 08:24

## 2019-07-02 RX ADMIN — MECLIZINE HYDROCHLORIDE 25 MG: 25 TABLET ORAL at 05:53

## 2019-07-02 NOTE — PROGRESS NOTES
"[facility] Daily Note       LOS: 0 days   Patient Care Team:  Connor Ritter MD as PCP - General  Connor Ritter MD as PCP - Family Medicine  KnucklesDanya MD as PCP - Claims Attributed    Chief Complaint: Falls, orthostasis, long-term anticoagulation for remote bilateral pulmonary emboli         Subjective     Subjective: BP averaging around 130's systolic.  Ambulated with PT yesterday and did get dizzy; no syncope.  Anxious regarding paper given to her yesterday regarding cost of inpatient room.      Review of Systems:   As above.    Medications:    aspirin 81 mg Oral Daily   baclofen 5 mg Oral TID   clidinium-chlordiazePOXIDE 1 capsule Oral TID   cyclobenzaprine 10 mg Oral Q8H   digoxin 250 mcg Oral Daily   fludrocortisone 100 mcg Oral Daily   gabapentin 400 mg Oral Q8H   insulin detemir 16 Units Subcutaneous Nightly   insulin lispro 0-7 Units Subcutaneous 4x Daily With Meals & Nightly   meclizine 25 mg Oral Q8H   metoprolol tartrate 25 mg Oral Q12H   metroNIDAZOLE  Topical Nightly   pancrelipase (Lip-Prot-Amyl) 18,000 units of lipase Oral TID With Meals   pantoprazole 40 mg Oral QAM AC   rosuvastatin 10 mg Oral Nightly   sodium chloride 3 mL Intravenous Q12H   topiramate 25 mg Oral Daily   venlafaxine XR 75 mg Oral Daily       Objective     Vital Sign Min/Max for last 24 hours  Temp  Min: 97.4 °F (36.3 °C)  Max: 98.6 °F (37 °C)   BP  Min: 128/53  Max: 145/64   Pulse  Min: 68  Max: 85   Resp  Min: 16  Max: 18   SpO2  Min: 94 %  Max: 97 %   No Data Recorded   No Data Recorded    No intake or output data in the 24 hours ending 07/02/19 0824     Flowsheet Rows      First Filed Value   Admission Height  172.7 cm (68\") Documented at 06/27/2019 1804   Admission Weight  83.9 kg (185 lb) Documented at 06/27/2019 1804          Physical Exam:  GENERAL: well-developed, well-nourished; in no acute distress.   NECK:  Carotid upstrokes are 2+ and  symmetrical without bruits.   LUNGS: Clear to " auscultation bilaterally without wheezing, rhonchi, or rales noted.   CARDIOVASCULAR: The heart has a regular rate with a normal S1 and S2. There is no murmur, gallop, rub, or click appreciated. The PMI is nondisplaced.   NEUROLOGICAL: Nonfocal; Alert and oriented  PERIPHERAL VASCULAR:  Posterior tibial pulses are 2+ and symmetrical. There is no peripheral edema.   SKIN:  Warm and dry       Results Review:    I reviewed the patient's new clinical results.    Tele:  NSR @     Labs:    Results from last 7 days   Lab Units 07/01/19 0443 06/30/19 0528 06/29/19 0421 06/27/19  1811   SODIUM mmol/L 141 140 138   < > 137   POTASSIUM mmol/L 4.5 4.1 4.0   < > 4.6   CHLORIDE mmol/L 103 103 101   < > 100   CO2 mmol/L 28.0 26.0 25.0   < > 25.0   BUN mg/dL 12 12 12   < > 15   CREATININE mg/dL 0.77 0.69 0.71   < > 1.05*   CALCIUM mg/dL 8.6 8.8 8.7   < > 9.1   BILIRUBIN mg/dL  --   --   --   --  0.2   ALK PHOS U/L  --   --   --   --  77   ALT (SGPT) U/L  --   --   --   --  30   AST (SGOT) U/L  --   --   --   --  27   GLUCOSE mg/dL 278* 239* 200*   < > 168*    < > = values in this interval not displayed.     Results from last 7 days   Lab Units 07/01/19 0443 06/30/19 0528 06/29/19  0421   WBC 10*3/mm3 5.40 4.95 5.63   HEMOGLOBIN g/dL 12.6 12.7 12.8   HEMATOCRIT % 40.7 40.4 40.8   PLATELETS 10*3/mm3 139* 139* 141     No results found for: TROPONINI, TROPONINT  Lab Results   Component Value Date    CHOL 97 06/28/2019     Lab Results   Component Value Date    TRIG 171 (H) 06/28/2019     Lab Results   Component Value Date    HDL 34 (L) 06/28/2019     No components found for: LDLCALC  Lab Results   Component Value Date    INR 1.07 07/01/2019    INR 1.19 (H) 06/30/2019    INR 1.39 (H) 06/29/2019    PROTIME 13.4 07/01/2019    PROTIME 14.6 (H) 06/30/2019    PROTIME 16.4 (H) 06/29/2019         Ejection Fraction:  60%     Assessment   Assessment:  1. Frequent falls with injury  2. History of bilateral pulmonary embolism following  surgery in 2007   1. on chronic Coumadin therapy  2. Coumadin dc'd this admission for negative BLE duplex for DVT  3. History of CVA  4. Status post PFO closure 10/2010 using an Amplatzer PFO closure device  5. Echo 5/2019: EF 60% with Amplatzer device well-seated without flow across device  6. Hypertension  7. Hyperlipidemia        Plan:  Continue Florinef 0.1mg daily  Continue metoprolol 25mg BID  Encouraged ambulation with PT  Nothing further from the cardiac standpoint.   Can follow up in 4-6 weeks in our office    WEST Lan  07/02/19  8:24 AM

## 2019-07-02 NOTE — PROGRESS NOTES
Neurology       Patient Care Team:  Connor Ritter MD as PCP - General  Connor Ritter MD as PCP - Family Medicine  KnuckDanya blount MD as PCP - [facility]  Jac Santiago MD as Consulting Physician (Endocrinology)  Rika Sullivan MD as Consulting Physician (Cardiology)    Chief complaint: Falls    History: Patient's neck pain is significantly better with Flexeril 10 mg 3 times daily.    Blood pressure is staying up with Florinef 0.1 mg daily.      Past Medical History:   Diagnosis Date   • Anxiety    • Arm pain    • Arthritis    • Depression    • Diabetes mellitus (CMS/HCC)    • Hyperlipidemia    • Hypertension    • Neck pain on left side    • Palpitations 4/18/2018   • Pancreatitis    • Pulmonary embolism (CMS/HCC)    • Rectal cancer (CMS/HCC)    • Stroke syndrome 9/14/2016    · Assessed By: Devaughn Lewis (Neurology); Last Assessed: 16 Jun 2015  Right cerebrovascular accident in July or August 2010, evaluated at [facility].  Admission to [facility] on 09/28/2010 with headache and stuttering, consistent with TIA.  Chronic Coumadin therapy, initiated following bilateral pulmonary emboli in 2007 after fall and hip replacement.  She was already on 81 mg of aspirin as well, 09/2010.  MRI of the brain on 09/28/2010 revealing old right parietal cerebrovascular accident.  MRA revealing normal carotids, middle anterior and posterior cerebral arteries with minimal ostial stenosis of both vertebral arteries.  GENARO by Dr. Oliver Mobley on 09/28/2010:  patent foramen ovale with a small amount of right to left shunting.  Normal LVEF and normal valvular structures.   Patent foramen ovale closure using a 25 mm. Amplatzer cribriform occluder, 10/05/2010.   Echocardiogram, 06/23/2014:  LVEF (55% to 60%) with and Amplatzer device noted to be well-seated in    • Thrombocytopenia (CMS/HCC)    • Transient cerebral ischemia        Vital Signs   Vitals:     07/01/19 1538 07/01/19 2000 07/02/19 0345 07/02/19 0652   BP: 145/64 136/73 143/70 129/57   BP Location: Right arm Right arm Right arm Left arm   Patient Position: Lying Lying Lying Lying   Pulse: 85 74 69 68   Resp: 16 16 16 18   Temp: 97.4 °F (36.3 °C) 98.4 °F (36.9 °C) 98.6 °F (37 °C) 97.5 °F (36.4 °C)   TempSrc: Oral Oral Oral Oral   SpO2:  94% 94% 97%   Weight:       Height:           Physical Exam:   General: Awake and alert              Neuro: Oriented to person place and time.    Speech is articulate.     are equal.        Results Review:  Labs reviewed  Results from last 7 days   Lab Units 07/02/19  0730   WBC 10*3/mm3 6.65   HEMOGLOBIN g/dL 12.9   HEMATOCRIT % 40.3   PLATELETS 10*3/mm3 131*     Results from last 7 days   Lab Units 07/02/19  0730 07/01/19  0443 06/30/19  0528  06/27/19  1811   SODIUM mmol/L 139 141 140   < > 137   POTASSIUM mmol/L 4.0 4.5 4.1   < > 4.6   CHLORIDE mmol/L 103 103 103   < > 100   CO2 mmol/L 25.0 28.0 26.0   < > 25.0   BUN mg/dL 11 12 12   < > 15   CREATININE mg/dL 0.68 0.77 0.69   < > 1.05*   CALCIUM mg/dL 8.7 8.6 8.8   < > 9.1   BILIRUBIN mg/dL  --   --   --   --  0.2   ALK PHOS U/L  --   --   --   --  77   ALT (SGPT) U/L  --   --   --   --  30   AST (SGOT) U/L  --   --   --   --  27   GLUCOSE mg/dL 248* 278* 239*   < > 168*    < > = values in this interval not displayed.       Imaging Results (last 24 hours)     ** No results found for the last 24 hours. **          Assessment:  Fall secondary to orthostatic hypotension, improved    Deconditioning.    Cervical spondylosis with cervicogenic headaches    Plan:  Okay to discharge.    She can use Flexeril 10 mg every 8 hours as needed for the neck pain headache and neck spasms.    Continue Florinef 0.1 mg daily.    Weightbearing exercise (walking) 10 minutes daily.    She is advised to build up in increments to regain her strength doing 1 to 3 minutes at a time and adding up to 10 minutes a day every day.    Comment:  Okay  to discharge         I discussed the patients findings and my recommendations with patient, family and primary care team    Wesly Joiner MD  07/02/19  12:01 PM

## 2019-07-02 NOTE — DISCHARGE SUMMARY
[facility] Medicine Services  DISCHARGE SUMMARY    Patient Name: Leela Muller  : 1950  MRN: [id number]    Date of Admission: 2019  Date of Discharge: 19  Length of Stay: 0  Primary Care Physician: Connor Ritter MD    Consults     Date and Time Order Name Status Description    2019 0752 Inpatient Neurosurgery Consult      2019 0917 Inpatient Cardiology Consult Completed     2019 0708 Inpatient Neurology Consult Stroke Completed         Hospital Course     Presenting Problem:   Falls [W19.XXXA]      Active Hospital Problems    Diagnosis  POA   • Falls [W19.XXXA]  Yes   • Dyslipidemia [E78.5]  Yes   • Hypertension [I10]  Yes   • Ataxia [R27.0]  Yes   • CVA (cerebral vascular accident) (CMS/[facility]) [I63.9]  No      Resolved Hospital Problems   No resolved problems to display.          Hospital Course:  Brief Hospital Course to date:  Leela Muller is a 69 y.o. female directly admitted to [facility] from Dr. Lewis's office on 19 for evaluation of multiple falls in several days. Orthostatic in the office (130/80 - 110/60 with sitting to standing)     Frequent falls  Orthostatic Hypotension  Traumatic possible nasal bone fracture  ---stroke workup obtained on admission essentially negative (negative MRI/MRA/TTE-deferred given patient had recent one in May 2019- this ECHO reviewed)  ---CT facial boneswith some concern about possible non-displaced fracture in setting of trauma  ---PT/OT to see, HH at d/c recommended and patient agreeable   ---CT C-spine obtained  with some concern of spinal stenosis-NSGY opinion requested regarding this read and rec for outpatient f/u no surgical management at this time   ---holding coumadin indefinitely given frequent falls, appreciate help of Dr Sullivan as well as neurology in this case   ---have restarted metoprolol at half home dose (25 BID) and florinef initiated       History of multiple blood clots   --on  chronic anticoagulation as above, holding   - BLE duplex on 6/28 was negative      HA  -in part favored to be traumatic s/p fall- a/w neck pain and muscle spasms-- have restarted home fioricet as well as home baclofen, monitor     HTN  --have added back metoprolol at half home dose as above      HLD  ---continue statin     DM2  ---continue basal/bolus, monitor and titrate as needed                 Day of Discharge     HPI:   She says that she slept better and feels better and is ready to be discharged home.  Vital Signs:   Temp:  [97.4 °F (36.3 °C)-98.6 °F (37 °C)] 97.5 °F (36.4 °C)  Heart Rate:  [68-85] 68  Resp:  [16-18] 18  BP: (128-145)/(53-73) 129/57     Physical Exam:  Patient is alert and talkative in no distress at rest  Neck is without mass or JVD  Heart is Reg wo murmur  Lungs are clear wo wheeze or crackle  Abd is soft without HSM or mass, not tender or distended  MAEW  Skin is without rash  Neurologic exam in nonfocal   Mood is appropriate--anxious and apprehensive      Pertinent  and/or Most Recent Results       Results from last 7 days   Lab Units 07/02/19  0730 07/01/19  0443 06/30/19  0528 06/29/19  0421 06/28/19  0526 06/28/19  0525 06/27/19  1811   WBC 10*3/mm3 6.65 5.40 4.95 5.63 8.81  --  10.35   HEMOGLOBIN g/dL 12.9 12.6 12.7 12.8 13.0  --  14.6   HEMATOCRIT % 40.3 40.7 40.4 40.8 41.3  --  45.0   PLATELETS 10*3/mm3 131* 139* 139* 141 154  --  202   SODIUM mmol/L 139 141 140 138  --  140 137   POTASSIUM mmol/L 4.0 4.5 4.1 4.0  --  4.0 4.6   CHLORIDE mmol/L 103 103 103 101  --  105 100   CO2 mmol/L 25.0 28.0 26.0 25.0  --  27.0 25.0   BUN mg/dL 11 12 12 12  --  13 15   CREATININE mg/dL 0.68 0.77 0.69 0.71  --  0.77 1.05*   GLUCOSE mg/dL 248* 278* 239* 200*  --  137* 168*   CALCIUM mg/dL 8.7 8.6 8.8 8.7  --  8.5* 9.1     Results from last 7 days   Lab Units 07/01/19  0443 06/30/19  0528 06/29/19  0421 06/28/19  0525 06/27/19  1811   BILIRUBIN mg/dL  --   --   --   --  0.2   ALK PHOS U/L  --   --    --   --  77   ALT (SGPT) U/L  --   --   --   --  30   AST (SGOT) U/L  --   --   --   --  27   PROTIME Seconds 13.4 14.6* 16.4* 18.6* 20.3*   INR  1.07 1.19* 1.39* 1.63* 1.82*      Results from last 7 days   Lab Units 06/28/19  0525   CHOLESTEROL mg/dL 97   TRIGLYCERIDES mg/dL 171*   HDL CHOL mg/dL 34*   LDL CHOL mg/dL 29     Results from last 7 days   Lab Units 06/27/19  1811   TSH mIU/mL 2.210   HEMOGLOBIN A1C % 7.90*     Brief Urine Lab Results     None               Mri Angiogram Head Without Contrast    Result Date: 6/27/2019  Impression: Normal head MRA. Signer Name: Mike Calderón MD  Signed: 6/27/2019 8:43 PM  Workstation Name: [id number]     Mri Angiogram Neck With Contrast    Result Date: 6/27/2019  Impression: Normal neck MRA with 0% stenosis in both internal carotid arteries by NASCET criteria. Signer Name: Mike Calderón MD  Signed: 6/27/2019 8:45 PM  Workstation Name: [id number]     Mri Cervical Spine Without Contrast    Result Date: 6/29/2019  Impression: Grossly stable alignment of minimal retrolisthesis C4 on C5 from 2011 comparison raises suspicion of likely congenital spinal canal narrowing particularly in the C4-C6 levels with superimposed spondylitic changes greatest at the C4-C5 and C5-C6 levels. C4-C5 demonstrates moderate left paracentral spinal canal stenosis with moderate right and moderate to severe left neuroforaminal stenosis. At the C5-C6 level there is only moderate spinal canal stenosis however contact and likely indentation of the anterior cord with a focus of T2 increased signal within the central cord at this level raises suspicion for edema or developing myelomalacia not as clearly evident on the remote 2011 comparison. Lack of intravenous contrast limits full evaluation of this increased signal with demyelinating process not entirely excluded however less favored given the adjacent spondylitic changes.   DICTATED:   06/28/2019 EDITED/ls :   06/28/2019  This report was  finalized on 6/29/2019 7:01 PM by Dr. Sage Renner.      Ct Facial Bones Without Contrast    Result Date: 6/28/2019  Impression: Mild irregularity of the bilateral nasal bones anteriorly without depression or nasal septal involvement may represent nondepressed nasal bone fractures in the setting of recent trauma. Facial bones and paranasal sinuses otherwise are grossly preserved.  D:  06/28/2019 E:  06/28/2019   This report was finalized on 6/28/2019 1:28 PM by Dr. Sage Renner.        Results for orders placed during the hospital encounter of 05/06/19   Adult Transthoracic Echo Complete W/ Cont if Necessary Per Protocol    Narrative · Estimated EF = 60%.  · Left ventricular systolic function is normal.  · Trace mitral and tricuspid regurgitation  · Intact Amplatzer septal occluder with no evidence of flow across the   device.        Results for orders placed during the hospital encounter of 06/27/19   Duplex Venous Lower Extremity - Bilateral CAR    Narrative · No evidence of deep or superficial venous thrombosis in the right or   left lower extremities.          Discharge Details        Discharge Medications      New Medications      Instructions Start Date   cyclobenzaprine 10 MG tablet  Commonly known as:  FLEXERIL   10 mg, Oral, 3 Times Daily PRN      fludrocortisone 0.1 MG tablet   0.1 mg, Oral, Daily   Start Date:  7/3/2019        Changes to Medications      Instructions Start Date   gabapentin 800 MG tablet  Commonly known as:  NEURONTIN  What changed:  how much to take   400 mg, Oral, 3 Times Daily      metoprolol succinate XL 50 MG 24 hr tablet  Commonly known as:  TOPROL-XL  What changed:  additional instructions   1 tab daily         Continue These Medications      Instructions Start Date   acyclovir 5 % ointment  Commonly known as:  ZOVIRAX   Topical      ANUCORT-HC 25 MG suppository  Generic drug:  hydrocortisone   Rectal      aspirin 81 MG tablet   Oral, Daily      B-D UF III MINI PEN NEEDLES 31G X 5  MM misc  Generic drug:  Insulin Pen Needle   Use to inject insulin twice a day as directed      baclofen 10 MG tablet  Commonly known as:  LIORESAL   10 mg, Oral, 3 Times Daily      butalbital-acetaminophen-caffeine -40 MG per tablet  Commonly known as:  FIORICET, ESGIC   Take 1-2 tablets by mouth Daily as needed      cetirizine 10 MG tablet  Commonly known as:  zyrTEC   1 tablet, Oral, Daily PRN      clidinium-chlordiazePOXIDE 5-2.5 MG per capsule  Commonly known as:  LIBRAX   1 capsule, Oral, 3 Times Daily      digoxin 250 MCG tablet  Commonly known as:  LANOXIN   Take 1 tablet by mouth daily      fluticasone 50 MCG/ACT nasal spray  Commonly known as:  FLONASE   Instill 2 sprays in each nostril once a day      furosemide 40 MG tablet  Commonly known as:  LASIX   Take 1 tablet by mouth Daily. May have extra 1/2 to 1 tablet daily if needed for edema      glipiZIDE 10 MG 24 hr tablet  Commonly known as:  GLUCOTROL XL   20 mg, Oral, Daily      HYDROcodone-acetaminophen 7.5-325 MG per tablet  Commonly known as:  NORCO   Take 1 tablet by mouth three times a day      Insulin Glargine 100 UNIT/ML injection pen  Commonly known as:  BASAGLAR KWIKPEN   Subcutaneous      Insulin Syringe-Needle U-100 29G 0.5 ML misc   0.3 mL, Injection, Daily      JANUVIA 100 MG tablet  Generic drug:  SITagliptin   100 mg, Oral, Daily      loperamide 2 MG capsule  Commonly known as:  IMODIUM   2 mg, Oral, 4 Times Daily PRN      meclizine 25 MG tablet  Commonly known as:  ANTIVERT   25 mg, Oral, 3 Times Daily      metoclopramide 10 MG tablet  Commonly known as:  REGLAN   10 mg, Oral, Daily PRN      metroNIDAZOLE 0.75 % cream  Commonly known as:  METROCREAM   Topical      nitroglycerin 0.4 MG SL tablet  Commonly known as:  NITROSTAT   0.4 mg, Sublingual, Every 5 Minutes PRN      ONE TOUCH ULTRA TEST test strip  Generic drug:  glucose blood   Use to test blood glucose as directed 3 times a day      PANCREAZE 81662 units capsule  delayed-release particles capsule  Generic drug:  pancrelipase (Lip-Prot-Amyl)   Take 1 capsule by mouth with each meal and each snack      PCCA CUSTOM LIPO-MAX cream   No dose, route, or frequency recorded.      polyethylene glycol packet  Commonly known as:  MIRALAX   17 g, Oral, Daily      potassium chloride 10 MEQ CR tablet  Commonly known as:  K-DUR,KLOR-CON   10 mEq, Oral, Daily      promethazine 25 MG tablet  Commonly known as:  PHENERGAN   25 mg, Oral, 4 Times Daily PRN      RABEprazole 20 MG EC tablet  Commonly known as:  ACIPHEX   20 mg, Oral, Daily      rosuvastatin 10 MG tablet  Commonly known as:  CRESTOR   10 mg, Oral, Daily      topiramate 25 MG tablet  Commonly known as:  TOPAMAX   25 mg, Oral, Daily      TYLENOL 500 MG tablet  Generic drug:  acetaminophen   Oral, Every 6 Hours PRN      venlafaxine XR 75 MG 24 hr capsule  Commonly known as:  EFFEXOR-XR   75 mg, Oral, Daily      vitamin b complex capsule capsule   1 tablet, Oral, Daily         Stop These Medications    pioglitazone 15 MG tablet  Commonly known as:  ACTOS     warfarin 5 MG tablet  Commonly known as:  COUMADIN              Discharge Disposition:  Home-Health Care Svc    Discharge Diet:    Regular  Discharge Activity:    As Tolerated  Special Instructions:    Future Appointments   Date Time Provider Department Center   7/9/2019  2:00 PM Devaughn Lewis MD [facility] None   9/4/2019  3:00 PM [facility] S[facility]       Additional Instructions for the Follow-ups that You Need to Schedule     Ambulatory Referral to Home Health   As directed      Face to Face Visit Date:  6/28/2019    Follow-up Provider for Plan of Care?:  I treated the patient in an acute care facility and will not continue treatment after discharge.    Follow-up Provider:  DAVE PERALTA [[id number]]    Reason/Clinical Findings:  Falls    Describe mobility limitations that make leaving home difficult:  Frequent falls    Nursing/Therapeutic Services  Requested:  Physical Therapy Occupational Therapy    PT orders:  Gait Training Strengthening Home safety assessment    Weight Bearing Status:  Full Weight Bearing    Occupational orders:  Activities of daily living Strengthening Home safety assessment Fine motor    Frequency:  1 Week 1         Discharge Follow-up with PCP   As directed       Currently Documented PCP:    Connor Ritter MD    PCP Phone Number:    [phone]     Follow Up Details:  in one week             Would recommend neurosurgery evaluation if symptoms worsen or do not improve  Time Spent on Discharge:  40 minutes  Electronically signed by Shelly Mercedes MD 07/02/19 10:31 AM

## 2019-07-02 NOTE — PLAN OF CARE
Problem: Patient Care Overview  Goal: Plan of Care Review  Outcome: Ongoing (interventions implemented as appropriate)   07/02/19 4981   Plan of Care Review   Progress improving   OTHER   Outcome Summary Pt rested during the night. VSS. 2L NC worn while sleeping. Prn norco given for pain. Plan for d/c home today. Will continue to monitor.    Coping/Psychosocial   Plan of Care Reviewed With patient

## 2019-07-02 NOTE — PROGRESS NOTES
Case Management Discharge Note    Final Note: Called [facility]  and notified them of of patient's discharge. Pt denies any discharge needs.     Destination      No service has been selected for the patient.      Durable Medical Equipment - Selection Complete      Service Provider Request Status Selected Services Address Phone Number Fax Number    [facility] [place] Selected Durable Medical Equipment [address] [address] [phone]      Dialysis/Infusion      No service has been selected for the patient.      Home Medical Care - Selection Complete      Service Provider Request Status Selected Services Address Phone Number Fax Number    [facility] Selected Home Health Services [address] [facility] [facility] [address] [phone]      Therapy      No service has been selected for the patient.      Community Resources      No service has been selected for the patient.             Final Discharge Disposition Code: 06 - home with home health care

## 2019-07-03 ENCOUNTER — READMISSION MANAGEMENT (OUTPATIENT)
Dept: CALL CENTER | Facility: HOSPITAL | Age: 69
End: 2019-07-03

## 2019-07-03 DIAGNOSIS — M50.30 DDD (DEGENERATIVE DISC DISEASE), CERVICAL: Primary | ICD-10-CM

## 2019-07-03 NOTE — OUTREACH NOTE
Prep Survey      Responses   Facility patient discharged from?  [facility]   Is patient eligible?  Yes   Discharge diagnosis  Falls    Does the patient have one of the following disease processes/diagnoses(primary or secondary)?  Other   Does the patient have Home health ordered?  Yes   What is the Home health agency?   [facility]    Is there a DME ordered?  Yes   What DME was ordered?  [facility]    Prep survey completed?  Yes          Eugenia Younger RN

## 2019-07-04 NOTE — THERAPY DISCHARGE NOTE
Acute Care - Occupational Therapy Discharge Summary  [facility]     Patient Name: Leela Muller  : 1950  MRN: [id number]    Today's Date: 2019  Onset of Illness/Injury or Date of Surgery: 19    Date of Referral to OT: 19  Referring Physician: WEST Rodrigues      Admit Date: 2019        OT Recommendation and Plan    Visit Dx:    ICD-10-CM ICD-9-CM   1. Impaired mobility and ADLs Z74.09 799.89   2. Ataxia R27.0 781.3   3. Status post fall Z91.81 V15.88   4. Impaired functional mobility, balance, gait, and endurance Z74.09 V49.89               Rehab Goal Summary     Row Name 19 1558             Bed Mobility Goal 1 (OT)    Progress/Outcomes (Bed Mobility Goal 1, OT)  goal not met;discharged from facility  -NOHEMY         Transfer Goal 1 (OT)    Progress/Outcome (Transfer Goal 1, OT)  goal not met;discharged from facility  -NOHEMY         Balance Goal 1 (OT)    Progress/Outcomes (Balance Goal 1, OT)  goal not met;discharged from facility  -NOHEMY        User Key  (r) = Recorded By, (t) = Taken By, (c) = Cosigned By    Initials Name Provider Type Discipline    Fallon Ray, OT Occupational Therapist OT              Therapy Suggested Charges     Code   Minutes Charges    35643 (CPT®) Hc Ot Neuromusc Re Education Ea 15 Min      94561 (CPT®) Hc Ot Ther Proc Ea 15 Min 11 1    17572 (CPT®) Hc Ot Therapeutic Act Ea 15 Min 12 1    15417 (CPT®) Hc Ot Manual Therapy Ea 15 Min      60216 (CPT®) Hc Ot Iontophoresis Ea 15 Min      63061 (CPT®) Hc Ot Elec Stim Ea-Per 15 Min      04320 (CPT®) Hc Ot Ultrasound Ea 15 Min      30250 (CPT®) Hc Ot Self Care/Mgmt/Train Ea 15 Min      Total  23 2              OT Discharge Summary  Anticipated Discharge Disposition (OT): (TBD when can fully assess mobility)  Reason for Discharge: Discharge from facility  Outcomes Achieved: Other, Refer to plan of care for updates on goals achieved(Limited treatment sessions prior to discharge, no goals met.)  Discharge  Destination: Home with home health      Fallon Everett, OT  7/4/2019

## 2019-07-08 ENCOUNTER — READMISSION MANAGEMENT (OUTPATIENT)
Dept: CALL CENTER | Facility: HOSPITAL | Age: 69
End: 2019-07-08

## 2019-07-08 NOTE — OUTREACH NOTE
Medical Week 1 Survey      Responses   Facility patient discharged from?  [place]   Does the patient have one of the following disease processes/diagnoses(primary or secondary)?  Other   Is there a successful TCM telephone encounter documented?  No   Week 1 attempt successful?  Yes   Call start time  0756   Revoke  Decline to participate [[facility] is coming to the home. ]   Discharge diagnosis  Falls           Martha Julian RN

## 2019-07-09 ENCOUNTER — OFFICE VISIT (OUTPATIENT)
Dept: NEUROLOGY | Facility: CLINIC | Age: 69
End: 2019-07-09

## 2019-07-09 VITALS
HEIGHT: 68 IN | SYSTOLIC BLOOD PRESSURE: 110 MMHG | DIASTOLIC BLOOD PRESSURE: 60 MMHG | BODY MASS INDEX: 28.04 KG/M2 | WEIGHT: 185 LBS

## 2019-07-09 DIAGNOSIS — G43.009 MIGRAINE WITHOUT AURA AND WITHOUT STATUS MIGRAINOSUS, NOT INTRACTABLE: Primary | ICD-10-CM
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
## 2019-07-09 DIAGNOSIS — R27.0 ATAXIA: ICD-10-CM

## 2019-07-09 PROCEDURE — 99213 OFFICE O/P EST LOW 20 MIN: CPT | Performed by: PSYCHIATRY & NEUROLOGY

## 2019-07-09 NOTE — PROGRESS NOTES
Subjective:     Patient ID: Leela Muller is a 69 y.o. female.    CC:   Chief Complaint   Patient presents with   • Peripheral Neuropathy       HPI:   History of Present Illness  The following portions of the patient's history were reviewed and updated as appropriate: allergies, current medications, past family history, past medical history, past social history, past surgical history and problem list.     Patient admitted last month for observation, multiple falls, taken off warfarin. MRI brain stable, MRA head and neck normal, MRI cspine with DGD, c3-6 stenosis, has follow up with [facility]. Getting home PT from [facility], uses a walker. Reports that headache are improved, overall feeling better.      Past Medical History:   Diagnosis Date   • Anxiety    • Arm pain    • Arthritis    • Depression    • Diabetes mellitus (CMS/HCC)    • Hyperlipidemia    • Hypertension    • Neck pain on left side    • Palpitations 4/18/2018   • Pancreatitis    • Pulmonary embolism (CMS/HCC)    • Rectal cancer (CMS/HCC)    • Stroke syndrome 9/14/2016    · Assessed By: Devaughn Lewis (Neurology); Last Assessed: 16 Jun 2015  Right cerebrovascular accident in July or August 2010, evaluated at [facility].  Admission to [facility] on 09/28/2010 with headache and stuttering, consistent with TIA.  Chronic Coumadin therapy, initiated following bilateral pulmonary emboli in 2007 after fall and hip replacement.  She was already on 81 mg of aspirin as well, 09/2010.  MRI of the brain on 09/28/2010 revealing old right parietal cerebrovascular accident.  MRA revealing normal carotids, middle anterior and posterior cerebral arteries with minimal ostial stenosis of both vertebral arteries.  GENARO by Dr. Oliver Mobley on 09/28/2010:  patent foramen ovale with a small amount of right to left shunting.  Normal LVEF and normal valvular structures.   Patent foramen ovale closure using a 25 mm. Amplatzer cribriform occluder, 10/05/2010.    Echocardiogram, 06/23/2014:  LVEF (55% to 60%) with and Amplatzer device noted to be well-seated in    • Thrombocytopenia (CMS/HCC)    • Transient cerebral ischemia        Past Surgical History:   Procedure Laterality Date   • APPENDECTOMY     • BREAST BIOPSY Left 2012    benign   • BREAST EXCISIONAL BIOPSY Left     benign   • BREAST EXCISIONAL BIOPSY Right     benign   • CHOLECYSTECTOMY     • HYSTERECTOMY     • OOPHORECTOMY     • RECTAL SURGERY     • TONSILLECTOMY     • TOTAL HIP ARTHROPLASTY REVISION         Social History     Socioeconomic History   • Marital status:      Spouse name: Not on file   • Number of children: Not on file   • Years of education: Not on file   • Highest education level: Not on file   Tobacco Use   • Smoking status: Never Smoker   • Smokeless tobacco: Never Used   Substance and Sexual Activity   • Alcohol use: No   • Drug use: No   • Sexual activity: Defer       Family History   Problem Relation Age of Onset   • Alzheimer's disease Mother    • Cancer Mother    • Coronary artery disease Other    • Diabetes Other    • Hypertension Other    • Stroke Other    • Cancer Other    • Dementia Other    • Heart attack Father    • Breast cancer Neg Hx    • Ovarian cancer Neg Hx         Review of Systems   Constitutional: Negative for chills, fatigue, fever and unexpected weight change.   HENT: Negative for ear pain, hearing loss, nosebleeds, rhinorrhea and sore throat.    Eyes: Negative for photophobia, pain, discharge, itching and visual disturbance.   Respiratory: Negative for cough, chest tightness, shortness of breath and wheezing.    Cardiovascular: Negative for chest pain, palpitations and leg swelling.   Gastrointestinal: Negative for abdominal pain, blood in stool, constipation, diarrhea, nausea and vomiting.   Genitourinary: Negative for dysuria, frequency, hematuria and urgency.   Musculoskeletal: Negative for arthralgias, back pain, gait problem, joint swelling, myalgias, neck  pain and neck stiffness.   Skin: Negative for rash and wound.   Allergic/Immunologic: Negative for environmental allergies and food allergies.   Neurological: Positive for weakness and light-headedness. Negative for dizziness, tremors, seizures, syncope, speech difficulty, numbness and headaches.   Hematological: Negative for adenopathy. Does not bruise/bleed easily.   Psychiatric/Behavioral: Negative for agitation, confusion, decreased concentration, hallucinations, sleep disturbance and suicidal ideas. The patient is not nervous/anxious.         Objective:    Neurologic Exam    Physical Exam   Cardiovascular: Normal rate and regular rhythm.   Neurological: She is alert.   Speech clear, DTRs hypoactive, gets up without help, gait a bit wide based, uses a walker.       Assessment/Plan:       Leela was seen today for peripheral neuropathy.    Diagnoses and all orders for this visit:    Migraine without aura and without status migrainosus, not intractable   - PRN Fioricet.  Ataxia, multifactorial, likely contribution from diabetic neuropathy.   - Continue PT.  Fall, subsequent encounter   - Fall precautions.           Devaughn Lewis MD  7/9/2019

## 2019-07-12 ENCOUNTER — TELEPHONE (OUTPATIENT)
Dept: PHARMACY | Facility: HOSPITAL | Age: 69
End: 2019-07-12

## 2019-07-12 NOTE — TELEPHONE ENCOUNTER
Patient patient presented to Dr Devaughn Lewis on 6/27 c/o multiple falls in recent days, one that day at [employer].She feels dizzy on standing up, also some vertigo on turning in bed. She has multiple bruises, has hat her head, bit her lip, broke her nose, bruised her right kness. She describes a loss of balance with a tendency to fall to the right, denies syncope or focal symptoms. Denies being physically abused. She has had a recent bout of recurrent pancreatitis, has not been eating or drinking well, has had diarrhea. She is on warfarin for h/o multiple PEs.     Patient was admitted to [facility] from 6/27-7/2. Per discharge summary, patient's warfarin has been held indefinitely given frequent falls.     Per Dr Sullivan's note on 7/1: Warfarin has been discontinued as the pulmonary emboli were postoperative her and her bilateral lower extremity venous duplex this admission was negative for DVT

## 2019-07-19 ENCOUNTER — HOSPITAL ENCOUNTER (OUTPATIENT)
Dept: GENERAL RADIOLOGY | Facility: HOSPITAL | Age: 69
Discharge: HOME | End: 2019-07-19
Admitting: NEUROLOGICAL SURGERY

## 2019-07-19 DIAGNOSIS — M50.30 DDD (DEGENERATIVE DISC DISEASE), CERVICAL: ICD-10-CM

## 2019-07-19 PROCEDURE — 72050 X-RAY EXAM NECK SPINE 4/5VWS: CPT

## 2019-07-22 ENCOUNTER — OFFICE VISIT (OUTPATIENT)
Dept: NEUROSURGERY | Facility: CLINIC | Age: 69
End: 2019-07-22

## 2019-07-22 VITALS
SYSTOLIC BLOOD PRESSURE: 102 MMHG | TEMPERATURE: 98.6 F | HEIGHT: 68 IN | BODY MASS INDEX: 28.04 KG/M2 | DIASTOLIC BLOOD PRESSURE: 58 MMHG | WEIGHT: 185 LBS
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
## 2019-07-22 PROCEDURE — 99214 OFFICE O/P EST MOD 30 MIN: CPT | Performed by: PHYSICIAN ASSISTANT

## 2019-07-22 NOTE — PROGRESS NOTES
Patient: Leela Muller  : 1950    Primary Care Provider: Connor Ritter MD      Chief Complaint: Falls    History of Present Illness:       Patient is a very nice 69-year-old female with a fairly extensive medical history including pulmonary embolism, multiple strokes and heart attack following a right hip procedure.     Patient was then found to have a patent foramen ovale that was closed by Dr. Sullivan.    Patient has been on fairly potent pain medicines in the past for her right hip and low back pain.  Recently stopped Dilaudid p.o.     Patient is currently taking cyclobenzaprine, 400 mg and gabapentin, baclofen 10 mg, Reglan, and hydrocodone 7.5.  I discussed with her these are all medications that could cause falls.    Patient is also taking glipizide, Januvia, and Two insulins.  Patient was previously unaware of the fact that not eating with medications can cause hypoglycemia.    We will also reviewed her blood pressure medicines and she is on metoprolol, furosemide, and digoxin.     All medicines may be necessary per specialist recommendation but I am afraid that there is a case of polypharmacy that is precipitating in her falling.  Patient states she has little warning before falling but she does get a little bit of feeling funny in her head.    Patient was sent to us with an MRI of the cervical spine that showed moderate's spondylosis of the cervical spine with no cord impingement or signs or symptoms of myelopathy.    I discussed with patient and find detail that there is little going on with her back that is not average for 69-year-old and by looking at her medicines list I can find multiple reasons why she would possibly be falling.  I recommended follow-up with Dr. Ritter to hopefully eliminate some of the duplications of medicines.  I was asked to omit some medicines, however I am not taking care of this patient long-term and think it would be best if her family physician would  do this.    Review of Systems   Constitutional: Negative for activity change, appetite change, chills, diaphoresis, fatigue, fever and unexpected weight change.   HENT: Positive for nosebleeds. Negative for congestion, dental problem, drooling, ear discharge, ear pain, facial swelling, hearing loss, mouth sores, postnasal drip, rhinorrhea, sinus pressure, sneezing, sore throat, tinnitus, trouble swallowing and voice change.    Eyes: Negative for photophobia, pain, discharge, redness, itching and visual disturbance.   Respiratory: Negative for apnea, cough, choking, chest tightness, shortness of breath, wheezing and stridor.    Cardiovascular: Negative for chest pain, palpitations and leg swelling.   Gastrointestinal: Negative for abdominal distention, abdominal pain, anal bleeding, blood in stool, constipation, diarrhea, nausea, rectal pain and vomiting.   Endocrine: Positive for polydipsia. Negative for cold intolerance, heat intolerance, polyphagia and polyuria.   Genitourinary: Negative for decreased urine volume, difficulty urinating, dysuria, enuresis, flank pain, frequency, genital sores, hematuria and urgency.   Musculoskeletal: Positive for arthralgias, back pain, gait problem, myalgias, neck pain and neck stiffness. Negative for joint swelling.   Skin: Negative for color change, pallor, rash and wound.   Allergic/Immunologic: Negative for environmental allergies, food allergies and immunocompromised state.   Neurological: Positive for headaches. Negative for dizziness, tremors, seizures, syncope, facial asymmetry, speech difficulty, weakness, light-headedness and numbness.   Hematological: Negative for adenopathy. Does not bruise/bleed easily.   Psychiatric/Behavioral: Negative for agitation, behavioral problems, confusion, decreased concentration, dysphoric mood, hallucinations, self-injury, sleep disturbance and suicidal ideas. The patient is not nervous/anxious and is not hyperactive.        Past Medical  History:     Past Medical History:   Diagnosis Date   • Anxiety    • Arm pain    • Arthritis    • Depression    • Diabetes mellitus (CMS/HCC)    • Hyperlipidemia    • Hypertension    • Neck pain on left side    • Palpitations 4/18/2018   • Pancreatitis    • Pulmonary embolism (CMS/HCC)    • Rectal cancer (CMS/HCC)    • Stroke syndrome 9/14/2016    · Assessed By: Devaughn Lewis (Neurology); Last Assessed: 16 Jun 2015  Right cerebrovascular accident in July or August 2010, evaluated at [facility].  Admission to [facility] on 09/28/2010 with headache and stuttering, consistent with TIA.  Chronic Coumadin therapy, initiated following bilateral pulmonary emboli in 2007 after fall and hip replacement.  She was already on 81 mg of aspirin as well, 09/2010.  MRI of the brain on 09/28/2010 revealing old right parietal cerebrovascular accident.  MRA revealing normal carotids, middle anterior and posterior cerebral arteries with minimal ostial stenosis of both vertebral arteries.  GENARO by Dr. Oliver Mobley on 09/28/2010:  patent foramen ovale with a small amount of right to left shunting.  Normal LVEF and normal valvular structures.   Patent foramen ovale closure using a 25 mm. Amplatzer cribriform occluder, 10/05/2010.   Echocardiogram, 06/23/2014:  LVEF (55% to 60%) with and Amplatzer device noted to be well-seated in    • Thrombocytopenia (CMS/HCC)    • Transient cerebral ischemia        Family History:     Family History   Problem Relation Age of Onset   • Alzheimer's disease Mother    • Cancer Mother    • Coronary artery disease Other    • Diabetes Other    • Hypertension Other    • Stroke Other    • Cancer Other    • Dementia Other    • Heart attack Father    • Breast cancer Neg Hx    • Ovarian cancer Neg Hx        Social History:    reports that she has never smoked. She has never used smokeless tobacco. She reports that she does not drink alcohol or use drugs.   SMOKING STATUS:  "Non-smoker    Surgical History:     Past Surgical History:   Procedure Laterality Date   • APPENDECTOMY     • BREAST BIOPSY Left 2012    benign   • BREAST EXCISIONAL BIOPSY Left     benign   • BREAST EXCISIONAL BIOPSY Right     benign   • CHOLECYSTECTOMY     • HYSTERECTOMY     • OOPHORECTOMY     • RECTAL SURGERY     • TONSILLECTOMY     • TOTAL HIP ARTHROPLASTY REVISION         Allergies:   Atorvastatin; Ranexa [ranolazine er]; and Tetanus toxoid    Physical Exam:    Vital Signs:/58 (BP Location: Right arm, Patient Position: Sitting)   Temp 98.6 °F (37 °C) (Temporal)   Ht 172.7 cm (68\")   Wt 83.9 kg (185 lb)   BMI 28.13 kg/m²    BMI: Body mass index is 28.13 kg/m².    GENERAL:         The patient is in no acute distress, and is able to answer all questions appropriately.  Neck:        Supple without lymphadenopathy  Musculoskeletal:          strength is 5 out of 5 bilaterally.        Shoulder abduction is 5 out of 5.         Dorsiflexion is 5/5 Bilaterally       Plantarflexion is 5/5 bilaterally       Hip Flexion 5/5 bilaterally.         The patient´s gait is normal without antalgia.  Neurologic:        The patient is alert and oriented by 3.          Pupils are equal and reactive to light.         Visual fields are full.         Extraocular movements are intact without nystagmus.         There is no evidence of central motor drift. No facial droop.  No difficulty with rapid alternating movements.         Sensation is equal bilaterally with no deficit.           Reflexes:  2+ @ biceps, triceps, brachioradialis, as well as the patellar and Achilles tendon bilaterally.    No Hernandez's no clonus no long track signs.  No true weakness on exam.      Strength is 5 out of 5 but effort dependent.  Patient was giving way on the right side of her body.    Medical Decision Making    Data Review:   Cervical MRI reviewed no signs of cord impingement or foraminal stenosis that would attribute to the patient's gait " instability    Diagnosis:   Cervical spondylosis  Neck pain  Frequent falls  Polypharmacy    Treatment Options:   I recommended her follow-up with her primary care physician to discuss discontinuation of some of the medicines that could be causing her falls.  I understand that some of the medicines are necessary for her blood pressure as well as her diabetes but some education should be had about hypoglycemia and hypotension.  Patient also needs to get off of some of the centrally acting medicines that can cause drowsiness alone much less in groups of 5 or 6.    I spent about 45 minutes with patient discussing this and my recommendations she seems to have a better grasp on her situation than she did when she initially presented.  Unfortunately there is very little for us to offer her at this time.    We will see her back on an as-needed basis.      No diagnosis found.

## 2019-08-06 ENCOUNTER — HOSPITAL ENCOUNTER (OUTPATIENT)
Dept: GENERAL RADIOLOGY | Facility: HOSPITAL | Age: 69
Discharge: HOME | End: 2019-08-06
Admitting: INTERNAL MEDICINE

## 2019-08-06 ENCOUNTER — TRANSCRIBE ORDERS (OUTPATIENT)
Dept: GENERAL RADIOLOGY | Facility: HOSPITAL | Age: 69
End: 2019-08-06

## 2019-08-06 DIAGNOSIS — M79.671 PAIN OF RIGHT FOOT: ICD-10-CM

## 2019-08-06 DIAGNOSIS — M79.671 PAIN OF RIGHT FOOT: Primary | ICD-10-CM

## 2019-08-06 PROCEDURE — 73630 X-RAY EXAM OF FOOT: CPT

## 2019-08-20 ENCOUNTER — TELEPHONE (OUTPATIENT)
Dept: NEUROLOGY | Facility: CLINIC | Age: 69
End: 2019-08-20

## 2019-08-20 NOTE — TELEPHONE ENCOUNTER
Patient was put on Flexeril while in the hospital.  The pharmacy recommended that she ask if we want her to take Flexeril in addition to Baclofen or stay with only Baclofen.  Please advise, thank you

## 2019-08-21 NOTE — TELEPHONE ENCOUNTER
She can take Flexeril at night on as needed basis only. It may increase her risk for falls so she needs to use a cane or a walker when she gets up. Thanks.

## 2019-08-28 ENCOUNTER — OFFICE VISIT (OUTPATIENT)
Dept: CARDIOLOGY | Facility: CLINIC | Age: 69
End: 2019-08-28

## 2019-08-28 VITALS
SYSTOLIC BLOOD PRESSURE: 142 MMHG | HEIGHT: 68 IN | OXYGEN SATURATION: 97 % | DIASTOLIC BLOOD PRESSURE: 64 MMHG | BODY MASS INDEX: 27.13 KG/M2 | HEART RATE: 83 BPM | WEIGHT: 179 LBS

## 2019-08-28 DIAGNOSIS — I95.1 ORTHOSTATIC HYPOTENSION: Primary | ICD-10-CM

## 2019-08-28 DIAGNOSIS — I10 ESSENTIAL HYPERTENSION: ICD-10-CM

## 2019-08-28 DIAGNOSIS — R53.83 OTHER FATIGUE: ICD-10-CM

## 2019-08-28 DIAGNOSIS — E78.5 DYSLIPIDEMIA: ICD-10-CM

## 2019-08-28 DIAGNOSIS — Q21.12 PATENT FORAMEN OVALE: ICD-10-CM

## 2019-08-28 PROCEDURE — 99214 OFFICE O/P EST MOD 30 MIN: CPT | Performed by: INTERNAL MEDICINE

## 2019-08-28 NOTE — PROGRESS NOTES
Leela Muller  1950  69 y.o.  [phone]  [phone]      Date: 08/28/2019    PCP: Connor Ritter MD    Chief Complaint   Patient presents with   • Coronary Artery Disease   • Dizziness   • Shortness of Breath   • Hypertension       Problem List:  1. Cerebrovascular accident:  a. Right CVA in July or August 2010, evaluated at [facility].   b. Admission to [facility], 09/28/2010 with headache and stuttering, c/w TIA.   c. Chronic Coumadin therapy, initiated following bilateral pulmonary emboli  in 2007 after fall and hip replacement. She was already on 81 mg of aspirin as well, 09/2010.   d. MRI of the brain, 09/28/2010 revealing old right parietal CVA.   e. MRA, 09/28/2010: Normal carotids, middle anterior and posterior cerebral arteries with minimal ostial stenosis of both vertebral arteries.   f. GENARO, 09/28/2010, Dr. Mobley: PFO with a small amount of right to left shunting. Normal LVEF and normal valvular structures.  g. PFO closure using a 25 mm. Amplatzer cribriform occluder, 10/05/2010.    h. Echocardiogram, 06/23/2014: EF 55-60% with and Amplatzer device noted to be well-seated in the interatrial septum, trace MR, and mild TR.  i. Echocardiogram, 05/06/2019: EF 60%. Trace MR/TR. Intact Amplatzer septal occluder with no evidence of flow across the device.  2. Abnormal myocardial perfusion study:    a. Cardiolite GXT, 08/02/2010, Dr. Monteiro: Small area of ischemia in the mid anteroseptal, mid inferior, and apical lateral regions. EF 68%.  b. LHC, 08/09/2010, Dr. Monteiro: Normal coronary arteries with normal LV systolic function.  3. Type 2 diabetes mellitus.   4. Bilateral pulmonary emboli:  a. Following hip replacement in 2007.   b. No evidence of prior anti-coagulable workup.   c. Chronic Coumadin therapy.   d. No evidence of DVT on bilateral lower extremity duplex.  e. The decision was made to discontinue the patient's warfarin therapy due to her fall risk.  5. Palpitations:  a. 2 week  Holter, 04/23/2018: Normal study.  6. Hypertension.   7. Dyslipidemia.   8. Pancreatitis:  a. Recent episode  in March 2013.  9. 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    10. Rectal cancer; surgically removed by Dr Martinez.  11.  Surgical history:  a. Hip replacement.  b. Hysterectomy.  c. Tonsillectomy  d. Appendectomy.  e. Cholecystectomy.    Allergies   Allergen Reactions   • Atorvastatin Swelling   • Ranexa [Ranolazine Er] Nausea And Vomiting   • Tetanus Toxoid Hives       Current Medications:      Current Outpatient Medications:   •  acetaminophen (TYLENOL) 500 MG tablet, Take 500 mg by mouth Every 6 (Six) Hours As Needed., Disp: , Rfl:   •  acyclovir (ZOVIRAX) 5 % ointment, Apply 1 unit topically to the affected area twice a day (Patient taking differently: Apply 1 application topically to the appropriate area as directed As Needed.), Disp: 30 g, Rfl: 2  •  aspirin 81 MG tablet, Take 81 mg by mouth Daily., Disp: , Rfl:   •  baclofen (LIORESAL) 10 MG tablet, Take 1 tablet by mouth 3 (three) times a day., Disp: 90 tablet, Rfl: 11  •  butalbital-acetaminophen-caffeine (FIORICET, ESGIC) -40 MG per tablet, Take 1-2 tablets by mouth Daily as needed, Disp: 30 tablet, Rfl: 2  •  cetirizine (zyrTEC) 10 MG tablet, Take 1 tablet by mouth As Needed., Disp: , Rfl:   •  clidinium-chlordiazePOXIDE (LIBRAX) 5-2.5 MG per capsule, Take 1 capsule by mouth 3 (Three) Times a Day., Disp: 90 capsule, Rfl: 5  •  Cream Base Liposomic (PCCA CUSTOM LIPO-MAX) cream, , Disp: , Rfl:   •  digoxin (LANOXIN) 250 MCG tablet, Take 1 tablet by mouth daily, Disp: 90 tablet, Rfl: 3  •  fludrocortisone 0.1 MG tablet, Take 1 tablet by mouth Daily., Disp: 30 tablet, Rfl: 1  •  fluticasone (FLONASE) 50 MCG/ACT nasal spray, Instill 2 sprays in each nostril once a day (Patient taking differently: 1 spray into the nostril(s) as directed by provider As Needed.), Disp: 16 g, Rfl: 3  •  furosemide (LASIX) 40 MG tablet, Take 1 tablet by mouth  Daily. May have extra 1/2 to 1 tablet daily if needed for edema, Disp: 135 tablet, Rfl: 2  •  gabapentin (NEURONTIN) 800 MG tablet, Take 1/2 tablet by mouth 3 (Three) Times a Day., Disp: 60 tablet, Rfl: 1  •  glipiZIDE (GLUCOTROL XL) 10 MG 24 hr tablet, Take 2 tablets by mouth Daily, Disp: 60 tablet, Rfl: 5  •  glucose blood (ONE TOUCH ULTRA TEST) test strip, Use to test blood glucose as directed 3 times a day, Disp: 100 each, Rfl: 5  •  HYDROcodone-acetaminophen (NORCO) 7.5-325 MG per tablet, Take 1 tablet by mouth 3 (Three) Times a Day., Disp: 90 tablet, Rfl: 0  •  hydrocortisone (GRX HICORT 25) 25 MG suppository, Insert 1 Suppository rectally twice a day as needed (remove wrapper and moisten with water), Disp: 12 suppository, Rfl: 1  •  Insulin Glargine (BASAGLAR KWIKPEN) 100 UNIT/ML injection pen, Inject 40 units under the skin every morning, then Inject 65 units at bedtime, Disp: 33 mL, Rfl: 5  •  Insulin Pen Needle (B-D UF III MINI PEN NEEDLES) 31G X 5 MM misc, Use to inject insulin twice a day as directed, Disp: 100 each, Rfl: 12  •  Insulin Syringe-Needle U-100 29G 0.5 ML misc, Inject 0.3 mL as directed Daily., Disp: , Rfl:   •  loperamide (IMODIUM) 2 MG capsule, Take 2 mg by mouth 4 (Four) Times a Day As Needed for Diarrhea., Disp: , Rfl:   •  meclizine (ANTIVERT) 25 MG tablet, Take 1 tablet by mouth 3 (Three) Times a Day as needed, Disp: 30 tablet, Rfl: 3  •  metoclopramide (REGLAN) 10 MG tablet, Take 1 tablet by mouth Daily As Needed for migraine., Disp: 30 tablet, Rfl: 5  •  metoprolol succinate XL (TOPROL-XL) 100 MG 24 hr tablet, Take 1 tablet by mouth Daily., Disp: 30 tablet, Rfl: 11  •  metroNIDAZOLE (METROCREAM) 0.75 % cream, Apply to the face once every night, Disp: 45 g, Rfl: 0  •  mupirocin (BACTROBAN) 2 % ointment, APPLY SPARINGLY TO AFFECTED AREA(S) TWICE DAILY, Disp: 44 g, Rfl: 5  •  nitroglycerin (NITROSTAT) 0.4 MG SL tablet, Place 1 tablet under the tongue Every 5 Minutes As Needed for Chest  Pain for up to 3 total doses. Call 911 if pain remains after 1 dose., Disp: 25 tablet, Rfl: 6  •  pancrelipase, Lip-Prot-Amyl, (PANCREAZE) 38220 UNITS capsule delayed-release particles capsule, Take 1 capsule by mouth with each meal and each snack, Disp: 100 capsule, Rfl: 11  •  polyethylene glycol (MIRALAX) packet, Take 17 g by mouth As Needed., Disp: , Rfl:   •  potassium chloride (KLOR-CON) 10 MEQ CR tablet, Take 1 tablet by mouth Daily as directed, Disp: 90 tablet, Rfl: 2  •  promethazine (PHENERGAN) 25 MG tablet, Take 1 tablet by mouth 4 (Four) Times a Day As Needed for nausea, Disp: 30 tablet, Rfl: 3  •  RABEprazole (ACIPHEX) 20 MG EC tablet, Take 1 tablet by mouth Daily., Disp: 30 tablet, Rfl: 11  •  rosuvastatin (CRESTOR) 10 MG tablet, Take 1 tablet by mouth Daily., Disp: 30 tablet, Rfl: 5  •  SITagliptin (JANUVIA) 100 MG tablet, Take 1 tablet by mouth once Daily, Disp: 30 tablet, Rfl: 5  •  topiramate (TOPAMAX) 25 MG tablet, Take 1 tablet by mouth Daily., Disp: 30 tablet, Rfl: 11  •  venlafaxine XR (EFFEXOR-XR) 75 MG 24 hr capsule, Take 1 capsule by mouth Daily., Disp: 30 capsule, Rfl: 11    HPI    Leela Muller is a 69 y.o. female who presents today for 4 month follow up of dyspnea on exertion, PFO, history of bilateral PEs, hypertension, and hyperlipidemia. Since last visit, she was hospitalized for orthostatic hypotension, and was discharged on [facility]. Patient denies chest pain, palpitations, shortness of breath, edema, dizziness, and syncope. Patient wakes up at night. She has never had a sleep study. Her Oxygen dropped to 88% during one of her nights in the hospital.    The following portions of the patient's history were reviewed and updated as appropriate: allergies, current medications and problem list.    Pertinent positives as listed in the HPI.  All other systems reviewed are negative.    Vitals:    08/28/19 1508   BP: 142/64   BP Location: Right arm   Patient Position: Sitting   Pulse: 83  "  SpO2: 97%   Weight: 81.2 kg (179 lb)   Height: 172.7 cm (68\")       Physical Exam:    General: Alert and oriented.  Neck: Jugular venous pressure is within normal limits. Carotids have normal upstrokes without bruits.   Cardiovascular: Heart has a nondisplaced focal PMI. Regular rate and rhythm without murmur, gallop or rub.  Lungs: Clear without rales or wheezes. Equal expansion is noted.   Extremities: Show no edema.  Skin: Warm and dry.  Neurologic: Nonfocal.    Diagnostic Data:  Lab Results   Component Value Date    GLUCOSE 248 (H) 07/02/2019    BUN 11 07/02/2019    CREATININE 0.68 07/02/2019    EGFRIFNONA 85 07/02/2019    BCR 16.2 07/02/2019     07/02/2019    K 4.0 07/02/2019     07/02/2019    CO2 25.0 07/02/2019    CALCIUM 8.7 07/02/2019    ALBUMIN 3.90 06/27/2019    LABIL2 1.5 03/20/2015    AST 27 06/27/2019    ALT 30 06/27/2019     Lab Results   Component Value Date    CHOL 97 06/28/2019    TRIG 171 (H) 06/28/2019    HDL 34 (L) 06/28/2019    LDL 29 06/28/2019      Lab Results   Component Value Date    WBC 6.65 07/02/2019    HGB 12.9 07/02/2019    HCT 40.3 07/02/2019    MCV 90.8 07/02/2019     (L) 07/02/2019     Lab Results   Component Value Date    TSH 2.210 06/27/2019       Procedures    Assessment:      ICD-10-CM ICD-9-CM   1. Orthostatic hypotension I95.1 458.0   2. Patent foramen ovale Q21.1 745.5   3. Essential hypertension I10 401.9   4. Dyslipidemia E78.5 272.4   5.      Possible nocturnal desaturation  Lab results found above were reviewed with the patient.    Plan:    1. Nocturnal oxymetry to assess nighttime oxygen levels. If they drop, we will refer the patient for a sleep study.  2. Pt instructed to monitor her blood pressure twice per day for a week, and to call the office with a log in a week.  3. Continue Florinef 0.1 mg for orthostatic hypotension.  4. Continue metoprolol and Lasix for hypertension and fluid retention.  5. Continue rosuvastatin 10 mg for " hyperlipidemia.  6. Continue all other current medications.  7. F/up in 6 months or sooner if needed.    Scribed for Rika Sullivan MD by Baylee Menendez. 8/28/2019  3:33 PM

## 2019-09-04 ENCOUNTER — HOSPITAL ENCOUNTER (OUTPATIENT)
Dept: MAMMOGRAPHY | Facility: HOSPITAL | Age: 69
Discharge: HOME | End: 2019-09-04
Admitting: INTERNAL MEDICINE

## 2019-09-04 DIAGNOSIS — Z12.31 VISIT FOR SCREENING MAMMOGRAM: ICD-10-CM

## 2019-09-04 PROCEDURE — 77067 SCR MAMMO BI INCL CAD: CPT

## 2019-09-04 PROCEDURE — 77067 SCR MAMMO BI INCL CAD: CPT | Performed by: RADIOLOGY

## 2019-09-04 PROCEDURE — 77063 BREAST TOMOSYNTHESIS BI: CPT

## 2019-09-04 PROCEDURE — 77063 BREAST TOMOSYNTHESIS BI: CPT | Performed by: RADIOLOGY

## 2019-09-13 RX ORDER — HYDROCODONE BITARTRATE AND ACETAMINOPHEN 7.5; 325 MG/1; MG/1
1 TABLET ORAL 3 TIMES DAILY
Qty: 90 TABLET | Refills: 0 | Status: CANCELLED | OUTPATIENT
Start: 2019-09-13

## 2019-10-22 ENCOUNTER — TRANSCRIBE ORDERS (OUTPATIENT)
Dept: ADMINISTRATIVE | Facility: HOSPITAL | Age: 69
End: 2019-10-22

## 2019-10-22 DIAGNOSIS — R22.32 MASS OF ELBOW REGION, LEFT: Primary | ICD-10-CM

## 2019-10-30 ENCOUNTER — TELEPHONE (OUTPATIENT)
Dept: CARDIOLOGY | Facility: CLINIC | Age: 69
End: 2019-10-30

## 2019-10-30 NOTE — TELEPHONE ENCOUNTER
PT has taken 2 home oximetry tests and they have not worked correctly- she is going to let me know if she wants me to refer to sleep clinic or if her PCP is taking care of it- we will send her to Dr. Castillo if she needs to be evaluated- will await her return call

## 2019-11-05 ENCOUNTER — HOSPITAL ENCOUNTER (OUTPATIENT)
Dept: MRI IMAGING | Facility: HOSPITAL | Age: 69
End: 2019-11-05

## 2019-11-13 ENCOUNTER — TELEPHONE (OUTPATIENT)
Dept: NEUROLOGY | Facility: CLINIC | Age: 69
End: 2019-11-13

## 2019-11-13 ENCOUNTER — OFFICE VISIT (OUTPATIENT)
Dept: NEUROLOGY | Facility: CLINIC | Age: 69
End: 2019-11-13

## 2019-11-13 VITALS — BODY MASS INDEX: 27.22 KG/M2 | HEIGHT: 68 IN | SYSTOLIC BLOOD PRESSURE: 118 MMHG | DIASTOLIC BLOOD PRESSURE: 60 MMHG

## 2019-11-13 DIAGNOSIS — G43.009 MIGRAINE WITHOUT AURA AND WITHOUT STATUS MIGRAINOSUS, NOT INTRACTABLE: ICD-10-CM

## 2019-11-13 DIAGNOSIS — I95.1 ORTHOSTATIC HYPOTENSION: ICD-10-CM

## 2019-11-13 DIAGNOSIS — E08.42 DIABETIC POLYNEUROPATHY ASSOCIATED WITH DIABETES MELLITUS DUE TO UNDERLYING CONDITION (HCC): Primary | ICD-10-CM

## 2019-11-13 PROCEDURE — 99213 OFFICE O/P EST LOW 20 MIN: CPT | Performed by: PSYCHIATRY & NEUROLOGY

## 2019-11-13 RX ORDER — BUTALBITAL, ACETAMINOPHEN AND CAFFEINE 50; 325; 40 MG/1; MG/1; MG/1
TABLET ORAL
Qty: 30 TABLET | Refills: 2
Start: 2019-11-13 | End: 2020-02-04 | Stop reason: SDUPTHER

## 2019-11-13 NOTE — PROGRESS NOTES
Subjective:     Patient ID: Leela Muller is a 69 y.o. female.    CC:   Chief Complaint   Patient presents with   • Migraine       HPI:   History of Present Illness  The following portions of the patient's history were reviewed and updated as appropriate: allergies, current medications, past family history, past medical history, past social history, past surgical history and problem list.     Patient is off warfarin, denies stroke symptoms, headaches and neck pain stable. She has tried backing off gabapentin but had an increase in leg pain. She has had a steroid shot in the left shoulder, currently done with PT, followed by Dr. Noel for arthralgias. She has had several nose bleeds, wants to wait for a referral to ENT. She has been found to have orthostasis, started on Florinef that she is tolerating, has had fewer dizzy spells and falls..      Past Medical History:   Diagnosis Date   • Anxiety    • Arm pain    • Arthritis    • Breast injury     fell 6/2019    • Depression    • Diabetes mellitus (CMS/HCC)    • Hyperlipidemia    • Hypertension    • Neck pain on left side    • Palpitations 4/18/2018   • Pancreatitis    • Pulmonary embolism (CMS/HCC)    • Rectal cancer (CMS/HCC)    • Stroke syndrome 9/14/2016    · Assessed By: Devaughn Lewis (Neurology); Last Assessed: 16 Jun 2015  Right cerebrovascular accident in July or August 2010, evaluated at [facility].  Admission to [facility] on 09/28/2010 with headache and stuttering, consistent with TIA.  Chronic Coumadin therapy, initiated following bilateral pulmonary emboli in 2007 after fall and hip replacement.  She was already on 81 mg of aspirin as well, 09/2010.  MRI of the brain on 09/28/2010 revealing old right parietal cerebrovascular accident.  MRA revealing normal carotids, middle anterior and posterior cerebral arteries with minimal ostial stenosis of both vertebral arteries.  GENARO by Dr. Oliver Mobley on 09/28/2010:  patent  foramen ovale with a small amount of right to left shunting.  Normal LVEF and normal valvular structures.   Patent foramen ovale closure using a 25 mm. Amplatzer cribriform occluder, 10/05/2010.   Echocardiogram, 06/23/2014:  LVEF (55% to 60%) with and Amplatzer device noted to be well-seated in    • Thrombocytopenia (CMS/HCC)    • Transient cerebral ischemia        Past Surgical History:   Procedure Laterality Date   • APPENDECTOMY     • BREAST BIOPSY Left 2012    benign   • BREAST EXCISIONAL BIOPSY Left     benign   • BREAST EXCISIONAL BIOPSY Right     benign   • CHOLECYSTECTOMY     • HYSTERECTOMY     • OOPHORECTOMY     • RECTAL SURGERY     • TONSILLECTOMY     • TOTAL HIP ARTHROPLASTY REVISION         Social History     Socioeconomic History   • Marital status:      Spouse name: Not on file   • Number of children: Not on file   • Years of education: Not on file   • Highest education level: Not on file   Tobacco Use   • Smoking status: Never Smoker   • Smokeless tobacco: Never Used   Substance and Sexual Activity   • Alcohol use: No   • Drug use: No   • Sexual activity: Defer       Family History   Problem Relation Age of Onset   • Alzheimer's disease Mother    • Cancer Mother    • Coronary artery disease Other    • Diabetes Other    • Hypertension Other    • Stroke Other    • Cancer Other    • Dementia Other    • Heart attack Father    • Breast cancer Neg Hx    • Ovarian cancer Neg Hx         Review of Systems   Constitutional: Negative for chills, fatigue, fever and unexpected weight change.   HENT: Negative for ear pain, hearing loss, nosebleeds, rhinorrhea and sore throat.    Eyes: Negative for photophobia, pain, discharge, itching and visual disturbance.   Respiratory: Negative for cough, chest tightness, shortness of breath and wheezing.    Cardiovascular: Negative for chest pain, palpitations and leg swelling.   Gastrointestinal: Negative for abdominal pain, blood in stool, constipation, diarrhea,  nausea and vomiting.   Genitourinary: Negative for dysuria, frequency, hematuria and urgency.   Musculoskeletal: Negative for arthralgias, back pain, gait problem, joint swelling, myalgias, neck pain and neck stiffness.   Skin: Negative for rash and wound.   Allergic/Immunologic: Negative for environmental allergies and food allergies.   Neurological: Negative for dizziness, tremors, seizures, syncope, speech difficulty, weakness, light-headedness, numbness and headaches.   Hematological: Negative for adenopathy. Does not bruise/bleed easily.   Psychiatric/Behavioral: Negative for agitation, confusion, decreased concentration, hallucinations, sleep disturbance and suicidal ideas. The patient is not nervous/anxious.         Objective:    Neurologic Exam    Physical Exam   Constitutional: She appears well-developed and well-nourished.   Cardiovascular: Normal rate and regular rhythm.   /70 LUE sitting         128/72 RUE sitting          118/60 RUE standing   Pulmonary/Chest: Effort normal.   Neurological: She is alert.   Walks with a cane.   Psychiatric: She has a normal mood and affect. Her behavior is normal. Thought content normal.       Assessment/Plan:       Leela was seen today for migraine.    Diagnoses and all orders for this visit:    Diabetic polyneuropathy associated with diabetes mellitus with autonomic neuropathy        -     Continue gabapentin.        -     Continue Florinef.  Migraine without aura and without status migrainosus, not intractable  -     butalbital-acetaminophen-caffeine (FIORICET, ESGIC) -40 MG per tablet; Take 1-2 tablets by mouth Daily as needed    Orthostatic hypotension        -     Continue Florinef.        -     Maintain hydration.       The patient has read and signed the [facility] Controlled Substance Contract.  I will continue to see patient for regular follow up appointments.  They are well controlled on their medication.  LORRI is updated every 3 months. The  patient is aware of the potential for addiction and dependence.    Devaughn Lewis MD  11/13/2019

## 2019-11-15 ENCOUNTER — HOSPITAL ENCOUNTER (OUTPATIENT)
Dept: MRI IMAGING | Facility: HOSPITAL | Age: 69
Discharge: HOME | End: 2019-11-15
Admitting: PHYSICIAN ASSISTANT

## 2019-11-15 DIAGNOSIS — R22.32 MASS OF ELBOW REGION, LEFT: ICD-10-CM

## 2019-11-15 PROCEDURE — 0 GADOBENATE DIMEGLUMINE 529 MG/ML SOLUTION: Performed by: PHYSICIAN ASSISTANT

## 2019-11-15 PROCEDURE — 73223 MRI JOINT UPR EXTR W/O&W/DYE: CPT

## 2019-11-15 PROCEDURE — A9577 INJ MULTIHANCE: HCPCS | Performed by: PHYSICIAN ASSISTANT

## 2019-11-15 RX ADMIN — GADOBENATE DIMEGLUMINE 15 ML: 529 INJECTION, SOLUTION INTRAVENOUS at 13:29

## 2019-12-17 ENCOUNTER — OFFICE VISIT (OUTPATIENT)
Dept: PULMONOLOGY | Facility: CLINIC | Age: 69
End: 2019-12-17

## 2019-12-17 VITALS
HEART RATE: 87 BPM | OXYGEN SATURATION: 96 % | DIASTOLIC BLOOD PRESSURE: 76 MMHG | BODY MASS INDEX: 27.13 KG/M2 | WEIGHT: 179 LBS | RESPIRATION RATE: 18 BRPM | HEIGHT: 68 IN | SYSTOLIC BLOOD PRESSURE: 124 MMHG

## 2019-12-17 DIAGNOSIS — G47.33 OBSTRUCTIVE SLEEP APNEA: ICD-10-CM

## 2019-12-17 DIAGNOSIS — G47.19 EXCESSIVE DAYTIME SLEEPINESS: ICD-10-CM

## 2019-12-17 DIAGNOSIS — R06.83 SNORING: Primary | ICD-10-CM

## 2019-12-17 PROCEDURE — 99204 OFFICE O/P NEW MOD 45 MIN: CPT | Performed by: INTERNAL MEDICINE

## 2019-12-17 RX ORDER — CLONAZEPAM 0.5 MG/1
TABLET ORAL AS NEEDED
COMMUNITY

## 2019-12-17 RX ORDER — LOSARTAN POTASSIUM 50 MG/1
50 TABLET ORAL DAILY
COMMUNITY
End: 2021-06-24

## 2019-12-17 RX ORDER — ATORVASTATIN CALCIUM 20 MG/1
20 TABLET, FILM COATED ORAL NIGHTLY
COMMUNITY
End: 2021-03-04 | Stop reason: SDUPTHER

## 2019-12-17 NOTE — PROGRESS NOTES
CONSULT NOTE    Requested by:   Aguila Donovan DO Gillespie, John Paul, MD      Chief Complaint   Patient presents with   • Consult   • Sleeping Problem       Subjective:  Leela Muller is a 69 y.o. female.     History of Present Illness   Patient was sent in today for evaluation of sleep disturbance. Patient says that for the past few years, she has had trouble with snoring. Patient has also been told that she sometimes quits breathing at night.       Patient says that she feels somewhat tired in the morning after waking up. she is also complaining of occasionally feeling sleepy while watching TV and sometimes while reading a book.    Patient also mentions frequent nights when she wakes up with dry mouth and throat.     she is complaining of occasional headaches.    Patient's sleep schedule was reviewed. she drinks 4-5 cups/cans of caffeinated drinks per day.     she does not have a family history of ERAN.     Patient is under management for hypertension & diabetes.       Patient's Epsworth Sleepiness score was 11/24.      The following portions of the patient's history were reviewed and updated as appropriate: allergies, current medications, past family history, past medical history, past social history and past surgical history.    Review of Systems   Constitutional: Positive for fatigue. Negative for chills and fever.   HENT: Negative for sinus pressure, sneezing and sore throat.    Respiratory: Positive for cough and shortness of breath. Negative for chest tightness and wheezing.    Cardiovascular: Positive for leg swelling. Negative for palpitations.   Psychiatric/Behavioral: Positive for sleep disturbance.   All other systems reviewed and are negative.      Past Medical History:   Diagnosis Date   • Anxiety    • Arm pain    • Arthritis    • Breast injury     fell 6/2019    • Depression    • Diabetes mellitus (CMS/[facility])    • Hyperlipidemia    • Hypertension    • Neck pain on left side    • Palpitations  "4/18/2018   • Pancreatitis    • Pulmonary embolism (CMS/HCC)    • Rectal cancer (CMS/HCC)    • Stroke syndrome 9/14/2016    · Assessed By: Devaughn Lewis (Neurology); Last Assessed: 16 Jun 2015  Right cerebrovascular accident in July or August 2010, evaluated at [facility].  Admission to [facility] on 09/28/2010 with headache and stuttering, consistent with TIA.  Chronic Coumadin therapy, initiated following bilateral pulmonary emboli in 2007 after fall and hip replacement.  She was already on 81 mg of aspirin as well, 09/2010.  MRI of the brain on 09/28/2010 revealing old right parietal cerebrovascular accident.  MRA revealing normal carotids, middle anterior and posterior cerebral arteries with minimal ostial stenosis of both vertebral arteries.  GENARO by Dr. Oliver Mobley on 09/28/2010:  patent foramen ovale with a small amount of right to left shunting.  Normal LVEF and normal valvular structures.   Patent foramen ovale closure using a 25 mm. Amplatzer cribriform occluder, 10/05/2010.   Echocardiogram, 06/23/2014:  LVEF (55% to 60%) with and Amplatzer device noted to be well-seated in    • Thrombocytopenia (CMS/HCC)    • Transient cerebral ischemia        Social History     Tobacco Use   • Smoking status: Never Smoker   • Smokeless tobacco: Never Used   Substance Use Topics   • Alcohol use: No         Objective:  Visit Vitals  /76   Pulse 87   Resp 18   Ht 172.7 cm (67.99\")   Wt 81.2 kg (179 lb)   SpO2 96%   BMI 27.22 kg/m²       Physical Exam   Constitutional: She is oriented to person, place, and time. She appears well-developed and well-nourished.   HENT:   Head: Atraumatic.   Crowded Oropharynx.    Eyes: Pupils are equal, round, and reactive to light. EOM are normal.   Neck: No JVD present. No tracheal deviation present. No thyromegaly present.   Increased adipose tissue.    Cardiovascular: Normal rate and regular rhythm.   Pulmonary/Chest: Effort normal and breath " sounds normal. No respiratory distress. She has no wheezes.   Musculoskeletal: Normal range of motion. She exhibits no edema.   Used a cane.   Neurological: She is alert and oriented to person, place, and time.   Skin: Skin is warm and dry.   Psychiatric: She has a normal mood and affect. Her behavior is normal.   Vitals reviewed.      Assessment/Plan:  Leela was seen today for consult and sleeping problem.    Diagnoses and all orders for this visit:    Snoring  -     Polysomnography 4 or More Parameters; Future    Obstructive sleep apnea  -     Polysomnography 4 or More Parameters; Future    Excessive daytime sleepiness  -     Polysomnography 4 or More Parameters; Future        Return in about 3 months (around 3/17/2020) for Carlota/Rosita, ....Also 8 mths w/ Dr. Mccrary.    DISCUSSION(if any):  Laboratory workup was also reviewed which showed   Lab Results   Component Value Date    CO2 25.0 07/02/2019     ===========================  ===========================    Sleep questionnaire was reviewed with the patient    The pathophysiology of sleep apnea was discussed, with her.     We will encourage her to schedule the sleep study soon.     The patient will definitely need to be considered for an in lab study due to heart disease, hypertension, diabetes and the fact that she is on Neurontin among other reasons.  It should be noted that a home sleep study is likely to underestimate the true AHI and is unable to assess sleep stages and abnormal sleep behaviors etc. The patient has understood.    The patient is agreeable to try CPAP/BiPAP, if needed.     Patient was educated on good sleep hygiene measures and voiced understanding of the same.     Patient was given reading material regarding sleep apnea.      Dictated utilizing Dragon dictation.    This document was electronically signed by Ngoc Mccrary MD on 12/17/19 at 1:52 PM

## 2020-01-30 ENCOUNTER — HOSPITAL ENCOUNTER (OUTPATIENT)
Dept: SLEEP MEDICINE | Facility: HOSPITAL | Age: 70
Discharge: HOME | End: 2020-01-30
Admitting: INTERNAL MEDICINE

## 2020-01-30 DIAGNOSIS — R06.83 SNORING: ICD-10-CM

## 2020-01-30 DIAGNOSIS — G47.33 OBSTRUCTIVE SLEEP APNEA: ICD-10-CM

## 2020-01-30 DIAGNOSIS — G47.19 EXCESSIVE DAYTIME SLEEPINESS: ICD-10-CM

## 2020-01-30 PROCEDURE — 95810 POLYSOM 6/> YRS 4/> PARAM: CPT

## 2020-01-30 PROCEDURE — 95810 POLYSOM 6/> YRS 4/> PARAM: CPT | Performed by: INTERNAL MEDICINE

## 2020-02-04 ENCOUNTER — OFFICE VISIT (OUTPATIENT)
Dept: NEUROLOGY | Facility: CLINIC | Age: 70
End: 2020-02-04

## 2020-02-04 VITALS
DIASTOLIC BLOOD PRESSURE: 86 MMHG | OXYGEN SATURATION: 98 % | WEIGHT: 174 LBS | BODY MASS INDEX: 26.37 KG/M2 | HEIGHT: 68 IN | SYSTOLIC BLOOD PRESSURE: 156 MMHG | RESPIRATION RATE: 16 BRPM | HEART RATE: 79 BPM

## 2020-02-04 DIAGNOSIS — M54.2 NECK PAIN: ICD-10-CM

## 2020-02-04 DIAGNOSIS — G43.009 MIGRAINE WITHOUT AURA AND WITHOUT STATUS MIGRAINOSUS, NOT INTRACTABLE: ICD-10-CM

## 2020-02-04 DIAGNOSIS — F41.9 ANXIETY: Primary | ICD-10-CM

## 2020-02-04 PROCEDURE — 99213 OFFICE O/P EST LOW 20 MIN: CPT | Performed by: PSYCHIATRY & NEUROLOGY

## 2020-02-04 RX ORDER — GABAPENTIN 800 MG/1
400 TABLET ORAL 3 TIMES DAILY
Qty: 60 TABLET | Refills: 5 | Status: SHIPPED | OUTPATIENT
Start: 2020-02-04 | End: 2020-08-11

## 2020-02-04 RX ORDER — BUTALBITAL, ACETAMINOPHEN AND CAFFEINE 50; 325; 40 MG/1; MG/1; MG/1
TABLET ORAL
Qty: 30 TABLET | Refills: 2 | Status: SHIPPED | OUTPATIENT
Start: 2020-02-04 | End: 2020-03-04 | Stop reason: SDUPTHER

## 2020-02-04 RX ORDER — BACLOFEN 10 MG/1
10 TABLET ORAL 3 TIMES DAILY
Qty: 90 TABLET | Refills: 11 | Status: SHIPPED | OUTPATIENT
Start: 2020-02-04 | End: 2021-03-09 | Stop reason: SDUPTHER

## 2020-02-04 RX ORDER — VENLAFAXINE HYDROCHLORIDE 75 MG/1
75 CAPSULE, EXTENDED RELEASE ORAL DAILY
Qty: 30 CAPSULE | Refills: 11 | Status: SHIPPED | OUTPATIENT
Start: 2020-02-04 | End: 2021-02-09 | Stop reason: SDUPTHER

## 2020-02-04 NOTE — PROGRESS NOTES
Tristian Muller is a 70 y.o. female.   No referring provider defined for this encounter.    History of Present Illness     The following portions of the patient's history were reviewed and updated as appropriate: allergies, current medications, past family history, past medical history, past social history, past surgical history and problem list.     Headaches better, has been feeling tired, wants to try decreasing meds. Denies any syncope, still gets dizzy at times.    Active Ambulatory Problems     Diagnosis Date Noted   • Migraine without aura and without status migrainosus, not intractable 06/07/2016   • Neck pain 06/07/2016   • Chronic low back pain 06/07/2016   • Anxiety 06/07/2016   • Ataxia 09/14/2016   • Atypical chest pain 09/14/2016   • Coronary artery disease 09/14/2016   • Headache 09/14/2016   • Lumbar radiculopathy 09/14/2016   • Myalgia and myositis 09/14/2016   • Nausea 09/14/2016   • Numbness 09/14/2016   • Patent foramen ovale 09/14/2016   • Status post fall 09/14/2016   • CVA (cerebral vascular accident) (CMS/[facility]) 09/14/2016   • Pancreatitis 09/14/2016   • Tingling 09/14/2016   • Cervical radiculopathy 09/14/2016   • Dyslipidemia 12/16/2016   • Hypertension 12/16/2016   • Edema 12/16/2016   • Embolism and thrombosis of unspecified site [I74.9] 08/18/2017   • Palpitations 04/18/2018   • Medication monitoring encounter 07/24/2018   • Diabetic polyneuropathy associated with diabetes mellitus due to underlying condition (CMS/[facility]) 04/23/2019   • Postherpetic neuralgia 04/23/2019   • History of pulmonary embolism 04/24/2019   • Dyspnea on exertion 04/24/2019   • Dehydration 06/27/2019   • Falls 06/27/2019   • Orthostatic hypotension 08/28/2019     Resolved Ambulatory Problems     Diagnosis Date Noted   • Migraine headache 09/14/2016   • Spondylosis of cervical region without myelopathy or radiculopathy 09/14/2016     Past Medical History:   Diagnosis Date   • Arm pain    • Arthritis    •  Breast injury    • Depression    • Diabetes mellitus (CMS/HCC)    • Hyperlipidemia    • Neck pain on left side    • Pulmonary embolism (CMS/HCC)    • Rectal cancer (CMS/HCC)    • Stroke syndrome 9/14/2016   • Thrombocytopenia (CMS/HCC)    • Transient cerebral ischemia      Social History     Socioeconomic History   • Marital status:      Spouse name: Not on file   • Number of children: Not on file   • Years of education: Not on file   • Highest education level: Not on file   Tobacco Use   • Smoking status: Never Smoker   • Smokeless tobacco: Never Used   Substance and Sexual Activity   • Alcohol use: No   • Drug use: No   • Sexual activity: Defer     Family History   Problem Relation Age of Onset   • Alzheimer's disease Mother    • Cancer Mother    • Coronary artery disease Other    • Diabetes Other    • Hypertension Other    • Stroke Other    • Cancer Other    • Dementia Other    • Heart attack Father    • Breast cancer Neg Hx    • Ovarian cancer Neg Hx      Current Outpatient Medications on File Prior to Visit   Medication Sig Dispense Refill   • acetaminophen (TYLENOL) 500 MG tablet Take 500 mg by mouth Every 6 (Six) Hours As Needed.     • acyclovir (ZOVIRAX) 5 % ointment Apply 1 unit topically to the affected area twice a day (Patient taking differently: Apply 1 application topically to the appropriate area as directed As Needed.) 30 g 2   • albuterol sulfate  (90 Base) MCG/ACT inhaler Inhale 2 puffs by mouth every 4 (Four) hours 8.5 g 3   • aspirin 81 MG tablet Take 81 mg by mouth Daily.     • atorvastatin (LIPITOR) 20 MG tablet atorvastatin 20 mg tablet     • butalbital-acetaminophen-caffeine (FIORICET, ESGIC) -40 MG per tablet Take 1 tablet by mouth Daily As Needed for headache 30 tablet 2   • cetirizine (zyrTEC) 10 MG tablet Take 1 tablet by mouth As Needed.     • clidinium-chlordiazePOXIDE (LIBRAX) 5-2.5 MG per capsule Take 1 capsule by mouth 3 (Three) Times a Day. 90 capsule 5   •  clonazePAM (KlonoPIN) 0.5 MG tablet clonazepam 0.5 mg tablet     • Cream Base Liposomic (PCCA CUSTOM LIPO-MAX) cream      • diclofenac (FLECTOR) 1.3 % patch patch Flector 1.3 % transdermal 12 hour patch     • digoxin (LANOXIN) 250 MCG tablet Take 1 tablet by mouth daily 90 tablet 3   • enoxaparin (LOVENOX) 80 MG/0.8ML solution syringe enoxaparin 80 mg/0.8 mL subcutaneous syringe     • fluconazole (DIFLUCAN) 150 MG tablet Take 1 tablet by mouth Daily for 2 days 2 tablet 0   • fludrocortisone 0.1 MG tablet Take 1 tablet by mouth Daily. 30 tablet 1   • fludrocortisone 0.1 MG tablet Take 1 tablet by mouth Daily. 30 tablet 11   • fluticasone (FLONASE) 50 MCG/ACT nasal spray Instill 2 sprays in each nostril once a day (Patient taking differently: 1 spray into the nostril(s) as directed by provider As Needed.) 16 g 3   • furosemide (LASIX) 40 MG tablet Take 1 tablet by mouth Daily. May have extra 1/2 to 1 tablet daily if needed for edema 135 tablet 2   • glipizide (GLUCOTROL XL) 10 MG 24 hr tablet Take 2 tablets by mouth Daily 60 tablet 5   • glucose blood (ONE TOUCH ULTRA TEST) test strip Use to test blood glucose as directed 3 times a day 100 each 5   • glucose blood (ONETOUCH VERIO) test strip Use to test blood glucose 3 times a day as directed 300 each 3   • hepatitis A (HAVRIX) 1440 EL U/ML vaccine Inject as directed per protocol and repeat in 6 months 1 mL 1   • HYDROcodone-acetaminophen (NORCO) 7.5-325 MG per tablet Take 1 tablet by mouth 3 (Three) Times a Day. 90 tablet 0   • HYDROcodone-acetaminophen (NORCO) 7.5-325 MG per tablet Take 1 tablet by mouth 3 (Three) Times a Day. 90 tablet 0   • hydrocortisone (GRX HICORT 25) 25 MG suppository Insert 1 Suppository rectally twice a day as needed (remove wrapper and moisten with water) 12 suppository 1   • Insulin Glargine (BASAGLAR KWIKPEN) 100 UNIT/ML injection pen Inject 40 units under the skin every morning, then Inject 65 units at bedtime 33 mL 5   • Insulin  Glargine (BASAGLAR KWIKPEN) 100 UNIT/ML injection pen Inject 40 units under the skin in the morning and 70 units in the evening at bedtime 33 mL 5   • Insulin Pen Needle (B-D UF III MINI PEN NEEDLES) 31G X 5 MM misc Use to inject insulin twice a day as directed 100 each 12   • Insulin Syringe-Needle U-100 29G 0.5 ML misc Inject 0.3 mL as directed Daily.     • loperamide (IMODIUM) 2 MG capsule Take 2 mg by mouth 4 (Four) Times a Day As Needed for Diarrhea.     • losartan (COZAAR) 50 MG tablet losartan 50 mg tablet     • meclizine (ANTIVERT) 25 MG tablet Take 1 tablet by mouth 3 (Three) Times a Day as needed 30 tablet 3   • metoclopramide (REGLAN) 10 MG tablet Take 1 tablet by mouth Daily As Needed for migraine. 30 tablet 5   • metoprolol succinate XL (TOPROL-XL) 100 MG 24 hr tablet Take 1 tablet by mouth Daily. 30 tablet 11   • metoprolol tartrate (LOPRESSOR) 25 MG tablet metoprolol tartrate 25 mg tablet     • metroNIDAZOLE (METROCREAM) 0.75 % cream Apply to the face once every night 45 g 0   • mometasone (ELOCON) 0.1 % cream APPLY A THIN LAYER TO THE AFFECTED AREA(S) TOPICALLY ONCE DAILY 45 g 3   • mupirocin (BACTROBAN) 2 % ointment APPLY SPARINGLY TO AFFECTED AREA(S) TWICE DAILY 44 g 5   • nitroglycerin (NITROSTAT) 0.4 MG SL tablet Place 1 tablet under the tongue Every 5 Minutes As Needed for Chest Pain for up to 3 total doses. Call 911 if pain remains after 1 dose. 25 tablet 6   • pancrelipase, Lip-Prot-Amyl, (PANCREAZE) 67784 UNITS capsule delayed-release particles capsule Take 1 capsule by mouth with each meal and each snack 100 capsule 11   • polyethylene glycol (MIRALAX) packet Take 17 g by mouth As Needed.     • potassium chloride (KLOR-CON) 10 MEQ CR tablet Take 1 tablet by mouth Daily as directed 90 tablet 2   • promethazine (PHENERGAN) 25 MG tablet Take 1 tablet by mouth 4 (Four) Times a Day As Needed for nausea 30 tablet 3   • RABEprazole (ACIPHEX) 20 MG EC tablet Take 1 tablet by mouth Daily. 30 tablet  "11   • rosuvastatin (CRESTOR) 10 MG tablet Take 1 tablet by mouth once daily 30 tablet 11   • SITagliptin (JANUVIA) 100 MG tablet Take 1 tablet by mouth once Daily 30 tablet 5   • topiramate (TOPAMAX) 25 MG tablet Take 1 tablet by mouth Daily. 30 tablet 11     No current facility-administered medications on file prior to visit.      Allergies   Allergen Reactions   • Atorvastatin Swelling   • Ranexa [Ranolazine Er] Nausea And Vomiting   • Tetanus Toxoid Hives         Review of Systems   Constitutional: Negative.    Eyes: Negative.    Respiratory: Negative.    Cardiovascular: Negative.    Gastrointestinal: Negative.    Endocrine: Negative.    Genitourinary: Positive for pelvic pain.   Musculoskeletal: Positive for arthralgias, back pain, joint swelling and neck pain.   Skin: Negative.    Allergic/Immunologic: Negative.    Neurological: Positive for dizziness, syncope, light-headedness and headaches.   Hematological: Negative.    Psychiatric/Behavioral: Negative.        Objective   Blood pressure 156/86, pulse 79, resp. rate 16, height 172.7 cm (67.99\"), weight 78.9 kg (174 lb), SpO2 98 %, not currently breastfeeding.  Physical Exam   Constitutional: She is oriented to person, place, and time. She appears well-developed and well-nourished.   Cardiovascular: Normal rate and regular rhythm.   /80 in both arms sitting, 135/75 standing.   Pulmonary/Chest: Effort normal.   Neurological: She is alert and oriented to person, place, and time. She has normal reflexes. She displays normal reflexes. No cranial nerve deficit or sensory deficit. She exhibits normal muscle tone. Coordination normal.   Psychiatric: She has a normal mood and affect. Her behavior is normal. Thought content normal.       Assessment/Plan   Leela was seen today for diabetic polyneuropathy associated with diabetes mellitus du.    Diagnoses and all orders for this visit:    Anxiety  -     venlafaxine XR (EFFEXOR-XR) 75 MG 24 hr capsule; Take 1 capsule " by mouth Daily.    Neck pain  -     gabapentin (NEURONTIN) 800 MG tablet; Take 1/2 tablet by mouth 3 (Three) Times a Day, try to decrease to bid as tolerated.  -     baclofen (LIORESAL) 10 MG tablet; Take 1 tablet by mouth 3 (three) times a day, try to decrease to bid as tolerated.    Migraine without aura and without status migrainosus, not intractable  -     venlafaxine XR (EFFEXOR-XR) 75 MG 24 hr capsule; Take 1 capsule by mouth Daily.  -     butalbital-acetaminophen-caffeine (FIORICET, ESGIC) -40 MG per tablet; Take 1-2 tablets by mouth Daily as needed    The patient has read and signed the [facility] Controlled Substance Contract.  I will continue to see patient for regular follow up appointments.  They are well controlled on their medication.  LORRI is updated every 3 months. The patient is aware of the potential for addiction and dependence.

## 2020-02-05 RX ORDER — TOPIRAMATE 25 MG/1
25 TABLET ORAL DAILY
Qty: 30 TABLET | Refills: 11 | Status: SHIPPED | OUTPATIENT
Start: 2020-02-05 | End: 2020-09-01 | Stop reason: SDUPTHER

## 2020-03-04 ENCOUNTER — OFFICE VISIT (OUTPATIENT)
Dept: CARDIOLOGY | Facility: CLINIC | Age: 70
End: 2020-03-04

## 2020-03-04 VITALS
HEART RATE: 76 BPM | SYSTOLIC BLOOD PRESSURE: 120 MMHG | HEIGHT: 68 IN | WEIGHT: 174 LBS | BODY MASS INDEX: 26.37 KG/M2 | OXYGEN SATURATION: 96 % | DIASTOLIC BLOOD PRESSURE: 58 MMHG

## 2020-03-04 DIAGNOSIS — I10 ESSENTIAL HYPERTENSION: ICD-10-CM

## 2020-03-04 DIAGNOSIS — Q21.12 PATENT FORAMEN OVALE: ICD-10-CM

## 2020-03-04 DIAGNOSIS — E78.5 DYSLIPIDEMIA: ICD-10-CM

## 2020-03-04 DIAGNOSIS — I95.1 ORTHOSTATIC HYPOTENSION: Primary | ICD-10-CM

## 2020-03-04 PROCEDURE — 99214 OFFICE O/P EST MOD 30 MIN: CPT | Performed by: INTERNAL MEDICINE

## 2020-03-04 NOTE — PROGRESS NOTES
[facility] Cardiology    Patient ID: Leela Muller is a 70 y.o.   female.  : 1950   Contact: [phone]    Encounter date: 2020    PCP: Connor Ritter MD      Chief complaint:   Chief Complaint   Patient presents with   • orthostatic hypotension       Problem List:  1. Cerebrovascular accident:  a. Right CVA in July or 2010, evaluated at [facility].   b. Admission to [facility], 2010 with headache and stuttering, c/w TIA.   c. Chronic Coumadin therapy, initiated following bilateral PE in  after fall and hip replacement. She was already on 81 mg of aspirin as well, 2010.   d. MRI brain, 2010: Old right parietal CVA.   e. MRA, 2010: Normal carotids, middle anterior and posterior cerebral arteries with minimal ostial stenosis of both vertebral arteries.   f. GENARO, 2010, Dr. Mobley: PFO with a small amount of right to left shunting. Normal LVEF and normal valves.  g. PFO closure, 10/05/2010: 25 mm Amplatzer cribriform occluder.    h. Echocardiogram, 2014: Amplatzer device is well-seated in the interatrial septum. EF 55-60%. Trace MR and mild TR.  i. Echocardiogram, 2019: Intact Amplatzer septal occluder with no evidence of flow across the device. EF 60%. Trace MR/TR.   2. Abnormal myocardial perfusion study:    a. Cardiolite GXT, 2010, Dr. Monteiro: Small area of ischemia in the mid anteroseptal, mid inferior, and apical lateral regions. EF 68%.  b. LHC, 2010, Dr. Monteiro: Normal coronary arteries with normal LVEF.  3. DM2.   4. Bilateral pulmonary emboli:  a. Following hip replacement in .   b. No evidence of prior anti-coagulable workup.   c. Chronic Coumadin therapy.   d. No evidence of DVT on bilateral lower extremity duplex.  e. The decision was made to discontinue the patient's warfarin therapy due to her fall risk.  5. Palpitations:  a. 2 week Holter, 2018: Normal  study.  6. Hypertension.   7. Dyslipidemia.   8. Pancreatitis:  a. Recent episode  in March 2013.  9. 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    10. Rectal cancer; surgically removed by Dr Martinez.  11.  Surgical history:  a. Hip replacement.  b. Hysterectomy.  c. Tonsillectomy  d. Appendectomy.  e. Cholecystectomy.    Allergies   Allergen Reactions   • Atorvastatin Swelling   • Tetanus Toxoid Hives   • Ranexa [Ranolazine Er] Nausea And Vomiting       Current Medications:    Current Outpatient Medications:   •  acetaminophen (TYLENOL) 500 MG tablet, Take 500 mg by mouth Every 6 (Six) Hours As Needed., Disp: , Rfl:   •  albuterol sulfate  (90 Base) MCG/ACT inhaler, Inhale 2 puffs by mouth every 4 (Four) hours, Disp: 8.5 g, Rfl: 3  •  aspirin 81 MG tablet, Take 81 mg by mouth Daily., Disp: , Rfl:   •  atorvastatin (LIPITOR) 20 MG tablet, Daily., Disp: , Rfl:   •  baclofen (LIORESAL) 10 MG tablet, Take 1 tablet by mouth 3 (three) times a day. (Patient taking differently: Take 10 mg by mouth 2 (Two) Times a Day.), Disp: 90 tablet, Rfl: 11  •  butalbital-acetaminophen-caffeine (FIORICET, ESGIC) -40 MG per tablet, Take 1 tablet by mouth Daily As Needed for headache, Disp: 30 tablet, Rfl: 2  •  cetirizine (zyrTEC) 10 MG tablet, Take 1 tablet by mouth As Needed., Disp: , Rfl:   •  clidinium-chlordiazePOXIDE (LIBRAX) 5-2.5 MG per capsule, Take 1 capsule by mouth 3 (Three) Times a Day., Disp: 90 capsule, Rfl: 5  •  clonazePAM (KlonoPIN) 0.5 MG tablet, As Needed., Disp: , Rfl:   •  Cream Base Liposomic (PCCA CUSTOM LIPO-MAX) cream, , Disp: , Rfl:   •  digoxin (LANOXIN) 250 MCG tablet, Take 1 tablet by mouth daily, Disp: 90 tablet, Rfl: 3  •  enoxaparin (LOVENOX) 80 MG/0.8ML solution syringe, Every 12 (Twelve) Hours., Disp: , Rfl:   •  fludrocortisone 0.1 MG tablet, Take 1 tablet by mouth Daily., Disp: 30 tablet, Rfl: 1  •  furosemide (LASIX) 40 MG tablet, Take 1 tablet by mouth Daily. May have extra 1/2 to 1  tablet daily if needed for edema (Patient taking differently: As Needed.), Disp: 135 tablet, Rfl: 2  •  gabapentin (NEURONTIN) 800 MG tablet, Take 1/2 tablet by mouth 3 (Three) Times a Day. (Patient taking differently: Take 800 mg by mouth 2 (Two) Times a Day As Needed.), Disp: 60 tablet, Rfl: 5  •  glipizide (GLUCOTROL XL) 10 MG 24 hr tablet, Take 2 tablets by mouth Daily, Disp: 60 tablet, Rfl: 5  •  glucose blood (ONE TOUCH ULTRA TEST) test strip, Use to test blood glucose as directed 3 times a day, Disp: 100 each, Rfl: 5  •  glucose blood (ONETOUCH VERIO) test strip, Use to test blood glucose 3 times a day as directed, Disp: 300 each, Rfl: 3  •  hepatitis A (HAVRIX) 1440 EL U/ML vaccine, Inject as directed per protocol and repeat in 6 months, Disp: 1 mL, Rfl: 1  •  HYDROcodone-acetaminophen (NORCO) 7.5-325 MG per tablet, Take 1 tablet by mouth 3 (Three) Times a Day., Disp: 90 tablet, Rfl: 0  •  hydrocortisone (GRX HICORT 25) 25 MG suppository, Insert 1 Suppository rectally twice a day as needed (remove wrapper and moisten with water), Disp: 12 suppository, Rfl: 1  •  Insulin Glargine (BASAGLAR KWIKPEN) 100 UNIT/ML injection pen, Inject 40 units under the skin every morning, then Inject 65 units at bedtime, Disp: 33 mL, Rfl: 5  •  Insulin Pen Needle (B-D UF III MINI PEN NEEDLES) 31G X 5 MM misc, Use to inject insulin twice a day as directed, Disp: 100 each, Rfl: 12  •  Insulin Syringe-Needle U-100 29G 0.5 ML misc, Inject 0.3 mL as directed Daily., Disp: , Rfl:   •  loperamide (IMODIUM) 2 MG capsule, Take 2 mg by mouth 4 (Four) Times a Day As Needed for Diarrhea., Disp: , Rfl:   •  losartan (COZAAR) 50 MG tablet, Daily., Disp: , Rfl:   •  meclizine (ANTIVERT) 25 MG tablet, Take 1 tablet by mouth 3 (Three) Times a Day as needed, Disp: 30 tablet, Rfl: 3  •  metoclopramide (REGLAN) 10 MG tablet, Take 1 tablet by mouth Daily As Needed for migraine., Disp: 30 tablet, Rfl: 5  •  metoprolol succinate XL (TOPROL-XL) 100 MG  24 hr tablet, Take 1 tablet by mouth Daily., Disp: 30 tablet, Rfl: 11  •  metroNIDAZOLE (METROCREAM) 0.75 % cream, Apply to the face once every night (Patient taking differently: Apply  topically to the appropriate area as directed As Needed.), Disp: 45 g, Rfl: 0  •  mometasone (ELOCON) 0.1 % cream, APPLY A THIN LAYER TO THE AFFECTED AREA(S) TOPICALLY ONCE DAILY, Disp: 45 g, Rfl: 3  •  nitroglycerin (NITROSTAT) 0.4 MG SL tablet, Place 1 tablet under the tongue Every 5 Minutes As Needed for Chest Pain for up to 3 total doses. Call 911 if pain remains after 1 dose., Disp: 25 tablet, Rfl: 6  •  pancrelipase, Lip-Prot-Amyl, (PANCREAZE) 75460 UNITS capsule delayed-release particles capsule, Take 1 capsule by mouth with each meal and each snack (Patient taking differently: As Needed.), Disp: 100 capsule, Rfl: 11  •  polyethylene glycol (MIRALAX) packet, Take 17 g by mouth As Needed., Disp: , Rfl:   •  potassium chloride (KLOR-CON) 10 MEQ CR tablet, Take 1 tablet by mouth Daily as directed (Patient taking differently: Take 10 mEq by mouth As Needed.), Disp: 90 tablet, Rfl: 2  •  promethazine (PHENERGAN) 25 MG tablet, Take 1 tablet by mouth 4 (Four) Times a Day As Needed for nausea, Disp: 30 tablet, Rfl: 3  •  RABEprazole (ACIPHEX) 20 MG EC tablet, Take 1 tablet by mouth Daily., Disp: 30 tablet, Rfl: 11  •  rosuvastatin (CRESTOR) 10 MG tablet, Take 1 tablet by mouth once daily, Disp: 30 tablet, Rfl: 11  •  SITagliptin (JANUVIA) 100 MG tablet, Take 1 tablet by mouth once Daily, Disp: 30 tablet, Rfl: 5  •  topiramate (TOPAMAX) 25 MG tablet, Take 1 tablet by mouth Daily., Disp: 30 tablet, Rfl: 11  •  venlafaxine XR (EFFEXOR-XR) 75 MG 24 hr capsule, Take 1 capsule by mouth Daily., Disp: 30 capsule, Rfl: 11    HPI    Leela Muller is a 70 y.o. female who presents today for 6 month follow up of hypertension, hyperlipidemia, PFO s/p closure, and orthostatic hypotension. Since last visit, she has been feeling well overall from a  "cardiovascular standpoint. She has been taking her Lasix as needed for swelling. She has been having GI issues, with periods of constipation followed by persistent diarrhea.  She does not recall her blood pressure going lower than 100 mmHg systolic.  She does note that she had one high reading at 170 mmHg systolic.  She has not been checking her pressure at home.  Patient denies chest pain, shortness of breath, palpitations, and syncope.          The following portions of the patient's history were reviewed and updated as appropriate: allergies, current medications and problem list.    Pertinent positives as listed in the HPI.  All other systems reviewed are negative.         Vitals:    03/04/20 1101 03/04/20 1111   BP: 124/64 120/58   BP Location: Left arm Left arm   Patient Position: Sitting Standing   Pulse: 73 76   SpO2: 96%    Weight: 78.9 kg (174 lb)    Height: 172.7 cm (68\")        Physical Exam:  General: Alert and oriented.  Neck: Jugular venous pressure is within normal limits. Carotids have normal upstrokes without bruits.   Cardiovascular: Heart has a nondisplaced focal PMI. Regular rate and rhythm. No murmur, gallop or rub.  Lungs: Clear, no rales or wheezes. Equal expansion is noted.   Extremities: Show no edema.  Skin: Warm and dry.  Neurologic: Nonfocal.     Diagnostic Data (reviewed with patient):  Lab Results   Component Value Date    GLUCOSE 248 (H) 07/02/2019    BUN 11 07/02/2019    CREATININE 0.68 07/02/2019    EGFRIFNONA 85 07/02/2019    BCR 16.2 07/02/2019     07/02/2019    K 4.0 07/02/2019     07/02/2019    CO2 25.0 07/02/2019    CALCIUM 8.7 07/02/2019    ALBUMIN 3.90 06/27/2019    ALKPHOS 77 06/27/2019    AST 27 06/27/2019    ALT 30 06/27/2019     Lab Results   Component Value Date    CHOL 97 06/28/2019    TRIG 171 (H) 06/28/2019    HDL 34 (L) 06/28/2019    LDL 29 06/28/2019      Lab Results   Component Value Date    WBC 6.65 07/02/2019    RBC 4.44 07/02/2019    HGB 12.9 " 07/02/2019    HCT 40.3 07/02/2019    MCV 90.8 07/02/2019     (L) 07/02/2019      Lab Results   Component Value Date    TSH 2.210 06/27/2019        Procedures      Assessment:    ICD-10-CM ICD-9-CM   1. Orthostatic hypotension I95.1 458.0   2. Patent foramen ovale Q21.1 745.5   3. Essential hypertension I10 401.9   4. Dyslipidemia E78.5 272.4         Plan:  1. Check BP at home about 3 times a week, goal systolic -140 mmHg.  2. Continue Florinef for orthostatic hypotension.  3. Continue aspirin for PFO s/p closure.  4. Continue losartan and metoprolol for hypertension.  5. Continue Lasix as needed for fluid retention.  6. Continue atorvastatin 20 mg for hyperlipidemia.  7. Continue all other current medications.  8. F/up in 8 months, sooner if needed.  Call with blood pressures if they are greater than 140 mmHg and will make appropriate medication adjustments.      Scribed for Rika Sullivan MD by Baylee Menendez. 3/4/2020  11:23 AM     I Rika Sullivan MD personally performed the services described in this documentation as scribed by the above individual in my presence, and it is both accurate and complete.    Rika Sulliavn MD, FACC

## 2020-03-08 ENCOUNTER — APPOINTMENT (OUTPATIENT)
Dept: CT IMAGING | Facility: HOSPITAL | Age: 70
End: 2020-03-08

## 2020-03-08 ENCOUNTER — HOSPITAL ENCOUNTER (EMERGENCY)
Facility: HOSPITAL | Age: 70
Discharge: HOME | End: 2020-03-08
Attending: EMERGENCY MEDICINE | Admitting: EMERGENCY MEDICINE

## 2020-03-08 VITALS
OXYGEN SATURATION: 98 % | BODY MASS INDEX: 25.31 KG/M2 | SYSTOLIC BLOOD PRESSURE: 144 MMHG | HEART RATE: 98 BPM | WEIGHT: 167 LBS | HEIGHT: 68 IN | TEMPERATURE: 98.2 F | RESPIRATION RATE: 18 BRPM | DIASTOLIC BLOOD PRESSURE: 72 MMHG

## 2020-03-08 DIAGNOSIS — E87.6 HYPOKALEMIA: ICD-10-CM

## 2020-03-08 DIAGNOSIS — K52.9 GASTROENTERITIS: Primary | ICD-10-CM

## 2020-03-08 LAB
ALBUMIN SERPL-MCNC: 4 G/DL (ref 3.5–5.2)
ALBUMIN/GLOB SERPL: 1.4 G/DL
ALP SERPL-CCNC: 68 U/L (ref 39–117)
ALT SERPL W P-5'-P-CCNC: 17 U/L (ref 1–33)
ANION GAP SERPL CALCULATED.3IONS-SCNC: 13.1 MMOL/L (ref 5–15)
AST SERPL-CCNC: 28 U/L (ref 1–32)
BASOPHILS # BLD AUTO: 0.05 10*3/MM3 (ref 0–0.2)
BASOPHILS NFR BLD AUTO: 0.6 % (ref 0–1.5)
BILIRUB SERPL-MCNC: 0.3 MG/DL (ref 0.2–1.2)
BUN BLD-MCNC: 7 MG/DL (ref 8–23)
BUN/CREAT SERPL: 10.9 (ref 7–25)
CALCIUM SPEC-SCNC: 9.1 MG/DL (ref 8.6–10.5)
CHLORIDE SERPL-SCNC: 104 MMOL/L (ref 98–107)
CO2 SERPL-SCNC: 25.9 MMOL/L (ref 22–29)
CREAT BLD-MCNC: 0.64 MG/DL (ref 0.57–1)
DEPRECATED RDW RBC AUTO: 44.4 FL (ref 37–54)
EOSINOPHIL # BLD AUTO: 0.22 10*3/MM3 (ref 0–0.4)
EOSINOPHIL NFR BLD AUTO: 2.4 % (ref 0.3–6.2)
ERYTHROCYTE [DISTWIDTH] IN BLOOD BY AUTOMATED COUNT: 14.4 % (ref 12.3–15.4)
GFR SERPL CREATININE-BSD FRML MDRD: 91 ML/MIN/1.73
GLOBULIN UR ELPH-MCNC: 2.9 GM/DL
GLUCOSE BLD-MCNC: 146 MG/DL (ref 65–99)
HCT VFR BLD AUTO: 46.7 % (ref 34–46.6)
HGB BLD-MCNC: 15.5 G/DL (ref 12–15.9)
HOLD SPECIMEN: NORMAL
HOLD SPECIMEN: NORMAL
IMM GRANULOCYTES # BLD AUTO: 0.05 10*3/MM3 (ref 0–0.05)
IMM GRANULOCYTES NFR BLD AUTO: 0.6 % (ref 0–0.5)
LIPASE SERPL-CCNC: 8 U/L (ref 13–60)
LYMPHOCYTES # BLD AUTO: 3.36 10*3/MM3 (ref 0.7–3.1)
LYMPHOCYTES NFR BLD AUTO: 37 % (ref 19.6–45.3)
MCH RBC QN AUTO: 28.1 PG (ref 26.6–33)
MCHC RBC AUTO-ENTMCNC: 33.2 G/DL (ref 31.5–35.7)
MCV RBC AUTO: 84.6 FL (ref 79–97)
MONOCYTES # BLD AUTO: 0.86 10*3/MM3 (ref 0.1–0.9)
MONOCYTES NFR BLD AUTO: 9.5 % (ref 5–12)
NEUTROPHILS # BLD AUTO: 4.53 10*3/MM3 (ref 1.7–7)
NEUTROPHILS NFR BLD AUTO: 49.9 % (ref 42.7–76)
NRBC BLD AUTO-RTO: 0 /100 WBC (ref 0–0.2)
PLATELET # BLD AUTO: 190 10*3/MM3 (ref 140–450)
PMV BLD AUTO: 12.2 FL (ref 6–12)
POTASSIUM BLD-SCNC: 3.2 MMOL/L (ref 3.5–5.2)
PROT SERPL-MCNC: 6.9 G/DL (ref 6–8.5)
RBC # BLD AUTO: 5.52 10*6/MM3 (ref 3.77–5.28)
SODIUM BLD-SCNC: 143 MMOL/L (ref 136–145)
WBC NRBC COR # BLD: 9.07 10*3/MM3 (ref 3.4–10.8)
WHOLE BLOOD HOLD SPECIMEN: NORMAL

## 2020-03-08 PROCEDURE — 74177 CT ABD & PELVIS W/CONTRAST: CPT

## 2020-03-08 PROCEDURE — 99283 EMERGENCY DEPT VISIT LOW MDM: CPT

## 2020-03-08 PROCEDURE — 85025 COMPLETE CBC W/AUTO DIFF WBC: CPT | Performed by: EMERGENCY MEDICINE

## 2020-03-08 PROCEDURE — 96361 HYDRATE IV INFUSION ADD-ON: CPT

## 2020-03-08 PROCEDURE — 25010000002 MORPHINE PER 10 MG: Performed by: EMERGENCY MEDICINE

## 2020-03-08 PROCEDURE — 25010000002 IOPAMIDOL 61 % SOLUTION: Performed by: EMERGENCY MEDICINE

## 2020-03-08 PROCEDURE — 80053 COMPREHEN METABOLIC PANEL: CPT | Performed by: EMERGENCY MEDICINE

## 2020-03-08 PROCEDURE — 96374 THER/PROPH/DIAG INJ IV PUSH: CPT

## 2020-03-08 PROCEDURE — 25010000002 PROMETHAZINE PER 50 MG: Performed by: PHYSICIAN ASSISTANT

## 2020-03-08 PROCEDURE — 83690 ASSAY OF LIPASE: CPT | Performed by: EMERGENCY MEDICINE

## 2020-03-08 PROCEDURE — 96375 TX/PRO/DX INJ NEW DRUG ADDON: CPT

## 2020-03-08 RX ORDER — PROMETHAZINE HYDROCHLORIDE 25 MG/1
25 TABLET ORAL EVERY 6 HOURS PRN
Qty: 12 TABLET | Refills: 0 | Status: SHIPPED | OUTPATIENT
Start: 2020-03-08 | End: 2021-03-04 | Stop reason: SDUPTHER

## 2020-03-08 RX ORDER — MORPHINE SULFATE 4 MG/ML
4 INJECTION, SOLUTION INTRAMUSCULAR; INTRAVENOUS ONCE
Status: COMPLETED | OUTPATIENT
Start: 2020-03-08 | End: 2020-03-08

## 2020-03-08 RX ORDER — SODIUM CHLORIDE 0.9 % (FLUSH) 0.9 %
10 SYRINGE (ML) INJECTION AS NEEDED
Status: DISCONTINUED | OUTPATIENT
Start: 2020-03-08 | End: 2020-03-08 | Stop reason: HOSPADM

## 2020-03-08 RX ORDER — PROMETHAZINE HYDROCHLORIDE 25 MG/ML
12.5 INJECTION, SOLUTION INTRAMUSCULAR; INTRAVENOUS ONCE
Status: COMPLETED | OUTPATIENT
Start: 2020-03-08 | End: 2020-03-08

## 2020-03-08 RX ORDER — POTASSIUM CHLORIDE 750 MG/1
20 TABLET, FILM COATED, EXTENDED RELEASE ORAL 2 TIMES DAILY
Qty: 12 TABLET | Refills: 0 | Status: SHIPPED | OUTPATIENT
Start: 2020-03-08 | End: 2020-03-13

## 2020-03-08 RX ADMIN — MORPHINE SULFATE 4 MG: 4 INJECTION, SOLUTION INTRAMUSCULAR; INTRAVENOUS at 14:02

## 2020-03-08 RX ADMIN — SODIUM CHLORIDE 1000 ML: 9 INJECTION, SOLUTION INTRAVENOUS at 14:02

## 2020-03-08 RX ADMIN — IOPAMIDOL 100 ML: 612 INJECTION, SOLUTION INTRAVENOUS at 15:53

## 2020-03-08 RX ADMIN — PROMETHAZINE HYDROCHLORIDE 12.5 MG: 25 INJECTION INTRAMUSCULAR; INTRAVENOUS at 14:02

## 2020-03-08 NOTE — ED NOTES
Lab called at this time requesting redrawn labs per CLEVE Mendoza.     Deedee Leija  03/08/20 [id number]

## 2020-03-08 NOTE — ED PROVIDER NOTES
Subjective   70-year-old female presents with abdominal pain, she said abdominal pain for 3 days.  She states the pain started after eating fried fish at a restaurant.  She has a history of pancreatitis and gets flareups, this is similar.  She has medication for pancreatitis, but it is .  She initially began to have pancreatitis after an ERCP that was performed several years ago.  She has had a decreased appetite, nausea, and vomiting.      History provided by:  Patient   used: No        Review of Systems   Gastrointestinal: Positive for abdominal pain, nausea and vomiting.   All other systems reviewed and are negative.      Past Medical History:   Diagnosis Date   • Anxiety    • Arm pain    • Arthritis    • Breast injury     fell 2019    • Chronic pancreatitis (CMS/HCC)    • Depression    • Diabetes mellitus (CMS/HCC)    • Hyperlipidemia    • Hypertension    • Neck pain on left side    • Palpitations 2018   • Pancreatitis    • Pulmonary embolism (CMS/HCC)    • Rectal cancer (CMS/HCC)    • Stroke syndrome 2016    · Assessed By: Devaughn Lewis (Neurology); Last Assessed: 2015  Right cerebrovascular accident in July or 2010, evaluated at [facility].  Admission to [facility] on 2010 with headache and stuttering, consistent with TIA.  Chronic Coumadin therapy, initiated following bilateral pulmonary emboli in  after fall and hip replacement.  She was already on 81 mg of aspirin as well, 2010.  MRI of the brain on 2010 revealing old right parietal cerebrovascular accident.  MRA revealing normal carotids, middle anterior and posterior cerebral arteries with minimal ostial stenosis of both vertebral arteries.  GENARO by Dr. Oliver Mobley on 2010:  patent foramen ovale with a small amount of right to left shunting.  Normal LVEF and normal valvular structures.   Patent foramen ovale closure using a 25 mm. Amplatzer  cribriform occluder, 10/05/2010.   Echocardiogram, 06/23/2014:  LVEF (55% to 60%) with and Amplatzer device noted to be well-seated in    • Thrombocytopenia (CMS/HCC)    • Transient cerebral ischemia        Allergies   Allergen Reactions   • Atorvastatin Swelling   • Tetanus Toxoid Hives   • Ranexa [Ranolazine Er] Nausea And Vomiting       Past Surgical History:   Procedure Laterality Date   • APPENDECTOMY     • BREAST BIOPSY Left 2012    benign   • BREAST EXCISIONAL BIOPSY Left     benign   • BREAST EXCISIONAL BIOPSY Right     benign   • CHOLECYSTECTOMY     • HYSTERECTOMY     • OOPHORECTOMY     • RECTAL SURGERY     • TONSILLECTOMY     • TOTAL HIP ARTHROPLASTY REVISION         Family History   Problem Relation Age of Onset   • Alzheimer's disease Mother    • Cancer Mother    • Coronary artery disease Other    • Diabetes Other    • Hypertension Other    • Stroke Other    • Cancer Other    • Dementia Other    • Heart attack Father    • Breast cancer Neg Hx    • Ovarian cancer Neg Hx        Social History     Socioeconomic History   • Marital status:      Spouse name: Not on file   • Number of children: Not on file   • Years of education: Not on file   • Highest education level: Not on file   Tobacco Use   • Smoking status: Never Smoker   • Smokeless tobacco: Never Used   Substance and Sexual Activity   • Alcohol use: No   • Drug use: No   • Sexual activity: Defer           Objective   Physical Exam   Constitutional: She is oriented to person, place, and time. She appears well-developed and well-nourished.   Eyes: EOM are normal.   Neck: Normal range of motion. Neck supple.   Cardiovascular: Normal rate and regular rhythm.   Pulmonary/Chest: Effort normal and breath sounds normal.   Abdominal: Soft. Bowel sounds are normal. There is tenderness in the epigastric area and left upper quadrant.   Musculoskeletal: Normal range of motion.   Neurological: She is alert and oriented to person, place, and time. She has  normal reflexes.   Skin: Skin is warm and dry.   Psychiatric: She has a normal mood and affect.   Nursing note and vitals reviewed.      Procedures           ED Course                                           MDM  Number of Diagnoses or Management Options  Gastroenteritis: new and requires workup  Hypokalemia: new and requires workup     Amount and/or Complexity of Data Reviewed  Clinical lab tests: reviewed  Tests in the radiology section of CPT®: reviewed  Discuss the patient with other providers: yes    Risk of Complications, Morbidity, and/or Mortality  Presenting problems: minimal  Diagnostic procedures: minimal  Management options: minimal    Patient Progress  Patient progress: stable      Final diagnoses:   Gastroenteritis   Hypokalemia            Ray Mendoza Jr., PA-C  03/08/20 [id number]

## 2020-05-08 DIAGNOSIS — G43.009 MIGRAINE WITHOUT AURA AND WITHOUT STATUS MIGRAINOSUS, NOT INTRACTABLE: ICD-10-CM

## 2020-05-08 RX ORDER — FUROSEMIDE 40 MG/1
TABLET ORAL
Qty: 135 TABLET | Refills: 1 | Status: SHIPPED | OUTPATIENT
Start: 2020-05-08 | End: 2020-11-10 | Stop reason: SDUPTHER

## 2020-05-08 RX ORDER — DIGOXIN 250 MCG
TABLET ORAL
Qty: 90 TABLET | Refills: 2 | Status: SHIPPED | OUTPATIENT
Start: 2020-05-08 | End: 2020-10-22

## 2020-05-08 RX ORDER — METOCLOPRAMIDE 10 MG/1
10 TABLET ORAL DAILY PRN
Qty: 30 TABLET | Refills: 5 | Status: SHIPPED | OUTPATIENT
Start: 2020-05-08 | End: 2021-05-15

## 2020-05-12 ENCOUNTER — OFFICE VISIT (OUTPATIENT)
Dept: NEUROLOGY | Facility: CLINIC | Age: 70
End: 2020-05-12

## 2020-05-12 DIAGNOSIS — G43.009 MIGRAINE WITHOUT AURA AND WITHOUT STATUS MIGRAINOSUS, NOT INTRACTABLE: Primary | ICD-10-CM

## 2020-05-12 PROCEDURE — 99441 PR PHYS/QHP TELEPHONE EVALUATION 5-10 MIN: CPT | Performed by: PSYCHIATRY & NEUROLOGY

## 2020-05-12 RX ORDER — BUTALBITAL, ACETAMINOPHEN AND CAFFEINE 50; 325; 40 MG/1; MG/1; MG/1
1 TABLET ORAL DAILY PRN
Qty: 30 TABLET | Refills: 2 | Status: SHIPPED | OUTPATIENT
Start: 2020-05-12 | End: 2020-08-11 | Stop reason: SDUPTHER

## 2020-05-12 NOTE — PROGRESS NOTES
Subjective:     Patient ID: Leela Muller is a 70 y.o. female.    CC:   Chief Complaint   Patient presents with   • Headache     You have chosen to receive care through a telephone visit. Do you consent to use a telephone visit for your medical care today? Yes      HPI:   History of Present Illness  The following portions of the patient's history were reviewed and updated as appropriate: allergies, current medications, past family history, past medical history, past social history, past surgical history and problem list.     Patient reports that she has been have more tension headaches since her  has had heart issues. She denies other concerns.    Past Medical History:   Diagnosis Date   • Anxiety    • Arm pain    • Arthritis    • Breast injury     fell 6/2019    • Chronic pancreatitis (CMS/HCC)    • Depression    • Diabetes mellitus (CMS/HCC)    • Hyperlipidemia    • Hypertension    • Neck pain on left side    • Palpitations 4/18/2018   • Pancreatitis    • Pulmonary embolism (CMS/HCC)    • Rectal cancer (CMS/HCC)    • Stroke syndrome 9/14/2016    · Assessed By: Devaughn Lewis (Neurology); Last Assessed: 16 Jun 2015  Right cerebrovascular accident in July or August 2010, evaluated at [facility].  Admission to [facility] on 09/28/2010 with headache and stuttering, consistent with TIA.  Chronic Coumadin therapy, initiated following bilateral pulmonary emboli in 2007 after fall and hip replacement.  She was already on 81 mg of aspirin as well, 09/2010.  MRI of the brain on 09/28/2010 revealing old right parietal cerebrovascular accident.  MRA revealing normal carotids, middle anterior and posterior cerebral arteries with minimal ostial stenosis of both vertebral arteries.  GENARO by Dr. Oliver Mobley on 09/28/2010:  patent foramen ovale with a small amount of right to left shunting.  Normal LVEF and normal valvular structures.   Patent foramen ovale closure using a 25 mm.  Amplatzer cribriform occluder, 10/05/2010.   Echocardiogram, 06/23/2014:  LVEF (55% to 60%) with and Amplatzer device noted to be well-seated in    • Thrombocytopenia (CMS/HCC)    • Transient cerebral ischemia        Past Surgical History:   Procedure Laterality Date   • APPENDECTOMY     • BREAST BIOPSY Left 2012    benign   • BREAST EXCISIONAL BIOPSY Left     benign   • BREAST EXCISIONAL BIOPSY Right     benign   • CHOLECYSTECTOMY     • HYSTERECTOMY     • OOPHORECTOMY     • RECTAL SURGERY     • TONSILLECTOMY     • TOTAL HIP ARTHROPLASTY REVISION         Social History     Socioeconomic History   • Marital status:      Spouse name: Not on file   • Number of children: Not on file   • Years of education: Not on file   • Highest education level: Not on file   Tobacco Use   • Smoking status: Never Smoker   • Smokeless tobacco: Never Used   Substance and Sexual Activity   • Alcohol use: No   • Drug use: No   • Sexual activity: Defer       Family History   Problem Relation Age of Onset   • Alzheimer's disease Mother    • Cancer Mother    • Coronary artery disease Other    • Diabetes Other    • Hypertension Other    • Stroke Other    • Cancer Other    • Dementia Other    • Heart attack Father    • Breast cancer Neg Hx    • Ovarian cancer Neg Hx         Review of Systems     Objective:  There were no vitals taken for this visit.    Neurologic Exam     Mental Status   Alert and oriented, speech clear.       Physical Exam    Assessment/Plan:       Leela was seen today for headache.    Diagnoses and all orders for this visit:    Migraine without aura and without status migrainosus, not intractable  -     butalbital-acetaminophen-caffeine (FIORICET, ESGIC) -40 MG per tablet; Take 1 tablet by mouth Daily As Needed for headache    The patient has read and signed the [facility] Controlled Substance Contract.  I will continue to see patient for regular follow up appointments.  They are well controlled on their  medication.  LORRI is updated every 3 months. The patient is aware of the potential for addiction and dependence.    Time on the phone 5 min.         Devaughn Lewis MD  5/12/2020

## 2020-05-20 ENCOUNTER — TRANSCRIBE ORDERS (OUTPATIENT)
Dept: ADMINISTRATIVE | Facility: HOSPITAL | Age: 70
End: 2020-05-20

## 2020-05-20 DIAGNOSIS — Z12.31 VISIT FOR SCREENING MAMMOGRAM: Primary | ICD-10-CM

## 2020-07-07 NOTE — PROGRESS NOTES
Patient: Leela Muller    YOB: 1950    Date: 07/08/2020    Primary Care Provider: Connor Ritter MD    Chief Complaint   Patient presents with   • Breast Mass       SUBJECTIVE:    History of present illness:  I saw the patient in the office today for an evaluation and consultation for a palpable mass in the right breast for the past month. She denies pain, nipple drainage and skin discoloration. She denies a family history of colon cancer. She is not due for a mammogram until September 2020.  Ultrasound today indicates multiple dilated ducts and intraductal papillomas.    The following portions of the patient's history were reviewed and updated as appropriate: allergies, current medications, past family history, past medical history, past social history, past surgical history and problem list.      Review of Systems   Constitutional: Negative for chills, fever and unexpected weight change.   HENT: Negative for hearing loss, trouble swallowing and voice change.    Eyes: Negative for visual disturbance.   Respiratory: Negative for apnea, cough, chest tightness, shortness of breath and wheezing.    Cardiovascular: Negative for chest pain, palpitations and leg swelling.   Gastrointestinal: Negative for abdominal distention, abdominal pain, anal bleeding, blood in stool, constipation, diarrhea, nausea, rectal pain and vomiting.   Endocrine: Negative for cold intolerance and heat intolerance.   Genitourinary: Negative for difficulty urinating, dysuria and flank pain.   Musculoskeletal: Negative for back pain and gait problem.   Skin: Negative for color change, rash and wound.   Neurological: Negative for dizziness, syncope, speech difficulty, weakness, light-headedness, numbness and headaches.   Hematological: Negative for adenopathy. Does not bruise/bleed easily.   Psychiatric/Behavioral: Negative for confusion. The patient is not nervous/anxious.        Allergies:  Allergies   Allergen Reactions   •  Atorvastatin Swelling   • Tetanus Toxoid Hives   • Ranexa [Ranolazine Er] Nausea And Vomiting       Medications:    Current Outpatient Medications:   •  acetaminophen (TYLENOL) 500 MG tablet, Take 500 mg by mouth Every 6 (Six) Hours As Needed., Disp: , Rfl:   •  albuterol sulfate  (90 Base) MCG/ACT inhaler, Inhale 2 puffs by mouth every 4 (Four) hours, Disp: 8.5 g, Rfl: 3  •  aspirin 81 MG tablet, Take 81 mg by mouth Daily., Disp: , Rfl:   •  atorvastatin (LIPITOR) 20 MG tablet, Daily., Disp: , Rfl:   •  baclofen (LIORESAL) 10 MG tablet, Take 1 tablet by mouth 3 (three) times a day. (Patient taking differently: Take 10 mg by mouth 2 (Two) Times a Day.), Disp: 90 tablet, Rfl: 11  •  butalbital-acetaminophen-caffeine (FIORICET, ESGIC) -40 MG per tablet, Take 1 tablet by mouth Daily As Needed for headache, Disp: 30 tablet, Rfl: 2  •  cetirizine (zyrTEC) 10 MG tablet, Take 1 tablet by mouth As Needed., Disp: , Rfl:   •  clidinium-chlordiazePOXIDE (LIBRAX) 5-2.5 MG per capsule, Take 1 capsule by mouth 3 (Three) Times a Day., Disp: 90 capsule, Rfl: 5  •  clonazePAM (KlonoPIN) 0.5 MG tablet, As Needed., Disp: , Rfl:   •  Cream Base Liposomic (PCCA CUSTOM LIPO-MAX) cream, , Disp: , Rfl:   •  digoxin (LANOXIN) 250 MCG tablet, Take 1 tablet by mouth daily, Disp: 90 tablet, Rfl: 2  •  enoxaparin (LOVENOX) 80 MG/0.8ML solution syringe, Every 12 (Twelve) Hours., Disp: , Rfl:   •  fludrocortisone 0.1 MG tablet, Take 1 tablet by mouth Daily., Disp: 30 tablet, Rfl: 1  •  fludrocortisone 0.1 MG tablet, Take 1 tablet by mouth Daily., Disp: 30 tablet, Rfl: 11  •  furosemide (LASIX) 40 MG tablet, Take 1 tablet by mouth Daily. May have extra 1/2 to 1 tablet daily if needed for edema, Disp: 135 tablet, Rfl: 1  •  gabapentin (NEURONTIN) 800 MG tablet, Take 1/2 tablet by mouth 3 (Three) Times a Day. (Patient taking differently: Take 800 mg by mouth 2 (Two) Times a Day As Needed.), Disp: 60 tablet, Rfl: 5  •  glipizide  (GLUCOTROL XL) 10 MG 24 hr tablet, Take 2 tablets by mouth Daily., Disp: 60 tablet, Rfl: 5  •  glipizide (GLUCOTROL XL) 10 MG 24 hr tablet, Take 2 tablets by mouth Daily, Disp: 60 tablet, Rfl: 5  •  glucose blood (ONE TOUCH ULTRA TEST) test strip, Use to test blood glucose as directed 3 times a day, Disp: 100 each, Rfl: 5  •  glucose blood (OneTouch Verio) test strip, Use to test blood glucose 3 times a day as directed, Disp: 300 each, Rfl: 3  •  hepatitis A (HAVRIX) 1440 EL U/ML vaccine, Inject as directed per protocol and repeat in 6 months, Disp: 1 mL, Rfl: 1  •  HYDROcodone-acetaminophen (NORCO) 7.5-325 MG per tablet, Take 1 tablet by mouth 3 (Three) Times a Day., Disp: 90 tablet, Rfl: 0  •  hydrocortisone (ANUSOL-HC) 25 MG suppository, Insert 1 suppository rectally twice a day as needed (remove wrapper and moisten with water), Disp: 12 suppository, Rfl: 3  •  hydrocortisone (GRX HICORT 25) 25 MG suppository, Insert 1 Suppository rectally twice a day as needed (remove wrapper and moisten with water), Disp: 12 suppository, Rfl: 1  •  Insulin Glargine (BASAGLAR KWIKPEN) 100 UNIT/ML injection pen, Inject 40 units under the skin in the morning and 70 units in the evening at bedtime, Disp: 33 mL, Rfl: 5  •  Insulin Pen Needle (B-D UF III MINI PEN NEEDLES) 31G X 5 MM misc, Use to inject insulin twice a day as directed, Disp: 100 each, Rfl: 12  •  Insulin Syringe-Needle U-100 29G 0.5 ML misc, Inject 0.3 mL as directed Daily., Disp: , Rfl:   •  loperamide (IMODIUM) 2 MG capsule, Take 2 mg by mouth 4 (Four) Times a Day As Needed for Diarrhea., Disp: , Rfl:   •  losartan (COZAAR) 50 MG tablet, Daily., Disp: , Rfl:   •  meclizine (ANTIVERT) 25 MG tablet, Take 1 tablet by mouth 3 (Three) Times a Day as needed, Disp: 30 tablet, Rfl: 3  •  metoclopramide (REGLAN) 10 MG tablet, Take 1 tablet by mouth Daily As Needed for migraine., Disp: 30 tablet, Rfl: 5  •  metoprolol succinate XL (TOPROL-XL) 100 MG 24 hr tablet, Take 1  tablet by mouth Daily., Disp: 30 tablet, Rfl: 11  •  metroNIDAZOLE (METROCREAM) 0.75 % cream, Apply to the face once every night (Patient taking differently: Apply  topically to the appropriate area as directed As Needed.), Disp: 45 g, Rfl: 0  •  mometasone (ELOCON) 0.1 % cream, APPLY A THIN LAYER TO THE AFFECTED AREA(S) TOPICALLY ONCE DAILY, Disp: 45 g, Rfl: 3  •  nitroglycerin (NITROSTAT) 0.4 MG SL tablet, Place 1 tablet under the tongue Every 5 Minutes As Needed for Chest Pain for up to 3 total doses. Call 911 if pain remains after 1 dose., Disp: 25 tablet, Rfl: 6  •  pancrelipase, Lip-Prot-Amyl, (PANCREAZE) 25849 UNITS capsule delayed-release particles capsule, Take 1 capsule by mouth with each meal and each snack (Patient taking differently: As Needed.), Disp: 100 capsule, Rfl: 11  •  pancrelipase, Lip-Prot-Amyl, (PANCREAZE) 72871 units capsule delayed-release particles capsule, Take 1 capsule with each meal and with each snack, Disp: 100 capsule, Rfl: 11  •  polyethylene glycol (MIRALAX) packet, Take 17 g by mouth As Needed., Disp: , Rfl:   •  potassium chloride (KLOR-CON) 10 MEQ CR tablet, Take 1 tablet by mouth Daily as directed (Patient taking differently: Take 10 mEq by mouth As Needed.), Disp: 90 tablet, Rfl: 2  •  promethazine (PHENERGAN) 25 MG tablet, Take 1 tablet by mouth Every 6 (Six) Hours As Needed for Nausea or Vomiting, Disp: 12 tablet, Rfl: 0  •  promethazine (PHENERGAN) 25 MG tablet, Take 1 tablet by mouth 4 (Four) Times a Day as needed for nausea, Disp: 30 tablet, Rfl: 3  •  RABEprazole (ACIPHEX) 20 MG EC tablet, Take 1 tablet by mouth Daily., Disp: 30 tablet, Rfl: 11  •  rosuvastatin (CRESTOR) 10 MG tablet, Take 1 tablet by mouth once daily, Disp: 30 tablet, Rfl: 11  •  SITagliptin (JANUVIA) 100 MG tablet, Take 1 tablet by mouth Daily., Disp: 30 tablet, Rfl: 5  •  topiramate (TOPAMAX) 25 MG tablet, Take 1 tablet by mouth Daily., Disp: 30 tablet, Rfl: 11  •  venlafaxine XR (EFFEXOR-XR) 75 MG  24 hr capsule, Take 1 capsule by mouth Daily., Disp: 30 capsule, Rfl: 11    History:  Past Medical History:   Diagnosis Date   • Anxiety    • Arm pain    • Arthritis    • Breast injury     fell 6/2019    • Chronic pancreatitis (CMS/HCC)    • Depression    • Diabetes mellitus (CMS/HCC)    • Hyperlipidemia    • Hypertension    • Neck pain on left side    • Palpitations 4/18/2018   • Pancreatitis    • Pulmonary embolism (CMS/HCC)    • Rectal cancer (CMS/HCC)    • Stroke syndrome 9/14/2016    · Assessed By: Devaughn Lewis (Neurology); Last Assessed: 16 Jun 2015  Right cerebrovascular accident in July or August 2010, evaluated at [facility].  Admission to [facility] on 09/28/2010 with headache and stuttering, consistent with TIA.  Chronic Coumadin therapy, initiated following bilateral pulmonary emboli in 2007 after fall and hip replacement.  She was already on 81 mg of aspirin as well, 09/2010.  MRI of the brain on 09/28/2010 revealing old right parietal cerebrovascular accident.  MRA revealing normal carotids, middle anterior and posterior cerebral arteries with minimal ostial stenosis of both vertebral arteries.  GENARO by Dr. Oliver Mobley on 09/28/2010:  patent foramen ovale with a small amount of right to left shunting.  Normal LVEF and normal valvular structures.   Patent foramen ovale closure using a 25 mm. Amplatzer cribriform occluder, 10/05/2010.   Echocardiogram, 06/23/2014:  LVEF (55% to 60%) with and Amplatzer device noted to be well-seated in    • Thrombocytopenia (CMS/HCC)    • Transient cerebral ischemia        Past Surgical History:   Procedure Laterality Date   • APPENDECTOMY     • BREAST BIOPSY Left 2012    benign   • BREAST EXCISIONAL BIOPSY Left     benign   • BREAST EXCISIONAL BIOPSY Right     benign   • CHOLECYSTECTOMY     • HYSTERECTOMY     • OOPHORECTOMY     • RECTAL SURGERY     • TONSILLECTOMY     • TOTAL HIP ARTHROPLASTY REVISION         Family History  "  Problem Relation Age of Onset   • Alzheimer's disease Mother    • Cancer Mother    • Coronary artery disease Other    • Diabetes Other    • Hypertension Other    • Stroke Other    • Cancer Other    • Dementia Other    • Heart attack Father    • Breast cancer Neg Hx    • Ovarian cancer Neg Hx        Social History     Tobacco Use   • Smoking status: Never Smoker   • Smokeless tobacco: Never Used   Substance Use Topics   • Alcohol use: No   • Drug use: No        OBJECTIVE:    Vital Signs:   Vitals:    07/08/20 1014   BP: 138/82   Pulse: 101   Temp: 97.8 °F (36.6 °C)   SpO2: 97%   Weight: 79.4 kg (175 lb)   Height: 172.7 cm (67.99\")       Physical Exam:   General Appearance:    Alert, cooperative, in no acute distress   Head:    Normocephalic, without obvious abnormality, atraumatic   Eyes:            Lids and lashes normal, conjunctivae and sclerae normal, no   icterus, no pallor, corneas clear, PERRLA   Ears:    Ears appear intact with no abnormalities noted   Throat:   No oral lesions, no thrush, oral mucosa moist   Neck:   No adenopathy, supple, trachea midline, no thyromegaly, no   carotid bruit, no JVD   Lungs:     Clear to auscultation,respirations regular, even and                  unlabored    Heart:    Regular rhythm and normal rate, normal S1 and S2, no            murmur, no gallop, no rub, no click  Breast- right breast mass at the 11 o'clock position behind the area [place], 1 cm in size and firm.  Mobile and appears fixed.   Chest Wall:    No abnormalities observed   Abdomen:     Normal bowel sounds, no masses, no organomegaly, soft        non-tender, non-distended, no guarding, no rebound                tenderness   Extremities:   Moves all extremities well, no edema, no cyanosis, no             redness   Pulses:   Pulses palpable and equal bilaterally   Skin:   No bleeding, bruising or rash   Lymph nodes:   No palpable adenopathy   Neurologic:   Cranial nerves 2 - 12 grossly intact, sensation intact, DTR  "      present and equal bilaterally     Results Review:   I reviewed the patient's new clinical results.    Review of Systems was reviewed and confirmed as accurate as documented by the MA.    ASSESSMENT/PLAN:    1. Breast mass    2. Other abnormal and inconclusive findings on diagnostic imaging of breast         Patient scheduled for right breast excisional biopsy to remove the area of intraductal papillomas and masses.  Risk of bleeding infection discussed and patient agreeable.    I discussed the patients findings and my recommendations with patient        Electronically signed by Benji Martinez MD  07/08/20

## 2020-07-07 NOTE — PROGRESS NOTES
Patient: Leela Muller    YOB: 1950    Date: 07/08/2020    Primary Care Provider: Connor Ritter MD    No chief complaint on file.      History of present illness:  I saw the patient in the office today as a followup from their recent laparoscopic cholecystectomy.  They state that they have done well and are having no complaints.    Vital Signs:   There were no vitals filed for this visit.    Physical Exam:   General Appearance:    Alert, cooperative, in no acute distress   Abdomen:     no masses, no organomegaly, soft non-tender, non-distended, no guarding, wounds are well healed     Assessment / Plan :    No diagnosis found.    I did discuss the situation with the patient today in the office and they have done well from their recent laparoscopic cholecystectomy.  I have released the patient back to normal activity, they understand that they need to be careful about heavy lifting.  I need to see the patient back in the office only if they are having further problems, they know to call me if they are.    Electronically signed by Jyoti Matthews MA  07/07/20

## 2020-07-08 ENCOUNTER — RESULTS ENCOUNTER (OUTPATIENT)
Dept: SURGERY | Facility: CLINIC | Age: 70
End: 2020-07-08

## 2020-07-08 ENCOUNTER — OFFICE VISIT (OUTPATIENT)
Dept: SURGERY | Facility: CLINIC | Age: 70
End: 2020-07-08

## 2020-07-08 VITALS
HEART RATE: 101 BPM | SYSTOLIC BLOOD PRESSURE: 138 MMHG | TEMPERATURE: 97.8 F | WEIGHT: 175 LBS | HEIGHT: 68 IN | BODY MASS INDEX: 26.52 KG/M2 | DIASTOLIC BLOOD PRESSURE: 82 MMHG | OXYGEN SATURATION: 97 %

## 2020-07-08 DIAGNOSIS — R92.8 OTHER ABNORMAL AND INCONCLUSIVE FINDINGS ON DIAGNOSTIC IMAGING OF BREAST: ICD-10-CM

## 2020-07-08 DIAGNOSIS — Z01.818 PRE-OP TESTING: Primary | ICD-10-CM

## 2020-07-08 DIAGNOSIS — N63.0 BREAST MASS: Primary | ICD-10-CM

## 2020-07-08 DIAGNOSIS — Z01.818 PRE-OP TESTING: ICD-10-CM

## 2020-07-08 PROCEDURE — 99213 OFFICE O/P EST LOW 20 MIN: CPT | Performed by: SURGERY

## 2020-07-10 ENCOUNTER — LAB (OUTPATIENT)
Dept: LAB | Facility: HOSPITAL | Age: 70
End: 2020-07-10

## 2020-07-10 PROCEDURE — U0002 COVID-19 LAB TEST NON-CDC: HCPCS | Performed by: SURGERY

## 2020-07-10 PROCEDURE — C9803 HOPD COVID-19 SPEC COLLECT: HCPCS | Performed by: SURGERY

## 2020-07-10 PROCEDURE — U0004 COV-19 TEST NON-CDC HGH THRU: HCPCS | Performed by: SURGERY

## 2020-07-11 LAB
REF LAB TEST METHOD: NORMAL
SARS-COV-2 RNA RESP QL NAA+PROBE: NOT DETECTED

## 2020-07-14 ENCOUNTER — OUTSIDE FACILITY SERVICE (OUTPATIENT)
Dept: SURGERY | Facility: CLINIC | Age: 70
End: 2020-07-14

## 2020-07-14 PROCEDURE — 19120 REMOVAL OF BREAST LESION: CPT | Performed by: SURGERY

## 2020-07-17 ENCOUNTER — TELEPHONE (OUTPATIENT)
Dept: SURGERY | Facility: CLINIC | Age: 70
End: 2020-07-17

## 2020-07-17 NOTE — TELEPHONE ENCOUNTER
"Pt is s/p breast biopsy (excisional) done on 07/14/2020.  Pt called the office, she stated \"I have had a fever and diarrhea for the last 2 days, my fever has been up to 100.0.\"  Pt denies redness, warmth and.or drainage at her incision.  I asked her if she wanted to be seen in the office today, she stated \"no I will just see him at my regular appt.\"  "

## 2020-07-21 NOTE — PROGRESS NOTES
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 Patient complains of soreness.     The following portions of the patient's history were reviewed and updated as appropriate: allergies, current medications, past family history, past medical history, past social history, past surgical history and problem list.    Review of Systems   Constitutional: Negative for chills, fever and unexpected weight change.   HENT: Negative for hearing loss, trouble swallowing and voice change.    Eyes: Negative for visual disturbance.   Respiratory: Negative for apnea, cough, chest tightness, shortness of breath and wheezing.    Cardiovascular: Negative for chest pain, palpitations and leg swelling.   Gastrointestinal: Negative for abdominal distention, abdominal pain, anal bleeding, blood in stool, constipation, diarrhea, nausea, rectal pain and vomiting.   Endocrine: Negative for cold intolerance and heat intolerance.   Genitourinary: Negative for difficulty urinating, dysuria and flank pain.   Musculoskeletal: Negative for back pain and gait problem.   Skin: Negative for color change, rash and wound.   Neurological: Negative for dizziness, syncope, speech difficulty, weakness, light-headedness, numbness and headaches.   Hematological: Negative for adenopathy. Does not bruise/bleed easily.   Psychiatric/Behavioral: Negative for confusion. The patient is not nervous/anxious.        Vital Signs  Vitals:    07/22/20 1045   BP: 140/80   Pulse: 91   Temp: 98 °F (36.7 °C)   TempSrc: Temporal   SpO2: 98%   Weight: 79.4 kg (175 lb)   Height: 172.7 cm (68\")       Allergies:  Allergies   Allergen Reactions   • Atorvastatin Swelling   • Tetanus " Toxoid Hives   • Ranexa [Ranolazine Er] Nausea And Vomiting       Medications:    Current Outpatient Medications:   •  acetaminophen (TYLENOL) 500 MG tablet, Take 500 mg by mouth Every 6 (Six) Hours As Needed., Disp: , Rfl:   •  albuterol sulfate  (90 Base) MCG/ACT inhaler, Inhale 2 puffs by mouth every 4 (Four) hours, Disp: 8.5 g, Rfl: 3  •  aspirin 81 MG tablet, Take 81 mg by mouth Daily., Disp: , Rfl:   •  atorvastatin (LIPITOR) 20 MG tablet, Daily., Disp: , Rfl:   •  baclofen (LIORESAL) 10 MG tablet, Take 1 tablet by mouth 3 (three) times a day. (Patient taking differently: Take 10 mg by mouth 2 (Two) Times a Day.), Disp: 90 tablet, Rfl: 11  •  butalbital-acetaminophen-caffeine (FIORICET, ESGIC) -40 MG per tablet, Take 1 tablet by mouth Daily As Needed for headache, Disp: 30 tablet, Rfl: 2  •  cetirizine (zyrTEC) 10 MG tablet, Take 1 tablet by mouth As Needed., Disp: , Rfl:   •  clidinium-chlordiazePOXIDE (LIBRAX) 5-2.5 MG per capsule, Take 1 capsule by mouth 3 (Three) Times a Day., Disp: 90 capsule, Rfl: 5  •  clonazePAM (KlonoPIN) 0.5 MG tablet, As Needed., Disp: , Rfl:   •  Cream Base Liposomic (PCCA CUSTOM LIPO-MAX) cream, , Disp: , Rfl:   •  digoxin (LANOXIN) 250 MCG tablet, Take 1 tablet by mouth daily, Disp: 90 tablet, Rfl: 2  •  enoxaparin (LOVENOX) 80 MG/0.8ML solution syringe, Every 12 (Twelve) Hours., Disp: , Rfl:   •  fludrocortisone 0.1 MG tablet, Take 1 tablet by mouth Daily., Disp: 30 tablet, Rfl: 1  •  fludrocortisone 0.1 MG tablet, Take 1 tablet by mouth Daily., Disp: 30 tablet, Rfl: 11  •  furosemide (LASIX) 40 MG tablet, Take 1 tablet by mouth Daily. May have extra 1/2 to 1 tablet daily if needed for edema, Disp: 135 tablet, Rfl: 1  •  gabapentin (NEURONTIN) 800 MG tablet, Take 1/2 tablet by mouth 3 (Three) Times a Day. (Patient taking differently: Take 800 mg by mouth 2 (Two) Times a Day As Needed.), Disp: 60 tablet, Rfl: 5  •  glipizide (GLUCOTROL XL) 10 MG 24 hr tablet, Take 2  tablets by mouth Daily., Disp: 60 tablet, Rfl: 5  •  glipizide (GLUCOTROL XL) 10 MG 24 hr tablet, Take 2 tablets by mouth Daily, Disp: 60 tablet, Rfl: 5  •  glucose blood (ONE TOUCH ULTRA TEST) test strip, Use to test blood glucose as directed 3 times a day, Disp: 100 each, Rfl: 5  •  glucose blood (OneTouch Verio) test strip, Use to test blood glucose 3 times a day as directed, Disp: 300 each, Rfl: 3  •  hydrocortisone (ANUSOL-HC) 25 MG suppository, Insert 1 suppository rectally twice a day as needed (remove wrapper and moisten with water), Disp: 12 suppository, Rfl: 3  •  hydrocortisone (GRX HICORT 25) 25 MG suppository, Insert 1 Suppository rectally twice a day as needed (remove wrapper and moisten with water), Disp: 12 suppository, Rfl: 1  •  Insulin Glargine (BASAGLAR KWIKPEN) 100 UNIT/ML injection pen, Inject 40 units under the skin in the morning and 70 units in the evening at bedtime, Disp: 33 mL, Rfl: 5  •  Insulin Pen Needle (B-D UF III MINI PEN NEEDLES) 31G X 5 MM misc, Use to inject insulin twice a day as directed, Disp: 100 each, Rfl: 12  •  Insulin Syringe-Needle U-100 29G 0.5 ML misc, Inject 0.3 mL as directed Daily., Disp: , Rfl:   •  loperamide (IMODIUM) 2 MG capsule, Take 2 mg by mouth 4 (Four) Times a Day As Needed for Diarrhea., Disp: , Rfl:   •  losartan (COZAAR) 50 MG tablet, Daily., Disp: , Rfl:   •  meclizine (ANTIVERT) 25 MG tablet, Take 1 tablet by mouth 3 (Three) Times a Day as needed, Disp: 30 tablet, Rfl: 3  •  metoclopramide (REGLAN) 10 MG tablet, Take 1 tablet by mouth Daily As Needed for migraine., Disp: 30 tablet, Rfl: 5  •  metoprolol succinate XL (TOPROL-XL) 100 MG 24 hr tablet, Take 1 tablet by mouth Daily., Disp: 30 tablet, Rfl: 11  •  metroNIDAZOLE (METROCREAM) 0.75 % cream, Apply to the face once every night (Patient taking differently: Apply  topically to the appropriate area as directed As Needed.), Disp: 45 g, Rfl: 0  •  mometasone (ELOCON) 0.1 % cream, APPLY A THIN LAYER TO  THE AFFECTED AREA(S) TOPICALLY ONCE DAILY, Disp: 45 g, Rfl: 3  •  nitroglycerin (NITROSTAT) 0.4 MG SL tablet, Place 1 tablet under the tongue Every 5 Minutes As Needed for Chest Pain for up to 3 total doses. Call 911 if pain remains after 1 dose., Disp: 25 tablet, Rfl: 6  •  pancrelipase, Lip-Prot-Amyl, (PANCREAZE) 48880 UNITS capsule delayed-release particles capsule, Take 1 capsule by mouth with each meal and each snack (Patient taking differently: As Needed.), Disp: 100 capsule, Rfl: 11  •  pancrelipase, Lip-Prot-Amyl, (PANCREAZE) 14525 units capsule delayed-release particles capsule, Take 1 capsule with each meal and with each snack, Disp: 100 capsule, Rfl: 11  •  polyethylene glycol (MIRALAX) packet, Take 17 g by mouth As Needed., Disp: , Rfl:   •  potassium chloride (KLOR-CON) 10 MEQ CR tablet, Take 1 tablet by mouth Daily as directed (Patient taking differently: Take 10 mEq by mouth As Needed.), Disp: 90 tablet, Rfl: 2  •  promethazine (PHENERGAN) 25 MG tablet, Take 1 tablet by mouth Every 6 (Six) Hours As Needed for Nausea or Vomiting, Disp: 12 tablet, Rfl: 0  •  promethazine (PHENERGAN) 25 MG tablet, Take 1 tablet by mouth 4 (Four) Times a Day as needed for nausea, Disp: 30 tablet, Rfl: 3  •  RABEprazole (ACIPHEX) 20 MG EC tablet, Take 1 tablet by mouth Daily., Disp: 30 tablet, Rfl: 11  •  rosuvastatin (CRESTOR) 10 MG tablet, Take 1 tablet by mouth once daily, Disp: 30 tablet, Rfl: 11  •  SITagliptin (JANUVIA) 100 MG tablet, Take 1 tablet by mouth Daily., Disp: 30 tablet, Rfl: 5  •  topiramate (TOPAMAX) 25 MG tablet, Take 1 tablet by mouth Daily., Disp: 30 tablet, Rfl: 11  •  venlafaxine XR (EFFEXOR-XR) 75 MG 24 hr capsule, Take 1 capsule by mouth Daily., Disp: 30 capsule, Rfl: 11  •  hepatitis A (HAVRIX) 1440 EL U/ML vaccine, Inject as directed per protocol and repeat in 6 months, Disp: 1 mL, Rfl: 1  •  HYDROcodone-acetaminophen (NORCO) 7.5-325 MG per tablet, Take 1 tablet by mouth 3 (Three) Times a Day.,  Disp: 90 tablet, Rfl: 0    Physical Exam:   General Appearance:    Alert, cooperative, in no acute distress   Head:    Normocephalic, without obvious abnormality, atraumatic   Lungs:     Clear to auscultation,respirations regular, even and                  unlabored    Heart:    Regular rhythm and normal rate, normal S1 and S2, no            murmur, no gallop, no rub, no click  Breast- wound looks good, no redness or drainage.   Abdomen:     Normal bowel sounds, no masses, no organomegaly, soft        non-tender, non-distended, no guarding, no rebound                tenderness   Extremities:   Moves all extremities well, no edema, no cyanosis, no             redness   Pulses:   Pulses palpable and equal bilaterally   Skin:   No bleeding, bruising or rash     Results Review:   I reviewed the patient's new clinical results.     Review of Systems was reviewed and confirmed as accurate as documented by the MA.    ASSESSMENT/PLAN:    1. Postoperative visit       Patient reassured, follow-up in 6 months for ultrasound.  Electronically signed by Benji Martinez MD  07/22/20

## 2020-07-22 ENCOUNTER — OFFICE VISIT (OUTPATIENT)
Dept: SURGERY | Facility: CLINIC | Age: 70
End: 2020-07-22

## 2020-07-22 VITALS
DIASTOLIC BLOOD PRESSURE: 80 MMHG | BODY MASS INDEX: 26.52 KG/M2 | WEIGHT: 175 LBS | SYSTOLIC BLOOD PRESSURE: 140 MMHG | HEIGHT: 68 IN | HEART RATE: 91 BPM | OXYGEN SATURATION: 98 % | TEMPERATURE: 98 F

## 2020-07-22 DIAGNOSIS — Z48.89 POSTOPERATIVE VISIT: Primary | ICD-10-CM

## 2020-07-22 PROCEDURE — 99024 POSTOP FOLLOW-UP VISIT: CPT | Performed by: SURGERY

## 2020-08-11 DIAGNOSIS — M54.2 NECK PAIN: ICD-10-CM

## 2020-08-11 DIAGNOSIS — G43.009 MIGRAINE WITHOUT AURA AND WITHOUT STATUS MIGRAINOSUS, NOT INTRACTABLE: ICD-10-CM

## 2020-08-11 RX ORDER — BUTALBITAL, ACETAMINOPHEN AND CAFFEINE 50; 325; 40 MG/1; MG/1; MG/1
1 TABLET ORAL DAILY PRN
Qty: 30 TABLET | Refills: 2 | Status: SHIPPED | OUTPATIENT
Start: 2020-08-11 | End: 2020-09-01 | Stop reason: SDUPTHER

## 2020-08-11 RX ORDER — INSULIN GLARGINE 100 [IU]/ML
INJECTION, SOLUTION SUBCUTANEOUS
Qty: 33 ML | Refills: 5 | Status: CANCELLED | OUTPATIENT
Start: 2020-08-11

## 2020-08-11 RX ORDER — GABAPENTIN 800 MG/1
400 TABLET ORAL 3 TIMES DAILY
Qty: 60 TABLET | Refills: 5 | Status: SHIPPED | OUTPATIENT
Start: 2020-08-11 | End: 2020-09-01 | Stop reason: SDUPTHER

## 2020-08-28 ENCOUNTER — OFFICE VISIT (OUTPATIENT)
Dept: SURGERY | Facility: CLINIC | Age: 70
End: 2020-08-28

## 2020-08-28 VITALS
WEIGHT: 175.04 LBS | SYSTOLIC BLOOD PRESSURE: 142 MMHG | DIASTOLIC BLOOD PRESSURE: 84 MMHG | TEMPERATURE: 98.2 F | HEIGHT: 68 IN | HEART RATE: 86 BPM | OXYGEN SATURATION: 98 % | BODY MASS INDEX: 26.53 KG/M2

## 2020-08-28 DIAGNOSIS — N63.0 BREAST MASS: Primary | ICD-10-CM

## 2020-08-28 PROCEDURE — 99212 OFFICE O/P EST SF 10 MIN: CPT | Performed by: SURGERY

## 2020-08-28 NOTE — PROGRESS NOTES
Patient: Leela Muller  YOB: 1950    Date: 08/28/2020    Primary Care Provider: Connro Ritter MD    Chief Complaint   Patient presents with   • Follow-up       History: Patient is here for an evaluation and follow up of am incisional breast biopsy performed on 07/16/20. Patient complains of right nipple pain. She states the incision has opened.  Wound is closed today, there is a mass is concerned about behind the nipple in the right breast.  No further drainage.  Pain is mild about 2 out of 10.    The following portions of the patient's history were reviewed and updated as appropriate: allergies, current medications, past family history, past medical history, past social history, past surgical history and problem list.    Review of Systems   Constitutional: Negative for chills, fever and unexpected weight change.   HENT: Negative for hearing loss, trouble swallowing and voice change.    Eyes: Negative for visual disturbance.   Respiratory: Negative for apnea, cough, chest tightness, shortness of breath and wheezing.    Cardiovascular: Negative for chest pain, palpitations and leg swelling.   Gastrointestinal: Negative for abdominal distention, abdominal pain, anal bleeding, blood in stool, constipation, diarrhea, nausea, rectal pain and vomiting.   Endocrine: Negative for cold intolerance and heat intolerance.   Genitourinary: Negative for difficulty urinating, dysuria and flank pain.   Musculoskeletal: Negative for back pain and gait problem.   Skin: Negative for color change, rash and wound.   Neurological: Negative for dizziness, syncope, speech difficulty, weakness, light-headedness, numbness and headaches.   Hematological: Negative for adenopathy. Does not bruise/bleed easily.   Psychiatric/Behavioral: Negative for confusion. The patient is not nervous/anxious.        Vital Signs  Vitals:    08/28/20 1436   BP: 142/84   Pulse: 86   Temp: 98.2 °F (36.8 °C)   SpO2: 98%   Weight: 79.4 kg (175  "lb 0.7 oz)   Height: 172.7 cm (67.99\")       Allergies:  Allergies   Allergen Reactions   • Atorvastatin Swelling   • Tetanus Toxoid Hives   • Ranexa [Ranolazine Er] Nausea And Vomiting       Medications:    Current Outpatient Medications:   •  acetaminophen (TYLENOL) 500 MG tablet, Take 500 mg by mouth Every 6 (Six) Hours As Needed., Disp: , Rfl:   •  albuterol sulfate  (90 Base) MCG/ACT inhaler, Inhale 2 puffs by mouth every 4 (Four) hours, Disp: 8.5 g, Rfl: 3  •  albuterol sulfate  (90 Base) MCG/ACT inhaler, Inhale 2 puffs by mouth every 4 hours, Disp: 8.5 g, Rfl: 3  •  aspirin 81 MG tablet, Take 81 mg by mouth Daily., Disp: , Rfl:   •  atorvastatin (LIPITOR) 20 MG tablet, Daily., Disp: , Rfl:   •  baclofen (LIORESAL) 10 MG tablet, Take 1 tablet by mouth 3 (three) times a day. (Patient taking differently: Take 10 mg by mouth 2 (Two) Times a Day.), Disp: 90 tablet, Rfl: 11  •  butalbital-acetaminophen-caffeine (FIORICET, ESGIC) -40 MG per tablet, Take 1 tablet by mouth Daily As Needed for headache, Disp: 30 tablet, Rfl: 2  •  cetirizine (zyrTEC) 10 MG tablet, Take 1 tablet by mouth As Needed., Disp: , Rfl:   •  clidinium-chlordiazePOXIDE (LIBRAX) 5-2.5 MG per capsule, Take 1 capsule by mouth 3 (Three) Times a Day., Disp: 90 capsule, Rfl: 5  •  clonazePAM (KlonoPIN) 0.5 MG tablet, As Needed., Disp: , Rfl:   •  Cream Base Liposomic (PCCA CUSTOM LIPO-MAX) cream, , Disp: , Rfl:   •  digoxin (LANOXIN) 250 MCG tablet, Take 1 tablet by mouth daily, Disp: 90 tablet, Rfl: 2  •  enoxaparin (LOVENOX) 80 MG/0.8ML solution syringe, Every 12 (Twelve) Hours., Disp: , Rfl:   •  fluconazole (DIFLUCAN) 150 MG tablet, Take 1 tablet by mouth Daily., Disp: 3 tablet, Rfl: 0  •  fludrocortisone 0.1 MG tablet, Take 1 tablet by mouth Daily., Disp: 30 tablet, Rfl: 1  •  fludrocortisone 0.1 MG tablet, Take 1 tablet by mouth Daily., Disp: 30 tablet, Rfl: 11  •  furosemide (LASIX) 40 MG tablet, Take 1 tablet by mouth " Daily. May have extra 1/2 to 1 tablet daily if needed for edema, Disp: 135 tablet, Rfl: 1  •  gabapentin (NEURONTIN) 800 MG tablet, Take 1/2 tablet by mouth 3 (Three) Times a Day., Disp: 60 tablet, Rfl: 5  •  glipizide (GLUCOTROL XL) 10 MG 24 hr tablet, Take 2 tablets by mouth Daily., Disp: 60 tablet, Rfl: 5  •  glipizide (GLUCOTROL XL) 10 MG 24 hr tablet, Take 2 tablets by mouth Daily, Disp: 60 tablet, Rfl: 5  •  glucose blood (ONE TOUCH ULTRA TEST) test strip, Use to test blood glucose as directed 3 times a day, Disp: 100 each, Rfl: 5  •  glucose blood (OneTouch Verio) test strip, Use to test blood glucose 3 times a day as directed, Disp: 300 each, Rfl: 3  •  hepatitis A (HAVRIX) 1440 EL U/ML vaccine, Inject as directed per protocol and repeat in 6 months, Disp: 1 mL, Rfl: 1  •  HYDROcodone-acetaminophen (NORCO) 7.5-325 MG per tablet, Take 1 tablet by mouth 3 (Three) Times a Day., Disp: 90 tablet, Rfl: 0  •  hydrocortisone (ANUSOL-HC) 25 MG suppository, Insert 1 suppository rectally twice a day as needed (remove wrapper and moisten with water), Disp: 12 suppository, Rfl: 3  •  hydrocortisone (GRX HICORT 25) 25 MG suppository, Insert 1 Suppository rectally twice a day as needed (remove wrapper and moisten with water), Disp: 12 suppository, Rfl: 1  •  Insulin Glargine (BASAGLAR KWIKPEN) 100 UNIT/ML injection pen, Inject 40 units subcutaneously once in the morning and 70 units in the evening at bedtime, Disp: 30 mL, Rfl: 5  •  Insulin Pen Needle (B-D UF III MINI PEN NEEDLES) 31G X 5 MM misc, Use to inject insulin twice a day as directed, Disp: 100 each, Rfl: 12  •  Insulin Syringe-Needle U-100 29G 0.5 ML misc, Inject 0.3 mL as directed Daily., Disp: , Rfl:   •  loperamide (IMODIUM) 2 MG capsule, Take 2 mg by mouth 4 (Four) Times a Day As Needed for Diarrhea., Disp: , Rfl:   •  losartan (COZAAR) 50 MG tablet, Daily., Disp: , Rfl:   •  meclizine (ANTIVERT) 25 MG tablet, Take 1 tablet by mouth 3 (Three) Times a Day as  needed, Disp: 30 tablet, Rfl: 3  •  meclizine (ANTIVERT) 25 MG tablet, Take 1 tablet by mouth 3 (Three) Times a Day As Needed., Disp: 30 tablet, Rfl: 3  •  metoclopramide (REGLAN) 10 MG tablet, Take 1 tablet by mouth Daily As Needed for migraine., Disp: 30 tablet, Rfl: 5  •  metoprolol succinate XL (TOPROL-XL) 100 MG 24 hr tablet, Take 1 tablet by mouth Daily., Disp: 30 tablet, Rfl: 11  •  metroNIDAZOLE (METROCREAM) 0.75 % cream, Apply to the face once every night (Patient taking differently: Apply  topically to the appropriate area as directed As Needed.), Disp: 45 g, Rfl: 0  •  mometasone (ELOCON) 0.1 % cream, APPLY A THIN LAYER TO THE AFFECTED AREA(S) TOPICALLY ONCE DAILY, Disp: 45 g, Rfl: 3  •  neomycin-polymyxin-hydrocortisone (CORTISPORIN) 3.5-15435-7 otic suspension, INSTILL 4 DROPS INTO AFFECTED EAR(S) 3 (THREE) TIMES PER DAY, Disp: 10 mL, Rfl: 0  •  nitroglycerin (NITROSTAT) 0.4 MG SL tablet, Place 1 tablet under the tongue Every 5 Minutes As Needed for Chest Pain for up to 3 total doses. Call 911 if pain remains after 1 dose., Disp: 25 tablet, Rfl: 6  •  pancrelipase, Lip-Prot-Amyl, (PANCREAZE) 20438 UNITS capsule delayed-release particles capsule, Take 1 capsule by mouth with each meal and each snack (Patient taking differently: As Needed.), Disp: 100 capsule, Rfl: 11  •  pancrelipase, Lip-Prot-Amyl, (PANCREAZE) 64040 units capsule delayed-release particles capsule, Take 1 capsule with each meal and with each snack, Disp: 100 capsule, Rfl: 11  •  polyethylene glycol (MIRALAX) packet, Take 17 g by mouth As Needed., Disp: , Rfl:   •  potassium chloride (KLOR-CON) 10 MEQ CR tablet, Take 1 tablet by mouth Daily as directed (Patient taking differently: Take 10 mEq by mouth As Needed.), Disp: 90 tablet, Rfl: 2  •  promethazine (PHENERGAN) 25 MG tablet, Take 1 tablet by mouth Every 6 (Six) Hours As Needed for Nausea or Vomiting, Disp: 12 tablet, Rfl: 0  •  promethazine (PHENERGAN) 25 MG tablet, Take 1 tablet by  mouth 4 (Four) Times a Day as needed for nausea, Disp: 30 tablet, Rfl: 3  •  RABEprazole (ACIPHEX) 20 MG EC tablet, Take 1 tablet by mouth Daily., Disp: 30 tablet, Rfl: 11  •  rosuvastatin (CRESTOR) 10 MG tablet, Take 1 tablet by mouth once daily, Disp: 30 tablet, Rfl: 11  •  SITagliptin (JANUVIA) 100 MG tablet, Take 1 tablet by mouth Daily., Disp: 30 tablet, Rfl: 5  •  topiramate (TOPAMAX) 25 MG tablet, Take 1 tablet by mouth Daily., Disp: 30 tablet, Rfl: 11  •  venlafaxine XR (EFFEXOR-XR) 75 MG 24 hr capsule, Take 1 capsule by mouth Daily., Disp: 30 capsule, Rfl: 11    Physical Exam:   General Appearance:    Alert, cooperative, in no acute distress   Head:    Normocephalic, without obvious abnormality, atraumatic   Lungs:     Clear to auscultation,respirations regular, even and                  unlabored    Heart:    Regular rhythm and normal rate, normal S1 and S2, no            murmur, no gallop, no rub, no click  Breast- 2 cm mass behind the right nipple, appears to be an area of previous scar.   Abdomen:     Normal bowel sounds, no masses, no organomegaly, soft        non-tender, non-distended, no guarding, no rebound                tenderness   Extremities:   Moves all extremities well, no edema, no cyanosis, no             redness   Pulses:   Pulses palpable and equal bilaterally   Skin:   No bleeding, bruising or rash     Results Review:   I reviewed the patient's new clinical results.     Review of Systems was reviewed and confirmed as accurate as documented by the MA.    ASSESSMENT/PLAN:    1. Breast mass       Scar tissue right breast with underlying mass.  Nothing suspicious by examination.  Recommend ultrasound in 6 months to establish a baseline.  Patient reassured and had no further questions.  Electronically signed by Benji Martinez MD  08/28/20    Portions of this note has been scribed for Benji Martinez MD by Jelly Mcgregor. 8/28/2020  14:56

## 2020-09-01 ENCOUNTER — OFFICE VISIT (OUTPATIENT)
Dept: NEUROLOGY | Facility: CLINIC | Age: 70
End: 2020-09-01

## 2020-09-01 VITALS
DIASTOLIC BLOOD PRESSURE: 70 MMHG | WEIGHT: 168 LBS | HEART RATE: 83 BPM | OXYGEN SATURATION: 98 % | BODY MASS INDEX: 26.37 KG/M2 | TEMPERATURE: 98.4 F | HEIGHT: 67 IN | SYSTOLIC BLOOD PRESSURE: 120 MMHG

## 2020-09-01 DIAGNOSIS — M54.2 NECK PAIN: ICD-10-CM

## 2020-09-01 DIAGNOSIS — G43.009 MIGRAINE WITHOUT AURA AND WITHOUT STATUS MIGRAINOSUS, NOT INTRACTABLE: ICD-10-CM

## 2020-09-01 PROCEDURE — 99213 OFFICE O/P EST LOW 20 MIN: CPT | Performed by: PSYCHIATRY & NEUROLOGY

## 2020-09-01 RX ORDER — GABAPENTIN 800 MG/1
400 TABLET ORAL 3 TIMES DAILY
Qty: 60 TABLET | Refills: 5 | Status: SHIPPED | OUTPATIENT
Start: 2020-09-01 | End: 2021-03-09 | Stop reason: SDUPTHER

## 2020-09-01 RX ORDER — BUTALBITAL, ACETAMINOPHEN AND CAFFEINE 50; 325; 40 MG/1; MG/1; MG/1
1 TABLET ORAL DAILY PRN
Qty: 30 TABLET | Refills: 2 | Status: SHIPPED | OUTPATIENT
Start: 2020-09-01 | End: 2021-03-09 | Stop reason: SDUPTHER

## 2020-09-01 RX ORDER — TOPIRAMATE 25 MG/1
25 TABLET ORAL DAILY
Qty: 30 TABLET | Refills: 11 | Status: SHIPPED | OUTPATIENT
Start: 2020-09-01 | End: 2021-08-11 | Stop reason: SDUPTHER

## 2020-09-01 NOTE — PROGRESS NOTES
Subjective:     Patient ID: Leela Muller is a 70 y.o. female.    CC: headaches    HPI:   History of Present Illness  The following portions of the patient's history were reviewed and updated as appropriate: allergies, current medications, past family history, past medical history, past social history, past surgical history and problem list.     Patient reports neck soreness, better with massager, sensation of knots behind her knees. She has had recent breast surgery for a breast mass, reports that it was not cancer. Denies any syncope.    Past Medical History:   Diagnosis Date   • Anxiety    • Arm pain    • Arthritis    • Breast injury     fell 6/2019    • Chronic pancreatitis (CMS/HCC)    • Depression    • Diabetes mellitus (CMS/HCC)    • Hyperlipidemia    • Hypertension    • Neck pain on left side    • Palpitations 4/18/2018   • Pancreatitis    • Pulmonary embolism (CMS/HCC)    • Rectal cancer (CMS/HCC)    • Stroke syndrome 9/14/2016    · Assessed By: Devaughn Lewis (Neurology); Last Assessed: 16 Jun 2015  Right cerebrovascular accident in July or August 2010, evaluated at [facility].  Admission to [facility] on 09/28/2010 with headache and stuttering, consistent with TIA.  Chronic Coumadin therapy, initiated following bilateral pulmonary emboli in 2007 after fall and hip replacement.  She was already on 81 mg of aspirin as well, 09/2010.  MRI of the brain on 09/28/2010 revealing old right parietal cerebrovascular accident.  MRA revealing normal carotids, middle anterior and posterior cerebral arteries with minimal ostial stenosis of both vertebral arteries.  GENARO by Dr. Oliver Mobley on 09/28/2010:  patent foramen ovale with a small amount of right to left shunting.  Normal LVEF and normal valvular structures.   Patent foramen ovale closure using a 25 mm. Amplatzer cribriform occluder, 10/05/2010.   Echocardiogram, 06/23/2014:  LVEF (55% to 60%) with and Amplatzer device noted  to be well-seated in    • Thrombocytopenia (CMS/HCC)    • Transient cerebral ischemia        Past Surgical History:   Procedure Laterality Date   • APPENDECTOMY     • BREAST BIOPSY Left 2012    benign   • BREAST BIOPSY     • BREAST EXCISIONAL BIOPSY Left     benign   • BREAST EXCISIONAL BIOPSY Right     benign   • BREAST SURGERY     • CHOLECYSTECTOMY     • HYSTERECTOMY     • OOPHORECTOMY     • RECTAL SURGERY     • TONSILLECTOMY     • TOTAL HIP ARTHROPLASTY REVISION         Social History     Socioeconomic History   • Marital status:      Spouse name: Not on file   • Number of children: Not on file   • Years of education: Not on file   • Highest education level: Not on file   Tobacco Use   • Smoking status: Never Smoker   • Smokeless tobacco: Never Used   Substance and Sexual Activity   • Alcohol use: No   • Drug use: No   • Sexual activity: Defer       Family History   Problem Relation Age of Onset   • Alzheimer's disease Mother    • Cancer Mother    • Coronary artery disease Other    • Diabetes Other    • Hypertension Other    • Stroke Other    • Cancer Other    • Dementia Other    • Heart attack Father    • Breast cancer Neg Hx    • Ovarian cancer Neg Hx         Review of Systems   Constitutional: Negative for chills, fatigue, fever and unexpected weight change.   HENT: Negative for ear pain, hearing loss, nosebleeds, rhinorrhea and sore throat.    Eyes: Negative for photophobia, pain, discharge, itching and visual disturbance.   Respiratory: Negative for cough, chest tightness, shortness of breath and wheezing.    Cardiovascular: Negative for chest pain, palpitations and leg swelling.   Gastrointestinal: Negative for abdominal pain, blood in stool, constipation, diarrhea, nausea and vomiting.   Genitourinary: Negative for dysuria, frequency, hematuria and urgency.   Musculoskeletal: Negative for arthralgias, back pain, gait problem, joint swelling, myalgias, neck pain and neck stiffness.   Skin: Negative  "for rash and wound.   Allergic/Immunologic: Negative for environmental allergies and food allergies.   Neurological: Negative for dizziness, tremors, seizures, syncope, speech difficulty, weakness, light-headedness, numbness and headaches.   Hematological: Negative for adenopathy. Does not bruise/bleed easily.   Psychiatric/Behavioral: Negative for agitation, confusion, decreased concentration, hallucinations, sleep disturbance and suicidal ideas. The patient is not nervous/anxious.    All other systems reviewed and are negative.       Objective:  /70   Pulse 83   Temp 98.4 °F (36.9 °C)   Ht 170.2 cm (67\")   Wt 76.2 kg (168 lb)   SpO2 98%   BMI 26.31 kg/m²     Neurologic Exam     Mental Status   Oriented to person, place, and time.       Physical Exam   Constitutional: She is oriented to person, place, and time. She appears well-developed and well-nourished.   Cardiovascular: Normal rate and regular rhythm.   Pulmonary/Chest: Effort normal.   Neurological: She is alert and oriented to person, place, and time. She has normal reflexes.   Psychiatric: She has a normal mood and affect. Her behavior is normal. Thought content normal.       Assessment/Plan:       Diagnoses and all orders for this visit:    Migraine without aura and without status migrainosus, not intractable  -     topiramate (TOPAMAX) 25 MG tablet; Take 1 tablet by mouth Daily.  -     butalbital-acetaminophen-caffeine (FIORICET, ESGIC) -40 MG per tablet; Take 1 tablet by mouth Daily As Needed for headache    Neck pain  -     gabapentin (NEURONTIN) 800 MG tablet; Take 1/2 tablet by mouth 3 (Three) Times a Day.    The patient has read and signed the [facility] Controlled Substance Contract.  I will continue to see patient for regular follow up appointments.  They are well controlled on their medication.  LORRI is updated every 3 months. The patient is aware of the potential for addiction and dependence.           Reviewed " medications, potential side effects and signs and symptoms to report. Discussed risk versus benefits of treatment plan with patient and/or family-including medications, labs and radiology that may be ordered. Addressed questions and concerns during visit. Patient and/or family verbalized understanding and agree with plan.    AS THE PROVIDER, I PERSONALLY WORE PPE DURING ENTIRE FACE TO FACE ENCOUNTER IN CLINIC WITH THE PATIENT. PATIENT ALSO WORE PPE DURING ENTIRE FACE TO FACE ENCOUNTER EXCEPT FOR A MAX OF 30 SECONDS DURING NEUROLOGICAL EVALUATION OF CRANIAL NERVES AND THEN MASK WAS PLACED BACK OVER PATIENT FACE FOR REMAINDER OF VISIT. I WASHED MY HANDS BEFORE AND AFTER VISIT.    Devaughn Lewis MD  9/1/2020

## 2020-09-08 ENCOUNTER — APPOINTMENT (OUTPATIENT)
Dept: OTHER | Facility: HOSPITAL | Age: 70
End: 2020-09-08

## 2020-09-08 ENCOUNTER — HOSPITAL ENCOUNTER (OUTPATIENT)
Dept: MAMMOGRAPHY | Facility: HOSPITAL | Age: 70
Discharge: HOME | End: 2020-09-08
Admitting: INTERNAL MEDICINE

## 2020-09-08 DIAGNOSIS — Z12.31 VISIT FOR SCREENING MAMMOGRAM: ICD-10-CM

## 2020-09-08 PROCEDURE — 77063 BREAST TOMOSYNTHESIS BI: CPT

## 2020-09-08 PROCEDURE — 77063 BREAST TOMOSYNTHESIS BI: CPT | Performed by: RADIOLOGY

## 2020-09-08 PROCEDURE — 77067 SCR MAMMO BI INCL CAD: CPT

## 2020-09-08 PROCEDURE — 77067 SCR MAMMO BI INCL CAD: CPT | Performed by: RADIOLOGY

## 2020-10-22 RX ORDER — DIGOXIN 250 MCG
TABLET ORAL
Qty: 90 TABLET | Refills: 0 | Status: SHIPPED | OUTPATIENT
Start: 2020-10-22 | End: 2021-01-12 | Stop reason: SDUPTHER

## 2020-10-26 ENCOUNTER — OFFICE VISIT (OUTPATIENT)
Dept: ENDOCRINOLOGY | Facility: CLINIC | Age: 70
End: 2020-10-26

## 2020-10-26 VITALS
DIASTOLIC BLOOD PRESSURE: 74 MMHG | OXYGEN SATURATION: 98 % | HEIGHT: 67 IN | SYSTOLIC BLOOD PRESSURE: 122 MMHG | TEMPERATURE: 97.3 F | HEART RATE: 80 BPM | WEIGHT: 172.2 LBS | BODY MASS INDEX: 27.03 KG/M2

## 2020-10-26 DIAGNOSIS — I10 ESSENTIAL HYPERTENSION: ICD-10-CM

## 2020-10-26 DIAGNOSIS — E78.2 MIXED HYPERLIPIDEMIA: ICD-10-CM

## 2020-10-26 DIAGNOSIS — E11.65 TYPE 2 DIABETES MELLITUS WITH HYPERGLYCEMIA, WITH LONG-TERM CURRENT USE OF INSULIN (HCC): Primary | ICD-10-CM

## 2020-10-26 DIAGNOSIS — Z79.4 TYPE 2 DIABETES MELLITUS WITH HYPERGLYCEMIA, WITH LONG-TERM CURRENT USE OF INSULIN (HCC): Primary | ICD-10-CM

## 2020-10-26 LAB
EXPIRATION DATE: NORMAL
HBA1C MFR BLD: 9.2 %
Lab: NORMAL

## 2020-10-26 PROCEDURE — 83036 HEMOGLOBIN GLYCOSYLATED A1C: CPT | Performed by: PHYSICIAN ASSISTANT

## 2020-10-26 PROCEDURE — 99213 OFFICE O/P EST LOW 20 MIN: CPT | Performed by: PHYSICIAN ASSISTANT

## 2020-10-26 RX ORDER — INSULIN GLARGINE 100 [IU]/ML
INJECTION, SOLUTION SUBCUTANEOUS
Qty: 30 ML | Refills: 5 | Status: SHIPPED | OUTPATIENT
Start: 2020-10-26 | End: 2021-01-29

## 2020-10-26 NOTE — PROGRESS NOTES
"     Office Note      Date: 10/26/2020  Patient Name: Leela Muller  MRN: [id number]  : 1950    Chief Complaint   Patient presents with   • Follow-up   • Diabetes       History of Present Illness:   Leela Muller is a 70 y.o. female who presents for type 2 diabetes.  She reports her blood sugars have been high recently.  Is checking her blood sugars twice a day.  Noted readings in the 200s in the morning and in the 200- 300s range in the evenings.  She reports she developed a rash and was on a steroid Dosepak about a month ago.  She reports she also had a steroid injection in her left shoulder.  Reports she is taking her insulin regularly 40 units in the morning and 60 units in the evening she also continues the glipizide regularly.  Denies any hypoglycemia.  She reports she had a concerning mammogram and had a right lumpectomy in July.  This was benign.  She reports she had a sleep study this weekend and is sending in the results later this week.  She is up-to-date on her eye exam she had this 2020.  Reports she had her flu vaccine this fall.      Subjective     Review of Systems:   Review of Systems   Constitutional: Negative.    Cardiovascular: Negative.    Gastrointestinal: Negative.    Endocrine: Negative.    Neurological: Negative.        The following portions of the patient's history were reviewed and updated as appropriate: allergies, current medications, past family history, past medical history, past social history, past surgical history and problem list.    Objective     Vitals:    10/26/20 1449   BP: 122/74   Pulse: 80   Temp: 97.3 °F (36.3 °C)   TempSrc: Infrared   SpO2: 98%   Weight: 78.1 kg (172 lb 3.2 oz)   Height: 170.2 cm (67\")     Body mass index is 26.97 kg/m².    Physical Exam  Constitutional:       General: She is not in acute distress.     Appearance: Normal appearance.   Neurological:      Mental Status: She is alert.         HEMOGLOBIN A1C  Lab Results   Component Value " Date    HGBA1C 9.2 10/26/2020             Assessment / Plan      Assessment & Plan:  1. Type 2 diabetes mellitus with hyperglycemia, with long-term current use of insulin (CMS/[facility])  A1c well above goal at 9.2%.  Will increase basal Klar to 50 units in the morning she will continue 60 units in the evening unless her blood sugars remain elevated and she will increase back up to 70 units.  Encourage patient to continue to check blood sugars regularly and send in readings as needed.  Blood pressure okay today.  Weight stable    - POC Glycosylated Hemoglobin (Hb A1C)    2. Essential hypertension  Blood pressure okay today.    3. Mixed hyperlipidemia  Fasting labs were okay in June at her appointment with Dr. Santiago.  We will continue rosuvastatin 10 mg daily.      Return in about 3 months (around 1/26/2021) for Recheck, sees Jac Santiago.     Ijeoma Puente PA-C  10/26/2020

## 2020-10-27 NOTE — TELEPHONE ENCOUNTER
PT is requesting Rx glucose blood (OneTouch Verio) test strip. PT confirmed pharmacy pickup location at  pharmacy

## 2020-10-28 RX ORDER — BLOOD SUGAR DIAGNOSTIC
STRIP MISCELLANEOUS
Qty: 300 EACH | Refills: 3 | Status: SHIPPED | OUTPATIENT
Start: 2020-10-28 | End: 2021-09-28 | Stop reason: SDUPTHER

## 2020-11-11 RX ORDER — FUROSEMIDE 40 MG/1
TABLET ORAL
Qty: 45 TABLET | Refills: 0 | Status: SHIPPED | OUTPATIENT
Start: 2020-11-11 | End: 2021-05-06

## 2021-01-12 ENCOUNTER — TELEPHONE (OUTPATIENT)
Dept: NEUROLOGY | Facility: CLINIC | Age: 71
End: 2021-01-12

## 2021-01-12 NOTE — TELEPHONE ENCOUNTER
DELETE AFTER REVIEWING: Telephone encounter to be sent to the clinical pool     Caller: PT    Relationship: SELF    Best call back number: [phone]    What medications are you currently taking:   Current Outpatient Medications on File Prior to Visit   Medication Sig Dispense Refill   • acetaminophen (TYLENOL) 500 MG tablet Take 500 mg by mouth Every 6 (Six) Hours As Needed.     • acyclovir (ZOVIRAX) 800 MG tablet Take 1 tablet by mouth 5 times a day by for 10 days. 50 tablet 0   • albuterol sulfate  (90 Base) MCG/ACT inhaler Inhale 2 puffs by mouth every 4 (Four) hours 8.5 g 3   • albuterol sulfate  (90 Base) MCG/ACT inhaler Inhale 2 puffs by mouth every 4 hours 8.5 g 3   • aspirin 81 MG tablet Take 81 mg by mouth Daily.     • atorvastatin (LIPITOR) 20 MG tablet Daily.     • baclofen (LIORESAL) 10 MG tablet Take 1 tablet by mouth 3 (three) times a day. (Patient taking differently: Take 10 mg by mouth 2 (Two) Times a Day.) 90 tablet 11   • butalbital-acetaminophen-caffeine (FIORICET, ESGIC) -40 MG per tablet Take 1 tablet by mouth Daily As Needed for headache 30 tablet 2   • cetirizine (zyrTEC) 10 MG tablet Take 1 tablet by mouth As Needed.     • clidinium-chlordiazePOXIDE (LIBRAX) 5-2.5 MG per capsule Take 1 capsule by mouth 3 (Three) Times A Day 90 capsule 5   • clonazePAM (KlonoPIN) 0.5 MG tablet As Needed.     • Cream Base Liposomic (PCCA CUSTOM LIPO-MAX) cream      • digoxin (LANOXIN) 250 MCG tablet Take 1 tablet by mouth daily 90 tablet 0   • enoxaparin (LOVENOX) 80 MG/0.8ML solution syringe Every 12 (Twelve) Hours.     • fluconazole (DIFLUCAN) 150 MG tablet Take 1 tablet by mouth Daily. 3 tablet 0   • fludrocortisone 0.1 MG tablet Take 1 tablet by mouth Daily. 30 tablet 1   • fludrocortisone 0.1 MG tablet Take 1 tablet by mouth Daily. 30 tablet 11   • furosemide (LASIX) 40 MG tablet Take 1 tablet by mouth Daily. May have extra 1/2 to 1 tablet daily if needed for edema 45 tablet 0   •  gabapentin (NEURONTIN) 800 MG tablet Take 1/2 tablet by mouth 3 (Three) Times a Day. 60 tablet 5   • glipizide (GLUCOTROL XL) 10 MG 24 hr tablet Take 2 tablets by mouth Daily. 60 tablet 5   • glipizide (GLUCOTROL XL) 10 MG 24 hr tablet Take 2 tablets by mouth Daily 60 tablet 5   • glipizide (GLUCOTROL XL) 10 MG 24 hr tablet Take 1 tablet by mouth 2 (two) times a day. 60 tablet 10   • glucose blood (ONE TOUCH ULTRA TEST) test strip Use to test blood glucose as directed 3 times a day 100 each 5   • glucose blood (OneTouch Verio) test strip Use to test blood glucose 3 times a day as directed 300 each 3   • hepatitis A (HAVRIX) 1440 EL U/ML vaccine Inject as directed per protocol and repeat in 6 months 1 mL 1   • HYDROcodone-acetaminophen (NORCO) 7.5-325 MG per tablet Take 1 tablet by mouth 3 (Three) Times a Day. 90 tablet 0   • hydrocortisone (ANUSOL-HC) 25 MG suppository Insert 1 suppository rectally twice a day as needed (remove wrapper and moisten with water) 12 suppository 3   • hydrocortisone (GRX HICORT 25) 25 MG suppository Insert 1 Suppository rectally twice a day as needed (remove wrapper and moisten with water) 12 suppository 1   • Insulin Glargine (BASAGLAR KWIKPEN) 100 UNIT/ML injection pen Inject 50 units subcutaneously once in the morning and 70 units at night 30 mL 5   • Insulin Pen Needle (B-D UF III MINI PEN NEEDLES) 31G X 5 MM misc Use to inject insulin twice a day as directed 100 each 12   • Insulin Syringe-Needle U-100 29G 0.5 ML misc Inject 0.3 mL as directed Daily.     • loperamide (IMODIUM) 2 MG capsule Take 2 mg by mouth 4 (Four) Times a Day As Needed for Diarrhea.     • losartan (COZAAR) 50 MG tablet Daily.     • meclizine (ANTIVERT) 25 MG tablet Take 1 tablet by mouth 3 (Three) Times a Day as needed 30 tablet 3   • meclizine (ANTIVERT) 25 MG tablet Take 1 tablet by mouth 3 (Three) Times a Day As Needed. 30 tablet 3   • methylPREDNISolone (MEDROL) 4 MG dose pack Take as directed on package 21  tablet 0   • metoclopramide (REGLAN) 10 MG tablet Take 1 tablet by mouth Daily As Needed for migraine. 30 tablet 5   • metoprolol succinate XL (TOPROL-XL) 100 MG 24 hr tablet Take 1 tablet by mouth Daily. 30 tablet 11   • metroNIDAZOLE (METROCREAM) 0.75 % cream Apply to the face once every night (Patient taking differently: Apply  topically to the appropriate area as directed As Needed.) 45 g 0   • mometasone (ELOCON) 0.1 % cream APPLY A THIN LAYER TO THE AFFECTED AREA(S) TOPICALLY ONCE DAILY 45 g 3   • neomycin-polymyxin-hydrocortisone (CORTISPORIN) 3.5-81152-2 otic suspension INSTILL 4 DROPS INTO AFFECTED EAR(S) 3 (THREE) TIMES PER DAY 10 mL 0   • nitroglycerin (NITROSTAT) 0.4 MG SL tablet Place 1 tablet under the tongue Every 5 Minutes As Needed for Chest Pain for up to 3 total doses. Call 911 if pain remains after 1 dose. 25 tablet 6   • pancrelipase, Lip-Prot-Amyl, (PANCREAZE) 38746 UNITS capsule delayed-release particles capsule Take 1 capsule by mouth with each meal and each snack (Patient taking differently: As Needed.) 100 capsule 11   • pancrelipase, Lip-Prot-Amyl, (PANCREAZE) 90361 units capsule delayed-release particles capsule Take 1 capsule with each meal and with each snack 100 capsule 11   • polyethylene glycol (MIRALAX) packet Take 17 g by mouth As Needed.     • potassium chloride (KLOR-CON) 10 MEQ CR tablet Take 1 tablet by mouth Daily as directed (Patient taking differently: Take 10 mEq by mouth As Needed.) 90 tablet 2   • predniSONE (DELTASONE) 10 MG tablet Take 4 tablets by mouth daily for 2 days, then 3 tabs daily for 2 days, then 2 tabs daily for 2 days, then 1 tab daily for 2 days 20 tablet 1   • promethazine (PHENERGAN) 25 MG tablet Take 1 tablet by mouth Every 6 (Six) Hours As Needed for Nausea or Vomiting 12 tablet 0   • promethazine (PHENERGAN) 25 MG tablet Take 1 tablet by mouth 4 (Four) Times a Day as needed for nausea 30 tablet 3   • RABEprazole (ACIPHEX) 20 MG EC tablet Take 1 tablet  by mouth Daily. 30 tablet 11   • rosuvastatin (CRESTOR) 10 MG tablet Take 1 tablet by mouth Daily. 90 tablet 2   • SITagliptin (JANUVIA) 100 MG tablet Take 1 tablet by mouth Daily. 30 tablet 5   • topiramate (TOPAMAX) 25 MG tablet Take 1 tablet by mouth Daily. 30 tablet 11   • venlafaxine XR (EFFEXOR-XR) 75 MG 24 hr capsule Take 1 capsule by mouth Daily. 30 capsule 11   • [DISCONTINUED] HYDROcodone-acetaminophen (NORCO) 7.5-325 MG per tablet Take 1 tablet by mouth 3 (Three) Times a Day. 90 tablet 0     No current facility-administered medications on file prior to visit.         When did you start taking these medications: 12-8-20 PT STATES    Which medication are you concerned about: PREDNISONE    Who prescribed you this medication: DR. PALOMO    What are your concerns: PATIENT STATES SHE JUST REFILLED THIS RX AGAIN BUT IT HAS NOT BEEN HELPING HER AT ALL. SHE WOULD LIKE TO KNOW WHAT DR. PALOMO WOULD SUGGEST. PT ALSO STATES HER BLOOD SUGAR HAS BEEN HIGH. PLEASE ADVISE.

## 2021-01-13 RX ORDER — DIGOXIN 250 MCG
TABLET ORAL
Qty: 90 TABLET | Refills: 0 | Status: SHIPPED | OUTPATIENT
Start: 2021-01-13 | End: 2021-03-09 | Stop reason: SDUPTHER

## 2021-01-15 ENCOUNTER — OFFICE VISIT (OUTPATIENT)
Dept: SURGERY | Facility: CLINIC | Age: 71
End: 2021-01-15

## 2021-01-15 VITALS
WEIGHT: 175 LBS | HEIGHT: 67 IN | OXYGEN SATURATION: 98 % | BODY MASS INDEX: 27.47 KG/M2 | SYSTOLIC BLOOD PRESSURE: 128 MMHG | DIASTOLIC BLOOD PRESSURE: 86 MMHG | TEMPERATURE: 98.7 F | HEART RATE: 101 BPM

## 2021-01-15 DIAGNOSIS — R92.8 OTHER ABNORMAL AND INCONCLUSIVE FINDINGS ON DIAGNOSTIC IMAGING OF BREAST: ICD-10-CM

## 2021-01-15 DIAGNOSIS — Z98.890 HISTORY OF BREAST LUMP/MASS EXCISION: Primary | ICD-10-CM

## 2021-01-15 PROBLEM — M50.00 INTERVERTEBRAL DISC DISORDER OF CERVICAL REGION WITH MYELOPATHY: Status: ACTIVE | Noted: 2021-01-15

## 2021-01-15 PROBLEM — G47.33 OBSTRUCTIVE SLEEP APNEA SYNDROME: Status: ACTIVE | Noted: 2019-09-12

## 2021-01-15 PROBLEM — Z85.038 HISTORY OF MALIGNANT NEOPLASM OF COLON: Status: ACTIVE | Noted: 2018-02-23

## 2021-01-15 PROBLEM — K57.32 DIVERTICULITIS OF COLON: Status: ACTIVE | Noted: 2021-01-15

## 2021-01-15 PROBLEM — H81.10 BENIGN PAROXYSMAL POSITIONAL VERTIGO: Status: ACTIVE | Noted: 2021-01-15

## 2021-01-15 PROBLEM — M48.061 DEGENERATIVE LUMBAR SPINAL STENOSIS: Status: ACTIVE | Noted: 2019-08-01

## 2021-01-15 PROBLEM — B37.31 CANDIDAL VULVOVAGINITIS: Status: ACTIVE | Noted: 2021-01-15

## 2021-01-15 PROBLEM — S77.10XA: Status: ACTIVE | Noted: 2021-01-15

## 2021-01-15 PROBLEM — R42 DIZZINESS AND GIDDINESS: Status: ACTIVE | Noted: 2021-01-15

## 2021-01-15 PROBLEM — K92.2 GASTROINTESTINAL HEMORRHAGE: Status: ACTIVE | Noted: 2021-01-15

## 2021-01-15 PROBLEM — K61.2 ANORECTAL ABSCESS: Status: ACTIVE | Noted: 2021-01-15

## 2021-01-15 PROBLEM — G89.4 CHRONIC PAIN DISORDER: Status: ACTIVE | Noted: 2021-01-15

## 2021-01-15 PROBLEM — M76.899 ENTHESOPATHY OF HIP REGION: Status: ACTIVE | Noted: 2021-01-15

## 2021-01-15 PROBLEM — M75.00 ADHESIVE CAPSULITIS OF SHOULDER: Status: ACTIVE | Noted: 2021-01-15

## 2021-01-15 PROBLEM — B35.4 TINEA CORPORIS: Status: ACTIVE | Noted: 2021-01-15

## 2021-01-15 PROBLEM — L23.7 CONTACT DERMATITIS DUE TO POISON IVY: Status: ACTIVE | Noted: 2021-01-15

## 2021-01-15 PROBLEM — L50.9 URTICARIA: Status: ACTIVE | Noted: 2021-01-15

## 2021-01-15 PROBLEM — S90.30XA CONTUSION OF FOOT: Status: ACTIVE | Noted: 2019-08-06

## 2021-01-15 PROBLEM — IMO0002 UNCONTROLLED TYPE 2 DIABETES MELLITUS: Status: ACTIVE | Noted: 2021-01-15

## 2021-01-15 PROBLEM — J32.9 SINUSITIS: Status: ACTIVE | Noted: 2021-01-15

## 2021-01-15 PROBLEM — R10.12 LEFT UPPER QUADRANT PAIN: Status: ACTIVE | Noted: 2021-01-15

## 2021-01-15 PROBLEM — N39.0 URINARY TRACT INFECTIOUS DISEASE: Status: ACTIVE | Noted: 2021-01-15

## 2021-01-15 PROBLEM — K21.9 GASTROESOPHAGEAL REFLUX DISEASE: Status: ACTIVE | Noted: 2021-01-15

## 2021-01-15 PROBLEM — M16.10 PRIMARY LOCALIZED OSTEOARTHRITIS OF PELVIC REGION AND THIGH: Status: ACTIVE | Noted: 2021-01-15

## 2021-01-15 PROBLEM — R31.29 MICROSCOPIC HEMATURIA: Status: ACTIVE | Noted: 2021-01-15

## 2021-01-15 PROBLEM — K52.9 GASTROENTERITIS: Status: ACTIVE | Noted: 2021-01-15

## 2021-01-15 PROBLEM — K62.5 HEMORRHAGE OF RECTUM AND ANUS: Status: ACTIVE | Noted: 2021-01-15

## 2021-01-15 PROBLEM — N63.10 MASS OF RIGHT BREAST: Status: ACTIVE | Noted: 2020-08-27

## 2021-01-15 PROBLEM — R26.9 ABNORMAL GAIT: Status: ACTIVE | Noted: 2021-01-15

## 2021-01-15 PROBLEM — M17.9 OSTEOARTHRITIS OF KNEE: Status: ACTIVE | Noted: 2019-04-13

## 2021-01-15 PROBLEM — K64.9 HEMORRHOIDS: Status: ACTIVE | Noted: 2021-01-15

## 2021-01-15 PROCEDURE — 99213 OFFICE O/P EST LOW 20 MIN: CPT | Performed by: SURGERY

## 2021-01-15 NOTE — PROGRESS NOTES
"Patient: Leela Muller  YOB: 1950    Date: 01/15/2021    Primary Care Provider: Connor Ritter MD    Chief Complaint   Patient presents with   • Follow-up     breast mass       History: Patient is in the office today for 6 month follow up on breast. She states she is doing well and has no complaints at this time.  Ultrasound today indicates no recurrence of her right breast fibroadenoma or lesion.  Patient with no symptoms otherwise.  No nipple discharge or breast pain.    The following portions of the patient's history were reviewed and updated as appropriate: allergies, current medications, past family history, past medical history, past social history, past surgical history and problem list.    Review of Systems   Constitutional: Negative for chills, fever and unexpected weight change.   HENT: Negative for trouble swallowing and voice change.    Eyes: Negative for visual disturbance.   Respiratory: Negative for apnea, cough, chest tightness and wheezing.    Cardiovascular: Negative for chest pain, palpitations and leg swelling.   Gastrointestinal: Negative for abdominal distention, abdominal pain, anal bleeding, blood in stool, constipation, diarrhea, nausea, rectal pain and vomiting.   Endocrine: Negative for cold intolerance and heat intolerance.   Genitourinary: Negative for difficulty urinating, dysuria, flank pain and hematuria.   Musculoskeletal: Negative for back pain, gait problem and joint swelling.   Skin: Negative for color change, rash and wound.   Neurological: Negative for dizziness, syncope, speech difficulty, weakness, numbness and headaches.   Hematological: Negative for adenopathy. Does not bruise/bleed easily.   Psychiatric/Behavioral: Negative for confusion. The patient is not nervous/anxious.        Vital Signs  Vitals:    01/15/21 1305   BP: 128/86   Pulse: 101   Temp: 98.7 °F (37.1 °C)   SpO2: 98%   Weight: 79.4 kg (175 lb)   Height: 170.2 cm (67.01\") "       Allergies:  Allergies   Allergen Reactions   • Atorvastatin Swelling   • Tetanus Toxoid Hives   • Ranexa [Ranolazine Er] Nausea And Vomiting       Medications:    Current Outpatient Medications:   •  acetaminophen (TYLENOL) 500 MG tablet, Take 500 mg by mouth Every 6 (Six) Hours As Needed., Disp: , Rfl:   •  acyclovir (ZOVIRAX) 800 MG tablet, Take 1 tablet by mouth 5 times a day by for 10 days., Disp: 50 tablet, Rfl: 0  •  albuterol sulfate  (90 Base) MCG/ACT inhaler, Inhale 2 puffs by mouth every 4 (Four) hours, Disp: 8.5 g, Rfl: 3  •  albuterol sulfate  (90 Base) MCG/ACT inhaler, Inhale 2 puffs by mouth every 4 hours, Disp: 8.5 g, Rfl: 3  •  aspirin 81 MG tablet, Take 81 mg by mouth Daily., Disp: , Rfl:   •  atorvastatin (LIPITOR) 20 MG tablet, Daily., Disp: , Rfl:   •  baclofen (LIORESAL) 10 MG tablet, Take 1 tablet by mouth 3 (three) times a day. (Patient taking differently: Take 10 mg by mouth 2 (Two) Times a Day.), Disp: 90 tablet, Rfl: 11  •  butalbital-acetaminophen-caffeine (FIORICET, ESGIC) -40 MG per tablet, Take 1 tablet by mouth Daily As Needed for headache, Disp: 30 tablet, Rfl: 2  •  cetirizine (zyrTEC) 10 MG tablet, Take 1 tablet by mouth As Needed., Disp: , Rfl:   •  clidinium-chlordiazePOXIDE (LIBRAX) 5-2.5 MG per capsule, Take 1 capsule by mouth 3 (Three) Times A Day, Disp: 90 capsule, Rfl: 5  •  clonazePAM (KlonoPIN) 0.5 MG tablet, As Needed., Disp: , Rfl:   •  Cream Base Liposomic (PCCA CUSTOM LIPO-MAX) cream, , Disp: , Rfl:   •  digoxin (LANOXIN) 250 MCG tablet, Take 1 tablet by mouth daily, Disp: 90 tablet, Rfl: 0  •  enoxaparin (LOVENOX) 80 MG/0.8ML solution syringe, Every 12 (Twelve) Hours., Disp: , Rfl:   •  fluconazole (DIFLUCAN) 150 MG tablet, Take 1 tablet by mouth Daily., Disp: 3 tablet, Rfl: 0  •  fluconazole (DIFLUCAN) 150 MG tablet, Take 1 tablet by mouth Daily., Disp: 30 tablet, Rfl: 10  •  fludrocortisone 0.1 MG tablet, Take 1 tablet by mouth Daily., Disp:  30 tablet, Rfl: 1  •  fludrocortisone 0.1 MG tablet, Take 1 tablet by mouth Daily., Disp: 30 tablet, Rfl: 11  •  furosemide (LASIX) 40 MG tablet, Take 1 tablet by mouth Daily. May have extra 1/2 to 1 tablet daily if needed for edema, Disp: 45 tablet, Rfl: 0  •  gabapentin (NEURONTIN) 800 MG tablet, Take 1/2 tablet by mouth 3 (Three) Times a Day., Disp: 60 tablet, Rfl: 5  •  glipizide (GLUCOTROL XL) 10 MG 24 hr tablet, Take 2 tablets by mouth Daily., Disp: 60 tablet, Rfl: 5  •  glipizide (GLUCOTROL XL) 10 MG 24 hr tablet, Take 2 tablets by mouth Daily, Disp: 60 tablet, Rfl: 5  •  glipizide (GLUCOTROL XL) 10 MG 24 hr tablet, Take 1 tablet by mouth 2 (two) times a day., Disp: 60 tablet, Rfl: 10  •  glucose blood (ONE TOUCH ULTRA TEST) test strip, Use to test blood glucose as directed 3 times a day, Disp: 100 each, Rfl: 5  •  glucose blood (OneTouch Verio) test strip, Use to test blood glucose 3 times a day as directed, Disp: 300 each, Rfl: 3  •  hepatitis A (HAVRIX) 1440 EL U/ML vaccine, Inject as directed per protocol and repeat in 6 months, Disp: 1 mL, Rfl: 1  •  HYDROcodone-acetaminophen (NORCO) 7.5-325 MG per tablet, Take 1 tablet by mouth 3 (Three) Times a Day., Disp: 90 tablet, Rfl: 0  •  hydrocortisone (ANUSOL-HC) 25 MG suppository, Insert 1 suppository rectally twice a day as needed (remove wrapper and moisten with water), Disp: 12 suppository, Rfl: 3  •  hydrocortisone (GRX HICORT 25) 25 MG suppository, Insert 1 Suppository rectally twice a day as needed (remove wrapper and moisten with water), Disp: 12 suppository, Rfl: 1  •  Insulin Glargine (BASAGLAR KWIKPEN) 100 UNIT/ML injection pen, Inject 50 units subcutaneously once in the morning and 70 units at night, Disp: 30 mL, Rfl: 5  •  Insulin Pen Needle (B-D UF III MINI PEN NEEDLES) 31G X 5 MM misc, Use to inject insulin twice a day as directed, Disp: 100 each, Rfl: 12  •  Insulin Syringe-Needle U-100 29G 0.5 ML misc, Inject 0.3 mL as directed Daily., Disp: ,  Rfl:   •  loperamide (IMODIUM) 2 MG capsule, Take 2 mg by mouth 4 (Four) Times a Day As Needed for Diarrhea., Disp: , Rfl:   •  losartan (COZAAR) 50 MG tablet, Daily., Disp: , Rfl:   •  meclizine (ANTIVERT) 25 MG tablet, Take 1 tablet by mouth 3 (Three) Times a Day as needed, Disp: 30 tablet, Rfl: 3  •  meclizine (ANTIVERT) 25 MG tablet, Take 1 tablet by mouth 3 (Three) Times a Day As Needed., Disp: 30 tablet, Rfl: 3  •  methylPREDNISolone (MEDROL) 4 MG dose pack, Take as directed on package, Disp: 21 tablet, Rfl: 0  •  metoclopramide (REGLAN) 10 MG tablet, Take 1 tablet by mouth Daily As Needed for migraine., Disp: 30 tablet, Rfl: 5  •  metoprolol succinate XL (TOPROL-XL) 100 MG 24 hr tablet, Take 1 tablet by mouth Daily., Disp: 30 tablet, Rfl: 11  •  metroNIDAZOLE (METROCREAM) 0.75 % cream, Apply to the face once every night (Patient taking differently: Apply  topically to the appropriate area as directed As Needed.), Disp: 45 g, Rfl: 0  •  mometasone (ELOCON) 0.1 % cream, APPLY A THIN LAYER TO THE AFFECTED AREA(S) TOPICALLY ONCE DAILY, Disp: 45 g, Rfl: 3  •  neomycin-polymyxin-hydrocortisone (CORTISPORIN) 3.5-90063-3 otic suspension, INSTILL 4 DROPS INTO AFFECTED EAR(S) 3 (THREE) TIMES PER DAY, Disp: 10 mL, Rfl: 0  •  nitroglycerin (NITROSTAT) 0.4 MG SL tablet, Place 1 tablet under the tongue Every 5 Minutes As Needed for Chest Pain for up to 3 total doses. Call 911 if pain remains after 1 dose., Disp: 25 tablet, Rfl: 6  •  pancrelipase, Lip-Prot-Amyl, (PANCREAZE) 31790 UNITS capsule delayed-release particles capsule, Take 1 capsule by mouth with each meal and each snack (Patient taking differently: As Needed.), Disp: 100 capsule, Rfl: 11  •  pancrelipase, Lip-Prot-Amyl, (PANCREAZE) 81475 units capsule delayed-release particles capsule, Take 1 capsule with each meal and with each snack, Disp: 100 capsule, Rfl: 11  •  polyethylene glycol (MIRALAX) packet, Take 17 g by mouth As Needed., Disp: , Rfl:   •  potassium  chloride (KLOR-CON) 10 MEQ CR tablet, Take 1 tablet by mouth Daily as directed (Patient taking differently: Take 10 mEq by mouth As Needed.), Disp: 90 tablet, Rfl: 2  •  predniSONE (DELTASONE) 10 MG tablet, Take 4 tablets by mouth daily for 2 days, then 3 tabs daily for 2 days, then 2 tabs daily for 2 days, then 1 tab daily for 2 days, Disp: 20 tablet, Rfl: 1  •  promethazine (PHENERGAN) 25 MG tablet, Take 1 tablet by mouth Every 6 (Six) Hours As Needed for Nausea or Vomiting, Disp: 12 tablet, Rfl: 0  •  promethazine (PHENERGAN) 25 MG tablet, Take 1 tablet by mouth 4 (Four) Times a Day as needed for nausea, Disp: 30 tablet, Rfl: 3  •  RABEprazole (ACIPHEX) 20 MG EC tablet, Take 1 tablet by mouth Daily., Disp: 30 tablet, Rfl: 11  •  rosuvastatin (CRESTOR) 10 MG tablet, Take 1 tablet by mouth Daily., Disp: 90 tablet, Rfl: 2  •  SITagliptin (JANUVIA) 100 MG tablet, Take 1 tablet by mouth Daily., Disp: 30 tablet, Rfl: 5  •  topiramate (TOPAMAX) 25 MG tablet, Take 1 tablet by mouth Daily., Disp: 30 tablet, Rfl: 11  •  venlafaxine XR (EFFEXOR-XR) 75 MG 24 hr capsule, Take 1 capsule by mouth Daily., Disp: 30 capsule, Rfl: 11    Physical Exam:   General Appearance:    Alert, cooperative, in no acute distress   Head:    Normocephalic, without obvious abnormality, atraumatic   Lungs:     Clear to auscultation,respirations regular, even and                  unlabored    Heart:    Regular rhythm and normal rate, normal S1 and S2, no            murmur, no gallop, no rub, no click   Abdomen:     Normal bowel sounds, no masses, no organomegaly, soft        non-tender, non-distended, no guarding, no rebound                Tenderness  Breast-no palpable mass in either breast or axilla   Extremities:   Moves all extremities well, no edema, no cyanosis, no             redness   Pulses:   Pulses palpable and equal bilaterally   Skin:   No bleeding, bruising or rash     Results Review:   I reviewed the patient's new clinical  results.     Review of Systems was reviewed and confirmed as accurate as documented by the MA.    ASSESSMENT/PLAN:    1. History of breast lump/mass excision    2. Other abnormal and inconclusive findings on diagnostic imaging of breast        Patient reassured, recommend follow-up in 1 year for repeat right breast ultrasound.  I have personally reviewed her ultrasound from 6 months ago.  Also reviewed pathology report of a right breast fibroadenoma.  Scheduled for ultrasound the right breast in 1 year.  Electronically signed by Benji Martinez MD  01/15/21

## 2021-01-29 ENCOUNTER — OFFICE VISIT (OUTPATIENT)
Dept: ENDOCRINOLOGY | Facility: CLINIC | Age: 71
End: 2021-01-29

## 2021-01-29 VITALS
HEART RATE: 94 BPM | OXYGEN SATURATION: 97 % | DIASTOLIC BLOOD PRESSURE: 60 MMHG | BODY MASS INDEX: 27.31 KG/M2 | TEMPERATURE: 97.1 F | HEIGHT: 67 IN | WEIGHT: 174 LBS | SYSTOLIC BLOOD PRESSURE: 100 MMHG

## 2021-01-29 DIAGNOSIS — Z79.4 TYPE 2 DIABETES MELLITUS WITH HYPERGLYCEMIA, WITH LONG-TERM CURRENT USE OF INSULIN (HCC): Primary | ICD-10-CM

## 2021-01-29 DIAGNOSIS — I10 ESSENTIAL HYPERTENSION: ICD-10-CM

## 2021-01-29 DIAGNOSIS — E78.5 DYSLIPIDEMIA: ICD-10-CM

## 2021-01-29 DIAGNOSIS — E11.65 TYPE 2 DIABETES MELLITUS WITH HYPERGLYCEMIA, WITH LONG-TERM CURRENT USE OF INSULIN (HCC): Primary | ICD-10-CM

## 2021-01-29 DIAGNOSIS — Z79.4 INSULIN LONG-TERM USE (HCC): ICD-10-CM

## 2021-01-29 LAB
EXPIRATION DATE: NORMAL
HBA1C MFR BLD: 9.1 %
Lab: NORMAL

## 2021-01-29 PROCEDURE — 99214 OFFICE O/P EST MOD 30 MIN: CPT | Performed by: INTERNAL MEDICINE

## 2021-01-29 PROCEDURE — 83036 HEMOGLOBIN GLYCOSYLATED A1C: CPT | Performed by: INTERNAL MEDICINE

## 2021-01-29 RX ORDER — GLIPIZIDE 10 MG/1
20 TABLET, FILM COATED, EXTENDED RELEASE ORAL DAILY
Qty: 60 TABLET | Refills: 5 | Status: SHIPPED | OUTPATIENT
Start: 2021-01-29 | End: 2021-05-10 | Stop reason: SDUPTHER

## 2021-01-29 RX ORDER — INSULIN GLARGINE 100 [IU]/ML
INJECTION, SOLUTION SUBCUTANEOUS
Qty: 30 ML | Refills: 5 | Status: SHIPPED | OUTPATIENT
Start: 2021-01-29 | End: 2021-05-10 | Stop reason: SDUPTHER

## 2021-01-29 NOTE — PROGRESS NOTES
"     Office Note      Date: 2021  Patient Name: Leela Muller  MRN: [id number]  : 1950    Chief Complaint   Patient presents with   • Diabetes       History of Present Illness:   Leela Muller is a 70 y.o. female who presents for Diabetes type 2. Diagnosed in: . Treated in past with oral agents. Current treatments: glipizide and basal insulin. Number of insulin shots per day: 2. Checks blood sugar 2 times a day. Has low blood sugar: no. Aspirin use: Yes. Statin use: Yes. ACE-I/ARB use: Yes. Changes in health since last visit: shingles - on steroids. Last eye exam .    Subjective      Diabetic Complications:  Eyes: No  Kidneys: No  Feet: No  Heart: No    Diet and Exercise:  Meals per day: 2  Minutes of exercise per week: 0 mins.    Review of Systems:   Review of Systems   Constitutional: Negative.    Cardiovascular: Negative.    Gastrointestinal: Negative.    Endocrine: Negative.        The following portions of the patient's history were reviewed and updated as appropriate: allergies, current medications, past family history, past medical history, past social history, past surgical history and problem list.    Objective       Visit Vitals  /60 (BP Location: Left arm, Patient Position: Sitting, Cuff Size: Adult)   Pulse 94   Temp 97.1 °F (36.2 °C) (Infrared)   Ht 170.2 cm (67\")   Wt 78.9 kg (174 lb)   SpO2 97%   BMI 27.25 kg/m²       Physical Exam:  Physical Exam  Constitutional:       Appearance: Normal appearance.   Neurological:      Mental Status: She is alert.         Labs:    HbA1c  Lab Results   Component Value Date    HGBA1C 9.1 2021       CMP  Lab Results   Component Value Date    GLUCOSE 146 (H) 2020    BUN 7 (L) 2020    CREATININE 0.64 2020    EGFRIFNONA 91 2020    BCR 10.9 2020    K 3.2 (L) 2020    CO2 25.9 2020    CALCIUM 9.1 2020    LABIL2 1.5 2015    AST 28 2020    ALT 17 2020        Lipid Panel  Lab " Results   Component Value Date    HDL 34 (L) 06/28/2019    LDL 29 06/28/2019    TRIG 171 (H) 06/28/2019        TSH  Lab Results   Component Value Date    TSH 2.210 06/27/2019        Hemoglobin A1C  Lab Results   Component Value Date    HGBA1C 9.1 01/29/2021        Microalbumin/Creatinine  No results found for: MALBCRERATIO, CREATINIURIN, MICROALBUR        Assessment / Plan      Assessment & Plan:  Diagnoses and all orders for this visit:    1. Type 2 diabetes mellitus with hyperglycemia, with long-term current use of insulin (CMS/[facility]) (Primary)  Assessment & Plan:  Diabetes is unchanged.   Continue current treatment regimen.  Titrate up insulin.  Diabetes will be reassessed in 3 months.    Has been on steroids recently.    Orders:  -     POC Glycosylated Hemoglobin (Hb A1C)    2. Essential hypertension  Assessment & Plan:  Hypertension is unchanged.  Continue current treatment regimen.  Blood pressure will be reassessed in 3 months.      3. Dyslipidemia    4. Insulin long-term use (CMS/[facility])    Other orders  -     glipizide (GLUCOTROL XL) 10 MG 24 hr tablet; Take 2 tablets by mouth Daily.  Dispense: 60 tablet; Refill: 5  -     Insulin Glargine (BASAGLAR KWIKPEN) 100 UNIT/ML injection pen; Inject 50 units subcutaneously once in the morning and 70 units at night  Dispense: 30 mL; Refill: 5      Return in about 3 months (around 4/29/2021) for Recheck with A1c.    Jac Santiago MD   01/29/2021

## 2021-01-29 NOTE — ASSESSMENT & PLAN NOTE
Diabetes is unchanged.   Continue current treatment regimen.  Titrate up insulin.  Diabetes will be reassessed in 3 months.    Has been on steroids recently.

## 2021-02-09 DIAGNOSIS — F41.9 ANXIETY: ICD-10-CM

## 2021-02-09 DIAGNOSIS — G43.009 MIGRAINE WITHOUT AURA AND WITHOUT STATUS MIGRAINOSUS, NOT INTRACTABLE: ICD-10-CM

## 2021-02-10 RX ORDER — VENLAFAXINE HYDROCHLORIDE 75 MG/1
75 CAPSULE, EXTENDED RELEASE ORAL DAILY
Qty: 30 CAPSULE | Refills: 11 | Status: SHIPPED | OUTPATIENT
Start: 2021-02-10 | End: 2021-10-25

## 2021-02-10 NOTE — TELEPHONE ENCOUNTER
BRITT ALVARENGA   [phone]    THE PT CALLED IN TODAY BECAUSE SHE WANTED TO CONFIRM HER REFILL FOR THE VENLAFAXINE 75 MG. I NOTIFIED HER WE DID RECEIVE THE REFILL REQUEST. SHE STATED SHE IS COMPLETELY OUT OF THE MEDICATION. PLEASE GETS THIS FILLED ASAP.

## 2021-02-24 ENCOUNTER — TELEPHONE (OUTPATIENT)
Dept: NEUROLOGY | Facility: CLINIC | Age: 71
End: 2021-02-24

## 2021-02-24 NOTE — TELEPHONE ENCOUNTER
----- Message from Dorene Saenz sent at 2/24/2021 12:03 PM EST -----  Regarding: DR. JACKLYN KEE CAME TO THE  TODAY TO GET WORKED IN WITH DR. PALOMO SOONER THAN April, TO GET AN INJECTION.  HER HEADACHES ARE SEVERE AND GOES TO THE BACK OF THE NECK, AND TO HER EAR.  SHE WANTED HIM TO KNOW THAT SHE CAN NOT TAKE THE STERIODS IT MAKES HER SUGAR GO UP.  I OFFERED TO WORK HER IN SOONER WITH ONE OF THE APRN'S, BUT SHE STRESSED THAT DR. PALOMO WANTS HER TO ONLY SEE HIM.  SHE SAID THAT THE butalbital-acetaminophen-caffeine (FIORICET, IS WORKING, BUT SHE WOULD LIKE THE INJECTION.  I CONFIRMED BOTH PHONES, AND TOLD HER I WOULD SEND DR. PALOMO A MESSAGE.

## 2021-03-04 ENCOUNTER — OFFICE VISIT (OUTPATIENT)
Dept: NEUROLOGY | Facility: CLINIC | Age: 71
End: 2021-03-04

## 2021-03-04 VITALS
TEMPERATURE: 96.8 F | OXYGEN SATURATION: 98 % | HEART RATE: 127 BPM | HEIGHT: 67 IN | WEIGHT: 173 LBS | SYSTOLIC BLOOD PRESSURE: 110 MMHG | DIASTOLIC BLOOD PRESSURE: 60 MMHG | BODY MASS INDEX: 27.15 KG/M2

## 2021-03-04 DIAGNOSIS — M79.18 CERVICAL MYOFASCIAL PAIN SYNDROME: ICD-10-CM

## 2021-03-04 DIAGNOSIS — M54.2 NECK PAIN: Primary | ICD-10-CM

## 2021-03-04 DIAGNOSIS — G44.209 TENSION HEADACHE: ICD-10-CM

## 2021-03-04 DIAGNOSIS — G43.009 MIGRAINE WITHOUT AURA AND WITHOUT STATUS MIGRAINOSUS, NOT INTRACTABLE: ICD-10-CM

## 2021-03-04 PROCEDURE — 20552 NJX 1/MLT TRIGGER POINT 1/2: CPT | Performed by: PSYCHIATRY & NEUROLOGY

## 2021-03-04 PROCEDURE — 99213 OFFICE O/P EST LOW 20 MIN: CPT | Performed by: PSYCHIATRY & NEUROLOGY

## 2021-03-04 RX ADMIN — METHYLPREDNISOLONE SODIUM SUCCINATE 125 MG: 125 INJECTION, POWDER, LYOPHILIZED, FOR SOLUTION INTRAMUSCULAR; INTRAVENOUS at 14:09

## 2021-03-04 NOTE — PROGRESS NOTES
Procedure   Procedures   With patient consent, Medrol 125 mg, lidocaine 1% 2 cc injected in cervical paraspinous in points of tenderness.

## 2021-03-04 NOTE — PROGRESS NOTES
Subjective:     Patient ID: Leela Muller is a 71 y.o. female.    CC: tension headache, neck pain  Chief Complaint   Patient presents with   • Migraine       HPI:   History of Present Illness  The following portions of the patient's history were reviewed and updated as appropriate: allergies, current medications, past family history, past medical history, past social history, past surgical history and problem list.  Patient reports increase in neck pain and headaches for several months, Fioricet makes her too sleepy, wants to try a trigger point injection. She is on a sliding scale insulin.  Past Medical History:   Diagnosis Date   • Anxiety    • Arm pain    • Arthritis    • Breast injury     fell 6/2019    • Chronic pancreatitis (CMS/HCC)    • Depression    • Diabetes mellitus (CMS/HCC)    • Hyperlipidemia    • Hypertension    • Neck pain on left side    • Palpitations 4/18/2018   • Pancreatitis    • Pulmonary embolism (CMS/HCC)    • Rectal cancer (CMS/HCC)    • Stroke syndrome 9/14/2016    · Assessed By: Devaughn Lewis (Neurology); Last Assessed: 16 Jun 2015  Right cerebrovascular accident in July or August 2010, evaluated at [facility].  Admission to [facility] on 09/28/2010 with headache and stuttering, consistent with TIA.  Chronic Coumadin therapy, initiated following bilateral pulmonary emboli in 2007 after fall and hip replacement.  She was already on 81 mg of aspirin as well, 09/2010.  MRI of the brain on 09/28/2010 revealing old right parietal cerebrovascular accident.  MRA revealing normal carotids, middle anterior and posterior cerebral arteries with minimal ostial stenosis of both vertebral arteries.  GENARO by Dr. Oliver Mobley on 09/28/2010:  patent foramen ovale with a small amount of right to left shunting.  Normal LVEF and normal valvular structures.   Patent foramen ovale closure using a 25 mm. Amplatzer cribriform occluder, 10/05/2010.   Echocardiogram, 06/23/2014:   LVEF (55% to 60%) with and Amplatzer device noted to be well-seated in    • Thrombocytopenia (CMS/HCC)    • Transient cerebral ischemia        Past Surgical History:   Procedure Laterality Date   • APPENDECTOMY     • BREAST BIOPSY Left 2012    benign   • BREAST BIOPSY     • BREAST EXCISIONAL BIOPSY Left     benign   • BREAST EXCISIONAL BIOPSY Right     benign   • BREAST EXCISIONAL BIOPSY Right 2020    benign   • BREAST SURGERY     • CHOLECYSTECTOMY     • HYSTERECTOMY     • OOPHORECTOMY     • RECTAL SURGERY     • TONSILLECTOMY     • TOTAL HIP ARTHROPLASTY REVISION         Social History     Socioeconomic History   • Marital status:      Spouse name: Not on file   • Number of children: Not on file   • Years of education: Not on file   • Highest education level: Not on file   Tobacco Use   • Smoking status: Never Smoker   • Smokeless tobacco: Never Used   Substance and Sexual Activity   • Alcohol use: No   • Drug use: No   • Sexual activity: Defer       Family History   Problem Relation Age of Onset   • Alzheimer's disease Mother    • Cancer Mother    • Coronary artery disease Other    • Diabetes Other    • Hypertension Other    • Stroke Other    • Cancer Other    • Dementia Other    • Heart attack Father    • Breast cancer Neg Hx    • Ovarian cancer Neg Hx         Review of Systems   Constitutional: Negative for chills, fatigue, fever and unexpected weight change.   HENT: Negative for ear pain, hearing loss, nosebleeds, rhinorrhea and sore throat.    Eyes: Negative for photophobia, pain, discharge, itching and visual disturbance.   Respiratory: Negative for cough, chest tightness, shortness of breath and wheezing.    Cardiovascular: Negative for chest pain, palpitations and leg swelling.   Gastrointestinal: Negative for abdominal pain, blood in stool, constipation, diarrhea, nausea and vomiting.   Genitourinary: Negative for dysuria, frequency, hematuria and urgency.   Musculoskeletal: Negative for  "arthralgias, back pain, gait problem, joint swelling, myalgias, neck pain and neck stiffness.   Skin: Negative for rash and wound.   Allergic/Immunologic: Negative for environmental allergies and food allergies.   Neurological: Negative for dizziness, tremors, seizures, syncope, speech difficulty, weakness, light-headedness, numbness and headaches.   Hematological: Negative for adenopathy. Does not bruise/bleed easily.   Psychiatric/Behavioral: Negative for agitation, confusion, decreased concentration, hallucinations, sleep disturbance and suicidal ideas. The patient is not nervous/anxious.         Objective:  /60   Pulse (!) 127   Temp 96.8 °F (36 °C)   Ht 170.2 cm (67\")   Wt 78.5 kg (173 lb)   SpO2 98%   BMI 27.10 kg/m²     Neurologic Exam    Physical Exam  Constitutional:       Appearance: She is well-developed.   Cardiovascular:      Rate and Rhythm: Normal rate and regular rhythm.   Pulmonary:      Effort: Pulmonary effort is normal.   Musculoskeletal:      Comments: Tender upper cervical paraspinous   Neurological:      Mental Status: She is alert.      Comments: Speech clear, no facial droop, gait steady   Psychiatric:         Behavior: Behavior normal.         Thought Content: Thought content normal.         Assessment/Plan:            Diagnoses and all orders for this visit:    1. Neck pain (Primary)   - Medrol/lidocaine IM  2. Migraine without aura and without status migrainosus, not intractable   - Medrol/lidocaine IM  3. Tension headache   - Medrol/lidocaine IM  4. Cervical myofascial pain syndrome   - Medrol/lidocaine IM           Reviewed medications, potential side effects and signs and symptoms to report. Discussed risk versus benefits of treatment plan with patient and/or family-including medications, labs and radiology that may be ordered. Addressed questions and concerns during visit. Patient and/or family verbalized understanding and agree with plan.    AS THE PROVIDER, I PERSONALLY " WORE PPE DURING ENTIRE FACE TO FACE ENCOUNTER IN CLINIC WITH THE PATIENT. PATIENT ALSO WORE PPE DURING ENTIRE FACE TO FACE ENCOUNTER EXCEPT FOR A MAX OF 30 SECONDS DURING NEUROLOGICAL EVALUATION OF CRANIAL NERVES AND THEN MASK WAS PLACED BACK OVER PATIENT FACE FOR REMAINDER OF VISIT. I WASHED MY HANDS BEFORE AND AFTER VISIT.    Devaughn Lewis MD  3/4/2021

## 2021-03-08 ENCOUNTER — IMMUNIZATION (OUTPATIENT)
Dept: VACCINE CLINIC | Facility: HOSPITAL | Age: 71
End: 2021-03-08

## 2021-03-08 PROCEDURE — 0001A: CPT | Performed by: INTERNAL MEDICINE

## 2021-03-08 PROCEDURE — 91300 HC SARSCOV02 VAC 30MCG/0.3ML IM: CPT | Performed by: INTERNAL MEDICINE

## 2021-03-09 DIAGNOSIS — M54.2 NECK PAIN: ICD-10-CM

## 2021-03-09 DIAGNOSIS — G43.009 MIGRAINE WITHOUT AURA AND WITHOUT STATUS MIGRAINOSUS, NOT INTRACTABLE: ICD-10-CM

## 2021-03-09 RX ORDER — BACLOFEN 10 MG/1
10 TABLET ORAL 3 TIMES DAILY
Qty: 90 TABLET | Refills: 11 | Status: SHIPPED | OUTPATIENT
Start: 2021-03-09 | End: 2021-10-25

## 2021-03-09 RX ORDER — GABAPENTIN 800 MG/1
400 TABLET ORAL 3 TIMES DAILY
Qty: 60 TABLET | Refills: 5 | Status: SHIPPED | OUTPATIENT
Start: 2021-03-09 | End: 2021-09-08

## 2021-03-09 RX ORDER — BUTALBITAL, ACETAMINOPHEN AND CAFFEINE 50; 325; 40 MG/1; MG/1; MG/1
1 TABLET ORAL DAILY PRN
Qty: 30 TABLET | Refills: 2 | Status: SHIPPED | OUTPATIENT
Start: 2021-03-09 | End: 2021-06-08 | Stop reason: SDUPTHER

## 2021-03-09 RX ORDER — DIGOXIN 250 MCG
TABLET ORAL
Qty: 90 TABLET | Refills: 0 | Status: SHIPPED | OUTPATIENT
Start: 2021-03-09 | End: 2021-04-07 | Stop reason: SDUPTHER

## 2021-03-29 ENCOUNTER — IMMUNIZATION (OUTPATIENT)
Dept: VACCINE CLINIC | Facility: HOSPITAL | Age: 71
End: 2021-03-29

## 2021-03-29 PROCEDURE — 0002A: CPT | Performed by: INTERNAL MEDICINE

## 2021-03-29 PROCEDURE — 91300 HC SARSCOV02 VAC 30MCG/0.3ML IM: CPT | Performed by: INTERNAL MEDICINE

## 2021-04-01 RX ORDER — METHYLPREDNISOLONE SODIUM SUCCINATE 125 MG/2ML
125 INJECTION, POWDER, LYOPHILIZED, FOR SOLUTION INTRAMUSCULAR; INTRAVENOUS ONCE
Status: COMPLETED | OUTPATIENT
Start: 2021-03-04 | End: 2021-03-04

## 2021-04-08 RX ORDER — DIGOXIN 250 MCG
TABLET ORAL
Qty: 90 TABLET | Refills: 0 | Status: SHIPPED | OUTPATIENT
Start: 2021-04-08 | End: 2021-06-24 | Stop reason: SDUPTHER

## 2021-05-06 RX ORDER — FLURBIPROFEN SODIUM 0.3 MG/ML
SOLUTION/ DROPS OPHTHALMIC
Qty: 100 EACH | Refills: 12 | Status: SHIPPED | OUTPATIENT
Start: 2021-05-06 | End: 2022-10-28 | Stop reason: SDUPTHER

## 2021-05-06 RX ORDER — FUROSEMIDE 40 MG/1
TABLET ORAL
Qty: 45 TABLET | Refills: 1 | Status: SHIPPED | OUTPATIENT
Start: 2021-05-06 | End: 2021-06-24 | Stop reason: SDUPTHER

## 2021-05-10 ENCOUNTER — OFFICE VISIT (OUTPATIENT)
Dept: ENDOCRINOLOGY | Facility: CLINIC | Age: 71
End: 2021-05-10

## 2021-05-10 VITALS
HEART RATE: 70 BPM | DIASTOLIC BLOOD PRESSURE: 80 MMHG | SYSTOLIC BLOOD PRESSURE: 120 MMHG | HEIGHT: 67 IN | WEIGHT: 172 LBS | OXYGEN SATURATION: 100 % | BODY MASS INDEX: 27 KG/M2 | TEMPERATURE: 97.5 F

## 2021-05-10 DIAGNOSIS — I10 ESSENTIAL HYPERTENSION: ICD-10-CM

## 2021-05-10 DIAGNOSIS — E78.5 DYSLIPIDEMIA: ICD-10-CM

## 2021-05-10 DIAGNOSIS — E11.65 TYPE 2 DIABETES MELLITUS WITH HYPERGLYCEMIA, WITH LONG-TERM CURRENT USE OF INSULIN (HCC): Primary | ICD-10-CM

## 2021-05-10 DIAGNOSIS — Z79.4 TYPE 2 DIABETES MELLITUS WITH HYPERGLYCEMIA, WITH LONG-TERM CURRENT USE OF INSULIN (HCC): Primary | ICD-10-CM

## 2021-05-10 LAB
EXPIRATION DATE: NORMAL
HBA1C MFR BLD: 9 %
Lab: NORMAL

## 2021-05-10 PROCEDURE — 83036 HEMOGLOBIN GLYCOSYLATED A1C: CPT | Performed by: INTERNAL MEDICINE

## 2021-05-10 PROCEDURE — 99214 OFFICE O/P EST MOD 30 MIN: CPT | Performed by: INTERNAL MEDICINE

## 2021-05-10 RX ORDER — INSULIN GLARGINE 100 [IU]/ML
INJECTION, SOLUTION SUBCUTANEOUS
Qty: 30 ML | Refills: 5 | Status: SHIPPED | OUTPATIENT
Start: 2021-05-10 | End: 2021-09-28 | Stop reason: SDUPTHER

## 2021-05-10 RX ORDER — FLURBIPROFEN SODIUM 0.3 MG/ML
SOLUTION/ DROPS OPHTHALMIC
Qty: 100 EACH | Refills: 12 | Status: SHIPPED | OUTPATIENT
Start: 2021-05-10 | End: 2021-06-24

## 2021-05-10 RX ORDER — GLIPIZIDE 10 MG/1
20 TABLET, FILM COATED, EXTENDED RELEASE ORAL DAILY
Qty: 180 TABLET | Refills: 3 | Status: SHIPPED | OUTPATIENT
Start: 2021-05-10 | End: 2022-03-09 | Stop reason: SDUPTHER

## 2021-05-10 NOTE — ASSESSMENT & PLAN NOTE
Diabetes is unchanged.  Has been on multiple rounds of steroids again.  Continue current treatment regimen. But increase insulin with steroids.  Diabetes will be reassessed in 3 months.    FSBS when off steroids are much better.

## 2021-05-10 NOTE — PROGRESS NOTES
"     Office Note      Date: 05/10/2021  Patient Name: Leela Muller  MRN: [id number]  : 1950    Chief Complaint   Patient presents with   • Diabetes       History of Present Illness:   Leela Muller is a 71 y.o. female who presents for Diabetes type 2. Diagnosed in: . Treated in past with oral agents. Current treatments: glipizide and basal insulin. Number of insulin shots per day: 2. Checks blood sugar 2 times a day. Has low blood sugar: no. Aspirin use: Yes. Statin use: Yes. ACE-I/ARB use: Yes. Changes in health since last visit: steroids for rash. Last eye exam .    She had COVID-19 injections.  She had rash on the left side after the shot.  She was on steroids again.  Got medrol pack about a month ago.  She also had steroid injections in her neck to treat HA's per neurology.    Subjective      Diabetic Complications:  Eyes: No  Kidneys: No  Feet: No  Heart: No    Diet and Exercise:  Meals per day: 2  Minutes of exercise per week: 0 mins.    Review of Systems:   Review of Systems   Constitutional: Negative.    Cardiovascular: Negative.    Gastrointestinal: Negative.    Endocrine: Negative.        The following portions of the patient's history were reviewed and updated as appropriate: allergies, current medications, past family history, past medical history, past social history, past surgical history and problem list.    Objective       Visit Vitals  /80 (BP Location: Left arm, Patient Position: Sitting, Cuff Size: Adult)   Pulse 70   Temp 97.5 °F (36.4 °C) (Infrared)   Ht 170.2 cm (67\")   Wt 78 kg (172 lb)   SpO2 100%   BMI 26.94 kg/m²       Physical Exam:  Physical Exam  Constitutional:       Appearance: Normal appearance.   Neurological:      Mental Status: She is alert.         Labs:    HbA1c  Lab Results   Component Value Date    HGBA1C 9.0 05/10/2021       CMP  Lab Results   Component Value Date    GLUCOSE 146 (H) 2020    BUN 7 (L) 2020    CREATININE 0.64 2020    " EGFRIFNONA 91 03/08/2020    BCR 10.9 03/08/2020    K 3.2 (L) 03/08/2020    CO2 25.9 03/08/2020    CALCIUM 9.1 03/08/2020    LABIL2 1.5 03/20/2015    AST 28 03/08/2020    ALT 17 03/08/2020        Lipid Panel  Lab Results   Component Value Date    HDL 34 (L) 06/28/2019    LDL 29 06/28/2019    TRIG 171 (H) 06/28/2019        TSH  Lab Results   Component Value Date    TSH 2.210 06/27/2019        Hemoglobin A1C  Lab Results   Component Value Date    HGBA1C 9.0 05/10/2021        Microalbumin/Creatinine  No results found for: MALBCRERATIO, CREATINIURIN, MICROALBUR        Assessment / Plan      Assessment & Plan:  Diagnoses and all orders for this visit:    1. Type 2 diabetes mellitus with hyperglycemia, with long-term current use of insulin (CMS/[facility]) (Primary)  Assessment & Plan:  Diabetes is unchanged.  Has been on multiple rounds of steroids again.  Continue current treatment regimen. But increase insulin with steroids.  Diabetes will be reassessed in 3 months.    FSBS when off steroids are much better.    Orders:  -     POC Glycosylated Hemoglobin (Hb A1C)    2. Essential hypertension  Assessment & Plan:  Hypertension is unchanged.  Continue current treatment regimen.  Blood pressure will be reassessed at the next regular appointment.      3. Dyslipidemia  Assessment & Plan:  Continue statin.      Other orders  -     Insulin Pen Needle (B-D UF III MINI PEN NEEDLES) 31G X 5 MM misc; Use to inject insulin twice a day as directed  Dispense: 100 each; Refill: 12  -     Insulin Glargine (BASAGLAR KWIKPEN) 100 UNIT/ML injection pen; Inject 50 units subcutaneously once in the morning and 70 units at night  Dispense: 30 mL; Refill: 5  -     glipizide (GLUCOTROL XL) 10 MG 24 hr tablet; Take 2 tablets by mouth Daily.  Dispense: 180 tablet; Refill: 3      Return in about 3 months (around 8/10/2021) for Recheck with A1c, CMP, lipids, TSH, microalbumin.    Jac Santiago MD   05/10/2021

## 2021-05-13 DIAGNOSIS — G43.009 MIGRAINE WITHOUT AURA AND WITHOUT STATUS MIGRAINOSUS, NOT INTRACTABLE: ICD-10-CM

## 2021-05-15 RX ORDER — METOCLOPRAMIDE 10 MG/1
10 TABLET ORAL DAILY PRN
Qty: 30 TABLET | Refills: 5 | Status: SHIPPED | OUTPATIENT
Start: 2021-05-15 | End: 2021-06-08 | Stop reason: SDUPTHER

## 2021-06-08 ENCOUNTER — OFFICE VISIT (OUTPATIENT)
Dept: NEUROLOGY | Facility: CLINIC | Age: 71
End: 2021-06-08

## 2021-06-08 VITALS
TEMPERATURE: 98 F | WEIGHT: 171 LBS | OXYGEN SATURATION: 96 % | HEIGHT: 67 IN | HEART RATE: 100 BPM | BODY MASS INDEX: 26.84 KG/M2 | SYSTOLIC BLOOD PRESSURE: 140 MMHG | DIASTOLIC BLOOD PRESSURE: 74 MMHG

## 2021-06-08 DIAGNOSIS — G43.009 MIGRAINE WITHOUT AURA AND WITHOUT STATUS MIGRAINOSUS, NOT INTRACTABLE: ICD-10-CM

## 2021-06-08 PROCEDURE — 99212 OFFICE O/P EST SF 10 MIN: CPT | Performed by: PSYCHIATRY & NEUROLOGY

## 2021-06-08 RX ORDER — METOCLOPRAMIDE 10 MG/1
10 TABLET ORAL DAILY PRN
Qty: 30 TABLET | Refills: 5 | Status: SHIPPED | OUTPATIENT
Start: 2021-06-08 | End: 2021-08-11 | Stop reason: SDUPTHER

## 2021-06-08 RX ORDER — BUTALBITAL, ACETAMINOPHEN AND CAFFEINE 50; 325; 40 MG/1; MG/1; MG/1
1 TABLET ORAL DAILY PRN
Qty: 30 TABLET | Refills: 2 | Status: SHIPPED | OUTPATIENT
Start: 2021-06-08 | End: 2021-08-11 | Stop reason: SDUPTHER

## 2021-06-08 NOTE — PROGRESS NOTES
Subjective:     Patient ID: Leela Muller is a 71 y.o. female.    CC: headaches    HPI:   History of Present Illness  The following portions of the patient's history were reviewed and updated as appropriate: allergies, current medications, past family history, past medical history, past social history, past surgical history and problem list.     Reports some improvement in headaches and neck pain since she had trigger point injects, pain starting to come back some, does not want to take prednisone that runs up her BS.    Past Medical History:   Diagnosis Date   • Anxiety    • Arm pain    • Arthritis    • Breast injury     fell 6/2019    • Chronic pancreatitis (CMS/HCC)    • Depression    • Diabetes mellitus (CMS/HCC)    • Hyperlipidemia    • Hypertension    • Neck pain on left side    • Palpitations 4/18/2018   • Pancreatitis    • Pulmonary embolism (CMS/HCC)    • Rectal cancer (CMS/HCC)    • Stroke syndrome 9/14/2016    · Assessed By: Devaughn Lewis (Neurology); Last Assessed: 16 Jun 2015  Right cerebrovascular accident in July or August 2010, evaluated at [facility].  Admission to [facility] on 09/28/2010 with headache and stuttering, consistent with TIA.  Chronic Coumadin therapy, initiated following bilateral pulmonary emboli in 2007 after fall and hip replacement.  She was already on 81 mg of aspirin as well, 09/2010.  MRI of the brain on 09/28/2010 revealing old right parietal cerebrovascular accident.  MRA revealing normal carotids, middle anterior and posterior cerebral arteries with minimal ostial stenosis of both vertebral arteries.  GENARO by Dr. Oliver Mobley on 09/28/2010:  patent foramen ovale with a small amount of right to left shunting.  Normal LVEF and normal valvular structures.   Patent foramen ovale closure using a 25 mm. Amplatzer cribriform occluder, 10/05/2010.   Echocardiogram, 06/23/2014:  LVEF (55% to 60%) with and Amplatzer device noted to be well-seated in     • Thrombocytopenia (CMS/HCC)    • Transient cerebral ischemia        Past Surgical History:   Procedure Laterality Date   • APPENDECTOMY     • BREAST BIOPSY Left 2012    benign   • BREAST BIOPSY     • BREAST EXCISIONAL BIOPSY Left     benign   • BREAST EXCISIONAL BIOPSY Right     benign   • BREAST EXCISIONAL BIOPSY Right 2020    benign   • BREAST SURGERY     • CHOLECYSTECTOMY     • HYSTERECTOMY     • OOPHORECTOMY     • RECTAL SURGERY     • TONSILLECTOMY     • TOTAL HIP ARTHROPLASTY REVISION         Social History     Socioeconomic History   • Marital status:      Spouse name: Not on file   • Number of children: Not on file   • Years of education: Not on file   • Highest education level: Not on file   Tobacco Use   • Smoking status: Never Smoker   • Smokeless tobacco: Never Used   Vaping Use   • Vaping Use: Never used   Substance and Sexual Activity   • Alcohol use: No   • Drug use: No   • Sexual activity: Defer       Family History   Problem Relation Age of Onset   • Alzheimer's disease Mother    • Cancer Mother    • Coronary artery disease Other    • Diabetes Other    • Hypertension Other    • Stroke Other    • Cancer Other    • Dementia Other    • Heart attack Father    • Breast cancer Neg Hx    • Ovarian cancer Neg Hx         Review of Systems   Constitutional: Negative for chills, fatigue, fever and unexpected weight change.   HENT: Negative for ear pain, hearing loss, nosebleeds, rhinorrhea and sore throat.    Eyes: Negative for photophobia, pain, discharge, itching and visual disturbance.   Respiratory: Negative for cough, chest tightness, shortness of breath and wheezing.    Cardiovascular: Negative for chest pain, palpitations and leg swelling.   Gastrointestinal: Negative for abdominal pain, blood in stool, constipation, diarrhea, nausea and vomiting.   Genitourinary: Negative for dysuria, frequency, hematuria and urgency.   Musculoskeletal: Negative for arthralgias, back pain, gait problem, joint  "swelling, myalgias, neck pain and neck stiffness.   Skin: Negative for rash and wound.   Allergic/Immunologic: Negative for environmental allergies and food allergies.   Neurological: Negative for dizziness, tremors, seizures, syncope, speech difficulty, weakness, light-headedness, numbness and headaches.   Hematological: Negative for adenopathy. Does not bruise/bleed easily.   Psychiatric/Behavioral: Negative for agitation, confusion, decreased concentration, hallucinations, sleep disturbance and suicidal ideas. The patient is not nervous/anxious.    All other systems reviewed and are negative.       Objective:  /74   Pulse 100   Temp 98 °F (36.7 °C)   Ht 170.2 cm (67\")   Wt 77.6 kg (171 lb)   SpO2 96%   BMI 26.78 kg/m²     Neurologic Exam    Physical Exam  Constitutional:       Appearance: She is well-developed.   Cardiovascular:      Rate and Rhythm: Normal rate and regular rhythm.   Pulmonary:      Effort: Pulmonary effort is normal.   Musculoskeletal:      Comments: Slight tenderness of cervical paraspinous   Neurological:      Mental Status: She is alert.      Comments: Speech clear, gait steady.   Psychiatric:         Behavior: Behavior normal.         Thought Content: Thought content normal.         Assessment/Plan:       Diagnoses and all orders for this visit:    1. Migraine without aura and without status migrainosus, not intractable  -     metoclopramide (REGLAN) 10 MG tablet; Take 1 tablet by mouth Daily As Needed for migraine.  Dispense: 30 tablet; Refill: 5  -     butalbital-acetaminophen-caffeine (FIORICET, ESGIC) -40 MG per tablet; Take 1 tablet by mouth Daily As Needed for headache  Dispense: 30 tablet; Refill: 2    The patient has read and signed the [facility] Controlled Substance Contract.  I will continue to see patient for regular follow up appointments.  They are well controlled on their medication.  LORRI is updated every 3 months. The patient is aware of the potential " for addiction and dependence.           Reviewed medications, potential side effects and signs and symptoms to report. Discussed risk versus benefits of treatment plan with patient and/or family-including medications, labs and radiology that may be ordered. Addressed questions and concerns during visit. Patient and/or family verbalized understanding and agree with plan.    AS THE PROVIDER, I PERSONALLY WORE PPE DURING ENTIRE FACE TO FACE ENCOUNTER IN CLINIC WITH THE PATIENT. PATIENT ALSO WORE PPE DURING ENTIRE FACE TO FACE ENCOUNTER EXCEPT FOR A MAX OF 30 SECONDS DURING NEUROLOGICAL EVALUATION OF CRANIAL NERVES AND THEN MASK WAS PLACED BACK OVER PATIENT FACE FOR REMAINDER OF VISIT. I WASHED MY HANDS BEFORE AND AFTER VISIT.      Devaughn Lewis MD  6/8/2021

## 2021-06-10 ENCOUNTER — TELEPHONE (OUTPATIENT)
Dept: NEUROLOGY | Facility: CLINIC | Age: 71
End: 2021-06-10

## 2021-06-10 NOTE — TELEPHONE ENCOUNTER
Leela Muller  [phone]     [facility] is supposed to fax a prescription refill request. PT is requesting for a refill of the muscle relaxant gel/ cream previously prescribed.      [address] [address] [phone]

## 2021-06-21 NOTE — TELEPHONE ENCOUNTER
Nuha can you call [facility] and get exact prescription information and I will refill

## 2021-06-23 NOTE — PROGRESS NOTES
[facility] Cardiology    Patient ID: Leela Muller is a 71 y.o. female.  : 1950   Contact: [phone]    Encounter date: 2021    PCP: Connor Ritter MD      Chief complaint:   Chief Complaint   Patient presents with   • Orthostatic hypotension     Problem List:  1. Cerebrovascular accident:  a. Right CVA in July or 2010, evaluated at [facility].   b. Admission to [facility], 2010 with headache and stuttering, c/w TIA.   c. Chronic Coumadin therapy, initiated following bilateral PE in  after fall and hip replacement. She was already on 81 mg of aspirin as well, 2010.   d. MRI brain, 2010: Old right parietal CVA.   e. MRA, 2010: Normal carotids, middle anterior and posterior cerebral arteries with minimal ostial stenosis of both vertebral arteries.   f. GENARO, 2010, Dr. Mobley: PFO with a small amount of right to left shunting. Normal LVEF and normal valves.  g. PFO closure, 10/05/2010: 25 mm Amplatzer cribriform occluder.    h. Echocardiogram, 2014: Amplatzer device is well-seated in the interatrial septum. EF 55-60%. Trace MR and mild TR.  i. Echocardiogram, 2019: Intact Amplatzer septal occluder with no evidence of flow across the device. EF 60%. Trace MR/TR.   2. Abnormal myocardial perfusion study:    a. Patient reports having a MI in . Documentation only supports bilateral PE. She did not have a LHC or stents at time of alleged MI.   b. Cardiolite GXT, 2010, Dr. Monteiro: Small area of ischemia in the mid anteroseptal, mid inferior, and apical lateral regions. EF 68%.  c. LHC, 2010, Dr. Monteiro: Normal coronary arteries with normal LVEF.  3. DM2.   4. Bilateral pulmonary emboli:  a. Following hip replacement in .   b. No evidence of prior anti-coagulable workup.   c. Chronic Coumadin therapy.   d. No evidence of DVT on bilateral lower extremity duplex.  e. The decision was made to discontinue  the patient's warfarin therapy due to her fall risk.  5. Palpitations:  a. 2 week Holter, 04/23/2018: Normal study.  6. Hypertension.   7. Dyslipidemia.   8. Pancreatitis:  a. Recent episode  in March 2013.  9. 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    10. Rectal cancer; surgically removed by Dr Martinez.  11.  Surgical history:  a. Hip replacement.  b. Hysterectomy.  c. Tonsillectomy  d. Appendectomy.  e. Cholecystectomy.    Allergies   Allergen Reactions   • Atorvastatin Swelling   • Tetanus Toxoid Hives   • Acyclovir Seizure   • Ranexa [Ranolazine Er] Nausea And Vomiting       Current Medications:    Current Outpatient Medications:   •  acetaminophen (TYLENOL) 500 MG tablet, Take 500 mg by mouth Every 6 (Six) Hours As Needed., Disp: , Rfl:   •  acyclovir (ZOVIRAX) 5 % ointment, Apply 1 unit topically to the anal area as needed, Disp: 15 g, Rfl: PRN  •  albuterol sulfate  (90 Base) MCG/ACT inhaler, Inhale 2 puffs by mouth every 4 (Four) hours, Disp: 8.5 g, Rfl: 3  •  aspirin 81 MG tablet, Take 81 mg by mouth Daily., Disp: , Rfl:   •  baclofen (LIORESAL) 10 MG tablet, Take 1 tablet by mouth 3 (three) times a day., Disp: 90 tablet, Rfl: 11  •  butalbital-acetaminophen-caffeine (FIORICET, ESGIC) -40 MG per tablet, Take 1 tablet by mouth Daily As Needed for headache, Disp: 30 tablet, Rfl: 2  •  clidinium-chlordiazePOXIDE (LIBRAX) 5-2.5 MG per capsule, Take 1 capsule by mouth 2 (Two) Times a Day As Needed., Disp: 60 capsule, Rfl: 0  •  clonazePAM (KlonoPIN) 0.5 MG tablet, As Needed., Disp: , Rfl:   •  Cream Base Liposomic (PCCA CUSTOM LIPO-MAX) cream, As Needed., Disp: , Rfl:   •  digoxin (LANOXIN) 250 MCG tablet, Take 1 tablet by mouth daily, Disp: 90 tablet, Rfl: 0  •  fluconazole (DIFLUCAN) 150 MG tablet, Take 150 mg by mouth 1 (One) Time As Needed., Disp: , Rfl:   •  fludrocortisone 0.1 MG tablet, Take 1 tablet by mouth Daily., Disp: 30 tablet, Rfl: 11  •  furosemide (LASIX) 40 MG tablet, Take 1  tablet by mouth Daily. May have extra 1/2 to 1 tablet daily if needed for edema, Disp: 45 tablet, Rfl: 1  •  gabapentin (NEURONTIN) 800 MG tablet, Take 1/2 tablet by mouth 3 (Three) Times a Day., Disp: 60 tablet, Rfl: 5  •  glipizide (GLUCOTROL XL) 10 MG 24 hr tablet, Take 2 tablets by mouth Daily., Disp: 180 tablet, Rfl: 3  •  glucose blood (ONE TOUCH ULTRA TEST) test strip, Use to test blood glucose as directed 3 times a day, Disp: 100 each, Rfl: 5  •  glucose blood (OneTouch Verio) test strip, Use to test blood glucose 3 times a day as directed, Disp: 300 each, Rfl: 3  •  HYDROcodone-acetaminophen (NORCO) 7.5-325 MG per tablet, Take 1 tablet by mouth 3 (Three) Times a Day., Disp: 90 tablet, Rfl: 0  •  hydrocortisone (ANUSOL-HC) 25 MG suppository, Insert 1 suppository rectally twice a day as needed (remove wrapper and moisten with water), Disp: 12 suppository, Rfl: 3  •  Insulin Glargine (BASAGLAR KWIKPEN) 100 UNIT/ML injection pen, Inject 50 units subcutaneously once in the morning and 70 units at night, Disp: 30 mL, Rfl: 5  •  Insulin Pen Needle (B-D UF III MINI PEN NEEDLES) 31G X 5 MM misc, Use to inject insulin twice a day as directed, Disp: 100 each, Rfl: 12  •  Insulin Syringe-Needle U-100 29G 0.5 ML misc, Inject 0.3 mL as directed Daily., Disp: , Rfl:   •  loperamide (IMODIUM) 2 MG capsule, Take 2 mg by mouth 4 (Four) Times a Day As Needed for Diarrhea., Disp: , Rfl:   •  meclizine (ANTIVERT) 25 MG tablet, Take 1 tablet by mouth 3 (Three) Times a Day As Needed., Disp: 30 tablet, Rfl: 3  •  methylcellulose, Laxative, (CITRUCEL) 500 MG tablet tablet, Take 2 tablets by mouth., Disp: , Rfl:   •  metoclopramide (REGLAN) 10 MG tablet, Take 1 tablet by mouth Daily As Needed for migraine., Disp: 30 tablet, Rfl: 5  •  metoprolol succinate XL (TOPROL-XL) 100 MG 24 hr tablet, Take 1 tablet by mouth Daily., Disp: 30 tablet, Rfl: 11  •  metroNIDAZOLE (METROCREAM) 0.75 % cream, Apply to the face once every night (Patient  taking differently: Apply  topically to the appropriate area as directed As Needed.), Disp: 45 g, Rfl: 0  •  mometasone (ELOCON) 0.1 % cream, APPLY A THIN LAYER TO THE AFFECTED AREA(S) TOPICALLY ONCE DAILY, Disp: 45 g, Rfl: 3  •  neomycin-polymyxin-hydrocortisone (CORTISPORIN) 3.5-63315-3 otic suspension, INSTILL 4 DROPS INTO AFFECTED EAR(S) 3 (THREE) TIMES PER DAY, Disp: 10 mL, Rfl: 0  •  nitroglycerin (NITROSTAT) 0.4 MG SL tablet, Place 1 tablet under the tongue Every 5 Minutes As Needed for Chest Pain for up to 3 total doses. Call 911 if pain remains after 1 dose., Disp: 25 tablet, Rfl: 6  •  pancrelipase, Lip-Prot-Amyl, (PANCREAZE) 62619 units capsule delayed-release particles capsule, Take 1 capsule with each meal and with each snack, Disp: 100 capsule, Rfl: 11  •  potassium chloride (KLOR-CON) 10 MEQ CR tablet, Take 1 tablet by mouth Daily as directed (Patient taking differently: Take 10 mEq by mouth As Needed.), Disp: 90 tablet, Rfl: 2  •  promethazine (PHENERGAN) 25 MG tablet, Take 1 tablet by mouth 4 (Four) Times a Day as needed for nausea, Disp: 30 tablet, Rfl: 3  •  RABEprazole (ACIPHEX) 20 MG EC tablet, Take 1 tablet by mouth Daily., Disp: 30 tablet, Rfl: 5  •  rosuvastatin (CRESTOR) 10 MG tablet, Take 1 tablet by mouth Daily., Disp: 90 tablet, Rfl: 2  •  topiramate (TOPAMAX) 25 MG tablet, Take 1 tablet by mouth Daily., Disp: 30 tablet, Rfl: 11  •  triamcinolone (KENALOG) 0.1 % cream, Apply 1 gram (about 2 fingertips worth) topically to affected area twice a day as needed., Disp: 60 g, Rfl: 1  •  venlafaxine XR (EFFEXOR-XR) 75 MG 24 hr capsule, Take 1 capsule by mouth Daily., Disp: 30 capsule, Rfl: 11  •  HYDROcodone-acetaminophen (NORCO) 7.5-325 MG per tablet, Take 1 tablet by mouth 3 (Three) Times a Day., Disp: 90 tablet, Rfl: 0  •  hydrocortisone (ANUSOL-HC) 25 MG suppository, INSERT 1 SUPPOSITORY RECTALLY (REMOVE WRAPPER AND MOISTEN WITH WATER) TWO TIMES A DAY AS NEEDED, Disp: 12 suppository, Rfl:  "3  •  Insulin Pen Needle (B-D UF III MINI PEN NEEDLES) 31G X 5 MM misc, Use to inject insulin twice a day as directed, Disp: 100 each, Rfl: 12  •  polyethylene glycol (MIRALAX) packet, Take 17 g by mouth As Needed., Disp: , Rfl:   •  RABEprazole (ACIPHEX) 20 MG EC tablet, Take 1 tablet by mouth Daily., Disp: 90 tablet, Rfl: 3  •  valACYclovir (Valtrex) 1000 MG tablet, Take 1 tablet by mouth Daily; May take 3 tablets daily as needed for flare up, Disp: 30 tablet, Rfl: PRN    HPI: Leela Muller is a 71 y.o. female who presents today for an 8 month follow up of orthostatic hypotension, tachycardia patent foramen ovale, s/p closure, essential hypertension, and dyslipidemia. Since last visit, patient has done well for the most part. BP is somewhat labile. It is mostly 100-130's per her account although she does not check it often at home. She reports taking lasix only PRN and potassium daily for the last one week. She reports having left sided chest pain, described as sharp. Radiates between shoulder blades. Occurs at rest and with exertion. Sometimes improved with rest, massage and ice. Is not improved with analgesics. Is associated with diaphoresis. Is not associated with nausea or shortness of air. She is concerned because she believes she had a MI in 2007 after her hip surgery although our documentation does not support this. She is unable to walk owed to a \"bad hip\". HR is mostly controlled at rest but does increase with exertion.     The following portions of the patient's history were reviewed and updated as appropriate: allergies, current medications and problem list.    Pertinent positives as listed in the HPI.  All other systems reviewed are negative.         Vitals:    06/24/21 1312   BP: 146/68   BP Location: Left arm   Patient Position: Sitting   Cuff Size: Adult   Pulse: 94   SpO2: 97%   Weight: 78 kg (172 lb)   Height: 170.2 cm (67\")       Physical Exam:  General: Alert and oriented.  Neck: Jugular venous " pressure is within normal limits. Carotids have normal upstrokes without bruits.   Cardiovascular: Heart has a nondisplaced focal PMI. Regular rate and rhythm. No murmur, gallop or rub.  Lungs: Clear, no rales or wheezes. Equal expansion is noted.   Extremities: Show no edema.  Skin: Warm and dry.  Neurologic: Nonfocal.     Diagnostic Data (reviewed with patient):     Lab Results   Component Value Date    GLUCOSE 146 (H) 03/08/2020    BUN 7 (L) 03/08/2020    CREATININE 0.64 03/08/2020    EGFRIFNONA 91 03/08/2020    BCR 10.9 03/08/2020     03/08/2020    K 3.2 (L) 03/08/2020     03/08/2020    CO2 25.9 03/08/2020    CALCIUM 9.1 03/08/2020    ALBUMIN 4.00 03/08/2020    ALKPHOS 68 03/08/2020    AST 28 03/08/2020    ALT 17 03/08/2020     Lab Results   Component Value Date    CHOL 97 06/28/2019    TRIG 171 (H) 06/28/2019    HDL 34 (L) 06/28/2019    LDL 29 06/28/2019      Lab Results   Component Value Date    WBC 9.07 03/08/2020    RBC 5.52 (H) 03/08/2020    HGB 15.5 03/08/2020    HCT 46.7 (H) 03/08/2020    MCV 84.6 03/08/2020     03/08/2020           Procedures      Assessment:    ICD-10-CM ICD-9-CM   1. Orthostatic hypotension  I95.1 458.0   2. Patent foramen ovale  Q21.1 745.5   3. Essential hypertension  I10 401.9   4. Dyslipidemia  E78.5 272.4         Plan:  1. Continue Fludrocortisone for orthostatic hypotension  2. Continue metoprolol and digoxin for tachycardia.   3. Patient was counseled to begin aerobic exercise 30 min per day for at least 4 days per week.   4. Check BMP today.   5. Nuclear chemical stress test for chest pain. Will call with results.   6. Continue ASA, statin for h/o CVA.   7. Continue all other current medications.  8. F/up in 6 months, sooner if needed.      Electronically signed by WEST Davila, 06/24/21, 1:55 PM EDT.

## 2021-06-24 ENCOUNTER — OFFICE VISIT (OUTPATIENT)
Dept: CARDIOLOGY | Facility: CLINIC | Age: 71
End: 2021-06-24

## 2021-06-24 ENCOUNTER — LAB (OUTPATIENT)
Dept: LAB | Facility: HOSPITAL | Age: 71
End: 2021-06-24

## 2021-06-24 VITALS
DIASTOLIC BLOOD PRESSURE: 68 MMHG | BODY MASS INDEX: 27 KG/M2 | HEIGHT: 67 IN | SYSTOLIC BLOOD PRESSURE: 146 MMHG | WEIGHT: 172 LBS | HEART RATE: 94 BPM | OXYGEN SATURATION: 97 %

## 2021-06-24 DIAGNOSIS — I95.1 ORTHOSTATIC HYPOTENSION: Primary | ICD-10-CM

## 2021-06-24 DIAGNOSIS — Q21.12 PATENT FORAMEN OVALE: ICD-10-CM

## 2021-06-24 DIAGNOSIS — R07.89 CHEST PAIN, ATYPICAL: ICD-10-CM

## 2021-06-24 DIAGNOSIS — I10 ESSENTIAL HYPERTENSION: ICD-10-CM

## 2021-06-24 DIAGNOSIS — E78.5 DYSLIPIDEMIA: ICD-10-CM

## 2021-06-24 LAB
ANION GAP SERPL CALCULATED.3IONS-SCNC: 7.3 MMOL/L (ref 5–15)
BUN SERPL-MCNC: 10 MG/DL (ref 8–23)
BUN/CREAT SERPL: 11.5 (ref 7–25)
CALCIUM SPEC-SCNC: 9 MG/DL (ref 8.6–10.5)
CHLORIDE SERPL-SCNC: 101 MMOL/L (ref 98–107)
CO2 SERPL-SCNC: 26.7 MMOL/L (ref 22–29)
CREAT SERPL-MCNC: 0.87 MG/DL (ref 0.57–1)
GFR SERPL CREATININE-BSD FRML MDRD: 64 ML/MIN/1.73
GLUCOSE SERPL-MCNC: 251 MG/DL (ref 65–99)
POTASSIUM SERPL-SCNC: 4 MMOL/L (ref 3.5–5.2)
SODIUM SERPL-SCNC: 135 MMOL/L (ref 136–145)

## 2021-06-24 PROCEDURE — 80048 BASIC METABOLIC PNL TOTAL CA: CPT

## 2021-06-24 PROCEDURE — 36415 COLL VENOUS BLD VENIPUNCTURE: CPT

## 2021-06-24 PROCEDURE — 99213 OFFICE O/P EST LOW 20 MIN: CPT | Performed by: NURSE PRACTITIONER

## 2021-06-24 RX ORDER — FUROSEMIDE 40 MG/1
TABLET ORAL
Qty: 45 TABLET | Refills: 1 | Status: SHIPPED | OUTPATIENT
Start: 2021-06-24 | End: 2022-05-31 | Stop reason: SDUPTHER

## 2021-06-24 RX ORDER — FLUCONAZOLE 150 MG/1
150 TABLET ORAL ONCE AS NEEDED
COMMUNITY
End: 2021-09-28 | Stop reason: SDUPTHER

## 2021-06-24 RX ORDER — DIGOXIN 250 MCG
TABLET ORAL
Qty: 90 TABLET | Refills: 0 | Status: SHIPPED | OUTPATIENT
Start: 2021-06-24 | End: 2021-07-09

## 2021-06-25 RX ORDER — POTASSIUM CHLORIDE 750 MG/1
10 TABLET, EXTENDED RELEASE ORAL DAILY
Qty: 90 TABLET | Refills: 0 | Status: SHIPPED | OUTPATIENT
Start: 2021-06-25

## 2021-06-29 ENCOUNTER — TRANSCRIBE ORDERS (OUTPATIENT)
Dept: LAB | Facility: HOSPITAL | Age: 71
End: 2021-06-29

## 2021-06-29 ENCOUNTER — LAB (OUTPATIENT)
Dept: LAB | Facility: HOSPITAL | Age: 71
End: 2021-06-29

## 2021-06-29 DIAGNOSIS — R11.0 NAUSEA: Primary | ICD-10-CM

## 2021-06-29 DIAGNOSIS — R11.0 NAUSEA: ICD-10-CM

## 2021-06-29 PROCEDURE — 83516 IMMUNOASSAY NONANTIBODY: CPT

## 2021-06-29 PROCEDURE — 86255 FLUORESCENT ANTIBODY SCREEN: CPT

## 2021-06-29 PROCEDURE — 82784 ASSAY IGA/IGD/IGG/IGM EACH: CPT

## 2021-06-29 PROCEDURE — 36415 COLL VENOUS BLD VENIPUNCTURE: CPT

## 2021-06-30 LAB
ENDOMYSIUM IGA SER QL: NEGATIVE
FATTY ACIDS: NORMAL
IGA SERPL-MCNC: 222 MG/DL (ref 64–422)
LACTOFERRIN STL QL LA: NEGATIVE
NEUTRAL FATS: NORMAL
TTG IGA SER-ACNC: <2 U/ML (ref 0–3)

## 2021-06-30 PROCEDURE — 89125 SPECIMEN FAT STAIN: CPT

## 2021-06-30 PROCEDURE — 83630 LACTOFERRIN FECAL (QUAL): CPT

## 2021-06-30 RX ORDER — NITROGLYCERIN 0.4 MG/1
0.4 TABLET SUBLINGUAL
Qty: 25 TABLET | Refills: 6 | Status: SHIPPED | OUTPATIENT
Start: 2021-06-30 | End: 2023-01-18 | Stop reason: SDUPTHER

## 2021-07-09 ENCOUNTER — TRANSCRIBE ORDERS (OUTPATIENT)
Dept: ADMINISTRATIVE | Facility: HOSPITAL | Age: 71
End: 2021-07-09

## 2021-07-09 DIAGNOSIS — Z12.31 VISIT FOR SCREENING MAMMOGRAM: Primary | ICD-10-CM

## 2021-07-09 RX ORDER — DIGOXIN 250 MCG
TABLET ORAL
Qty: 90 TABLET | Refills: 2 | Status: SHIPPED | OUTPATIENT
Start: 2021-07-09 | End: 2022-05-31 | Stop reason: SDUPTHER

## 2021-08-04 NOTE — PATIENT INSTRUCTIONS
Given new dosage and to recheck 1 week   AH   Perineural Invasion (For Histology - Be Specific If Possible): absent

## 2021-08-09 ENCOUNTER — HOSPITAL ENCOUNTER (OUTPATIENT)
Dept: CARDIOLOGY | Facility: HOSPITAL | Age: 71
Discharge: HOME | End: 2021-08-09

## 2021-08-09 VITALS
HEIGHT: 67 IN | DIASTOLIC BLOOD PRESSURE: 76 MMHG | BODY MASS INDEX: 26.99 KG/M2 | HEART RATE: 66 BPM | WEIGHT: 171.96 LBS | SYSTOLIC BLOOD PRESSURE: 134 MMHG

## 2021-08-09 DIAGNOSIS — R07.89 CHEST PAIN, ATYPICAL: ICD-10-CM

## 2021-08-09 PROCEDURE — 78452 HT MUSCLE IMAGE SPECT MULT: CPT

## 2021-08-09 PROCEDURE — 25010000002 REGADENOSON 0.4 MG/5ML SOLUTION: Performed by: NURSE PRACTITIONER

## 2021-08-09 PROCEDURE — 0 TECHNETIUM SESTAMIBI: Performed by: NURSE PRACTITIONER

## 2021-08-09 PROCEDURE — A9500 TC99M SESTAMIBI: HCPCS | Performed by: NURSE PRACTITIONER

## 2021-08-09 PROCEDURE — 93017 CV STRESS TEST TRACING ONLY: CPT

## 2021-08-09 PROCEDURE — 93018 CV STRESS TEST I&R ONLY: CPT | Performed by: INTERNAL MEDICINE

## 2021-08-09 PROCEDURE — 78452 HT MUSCLE IMAGE SPECT MULT: CPT | Performed by: INTERNAL MEDICINE

## 2021-08-09 RX ORDER — CAFFEINE CITRATE 20 MG/ML
60 SOLUTION INTRAVENOUS ONCE
Status: COMPLETED | OUTPATIENT
Start: 2021-08-09 | End: 2021-08-09

## 2021-08-09 RX ADMIN — CAFFEINE CITRATE 60 MG: 20 INJECTION, SOLUTION INTRAVENOUS at 10:09

## 2021-08-09 RX ADMIN — TECHNETIUM TC 99M SESTAMIBI 1 DOSE: 1 INJECTION INTRAVENOUS at 08:25

## 2021-08-09 RX ADMIN — REGADENOSON 0.4 MG: 0.08 INJECTION, SOLUTION INTRAVENOUS at 10:02

## 2021-08-09 RX ADMIN — TECHNETIUM TC 99M SESTAMIBI 1 DOSE: 1 INJECTION INTRAVENOUS at 10:05

## 2021-08-10 ENCOUNTER — TELEPHONE (OUTPATIENT)
Dept: NEUROLOGY | Facility: CLINIC | Age: 71
End: 2021-08-10

## 2021-08-10 DIAGNOSIS — G43.009 MIGRAINE WITHOUT AURA AND WITHOUT STATUS MIGRAINOSUS, NOT INTRACTABLE: ICD-10-CM

## 2021-08-10 NOTE — TELEPHONE ENCOUNTER
----- Message from Dorene Saenz sent at 8/10/2021 11:01 AM EDT -----  Regarding: medication  I called patient to reschedule her appt with Dr. Lewis, and she rescheduled with REINIER Lombardi.  She asked if she had to come in, and I told her that the APRN would have to review her health, and her medications.  Also patient said she needed refill on her topirmate, raglin, and butalbital.  I confirmed the pharmacy.

## 2021-08-11 RX ORDER — METOCLOPRAMIDE 10 MG/1
10 TABLET ORAL DAILY PRN
Qty: 30 TABLET | Refills: 2 | Status: SHIPPED | OUTPATIENT
Start: 2021-08-11 | End: 2021-10-25

## 2021-08-11 RX ORDER — BUTALBITAL, ACETAMINOPHEN AND CAFFEINE 50; 325; 40 MG/1; MG/1; MG/1
1 TABLET ORAL DAILY PRN
Qty: 30 TABLET | Refills: 0 | Status: SHIPPED | OUTPATIENT
Start: 2021-08-11 | End: 2021-10-25

## 2021-08-11 RX ORDER — TOPIRAMATE 25 MG/1
25 TABLET ORAL DAILY
Qty: 90 TABLET | Refills: 0 | Status: SHIPPED | OUTPATIENT
Start: 2021-08-11 | End: 2021-09-09

## 2021-08-11 NOTE — TELEPHONE ENCOUNTER
She will need to be seen in person for 10/25/21 visit since I have never seen her before and then we can alternate with phone visits following that. Thanks, WEST Barron     I reviewed her alfonso and I sent her refills, but also she already had refills with her pharmacy for the fioricet and reglan so make sure she checks with her pharmacy first.

## 2021-08-11 NOTE — TELEPHONE ENCOUNTER
I CALLED AND SPOKE WITH PT AND SHE IS AWARE HER MEDICATION WAS SENT TO PHARMACY AND SHE PREFERS COMING INTO THE CLINIC FOR HER VISITS, SHE WAS REFERRING TO GETTING REFILLS FOR HER MEDICATIONS WHEN SHE SAID COME IN.

## 2021-08-12 LAB
BH CV REST NUCLEAR ISOTOPE DOSE: 9.9 MCI
BH CV STRESS BP STAGE 2: NORMAL
BH CV STRESS BP STAGE 4: NORMAL
BH CV STRESS COMMENTS STAGE 1: NORMAL
BH CV STRESS DOSE REGADENOSON STAGE 1: 0.4
BH CV STRESS DURATION MIN STAGE 1: 1
BH CV STRESS DURATION MIN STAGE 2: 1
BH CV STRESS DURATION MIN STAGE 3: 1
BH CV STRESS DURATION MIN STAGE 4: 1
BH CV STRESS DURATION SEC STAGE 1: 0
BH CV STRESS DURATION SEC STAGE 2: 0
BH CV STRESS HR STAGE 1: 75
BH CV STRESS HR STAGE 2: 94
BH CV STRESS HR STAGE 3: 86
BH CV STRESS HR STAGE 4: 85
BH CV STRESS NUCLEAR ISOTOPE DOSE: 32.5 MCI
BH CV STRESS O2 STAGE 1: 97
BH CV STRESS O2 STAGE 2: 97
BH CV STRESS O2 STAGE 3: 99
BH CV STRESS O2 STAGE 4: 99
BH CV STRESS PROTOCOL 1: NORMAL
BH CV STRESS RECOVERY BP: NORMAL MMHG
BH CV STRESS RECOVERY HR: 69 BPM
BH CV STRESS RECOVERY O2: 99 %
BH CV STRESS STAGE 1: 1
BH CV STRESS STAGE 2: 2
BH CV STRESS STAGE 3: 3
BH CV STRESS STAGE 4: 4
LV EF NUC BP: 58 %
MAXIMAL PREDICTED HEART RATE: 147 BPM
PERCENT MAX PREDICTED HR: 63.95 %
STRESS BASELINE BP: NORMAL MMHG
STRESS BASELINE HR: 65 BPM
STRESS O2 SAT REST: 96 %
STRESS PERCENT HR: 75 %
STRESS POST ESTIMATED WORKLOAD: 1 METS
STRESS POST EXERCISE DUR MIN: 4 MIN
STRESS POST EXERCISE DUR SEC: 0 SEC
STRESS POST O2 SAT PEAK: 99 %
STRESS POST PEAK BP: NORMAL MMHG
STRESS POST PEAK HR: 94 BPM
STRESS TARGET HR: 125 BPM

## 2021-08-13 ENCOUNTER — HOSPITAL ENCOUNTER (OUTPATIENT)
Dept: MAMMOGRAPHY | Facility: HOSPITAL | Age: 71
Discharge: HOME | End: 2021-08-13
Admitting: INTERNAL MEDICINE

## 2021-08-13 DIAGNOSIS — Z12.31 VISIT FOR SCREENING MAMMOGRAM: ICD-10-CM

## 2021-08-13 PROCEDURE — 77067 SCR MAMMO BI INCL CAD: CPT

## 2021-08-13 PROCEDURE — 77063 BREAST TOMOSYNTHESIS BI: CPT | Performed by: RADIOLOGY

## 2021-08-13 PROCEDURE — 77067 SCR MAMMO BI INCL CAD: CPT | Performed by: RADIOLOGY

## 2021-08-13 PROCEDURE — 77063 BREAST TOMOSYNTHESIS BI: CPT

## 2021-08-19 ENCOUNTER — HOSPITAL ENCOUNTER (OUTPATIENT)
Dept: CT IMAGING | Facility: HOSPITAL | Age: 71
Discharge: HOME | End: 2021-08-19
Payer: MEDICARE

## 2021-08-19 ENCOUNTER — HOSPITAL ENCOUNTER (OUTPATIENT)
Facility: HOSPITAL | Age: 71
Discharge: HOME | End: 2021-08-19
Payer: MEDICARE

## 2021-08-19 ENCOUNTER — OUTSIDE FACILITY SERVICE (OUTPATIENT)
Dept: CARDIOLOGY | Facility: CLINIC | Age: 71
End: 2021-08-19

## 2021-08-19 ENCOUNTER — CLINICAL SUPPORT (OUTPATIENT)
Dept: CARDIOLOGY | Facility: CLINIC | Age: 71
End: 2021-08-19

## 2021-08-19 DIAGNOSIS — R06.02 SHORTNESS OF BREATH: Primary | ICD-10-CM

## 2021-08-19 DIAGNOSIS — R06.02 SHORTNESS OF BREATH: ICD-10-CM

## 2021-08-19 DIAGNOSIS — R00.0 TACHYCARDIA: ICD-10-CM

## 2021-08-19 LAB
ANION GAP SERPL CALCULATED.3IONS-SCNC: 7 MMOL/L (ref 3–16)
BUN BLDV-MCNC: 11 MG/DL (ref 6–20)
CALCIUM SERPL-MCNC: 9.4 MG/DL (ref 8.5–10.5)
CHLORIDE BLD-SCNC: 100 MMOL/L (ref 98–107)
CO2: 29 MMOL/L (ref 20–30)
CREAT SERPL-MCNC: 0.8 MG/DL (ref 0.4–1.2)
GFR AFRICAN AMERICAN: >59
GFR NON-AFRICAN AMERICAN: >60
GLUCOSE BLD-MCNC: 245 MG/DL (ref 74–106)
POTASSIUM SERPL-SCNC: 4.3 MMOL/L (ref 3.4–5.1)
SODIUM BLD-SCNC: 136 MMOL/L (ref 136–145)

## 2021-08-19 PROCEDURE — 80048 BASIC METABOLIC PNL TOTAL CA: CPT

## 2021-08-19 PROCEDURE — 6360000004 HC RX CONTRAST MEDICATION: Performed by: INTERNAL MEDICINE

## 2021-08-19 PROCEDURE — 93010 ELECTROCARDIOGRAM REPORT: CPT | Performed by: INTERNAL MEDICINE

## 2021-08-19 PROCEDURE — 36415 COLL VENOUS BLD VENIPUNCTURE: CPT

## 2021-08-19 PROCEDURE — 93005 ELECTROCARDIOGRAM TRACING: CPT

## 2021-08-19 PROCEDURE — 71275 CT ANGIOGRAPHY CHEST: CPT

## 2021-08-19 RX ADMIN — IOPAMIDOL 100 ML: 755 INJECTION, SOLUTION INTRAVENOUS at 15:19

## 2021-08-19 NOTE — PROGRESS NOTES
Pt walked into the [place] office today- we walked in the loving- HR started at 90 but that was after walking into the office- she complained of SOA and she was notably SOB- her O2 level dropped from 96% to 89% with ambulation after about 600 feet.     She does have a history of TIMO pulmonary emboli- not currently anticoagulated. Will get STAT CT PE protocol in [place] today. EKG performed as well- NSR rate 80.

## 2021-08-23 DIAGNOSIS — R06.02 SHORTNESS OF BREATH: ICD-10-CM

## 2021-08-26 ENCOUNTER — HOSPITAL ENCOUNTER (OUTPATIENT)
Dept: CT IMAGING | Facility: HOSPITAL | Age: 71
Discharge: HOME | End: 2021-08-26
Payer: MEDICARE

## 2021-08-26 DIAGNOSIS — R16.0 LIVER MASS: ICD-10-CM

## 2021-08-26 PROCEDURE — 74177 CT ABD & PELVIS W/CONTRAST: CPT

## 2021-08-26 PROCEDURE — 6360000004 HC RX CONTRAST MEDICATION: Performed by: INTERNAL MEDICINE

## 2021-08-26 RX ADMIN — IOPAMIDOL 100 ML: 755 INJECTION, SOLUTION INTRAVENOUS at 14:33

## 2021-08-30 ENCOUNTER — HOSPITAL ENCOUNTER (OUTPATIENT)
Dept: MRI IMAGING | Facility: HOSPITAL | Age: 71
Discharge: HOME | End: 2021-08-30
Payer: MEDICARE

## 2021-08-30 DIAGNOSIS — R16.0 LIVER MASS: ICD-10-CM

## 2021-08-30 PROCEDURE — A9581 GADOXETATE DISODIUM INJ: HCPCS | Performed by: INTERNAL MEDICINE

## 2021-08-30 PROCEDURE — 6360000004 HC RX CONTRAST MEDICATION: Performed by: INTERNAL MEDICINE

## 2021-08-30 PROCEDURE — 74183 MRI ABD W/O CNTR FLWD CNTR: CPT

## 2021-08-30 RX ADMIN — GADOXETATE DISODIUM 10 ML: 181.43 INJECTION, SOLUTION INTRAVENOUS at 17:45

## 2021-09-08 ENCOUNTER — TELEPHONE (OUTPATIENT)
Dept: GASTROENTEROLOGY | Facility: CLINIC | Age: 71
End: 2021-09-08

## 2021-09-08 DIAGNOSIS — M54.2 NECK PAIN: ICD-10-CM

## 2021-09-08 RX ORDER — GABAPENTIN 800 MG/1
400 TABLET ORAL 3 TIMES DAILY
Qty: 45 TABLET | Refills: 2 | Status: SHIPPED | OUTPATIENT
Start: 2021-09-08 | End: 2021-10-25

## 2021-09-08 NOTE — TELEPHONE ENCOUNTER
Rx Refill Note  Requested Prescriptions     Pending Prescriptions Disp Refills   • gabapentin (NEURONTIN) 800 MG tablet [Pharmacy Med Name: Gabapentin 800 MG Oral Tablet (NEURONTIN)] 60 tablet 5     Sig: Take 1/2 tablet by mouth 3 (Three) Times a Day.      Last office visit with prescribing clinician: 6/8/2021      Next office visit with prescribing clinician:  10-           Anjali Mcgraw CMA  09/08/21, 10:40 EDT

## 2021-09-08 NOTE — TELEPHONE ENCOUNTER
Sunshine,   This PT has a referral to see us for a liver mass. Could you review her records and see if we could get her in sooner? The soonest new patient appt was with Dr Garcia on 10/6 @ 1:00.  Thanks,   Bia

## 2021-09-09 DIAGNOSIS — G43.009 MIGRAINE WITHOUT AURA AND WITHOUT STATUS MIGRAINOSUS, NOT INTRACTABLE: ICD-10-CM

## 2021-09-09 RX ORDER — TOPIRAMATE 25 MG/1
25 TABLET ORAL DAILY
Qty: 90 TABLET | Refills: 0 | Status: SHIPPED | OUTPATIENT
Start: 2021-09-09 | End: 2021-10-25

## 2021-09-09 NOTE — TELEPHONE ENCOUNTER
Rx Refill Note  Requested Prescriptions     Pending Prescriptions Disp Refills   • topiramate (TOPAMAX) 25 MG tablet [Pharmacy Med Name: Topiramate 25 MG Oral Tablet (TOPAMAX)] 90 tablet 0     Sig: Take 1 tablet by mouth Daily.      Last office visit with prescribing clinician: 6-8-2021     Next office visit with prescribing clinician: 10/25/2021            Anjali Mcgraw CMA  09/09/21, 15:08 EDT

## 2021-09-15 ENCOUNTER — OFFICE VISIT (OUTPATIENT)
Dept: GASTROENTEROLOGY | Facility: CLINIC | Age: 71
End: 2021-09-15

## 2021-09-15 ENCOUNTER — LAB (OUTPATIENT)
Dept: LAB | Facility: HOSPITAL | Age: 71
End: 2021-09-15

## 2021-09-15 ENCOUNTER — PREP FOR SURGERY (OUTPATIENT)
Dept: OTHER | Facility: HOSPITAL | Age: 71
End: 2021-09-15

## 2021-09-15 VITALS
BODY MASS INDEX: 26.21 KG/M2 | HEART RATE: 89 BPM | SYSTOLIC BLOOD PRESSURE: 138 MMHG | DIASTOLIC BLOOD PRESSURE: 80 MMHG | WEIGHT: 167 LBS | OXYGEN SATURATION: 94 % | HEIGHT: 67 IN | TEMPERATURE: 98 F

## 2021-09-15 DIAGNOSIS — R16.0 LIVER MASS: ICD-10-CM

## 2021-09-15 DIAGNOSIS — C20 RECTAL CANCER (HCC): ICD-10-CM

## 2021-09-15 DIAGNOSIS — R10.11 RUQ PAIN: ICD-10-CM

## 2021-09-15 DIAGNOSIS — K58.0 IRRITABLE BOWEL SYNDROME WITH DIARRHEA: ICD-10-CM

## 2021-09-15 DIAGNOSIS — K85.90 RECURRENT PANCREATITIS: ICD-10-CM

## 2021-09-15 DIAGNOSIS — R93.2 ABNORMAL FINDINGS ON DIAGNOSTIC IMAGING OF LIVER AND BILIARY TRACT: ICD-10-CM

## 2021-09-15 DIAGNOSIS — R16.0 LIVER MASS, RIGHT LOBE: Primary | ICD-10-CM

## 2021-09-15 DIAGNOSIS — R16.0 LIVER MASS: Primary | ICD-10-CM

## 2021-09-15 LAB
ALBUMIN SERPL-MCNC: 4.4 G/DL (ref 3.5–5.2)
ALBUMIN/GLOB SERPL: 1.8 G/DL
ALP SERPL-CCNC: 67 U/L (ref 39–117)
ALPHA-FETOPROTEIN: 1.62 NG/ML (ref 0–8.3)
ALT SERPL W P-5'-P-CCNC: 23 U/L (ref 1–33)
ANION GAP SERPL CALCULATED.3IONS-SCNC: 10.7 MMOL/L (ref 5–15)
AST SERPL-CCNC: 29 U/L (ref 1–32)
BASOPHILS # BLD AUTO: 0.08 10*3/MM3 (ref 0–0.2)
BASOPHILS NFR BLD AUTO: 0.9 % (ref 0–1.5)
BILIRUB SERPL-MCNC: 0.3 MG/DL (ref 0–1.2)
BUN SERPL-MCNC: 11 MG/DL (ref 8–23)
BUN/CREAT SERPL: 14.1 (ref 7–25)
CALCIUM SPEC-SCNC: 9.1 MG/DL (ref 8.6–10.5)
CHLORIDE SERPL-SCNC: 100 MMOL/L (ref 98–107)
CO2 SERPL-SCNC: 27.3 MMOL/L (ref 22–29)
CREAT SERPL-MCNC: 0.78 MG/DL (ref 0.57–1)
DEPRECATED RDW RBC AUTO: 41.8 FL (ref 37–54)
EOSINOPHIL # BLD AUTO: 0.16 10*3/MM3 (ref 0–0.4)
EOSINOPHIL NFR BLD AUTO: 1.8 % (ref 0.3–6.2)
ERYTHROCYTE [DISTWIDTH] IN BLOOD BY AUTOMATED COUNT: 13.2 % (ref 12.3–15.4)
GFR SERPL CREATININE-BSD FRML MDRD: 72 ML/MIN/1.73
GLOBULIN UR ELPH-MCNC: 2.4 GM/DL
GLUCOSE SERPL-MCNC: 237 MG/DL (ref 65–99)
HCT VFR BLD AUTO: 43.1 % (ref 34–46.6)
HGB BLD-MCNC: 14.2 G/DL (ref 12–15.9)
INR PPP: 1.01 (ref 0.85–1.16)
LIPASE SERPL-CCNC: 12 U/L (ref 13–60)
LYMPHOCYTES # BLD AUTO: 2.3 10*3/MM3 (ref 0.7–3.1)
LYMPHOCYTES NFR BLD AUTO: 26.1 % (ref 19.6–45.3)
MCH RBC QN AUTO: 29 PG (ref 26.6–33)
MCHC RBC AUTO-ENTMCNC: 32.9 G/DL (ref 31.5–35.7)
MCV RBC AUTO: 88.1 FL (ref 79–97)
MONOCYTES # BLD AUTO: 0.64 10*3/MM3 (ref 0.1–0.9)
MONOCYTES NFR BLD AUTO: 7.3 % (ref 5–12)
NEUTROPHILS NFR BLD AUTO: 5.6 10*3/MM3 (ref 1.7–7)
NEUTROPHILS NFR BLD AUTO: 63.7 % (ref 42.7–76)
PLATELET # BLD AUTO: 159 10*3/MM3 (ref 140–450)
PMV BLD AUTO: 13.1 FL (ref 6–12)
POTASSIUM SERPL-SCNC: 4 MMOL/L (ref 3.5–5.2)
PROT SERPL-MCNC: 6.8 G/DL (ref 6–8.5)
PROTHROMBIN TIME: 13 SECONDS (ref 11.4–14.4)
RBC # BLD AUTO: 4.89 10*6/MM3 (ref 3.77–5.28)
SODIUM SERPL-SCNC: 138 MMOL/L (ref 136–145)
WBC # BLD AUTO: 8.8 10*3/MM3 (ref 3.4–10.8)

## 2021-09-15 PROCEDURE — 83516 IMMUNOASSAY NONANTIBODY: CPT

## 2021-09-15 PROCEDURE — 99214 OFFICE O/P EST MOD 30 MIN: CPT | Performed by: NURSE PRACTITIONER

## 2021-09-15 PROCEDURE — 83690 ASSAY OF LIPASE: CPT

## 2021-09-15 PROCEDURE — 85610 PROTHROMBIN TIME: CPT

## 2021-09-15 PROCEDURE — 80053 COMPREHEN METABOLIC PANEL: CPT

## 2021-09-15 PROCEDURE — 86255 FLUORESCENT ANTIBODY SCREEN: CPT

## 2021-09-15 PROCEDURE — 36415 COLL VENOUS BLD VENIPUNCTURE: CPT

## 2021-09-15 PROCEDURE — 82784 ASSAY IGA/IGD/IGG/IGM EACH: CPT

## 2021-09-15 PROCEDURE — 85025 COMPLETE CBC W/AUTO DIFF WBC: CPT

## 2021-09-15 PROCEDURE — 82105 ALPHA-FETOPROTEIN SERUM: CPT

## 2021-09-15 NOTE — PROGRESS NOTES
Follow Up      Patient Name: Leela Muller  : 1950   MRN: [id number]     Chief Complaint:    Chief Complaint   Patient presents with   • Abnormal Imaging     liver mass        History of Present Illness: Leela Muller is a 71 y.o. female who is here today for follow up on liver mass    Leela began having right upper quadrant pain about 6 months ago. The pain radiates along her side into her back. It does not relate to eating. There are no relieving factors besides pressure to the area. There is no unintentional weight loss. She does have a history of pulmonary embolisms. She does have a history of rectal cancer and is followed by Dr. Padilla for this. She underwent CT of her chest which caught new liver mass. A repeat CT abdomen was performed which was significant for a heterogenous, infiltrative, and ill-defined hypoattenuating lesion of the right hepatic lobe. An MRI of her abdomen was then performed which is significant for subcapsular masslike lesion of the right hepatic lobe. This was thought to be focal fatty infiltration.  She did have a CT of her abdomen in March of last year which showed no mass of her liver.      She does have a history of recurrent pancreatitis and is currently on Creon and Effexor for this. She does not know the cause of her recurrent pancreatitis. Has followed with Dr. Clifford and before this Dr. Landa.  She reports chronic reflux but does not take anything for this. She does not recall having an EGD.  History of chronic diarrhea and she is treated with Librax for this.    Subjective      Review of Systems:   Review of Systems   Constitutional: Negative for appetite change and unexpected weight loss.   HENT: Negative for trouble swallowing.    Gastrointestinal: Positive for abdominal pain, diarrhea, nausea, GERD and indigestion. Negative for abdominal distention, blood in stool, constipation, rectal pain and vomiting.       Medications:     Current Outpatient Medications:   •   acetaminophen (TYLENOL) 500 MG tablet, Take 500 mg by mouth Every 6 (Six) Hours As Needed., Disp: , Rfl:   •  acyclovir (ZOVIRAX) 5 % ointment, Apply 1 unit topically to the anal area as needed, Disp: 15 g, Rfl: PRN  •  albuterol sulfate  (90 Base) MCG/ACT inhaler, Inhale 2 puffs by mouth every 4 hours, Disp: 8.5 g, Rfl: 3  •  aspirin 81 MG tablet, Take 81 mg by mouth Daily., Disp: , Rfl:   •  baclofen (LIORESAL) 10 MG tablet, Take 1 tablet by mouth 3 (three) times a day., Disp: 90 tablet, Rfl: 11  •  butalbital-acetaminophen-caffeine (FIORICET, ESGIC) -40 MG per tablet, Take 1 tablet by mouth Daily As Needed for headache, Disp: 30 tablet, Rfl: 0  •  clidinium-chlordiazePOXIDE (LIBRAX) 5-2.5 MG per capsule, Take 1 capsule by mouth 2 (Two) Times a Day As Needed., Disp: 60 capsule, Rfl: 0  •  clonazePAM (KlonoPIN) 0.5 MG tablet, As Needed., Disp: , Rfl:   •  Cream Base Liposomic (PCCA CUSTOM LIPO-MAX) cream, As Needed., Disp: , Rfl:   •  digoxin (LANOXIN) 250 MCG tablet, Take 1 tablet by mouth daily, Disp: 90 tablet, Rfl: 2  •  fluconazole (DIFLUCAN) 150 MG tablet, Take 150 mg by mouth 1 (One) Time As Needed., Disp: , Rfl:   •  fludrocortisone 0.1 MG tablet, Take 1 tablet by mouth Daily., Disp: 30 tablet, Rfl: 11  •  furosemide (LASIX) 40 MG tablet, Take 1 tablet by mouth Daily. May have extra 1/2 to 1 tablet daily if needed for edema, Disp: 45 tablet, Rfl: 1  •  gabapentin (NEURONTIN) 800 MG tablet, Take 1/2 tablet by mouth 3 (Three) Times a Day., Disp: 45 tablet, Rfl: 2  •  glipizide (GLUCOTROL XL) 10 MG 24 hr tablet, Take 2 tablets by mouth Daily., Disp: 180 tablet, Rfl: 3  •  glucose blood (ONE TOUCH ULTRA TEST) test strip, Use to test blood glucose as directed 3 times a day, Disp: 100 each, Rfl: 5  •  glucose blood (OneTouch Verio) test strip, Use to test blood glucose 3 times a day as directed, Disp: 300 each, Rfl: 3  •  HYDROcodone-acetaminophen (NORCO) 7.5-325 MG per tablet, Take 1 tablet by mouth  3 (Three) Times a Day., Disp: 90 tablet, Rfl: 0  •  hydrocortisone (ANUSOL-HC) 25 MG suppository, Insert 1 suppository rectally twice a day as needed (remove wrapper and moisten with water), Disp: 12 suppository, Rfl: 3  •  Insulin Glargine (BASAGLAR KWIKPEN) 100 UNIT/ML injection pen, Inject 50 units subcutaneously once in the morning and 70 units at night, Disp: 30 mL, Rfl: 5  •  Insulin Pen Needle (B-D UF III MINI PEN NEEDLES) 31G X 5 MM misc, Use to inject insulin twice a day as directed, Disp: 100 each, Rfl: 12  •  Insulin Syringe-Needle U-100 29G 0.5 ML misc, Inject 0.3 mL as directed Daily., Disp: , Rfl:   •  loperamide (IMODIUM) 2 MG capsule, Take 2 mg by mouth 4 (Four) Times a Day As Needed for Diarrhea., Disp: , Rfl:   •  meclizine (ANTIVERT) 25 MG tablet, Take 1 tablet by mouth 3 (Three) Times a Day As Needed., Disp: 30 tablet, Rfl: 3  •  methylcellulose, Laxative, (CITRUCEL) 500 MG tablet tablet, Take 2 tablets by mouth., Disp: , Rfl:   •  metoclopramide (REGLAN) 10 MG tablet, Take 1 tablet by mouth Daily As Needed for migraine., Disp: 30 tablet, Rfl: 2  •  metoprolol succinate XL (TOPROL-XL) 100 MG 24 hr tablet, Take 1 tablet by mouth Daily., Disp: 90 tablet, Rfl: 1  •  metroNIDAZOLE (METROCREAM) 0.75 % cream, Apply to the face once every night (Patient taking differently: Apply  topically to the appropriate area as directed As Needed.), Disp: 45 g, Rfl: 0  •  mometasone (ELOCON) 0.1 % cream, APPLY A THIN LAYER TO THE AFFECTED AREA(S) TOPICALLY ONCE DAILY, Disp: 45 g, Rfl: 3  •  neomycin-polymyxin-hydrocortisone (CORTISPORIN) 3.5-04000-7 otic suspension, INSTILL 4 DROPS INTO AFFECTED EAR(S) 3 (THREE) TIMES PER DAY, Disp: 10 mL, Rfl: 0  •  nitroglycerin (Nitrostat) 0.4 MG SL tablet, Place 1 tablet under the tongue Every 5 Minutes As Needed for Chest Pain for up to 3 total doses. Call 911 if pain remains after 1 dose., Disp: 25 tablet, Rfl: 6  •  pancrelipase, Lip-Prot-Amyl, (Pancreaze) 37518-16617 units  capsule delayed-release particles capsule, Take 1 capsule with each meal and with each snack, Disp: 100 capsule, Rfl: 2  •  polyethylene glycol (MIRALAX) packet, Take 17 g by mouth As Needed., Disp: , Rfl:   •  potassium chloride (KLOR-CON) 10 MEQ CR tablet, Take 1 tablet by mouth Daily as directed, Disp: 90 tablet, Rfl: 0  •  promethazine (PHENERGAN) 25 MG tablet, Take 1 tablet by mouth 4 (Four) Times a Day as needed for nausea, Disp: 30 tablet, Rfl: 3  •  promethazine (PHENERGAN) 25 MG tablet, Take 1 tablet by mouth 4 (Four) Times a Day As Needed for Nausea, Disp: 30 tablet, Rfl: 0  •  RABEprazole (ACIPHEX) 20 MG EC tablet, Take 1 tablet by mouth Daily., Disp: 90 tablet, Rfl: 1  •  rosuvastatin (CRESTOR) 10 MG tablet, Take 1 tablet by mouth Daily., Disp: 90 tablet, Rfl: 2  •  topiramate (TOPAMAX) 25 MG tablet, Take 1 tablet by mouth Daily., Disp: 90 tablet, Rfl: 0  •  triamcinolone (KENALOG) 0.1 % cream, Apply 1 gram (about 2 fingertips worth) topically to affected area twice a day as needed., Disp: 60 g, Rfl: 1  •  valACYclovir (Valtrex) 1000 MG tablet, Take 1 tablet by mouth Daily; May take 3 tablets daily as needed for flare up, Disp: 30 tablet, Rfl: PRN  •  venlafaxine XR (EFFEXOR-XR) 75 MG 24 hr capsule, Take 1 capsule by mouth Daily., Disp: 30 capsule, Rfl: 11    Allergies:   Allergies   Allergen Reactions   • Atorvastatin Swelling   • Tetanus Toxoid Hives   • Acyclovir Seizure   • Cefprozil Other (See Comments)   • Lansoprazole Nausea Only and Nausea And Vomiting   • Ranexa [Ranolazine Er] Nausea And Vomiting       Social History:   Social History     Socioeconomic History   • Marital status:      Spouse name: Not on file   • Number of children: Not on file   • Years of education: Not on file   • Highest education level: Not on file   Tobacco Use   • Smoking status: Never Smoker   • Smokeless tobacco: Never Used   Vaping Use   • Vaping Use: Never used   Substance and Sexual Activity   • Alcohol use:  No   • Drug use: No   • Sexual activity: Defer        Surgical History:   Past Surgical History:   Procedure Laterality Date   • APPENDECTOMY     • BREAST BIOPSY Left 2012    benign   • BREAST BIOPSY     • BREAST EXCISIONAL BIOPSY Left     benign   • BREAST EXCISIONAL BIOPSY Right     benign   • BREAST EXCISIONAL BIOPSY Right 2020    benign   • BREAST SURGERY     • CHOLECYSTECTOMY     • COLONOSCOPY     • HYSTERECTOMY     • OOPHORECTOMY     • RECTAL SURGERY     • TONSILLECTOMY     • TOTAL HIP ARTHROPLASTY REVISION          Medical History:   Past Medical History:   Diagnosis Date   • Anxiety    • Arm pain    • Arthritis    • Breast injury     fell 6/2019    • Chronic pancreatitis (CMS/HCC)    • Depression    • Diabetes mellitus (CMS/HCC)    • Hyperlipidemia    • Hypertension    • Neck pain on left side    • Palpitations 4/18/2018   • Pancreatitis    • Pulmonary embolism (CMS/HCC)    • Rectal cancer (CMS/HCC)    • Stroke syndrome 9/14/2016    · Assessed By: Devaughn Lewis (Neurology); Last Assessed: 16 Jun 2015  Right cerebrovascular accident in July or August 2010, evaluated at [facility].  Admission to [facility] on 09/28/2010 with headache and stuttering, consistent with TIA.  Chronic Coumadin therapy, initiated following bilateral pulmonary emboli in 2007 after fall and hip replacement.  She was already on 81 mg of aspirin as well, 09/2010.  MRI of the brain on 09/28/2010 revealing old right parietal cerebrovascular accident.  MRA revealing normal carotids, middle anterior and posterior cerebral arteries with minimal ostial stenosis of both vertebral arteries.  GENARO by Dr. Oliver Mobley on 09/28/2010:  patent foramen ovale with a small amount of right to left shunting.  Normal LVEF and normal valvular structures.   Patent foramen ovale closure using a 25 mm. Amplatzer cribriform occluder, 10/05/2010.   Echocardiogram, 06/23/2014:  LVEF (55% to 60%) with and Amplatzer device noted  "to be well-seated in    • Thrombocytopenia (CMS/HCC)    • Transient cerebral ischemia         Objective     Physical Exam:  Vital Signs:   Vitals:    09/15/21 1429   BP: 138/80   BP Location: Right arm   Patient Position: Sitting   Cuff Size: Adult   Pulse: 89   Temp: 98 °F (36.7 °C)   TempSrc: Temporal   SpO2: 94%   Weight: 75.8 kg (167 lb)   Height: 170.2 cm (67\")     Body mass index is 26.16 kg/m².     Physical Exam  Vitals and nursing note reviewed.   Constitutional:       General: She is not in acute distress.     Appearance: She is well-developed. She is not diaphoretic.   Eyes:      General: No scleral icterus.     Extraocular Movements:      Right eye: No nystagmus.      Left eye: No nystagmus.      Conjunctiva/sclera: Conjunctivae normal.      Pupils: Pupils are equal, round, and reactive to light.   Neck:      Thyroid: No thyromegaly.   Cardiovascular:      Rate and Rhythm: Normal rate and regular rhythm.   Pulmonary:      Effort: Pulmonary effort is normal.      Breath sounds: Normal breath sounds.   Abdominal:      General: Bowel sounds are normal. There is no distension. There are no signs of injury.      Palpations: Abdomen is soft. There is no hepatomegaly or splenomegaly.      Tenderness: There is abdominal tenderness in the right upper quadrant and suprapubic area. There is no guarding or rebound.      Hernia: No hernia is present.   Musculoskeletal:      Cervical back: Neck supple.      Right lower leg: No edema.      Left lower leg: No edema.   Skin:     General: Skin is warm and dry.      Capillary Refill: Capillary refill takes 2 to 3 seconds.      Coloration: Skin is not jaundiced or pale.      Findings: No bruising or petechiae.      Nails: There is no clubbing.   Neurological:      Mental Status: She is alert and oriented to person, place, and time.   Psychiatric:         Mood and Affect: Mood is anxious.         Behavior: Behavior normal.         Thought Content: Thought content normal.    " "     Judgment: Judgment normal.         Assessment / Plan      Assessment/Plan:   Diagnoses and all orders for this visit:    1. Liver mass (Primary)  -     Protime-INR; Future  -     Comprehensive Metabolic Panel; Future  -     CBC & Differential; Future  -     AFP Tumor Marker; Future  MRI is somewhat reassuring, however this is definitely a change from her CT 1 year ago. Especially with her history of rectal cancer, a biopsy is recommended.  We will get this set up for her soon as possible  2. Abnormal findings on diagnostic imaging of liver and biliary tract   -     AFP Tumor Marker; Future    3. Rectal cancer (CMS/HCC)  Continue with colorectal surgery  4. RUQ pain  Possibly from new liver mass, consider upper endoscopy. Patient wants to defer this until \"liver is taking care of\".  5. Recurrent pancreatitis  -     Lipase; Future  ? Etiology  6. Irritable bowel syndrome with diarrhea  -     Celiac Comprehensive Panel; Future           Follow Up:   Return in about 6 weeks (around 10/27/2021), or if symptoms worsen or fail to improve.    Plan of care reviewed with the patient at the conclusion of today's visit.  Education was provided regarding diagnosis, management, and any prescribed or recommended OTC medications.  Patient verbalized understanding of and agreement with management plan.         WEST Carlos  [facility]     "

## 2021-09-16 ENCOUNTER — TELEPHONE (OUTPATIENT)
Dept: CARDIOLOGY | Facility: CLINIC | Age: 71
End: 2021-09-16

## 2021-09-16 ENCOUNTER — TELEPHONE (OUTPATIENT)
Dept: GASTROENTEROLOGY | Facility: CLINIC | Age: 71
End: 2021-09-16

## 2021-09-16 LAB
ENDOMYSIUM IGA SER QL: NEGATIVE
GLIADIN PEPTIDE IGA SER-ACNC: 18 UNITS (ref 0–19)
GLIADIN PEPTIDE IGG SER-ACNC: 1 UNITS (ref 0–19)
IGA SERPL-MCNC: 279 MG/DL (ref 64–422)
TTG IGA SER-ACNC: <2 U/ML (ref 0–3)
TTG IGG SER-ACNC: <2 U/ML (ref 0–5)

## 2021-09-16 NOTE — TELEPHONE ENCOUNTER
Patient called and advised that her cardiologist wont let her be off the asprin 81mg 3-5 days prior to her liver Bx. She would like to let you know.

## 2021-09-16 NOTE — TELEPHONE ENCOUNTER
PT requesting instructions for ASA 81 mg that she takes daily for Hx CVA, PFO sp closure.     She is scheduled next week for a liver biopsy here at [facility]. Need to continue or ok to hold?

## 2021-09-20 NOTE — TELEPHONE ENCOUNTER
Relayed to pt that she may hold ASA a few days prior to procedure but needs to restart as soon as possible afterwards and once ok'd by the physician doing procedure. She verbalized understanding.

## 2021-09-21 ENCOUNTER — TELEPHONE (OUTPATIENT)
Dept: INFUSION THERAPY | Facility: HOSPITAL | Age: 71
End: 2021-09-21

## 2021-09-21 NOTE — TELEPHONE ENCOUNTER
Contacted Mrs. Muller to see if she had copy of the films on a disc that she could bring with her tomorrow morning when she comes for her biopsy. Mrs. Muller reports that she had the MRI performed at the mobile scanner at [facility] and that she had 3 or 4 CT's performed there as well recently but that she was not given any cd's.  Pt suggested that I contact [facility] radiology department and ask if they had the films, then offered to pick them up if they had them.  I contacted [facility] radiology department at [phone], they are going to burn a cd of her recent films for the patient to  this afternoon.  Contacted Ms. Muller again to inform her that the cd's would be waiting for her at the desk in radiology department at [facility]. Pt verbalized that she would try to pick them up before they closed but that she had to  her car before 5 as it was in the shop, if she didn't get her car she wouldn't be ab;e to make it to [place] for her procedure. Thanked her for her assistance with this matter.

## 2021-09-22 ENCOUNTER — HOSPITAL ENCOUNTER (OUTPATIENT)
Dept: ULTRASOUND IMAGING | Facility: HOSPITAL | Age: 71
Discharge: HOME | End: 2021-09-22

## 2021-09-22 ENCOUNTER — APPOINTMENT (OUTPATIENT)
Dept: OTHER | Facility: HOSPITAL | Age: 71
End: 2021-09-22

## 2021-09-22 VITALS
RESPIRATION RATE: 16 BRPM | BODY MASS INDEX: 25.96 KG/M2 | DIASTOLIC BLOOD PRESSURE: 79 MMHG | WEIGHT: 165.4 LBS | OXYGEN SATURATION: 97 % | TEMPERATURE: 98.3 F | HEIGHT: 67 IN | HEART RATE: 100 BPM | SYSTOLIC BLOOD PRESSURE: 149 MMHG

## 2021-09-22 DIAGNOSIS — R16.0 LIVER MASS, RIGHT LOBE: ICD-10-CM

## 2021-09-22 DIAGNOSIS — Z00.6 EXAMINATION FOR NORMAL COMPARISON FOR CLINICAL RESEARCH: ICD-10-CM

## 2021-09-22 LAB
GLUCOSE BLDC GLUCOMTR-MCNC: 133 MG/DL (ref 70–130)
GLUCOSE BLDC GLUCOMTR-MCNC: 187 MG/DL (ref 70–130)

## 2021-09-22 PROCEDURE — 25010000002 MIDAZOLAM PER 1 MG: Performed by: RADIOLOGY

## 2021-09-22 PROCEDURE — 99153 MOD SED SAME PHYS/QHP EA: CPT

## 2021-09-22 PROCEDURE — 76942 ECHO GUIDE FOR BIOPSY: CPT

## 2021-09-22 PROCEDURE — 88313 SPECIAL STAINS GROUP 2: CPT | Performed by: NURSE PRACTITIONER

## 2021-09-22 PROCEDURE — 25010000002 FENTANYL CITRATE (PF) 50 MCG/ML SOLUTION: Performed by: RADIOLOGY

## 2021-09-22 PROCEDURE — 99152 MOD SED SAME PHYS/QHP 5/>YRS: CPT

## 2021-09-22 PROCEDURE — 82962 GLUCOSE BLOOD TEST: CPT

## 2021-09-22 PROCEDURE — 88307 TISSUE EXAM BY PATHOLOGIST: CPT | Performed by: NURSE PRACTITIONER

## 2021-09-22 PROCEDURE — 25010000002 FENTANYL CITRATE (PF) 100 MCG/2ML SOLUTION: Performed by: RADIOLOGY

## 2021-09-22 RX ORDER — FENTANYL CITRATE 50 UG/ML
50 INJECTION, SOLUTION INTRAMUSCULAR; INTRAVENOUS ONCE
Status: COMPLETED | OUTPATIENT
Start: 2021-09-22 | End: 2021-09-22

## 2021-09-22 RX ORDER — FENTANYL CITRATE 50 UG/ML
INJECTION, SOLUTION INTRAMUSCULAR; INTRAVENOUS
Status: DISPENSED
Start: 2021-09-22 | End: 2021-09-22

## 2021-09-22 RX ORDER — SODIUM CHLORIDE 0.9 % (FLUSH) 0.9 %
3 SYRINGE (ML) INJECTION EVERY 12 HOURS SCHEDULED
Status: DISCONTINUED | OUTPATIENT
Start: 2021-09-22 | End: 2021-09-23 | Stop reason: HOSPADM

## 2021-09-22 RX ORDER — SODIUM CHLORIDE 0.9 % (FLUSH) 0.9 %
10 SYRINGE (ML) INJECTION AS NEEDED
Status: DISCONTINUED | OUTPATIENT
Start: 2021-09-22 | End: 2021-09-23 | Stop reason: HOSPADM

## 2021-09-22 RX ORDER — MIDAZOLAM HYDROCHLORIDE 1 MG/ML
INJECTION INTRAMUSCULAR; INTRAVENOUS
Status: COMPLETED | OUTPATIENT
Start: 2021-09-22 | End: 2021-09-22

## 2021-09-22 RX ORDER — FENTANYL CITRATE 50 UG/ML
INJECTION, SOLUTION INTRAMUSCULAR; INTRAVENOUS
Status: COMPLETED | OUTPATIENT
Start: 2021-09-22 | End: 2021-09-22

## 2021-09-22 RX ORDER — LIDOCAINE HYDROCHLORIDE 10 MG/ML
5 INJECTION, SOLUTION EPIDURAL; INFILTRATION; INTRACAUDAL; PERINEURAL ONCE
Status: DISCONTINUED | OUTPATIENT
Start: 2021-09-22 | End: 2021-09-23 | Stop reason: HOSPADM

## 2021-09-22 RX ORDER — MIDAZOLAM HYDROCHLORIDE 1 MG/ML
INJECTION INTRAMUSCULAR; INTRAVENOUS
Status: DISPENSED
Start: 2021-09-22 | End: 2021-09-22

## 2021-09-22 RX ADMIN — MIDAZOLAM 1 MG: 1 INJECTION INTRAMUSCULAR; INTRAVENOUS at 09:42

## 2021-09-22 RX ADMIN — FENTANYL CITRATE 50 MCG: 50 INJECTION INTRAMUSCULAR; INTRAVENOUS at 10:53

## 2021-09-22 RX ADMIN — MIDAZOLAM 1 MG: 1 INJECTION INTRAMUSCULAR; INTRAVENOUS at 09:40

## 2021-09-22 RX ADMIN — FENTANYL CITRATE 25 MCG: 0.05 INJECTION, SOLUTION INTRAMUSCULAR; INTRAVENOUS at 09:40

## 2021-09-22 RX ADMIN — FENTANYL CITRATE 25 MCG: 0.05 INJECTION, SOLUTION INTRAMUSCULAR; INTRAVENOUS at 09:41

## 2021-09-22 NOTE — POST-PROCEDURE NOTE
Vascular Interventional Radiology  Procedure Note    Date: 09/22/21       Time: 09:54 EDT     Pre-op Diagnosis: LIVER BX. US GUIDED.     Post-op Diagnosis: As above.     Procedure: Image guided percutaneous BX. LIVER Via Sub-CS.    Specimen: 2 cores. Sent to the lab.    Surgeon: Naman Davis MD     Assistants: SHIRIN    Sedation: IV Midazolam and Fentanyl. See records for details.    Estimated Blood Loss (EBL): 0 cc    IVF: NA    Findings: Above    Complications: NA.     Disposition:  IR Recovery.    Naman Davis MD    Vascular Interventional Radiology

## 2021-09-22 NOTE — NURSING NOTE
Image guided liver biopsy performed by MD Davis. 2 core samples obtained, labeled, and sent to lab. Patient given 50 mcg Fentanyl  and 2 mg Versed with sedation time of 10 minutes. Patient tolerated well. Placed on right side with bedrest orders. Report called to nurse.

## 2021-09-22 NOTE — PRE-PROCEDURE NOTE
[facility]   Vascular Interventional Radiology  History & Physicial    Patient Name:Leela Muller  : 1950  MRN: [id number]  Primary Care Physician: Provider, No Known  Date of admission: 2021    Subjective   Subjective     Chief Complaint: Liver biopsy of a mass    History of Present Illness   Leela Muller is a 71 y.o. female referred to IR as in the chief complaint.    Review of Systems      Personal History     Past Medical History:   Diagnosis Date   • Anxiety    • Arm pain    • Arthritis    • Breast injury     fell 2019    • Chronic pancreatitis (CMS/HCC)    • Depression    • Diabetes mellitus (CMS/HCC)    • Hyperlipidemia    • Hypertension    • Neck pain on left side    • Palpitations 2018   • Pancreatitis    • Pulmonary embolism (CMS/HCC)    • Rectal cancer (CMS/HCC)    • Stroke syndrome 2016    · Assessed By: Devaughn Lewis (Neurology); Last Assessed: 2015  Right cerebrovascular accident in July or 2010, evaluated at [facility].  Admission to [facility] on 2010 with headache and stuttering, consistent with TIA.  Chronic Coumadin therapy, initiated following bilateral pulmonary emboli in  after fall and hip replacement.  She was already on 81 mg of aspirin as well, 2010.  MRI of the brain on 2010 revealing old right parietal cerebrovascular accident.  MRA revealing normal carotids, middle anterior and posterior cerebral arteries with minimal ostial stenosis of both vertebral arteries.  GENARO by Dr. Oliver Mobley on 2010:  patent foramen ovale with a small amount of right to left shunting.  Normal LVEF and normal valvular structures.   Patent foramen ovale closure using a 25 mm. Amplatzer cribriform occluder, 10/05/2010.   Echocardiogram, 2014:  LVEF (55% to 60%) with and Amplatzer device noted to be well-seated in    • Thrombocytopenia (CMS/HCC)    • Transient cerebral ischemia        Past Surgical  History:   Procedure Laterality Date   • APPENDECTOMY     • BREAST BIOPSY Left 2012    benign   • BREAST BIOPSY     • BREAST EXCISIONAL BIOPSY Left     benign   • BREAST EXCISIONAL BIOPSY Right     benign   • BREAST EXCISIONAL BIOPSY Right 2020    benign   • BREAST SURGERY     • CHOLECYSTECTOMY     • COLONOSCOPY     • HYSTERECTOMY     • OOPHORECTOMY     • RECTAL SURGERY     • TONSILLECTOMY     • TOTAL HIP ARTHROPLASTY REVISION         Family History: Her family history includes Alzheimer's disease in her mother; Cancer in her mother and another family member; Colon polyps in her father; Coronary artery disease in an other family member; Dementia in an other family member; Diabetes in an other family member; Heart attack in her father; Hypertension in an other family member; Stroke in an other family member.     Social History: She  reports that she has never smoked. She has never used smokeless tobacco. She reports that she does not drink alcohol and does not use drugs.    Home Medications:  BASAGLAR KWIKPEN, HYDROcodone-acetaminophen, Insulin Pen Needle, Insulin Syringe-Needle U-100, PCCA Custom Lipo-Max, RABEprazole, acetaminophen, acyclovir, albuterol sulfate HFA, aspirin, baclofen, butalbital-acetaminophen-caffeine, clidinium-chlordiazePOXIDE, clonazePAM, digoxin, fluconazole, fludrocortisone, furosemide, gabapentin, glipizide, glucose blood, hydrocortisone, loperamide, meclizine, methylcellulose (Laxative), metoclopramide, metoprolol succinate XL, metroNIDAZOLE, mometasone, neomycin-polymyxin-hydrocortisone, nitroglycerin, pancrelipase (Lip-Prot-Amyl), polyethylene glycol, potassium chloride, promethazine, rosuvastatin, topiramate, triamcinolone, valACYclovir, and venlafaxine XR    Allergies:  She is allergic to atorvastatin, tetanus toxoid, acyclovir, cefprozil, lansoprazole, and ranexa [ranolazine er].    Objective    Objective     Vitals:    Temp:  [98.3 °F (36.8 °C)] 98.3 °F (36.8 °C)  Heart Rate:   [101-118] 101  Resp:  [12-16] 12  BP: (108-151)/(57-71) 138/71  Flow (L/min):  [2] 2    Physical Exam     Result Review    Result Review:  I have personally reviewed the results from the time of this admission to 9/22/2021 09:21 EDT and agree with these findings:  [x]  Laboratory  []  Microbiology  [x]  Radiology  []  EKG/Telemetry   []  Cardiology/Vascular   []  Pathology  []  Old records  []  Other:  Most notable findings include: As noted in the above reports.    Assessment/Plan   Assessment / Plan     Brief Patient Summary:  Leela Muller is a 71 y.o. female referred to the IR service with above problem.    Active Hospital Problems:  There are no active hospital problems to display for this patient.    Plan:   [facility] liver bx  DVT prophylaxis:  No DVT prophylaxis order currently exists.      Naman Davis MD  Vascular Interventional Radiology  09/22/21   9:21 AM EDT

## 2021-09-23 ENCOUNTER — TELEPHONE (OUTPATIENT)
Dept: INFUSION THERAPY | Facility: HOSPITAL | Age: 71
End: 2021-09-23

## 2021-09-24 PROBLEM — R19.7 DIARRHEA: Status: ACTIVE | Noted: 2021-07-05

## 2021-09-24 PROBLEM — R16.0 LIVER MASS: Status: ACTIVE | Noted: 2021-08-26

## 2021-09-24 PROBLEM — N64.4 BREAST PAIN: Status: ACTIVE | Noted: 2021-07-09

## 2021-09-24 LAB
CYTO UR: NORMAL
LAB AP CASE REPORT: NORMAL
LAB AP CLINICAL INFORMATION: NORMAL
LAB AP DIAGNOSIS COMMENT: NORMAL
PATH REPORT.FINAL DX SPEC: NORMAL
PATH REPORT.GROSS SPEC: NORMAL

## 2021-09-24 RX ORDER — LOPERAMIDE HYDROCHLORIDE 2 MG/1
CAPSULE ORAL
COMMUNITY
End: 2022-10-31

## 2021-09-28 ENCOUNTER — OFFICE VISIT (OUTPATIENT)
Dept: ENDOCRINOLOGY | Facility: CLINIC | Age: 71
End: 2021-09-28

## 2021-09-28 VITALS
OXYGEN SATURATION: 94 % | BODY MASS INDEX: 25.9 KG/M2 | WEIGHT: 165 LBS | SYSTOLIC BLOOD PRESSURE: 124 MMHG | HEIGHT: 67 IN | HEART RATE: 84 BPM | DIASTOLIC BLOOD PRESSURE: 84 MMHG

## 2021-09-28 DIAGNOSIS — E11.65 TYPE 2 DIABETES MELLITUS WITH HYPERGLYCEMIA, WITH LONG-TERM CURRENT USE OF INSULIN (HCC): Primary | ICD-10-CM

## 2021-09-28 DIAGNOSIS — Z79.4 TYPE 2 DIABETES MELLITUS WITH HYPERGLYCEMIA, WITH LONG-TERM CURRENT USE OF INSULIN (HCC): Primary | ICD-10-CM

## 2021-09-28 DIAGNOSIS — Z79.4 INSULIN LONG-TERM USE (HCC): ICD-10-CM

## 2021-09-28 DIAGNOSIS — I10 ESSENTIAL HYPERTENSION: ICD-10-CM

## 2021-09-28 LAB
EXPIRATION DATE: ABNORMAL
GLUCOSE BLDC GLUCOMTR-MCNC: 297 MG/DL (ref 70–130)
HBA1C MFR BLD: 8.6 %
Lab: ABNORMAL

## 2021-09-28 PROCEDURE — 99213 OFFICE O/P EST LOW 20 MIN: CPT | Performed by: INTERNAL MEDICINE

## 2021-09-28 PROCEDURE — 83036 HEMOGLOBIN GLYCOSYLATED A1C: CPT | Performed by: INTERNAL MEDICINE

## 2021-09-28 PROCEDURE — 82947 ASSAY GLUCOSE BLOOD QUANT: CPT | Performed by: INTERNAL MEDICINE

## 2021-09-28 RX ORDER — FLUCONAZOLE 150 MG/1
150 TABLET ORAL ONCE AS NEEDED
Qty: 2 TABLET | Refills: 2 | Status: SHIPPED | OUTPATIENT
Start: 2021-09-28 | End: 2022-07-25

## 2021-09-28 RX ORDER — INSULIN GLARGINE 100 [IU]/ML
INJECTION, SOLUTION SUBCUTANEOUS
Qty: 45 ML | Refills: 5 | Status: SHIPPED | OUTPATIENT
Start: 2021-09-28 | End: 2022-03-09 | Stop reason: SDUPTHER

## 2021-09-28 RX ORDER — BLOOD SUGAR DIAGNOSTIC
STRIP MISCELLANEOUS
Qty: 300 EACH | Refills: 3 | Status: SHIPPED | OUTPATIENT
Start: 2021-09-28 | End: 2022-05-20 | Stop reason: SDUPTHER

## 2021-09-28 NOTE — PROGRESS NOTES
"     Office Note      Date: 2021  Patient Name: Leela Muller  MRN: [id number]  : 1950    Chief Complaint   Patient presents with   • Diabetes     3 month follow up        History of Present Illness:   Leela Muller is a 71 y.o. female who presents for Diabetes type 2. Diagnosed in: . Treated in past with oral agents. Current treatments: glipizide and basal insulin. Number of insulin shots per day: 2. Checks blood sugar 2 times a day. Has low blood sugar: no. Aspirin use: Yes. Statin use: Yes. ACE-I/ARB use: Yes. Changes in health since last visit: liver mass biopsy - results pending; removal of rectal cancer again. Last eye exam .    Subjective      Diabetic Complications:  Eyes: No  Kidneys: No  Feet: No  Heart: No    Diet and Exercise:  Meals per day: 2  Minutes of exercise per week: 0 mins.    Review of Systems:   Review of Systems   Constitutional: Negative.    Cardiovascular: Negative.    Gastrointestinal: Negative.    Endocrine: Negative.        The following portions of the patient's history were reviewed and updated as appropriate: allergies, current medications, past family history, past medical history, past social history, past surgical history and problem list.    Objective       Visit Vitals  /84   Pulse 84   Ht 170.2 cm (67\")   Wt 74.8 kg (165 lb)   SpO2 94%   Breastfeeding No   BMI 25.84 kg/m²       Physical Exam:  Physical Exam  Constitutional:       Appearance: Normal appearance.   Neurological:      Mental Status: She is alert.         Labs:    HbA1c  Lab Results   Component Value Date    HGBA1C 8.6 2021       CMP  Lab Results   Component Value Date    GLUCOSE 237 (H) 09/15/2021    BUN 11 09/15/2021    CREATININE 0.78 09/15/2021    EGFRIFNONA 72 09/15/2021    BCR 14.1 09/15/2021    K 4.0 09/15/2021    CO2 27.3 09/15/2021    CALCIUM 9.1 09/15/2021    LABIL2 1.5 2015    AST 29 09/15/2021    ALT 23 09/15/2021        Lipid Panel  Lab Results   Component Value Date "    HDL 34 (L) 06/28/2019    LDL 29 06/28/2019    TRIG 171 (H) 06/28/2019        TSH  Lab Results   Component Value Date    TSH 2.210 06/27/2019        Hemoglobin A1C  Lab Results   Component Value Date    HGBA1C 8.6 09/28/2021        Microalbumin/Creatinine  No results found for: MALBCRERATIO, CREATINIURIN, MICROALBUR        Assessment / Plan      Assessment & Plan:  Diagnoses and all orders for this visit:    1. Type 2 diabetes mellitus with hyperglycemia, with long-term current use of insulin (CMS/[facility]) (Primary)  Assessment & Plan:  Diabetes is improving with treatment.   Continue current treatment regimen.  Titrate up insulin.  Diabetes will be reassessed in 3 months.    Orders:  -     POC Glycosylated Hemoglobin (Hb A1C)  -     POC Glucose, Blood    2. Essential hypertension  Assessment & Plan:  Hypertension is improving with treatment.  Continue current treatment regimen.  Blood pressure will be reassessed at the next regular appointment.      3. Insulin long-term use (CMS/[facility])    Other orders  -     Insulin Glargine (BASAGLAR KWIKPEN) 100 UNIT/ML injection pen; Inject 50 units subcutaneously once in the morning and 75 units at night  Dispense: 39 mL; Refill: 5  -     glucose blood (OneTouch Verio) test strip; Use to test blood glucose 3 times a day as directed  Dispense: 300 each; Refill: 3  -     fluconazole (DIFLUCAN) 150 MG tablet; Take 1 tablet by mouth 1 (One) Time As Needed (yeast infection).  Dispense: 2 tablet; Refill: 2      Return in about 3 months (around 12/28/2021) for Recheck with A1c, CMP, lipids, TSH, microalbumin, CBC, foot exam.    Jac Santiago MD   09/28/2021

## 2021-09-29 ENCOUNTER — OFFICE VISIT (OUTPATIENT)
Dept: GASTROENTEROLOGY | Facility: CLINIC | Age: 71
End: 2021-09-29

## 2021-09-29 ENCOUNTER — TELEPHONE (OUTPATIENT)
Dept: GASTROENTEROLOGY | Facility: CLINIC | Age: 71
End: 2021-09-29

## 2021-09-29 VITALS
TEMPERATURE: 97.3 F | OXYGEN SATURATION: 96 % | HEART RATE: 76 BPM | BODY MASS INDEX: 26.81 KG/M2 | SYSTOLIC BLOOD PRESSURE: 130 MMHG | DIASTOLIC BLOOD PRESSURE: 68 MMHG | WEIGHT: 170.8 LBS | HEIGHT: 67 IN

## 2021-09-29 DIAGNOSIS — K75.81 NASH (NONALCOHOLIC STEATOHEPATITIS): Primary | ICD-10-CM

## 2021-09-29 PROCEDURE — 99213 OFFICE O/P EST LOW 20 MIN: CPT | Performed by: INTERNAL MEDICINE

## 2021-09-29 NOTE — PROGRESS NOTES
Patient Name: Leela Muller  YOB: 1950   Medical Record number: [id number]     PCP: Connor Ritter MD    Chief Complaint   Patient presents with   • Follow-up     LIVER BIOPSY        History of Present Illness:   HPI  Mrs. Muller returns to the office for follow-up of liver biopsy.  The patient had a liver biopsy and the findings are suggestive of Vargas.  There is no evidence for malignancy.  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  Patient is scheduled for follow-up with dermatology.  Mrs. Muller denies any issue at this time with indigestion.  There is no history of difficult or painful swallowing.  Mrs. Muller reportedly saw Dr. Clifford earlier this year but no procedure was performed.  The patient may  have urgency to have a bowel movement without any blood in the stool.  Patient has undergone previous colon examinations by Dr. Mahmood.  Past Medical History:   Diagnosis Date   • Anxiety    • Arm pain    • Arthritis    • Breast injury     fell 6/2019    • Cancer (CMS/HCC)    • Chronic pancreatitis (CMS/HCC)    • Depression    • Diabetes mellitus (CMS/HCC)    • Hyperlipidemia    • Hypertension    • Neck pain on left side    • Palpitations 4/18/2018   • Pancreatitis    • Pulmonary embolism (CMS/HCC)    • Stroke syndrome 9/14/2016    · Assessed By: Devaughn Lewis (Neurology); Last Assessed: 16 Jun 2015  Right cerebrovascular accident in July or August 2010, evaluated at [facility].  Admission to [facility] on 09/28/2010 with headache and stuttering, consistent with TIA.  Chronic Coumadin therapy, initiated following bilateral pulmonary emboli in 2007 after fall and hip replacement.  She was already on 81 mg of aspirin as well, 09/2010.  MRI of the brain on 09/28/2010 revealing old right parietal cerebrovascular accident.  MRA revealing normal carotids, middle anterior and posterior cerebral  arteries with minimal ostial stenosis of both vertebral arteries.  GENARO by Dr. Oliver Mobley on 09/28/2010:  patent foramen ovale with a small amount of right to left shunting.  Normal LVEF and normal valvular structures.   Patent foramen ovale closure using a 25 mm. Amplatzer cribriform occluder, 10/05/2010.   Echocardiogram, 06/23/2014:  LVEF (55% to 60%) with and Amplatzer device noted to be well-seated in    • Thrombocytopenia (CMS/HCC)    • Transient cerebral ischemia        Past Surgical History:   Procedure Laterality Date   • APPENDECTOMY     • BREAST BIOPSY Left 2012    benign   • BREAST BIOPSY     • BREAST EXCISIONAL BIOPSY Left     benign   • BREAST EXCISIONAL BIOPSY Right     benign   • BREAST EXCISIONAL BIOPSY Right 2020    benign   • BREAST SURGERY     • CHOLECYSTECTOMY     • COLONOSCOPY     • HYSTERECTOMY     • LIVER BIOPSY     • OOPHORECTOMY     • RECTAL SURGERY     • TONSILLECTOMY     • TOTAL HIP ARTHROPLASTY REVISION           Current Outpatient Medications:   •  acetaminophen (TYLENOL) 500 MG tablet, Take 500 mg by mouth Every 6 (Six) Hours As Needed., Disp: , Rfl:   •  acyclovir (ZOVIRAX) 5 % ointment, Apply 1 unit topically to the anal area as needed, Disp: 15 g, Rfl: PRN  •  albuterol sulfate  (90 Base) MCG/ACT inhaler, Inhale 2 puffs by mouth every 4 hours, Disp: 8.5 g, Rfl: 3  •  aspirin 81 MG tablet, Take 81 mg by mouth Daily. Taken at night. Last dose 9-18-21, Disp: , Rfl:   •  baclofen (LIORESAL) 10 MG tablet, Take 1 tablet by mouth 3 (three) times a day., Disp: 90 tablet, Rfl: 11  •  butalbital-acetaminophen-caffeine (FIORICET, ESGIC) -40 MG per tablet, Take 1 tablet by mouth Daily As Needed for headache, Disp: 30 tablet, Rfl: 0  •  clidinium-chlordiazePOXIDE (LIBRAX) 5-2.5 MG per capsule, Take 1 capsule by mouth 2 (Two) Times a Day As Needed., Disp: 60 capsule, Rfl: 0  •  clonazePAM (KlonoPIN) 0.5 MG tablet, As Needed., Disp: , Rfl:   •  Cream Base Liposomic (PCCA CUSTOM  LIPO-MAX) cream, As Needed., Disp: , Rfl:   •  digoxin (LANOXIN) 250 MCG tablet, Take 1 tablet by mouth daily, Disp: 90 tablet, Rfl: 2  •  fluconazole (DIFLUCAN) 150 MG tablet, Take 1 tablet by mouth 1 (One) Time As Needed for yeast infection, Disp: 2 tablet, Rfl: 2  •  fludrocortisone 0.1 MG tablet, Take 1 tablet by mouth Daily., Disp: 30 tablet, Rfl: 11  •  furosemide (LASIX) 40 MG tablet, Take 1 tablet by mouth Daily. May have extra 1/2 to 1 tablet daily if needed for edema, Disp: 45 tablet, Rfl: 1  •  gabapentin (NEURONTIN) 800 MG tablet, Take 1/2 tablet by mouth 3 (Three) Times a Day., Disp: 45 tablet, Rfl: 2  •  glipizide (GLUCOTROL XL) 10 MG 24 hr tablet, Take 2 tablets by mouth Daily., Disp: 180 tablet, Rfl: 3  •  glucose blood (ONE TOUCH ULTRA TEST) test strip, Use to test blood glucose as directed 3 times a day, Disp: 100 each, Rfl: 5  •  glucose blood (OneTouch Verio) test strip, Use to test blood glucose 3 times a day as directed, Disp: 300 each, Rfl: 3  •  HYDROcodone-acetaminophen (NORCO) 7.5-325 MG per tablet, Take 1 tablet by mouth 3 (Three) Times a Day., Disp: 90 tablet, Rfl: 0  •  hydrocortisone (ANUSOL-HC) 25 MG suppository, Insert 1 suppository rectally twice a day as needed (remove wrapper and moisten with water), Disp: 12 suppository, Rfl: 3  •  Insulin Glargine (BASAGLAR KWIKPEN) 100 UNIT/ML injection pen, Inject 50 units subcutaneously once in the morning and 75 units at night, Disp: 45 mL, Rfl: 5  •  Insulin Pen Needle (B-D UF III MINI PEN NEEDLES) 31G X 5 MM misc, Use to inject insulin twice a day as directed, Disp: 100 each, Rfl: 12  •  Insulin Syringe-Needle U-100 29G 0.5 ML misc, Inject 0.3 mL as directed Daily., Disp: , Rfl:   •  loperamide (Imodium A-D) 2 MG capsule, Imodium A-D  As directed prn.  Initial dose 2 tablets followed by 1 tablet daily,, Disp: , Rfl:   •  meclizine (ANTIVERT) 25 MG tablet, Take 1 tablet by mouth 3 (Three) Times a Day As Needed., Disp: 30 tablet, Rfl: 3  •   metoclopramide (REGLAN) 10 MG tablet, Take 1 tablet by mouth Daily As Needed for migraine., Disp: 30 tablet, Rfl: 2  •  metoprolol succinate XL (TOPROL-XL) 100 MG 24 hr tablet, Take 1 tablet by mouth Daily., Disp: 90 tablet, Rfl: 1  •  metroNIDAZOLE (METROCREAM) 0.75 % cream, Apply to the face once every night (Patient taking differently: Apply  topically to the appropriate area as directed As Needed.), Disp: 45 g, Rfl: 0  •  neomycin-polymyxin-hydrocortisone (CORTISPORIN) 3.5-38368-8 otic suspension, INSTILL 4 DROPS INTO AFFECTED EAR(S) 3 (THREE) TIMES PER DAY, Disp: 10 mL, Rfl: 0  •  nitroglycerin (Nitrostat) 0.4 MG SL tablet, Place 1 tablet under the tongue Every 5 Minutes As Needed for Chest Pain for up to 3 total doses. Call 911 if pain remains after 1 dose., Disp: 25 tablet, Rfl: 6  •  pancrelipase, Lip-Prot-Amyl, (Pancreaze) 03535-59793 units capsule delayed-release particles capsule, Take 1 capsule with each meal and with each snack, Disp: 100 capsule, Rfl: 2  •  potassium chloride (KLOR-CON) 10 MEQ CR tablet, Take 1 tablet by mouth Daily as directed, Disp: 90 tablet, Rfl: 0  •  promethazine (PHENERGAN) 25 MG tablet, Take 1 tablet by mouth 4 (Four) Times a Day as needed for nausea, Disp: 30 tablet, Rfl: 3  •  RABEprazole (ACIPHEX) 20 MG EC tablet, Take 1 tablet by mouth Daily., Disp: 90 tablet, Rfl: 1  •  rosuvastatin (CRESTOR) 10 MG tablet, Take 1 tablet by mouth Daily., Disp: 90 tablet, Rfl: 2  •  topiramate (TOPAMAX) 25 MG tablet, Take 1 tablet by mouth Daily., Disp: 90 tablet, Rfl: 0  •  venlafaxine XR (EFFEXOR-XR) 75 MG 24 hr capsule, Take 1 capsule by mouth Daily., Disp: 30 capsule, Rfl: 11    Allergies   Allergen Reactions   • Atorvastatin Swelling   • Tetanus Toxoid Hives   • Acyclovir Seizure   • Cefprozil Other (See Comments)   • Lansoprazole Nausea Only and Nausea And Vomiting   • Ranexa [Ranolazine Er] Nausea And Vomiting       Family History   Problem Relation Age of Onset   • Alzheimer's  disease Mother    • Cancer Mother    • Coronary artery disease Other    • Diabetes Other    • Hypertension Other    • Stroke Other    • Cancer Other    • Dementia Other    • Heart attack Father    • Colon polyps Father    • Breast cancer Neg Hx    • Ovarian cancer Neg Hx    • Colon cancer Neg Hx    • Esophageal cancer Neg Hx        Social History     Socioeconomic History   • Marital status:      Spouse name: Not on file   • Number of children: Not on file   • Years of education: Not on file   • Highest education level: Not on file   Tobacco Use   • Smoking status: Never Smoker   • Smokeless tobacco: Never Used   Vaping Use   • Vaping Use: Never used   Substance and Sexual Activity   • Alcohol use: No   • Drug use: No   • Sexual activity: Defer       Review of Systems   Constitutional: Negative for activity change, appetite change, fatigue, fever and unexpected weight change.   HENT: Negative for dental problem, hearing loss, mouth sores, postnasal drip, sneezing, trouble swallowing and voice change.    Eyes: Negative for pain, redness, itching and visual disturbance.   Respiratory: Negative for cough, choking, chest tightness, shortness of breath and wheezing.    Cardiovascular: Negative for chest pain, palpitations and leg swelling.   Gastrointestinal: Negative for abdominal distention, abdominal pain (soreness on right side), anal bleeding, blood in stool, constipation, diarrhea, nausea, rectal pain and vomiting.   Endocrine: Negative for cold intolerance, heat intolerance, polydipsia, polyphagia and polyuria.   Genitourinary: Negative.  Negative for dysuria, enuresis, flank pain, hematuria and urgency.   Musculoskeletal: Negative for arthralgias, back pain, gait problem, joint swelling and myalgias.   Skin: Negative for color change, pallor and rash.   Allergic/Immunologic: Negative for environmental allergies, food allergies and immunocompromised state.   Neurological: Negative for dizziness, tremors,  seizures, facial asymmetry, speech difficulty, numbness and headaches.   Hematological: Negative for adenopathy.   Psychiatric/Behavioral: Negative for behavioral problems, confusion, dysphoric mood, hallucinations and self-injury.       Vitals:    09/29/21 1418   BP: 130/68   Pulse: 76   Temp: 97.3 °F (36.3 °C)   SpO2: 96%       Physical Exam  Vitals reviewed.   Constitutional:       General: She is not in acute distress.     Appearance: Normal appearance.   HENT:      Head: Normocephalic and atraumatic.      Nose: Nose normal.      Mouth/Throat:      Mouth: Mucous membranes are moist.      Pharynx: Oropharynx is clear.   Eyes:      General: No scleral icterus.     Extraocular Movements: Extraocular movements intact.   Cardiovascular:      Rate and Rhythm: Normal rate and regular rhythm.      Heart sounds: No murmur heard.   No gallop.    Pulmonary:      Breath sounds: Normal breath sounds. No wheezing or rales.   Abdominal:      General: Bowel sounds are normal.      Palpations: Abdomen is soft.      Tenderness: There is no abdominal tenderness. There is no guarding.   Musculoskeletal:         General: No swelling. Normal range of motion.      Cervical back: Normal range of motion. No rigidity.   Skin:     General: Skin is dry.      Coloration: Skin is not jaundiced.   Neurological:      Mental Status: She is alert and oriented to person, place, and time.   Psychiatric:         Mood and Affect: Mood normal.         Thought Content: Thought content normal.         Judgment: Judgment normal.         Diagnoses and all orders for this visit:    1. LEDBETTER (nonalcoholic steatohepatitis) (Primary)  -     US Liver; Future    The patient has biopsy-proven LEDBETTER.  There was no evidence for cirrhosis on the biopsy.  The synthetic function labs are within normal limits.      Plan: Will schedule ultrasound in 6 months.           Will follow-up in the office in 6 months.

## 2021-10-13 ENCOUNTER — HOSPITAL ENCOUNTER (OUTPATIENT)
Dept: ULTRASOUND IMAGING | Facility: HOSPITAL | Age: 71
Discharge: HOME | End: 2021-10-13
Admitting: INTERNAL MEDICINE

## 2021-10-13 DIAGNOSIS — K75.81 NASH (NONALCOHOLIC STEATOHEPATITIS): ICD-10-CM

## 2021-10-13 PROCEDURE — 76705 ECHO EXAM OF ABDOMEN: CPT

## 2021-10-18 ENCOUNTER — TELEPHONE (OUTPATIENT)
Dept: GASTROENTEROLOGY | Facility: CLINIC | Age: 71
End: 2021-10-18

## 2021-10-18 NOTE — TELEPHONE ENCOUNTER
----- Message from Catracho Garcia MD sent at 10/15/2021  4:43 PM EDT -----  Let Ms. Muller know the ultrasound just demonstrated fatty liver changes.

## 2021-10-18 NOTE — TELEPHONE ENCOUNTER
I tried to call Ms Muller to give ultrasound. No answer; left voice message. I will send result letter.

## 2021-10-20 NOTE — TELEPHONE ENCOUNTER
I spoke with Ms Muller. Ultrasound results given.  Result letter and information on fatty liver sent to patient in the mail on 10/18/2021.

## 2021-10-25 ENCOUNTER — OFFICE VISIT (OUTPATIENT)
Dept: NEUROLOGY | Facility: CLINIC | Age: 71
End: 2021-10-25

## 2021-10-25 VITALS
TEMPERATURE: 98 F | SYSTOLIC BLOOD PRESSURE: 126 MMHG | WEIGHT: 166 LBS | DIASTOLIC BLOOD PRESSURE: 78 MMHG | HEART RATE: 91 BPM | BODY MASS INDEX: 26.06 KG/M2 | HEIGHT: 67 IN | OXYGEN SATURATION: 98 %

## 2021-10-25 DIAGNOSIS — G43.009 MIGRAINE WITHOUT AURA AND WITHOUT STATUS MIGRAINOSUS, NOT INTRACTABLE: ICD-10-CM

## 2021-10-25 DIAGNOSIS — R26.9 ABNORMAL GAIT: Primary | ICD-10-CM

## 2021-10-25 DIAGNOSIS — H81.10 BENIGN PAROXYSMAL VERTIGO, UNSPECIFIED EAR: ICD-10-CM

## 2021-10-25 DIAGNOSIS — M54.2 NECK PAIN: ICD-10-CM

## 2021-10-25 DIAGNOSIS — F41.9 ANXIETY: ICD-10-CM

## 2021-10-25 PROCEDURE — 99214 OFFICE O/P EST MOD 30 MIN: CPT | Performed by: NURSE PRACTITIONER

## 2021-10-25 RX ORDER — GABAPENTIN 800 MG/1
400 TABLET ORAL 3 TIMES DAILY
Qty: 45 TABLET | Refills: 1 | Status: SHIPPED | OUTPATIENT
Start: 2021-10-25 | End: 2022-01-05 | Stop reason: SDUPTHER

## 2021-10-25 RX ORDER — METOCLOPRAMIDE 10 MG/1
10 TABLET ORAL DAILY PRN
Qty: 30 TABLET | Refills: 5 | Status: SHIPPED | OUTPATIENT
Start: 2021-10-25 | End: 2022-04-25

## 2021-10-25 RX ORDER — BUTALBITAL, ACETAMINOPHEN AND CAFFEINE 50; 325; 40 MG/1; MG/1; MG/1
1 TABLET ORAL DAILY PRN
Qty: 30 TABLET | Refills: 0 | Status: SHIPPED | OUTPATIENT
Start: 2021-10-25 | End: 2022-01-05 | Stop reason: SDUPTHER

## 2021-10-25 RX ORDER — MECLIZINE HYDROCHLORIDE 25 MG/1
25 TABLET ORAL 3 TIMES DAILY PRN
Qty: 90 TABLET | Refills: 3 | Status: SHIPPED | OUTPATIENT
Start: 2021-10-25 | End: 2022-04-25

## 2021-10-25 RX ORDER — BACLOFEN 10 MG/1
10 TABLET ORAL 3 TIMES DAILY
Qty: 270 TABLET | Refills: 3 | Status: SHIPPED | OUTPATIENT
Start: 2021-10-25 | End: 2022-04-25

## 2021-10-25 RX ORDER — PROMETHAZINE HYDROCHLORIDE 25 MG/1
25 TABLET ORAL EVERY 6 HOURS PRN
Qty: 30 TABLET | Refills: 5 | Status: SHIPPED | OUTPATIENT
Start: 2021-10-25 | End: 2022-04-25

## 2021-10-25 RX ORDER — TOPIRAMATE 25 MG/1
25 TABLET ORAL DAILY
Qty: 90 TABLET | Refills: 3 | Status: SHIPPED | OUTPATIENT
Start: 2021-10-25 | End: 2022-04-25

## 2021-10-25 RX ORDER — VENLAFAXINE HYDROCHLORIDE 75 MG/1
75 CAPSULE, EXTENDED RELEASE ORAL DAILY
Qty: 90 CAPSULE | Refills: 3 | Status: SHIPPED | OUTPATIENT
Start: 2021-10-25 | End: 2022-10-26 | Stop reason: SDUPTHER

## 2021-10-25 NOTE — PROGRESS NOTES
Subjective:     Patient ID: Leela Muller is a 71 y.o. female.    CC:   Chief Complaint   Patient presents with   • Migraine       HPI:   History of Present Illness   This is a very pleasant 71-year-old female who presents for 4-month neurology follow-up on migraine headaches present for many years, chronic neck pain, chronic and intermittent vertigo, as well as anxiety and history of stroke in June 2015 and 2010 as well.  She is currently taking Topamax 25 mg daily for migraine prevention.  She takes gabapentin 300 mg half a tablet 3 times a day for neck pain and headaches.  She takes butalbital with acetaminophen as needed for migraines and last fill was 8/11/2021.  She uses Reglan or Phenergan as needed for nausea and vomiting.  She does not use these often.  She does have difficulty with her gait and uses a cane to prevent falls.  She did have a fall in June 2019 where she fell and hit her face and was placed on fludrocortisone due to orthostatic hypotensive changes.  She also has significant diabetes with most recent hemoglobin A1c 8.6% on follow-up with endocrinology.  She is also followed by cardiology as well as GI and oncology for recent liver mass and mass diagnosis.  She is on aspirin and statin therapy to prevent stroke.  She also has neuropathy.      Prior neurology workup: MRI of the cervical spine 6/28/2019 showed extensive moderate degenerative disc changes throughout the spine.  MRI of the brain with volume loss and right occipital encephalomalacia from old stroke.  The neck showed no acute intracranial abnormalities and were found to be normal with no significant stenosis.  EMG and NCVS of the lower extremity showed mild axonal peripheral neuropathy in June 2019.    The following portions of the patient's history were reviewed and updated as appropriate: allergies, current medications, past family history, past medical history, past social history, past surgical history and problem list.    Past  Medical History:   Diagnosis Date   • Anxiety    • Arm pain    • Arthritis    • Breast injury     fell 6/2019    • Cancer (HCC)    • Chronic pancreatitis (HCC)    • Depression    • Diabetes mellitus (HCC)    • Hyperlipidemia    • Hypertension    • Neck pain on left side    • Palpitations 4/18/2018   • Pancreatitis    • Pulmonary embolism (HCC)    • Stroke syndrome 9/14/2016    · Assessed By: Devaughn Lewis (Neurology); Last Assessed: 16 Jun 2015  Right cerebrovascular accident in July or August 2010, evaluated at [facility].  Admission to [facility] on 09/28/2010 with headache and stuttering, consistent with TIA.  Chronic Coumadin therapy, initiated following bilateral pulmonary emboli in 2007 after fall and hip replacement.  She was already on 81 mg of aspirin as well, 09/2010.  MRI of the brain on 09/28/2010 revealing old right parietal cerebrovascular accident.  MRA revealing normal carotids, middle anterior and posterior cerebral arteries with minimal ostial stenosis of both vertebral arteries.  GENARO by Dr. Oliver Mobley on 09/28/2010:  patent foramen ovale with a small amount of right to left shunting.  Normal LVEF and normal valvular structures.   Patent foramen ovale closure using a 25 mm. Amplatzer cribriform occluder, 10/05/2010.   Echocardiogram, 06/23/2014:  LVEF (55% to 60%) with and Amplatzer device noted to be well-seated in    • Thrombocytopenia (HCC)    • Transient cerebral ischemia        Past Surgical History:   Procedure Laterality Date   • APPENDECTOMY     • BREAST BIOPSY Left 2012    benign   • BREAST BIOPSY     • BREAST EXCISIONAL BIOPSY Left     benign   • BREAST EXCISIONAL BIOPSY Right     benign   • BREAST EXCISIONAL BIOPSY Right 2020    benign   • BREAST SURGERY     • CHOLECYSTECTOMY     • COLONOSCOPY     • HYSTERECTOMY     • LIVER BIOPSY     • OOPHORECTOMY     • RECTAL SURGERY     • TONSILLECTOMY     • TOTAL HIP ARTHROPLASTY REVISION         Social  History     Socioeconomic History   • Marital status:    Tobacco Use   • Smoking status: Never Smoker   • Smokeless tobacco: Never Used   Vaping Use   • Vaping Use: Never used   Substance and Sexual Activity   • Alcohol use: No   • Drug use: No   • Sexual activity: Defer       Family History   Problem Relation Age of Onset   • Alzheimer's disease Mother    • Cancer Mother    • Coronary artery disease Other    • Diabetes Other    • Hypertension Other    • Stroke Other    • Cancer Other    • Dementia Other    • Heart attack Father    • Colon polyps Father    • Breast cancer Neg Hx    • Ovarian cancer Neg Hx    • Colon cancer Neg Hx    • Esophageal cancer Neg Hx         Review of Systems   Constitutional: Negative for chills, fatigue, fever and unexpected weight change.   HENT: Negative for ear pain, hearing loss, nosebleeds, rhinorrhea and sore throat.    Eyes: Negative for photophobia, pain, discharge, itching and visual disturbance.   Respiratory: Negative for cough, chest tightness, shortness of breath and wheezing.    Cardiovascular: Negative for chest pain, palpitations and leg swelling.   Gastrointestinal: Negative for abdominal pain, blood in stool, constipation, diarrhea, nausea and vomiting.   Genitourinary: Negative for dysuria, frequency, hematuria and urgency.   Musculoskeletal: Negative for arthralgias, back pain, gait problem, joint swelling, myalgias, neck pain and neck stiffness.   Skin: Negative for rash and wound.   Allergic/Immunologic: Negative for environmental allergies and food allergies.   Neurological: Positive for headaches. Negative for dizziness, tremors, seizures, syncope, speech difficulty, weakness, light-headedness and numbness.   Hematological: Negative for adenopathy. Does not bruise/bleed easily.   Psychiatric/Behavioral: Negative for agitation, confusion, decreased concentration, hallucinations, sleep disturbance and suicidal ideas. The patient is not nervous/anxious.    All  "other systems reviewed and are negative.       Objective:  /78   Pulse 91   Temp 98 °F (36.7 °C)   Ht 170.2 cm (67\")   Wt 75.3 kg (166 lb)   SpO2 98%   BMI 26.00 kg/m²     Neurologic Exam     Mental Status   Oriented to person, place, and time.   Speech: speech is normal   Level of consciousness: alert    Cranial Nerves   Cranial nerves II through XII intact.     Motor Exam   Muscle bulk: normal  Overall muscle tone: normal    Strength   Strength 5/5 throughout.     Gait, Coordination, and Reflexes     Gait  Gait: wide-based (antalgia, uses cane)    Coordination   Finger to nose coordination: normal    Tremor   Resting tremor: absent  Intention tremor: absent  Action tremor: absent    Reflexes   Right brachioradialis: 2+  Left brachioradialis: 2+  Right biceps: 2+  Left biceps: 2+  Right patellar: 1+  Left patellar: 1+  Right achilles: 1+  Left achilles: 1+  Right : 2+  Left : 2+      Physical Exam  Constitutional:       Appearance: Normal appearance.      Comments: Chronically ill, in no acute distress   Neurological:      Mental Status: She is alert and oriented to person, place, and time.      Coordination: Finger-Nose-Finger Test normal.      Deep Tendon Reflexes: Strength normal.      Reflex Scores:       Bicep reflexes are 2+ on the right side and 2+ on the left side.       Brachioradialis reflexes are 2+ on the right side and 2+ on the left side.       Patellar reflexes are 1+ on the right side and 1+ on the left side.       Achilles reflexes are 1+ on the right side and 1+ on the left side.  Psychiatric:         Mood and Affect: Mood is anxious.         Speech: Speech normal.         Behavior: Behavior normal.         Thought Content: Thought content normal.         Cognition and Memory: Cognition and memory normal.         Judgment: Judgment normal.         Assessment/Plan:       Diagnoses and all orders for this visit:    1. Abnormal gait (Primary)  Comments:  continue cane, falls " prevention    2. Migraine without aura and without status migrainosus, not intractable  -     metoclopramide (REGLAN) 10 MG tablet; Take 1 tablet by mouth Daily As Needed for migraine.  Dispense: 30 tablet; Refill: 5  -     butalbital-acetaminophen-caffeine (FIORICET, ESGIC) -40 MG per tablet; Take 1 tablet by mouth Daily As Needed for headache  Dispense: 30 tablet; Refill: 0  -     gabapentin (NEURONTIN) 800 MG tablet; Take 1/2 tablet by mouth 3 (Three) Times a Day.  Dispense: 45 tablet; Refill: 1  -     promethazine (PHENERGAN) 25 MG tablet; Take 1 tablet by mouth Every 6 (Six) Hours As Needed for Nausea or Vomiting.  Dispense: 30 tablet; Refill: 5  -     topiramate (TOPAMAX) 25 MG tablet; Take 1 tablet by mouth Daily.  Dispense: 90 tablet; Refill: 3  -     venlafaxine XR (EFFEXOR-XR) 75 MG 24 hr capsule; Take 1 capsule by mouth Daily.  Dispense: 90 capsule; Refill: 3    3. Neck pain  -     gabapentin (NEURONTIN) 800 MG tablet; Take 1/2 tablet by mouth 3 (Three) Times a Day.  Dispense: 45 tablet; Refill: 1  -     baclofen (LIORESAL) 10 MG tablet; Take 1 tablet by mouth 3 (three) times a day.  Dispense: 270 tablet; Refill: 3    4. Benign paroxysmal vertigo, unspecified ear  -     meclizine (ANTIVERT) 25 MG tablet; Take 1 tablet by mouth 3 (Three) Times a Day As Needed for Dizziness.  Dispense: 90 tablet; Refill: 3    5. Anxiety  -     venlafaxine XR (EFFEXOR-XR) 75 MG 24 hr capsule; Take 1 capsule by mouth Daily.  Dispense: 90 capsule; Refill: 3           Continue current medications. Use reglan and phenergan separately by 12 hours and use very sparingly. F/U in 6 months or sooner if needed. Continue with all specialists.  Reviewed medications, potential side effects and signs and symptoms to report. Discussed risk versus benefits of treatment plan with patient and/or family-including medications, labs and radiology that may be ordered. Addressed questions and concerns during visit. Patient and/or family  verbalized understanding and agree with plan.    AS THE PROVIDER, I PERSONALLY WORE PPE DURING ENTIRE FACE TO FACE ENCOUNTER IN CLINIC WITH THE PATIENT. PATIENT ALSO WORE PPE DURING ENTIRE FACE TO FACE ENCOUNTER EXCEPT FOR A MAX OF 30 SECONDS DURING NEUROLOGICAL EVALUATION OF CRANIAL NERVES AND THEN MASK WAS PLACED BACK OVER PATIENT FACE FOR REMAINDER OF VISIT. I WASHED MY HANDS BEFORE AND AFTER VISIT.    As part of this patient's treatment plan I am prescribing controlled substances. The patient has been made aware of appropriate use of such medications, including potential risk of somnolence, limited ability to drive and/or work safely, and potential for dependence or overdose. It has also been made clear that these medications are for use by the patient only, without concomitant use of alcohol or other substances unless prescribed. Keep out of reach of children.  Diogenes report has been reviewed. If this is going to be prescribed long term, [facility] Controlled Substance Agreement Contract has also been read and signed by patient and myself.    During this visit the following were done:  Labs Reviewed [x]  9/2021 cbc/cmp ok with -pcp  Labs Ordered []    Radiology Reports Reviewed [x]    Radiology Ordered []    PCP Records Reviewed []    Referring Provider Records Reviewed []    ER Records Reviewed [x]    Hospital Records Reviewed [x]    History Obtained From Family []    Radiology Images Reviewed []    Other Reviewed [x]    Records Requested []      [place]  10/25/2021

## 2021-11-15 ENCOUNTER — TELEPHONE (OUTPATIENT)
Dept: ENDOCRINOLOGY | Facility: CLINIC | Age: 71
End: 2021-11-15

## 2021-11-15 NOTE — TELEPHONE ENCOUNTER
PT CALLED STATING HER BS HAS BEEN RUNNING AROUND 300-400. PT REQUESTED A CALL BACK. PLEASE REACH OUT TO HER AT EARLIEST CONVENIENCE. THANK YOU

## 2021-11-15 NOTE — TELEPHONE ENCOUNTER
She seems to ho high after supper. Let's have her take both of the glipizide tablets just before supper.

## 2021-11-15 NOTE — TELEPHONE ENCOUNTER
Spoke with patient.  States her blood sugars have been running high.  States she has not been eating very much.  Blood sugar has been all over the place.  On 11/8/2021 her blood sugar was 247 at bedtime.  She took 65 units and at 1130pm it was 157.  When she woke up in the morning it was 61.  On 11/9/21 at 1000pm it was 357.  Patient took 65 units and at 130am it was 159.  At 6am it was 81.  On 11/11/21 her blood sugar at bedtime was 400.  On 11/12/21 it was 252 at bedtime and she took 65 units and at 800pm it was 352 and at 3am it was 115.  Seems to go up and down.  Goes up if she eats anything.

## 2021-12-17 ENCOUNTER — DOCUMENTATION (OUTPATIENT)
Dept: DIABETES SERVICES | Facility: HOSPITAL | Age: 71
End: 2021-12-17

## 2021-12-17 ENCOUNTER — OFFICE VISIT (OUTPATIENT)
Dept: ENDOCRINOLOGY | Facility: CLINIC | Age: 71
End: 2021-12-17

## 2021-12-17 ENCOUNTER — LAB (OUTPATIENT)
Dept: LAB | Facility: HOSPITAL | Age: 71
End: 2021-12-17

## 2021-12-17 VITALS
OXYGEN SATURATION: 96 % | BODY MASS INDEX: 26.84 KG/M2 | HEIGHT: 67 IN | HEART RATE: 68 BPM | DIASTOLIC BLOOD PRESSURE: 68 MMHG | SYSTOLIC BLOOD PRESSURE: 120 MMHG | WEIGHT: 171 LBS

## 2021-12-17 DIAGNOSIS — E11.65 TYPE 2 DIABETES MELLITUS WITH HYPERGLYCEMIA, WITH LONG-TERM CURRENT USE OF INSULIN (HCC): ICD-10-CM

## 2021-12-17 DIAGNOSIS — Z79.4 TYPE 2 DIABETES MELLITUS WITH HYPERGLYCEMIA, WITH LONG-TERM CURRENT USE OF INSULIN (HCC): ICD-10-CM

## 2021-12-17 DIAGNOSIS — I10 PRIMARY HYPERTENSION: ICD-10-CM

## 2021-12-17 DIAGNOSIS — E08.42 DIABETIC POLYNEUROPATHY ASSOCIATED WITH DIABETES MELLITUS DUE TO UNDERLYING CONDITION (HCC): ICD-10-CM

## 2021-12-17 DIAGNOSIS — Z79.4 INSULIN LONG-TERM USE (HCC): ICD-10-CM

## 2021-12-17 DIAGNOSIS — E78.5 DYSLIPIDEMIA: ICD-10-CM

## 2021-12-17 DIAGNOSIS — E11.65 TYPE 2 DIABETES MELLITUS WITH HYPERGLYCEMIA, WITH LONG-TERM CURRENT USE OF INSULIN (HCC): Primary | ICD-10-CM

## 2021-12-17 DIAGNOSIS — Z79.4 TYPE 2 DIABETES MELLITUS WITH HYPERGLYCEMIA, WITH LONG-TERM CURRENT USE OF INSULIN (HCC): Primary | ICD-10-CM

## 2021-12-17 LAB
ALBUMIN SERPL-MCNC: 4.1 G/DL (ref 3.5–5.2)
ALBUMIN UR-MCNC: <1.2 MG/DL
ALBUMIN/GLOB SERPL: 1.5 G/DL
ALP SERPL-CCNC: 70 U/L (ref 39–117)
ALT SERPL W P-5'-P-CCNC: 22 U/L (ref 1–33)
ANION GAP SERPL CALCULATED.3IONS-SCNC: 5.4 MMOL/L (ref 5–15)
AST SERPL-CCNC: 18 U/L (ref 1–32)
BILIRUB SERPL-MCNC: 0.2 MG/DL (ref 0–1.2)
BUN SERPL-MCNC: 13 MG/DL (ref 8–23)
BUN/CREAT SERPL: 16.9 (ref 7–25)
CALCIUM SPEC-SCNC: 9.3 MG/DL (ref 8.6–10.5)
CHLORIDE SERPL-SCNC: 101 MMOL/L (ref 98–107)
CHOLEST SERPL-MCNC: 103 MG/DL (ref 0–200)
CO2 SERPL-SCNC: 31.6 MMOL/L (ref 22–29)
CREAT SERPL-MCNC: 0.77 MG/DL (ref 0.57–1)
CREAT UR-MCNC: 89.8 MG/DL
DEPRECATED RDW RBC AUTO: 47 FL (ref 37–54)
ERYTHROCYTE [DISTWIDTH] IN BLOOD BY AUTOMATED COUNT: 13.9 % (ref 12.3–15.4)
EXPIRATION DATE: ABNORMAL
EXPIRATION DATE: NORMAL
GFR SERPL CREATININE-BSD FRML MDRD: 73 ML/MIN/1.73
GLOBULIN UR ELPH-MCNC: 2.7 GM/DL
GLUCOSE BLDC GLUCOMTR-MCNC: 138 MG/DL (ref 70–130)
GLUCOSE SERPL-MCNC: 112 MG/DL (ref 65–99)
HBA1C MFR BLD: 7.7 %
HCT VFR BLD AUTO: 41.7 % (ref 34–46.6)
HDLC SERPL-MCNC: 31 MG/DL (ref 40–60)
HGB BLD-MCNC: 13.6 G/DL (ref 12–15.9)
LDLC SERPL CALC-MCNC: 25 MG/DL (ref 0–100)
LDLC/HDLC SERPL: 0.25 {RATIO}
Lab: ABNORMAL
Lab: NORMAL
MCH RBC QN AUTO: 29.6 PG (ref 26.6–33)
MCHC RBC AUTO-ENTMCNC: 32.6 G/DL (ref 31.5–35.7)
MCV RBC AUTO: 90.8 FL (ref 79–97)
MICROALBUMIN/CREAT UR: NORMAL MG/G{CREAT}
PLATELET # BLD AUTO: 154 10*3/MM3 (ref 140–450)
PMV BLD AUTO: 12.4 FL (ref 6–12)
POTASSIUM SERPL-SCNC: 3.8 MMOL/L (ref 3.5–5.2)
PROT SERPL-MCNC: 6.8 G/DL (ref 6–8.5)
RBC # BLD AUTO: 4.59 10*6/MM3 (ref 3.77–5.28)
SODIUM SERPL-SCNC: 138 MMOL/L (ref 136–145)
TRIGL SERPL-MCNC: 321 MG/DL (ref 0–150)
TSH SERPL DL<=0.05 MIU/L-ACNC: 3.17 UIU/ML (ref 0.27–4.2)
VLDLC SERPL-MCNC: 47 MG/DL (ref 5–40)
WBC NRBC COR # BLD: 7.26 10*3/MM3 (ref 3.4–10.8)

## 2021-12-17 PROCEDURE — 99214 OFFICE O/P EST MOD 30 MIN: CPT | Performed by: INTERNAL MEDICINE

## 2021-12-17 PROCEDURE — 83036 HEMOGLOBIN GLYCOSYLATED A1C: CPT | Performed by: INTERNAL MEDICINE

## 2021-12-17 PROCEDURE — 82043 UR ALBUMIN QUANTITATIVE: CPT

## 2021-12-17 PROCEDURE — 80053 COMPREHEN METABOLIC PANEL: CPT

## 2021-12-17 PROCEDURE — 82947 ASSAY GLUCOSE BLOOD QUANT: CPT | Performed by: INTERNAL MEDICINE

## 2021-12-17 PROCEDURE — 82570 ASSAY OF URINE CREATININE: CPT

## 2021-12-17 PROCEDURE — 84443 ASSAY THYROID STIM HORMONE: CPT

## 2021-12-17 PROCEDURE — 3051F HG A1C>EQUAL 7.0%<8.0%: CPT | Performed by: INTERNAL MEDICINE

## 2021-12-17 PROCEDURE — 80061 LIPID PANEL: CPT

## 2021-12-17 PROCEDURE — 85027 COMPLETE CBC AUTOMATED: CPT

## 2021-12-17 NOTE — PROGRESS NOTES
"     Office Note      Date: 2021  Patient Name: Leela Muller  MRN: [id number]  : 1950    Chief Complaint   Patient presents with   • Diabetes       History of Present Illness:   Leela Muller is a 71 y.o. female who presents for Diabetes type 2. Diagnosed in: . Treated in past with oral agents. Current treatments: glipizide and basal insulin. Number of insulin shots per day: 2. Checks blood sugar 2 times a day. Has low blood sugar: no. Aspirin use: Yes. Statin use: Yes. ACE-I/ARB use: Yes. Changes in health since last visit: none. Last eye exam .    Subjective      Diabetic Complications:  Eyes: No  Kidneys: No  Feet: Yes -    Heart: No    Diet and Exercise:  Meals per day: 2  Minutes of exercise per week: 0 mins.    Review of Systems:   Review of Systems   Constitutional: Negative.    Cardiovascular: Negative.    Gastrointestinal: Negative.    Endocrine: Negative.        The following portions of the patient's history were reviewed and updated as appropriate: allergies, current medications, past family history, past medical history, past social history, past surgical history and problem list.    Objective       Visit Vitals  /68   Pulse 68   Ht 170.2 cm (67\")   Wt 77.6 kg (171 lb)   SpO2 96%   BMI 26.78 kg/m²       Physical Exam:  Physical Exam  Constitutional:       Appearance: Normal appearance.   Cardiovascular:      Pulses:           Dorsalis pedis pulses are 2+ on the right side and 2+ on the left side.        Posterior tibial pulses are 2+ on the right side and 2+ on the left side.   Musculoskeletal:      Right foot: Deformity present.      Left foot: Deformity present.   Feet:      Right foot:      Protective Sensation: 5 sites tested. 5 sites sensed.      Skin integrity: Skin integrity normal.      Toenail Condition: Right toenails are normal.      Left foot:      Protective Sensation: 5 sites tested. 5 sites sensed.      Skin integrity: Skin integrity normal.      Toenail " Condition: Left toenails are normal.      Comments: Hammer toes  Neurological:      Mental Status: She is alert.         Labs:    HbA1c  Lab Results   Component Value Date    HGBA1C 7.7 12/17/2021       CMP  Lab Results   Component Value Date    GLUCOSE 237 (H) 09/15/2021    BUN 11 09/15/2021    CREATININE 0.78 09/15/2021    EGFRIFNONA 72 09/15/2021    BCR 14.1 09/15/2021    K 4.0 09/15/2021    CO2 27.3 09/15/2021    CALCIUM 9.1 09/15/2021    LABIL2 1.5 03/20/2015    AST 29 09/15/2021    ALT 23 09/15/2021        Lipid Panel  Lab Results   Component Value Date    HDL 34 (L) 06/28/2019    LDL 29 06/28/2019    TRIG 171 (H) 06/28/2019        TSH  Lab Results   Component Value Date    TSH 2.210 06/27/2019        Hemoglobin A1C  Lab Results   Component Value Date    HGBA1C 7.7 12/17/2021        Microalbumin/Creatinine  No results found for: MALBCRERATIO, CREATINIURIN, MICROALBUR        Assessment / Plan      Assessment & Plan:  Diagnoses and all orders for this visit:    1. Type 2 diabetes mellitus with hyperglycemia, with long-term current use of insulin (HCC) (Primary)  Assessment & Plan:  Diabetes is improving with treatment.  A1c improved at 7.7%.  Continue current treatment regimen.  Diabetes will be reassessed in 3 months.    Orders:  -     POC Glycosylated Hemoglobin (Hb A1C)  -     POC Glucose, Blood  -     Comprehensive Metabolic Panel; Future  -     CBC (No Diff); Future  -     Lipid Panel; Future  -     Microalbumin / Creatinine Urine Ratio - Urine, Clean Catch; Future  -     TSH; Future  -     Ambulatory Referral to Diabetic Education    2. Diabetic polyneuropathy associated with diabetes mellitus due to underlying condition (HCC)  Assessment & Plan:  Some numbness in right foot.      3. Primary hypertension  Assessment & Plan:  Hypertension is unchanged.  Continue current treatment regimen.  Blood pressure will be reassessed at the next regular appointment.      4. Dyslipidemia  Assessment & Plan:  Continue  statin. Check lipids today.      5. Insulin long-term use (HCC)      Return in about 3 months (around 3/17/2022) for Recheck with A1c.    Jac Santiago MD   12/17/2021

## 2021-12-17 NOTE — ASSESSMENT & PLAN NOTE
Diabetes is improving with treatment.  A1c improved at 7.7%.  Continue current treatment regimen.  Diabetes will be reassessed in 3 months.

## 2021-12-17 NOTE — PLAN OF CARE
Courtesy visit completed to answer questions regarding diet. Pt very appreciative. Encouraged her to call with additional concerns.

## 2022-01-05 ENCOUNTER — TELEPHONE (OUTPATIENT)
Dept: NEUROLOGY | Facility: CLINIC | Age: 72
End: 2022-01-05

## 2022-01-05 ENCOUNTER — OFFICE VISIT (OUTPATIENT)
Dept: CARDIOLOGY | Facility: CLINIC | Age: 72
End: 2022-01-05

## 2022-01-05 VITALS
BODY MASS INDEX: 26.21 KG/M2 | OXYGEN SATURATION: 98 % | SYSTOLIC BLOOD PRESSURE: 138 MMHG | HEART RATE: 82 BPM | HEIGHT: 67 IN | WEIGHT: 167 LBS | DIASTOLIC BLOOD PRESSURE: 70 MMHG

## 2022-01-05 DIAGNOSIS — G43.009 MIGRAINE WITHOUT AURA AND WITHOUT STATUS MIGRAINOSUS, NOT INTRACTABLE: ICD-10-CM

## 2022-01-05 DIAGNOSIS — I95.1 ORTHOSTATIC HYPOTENSION: Primary | ICD-10-CM

## 2022-01-05 DIAGNOSIS — M54.2 NECK PAIN: ICD-10-CM

## 2022-01-05 DIAGNOSIS — E78.5 DYSLIPIDEMIA: ICD-10-CM

## 2022-01-05 DIAGNOSIS — R11.0 NAUSEA WITHOUT VOMITING: ICD-10-CM

## 2022-01-05 DIAGNOSIS — Q21.12 PATENT FORAMEN OVALE: ICD-10-CM

## 2022-01-05 DIAGNOSIS — I10 ESSENTIAL HYPERTENSION: ICD-10-CM

## 2022-01-05 DIAGNOSIS — R00.2 PALPITATIONS: ICD-10-CM

## 2022-01-05 DIAGNOSIS — R00.0 TACHYCARDIA: ICD-10-CM

## 2022-01-05 PROCEDURE — 99214 OFFICE O/P EST MOD 30 MIN: CPT | Performed by: INTERNAL MEDICINE

## 2022-01-05 RX ORDER — BUTALBITAL, ACETAMINOPHEN AND CAFFEINE 50; 325; 40 MG/1; MG/1; MG/1
1 TABLET ORAL DAILY PRN
Qty: 30 TABLET | Refills: 0 | Status: SHIPPED | OUTPATIENT
Start: 2022-01-05 | End: 2022-02-02 | Stop reason: SDUPTHER

## 2022-01-05 RX ORDER — GABAPENTIN 800 MG/1
400 TABLET ORAL 3 TIMES DAILY
Qty: 45 TABLET | Refills: 4 | Status: SHIPPED | OUTPATIENT
Start: 2022-01-05 | End: 2022-04-25

## 2022-01-05 RX ORDER — FLUDROCORTISONE ACETATE 0.1 MG/1
0.1 TABLET ORAL DAILY
Qty: 90 TABLET | Refills: 1 | Status: SHIPPED | OUTPATIENT
Start: 2022-01-05 | End: 2022-06-30 | Stop reason: SDUPTHER

## 2022-01-05 RX ORDER — METOPROLOL SUCCINATE 100 MG/1
100 TABLET, EXTENDED RELEASE ORAL DAILY
Qty: 90 TABLET | Refills: 3 | Status: SHIPPED | OUTPATIENT
Start: 2022-01-05 | End: 2023-01-18 | Stop reason: SDUPTHER

## 2022-01-05 RX ORDER — CHLORDIAZEPOXIDE HYDROCHLORIDE AND CLIDINIUM BROMIDE 5; 2.5 MG/1; MG/1
1 CAPSULE ORAL
Qty: 60 CAPSULE | Refills: 5 | Status: CANCELLED | OUTPATIENT
Start: 2022-01-05

## 2022-01-05 RX ORDER — PYRIDOSTIGMINE BROMIDE 60 MG/1
60 TABLET ORAL 3 TIMES DAILY
Qty: 90 TABLET | Refills: 0 | Status: SHIPPED | OUTPATIENT
Start: 2022-01-05 | End: 2022-04-25 | Stop reason: SINTOL

## 2022-01-05 RX ORDER — PYRIDOSTIGMINE BROMIDE 60 MG/1
60 TABLET ORAL 3 TIMES DAILY
Qty: 90 TABLET | Refills: 0 | Status: SHIPPED | OUTPATIENT
Start: 2022-01-05 | End: 2022-01-05

## 2022-01-05 NOTE — TELEPHONE ENCOUNTER
Patient called to ask about refills on gabapentin and fioricet. Reorders were placed. Please advise.

## 2022-01-05 NOTE — PROGRESS NOTES
[facility]    Patient ID: Leela Muller is a 71 y.o. female.  : 1950   Contact: [phone]    Encounter date: 2022    PCP: Connor Ritter MD      Chief complaint:   Chief Complaint   Patient presents with   • Orthostatic hypotension       Problem List:  1. Cerebrovascular accident:  a. Right CVA in July or 2010, evaluated at [facility].   b. Admission to [facility], 2010 with headache and stuttering, c/w TIA.   c. Chronic Coumadin therapy, initiated following bilateral PE in  after fall and hip replacement. She was already on 81 mg of aspirin as well, 2010.   d. MRI brain, 2010: Old right parietal CVA.   e. MRA, 2010: Normal carotids, middle anterior and posterior cerebral arteries with minimal ostial stenosis of both vertebral arteries.   f. GENARO, 2010, Dr. Mobley: PFO with a small amount of right to left shunting. Normal LVEF and normal valves.  g. PFO closure, 10/05/2010: 25 mm Amplatzer cribriform occluder.    h. Echocardiogram, 2014: Amplatzer device is well-seated in the interatrial septum. EF 55-60%. Trace MR and mild TR.  i. Echocardiogram, 2019: Intact Amplatzer septal occluder with no evidence of flow across the device. EF 60%. Trace MR/TR.   2. Abnormal myocardial perfusion study:    a. Patient reports having a MI in . Documentation only supports bilateral PE. She did not have a LHC or stents at time of alleged MI.   b. Cardiolite GXT, 2010, Dr. Monteiro: Small area of ischemia in the mid anteroseptal, mid inferior, and apical lateral regions. EF 68%.  c. LHC, 2010, Dr. Monteiro: Normal coronary arteries with normal LVEF.  3. Chest pain   a. Cardiolite stress test, 2021: EF 58%. No CP at baseline but had CP with infusion located in the center of her chest, which continued into recovery, but was less intense. Abnormal baseline EKG with 0.5 to 1 mm of ST segment depression  which did not change appreciably with the infusion. No evidence of inducible ischemia. Low risk study.   4. DM2.   5. Bilateral pulmonary emboli:  a. Following hip replacement in 2007.   b. No evidence of prior anti-coagulable workup.   c. Chronic Coumadin therapy.   d. No evidence of DVT on bilateral lower extremity duplex.  e. The decision was made to discontinue the patient's warfarin therapy due to her fall risk.  6. Palpitations:  a. 2 week Holter, 04/23/2018: Normal study.  7. Hypertension.   8. Dyslipidemia.   9. Pancreatitis:  a. Recent episode  in March 2013.  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    11. Rectal cancer; surgically removed by Dr Martinez.  12.  Surgical history:  a. Hip replacement.  b. Hysterectomy.  c. Tonsillectomy  d. Appendectomy.  e. Cholecystectomy.    Allergies   Allergen Reactions   • Atorvastatin Swelling   • Tetanus Toxoid Hives   • Acyclovir Seizure   • Cefprozil Other (See Comments)   • Lansoprazole Nausea Only and Nausea And Vomiting   • Ranexa [Ranolazine Er] Nausea And Vomiting       Current Medications:    Current Outpatient Medications:   •  acetaminophen (TYLENOL) 500 MG tablet, Take 500 mg by mouth Every 6 (Six) Hours As Needed., Disp: , Rfl:   •  acyclovir (ZOVIRAX) 5 % ointment, Apply 1 unit topically to the anal area as needed, Disp: 15 g, Rfl: PRN  •  albuterol sulfate  (90 Base) MCG/ACT inhaler, Inhale 2 puffs by mouth every 4 hours, Disp: 8.5 g, Rfl: 3  •  aspirin 81 MG tablet, Take 81 mg by mouth Daily. Taken at night. Last dose 9-18-21, Disp: , Rfl:   •  baclofen (LIORESAL) 10 MG tablet, Take 1 tablet by mouth 3 (three) times a day., Disp: 270 tablet, Rfl: 3  •  butalbital-acetaminophen-caffeine (FIORICET, ESGIC) -40 MG per tablet, Take 1 tablet by mouth Daily As Needed for headache, Disp: 30 tablet, Rfl: 0  •  clidinium-chlordiazePOXIDE (LIBRAX) 5-2.5 MG per capsule, Take 1 capsule by mouth once or twice a day, Disp: 60 capsule, Rfl: 5  •   clonazePAM (KlonoPIN) 0.5 MG tablet, As Needed., Disp: , Rfl:   •  Cream Base Liposomic (PCCA CUSTOM LIPO-MAX) cream, As Needed., Disp: , Rfl:   •  digoxin (LANOXIN) 250 MCG tablet, Take 1 tablet by mouth daily, Disp: 90 tablet, Rfl: 2  •  fluconazole (DIFLUCAN) 150 MG tablet, Take 1 tablet by mouth 1 (One) Time As Needed for yeast infection, Disp: 2 tablet, Rfl: 2  •  fludrocortisone 0.1 MG tablet, Take 1 tablet by mouth Daily., Disp: 90 tablet, Rfl: 1  •  furosemide (LASIX) 40 MG tablet, Take 1 tablet by mouth Daily. May have extra 1/2 to 1 tablet daily if needed for edema, Disp: 45 tablet, Rfl: 1  •  gabapentin (NEURONTIN) 800 MG tablet, Take 1/2 tablet by mouth 3 (Three) Times a Day., Disp: 45 tablet, Rfl: 1  •  glipizide (GLUCOTROL XL) 10 MG 24 hr tablet, Take 2 tablets by mouth Daily., Disp: 180 tablet, Rfl: 3  •  glucose blood (OneTouch Verio) test strip, Use to test blood glucose 3 times a day as directed, Disp: 300 each, Rfl: 3  •  HYDROcodone-acetaminophen (NORCO) 7.5-325 MG per tablet, Take 1 tablet by mouth 3 (Three) Times a Day., Disp: 90 tablet, Rfl: 0  •  hydrocortisone (ANUSOL-HC) 25 MG suppository, Insert 1 suppository rectally twice a day as needed (remove wrapper and moisten with water), Disp: 12 suppository, Rfl: 3  •  Insulin Glargine (BASAGLAR KWIKPEN) 100 UNIT/ML injection pen, Inject 50 units subcutaneously once in the morning and 75 units at night, Disp: 45 mL, Rfl: 5  •  Insulin Pen Needle (B-D UF III MINI PEN NEEDLES) 31G X 5 MM misc, Use to inject insulin twice a day as directed, Disp: 100 each, Rfl: 12  •  Insulin Syringe-Needle U-100 29G 0.5 ML misc, Inject 0.3 mL as directed Daily., Disp: , Rfl:   •  loperamide (Imodium A-D) 2 MG capsule, Imodium A-D  As directed prn.  Initial dose 2 tablets followed by 1 tablet daily,, Disp: , Rfl:   •  meclizine (ANTIVERT) 25 MG tablet, Take 1 tablet by mouth 3 (Three) Times a Day As Needed for Dizziness., Disp: 90 tablet, Rfl: 3  •  metoclopramide  (REGLAN) 10 MG tablet, Take 1 tablet by mouth Daily As Needed for migraine., Disp: 30 tablet, Rfl: 5  •  metoprolol succinate XL (TOPROL-XL) 100 MG 24 hr tablet, Take 1 tablet by mouth Daily., Disp: 90 tablet, Rfl: 3  •  metroNIDAZOLE (METROCREAM) 0.75 % cream, Apply to the face once every night (Patient taking differently: Apply  topically to the appropriate area as directed As Needed.), Disp: 45 g, Rfl: 0  •  mupirocin (BACTROBAN) 2 % ointment, Apply to affected area daily at bedtime, Disp: 22 g, Rfl: 5  •  nitroglycerin (Nitrostat) 0.4 MG SL tablet, Place 1 tablet under the tongue Every 5 Minutes As Needed for Chest Pain for up to 3 total doses. Call 911 if pain remains after 1 dose., Disp: 25 tablet, Rfl: 6  •  pancrelipase, Lip-Prot-Amyl, (Pancreaze) 57229-14291 units capsule delayed-release particles capsule, Take 1 capsule with each meal and with each snack, Disp: 100 capsule, Rfl: 2  •  potassium chloride (KLOR-CON) 10 MEQ CR tablet, Take 1 tablet by mouth Daily as directed, Disp: 90 tablet, Rfl: 0  •  promethazine (PHENERGAN) 25 MG tablet, Take 1 tablet by mouth Every 6 (Six) Hours As Needed for Nausea or Vomiting., Disp: 30 tablet, Rfl: 5  •  RABEprazole (ACIPHEX) 20 MG EC tablet, Take 1 tablet by mouth Daily., Disp: 90 tablet, Rfl: 1  •  rosuvastatin (CRESTOR) 10 MG tablet, Take 1 tablet by mouth Daily., Disp: 90 tablet, Rfl: 2  •  topiramate (TOPAMAX) 25 MG tablet, Take 1 tablet by mouth Daily., Disp: 90 tablet, Rfl: 3  •  venlafaxine XR (EFFEXOR-XR) 75 MG 24 hr capsule, Take 1 capsule by mouth Daily., Disp: 90 capsule, Rfl: 3  •  pyridostigmine (MESTINON) 60 MG tablet, Take 1 tablet by mouth 3 (Three) Times a Day., Disp: 90 tablet, Rfl: 0  •  triamcinolone (KENALOG) 0.1 % cream, Mix with a tub of cetaphil cream (buy over the counter) and apply to rash 2 (Two) times a day, Disp: 80 g, Rfl: 5    HPI    Leela LOBO Price is a 71 y.o. female who presents today for a 6 month follow up of orthostatic hypotension,  "PFO, and cardiac risk factors. Since last visit, she's experienced quivering in her chest that wakes her up during the night. Onset of episodes was three months ago and occur about 3 times a week. For quivering to improve she sits up in bed and drinks a glass of water. Her blood sugar has fluctuated recently and thinks this may contribute. Dr. Santiago follows her diabetes and last A1c was around 7% with 20 pound weight loss. One day her blood pressure was 80/60 while standing associated with dizziness, pressure improved after sitting down. Patient's dizziness is bothersome and frequent. She doesn't take Lasix very often. She started seeing Dr. Mccrary since her oxygen saturation level was dropping fairly low while doing a hallway walk test. Patient was having ongoing abdominal pain for some time and was found to have a liver mass. Patient otherwise denies chest pain, shortness of breath, edema, and syncope.     The following portions of the patient's history were reviewed and updated as appropriate: allergies, current medications and problem list.    Pertinent positives as listed in the HPI.  All other systems reviewed are negative.         Vitals:    01/05/22 1303   BP: 138/70   BP Location: Left arm   Patient Position: Sitting   Cuff Size: Adult   Pulse: 82   SpO2: 98%   Weight: 75.8 kg (167 lb)   Height: 170.2 cm (67\")       Physical Exam:  General: Alert and oriented.  Neck: Jugular venous pressure is within normal limits. Carotids have normal upstrokes without bruits.   Cardiovascular: Heart has a nondisplaced focal PMI. Regular rate and rhythm. No murmur, gallop or rub.  Lungs: Clear, no rales or wheezes. Equal expansion is noted.   Extremities: Show no edema.  Skin: Warm and dry.  Neurologic: Nonfocal.     Diagnostic Data (reviewed with patient):  Lab Results   Component Value Date    GLUCOSE 112 (H) 12/17/2021    BUN 13 12/17/2021    CREATININE 0.77 12/17/2021    EGFRIFNONA 73 12/17/2021    BCR 16.9 12/17/2021 "     12/17/2021    K 3.8 12/17/2021     12/17/2021    CO2 31.6 (H) 12/17/2021    CALCIUM 9.3 12/17/2021    ALBUMIN 4.10 12/17/2021    ALKPHOS 70 12/17/2021    AST 18 12/17/2021    ALT 22 12/17/2021     Lab Results   Component Value Date    CHOL 103 12/17/2021    TRIG 321 (H) 12/17/2021    HDL 31 (L) 12/17/2021    LDL 25 12/17/2021      Lab Results   Component Value Date    WBC 7.26 12/17/2021    RBC 4.59 12/17/2021    HGB 13.6 12/17/2021    HCT 41.7 12/17/2021    MCV 90.8 12/17/2021     12/17/2021      Lab Results   Component Value Date    TSH 3.170 12/17/2021        Procedures      Assessment:    ICD-10-CM ICD-9-CM   1. Orthostatic hypotension  I95.1 458.0   2. Patent foramen ovale  Q21.1 745.5   3. Tachycardia  R00.0 785.0   4. Essential hypertension  I10 401.9   5. Dyslipidemia  E78.5 272.4   6. Palpitations  R00.2 785.1         Plan:  1. Two week monitor placed on patient to evaluate quivering sensation in chest. ? afib?  2. Begin Mestinon 60 mg TID for better control of orthostatic hypotension and reduce dizziness.  3. Continue florinef but it may be possible we discontinue it next month if mestinon works well.  4. Continue on aspirin 81 mg for antiplatelet therapy.   5. Continue on digoxin 250 mcg daily for tachycardia.   6. Continue on metoprolol 100 mg daily for hypertension and tachycardia.   7. Continue on rosuvastatin 10 mg daily for hyperlipidemia.   8. Continue all other current medications.  9. F/up in 1 months, sooner if needed.    Scribed for Rika Sullivan MD by Nataly Pelayo. 1/5/2022 13:07 EST      I Rika Sullivan MD personally performed the services described in this documentation as scribed by the above individual in my presence, and it is both accurate and complete.    Rika Sullivan MD, [facility]

## 2022-01-11 ENCOUNTER — TELEPHONE (OUTPATIENT)
Dept: CARDIOLOGY | Facility: CLINIC | Age: 72
End: 2022-01-11

## 2022-01-11 NOTE — TELEPHONE ENCOUNTER
PT reports that since starting mestinon, she has a hard time holding her urine and she has pains that shoot up through her vagina. Also reports leg weakness and muscle cramps.     Instructed to hold medication and call me this Thursday with an update- she should also see PCP if symptoms do not improve- she verbalized understanding.

## 2022-01-13 NOTE — TELEPHONE ENCOUNTER
"Dr. Sullivan aware \"stay of mestinon. Nothing else to do\"    Pt aware. Will see her as scheduled.   "

## 2022-01-13 NOTE — TELEPHONE ENCOUNTER
PT calls with an update- she feels much better off mestinon- no itching, no leg cramps, no shooting pains in the vaginal area and decrease in urinary frequency.     Instructed to remain off medication and I will discuss further with Dr. Sullivan.

## 2022-02-01 NOTE — PROGRESS NOTES
[facility]    Patient ID: Leela Muller is a 72 y.o. female.  : 1950   Contact: [phone]    Encounter date: 2022    PCP: Connor Ritter MD      Chief complaint:   Chief Complaint   Patient presents with   • Orthostatic hypotension       Problem List:  1. Cerebrovascular accident:  a. Right CVA in July or 2010, evaluated at [facility].   b. Admission to [facility], 2010 with headache and stuttering, c/w TIA.   c. Chronic Coumadin therapy, initiated following bilateral PE in  after fall and hip replacement. She was already on 81 mg of aspirin as well, 2010.   d. MRI brain, 2010: Old right parietal CVA.   e. MRA, 2010: Normal carotids, middle anterior and posterior cerebral arteries with minimal ostial stenosis of both vertebral arteries.   f. GENARO, 2010, Dr. Mobley: PFO with a small amount of right to left shunting. Normal LVEF and normal valves.  g. PFO closure, 10/05/2010: 25 mm Amplatzer cribriform occluder.    h. Echocardiogram, 2014: Amplatzer device is well-seated in the interatrial septum. EF 55-60%. Trace MR and mild TR.  i. Echocardiogram, 2019: Intact Amplatzer septal occluder with no evidence of flow across the device. EF 60%. Trace MR/TR.   2. Abnormal myocardial perfusion study:    a. Patient reports having a MI in . Documentation only supports bilateral PE. She did not have a LHC or stents at time of alleged MI.   b. Cardiolite GXT, 2010, Dr. Monteiro: Small area of ischemia in the mid anteroseptal, mid inferior, and apical lateral regions. EF 68%.  c. LHC, 2010, Dr. Monteiro: Normal coronary arteries with normal LVEF.  3. Chest pain   a. Cardiolite stress test, 2021: EF 58%. No CP at baseline but had CP with infusion located in the center of her chest, which continued into recovery, but was less intense. Abnormal baseline EKG with 0.5 to 1 mm of ST segment depression  which did not change appreciably with the infusion. No evidence of inducible ischemia. Low risk study.   4. DM2.   5. Bilateral pulmonary emboli:  a. Following hip replacement in 2007.   b. No evidence of prior anti-coagulable workup.   c. Chronic Coumadin therapy.   d. No evidence of DVT on bilateral lower extremity duplex.  e. The decision was made to discontinue the patient's warfarin therapy due to her fall risk.  6. Palpitations:  a. 2 week Holter, 04/23/2018: Normal study.  b. 14-day Holter, 1/19/2022: SR/SB/ST. 3 pauses longest 2.2 secs. Brief a tach. Rare PVCs and PACs. Average HR: 73. Min HR: 50. Max HR: 131.  7. Hypertension.   8. Dyslipidemia.   9. Pancreatitis:  a. Recent episode  in March 2013.  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    11. Rectal cancer; surgically removed by Dr Martinez.  12.  Surgical history:  a. Hip replacement.  b. Hysterectomy.  c. Tonsillectomy  d. Appendectomy.  e. Cholecystectomy.    Allergies   Allergen Reactions   • Atorvastatin Swelling   • Tetanus Toxoid Hives   • Acyclovir Seizure   • Cefprozil Other (See Comments)   • Lansoprazole Nausea Only and Nausea And Vomiting   • Mestinon [Pyridostigmine] Itching and Other (See Comments)     Leg cramps, shooting vaginal pains, frequent urination   • Ranexa [Ranolazine Er] Nausea And Vomiting       Current Medications:    Current Outpatient Medications:   •  acetaminophen (TYLENOL) 500 MG tablet, Take 500 mg by mouth Every 6 (Six) Hours As Needed., Disp: , Rfl:   •  acyclovir (ZOVIRAX) 5 % ointment, Apply 1 unit topically to the anal area as needed, Disp: 15 g, Rfl: PRN  •  albuterol sulfate  (90 Base) MCG/ACT inhaler, Inhale 2 puffs by mouth every 4 hours, Disp: 8.5 g, Rfl: 3  •  aspirin 81 MG tablet, Take 81 mg by mouth Daily. Taken at night. Last dose 9-18-21, Disp: , Rfl:   •  baclofen (LIORESAL) 10 MG tablet, Take 1 tablet by mouth 3 (three) times a day., Disp: 270 tablet, Rfl: 3  •   butalbital-acetaminophen-caffeine (FIORICET, ESGIC) -40 MG per tablet, Take 1 tablet by mouth Daily As Needed for headache, Disp: 30 tablet, Rfl: 0  •  clidinium-chlordiazePOXIDE (LIBRAX) 5-2.5 MG per capsule, Take 1 capsule by mouth 2 (Two) Times a Day., Disp: 60 capsule, Rfl: 0  •  clonazePAM (KlonoPIN) 0.5 MG tablet, As Needed., Disp: , Rfl:   •  Cream Base Liposomic (PCCA CUSTOM LIPO-MAX) cream, As Needed., Disp: , Rfl:   •  digoxin (LANOXIN) 250 MCG tablet, Take 1 tablet by mouth daily, Disp: 90 tablet, Rfl: 2  •  doxycycline (VIBRAMYCIN) 100 MG capsule, Take 1 capsule by mouth Every 12 (Twelve) Hours for 7 days, Disp: 14 capsule, Rfl: 0  •  fluconazole (DIFLUCAN) 150 MG tablet, Take 1 tablet by mouth 1 (One) Time As Needed for yeast infection, Disp: 2 tablet, Rfl: 2  •  fludrocortisone 0.1 MG tablet, Take 1 tablet by mouth Daily., Disp: 90 tablet, Rfl: 1  •  furosemide (LASIX) 40 MG tablet, Take 1 tablet by mouth Daily. May have extra 1/2 to 1 tablet daily if needed for edema, Disp: 45 tablet, Rfl: 1  •  gabapentin (NEURONTIN) 800 MG tablet, Take 1/2 tablet by mouth 3 (Three) Times a Day., Disp: 45 tablet, Rfl: 4  •  glipizide (GLUCOTROL XL) 10 MG 24 hr tablet, Take 2 tablets by mouth Daily., Disp: 180 tablet, Rfl: 3  •  glucose blood (OneTouch Verio) test strip, Use to test blood glucose 3 times a day as directed, Disp: 300 each, Rfl: 3  •  HYDROcodone-acetaminophen (NORCO) 7.5-325 MG per tablet, Take 1 tablet by mouth 3 (Three) Times a Day., Disp: 90 tablet, Rfl: 0  •  hydrocortisone (ANUSOL-HC) 25 MG suppository, Insert 1 suppository rectally twice a day as needed (remove wrapper and moisten with water), Disp: 12 suppository, Rfl: 3  •  Insulin Glargine (BASAGLAR KWIKPEN) 100 UNIT/ML injection pen, Inject 50 units subcutaneously once in the morning and 75 units at night, Disp: 45 mL, Rfl: 5  •  Insulin Pen Needle (B-D UF III MINI PEN NEEDLES) 31G X 5 MM misc, Use to inject insulin twice a day as  directed, Disp: 100 each, Rfl: 12  •  Insulin Syringe-Needle U-100 29G 0.5 ML misc, Inject 0.3 mL as directed Daily., Disp: , Rfl:   •  loperamide (Imodium A-D) 2 MG capsule, Imodium A-D  As directed prn.  Initial dose 2 tablets followed by 1 tablet daily,, Disp: , Rfl:   •  meclizine (ANTIVERT) 25 MG tablet, Take 1 tablet by mouth 3 (Three) Times a Day As Needed for Dizziness., Disp: 90 tablet, Rfl: 3  •  metoclopramide (REGLAN) 10 MG tablet, Take 1 tablet by mouth Daily As Needed for migraine., Disp: 30 tablet, Rfl: 5  •  metoprolol succinate XL (TOPROL-XL) 100 MG 24 hr tablet, Take 1 tablet by mouth Daily., Disp: 90 tablet, Rfl: 3  •  metroNIDAZOLE (METROCREAM) 0.75 % cream, Apply to the face once every night (Patient taking differently: Apply  topically to the appropriate area as directed As Needed.), Disp: 45 g, Rfl: 0  •  mupirocin (BACTROBAN) 2 % ointment, Apply to affected area daily at bedtime, Disp: 22 g, Rfl: 5  •  nitroglycerin (Nitrostat) 0.4 MG SL tablet, Place 1 tablet under the tongue Every 5 Minutes As Needed for Chest Pain for up to 3 total doses. Call 911 if pain remains after 1 dose., Disp: 25 tablet, Rfl: 6  •  pancrelipase, Lip-Prot-Amyl, (Pancreaze) 94228-38877 units capsule delayed-release particles capsule, Take 1 capsule with each meal and with each snack, Disp: 100 capsule, Rfl: 2  •  potassium chloride (KLOR-CON) 10 MEQ CR tablet, Take 1 tablet by mouth Daily as directed, Disp: 90 tablet, Rfl: 0  •  promethazine (PHENERGAN) 25 MG tablet, Take 1 tablet by mouth Every 6 (Six) Hours As Needed for Nausea or Vomiting., Disp: 30 tablet, Rfl: 5  •  RABEprazole (ACIPHEX) 20 MG EC tablet, Take 1 tablet by mouth Daily., Disp: 90 tablet, Rfl: 1  •  rosuvastatin (CRESTOR) 10 MG tablet, Take 1 tablet by mouth Daily., Disp: 90 tablet, Rfl: 1  •  topiramate (TOPAMAX) 25 MG tablet, Take 1 tablet by mouth Daily., Disp: 90 tablet, Rfl: 3  •  triamcinolone (KENALOG) 0.1 % cream, Mix with a tub of cetaphil  "cream (buy over the counter) and apply to rash 2 (Two) times a day, Disp: 80 g, Rfl: 5  •  venlafaxine XR (EFFEXOR-XR) 75 MG 24 hr capsule, Take 1 capsule by mouth Daily., Disp: 90 capsule, Rfl: 3  •  pyridostigmine (MESTINON) 60 MG tablet, Take 1 tablet by mouth 3 (Three) Times a Day., Disp: 90 tablet, Rfl: 0    HPI    Leela Muller is a 72 y.o. female who presents today for a one month follow up of PFO, tachycardia, palpitations, orthostatic hypotension, and cardiac risk factors. Since last visit, patient has done okay. She did not tolerate mestinon due to several side effects. Her BP has been controlled. She reports having one episode of \"heart quivering\" while she wore the monitor and one \"big one\" after she took the monitor off. She has no PND, orthopnea, edema.        The following portions of the patient's history were reviewed and updated as appropriate: allergies, current medications and problem list.    Pertinent positives as listed in the HPI.  All other systems reviewed are negative.         Vitals:    02/02/22 1028   BP: 120/56   BP Location: Left arm   Patient Position: Sitting   Pulse: 74   SpO2: 95%   Weight: 74.8 kg (165 lb)   Height: 170.2 cm (67\")       Physical Exam:  General: Alert and oriented.  Neck: Jugular venous pressure is within normal limits. Carotids have normal upstrokes without bruits.   Cardiovascular: Heart has a nondisplaced focal PMI. Regular rate and rhythm. No murmur, gallop or rub.  Lungs: Clear, no rales or wheezes. Equal expansion is noted.   Extremities: Show no edema.  Skin: Warm and dry.  Neurologic: Nonfocal.     Diagnostic Data (reviewed with patient):  Lab Results   Component Value Date    GLUCOSE 112 (H) 12/17/2021    BUN 13 12/17/2021    CREATININE 0.77 12/17/2021    EGFRIFNONA 73 12/17/2021    BCR 16.9 12/17/2021     12/17/2021    K 3.8 12/17/2021     12/17/2021    CO2 31.6 (H) 12/17/2021    CALCIUM 9.3 12/17/2021    ALBUMIN 4.10 12/17/2021    ALKPHOS 70 " 12/17/2021    AST 18 12/17/2021    ALT 22 12/17/2021     Lab Results   Component Value Date    CHOL 103 12/17/2021    TRIG 321 (H) 12/17/2021    HDL 31 (L) 12/17/2021    LDL 25 12/17/2021      Lab Results   Component Value Date    WBC 7.26 12/17/2021    RBC 4.59 12/17/2021    HGB 13.6 12/17/2021    HCT 41.7 12/17/2021    MCV 90.8 12/17/2021     12/17/2021      Lab Results   Component Value Date    TSH 3.170 12/17/2021        Procedures      Assessment:    ICD-10-CM ICD-9-CM   1. Orthostatic hypotension  I95.1 458.0   2. Patent foramen ovale  Q21.1 745.5   3. Tachycardia  R00.0 785.0   4. Essential hypertension  I10 401.9   5. Dyslipidemia  E78.5 272.4   6. Palpitations  R00.2 785.1         Plan:  1. Patient may take an extra 1/2 of the metoprolol for sustained palpitations.   2. Continue on aspirin 81 mg for antiplatelet therapy.   3. Continue on digoxin 250 mcg daily for tachycardia.   4. Continue on metoprolol 100 mg daily for hypertension and tachycardia.   5. Continue on rosuvastatin 10 mg daily for dyslipidemia.   6. Continue all other current medications.  7. F/up in 6 months, sooner if needed.      Electronically signed by WEST Davila, 02/02/22, 10:57 AM EST.

## 2022-02-02 ENCOUNTER — OFFICE VISIT (OUTPATIENT)
Dept: CARDIOLOGY | Facility: CLINIC | Age: 72
End: 2022-02-02

## 2022-02-02 VITALS
DIASTOLIC BLOOD PRESSURE: 56 MMHG | OXYGEN SATURATION: 95 % | SYSTOLIC BLOOD PRESSURE: 120 MMHG | HEIGHT: 67 IN | WEIGHT: 165 LBS | HEART RATE: 74 BPM | BODY MASS INDEX: 25.9 KG/M2

## 2022-02-02 DIAGNOSIS — R00.0 TACHYCARDIA: ICD-10-CM

## 2022-02-02 DIAGNOSIS — I10 ESSENTIAL HYPERTENSION: ICD-10-CM

## 2022-02-02 DIAGNOSIS — Q21.12 PATENT FORAMEN OVALE: ICD-10-CM

## 2022-02-02 DIAGNOSIS — E78.5 DYSLIPIDEMIA: ICD-10-CM

## 2022-02-02 DIAGNOSIS — R00.2 PALPITATIONS: ICD-10-CM

## 2022-02-02 DIAGNOSIS — G43.009 MIGRAINE WITHOUT AURA AND WITHOUT STATUS MIGRAINOSUS, NOT INTRACTABLE: ICD-10-CM

## 2022-02-02 DIAGNOSIS — I95.1 ORTHOSTATIC HYPOTENSION: Primary | ICD-10-CM

## 2022-02-02 PROCEDURE — 99213 OFFICE O/P EST LOW 20 MIN: CPT | Performed by: INTERNAL MEDICINE

## 2022-02-02 RX ORDER — PYRIDOSTIGMINE BROMIDE 60 MG/1
60 TABLET ORAL 3 TIMES DAILY
Qty: 90 TABLET | Refills: 0 | OUTPATIENT
Start: 2022-02-02

## 2022-02-02 RX ORDER — BUTALBITAL, ACETAMINOPHEN AND CAFFEINE 50; 325; 40 MG/1; MG/1; MG/1
1 TABLET ORAL DAILY PRN
Qty: 30 TABLET | Refills: 0 | Status: SHIPPED | OUTPATIENT
Start: 2022-02-02 | End: 2022-04-25

## 2022-02-02 NOTE — TELEPHONE ENCOUNTER
Rx Refill Note  Requested Prescriptions     Pending Prescriptions Disp Refills   • butalbital-acetaminophen-caffeine (FIORICET, ESGIC) -40 MG per tablet 30 tablet 0     Sig: Take 1 tablet by mouth Daily As Needed for headache      Last office visit with prescribing clinician: 10/25/2021      Next office visit with prescribing clinician: 4/25/2022            Anjali Mcgraw CMA  02/02/22, 14:34 EST

## 2022-03-09 ENCOUNTER — OFFICE VISIT (OUTPATIENT)
Dept: ENDOCRINOLOGY | Facility: CLINIC | Age: 72
End: 2022-03-09

## 2022-03-09 VITALS
OXYGEN SATURATION: 96 % | HEART RATE: 63 BPM | BODY MASS INDEX: 25.58 KG/M2 | DIASTOLIC BLOOD PRESSURE: 60 MMHG | SYSTOLIC BLOOD PRESSURE: 121 MMHG | WEIGHT: 163 LBS | HEIGHT: 67 IN

## 2022-03-09 DIAGNOSIS — Z79.4 TYPE 2 DIABETES MELLITUS WITH HYPERGLYCEMIA, WITH LONG-TERM CURRENT USE OF INSULIN: Primary | ICD-10-CM

## 2022-03-09 DIAGNOSIS — E11.65 TYPE 2 DIABETES MELLITUS WITH HYPERGLYCEMIA, WITH LONG-TERM CURRENT USE OF INSULIN: Primary | ICD-10-CM

## 2022-03-09 DIAGNOSIS — E78.5 DYSLIPIDEMIA: ICD-10-CM

## 2022-03-09 DIAGNOSIS — Z79.4 INSULIN LONG-TERM USE: ICD-10-CM

## 2022-03-09 DIAGNOSIS — I10 PRIMARY HYPERTENSION: ICD-10-CM

## 2022-03-09 LAB
EXPIRATION DATE: NORMAL
EXPIRATION DATE: NORMAL
GLUCOSE BLDC GLUCOMTR-MCNC: 130 MG/DL (ref 70–130)
HBA1C MFR BLD: 7.2 %
Lab: NORMAL
Lab: NORMAL

## 2022-03-09 PROCEDURE — 99214 OFFICE O/P EST MOD 30 MIN: CPT | Performed by: INTERNAL MEDICINE

## 2022-03-09 PROCEDURE — 83036 HEMOGLOBIN GLYCOSYLATED A1C: CPT | Performed by: INTERNAL MEDICINE

## 2022-03-09 PROCEDURE — 82947 ASSAY GLUCOSE BLOOD QUANT: CPT | Performed by: INTERNAL MEDICINE

## 2022-03-09 PROCEDURE — 3051F HG A1C>EQUAL 7.0%<8.0%: CPT | Performed by: INTERNAL MEDICINE

## 2022-03-09 RX ORDER — INSULIN GLARGINE 100 [IU]/ML
INJECTION, SOLUTION SUBCUTANEOUS
Qty: 45 ML | Refills: 5 | Status: SHIPPED | OUTPATIENT
Start: 2022-03-09 | End: 2022-12-27 | Stop reason: SDUPTHER

## 2022-03-09 RX ORDER — GLIPIZIDE 10 MG/1
20 TABLET, FILM COATED, EXTENDED RELEASE ORAL DAILY
Qty: 180 TABLET | Refills: 3 | Status: SHIPPED | OUTPATIENT
Start: 2022-03-09 | End: 2023-03-06 | Stop reason: SDUPTHER

## 2022-03-09 NOTE — PROGRESS NOTES
"     Office Note      Date: 2022  Patient Name: Leela Muller  MRN: [id number]  : 1950    Chief Complaint   Patient presents with   • Diabetes       History of Present Illness:   Leela Muller is a 72 y.o. female who presents for Diabetes type 2. Diagnosed in: . Treated in past with oral agents. Current treatments: glipizide and basal insulin. Number of insulin shots per day: 2. Checks blood sugar 2 times a day. Has low blood sugar: no. Aspirin use: Yes. Statin use: Yes. ACE-I/ARB use: Yes. Changes in health since last visit: COVID-19 infection. Last eye exam .    Subjective      Diabetic Complications:  Eyes: No  Kidneys: No  Feet: Yes -    Heart: No    Diet and Exercise:  Meals per day: 2  Minutes of exercise per week: 0 mins.    Review of Systems:   Review of Systems   Constitutional: Negative.    Cardiovascular: Negative.    Gastrointestinal: Negative.    Endocrine: Negative.        The following portions of the patient's history were reviewed and updated as appropriate: allergies, current medications, past family history, past medical history, past social history, past surgical history and problem list.    Objective       Visit Vitals  /60   Pulse 63   Ht 170.2 cm (67\")   Wt 73.9 kg (163 lb)   SpO2 96%   BMI 25.53 kg/m²       Physical Exam:  Physical Exam  Constitutional:       Appearance: Normal appearance.   Neurological:      Mental Status: She is alert.         Labs:    HbA1c  Lab Results   Component Value Date    HGBA1C 7.2 2022       CMP  Lab Results   Component Value Date    GLUCOSE 112 (H) 2021    BUN 13 2021    CREATININE 0.77 2021    EGFRIFNONA 73 2021    BCR 16.9 2021    K 3.8 2021    CO2 31.6 (H) 2021    CALCIUM 9.3 2021    LABIL2 1.5 2015    AST 18 2021    ALT 22 2021        Lipid Panel  Lab Results   Component Value Date    HDL 31 (L) 2021    LDL 25 2021    TRIG 321 (H) 2021    "     TSH  Lab Results   Component Value Date    TSH 3.170 12/17/2021        Hemoglobin A1C  Lab Results   Component Value Date    HGBA1C 7.2 03/09/2022        Microalbumin/Creatinine  Lab Results   Component Value Date    DARINELO  12/17/2021      Comment:      Unable to calculate    MICROALBUR <1.2 12/17/2021           Assessment / Plan      Assessment & Plan:  Diagnoses and all orders for this visit:    1. Type 2 diabetes mellitus with hyperglycemia, with long-term current use of insulin (HCC) (Primary)  Assessment & Plan:  Diabetes is improving with treatment.  A1c improved at 7.2%.  This is acceptable for her.  Continue current treatment regimen.  Diabetes will be reassessed in 3 months.    Orders:  -     POC Glucose, Blood  -     POC Glycosylated Hemoglobin (Hb A1C)    2. Primary hypertension  Assessment & Plan:  Hypertension is unchanged.  Continue current treatment regimen.  Blood pressure will be reassessed at the next regular appointment.      3. Dyslipidemia  Assessment & Plan:  Continue statin.      4. Insulin long-term use (HCC)    Other orders  -     glipizide (GLUCOTROL XL) 10 MG 24 hr tablet; Take 2 tablets by mouth Daily.  Dispense: 180 tablet; Refill: 3  -     Insulin Glargine (BASAGLAR KWIKPEN) 100 UNIT/ML injection pen; Inject 50 units subcutaneously once in the morning and 75 units at night  Dispense: 45 mL; Refill: 5      Return in about 3 months (around 6/9/2022) for Recheck with A1c.    Jac Santiago MD   03/09/2022

## 2022-03-09 NOTE — ASSESSMENT & PLAN NOTE
Diabetes is improving with treatment.  A1c improved at 7.2%.  This is acceptable for her.  Continue current treatment regimen.  Diabetes will be reassessed in 3 months.

## 2022-03-30 ENCOUNTER — OFFICE VISIT (OUTPATIENT)
Dept: GASTROENTEROLOGY | Facility: CLINIC | Age: 72
End: 2022-03-30

## 2022-03-30 VITALS
HEIGHT: 67 IN | WEIGHT: 167 LBS | BODY MASS INDEX: 26.21 KG/M2 | TEMPERATURE: 97.7 F | OXYGEN SATURATION: 96 % | SYSTOLIC BLOOD PRESSURE: 148 MMHG | HEART RATE: 83 BPM | DIASTOLIC BLOOD PRESSURE: 70 MMHG

## 2022-03-30 DIAGNOSIS — K75.81 NASH (NONALCOHOLIC STEATOHEPATITIS): Primary | ICD-10-CM

## 2022-03-30 PROCEDURE — 99213 OFFICE O/P EST LOW 20 MIN: CPT | Performed by: INTERNAL MEDICINE

## 2022-03-30 NOTE — PROGRESS NOTES
Patient Name: Leela Muller  YOB: 1950   Medical Record number: [id number]     PCP: Connor Ritter MD    Chief Complaint   Patient presents with   • Follow-up     6 month   Follow-up of [facility].    History of Present Illness:   HPI  Mrs. Muller returns to the office for follow-up visit.  The patient has suffered a couple of falls with a fracture of the tailbone.  She denies any issue with abdominal pain.  There is no history of nausea.  Mrs. Muller has not noted any change in the color of her eyes or skin.  The patient denies any abdominal swelling.  There is no history of unexplained weight loss.  Mrs. Muller denies any signs of gastrointestinal bleeding.  Past Medical History:   Diagnosis Date   • Anxiety    • Arm pain    • Arthritis    • Breast injury     fell 6/2019    • Cancer (HCC)    • Chronic pancreatitis (HCC)    • COVID-19    • Depression    • Diabetes mellitus (HCC)    • Hyperlipidemia    • Hypertension    • Liver mass    • Neck pain on left side    • Palpitations 4/18/2018   • Pancreatitis    • Pulmonary embolism (HCC)    • Stroke syndrome 9/14/2016    · Assessed By: Devaughn Lewis (Neurology); Last Assessed: 16 Jun 2015  Right cerebrovascular accident in July or August 2010, evaluated at [facility].  Admission to [facility] on 09/28/2010 with headache and stuttering, consistent with TIA.  Chronic Coumadin therapy, initiated following bilateral pulmonary emboli in 2007 after fall and hip replacement.  She was already on 81 mg of aspirin as well, 09/2010.  MRI of the brain on 09/28/2010 revealing old right parietal cerebrovascular accident.  MRA revealing normal carotids, middle anterior and posterior cerebral arteries with minimal ostial stenosis of both vertebral arteries.  GENARO by Dr. Oliver Mobley on 09/28/2010:  patent foramen ovale with a small amount of right to left shunting.  Normal LVEF and normal valvular structures.   Patent foramen ovale  closure using a 25 mm. Amplatzer cribriform occluder, 10/05/2010.   Echocardiogram, 06/23/2014:  LVEF (55% to 60%) with and Amplatzer device noted to be well-seated in    • Thrombocytopenia (HCC)    • Transient cerebral ischemia    • Type 2 diabetes mellitus (HCC)        Past Surgical History:   Procedure Laterality Date   • APPENDECTOMY     • BREAST BIOPSY Left 2012    benign   • BREAST BIOPSY     • BREAST EXCISIONAL BIOPSY Left     benign   • BREAST EXCISIONAL BIOPSY Right     benign   • BREAST EXCISIONAL BIOPSY Right 2020    benign   • BREAST SURGERY     • CHOLECYSTECTOMY     • COLONOSCOPY     • HYSTERECTOMY     • LIVER BIOPSY     • OOPHORECTOMY     • RECTAL SURGERY     • SKIN CANCER EXCISION     • TONSILLECTOMY     • TOTAL HIP ARTHROPLASTY REVISION           Current Outpatient Medications:   •  acetaminophen (TYLENOL) 500 MG tablet, Take 500 mg by mouth Every 6 (Six) Hours As Needed., Disp: , Rfl:   •  acyclovir (ZOVIRAX) 5 % ointment, Apply 1 unit topically to the anal area as needed, Disp: 15 g, Rfl: PRN  •  albuterol sulfate  (90 Base) MCG/ACT inhaler, Inhale 2 puffs by mouth every 4 hours, Disp: 8.5 g, Rfl: 3  •  baclofen (LIORESAL) 10 MG tablet, Take 1 tablet by mouth 3 (three) times a day., Disp: 270 tablet, Rfl: 3  •  butalbital-acetaminophen-caffeine (FIORICET, ESGIC) -40 MG per tablet, Take 1 tablet by mouth Daily As Needed for headache, Disp: 30 tablet, Rfl: 0  •  clidinium-chlordiazePOXIDE (LIBRAX) 5-2.5 MG per capsule, Take 1 capsule by mouth 2 (Two) Times a Day Before Meals., Disp: 56 capsule, Rfl: 0  •  clonazePAM (KlonoPIN) 0.5 MG tablet, As Needed., Disp: , Rfl:   •  Cream Base Liposomic (PCCA CUSTOM LIPO-MAX) cream, As Needed., Disp: , Rfl:   •  digoxin (LANOXIN) 250 MCG tablet, Take 1 tablet by mouth daily, Disp: 90 tablet, Rfl: 2  •  fluconazole (DIFLUCAN) 150 MG tablet, Take 1 tablet by mouth 1 (One) Time As Needed for yeast infection, Disp: 2 tablet, Rfl: 2  •  fludrocortisone  0.1 MG tablet, Take 1 tablet by mouth Daily., Disp: 90 tablet, Rfl: 1  •  furosemide (LASIX) 40 MG tablet, Take 1 tablet by mouth Daily. May have extra 1/2 to 1 tablet daily if needed for edema, Disp: 45 tablet, Rfl: 1  •  gabapentin (NEURONTIN) 800 MG tablet, Take 1/2 tablet by mouth 3 (Three) Times a Day., Disp: 45 tablet, Rfl: 4  •  glipizide (GLUCOTROL XL) 10 MG 24 hr tablet, Take 2 tablets by mouth Daily., Disp: 180 tablet, Rfl: 3  •  glucose blood (OneTouch Verio) test strip, Use to test blood glucose 3 times a day as directed (Patient taking differently: Use to test blood glucose 3 times a day as directed), Disp: 300 each, Rfl: 3  •  HYDROcodone-acetaminophen (NORCO) 7.5-325 MG per tablet, Take 1 tablet by mouth 3 (Three) Times a Day., Disp: 84 tablet, Rfl: 0  •  hydrocortisone (ANUSOL-HC) 25 MG suppository, Insert 1 suppository rectally twice a day as needed (remove wrapper and moisten with water), Disp: 12 suppository, Rfl: 3  •  Insulin Glargine (BASAGLAR KWIKPEN) 100 UNIT/ML injection pen, Inject 50 units subcutaneously once in the morning and 75 units at night, Disp: 45 mL, Rfl: 5  •  Insulin Pen Needle (B-D UF III MINI PEN NEEDLES) 31G X 5 MM misc, Use to inject insulin twice a day as directed, Disp: 100 each, Rfl: 12  •  Insulin Syringe-Needle U-100 29G 0.5 ML misc, Inject 0.3 mL as directed Daily., Disp: , Rfl:   •  loperamide (IMODIUM) 2 MG capsule, Imodium A-D  As directed prn.  Initial dose 2 tablets followed by 1 tablet daily,, Disp: , Rfl:   •  meclizine (ANTIVERT) 25 MG tablet, Take 1 tablet by mouth 3 (Three) Times a Day As Needed for Dizziness., Disp: 90 tablet, Rfl: 3  •  metoclopramide (REGLAN) 10 MG tablet, Take 1 tablet by mouth Daily As Needed for migraine., Disp: 30 tablet, Rfl: 5  •  metoprolol succinate XL (TOPROL-XL) 100 MG 24 hr tablet, Take 1 tablet by mouth Daily., Disp: 90 tablet, Rfl: 3  •  metroNIDAZOLE (METROCREAM) 0.75 % cream, Apply to the face once every night (Patient  taking differently: Apply  topically to the appropriate area as directed As Needed.), Disp: 45 g, Rfl: 0  •  mupirocin (BACTROBAN) 2 % ointment, Apply to affected area daily at bedtime, Disp: 22 g, Rfl: 5  •  mupirocin (BACTROBAN) 2 % ointment, Apply topically to the affected area twice a day, Disp: 22 g, Rfl: 0  •  nitroglycerin (Nitrostat) 0.4 MG SL tablet, Place 1 tablet under the tongue Every 5 Minutes As Needed for Chest Pain for up to 3 total doses. Call 911 if pain remains after 1 dose., Disp: 25 tablet, Rfl: 6  •  pancrelipase, Lip-Prot-Amyl, (Pancreaze) 07820-03572 units capsule delayed-release particles capsule, Take 1 capsule with each meal and with each snack, Disp: 100 capsule, Rfl: 2  •  potassium chloride (KLOR-CON) 10 MEQ CR tablet, Take 1 tablet by mouth Daily as directed, Disp: 90 tablet, Rfl: 0  •  promethazine (PHENERGAN) 25 MG tablet, Take 1 tablet by mouth Every 6 (Six) Hours As Needed for Nausea or Vomiting., Disp: 30 tablet, Rfl: 5  •  pyridostigmine (MESTINON) 60 MG tablet, Take 1 tablet by mouth 3 (Three) Times a Day., Disp: 90 tablet, Rfl: 0  •  RABEprazole (ACIPHEX) 20 MG EC tablet, Take 1 tablet by mouth Daily., Disp: 90 tablet, Rfl: 1  •  rosuvastatin (CRESTOR) 10 MG tablet, Take 1 tablet by mouth Daily., Disp: 90 tablet, Rfl: 1  •  topiramate (TOPAMAX) 25 MG tablet, Take 1 tablet by mouth Daily., Disp: 90 tablet, Rfl: 3  •  triamcinolone (KENALOG) 0.1 % cream, Apply topically to the affected area twice a day, Disp: 30 g, Rfl: 0  •  venlafaxine XR (EFFEXOR-XR) 75 MG 24 hr capsule, Take 1 capsule by mouth Daily., Disp: 90 capsule, Rfl: 3  •  aspirin 81 MG tablet, Take 81 mg by mouth Daily. Taken at night. Last dose 9-18-21, Disp: , Rfl:     Allergies   Allergen Reactions   • Atorvastatin Swelling   • Tetanus Toxoid Hives   • Acyclovir Seizure   • Cefprozil Other (See Comments)   • Lansoprazole Nausea Only and Nausea And Vomiting   • Mestinon [Pyridostigmine] Itching and Other (See  Comments)     Leg cramps, shooting vaginal pains, frequent urination   • Ranexa [Ranolazine Er] Nausea And Vomiting       Family History   Problem Relation Age of Onset   • Alzheimer's disease Mother    • Cancer Mother    • Coronary artery disease Other    • Diabetes Other    • Hypertension Other    • Stroke Other    • Cancer Other    • Dementia Other    • Heart attack Father    • Colon polyps Father    • Breast cancer Neg Hx    • Ovarian cancer Neg Hx    • Colon cancer Neg Hx    • Esophageal cancer Neg Hx        Social History     Socioeconomic History   • Marital status:    Tobacco Use   • Smoking status: Never Smoker   • Smokeless tobacco: Never Used   Vaping Use   • Vaping Use: Never used   Substance and Sexual Activity   • Alcohol use: No   • Drug use: No   • Sexual activity: Defer       Review of Systems   Constitutional: Negative for activity change, appetite change, fatigue, fever and unexpected weight change.   HENT: Negative for dental problem, hearing loss, mouth sores, postnasal drip, sneezing, trouble swallowing and voice change.    Eyes: Negative for pain, redness, itching and visual disturbance.   Respiratory: Negative for cough, choking, chest tightness, shortness of breath and wheezing.    Cardiovascular: Negative for chest pain, palpitations and leg swelling.   Gastrointestinal: Negative for abdominal distention, abdominal pain, anal bleeding, blood in stool, constipation, diarrhea, nausea, rectal pain and vomiting.   Endocrine: Negative for cold intolerance, heat intolerance, polydipsia, polyphagia and polyuria.   Genitourinary: Negative.  Negative for dysuria, enuresis, flank pain, hematuria and urgency.   Musculoskeletal: Positive for gait problem (cane). Negative for arthralgias, back pain, joint swelling and myalgias.   Skin: Negative for color change, pallor and rash.   Allergic/Immunologic: Negative for environmental allergies, food allergies and immunocompromised state.    Neurological: Negative for dizziness, tremors, seizures, facial asymmetry, speech difficulty, numbness and headaches.   Hematological: Negative for adenopathy.   Psychiatric/Behavioral: Negative for behavioral problems, confusion, dysphoric mood, hallucinations and self-injury.       Vitals:    03/30/22 1355   BP: 148/70   Pulse: 83   Temp: 97.7 °F (36.5 °C)   SpO2: 96%       Physical Exam  Vitals reviewed.   Constitutional:       Appearance: Normal appearance.   HENT:      Head: Normocephalic and atraumatic.      Nose: Nose normal.      Mouth/Throat:      Mouth: Mucous membranes are moist.      Pharynx: Oropharynx is clear.   Eyes:      General: No scleral icterus.     Extraocular Movements: Extraocular movements intact.   Cardiovascular:      Rate and Rhythm: Normal rate and regular rhythm.      Heart sounds: No murmur heard.  Pulmonary:      Breath sounds: Normal breath sounds. No wheezing or rales.   Abdominal:      General: Bowel sounds are normal.      Palpations: Abdomen is soft.      Tenderness: There is no abdominal tenderness.   Musculoskeletal:         General: No swelling or tenderness.      Cervical back: Normal range of motion and neck supple.      Comments: Patient walking with a cane due to injury from fall   Skin:     General: Skin is dry.      Coloration: Skin is not jaundiced.   Neurological:      Mental Status: She is alert and oriented to person, place, and time.   Psychiatric:         Mood and Affect: Mood normal.         Thought Content: Thought content normal.         Judgment: Judgment normal.         Diagnoses and all orders for this visit:    1. LEDBETTER (nonalcoholic steatohepatitis) (Primary)  -     US Liver; Future    The patient has Ledbetter that is biopsy-proven.  There have been no signs of hepatic decompensation.  The last liver panel test demonstrates normal albumin and bilirubin.      Plan: Will schedule ultrasound of the liver in April 2022.           Will follow-up in the office in 6  months.

## 2022-04-01 NOTE — PROGRESS NOTES
Patient: Leela Muller    YOB: 1950    Date: 04/04/2022    Primary Care Provider: Connor Ritter MD    Chief Complaint   Patient presents with   • Breast Mass     1 year follow up        Subjective .     History of present illness:   I saw the patient in the office today for a 1 year follow up her right breast.  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   Mammogram on 8/2021 was normal.  She denies any problems.  Ultrasound indicates no evidence of a mass in the right breast biopsy site.     The following portions of the patient's history were reviewed and updated as appropriate: allergies, current medications, past family history, past medical history, past social history, past surgical history and problem list.    Review of Systems   Constitutional: Negative for chills, fever and unexpected weight change.   HENT: Negative for hearing loss, trouble swallowing and voice change.    Eyes: Negative for visual disturbance.   Respiratory: Negative for apnea, cough, chest tightness, shortness of breath and wheezing.    Cardiovascular: Negative for chest pain, palpitations and leg swelling.   Gastrointestinal: Negative for abdominal distention, abdominal pain, anal bleeding, blood in stool, constipation, diarrhea, nausea, rectal pain and vomiting.   Endocrine: Negative for cold intolerance and heat intolerance.   Genitourinary: Negative for difficulty urinating, dysuria and flank pain.   Musculoskeletal: Negative for back pain and gait problem.   Skin: Negative for color change, rash and wound.   Neurological: Negative for dizziness, syncope, speech difficulty, weakness, light-headedness, numbness and headaches.   Hematological: Negative for adenopathy. Does not bruise/bleed easily.   Psychiatric/Behavioral: Negative for confusion. The patient is not nervous/anxious.        History:  Past Medical History:   Diagnosis Date   • Anxiety    • Arm pain    • Arthritis    • Breast injury     fell  6/2019    • Cancer (HCC)    • Chronic pancreatitis (HCC)    • COVID-19    • Depression    • Diabetes mellitus (HCC)    • Hyperlipidemia    • Hypertension    • Liver mass    • Neck pain on left side    • Palpitations 4/18/2018   • Pancreatitis    • Pulmonary embolism (HCC)    • Stroke syndrome 9/14/2016    · Assessed By: Devaughn Lewis (Neurology); Last Assessed: 16 Jun 2015  Right cerebrovascular accident in July or August 2010, evaluated at [facility].  Admission to [facility] on 09/28/2010 with headache and stuttering, consistent with TIA.  Chronic Coumadin therapy, initiated following bilateral pulmonary emboli in 2007 after fall and hip replacement.  She was already on 81 mg of aspirin as well, 09/2010.  MRI of the brain on 09/28/2010 revealing old right parietal cerebrovascular accident.  MRA revealing normal carotids, middle anterior and posterior cerebral arteries with minimal ostial stenosis of both vertebral arteries.  GENARO by Dr. Oliver Mobley on 09/28/2010:  patent foramen ovale with a small amount of right to left shunting.  Normal LVEF and normal valvular structures.   Patent foramen ovale closure using a 25 mm. Amplatzer cribriform occluder, 10/05/2010.   Echocardiogram, 06/23/2014:  LVEF (55% to 60%) with and Amplatzer device noted to be well-seated in    • Thrombocytopenia (HCC)    • Transient cerebral ischemia    • Type 2 diabetes mellitus (HCC)           Past Surgical History:   Procedure Laterality Date   • APPENDECTOMY     • BREAST BIOPSY Left 2012    benign   • BREAST BIOPSY     • BREAST EXCISIONAL BIOPSY Left     benign   • BREAST EXCISIONAL BIOPSY Right     benign   • BREAST EXCISIONAL BIOPSY Right 2020    benign   • BREAST SURGERY     • CHOLECYSTECTOMY     • COLONOSCOPY     • HYSTERECTOMY     • LIVER BIOPSY     • OOPHORECTOMY     • RECTAL SURGERY     • SKIN CANCER EXCISION     • TONSILLECTOMY     • TOTAL HIP ARTHROPLASTY REVISION         Family History    Problem Relation Age of Onset   • Alzheimer's disease Mother    • Cancer Mother    • Coronary artery disease Other    • Diabetes Other    • Hypertension Other    • Stroke Other    • Cancer Other    • Dementia Other    • Heart attack Father    • Colon polyps Father    • Breast cancer Neg Hx    • Ovarian cancer Neg Hx    • Colon cancer Neg Hx    • Esophageal cancer Neg Hx        Social History     Tobacco Use   • Smoking status: Never Smoker   • Smokeless tobacco: Never Used   Vaping Use   • Vaping Use: Never used   Substance Use Topics   • Alcohol use: No   • Drug use: No       Allergies:  Allergies   Allergen Reactions   • Atorvastatin Swelling   • Tetanus Toxoid Hives   • Acyclovir Seizure   • Cefprozil Other (See Comments)   • Lansoprazole Nausea Only and Nausea And Vomiting   • Mestinon [Pyridostigmine] Itching and Other (See Comments)     Leg cramps, shooting vaginal pains, frequent urination   • Ranexa [Ranolazine Er] Nausea And Vomiting       Medications:     Current Outpatient Medications:   •  acetaminophen (TYLENOL) 500 MG tablet, Take 500 mg by mouth Every 6 (Six) Hours As Needed., Disp: , Rfl:   •  acyclovir (ZOVIRAX) 5 % ointment, Apply 1 unit topically to the anal area as needed, Disp: 15 g, Rfl: PRN  •  albuterol sulfate  (90 Base) MCG/ACT inhaler, Inhale 2 puffs by mouth every 4 hours, Disp: 8.5 g, Rfl: 3  •  aspirin 81 MG tablet, Take 81 mg by mouth Daily. Taken at night. Last dose 9-18-21, Disp: , Rfl:   •  baclofen (LIORESAL) 10 MG tablet, Take 1 tablet by mouth 3 (three) times a day., Disp: 270 tablet, Rfl: 3  •  butalbital-acetaminophen-caffeine (FIORICET, ESGIC) -40 MG per tablet, Take 1 tablet by mouth Daily As Needed for headache, Disp: 30 tablet, Rfl: 0  •  clidinium-chlordiazePOXIDE (LIBRAX) 5-2.5 MG per capsule, Take 1 capsule by mouth 2 (Two) Times a Day Before Meals., Disp: 56 capsule, Rfl: 0  •  clonazePAM (KlonoPIN) 0.5 MG tablet, As Needed., Disp: , Rfl:   •  Cream Base  Liposomic (PCCA CUSTOM LIPO-MAX) cream, As Needed., Disp: , Rfl:   •  digoxin (LANOXIN) 250 MCG tablet, Take 1 tablet by mouth daily, Disp: 90 tablet, Rfl: 2  •  fluconazole (DIFLUCAN) 150 MG tablet, Take 1 tablet by mouth 1 (One) Time As Needed for yeast infection, Disp: 2 tablet, Rfl: 2  •  fludrocortisone 0.1 MG tablet, Take 1 tablet by mouth Daily., Disp: 90 tablet, Rfl: 1  •  furosemide (LASIX) 40 MG tablet, Take 1 tablet by mouth Daily. May have extra 1/2 to 1 tablet daily if needed for edema, Disp: 45 tablet, Rfl: 1  •  gabapentin (NEURONTIN) 800 MG tablet, Take 1/2 tablet by mouth 3 (Three) Times a Day., Disp: 45 tablet, Rfl: 4  •  glipizide (GLUCOTROL XL) 10 MG 24 hr tablet, Take 2 tablets by mouth Daily., Disp: 180 tablet, Rfl: 3  •  glucose blood (OneTouch Verio) test strip, Use to test blood glucose 3 times a day as directed (Patient taking differently: Use to test blood glucose 3 times a day as directed), Disp: 300 each, Rfl: 3  •  HYDROcodone-acetaminophen (NORCO) 7.5-325 MG per tablet, Take 1 tablet by mouth 3 (Three) Times a Day., Disp: 90 tablet, Rfl: 0  •  hydrocortisone (ANUSOL-HC) 25 MG suppository, Insert 1 suppository rectally twice a day as needed (remove wrapper and moisten with water), Disp: 12 suppository, Rfl: 3  •  Insulin Glargine (BASAGLAR KWIKPEN) 100 UNIT/ML injection pen, Inject 50 units subcutaneously once in the morning and 75 units at night, Disp: 45 mL, Rfl: 5  •  Insulin Pen Needle (B-D UF III MINI PEN NEEDLES) 31G X 5 MM misc, Use to inject insulin twice a day as directed, Disp: 100 each, Rfl: 12  •  Insulin Syringe-Needle U-100 29G 0.5 ML misc, Inject 0.3 mL as directed Daily., Disp: , Rfl:   •  loperamide (IMODIUM) 2 MG capsule, Imodium A-D  As directed prn.  Initial dose 2 tablets followed by 1 tablet daily,, Disp: , Rfl:   •  meclizine (ANTIVERT) 25 MG tablet, Take 1 tablet by mouth 3 (Three) Times a Day As Needed for Dizziness., Disp: 90 tablet, Rfl: 3  •  metoclopramide  (REGLAN) 10 MG tablet, Take 1 tablet by mouth Daily As Needed for migraine., Disp: 30 tablet, Rfl: 5  •  metoprolol succinate XL (TOPROL-XL) 100 MG 24 hr tablet, Take 1 tablet by mouth Daily., Disp: 90 tablet, Rfl: 3  •  metroNIDAZOLE (METROCREAM) 0.75 % cream, Apply to the face once every night (Patient taking differently: Apply  topically to the appropriate area as directed As Needed.), Disp: 45 g, Rfl: 0  •  mupirocin (BACTROBAN) 2 % ointment, Apply to affected area daily at bedtime, Disp: 22 g, Rfl: 5  •  mupirocin (BACTROBAN) 2 % ointment, Apply topically to the affected area twice a day, Disp: 22 g, Rfl: 0  •  nitroglycerin (Nitrostat) 0.4 MG SL tablet, Place 1 tablet under the tongue Every 5 Minutes As Needed for Chest Pain for up to 3 total doses. Call 911 if pain remains after 1 dose., Disp: 25 tablet, Rfl: 6  •  pancrelipase, Lip-Prot-Amyl, (Pancreaze) 53625-90437 units capsule delayed-release particles capsule, Take 1 capsule with each meal and with each snack, Disp: 100 capsule, Rfl: 2  •  potassium chloride (KLOR-CON) 10 MEQ CR tablet, Take 1 tablet by mouth Daily as directed, Disp: 90 tablet, Rfl: 0  •  promethazine (PHENERGAN) 25 MG tablet, Take 1 tablet by mouth Every 6 (Six) Hours As Needed for Nausea or Vomiting., Disp: 30 tablet, Rfl: 5  •  pyridostigmine (MESTINON) 60 MG tablet, Take 1 tablet by mouth 3 (Three) Times a Day., Disp: 90 tablet, Rfl: 0  •  RABEprazole (ACIPHEX) 20 MG EC tablet, Take 1 tablet by mouth Daily., Disp: 90 tablet, Rfl: 1  •  rosuvastatin (CRESTOR) 10 MG tablet, Take 1 tablet by mouth Daily., Disp: 90 tablet, Rfl: 1  •  topiramate (TOPAMAX) 25 MG tablet, Take 1 tablet by mouth Daily., Disp: 90 tablet, Rfl: 3  •  triamcinolone (KENALOG) 0.1 % cream, Apply topically to the affected area twice a day, Disp: 30 g, Rfl: 0  •  venlafaxine XR (EFFEXOR-XR) 75 MG 24 hr capsule, Take 1 capsule by mouth Daily., Disp: 90 capsule, Rfl: 3    Objective     Vital Signs:   Vitals:     "04/04/22 1436   BP: 128/66   Pulse: 94   Temp: 97.8 °F (36.6 °C)   SpO2: 95%   Height: 170.2 cm (67\")       Physical Exam:     General Appearance:    Alert, cooperative, in no acute distress   Head:    Normocephalic, without obvious abnormality, atraumatic   Eyes:            Lids and lashes normal, conjunctivae and sclerae normal, no   icterus, no pallor, corneas clear,   Ears:    Ears appear intact with no abnormalities noted   Throat:   No oral lesions, no thrush, oral mucosa moist   Breast:    No palpable mass in either breast or axilla   Lungs:     Clear to auscultation,respirations regular, even and                  Unlabored    Heart:    Regular rhythm and normal rate, no murmur, no gallop.   Chest Wall:    No abnormalities observed   Abdomen:     Normal bowel sounds, no masses, no organomegaly, soft        non-tender, non-distended, no guarding.   Extremities:   Moves all extremities well, no edema, no cyanosis, no             redness   Pulses:   Pulses palpable and equal bilaterally   Skin:   No bleeding, bruising or rash   Lymph nodes:   No palpable adenopathy   Neurologic:   Cranial nerves 2 - 12 grossly intact.           Results Review:   I reviewed the patient's new clinical results.    Review of Systems was reviewed and confirmed as accurate as documented by the MA.    Assessment / Plan:    1. History of breast lump/mass excision    2. Other abnormal and inconclusive findings on diagnostic imaging of breast     3. Screening mammogram, encounter for        I did have a detailed and extensive discussion with the patient in the office today and reviewed her recent workup.  I have told the patient that she will need repeat ultrasound and mammogram in 1 year.  Patient reassured no significant findings today.    Electronically signed by Benji Martinez MD  04/04/22  15:26 EDT                  "

## 2022-04-04 ENCOUNTER — OFFICE VISIT (OUTPATIENT)
Dept: SURGERY | Facility: CLINIC | Age: 72
End: 2022-04-04

## 2022-04-04 VITALS
TEMPERATURE: 97.8 F | BODY MASS INDEX: 26.16 KG/M2 | HEIGHT: 67 IN | HEART RATE: 94 BPM | SYSTOLIC BLOOD PRESSURE: 128 MMHG | OXYGEN SATURATION: 95 % | DIASTOLIC BLOOD PRESSURE: 66 MMHG

## 2022-04-04 DIAGNOSIS — Z12.31 SCREENING MAMMOGRAM, ENCOUNTER FOR: ICD-10-CM

## 2022-04-04 DIAGNOSIS — Z98.890 HISTORY OF BREAST LUMP/MASS EXCISION: Primary | ICD-10-CM

## 2022-04-04 DIAGNOSIS — R92.8 OTHER ABNORMAL AND INCONCLUSIVE FINDINGS ON DIAGNOSTIC IMAGING OF BREAST: ICD-10-CM

## 2022-04-04 PROCEDURE — 99212 OFFICE O/P EST SF 10 MIN: CPT | Performed by: SURGERY

## 2022-04-25 ENCOUNTER — TELEPHONE (OUTPATIENT)
Dept: NEUROLOGY | Facility: CLINIC | Age: 72
End: 2022-04-25

## 2022-04-25 ENCOUNTER — OFFICE VISIT (OUTPATIENT)
Dept: NEUROLOGY | Facility: CLINIC | Age: 72
End: 2022-04-25

## 2022-04-25 VITALS
HEIGHT: 67 IN | BODY MASS INDEX: 25.11 KG/M2 | OXYGEN SATURATION: 95 % | SYSTOLIC BLOOD PRESSURE: 140 MMHG | WEIGHT: 160 LBS | DIASTOLIC BLOOD PRESSURE: 80 MMHG | HEART RATE: 85 BPM

## 2022-04-25 DIAGNOSIS — M54.2 NECK PAIN: ICD-10-CM

## 2022-04-25 DIAGNOSIS — S06.0X0S CONCUSSION WITHOUT LOSS OF CONSCIOUSNESS, SEQUELA: ICD-10-CM

## 2022-04-25 DIAGNOSIS — R29.6 RECURRENT FALLS WHILE WALKING: Primary | ICD-10-CM

## 2022-04-25 DIAGNOSIS — M50.30 DDD (DEGENERATIVE DISC DISEASE), CERVICAL: ICD-10-CM

## 2022-04-25 DIAGNOSIS — H81.13 BENIGN PAROXYSMAL VERTIGO OF BOTH EARS: ICD-10-CM

## 2022-04-25 DIAGNOSIS — G43.009 MIGRAINE WITHOUT AURA AND WITHOUT STATUS MIGRAINOSUS, NOT INTRACTABLE: ICD-10-CM

## 2022-04-25 PROBLEM — S06.0X0A CONCUSSION WITH NO LOSS OF CONSCIOUSNESS: Status: ACTIVE | Noted: 2022-04-25

## 2022-04-25 PROCEDURE — 99215 OFFICE O/P EST HI 40 MIN: CPT | Performed by: NURSE PRACTITIONER

## 2022-04-25 RX ORDER — GABAPENTIN 800 MG/1
800 TABLET ORAL NIGHTLY
Qty: 90 TABLET | Refills: 1 | Status: SHIPPED | OUTPATIENT
Start: 2022-04-25 | End: 2022-07-25

## 2022-04-25 RX ORDER — PROMETHAZINE HYDROCHLORIDE 25 MG/1
25 TABLET ORAL EVERY 6 HOURS PRN
Qty: 30 TABLET | Refills: 1 | Status: SHIPPED | OUTPATIENT
Start: 2022-04-25 | End: 2022-07-25

## 2022-04-25 RX ORDER — METOCLOPRAMIDE 10 MG/1
10 TABLET ORAL DAILY PRN
Qty: 30 TABLET | Refills: 1 | Status: SHIPPED | OUTPATIENT
Start: 2022-04-25 | End: 2022-07-25

## 2022-04-25 RX ORDER — BACLOFEN 10 MG/1
10 TABLET ORAL NIGHTLY PRN
Qty: 90 TABLET | Refills: 3 | Status: SHIPPED | OUTPATIENT
Start: 2022-04-25 | End: 2022-07-25

## 2022-04-25 RX ORDER — TOPIRAMATE 25 MG/1
25 TABLET ORAL NIGHTLY
Qty: 90 TABLET | Refills: 3 | Status: SHIPPED | OUTPATIENT
Start: 2022-04-25 | End: 2022-07-25

## 2022-04-25 RX ORDER — BUTALBITAL, ACETAMINOPHEN AND CAFFEINE 50; 325; 40 MG/1; MG/1; MG/1
1 TABLET ORAL DAILY PRN
Qty: 30 TABLET | Refills: 0 | Status: SHIPPED | OUTPATIENT
Start: 2022-04-25 | End: 2022-07-25

## 2022-04-25 RX ORDER — MECLIZINE HYDROCHLORIDE 25 MG/1
25 TABLET ORAL 3 TIMES DAILY PRN
Qty: 90 TABLET | Refills: 3 | Status: SHIPPED | OUTPATIENT
Start: 2022-04-25 | End: 2022-07-25

## 2022-04-25 RX ORDER — CYANOCOBALAMIN, ISOPROPYL ALCOHOL 1000MCG/ML
KIT INJECTION
COMMUNITY
End: 2022-05-20

## 2022-04-25 NOTE — TELEPHONE ENCOUNTER
----- Message from [place] sent at 4/25/2022 11:10 AM EDT -----  REQUEST [facility] ER NOTES AND CT SCANS FROM 3/28/2022

## 2022-04-25 NOTE — PROGRESS NOTES
Subjective:     Patient ID: Leela Mullre is a 72 y.o. female.    CC:   Chief Complaint   Patient presents with   • Migraine   • Gait Problem     Fell in March of 2022 and broke tail bone       HPI:   History of Present Illness   This is a very pleasant 72-year-old female who presents for 6-month neurology follow-up on migraine headaches present for many years as well as chronic neck pain, chronic and intermittent vertigo, anxiety, and a a history of previous strokes in 06/2015 and in 2010 as well. She was previously followed long term in our clinic by Dr. Devaughn Lewis in neurology. She is here for follow-up and for refills on medications.     The patient reports having 2 recent falls. The most recent fall was on 03/28/2022. She was getting dressed and pulling on her pants when her hip gave way. It appears she fell and fractured the coccyx, and injured the head and back. CT scan was performed at [facility]. She is not doing physical therapy. She denies any syncope or loss of consciousness, but she did have confusion afterward.    The patient complains of pain to the right posterior cervical spine, headaches localized to the right occipital region, and dizziness after the fall. She has bruising to the head from the fall as well. She complains of tiredness. She has not tried BioFreeze. Her  has been using a massager on her neck. She has been applying an ice pack to the neck. She notes numbness to the right trapezius region.    The patient is taking butalbital (Fioricet) as needed for headaches.  The patient is taking baclofen 10 mg once per day at bedtime for neck tightness related to migraines. She takes gabapentin 800 mg once a day at bedtime. She is still taking topiramate (Topamax) 25 mg daily at bedtime. She states she is drinking plenty of water. The patient takes meclizine in the morning and at night as needed for dizziness, and this helps. She takes Reglan daily as needed and  infrequently. She also takes Phenergan infrequently for nausea. She does not take the 2 together. She does get aching pain with photophobia and phonophobia as well as nausea.    The patient notes her heart rate has been elevated. She has not seen Dr. Sullivan recently-her cardiologist with . The patient reports allergy to PYRIDOSTGMINE (Mestinon). This was previously prescribed for erratic orthostatic blood pressure and dizziness, but she was not able to tolerate it.     She was unable to see Dr. Mccrary in pulmonology last week due to headache and diarrhea. She is rescheduled for 08/2022. Dr. Ritter PCP started the patient on doxycycline for her ear infection, but she has not noted improvement. She has 2 days left of the antibiotic course. Her primary care prescribed an oral vitamin B supplement after recent labs.    The following portions of the patient's history were reviewed and updated as appropriate: allergies, current medications, past family history, past medical history, past social history, past surgical history and problem list.    Past Medical History:   Diagnosis Date   • Anxiety    • Arm pain    • Arthritis    • Breast injury     fell 6/2019    • Cancer (HCC)    • Chronic pancreatitis (HCC)    • COVID-19    • Depression    • Diabetes mellitus (HCC)    • Hyperlipidemia    • Hypertension    • Liver mass    • Neck pain on left side    • Palpitations 4/18/2018   • Pancreatitis    • Pulmonary embolism (HCC)    • Stroke syndrome 9/14/2016    · Assessed By: Devaughn Lewis (Neurology); Last Assessed: 16 Jun 2015  Right cerebrovascular accident in July or August 2010, evaluated at [facility].  Admission to [facility] on 09/28/2010 with headache and stuttering, consistent with TIA.  Chronic Coumadin therapy, initiated following bilateral pulmonary emboli in 2007 after fall and hip replacement.  She was already on 81 mg of aspirin as well, 09/2010.  MRI of the brain on  09/28/2010 revealing old right parietal cerebrovascular accident.  MRA revealing normal carotids, middle anterior and posterior cerebral arteries with minimal ostial stenosis of both vertebral arteries.  GENARO by Dr. Oliver Mobley on 09/28/2010:  patent foramen ovale with a small amount of right to left shunting.  Normal LVEF and normal valvular structures.   Patent foramen ovale closure using a 25 mm. Amplatzer cribriform occluder, 10/05/2010.   Echocardiogram, 06/23/2014:  LVEF (55% to 60%) with and Amplatzer device noted to be well-seated in    • Thrombocytopenia (HCC)    • Transient cerebral ischemia    • Type 2 diabetes mellitus (HCC)        Past Surgical History:   Procedure Laterality Date   • APPENDECTOMY     • BREAST BIOPSY Left 2012    benign   • BREAST BIOPSY     • BREAST EXCISIONAL BIOPSY Left     benign   • BREAST EXCISIONAL BIOPSY Right     benign   • BREAST EXCISIONAL BIOPSY Right 2020    benign   • BREAST SURGERY     • CHOLECYSTECTOMY     • COLONOSCOPY     • HYSTERECTOMY     • LIVER BIOPSY     • OOPHORECTOMY     • RECTAL SURGERY     • SKIN CANCER EXCISION     • TONSILLECTOMY     • TOTAL HIP ARTHROPLASTY REVISION         Social History     Socioeconomic History   • Marital status:    Tobacco Use   • Smoking status: Never Smoker   • Smokeless tobacco: Never Used   Vaping Use   • Vaping Use: Never used   Substance and Sexual Activity   • Alcohol use: No   • Drug use: No   • Sexual activity: Defer       Family History   Problem Relation Age of Onset   • Alzheimer's disease Mother    • Cancer Mother    • Coronary artery disease Other    • Diabetes Other    • Hypertension Other    • Stroke Other    • Cancer Other    • Dementia Other    • Heart attack Father    • Colon polyps Father    • Breast cancer Neg Hx    • Ovarian cancer Neg Hx    • Colon cancer Neg Hx    • Esophageal cancer Neg Hx           Current Outpatient Medications:   •  acetaminophen (TYLENOL) 500 MG tablet, Take 500 mg by mouth Every 6  (Six) Hours As Needed., Disp: , Rfl:   •  acyclovir (ZOVIRAX) 5 % ointment, Apply 1 unit topically to the anal area as needed, Disp: 15 g, Rfl: PRN  •  albuterol sulfate  (90 Base) MCG/ACT inhaler, Inhale 2 puffs by mouth every 4 hours, Disp: 8.5 g, Rfl: 3  •  aspirin 81 MG tablet, Take 81 mg by mouth Daily. Taken at night. Last dose 9-18-21, Disp: , Rfl:   •  baclofen (LIORESAL) 10 MG tablet, Take 1 tablet by mouth At Night As Needed for Muscle Spasms., Disp: 90 tablet, Rfl: 3  •  butalbital-acetaminophen-caffeine (FIORICET, ESGIC) -40 MG per tablet, Take 1 tablet by mouth Daily As Needed for headache, Disp: 30 tablet, Rfl: 0  •  clidinium-chlordiazePOXIDE (LIBRAX) 5-2.5 MG per capsule, Take 1 capsule by mouth 2 (Two) Times a Day before meals, Disp: 60 capsule, Rfl: 0  •  clonazePAM (KlonoPIN) 0.5 MG tablet, As Needed., Disp: , Rfl:   •  Cream Base Liposomic (PCCA CUSTOM LIPO-MAX) cream, As Needed., Disp: , Rfl:   •  Cyanocobalamin (B-12 Compliance Injection) 1000 MCG/ML kit, Inject  as directed., Disp: , Rfl:   •  digoxin (LANOXIN) 250 MCG tablet, Take 1 tablet by mouth daily, Disp: 90 tablet, Rfl: 2  •  doxycycline (VIBRAMYCIN) 100 MG capsule, Take 1 capsule by mouth 2 (Two) Times a Day for 7 days, Disp: 14 capsule, Rfl: 0  •  fluconazole (DIFLUCAN) 150 MG tablet, Take 1 tablet by mouth 1 (One) Time As Needed for yeast infection, Disp: 2 tablet, Rfl: 2  •  fludrocortisone 0.1 MG tablet, Take 1 tablet by mouth Daily., Disp: 90 tablet, Rfl: 1  •  furosemide (LASIX) 40 MG tablet, Take 1 tablet by mouth Daily. May have extra 1/2 to 1 tablet daily if needed for edema, Disp: 45 tablet, Rfl: 1  •  gabapentin (NEURONTIN) 800 MG tablet, Take 1 tablet by mouth Every Night., Disp: 90 tablet, Rfl: 1  •  glipizide (GLUCOTROL XL) 10 MG 24 hr tablet, Take 2 tablets by mouth Daily., Disp: 180 tablet, Rfl: 3  •  glucose blood (OneTouch Verio) test strip, Use to test blood glucose 3 times a day as directed (Patient  taking differently: Use to test blood glucose 3 times a day as directed), Disp: 300 each, Rfl: 3  •  HYDROcodone-acetaminophen (NORCO) 7.5-325 MG per tablet, Take 1 tablet by mouth 3 (Three) Times a Day., Disp: 90 tablet, Rfl: 0  •  hydrocortisone (ANUSOL-HC) 25 MG suppository, Insert 1 suppository rectally twice a day as needed (remove wrapper and moisten with water), Disp: 12 suppository, Rfl: 3  •  Insulin Glargine (BASAGLAR KWIKPEN) 100 UNIT/ML injection pen, Inject 50 units subcutaneously once in the morning and 75 units at night, Disp: 45 mL, Rfl: 5  •  Insulin Pen Needle (B-D UF III MINI PEN NEEDLES) 31G X 5 MM misc, Use to inject insulin twice a day as directed, Disp: 100 each, Rfl: 12  •  Insulin Syringe-Needle U-100 29G 0.5 ML misc, Inject 0.3 mL as directed Daily., Disp: , Rfl:   •  loperamide (IMODIUM) 2 MG capsule, Imodium A-D  As directed prn.  Initial dose 2 tablets followed by 1 tablet daily,, Disp: , Rfl:   •  meclizine (ANTIVERT) 25 MG tablet, Take 1 tablet by mouth 3 (Three) Times a Day As Needed for Dizziness., Disp: 90 tablet, Rfl: 3  •  metoclopramide (REGLAN) 10 MG tablet, Take 1 tablet by mouth Daily As Needed for migraine., Disp: 30 tablet, Rfl: 1  •  metoprolol succinate XL (TOPROL-XL) 100 MG 24 hr tablet, Take 1 tablet by mouth Daily., Disp: 90 tablet, Rfl: 3  •  metroNIDAZOLE (METROCREAM) 0.75 % cream, Apply to the face once every night (Patient taking differently: Apply  topically to the appropriate area as directed As Needed.), Disp: 45 g, Rfl: 0  •  mupirocin (BACTROBAN) 2 % ointment, Apply to affected area daily at bedtime, Disp: 22 g, Rfl: 5  •  mupirocin (BACTROBAN) 2 % ointment, Apply topically to the affected area twice a day, Disp: 22 g, Rfl: 0  •  nitroglycerin (Nitrostat) 0.4 MG SL tablet, Place 1 tablet under the tongue Every 5 Minutes As Needed for Chest Pain for up to 3 total doses. Call 911 if pain remains after 1 dose., Disp: 25 tablet, Rfl: 6  •  pancrelipase,  Lip-Prot-Amyl, (Pancreaze) 94688-30051 units capsule delayed-release particles capsule, Take 1 capsule with each meal and with each snack, Disp: 100 capsule, Rfl: 2  •  potassium chloride (KLOR-CON) 10 MEQ CR tablet, Take 1 tablet by mouth Daily as directed, Disp: 90 tablet, Rfl: 0  •  promethazine (PHENERGAN) 25 MG tablet, Take 1 tablet by mouth Every 6 (Six) Hours As Needed for Nausea or Vomiting., Disp: 30 tablet, Rfl: 1  •  RABEprazole (ACIPHEX) 20 MG EC tablet, Take 1 tablet by mouth Daily., Disp: 90 tablet, Rfl: 1  •  rosuvastatin (CRESTOR) 10 MG tablet, Take 1 tablet by mouth Daily., Disp: 90 tablet, Rfl: 1  •  topiramate (TOPAMAX) 25 MG tablet, Take 1 tablet by mouth Every Night., Disp: 90 tablet, Rfl: 3  •  triamcinolone (KENALOG) 0.1 % cream, Apply topically to the affected area twice a day, Disp: 30 g, Rfl: 0  •  venlafaxine XR (EFFEXOR-XR) 75 MG 24 hr capsule, Take 1 capsule by mouth Daily., Disp: 90 capsule, Rfl: 3     Review of Systems   Constitutional: Negative for chills, fatigue, fever and unexpected weight change.   HENT: Negative for ear pain, hearing loss, nosebleeds, rhinorrhea and sore throat.    Eyes: Negative for photophobia, pain, discharge, itching and visual disturbance.   Respiratory: Negative for cough, chest tightness, shortness of breath and wheezing.    Cardiovascular: Negative for chest pain, palpitations and leg swelling.   Gastrointestinal: Negative for abdominal pain, blood in stool, constipation, diarrhea, nausea and vomiting.   Genitourinary: Negative for dysuria, frequency, hematuria and urgency.   Musculoskeletal: Positive for gait problem. Negative for arthralgias, back pain, joint swelling, myalgias, neck pain and neck stiffness.   Skin: Negative for rash and wound.   Allergic/Immunologic: Negative for environmental allergies and food allergies.   Neurological: Positive for dizziness and headaches. Negative for tremors, seizures, syncope, speech difficulty, weakness,  "light-headedness and numbness.   Hematological: Negative for adenopathy. Does not bruise/bleed easily.   Psychiatric/Behavioral: Negative for agitation, confusion, decreased concentration, hallucinations, sleep disturbance and suicidal ideas. The patient is not nervous/anxious.    All other systems reviewed and are negative.       Objective:  /80   Pulse 85   Ht 170.2 cm (67\")   Wt 72.6 kg (160 lb)   SpO2 95%   BMI 25.06 kg/m²     Neurologic Exam     Mental Status   Oriented to person, place, and time.   Attention: normal. Concentration: normal.   Speech: speech is normal   Level of consciousness: alert  Knowledge: good.     Cranial Nerves   Cranial nerves II through XII intact.     CN III, IV, VI   Extraocular motions are normal.   Nystagmus: none     Motor Exam   Muscle bulk: normal  Overall muscle tone: normal  Strength is 5/5 and symmetric in upper and lower extremities.     Sensory Exam   Light touch normal.   Vibration normal.     Cold sensation intact in the upper and lower extremities     Gait, Coordination, and Reflexes     Gait  Gait: (antalgic with use of a cane)    Coordination   Finger to nose coordination: normal  Heel to shin coordination: normal    Tremor   Resting tremor: absent  Intention tremor: absent  Action tremor: absent    Reflexes   Right brachioradialis: 2+  Left brachioradialis: 2+  Right biceps: 2+  Left biceps: 2+  Right triceps: 2+  Left triceps: 2+  Right patellar: 2+  Left patellar: 2+  Right achilles: 2+  Left achilles: 2+  Right : 2+  Left : 2+  Negative Babinski reflex.   Negative Hernandez's bilaterally.        Physical Exam  HENT:      Head:      Comments: Tenderness to the right occipital region  Eyes:      Extraocular Movements: EOM normal.   Musculoskeletal:      Cervical back: Tenderness (to the right trapezius and cervical paraspinal region, and left cervical paraspinal region) present. No edema or erythema. Pain with movement (pain on rotation, flexion, " extension, and hyperextension) present. Decreased range of motion.      Comments: No bulging   Neurological:      Mental Status: She is oriented to person, place, and time.      Coordination: Finger-Nose-Finger Test and Heel to Shin Test normal.      Deep Tendon Reflexes:      Reflex Scores:       Tricep reflexes are 2+ on the right side and 2+ on the left side.       Bicep reflexes are 2+ on the right side and 2+ on the left side.       Brachioradialis reflexes are 2+ on the right side and 2+ on the left side.       Patellar reflexes are 2+ on the right side and 2+ on the left side.       Achilles reflexes are 2+ on the right side and 2+ on the left side.  Psychiatric:         Mood and Affect: Mood is anxious (related to her headaches and neck pain.).         Speech: Speech normal.       Imaging reviewed.  MRI of the cervical spine completed on 06/28/2019 showed severe arthritis.    MRI of the brain completed in 2019 did show an old right occipital stroke with no acute intracranial abnormalities.    Assessment/Plan:       Diagnoses and all orders for this visit:    1. Recurrent falls while walking (Primary)  Comments:  continue using cane  Orders:  -     Ambulatory Referral to Physical Therapy Evaluate and treat, Neuro, Vestibular  -     MRI Brain With & Without Contrast; Future  -     MRI Cervical Spine Without Contrast; Future    2. Neck pain  -     baclofen (LIORESAL) 10 MG tablet; Take 1 tablet by mouth At Night As Needed for Muscle Spasms.  Dispense: 90 tablet; Refill: 3  -     gabapentin (NEURONTIN) 800 MG tablet; Take 1 tablet by mouth Every Night.  Dispense: 90 tablet; Refill: 1  -     Ambulatory Referral to Physical Therapy Evaluate and treat, Neuro, Vestibular    3. Migraine without aura and without status migrainosus, not intractable  -     butalbital-acetaminophen-caffeine (FIORICET, ESGIC) -40 MG per tablet; Take 1 tablet by mouth Daily As Needed for headache  Dispense: 30 tablet; Refill: 0  -      gabapentin (NEURONTIN) 800 MG tablet; Take 1 tablet by mouth Every Night.  Dispense: 90 tablet; Refill: 1  -     metoclopramide (REGLAN) 10 MG tablet; Take 1 tablet by mouth Daily As Needed for migraine.  Dispense: 30 tablet; Refill: 1  -     promethazine (PHENERGAN) 25 MG tablet; Take 1 tablet by mouth Every 6 (Six) Hours As Needed for Nausea or Vomiting.  Dispense: 30 tablet; Refill: 1  -     topiramate (TOPAMAX) 25 MG tablet; Take 1 tablet by mouth Every Night.  Dispense: 90 tablet; Refill: 3  -     Ambulatory Referral to Physical Therapy Evaluate and treat, Neuro, Vestibular  -     Creatinine, Serum; Future    4. Benign paroxysmal vertigo of both ears  -     meclizine (ANTIVERT) 25 MG tablet; Take 1 tablet by mouth 3 (Three) Times a Day As Needed for Dizziness.  Dispense: 90 tablet; Refill: 3  -     Ambulatory Referral to Physical Therapy Evaluate and treat, Neuro, Vestibular    5. Concussion without loss of consciousness, sequela (HCC)  Comments:  normal CT head at [facility] reported 3/28/22  Orders:  -     Ambulatory Referral to Physical Therapy Evaluate and treat, Neuro, Vestibular  -     MRI Brain With & Without Contrast; Future  -     MRI Cervical Spine Without Contrast; Future    6. DDD (degenerative disc disease), cervical  -     MRI Cervical Spine Without Contrast; Future           We will order MRI of the head and neck to assess for any new changes or spinal compression with recent falls with worsening headaches, dizziness and neck pain. We will call her with the results of imaging.    We also discussed today that all of her symptoms are consistent with a concussion and this can take 6 months or longer to recover completely especially with her baseline dizziness, headaches and chronic neck pain for many years.    The patient will continue taking butalbital (Fioricet) as needed for headaches. She will also continue taking gabapentin 800 mg once a day at bedtime and topiramate (Topamax) 25 mg daily at  bedtime.    She can continue taking meclizine in the morning and at night as needed for dizziness. She can continue taking Reglan daily as needed, and Phenergan infrequently for nausea but never together or in the same day.    The patient can continue taking baclofen 10 mg once a day for neck discomfort. She can increase to 2 tablets per day if needed for 1 to 2 weeks. She is advised to monitor for drowsiness. She has enough at home and does not need an increase on her script.    I do recommend she follow up with her PCP if her ears are not improving, since she is finishing her antibiotics.    We will place a referral to physical therapy to help with balance and strengthening. She is advised to rest and increase fluid intake.    She is going to follow up in 3 months or sooner if needed.     Total time of visit today was greater than 40 minutes, which included review of previous testing completed for neurology as well as obtaining history from the patient, discussing plan of care moving forward, physical examination, and documentation of today's visit.    Reviewed medications, potential side effects and signs and symptoms to report. Discussed risk versus benefits of treatment plan with patient and/or family-including medications, labs and radiology that may be ordered. Addressed questions and concerns during visit. Patient and/or family verbalized understanding and agree with plan.    AS THE PROVIDER, I PERSONALLY WORE PPE DURING ENTIRE FACE TO FACE ENCOUNTER IN CLINIC WITH THE PATIENT. PATIENT ALSO WORE PPE DURING ENTIRE FACE TO FACE ENCOUNTER EXCEPT FOR A MAX OF 30 SECONDS DURING NEUROLOGICAL EVALUATION OF CRANIAL NERVES AND THEN MASK WAS PLACED BACK OVER PATIENT FACE FOR REMAINDER OF VISIT. I WASHED MY HANDS BEFORE AND AFTER VISIT.    As part of this patient's treatment plan I am prescribing controlled substances. The patient has been made aware of appropriate use of such medications, including potential risk of  somnolence, limited ability to drive and/or work safely, and potential for dependence or overdose. It has also been made clear that these medications are for use by the patient only, without concomitant use of alcohol or other substances unless prescribed. Keep out of reach of children.  [facility] report has been reviewed. If this is going to be prescribed long term, [facility] Controlled Substance Agreement Contract has also been read and signed by patient and myself.    During this visit the following were done:  Labs Reviewed [x]    Labs Ordered [x]    Radiology Reports Reviewed []    Radiology Ordered [x]    PCP Records Reviewed []    Referring Provider Records Reviewed []    ER Records Reviewed []    Hospital Records Reviewed []    History Obtained From Family []    Radiology Images Reviewed []    Other Reviewed []    Records Requested [x]  [facility] ER RECORDS WITH CT SCANS FROM RECENT ER VISIT 3/28/2022    Transcribed from ambient dictation for [facility] by Caryl Delgadillo.  04/25/22   13:49 EDT    Patient verbalized consent to the visit recording.  I have personally performed the services described in this document as transcribed by the above individual, and it is both accurate and complete.  [facility]  4/25/2022  14:57 EDT

## 2022-04-30 ENCOUNTER — HOSPITAL ENCOUNTER (OUTPATIENT)
Dept: ULTRASOUND IMAGING | Facility: HOSPITAL | Age: 72
Discharge: HOME | End: 2022-04-30
Admitting: INTERNAL MEDICINE

## 2022-04-30 DIAGNOSIS — K75.81 NASH (NONALCOHOLIC STEATOHEPATITIS): ICD-10-CM

## 2022-04-30 PROCEDURE — 76705 ECHO EXAM OF ABDOMEN: CPT

## 2022-05-03 ENCOUNTER — TELEPHONE (OUTPATIENT)
Dept: GASTROENTEROLOGY | Facility: CLINIC | Age: 72
End: 2022-05-03

## 2022-05-03 NOTE — TELEPHONE ENCOUNTER
----- Message from Catracho Garcia MD sent at 5/2/2022  6:05 PM EDT -----  Let MsSinan Muller know the ultrasound is consistent with the fatty liver.

## 2022-05-20 ENCOUNTER — OFFICE VISIT (OUTPATIENT)
Dept: ENDOCRINOLOGY | Facility: CLINIC | Age: 72
End: 2022-05-20

## 2022-05-20 VITALS
WEIGHT: 160 LBS | DIASTOLIC BLOOD PRESSURE: 72 MMHG | OXYGEN SATURATION: 97 % | BODY MASS INDEX: 25.11 KG/M2 | HEIGHT: 67 IN | SYSTOLIC BLOOD PRESSURE: 128 MMHG | HEART RATE: 85 BPM

## 2022-05-20 DIAGNOSIS — I10 PRIMARY HYPERTENSION: ICD-10-CM

## 2022-05-20 DIAGNOSIS — E11.65 TYPE 2 DIABETES MELLITUS WITH HYPERGLYCEMIA, WITH LONG-TERM CURRENT USE OF INSULIN: Primary | ICD-10-CM

## 2022-05-20 DIAGNOSIS — Z79.4 TYPE 2 DIABETES MELLITUS WITH HYPERGLYCEMIA, WITH LONG-TERM CURRENT USE OF INSULIN: Primary | ICD-10-CM

## 2022-05-20 DIAGNOSIS — E08.42 DIABETIC POLYNEUROPATHY ASSOCIATED WITH DIABETES MELLITUS DUE TO UNDERLYING CONDITION: ICD-10-CM

## 2022-05-20 DIAGNOSIS — E78.5 DYSLIPIDEMIA: ICD-10-CM

## 2022-05-20 LAB
EXPIRATION DATE: ABNORMAL
GLUCOSE BLDC GLUCOMTR-MCNC: 162 MG/DL (ref 70–130)
Lab: ABNORMAL

## 2022-05-20 PROCEDURE — 99214 OFFICE O/P EST MOD 30 MIN: CPT | Performed by: INTERNAL MEDICINE

## 2022-05-20 PROCEDURE — 82947 ASSAY GLUCOSE BLOOD QUANT: CPT | Performed by: INTERNAL MEDICINE

## 2022-05-20 RX ORDER — BLOOD SUGAR DIAGNOSTIC
STRIP MISCELLANEOUS
Qty: 300 EACH | Refills: 3 | Status: SHIPPED | OUTPATIENT
Start: 2022-05-20

## 2022-05-20 RX ORDER — LANOLIN ALCOHOL/MO/W.PET/CERES
1000 CREAM (GRAM) TOPICAL DAILY
COMMUNITY
End: 2022-10-31

## 2022-05-20 NOTE — ASSESSMENT & PLAN NOTE
Diabetes is worsening.  Having some nocturnal lows now.  She has lost some weight.    Continue current treatment regimen.  But decrease basal insulin.  Diabetes will be reassessed in 3 months.

## 2022-05-20 NOTE — PROGRESS NOTES
"     Office Note      Date: 2022  Patient Name: Leela Muller  MRN: [id number]  : 1950    Chief Complaint   Patient presents with   • Diabetes       History of Present Illness:   Leela Muller is a 72 y.o. female who presents for Diabetes type 2. Diagnosed in: . Treated in past with oral agents. Current treatments: glipizide and basal insulin. Number of insulin shots per day: 2. Checks blood sugar 2-3 times a day. Has low blood sugar: occ. Aspirin use: Yes. Statin use: Yes. ACE-I/ARB use: Yes. Changes in health since last visit: none. Last eye exam .    Subjective      Diabetic Complications:  Eyes: No  Kidneys: No  Feet: Yes -    Heart: No    Diet and Exercise:  Meals per day: 2  Minutes of exercise per week: 0 mins.    Review of Systems:   Review of Systems   Constitutional: Negative.    Cardiovascular: Negative.    Gastrointestinal: Negative.    Endocrine: Negative.        The following portions of the patient's history were reviewed and updated as appropriate: allergies, current medications, past family history, past medical history, past social history, past surgical history and problem list.    Objective     Visit Vitals  /72   Pulse 85   Ht 170.2 cm (67\")   Wt 72.6 kg (160 lb)   SpO2 97%   BMI 25.06 kg/m²       Physical Exam:  Physical Exam  Constitutional:       Appearance: Normal appearance.   Neurological:      Mental Status: She is alert.         Labs:    HbA1c  Hemoglobin A1C   Date Value Ref Range Status   2022 7.2 % Final   2019 7.90 (H) 4.80 - 5.60 % Final   .    CMP  Lab Results   Component Value Date    GLUCOSE 112 (H) 2021    BUN 13 2021    CREATININE 0.77 2021    EGFRIFNONA 73 2021    BCR 16.9 2021    K 3.8 2021    CO2 31.6 (H) 2021    CALCIUM 9.3 2021    LABIL2 1.5 2015    AST 18 2021    ALT 22 2021        Lipid Panel  Lab Results   Component Value Date    HDL 31 (L) 2021    LDL 25 " 12/17/2021    TRIG 321 (H) 12/17/2021        TSH  Lab Results   Component Value Date    TSH 3.170 12/17/2021        Hemoglobin A1C  Lab Results   Component Value Date    HGBA1C 7.2 03/09/2022        Microalbumin/Creatinine  No results found for: IDALIARERATI        Assessment / Plan      Assessment & Plan:  Diagnoses and all orders for this visit:    1. Type 2 diabetes mellitus with hyperglycemia, with long-term current use of insulin ([facility]) (Primary)  Assessment & Plan:  Diabetes is worsening.  Having some nocturnal lows now.  She has lost some weight.    Continue current treatment regimen.  But decrease basal insulin.  Diabetes will be reassessed in 3 months.    Orders:  -     POC Glucose, Blood    2. Diabetic polyneuropathy associated with diabetes mellitus due to underlying condition ([facility])    3. Primary hypertension  Assessment & Plan:  Hypertension is unchanged.  Continue current treatment regimen.  Blood pressure will be reassessed at the next regular appointment.      4. Dyslipidemia  Assessment & Plan:  Continue statin.      Other orders  -     glucose blood (OneTouch Verio) test strip; Use to test blood glucose 3 times a day as directed; E11.65; Z79.4  Dispense: 300 each; Refill: 3       Return in about 3 months (around 8/20/2022) for Recheck with A1c, CMP.    Jac Santiago MD   05/20/2022   No pertinent family history in first degree relatives

## 2022-05-25 ENCOUNTER — TELEPHONE (OUTPATIENT)
Dept: ENDOCRINOLOGY | Facility: CLINIC | Age: 72
End: 2022-05-25

## 2022-05-25 NOTE — TELEPHONE ENCOUNTER
She could increase the insulin to 100u while on the steroids.  Make sure to drink lots of water to stay hydrated.  If she develops n/v, then go to ER.

## 2022-05-25 NOTE — TELEPHONE ENCOUNTER
PT called and has poison ivy all over her face. She went to the doctor and they gave her a steroid pack, she is taking it. Last night her BS was 507, she took 80 last night, this morning . She needs someone to give her a call back at [phone].    Last OV 5-20-22  F/U 8/5/22

## 2022-05-31 RX ORDER — DIGOXIN 250 MCG
TABLET ORAL
Qty: 90 TABLET | Refills: 1 | Status: SHIPPED | OUTPATIENT
Start: 2022-05-31 | End: 2022-10-26 | Stop reason: SDUPTHER

## 2022-05-31 RX ORDER — FUROSEMIDE 40 MG/1
TABLET ORAL
Qty: 135 TABLET | Refills: 1 | Status: SHIPPED | OUTPATIENT
Start: 2022-05-31 | End: 2022-12-28 | Stop reason: SDUPTHER

## 2022-05-31 NOTE — TELEPHONE ENCOUNTER
Lab Results   Component Value Date    GLUCOSE 112 (H) 12/17/2021    BUN 13 12/17/2021    CREATININE 0.77 12/17/2021    EGFRIFNONA 73 12/17/2021    BCR 16.9 12/17/2021    K 3.8 12/17/2021    CO2 31.6 (H) 12/17/2021    CALCIUM 9.3 12/17/2021    ALBUMIN 4.10 12/17/2021    LABIL2 1.5 03/20/2015    AST 18 12/17/2021    ALT 22 12/17/2021

## 2022-06-06 ENCOUNTER — HOSPITAL ENCOUNTER (OUTPATIENT)
Dept: MRI IMAGING | Facility: HOSPITAL | Age: 72
Discharge: HOME | End: 2022-06-06

## 2022-06-06 DIAGNOSIS — S06.0X0S CONCUSSION WITHOUT LOSS OF CONSCIOUSNESS, SEQUELA: ICD-10-CM

## 2022-06-06 DIAGNOSIS — R29.6 RECURRENT FALLS WHILE WALKING: ICD-10-CM

## 2022-06-06 DIAGNOSIS — M50.30 DDD (DEGENERATIVE DISC DISEASE), CERVICAL: ICD-10-CM

## 2022-06-06 LAB — CREAT BLDA-MCNC: 0.7 MG/DL (ref 0.6–1.3)

## 2022-06-06 PROCEDURE — 72141 MRI NECK SPINE W/O DYE: CPT

## 2022-06-06 PROCEDURE — 82565 ASSAY OF CREATININE: CPT

## 2022-06-06 PROCEDURE — 0 GADOBENATE DIMEGLUMINE 529 MG/ML SOLUTION: Performed by: NURSE PRACTITIONER

## 2022-06-06 PROCEDURE — 70553 MRI BRAIN STEM W/O & W/DYE: CPT

## 2022-06-06 PROCEDURE — A9577 INJ MULTIHANCE: HCPCS | Performed by: NURSE PRACTITIONER

## 2022-06-06 RX ADMIN — GADOBENATE DIMEGLUMINE 15 ML: 529 INJECTION, SOLUTION INTRAVENOUS at 14:08

## 2022-06-06 NOTE — PROGRESS NOTES
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  I do see in Jelly Lombardi's previous note that patient reported history of stroke in the past however I do not have a prior MRI report to compare.    Does she know where her previous stroke was?    MRI of the neck read as multilevel degenerative changes that appeared similar to her findings on 2019 cervical spine MRI per radiology read.  She had some narrowing present where the nerves exit but there did not appear to be any evidence of new spinal cord signal abnormality or swelling.  Certainly if she would like to see a neurosurgeon for opinion we would be happy to refer her.  Otherwise she can follow-up with Jelly Lombardi as scheduled

## 2022-06-07 ENCOUNTER — TELEPHONE (OUTPATIENT)
Dept: NEUROLOGY | Facility: CLINIC | Age: 72
End: 2022-06-07

## 2022-06-07 NOTE — TELEPHONE ENCOUNTER
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  I do see in Jelly Lombardi's previous note that patient reported history of stroke in the past however I do not have a prior MRI report to compare.    Does she know where her previous stroke was?    MRI of the neck read as multilevel degenerative changes that appeared similar to her findings on 2019 cervical spine MRI per radiology read.  She had some narrowing present where the nerves exit but there did not appear to be any evidence of new spinal cord signal abnormality or swelling.  Certainly if she would like to see a neurosurgeon for opinion we would be happy to refer her.  Otherwise she can follow-up with Jelly Lombardi as scheduled

## 2022-06-07 NOTE — TELEPHONE ENCOUNTER
Pt aware of the mri of brain and neck and doesn't wish to see neurosurgeon at this time, does not know where her last stroke was.

## 2022-06-08 ENCOUNTER — TELEPHONE (OUTPATIENT)
Dept: NEUROLOGY | Facility: CLINIC | Age: 72
End: 2022-06-08

## 2022-06-08 NOTE — PROGRESS NOTES
Please let Leela know that we can discuss her MRI brain and cervical spine results in detail at her follow up on 7/25/2022 in clinic. MRI brain  is ok overall with old stroke seen. No prior testing to compare. She should continue her rosuvastatin and aspirin for stroke prevention. The MRI of her cervical spine looks overall stable compared to prior MRI but shows multiple disc changes. Does she want to see neurosurgery for further evaluation, see a pain specialist to try to manage her symptoms conservatively, completed physical therapy or just wait until follow up? Happy to place any referrals desired. Thanks, WEST Barron

## 2022-06-08 NOTE — TELEPHONE ENCOUNTER
----- Message from WEST Branch sent at 6/8/2022  1:24 PM EDT -----  Please let Leela know that we can discuss her MRI brain and cervical spine results in detail at her follow up on 7/25/2022 in clinic. MRI brain  is ok overall with old stroke seen. No prior testing to compare. She should continue her rosuvastatin and aspirin for stroke prevention. The MRI of her cervical spine looks overall stable compared to prior MRI but shows multiple disc changes. Does she want to see neurosurgery for further evaluation, see a pain specialist to try to manage her symptoms conservatively, completed physical therapy or just wait until follow up? Happy to place any referrals desired. Thanks, WEST Barron

## 2022-06-27 ENCOUNTER — TRANSCRIBE ORDERS (OUTPATIENT)
Dept: GENERAL RADIOLOGY | Facility: HOSPITAL | Age: 72
End: 2022-06-27

## 2022-06-27 ENCOUNTER — HOSPITAL ENCOUNTER (OUTPATIENT)
Dept: GENERAL RADIOLOGY | Facility: HOSPITAL | Age: 72
Discharge: HOME | End: 2022-06-27
Admitting: NURSE PRACTITIONER

## 2022-06-27 DIAGNOSIS — R10.9 LEFT FLANK PAIN: ICD-10-CM

## 2022-06-27 DIAGNOSIS — R10.9 LEFT FLANK PAIN: Primary | ICD-10-CM

## 2022-06-27 PROCEDURE — 74018 RADEX ABDOMEN 1 VIEW: CPT

## 2022-06-30 RX ORDER — FLUDROCORTISONE ACETATE 0.1 MG/1
0.1 TABLET ORAL DAILY
Qty: 90 TABLET | Refills: 1 | Status: SHIPPED | OUTPATIENT
Start: 2022-06-30 | End: 2022-10-26 | Stop reason: SDUPTHER

## 2022-07-05 ENCOUNTER — TRANSCRIBE ORDERS (OUTPATIENT)
Dept: ADMINISTRATIVE | Facility: HOSPITAL | Age: 72
End: 2022-07-05

## 2022-07-05 DIAGNOSIS — Z12.31 VISIT FOR SCREENING MAMMOGRAM: Primary | ICD-10-CM

## 2022-07-25 ENCOUNTER — OFFICE VISIT (OUTPATIENT)
Dept: NEUROLOGY | Facility: CLINIC | Age: 72
End: 2022-07-25

## 2022-07-25 ENCOUNTER — TELEPHONE (OUTPATIENT)
Dept: NEUROLOGY | Facility: CLINIC | Age: 72
End: 2022-07-25

## 2022-07-25 VITALS
HEIGHT: 67 IN | WEIGHT: 164 LBS | BODY MASS INDEX: 25.74 KG/M2 | HEART RATE: 76 BPM | DIASTOLIC BLOOD PRESSURE: 72 MMHG | SYSTOLIC BLOOD PRESSURE: 130 MMHG | OXYGEN SATURATION: 98 %

## 2022-07-25 DIAGNOSIS — H81.13 BENIGN PAROXYSMAL VERTIGO OF BOTH EARS: ICD-10-CM

## 2022-07-25 DIAGNOSIS — M62.838 CERVICAL PARASPINAL MUSCLE SPASM: ICD-10-CM

## 2022-07-25 DIAGNOSIS — G43.009 MIGRAINE WITHOUT AURA AND WITHOUT STATUS MIGRAINOSUS, NOT INTRACTABLE: ICD-10-CM

## 2022-07-25 DIAGNOSIS — R29.6 RECURRENT FALLS WHILE WALKING: Primary | ICD-10-CM

## 2022-07-25 DIAGNOSIS — M50.30 DDD (DEGENERATIVE DISC DISEASE), CERVICAL: ICD-10-CM

## 2022-07-25 PROCEDURE — 99214 OFFICE O/P EST MOD 30 MIN: CPT | Performed by: NURSE PRACTITIONER

## 2022-07-25 RX ORDER — MECLIZINE HYDROCHLORIDE 25 MG/1
25 TABLET ORAL 3 TIMES DAILY PRN
Qty: 90 TABLET | Refills: 3 | Status: SHIPPED | OUTPATIENT
Start: 2022-07-25

## 2022-07-25 RX ORDER — GABAPENTIN 800 MG/1
800 TABLET ORAL NIGHTLY
Qty: 90 TABLET | Refills: 1 | Status: SHIPPED | OUTPATIENT
Start: 2022-07-25 | End: 2023-02-27 | Stop reason: SDUPTHER

## 2022-07-25 RX ORDER — TOPIRAMATE 25 MG/1
25 TABLET ORAL NIGHTLY
Qty: 90 TABLET | Refills: 3 | Status: SHIPPED | OUTPATIENT
Start: 2022-07-25

## 2022-07-25 RX ORDER — METOCLOPRAMIDE 10 MG/1
10 TABLET ORAL DAILY PRN
Qty: 30 TABLET | Refills: 1 | Status: SHIPPED | OUTPATIENT
Start: 2022-07-25

## 2022-07-25 RX ORDER — PROMETHAZINE HYDROCHLORIDE 25 MG/1
25 TABLET ORAL EVERY 6 HOURS PRN
Qty: 30 TABLET | Refills: 1 | Status: SHIPPED | OUTPATIENT
Start: 2022-07-25

## 2022-07-25 RX ORDER — BACLOFEN 10 MG/1
10 TABLET ORAL NIGHTLY PRN
Qty: 90 TABLET | Refills: 3 | Status: SHIPPED | OUTPATIENT
Start: 2022-07-25 | End: 2022-10-28

## 2022-07-25 RX ORDER — BUTALBITAL, ACETAMINOPHEN AND CAFFEINE 50; 325; 40 MG/1; MG/1; MG/1
1 TABLET ORAL DAILY PRN
Qty: 30 TABLET | Refills: 0 | Status: SHIPPED | OUTPATIENT
Start: 2022-07-25 | End: 2022-10-28

## 2022-07-25 NOTE — PROGRESS NOTES
Subjective:     Patient ID: Leela Muller is a 72 y.o. female.    CC:   Chief Complaint   Patient presents with   • Gait Problem   • Migraine   • Neck Pain   • Peripheral Neuropathy       HPI:   History of Present Illness   Today 7/25/2022-  This is a very pleasant 72-year-old female who presents for 3-month neurology follow-up on chronic migraine headaches present for many years along with chronic neck pain, chronic and intermittent vertigo, anxiety, and history of prior strokes in 06/2015 and in 2010. She was previously followed in our clinic by Dr. Devaughn Lewis in neurology.     At her last visit in clinic on 04/25/2022, she did have two falls with the most recent being on 03/28/2022. She had fractured her coccyx and actually injured her head and back. She did go to [facility] and had a CT scan. She did not have syncope or loss of consciousness, but did have a little bit of confusion after that fall.     At her last visit, she was complaining of right-sided cervical pain with headaches in the right occipital region along with dizziness. She did have some bruising to her head. She felt tired. She had been using ice packs. We did go ahead and order additional imaging at that time including MRI of the brain and cervical spine with and without contrast. She did have both of these completed on 06/6/2022 at [facility]. MRI of the brain with and without contrast showed age-related changes of mild generalized volume loss with some encephalomalacia and gliotic changes in the right occipital lobe with the appearance of a prior stroke. No acute intracranial abnormalities and no abnormal contrast enhancement were seen. Her MRI of the cervical spine with and without contrast did show some mild cervical spondylosis similar to 2019 imaging with no focal cord signal abnormalities and no significant spinal stenosis.    She was wanting to continue conservative management when we gave her the results. She  does take aspirin and rosuvastatin. She does follow with cardiology for erratic orthostatic blood pressure and dizziness and was previously prescribed pyridostigmine, but was unable to tolerate it.     For her headaches, she is taking Fioricet as needed. She uses baclofen at night and she also takes gabapentin 800 mg at night. She has been on the topiramate 25 mg daily at bedtime for migraine prevention. She has been taking meclizine in the morning. She does use Reglan daily as needed, but takes this infrequently. She has Phenergan also if needed, but takes it infrequently. Generally headaches have aching pain, some throbbing, photophobia and phonophobia with nausea, and usually are in the frontal region, or on either side, or in the back of the neck on either side. We also did refer her for some physical therapy last visit. She does use a cane for ambulation and stability.     She is here for refills on medications. She also requests a refill for muscle relaxer cream from [facility] that has . The muscle relaxant cream is a transdermal gel she uses to rub onto her shoulders and neck. The magnesium chloride is 15%, guaifenesin is 10%, baclofen is 5%, and cyclobenzaprine is 4%. She uses the [facility] pharmacy in [place], [place].     She does also have type 2 diabetes along with diabetic polyneuropathy, hypertension, and dyslipidemia. She follows with cardiology, pulmonology, endocrinology, and gastroenterology, all with [facility].    She has not started physical therapy yet and reports that she was never contacted by anyone for that. Records indicate that PT did attempt to reach out to her, but they were unable to leave a voicemail and ultimately closed the referral after 3 attempts. She notes that they may have attempted to call her cell phone and not her home phone. She does have a physical therapy facility near her home that she can call and get started with. She requests a new  order/referral. She is still using a cane to assist with ambulation.     She reports that she has had falls since 03/28/2022. Her most recent fall occurred approximately 1 month ago. She denies losing consciousness and states she has not hit her head since the fall she experienced on 03/28/2022. She reports having headaches and pain on the back of her neck and her left shoulder. She notes that her left arm is significantly hurting her, but she is planning to see orthopedics for an injection. She states that she is overdue for the injection. She reports that the reason for her being past due on repeat injection was that she had significant improvement, therefore she did not believe it necessary to follow-up with them until now. Additionally, she has experienced a painful sensation in the muscle of her upper left arm for some time, and will speak to orthopedics regarding that as well at her next appointment.     She states her blood sugar level has been low and she has been having some difficulty with that. She reports that on 07/24/2022 her sugar was 77 mg/dL, on 07/23/2022 it was 59 mg/dL, and on 07/22/2022 it was 47 mg/dL. She contacted Dr. Santiago who has adjusted her insulin to try and regulate her blood sugar levels. She has an appointment scheduled for follow-up with Dr. Santiago on 08/05/2022.    She feels that the medications she is currently prescribed are effective for her.     The following portions of the patient's history were reviewed and updated as appropriate: allergies, current medications, past family history, past medical history, past social history, past surgical history and problem list.    Past Medical History:   Diagnosis Date   • Anxiety    • Arm pain    • Arthritis    • Breast injury     fell 6/2019    • Cancer (HCC)    • Chronic pancreatitis (HCC)    • COVID-19    • Depression    • Diabetes mellitus (HCC)    • Hyperlipidemia    • Hypertension    • Liver mass    • Neck pain on left side    •  Palpitations 4/18/2018   • Pancreatitis    • Pulmonary embolism (HCC)    • Stroke syndrome 9/14/2016    · Assessed By: Devaughn Lewis (Neurology); Last Assessed: 16 Jun 2015  Right cerebrovascular accident in July or August 2010, evaluated at [facility].  Admission to [facility] on 09/28/2010 with headache and stuttering, consistent with TIA.  Chronic Coumadin therapy, initiated following bilateral pulmonary emboli in 2007 after fall and hip replacement.  She was already on 81 mg of aspirin as well, 09/2010.  MRI of the brain on 09/28/2010 revealing old right parietal cerebrovascular accident.  MRA revealing normal carotids, middle anterior and posterior cerebral arteries with minimal ostial stenosis of both vertebral arteries.  GENARO by Dr. Oliver Mobley on 09/28/2010:  patent foramen ovale with a small amount of right to left shunting.  Normal LVEF and normal valvular structures.   Patent foramen ovale closure using a 25 mm. Amplatzer cribriform occluder, 10/05/2010.   Echocardiogram, 06/23/2014:  LVEF (55% to 60%) with and Amplatzer device noted to be well-seated in    • Thrombocytopenia (HCC)    • Transient cerebral ischemia    • Type 2 diabetes mellitus (HCC)        Past Surgical History:   Procedure Laterality Date   • APPENDECTOMY     • BREAST BIOPSY Left 2012    benign   • BREAST BIOPSY     • BREAST EXCISIONAL BIOPSY Left     benign   • BREAST EXCISIONAL BIOPSY Right     benign   • BREAST EXCISIONAL BIOPSY Right 2020    benign   • BREAST SURGERY     • CHOLECYSTECTOMY     • COLONOSCOPY     • HYSTERECTOMY     • LIVER BIOPSY     • OOPHORECTOMY     • RECTAL SURGERY     • SKIN CANCER EXCISION     • TONSILLECTOMY     • TOTAL HIP ARTHROPLASTY REVISION         Social History     Socioeconomic History   • Marital status:    Tobacco Use   • Smoking status: Never Smoker   • Smokeless tobacco: Never Used   Vaping Use   • Vaping Use: Never used   Substance and Sexual Activity   •  Alcohol use: No   • Drug use: No   • Sexual activity: Not Currently       Family History   Problem Relation Age of Onset   • Alzheimer's disease Mother    • Cancer Mother    • Coronary artery disease Other    • Diabetes Other    • Hypertension Other    • Stroke Other    • Cancer Other    • Dementia Other    • Heart attack Father    • Colon polyps Father    • Breast cancer Neg Hx    • Ovarian cancer Neg Hx    • Colon cancer Neg Hx    • Esophageal cancer Neg Hx           Current Outpatient Medications:   •  acetaminophen (TYLENOL) 500 MG tablet, Take 500 mg by mouth Every 6 (Six) Hours As Needed., Disp: , Rfl:   •  acyclovir (ZOVIRAX) 5 % ointment, Apply 1 unit topically to the anal area as needed, Disp: 15 g, Rfl: PRN  •  albuterol sulfate  (90 Base) MCG/ACT inhaler, Inhale 2 puffs by mouth every 4 hours, Disp: 8.5 g, Rfl: 3  •  aspirin 81 MG tablet, Take 81 mg by mouth Daily. Taken at night. Last dose 9-18-21, Disp: , Rfl:   •  baclofen (LIORESAL) 10 MG tablet, Take 1 tablet by mouth At Night As Needed for Muscle Spasms., Disp: 90 tablet, Rfl: 3  •  butalbital-acetaminophen-caffeine (FIORICET, ESGIC) -40 MG per tablet, Take 1 tablet by mouth Daily As Needed for headache, Disp: 30 tablet, Rfl: 0  •  clidinium-chlordiazePOXIDE (LIBRAX) 5-2.5 MG per capsule, Take 1 capsule by mouth 2 (Two) Times a Day., Disp: 45 capsule, Rfl: 0  •  clonazePAM (KlonoPIN) 0.5 MG tablet, As Needed., Disp: , Rfl:   •  clotrimazole-betamethasone (LOTRISONE) 1-0.05 % cream, Apply topically to the appropriate private area two times daily as directed., Disp: 15 g, Rfl: 0  •  Cream Base Liposomic (PCCA CUSTOM LIPO-MAX) cream, As Needed., Disp: , Rfl:   •  digoxin (LANOXIN) 250 MCG tablet, Take 1 tablet by mouth daily, Disp: 90 tablet, Rfl: 1  •  fludrocortisone 0.1 MG tablet, Take 1 tablet by mouth Daily., Disp: 90 tablet, Rfl: 1  •  furosemide (LASIX) 40 MG tablet, Take 1 tablet by mouth Daily. May have extra 1/2 to 1 tablet daily  if needed for edema, Disp: 135 tablet, Rfl: 1  •  gabapentin (NEURONTIN) 800 MG tablet, Take 1 tablet by mouth Every Night., Disp: 90 tablet, Rfl: 1  •  glipizide (GLUCOTROL XL) 10 MG 24 hr tablet, Take 2 tablets by mouth Daily., Disp: 180 tablet, Rfl: 3  •  glucose blood (OneTouch Verio) test strip, Use to test blood glucose 3 times a day as directed (Patient taking differently: Use to test blood glucose 3 times a day as directed), Disp: 300 each, Rfl: 3  •  HYDROcodone-acetaminophen (NORCO) 7.5-325 MG per tablet, Take 1 tablet by mouth 3 (Three) Times a Day., Disp: 90 tablet, Rfl: 0  •  hydrocortisone (ANUSOL-HC) 25 MG suppository, Insert 1 suppository rectally twice a day as needed (remove wrapper and moisten with water), Disp: 12 suppository, Rfl: 3  •  Insulin Glargine (BASAGLAR KWIKPEN) 100 UNIT/ML injection pen, Inject 50 units subcutaneously once in the morning and 75 units at night, Disp: 45 mL, Rfl: 5  •  Insulin Pen Needle (B-D UF III MINI PEN NEEDLES) 31G X 5 MM misc, Use to inject insulin twice a day as directed, Disp: 100 each, Rfl: 12  •  Insulin Syringe-Needle U-100 29G 0.5 ML misc, Inject 0.3 mL as directed Daily., Disp: , Rfl:   •  loperamide (IMODIUM) 2 MG capsule, Imodium A-D  As directed prn.  Initial dose 2 tablets followed by 1 tablet daily,, Disp: , Rfl:   •  meclizine (ANTIVERT) 25 MG tablet, Take 1 tablet by mouth 3 (Three) Times a Day As Needed for Dizziness., Disp: 90 tablet, Rfl: 3  •  metoclopramide (REGLAN) 10 MG tablet, Take 1 tablet by mouth Daily As Needed for migraine., Disp: 30 tablet, Rfl: 1  •  metoprolol succinate XL (TOPROL-XL) 100 MG 24 hr tablet, Take 1 tablet by mouth Daily., Disp: 90 tablet, Rfl: 3  •  metroNIDAZOLE (METROCREAM) 0.75 % cream, Apply to the face once every night (Patient taking differently: Apply  topically to the appropriate area as directed As Needed.), Disp: 45 g, Rfl: 0  •  mupirocin (BACTROBAN) 2 % ointment, Apply topically to the affected area twice a  day, Disp: 22 g, Rfl: 0  •  nitroglycerin (Nitrostat) 0.4 MG SL tablet, Place 1 tablet under the tongue Every 5 Minutes As Needed for Chest Pain for up to 3 total doses. Call 911 if pain remains after 1 dose., Disp: 25 tablet, Rfl: 6  •  pancrelipase, Lip-Prot-Amyl, (Pancreaze) 28256-93935 units capsule delayed-release particles capsule, Take 1 capsule with each meal and with each snack, Disp: 100 capsule, Rfl: 2  •  pimecrolimus (Elidel) 1 % cream, Apply topically to the face two times daily as directed., Disp: 60 g, Rfl: 0  •  potassium chloride (KLOR-CON) 10 MEQ CR tablet, Take 1 tablet by mouth Daily as directed, Disp: 90 tablet, Rfl: 0  •  promethazine (PHENERGAN) 25 MG tablet, Take 1 tablet by mouth Every 6 (Six) Hours As Needed for Nausea or Vomiting., Disp: 30 tablet, Rfl: 1  •  rosuvastatin (CRESTOR) 10 MG tablet, Take 1 tablet by mouth Daily., Disp: 90 tablet, Rfl: 1  •  topiramate (TOPAMAX) 25 MG tablet, Take 1 tablet by mouth Every Night., Disp: 90 tablet, Rfl: 3  •  triamcinolone (KENALOG) 0.1 % cream, Apply topically to the affected area twice a day, Disp: 30 g, Rfl: 0  •  venlafaxine XR (EFFEXOR-XR) 75 MG 24 hr capsule, Take 1 capsule by mouth Daily., Disp: 90 capsule, Rfl: 3  •  vitamin B-12 (CYANOCOBALAMIN) 1000 MCG tablet, Take 1,000 mcg by mouth Daily., Disp: , Rfl:   •  RABEprazole (ACIPHEX) 20 MG EC tablet, Take 1 tablet by mouth Daily., Disp: 90 tablet, Rfl: 1     Review of Systems   Constitutional: Negative for chills, fatigue, fever and unexpected weight change.   HENT: Negative for ear pain, hearing loss, nosebleeds, rhinorrhea and sore throat.    Eyes: Negative for photophobia, pain, discharge, itching and visual disturbance.   Respiratory: Negative for cough, chest tightness, shortness of breath and wheezing.    Cardiovascular: Negative for chest pain, palpitations and leg swelling.   Gastrointestinal: Negative for abdominal pain, blood in stool, constipation, diarrhea, nausea and  "vomiting.   Genitourinary: Negative for dysuria, frequency, hematuria and urgency.   Musculoskeletal: Positive for arthralgias, gait problem and neck pain. Negative for back pain, joint swelling, myalgias and neck stiffness.   Skin: Negative for rash and wound.   Allergic/Immunologic: Negative for environmental allergies and food allergies.   Neurological: Positive for headaches. Negative for dizziness, tremors, seizures, syncope, speech difficulty, weakness, light-headedness and numbness.   Hematological: Negative for adenopathy. Does not bruise/bleed easily.   Psychiatric/Behavioral: Negative for agitation, confusion, decreased concentration, hallucinations, sleep disturbance and suicidal ideas. The patient is nervous/anxious.    All other systems reviewed and are negative.       Objective:  /72   Pulse 76   Ht 170.2 cm (67\")   Wt 74.4 kg (164 lb)   SpO2 98%   BMI 25.69 kg/m²     Neurologic Exam     Mental Status   Oriented to person, place, and time.   Speech: speech is normal   Level of consciousness: alert    Cranial Nerves   Cranial nerves II through XII intact.     Motor Exam   Muscle bulk: normal  Overall muscle tone: normal    Strength   Strength 5/5 except as noted.   Decreased ROM left shoulder/upper arm reported as chronic with right hip pain with previous hip replacement & reports following with ortho and needing additional surgery     Sensory Exam   Light touch normal.   Right leg vibration: decreased from ankle  Left leg vibration: decreased from ankle  Pinprick normal.     Gait, Coordination, and Reflexes     Gait  Gait: (antalgic, using cane)    Coordination   Finger to nose coordination: normal    Tremor   Resting tremor: absent  Intention tremor: absent  Action tremor: absent    Reflexes   Reflexes 2+ except as noted.   Right : 2+  Left : 2+      Physical Exam  Constitutional:       Appearance: Normal appearance.   Musculoskeletal:      Right shoulder: Normal.      Left " shoulder: Tenderness present. Decreased range of motion.      Right upper arm: Normal.      Left upper arm: Tenderness present. No swelling, edema, deformity, lacerations or bony tenderness.      Cervical back: No edema, erythema, signs of trauma, rigidity, torticollis or crepitus. Pain with movement and muscular tenderness present. No spinous process tenderness. Decreased range of motion.   Neurological:      Mental Status: She is alert and oriented to person, place, and time.      Coordination: Finger-Nose-Finger Test normal.   Psychiatric:         Mood and Affect: Mood is anxious.         Speech: Speech normal.         Behavior: Behavior normal.         Thought Content: Thought content normal.         Cognition and Memory: Cognition and memory normal.         Judgment: Judgment normal.         Assessment/Plan:       Diagnoses and all orders for this visit:    1. Recurrent falls while walking (Primary)  -     Ambulatory Referral to Physical Therapy Evaluate and treat    2. DDD (degenerative disc disease), cervical  -     baclofen (LIORESAL) 10 MG tablet; Take 1 tablet by mouth At Night As Needed for Muscle Spasms.  Dispense: 90 tablet; Refill: 3  -     gabapentin (NEURONTIN) 800 MG tablet; Take 1 tablet by mouth Every Night.  Dispense: 90 tablet; Refill: 1  -     Ambulatory Referral to Physical Therapy Evaluate and treat    3. Migraine without aura and without status migrainosus, not intractable  -     butalbital-acetaminophen-caffeine (FIORICET, ESGIC) -40 MG per tablet; Take 1 tablet by mouth Daily As Needed for headache  Dispense: 30 tablet; Refill: 0  -     gabapentin (NEURONTIN) 800 MG tablet; Take 1 tablet by mouth Every Night.  Dispense: 90 tablet; Refill: 1  -     metoclopramide (REGLAN) 10 MG tablet; Take 1 tablet by mouth Daily As Needed for migraine.  Dispense: 30 tablet; Refill: 1  -     promethazine (PHENERGAN) 25 MG tablet; Take 1 tablet by mouth Every 6 (Six) Hours As Needed for Nausea or  Vomiting.  Dispense: 30 tablet; Refill: 1  -     topiramate (TOPAMAX) 25 MG tablet; Take 1 tablet by mouth Every Night.  Dispense: 90 tablet; Refill: 3    4. Benign paroxysmal vertigo of both ears  -     meclizine (ANTIVERT) 25 MG tablet; Take 1 tablet by mouth 3 (Three) Times a Day As Needed for Dizziness.  Dispense: 90 tablet; Refill: 3  -     Ambulatory Referral to Physical Therapy Evaluate and treat    5. Cervical paraspinal muscle spasm  -     Ambulatory Referral to Physical Therapy Evaluate and treat       She should continue monitoring and follow up with all specialists. We have handed her a physical therapy order to complete this in her hometown. I have refilled her medications today. She has signed her controlled substance agreement.     She is going to follow up with us in 3 months or sooner if needed. She is aware to follow up with orthopedics in regards to left upper arm and shoulder pain as well as her hip pain-all of which are chronic. She will call us with any additional questions or concerns prior to follow-up.    Reviewed medications, potential side effects and signs and symptoms to report. Discussed risk versus benefits of treatment plan with patient and/or family-including medications, labs and radiology that may be ordered. Addressed questions and concerns during visit. Patient and/or family verbalized understanding and agree with plan.    AS THE PROVIDER, I PERSONALLY WORE PPE DURING ENTIRE FACE TO FACE ENCOUNTER IN CLINIC WITH THE PATIENT. PATIENT ALSO WORE PPE DURING ENTIRE FACE TO FACE ENCOUNTER EXCEPT FOR A MAX OF 30 SECONDS DURING NEUROLOGICAL EVALUATION OF CRANIAL NERVES AND THEN MASK WAS PLACED BACK OVER PATIENT FACE FOR REMAINDER OF VISIT. I WASHED MY HANDS BEFORE AND AFTER VISIT.    As part of this patient's treatment plan I am prescribing controlled substances. The patient has been made aware of appropriate use of such medications, including potential risk of somnolence, limited  ability to drive and/or work safely, and potential for dependence or overdose. It has also been made clear that these medications are for use by the patient only, without concomitant use of alcohol or other substances unless prescribed. Keep out of reach of children.  [facility]ogenes report has been reviewed. If this is going to be prescribed long term, [facility] Controlled Substance Agreement Contract has also been read and signed by patient and myself.    During this visit the following were done:  Labs Reviewed []    Labs Ordered []    Radiology Reports Reviewed [x]    Radiology Ordered []    PCP Records Reviewed []    Referring Provider Records Reviewed []    ER Records Reviewed []    Hospital Records Reviewed []    History Obtained From Family []    Radiology Images Reviewed [x]    Other Reviewed [x]    Records Requested []      Transcribed from ambient dictation for [facility] by Flakita Adame.  07/25/22   11:11 EDT    Patient verbalized consent to the visit recording.  I have personally performed the services described in this document as transcribed by the above individual, and it is both accurate and complete.  [facility]  7/25/2022  22:28 EDT

## 2022-07-25 NOTE — TELEPHONE ENCOUNTER
PATIENT CALLED IN TO MAKE SURE THAT SCRIPT FOR COMPOUNDING MEDICATION WAS SENT TO:    [facility]  [phone]    SHE HAD CALLED TO CHECK AND THE SCRIPT WAS NOT THERE SO SHE WANTED TO MAKE SURE WE SENT IT.    THANK YOU

## 2022-08-05 ENCOUNTER — LAB (OUTPATIENT)
Dept: LAB | Facility: HOSPITAL | Age: 72
End: 2022-08-05

## 2022-08-05 ENCOUNTER — OFFICE VISIT (OUTPATIENT)
Dept: ENDOCRINOLOGY | Facility: CLINIC | Age: 72
End: 2022-08-05

## 2022-08-05 VITALS
SYSTOLIC BLOOD PRESSURE: 104 MMHG | DIASTOLIC BLOOD PRESSURE: 68 MMHG | BODY MASS INDEX: 25.74 KG/M2 | HEART RATE: 81 BPM | OXYGEN SATURATION: 96 % | HEIGHT: 67 IN | WEIGHT: 164 LBS

## 2022-08-05 DIAGNOSIS — Z79.4 TYPE 2 DIABETES MELLITUS WITH HYPERGLYCEMIA, WITH LONG-TERM CURRENT USE OF INSULIN: ICD-10-CM

## 2022-08-05 DIAGNOSIS — E11.65 TYPE 2 DIABETES MELLITUS WITH HYPERGLYCEMIA, WITH LONG-TERM CURRENT USE OF INSULIN: Primary | ICD-10-CM

## 2022-08-05 DIAGNOSIS — Z79.4 TYPE 2 DIABETES MELLITUS WITH HYPERGLYCEMIA, WITH LONG-TERM CURRENT USE OF INSULIN: Primary | ICD-10-CM

## 2022-08-05 DIAGNOSIS — E11.65 TYPE 2 DIABETES MELLITUS WITH HYPERGLYCEMIA, WITH LONG-TERM CURRENT USE OF INSULIN: ICD-10-CM

## 2022-08-05 DIAGNOSIS — E78.5 DYSLIPIDEMIA: ICD-10-CM

## 2022-08-05 DIAGNOSIS — I10 PRIMARY HYPERTENSION: ICD-10-CM

## 2022-08-05 LAB
ALBUMIN SERPL-MCNC: 4.4 G/DL (ref 3.5–5.2)
ALBUMIN/GLOB SERPL: 1.9 G/DL
ALP SERPL-CCNC: 66 U/L (ref 39–117)
ALT SERPL W P-5'-P-CCNC: 20 U/L (ref 1–33)
ANION GAP SERPL CALCULATED.3IONS-SCNC: 12.6 MMOL/L (ref 5–15)
AST SERPL-CCNC: 20 U/L (ref 1–32)
BILIRUB SERPL-MCNC: 0.2 MG/DL (ref 0–1.2)
BUN SERPL-MCNC: 13 MG/DL (ref 8–23)
BUN/CREAT SERPL: 16.9 (ref 7–25)
CALCIUM SPEC-SCNC: 9.3 MG/DL (ref 8.6–10.5)
CHLORIDE SERPL-SCNC: 101 MMOL/L (ref 98–107)
CO2 SERPL-SCNC: 28.4 MMOL/L (ref 22–29)
CREAT SERPL-MCNC: 0.77 MG/DL (ref 0.57–1)
EGFRCR SERPLBLD CKD-EPI 2021: 81.1 ML/MIN/1.73
EXPIRATION DATE: ABNORMAL
EXPIRATION DATE: NORMAL
GLOBULIN UR ELPH-MCNC: 2.3 GM/DL
GLUCOSE BLDC GLUCOMTR-MCNC: 168 MG/DL (ref 70–130)
GLUCOSE SERPL-MCNC: 162 MG/DL (ref 65–99)
HBA1C MFR BLD: 7.4 %
Lab: ABNORMAL
Lab: NORMAL
POTASSIUM SERPL-SCNC: 4.7 MMOL/L (ref 3.5–5.2)
PROT SERPL-MCNC: 6.7 G/DL (ref 6–8.5)
SODIUM SERPL-SCNC: 142 MMOL/L (ref 136–145)

## 2022-08-05 PROCEDURE — 80053 COMPREHEN METABOLIC PANEL: CPT

## 2022-08-05 PROCEDURE — 82947 ASSAY GLUCOSE BLOOD QUANT: CPT | Performed by: INTERNAL MEDICINE

## 2022-08-05 PROCEDURE — 83036 HEMOGLOBIN GLYCOSYLATED A1C: CPT | Performed by: INTERNAL MEDICINE

## 2022-08-05 PROCEDURE — 3051F HG A1C>EQUAL 7.0%<8.0%: CPT | Performed by: INTERNAL MEDICINE

## 2022-08-05 PROCEDURE — 99214 OFFICE O/P EST MOD 30 MIN: CPT | Performed by: INTERNAL MEDICINE

## 2022-08-05 NOTE — ASSESSMENT & PLAN NOTE
Diabetes is unchanged.  A1c acceptable at 7.4%.  Had steroids about a month ago.  Occ hypoglycemia.  Continue current treatment regimen.  But decrease insulin by 2u due to some nocturnal hypoglycemia.  Diabetes will be reassessed in 3 months.

## 2022-08-24 ENCOUNTER — APPOINTMENT (OUTPATIENT)
Dept: MAMMOGRAPHY | Facility: HOSPITAL | Age: 72
End: 2022-08-24

## 2022-08-29 ENCOUNTER — TRANSCRIBE ORDERS (OUTPATIENT)
Dept: LAB | Facility: HOSPITAL | Age: 72
End: 2022-08-29

## 2022-08-29 ENCOUNTER — HOSPITAL ENCOUNTER (OUTPATIENT)
Dept: GENERAL RADIOLOGY | Facility: HOSPITAL | Age: 72
Discharge: HOME | End: 2022-08-29
Admitting: NURSE PRACTITIONER

## 2022-08-29 ENCOUNTER — TRANSCRIBE ORDERS (OUTPATIENT)
Dept: GENERAL RADIOLOGY | Facility: HOSPITAL | Age: 72
End: 2022-08-29

## 2022-08-29 DIAGNOSIS — M51.06 INTERVERTEBRAL DISC DISORDERS WITH MYELOPATHY OF LUMBAR REGION: ICD-10-CM

## 2022-08-29 DIAGNOSIS — M51.06 INTERVERTEBRAL DISC DISORDERS WITH MYELOPATHY OF LUMBAR REGION: Primary | ICD-10-CM

## 2022-08-29 PROCEDURE — 72070 X-RAY EXAM THORAC SPINE 2VWS: CPT

## 2022-08-29 PROCEDURE — 72100 X-RAY EXAM L-S SPINE 2/3 VWS: CPT

## 2022-09-26 ENCOUNTER — APPOINTMENT (OUTPATIENT)
Dept: MAMMOGRAPHY | Facility: HOSPITAL | Age: 72
End: 2022-09-26

## 2022-09-29 ENCOUNTER — OFFICE VISIT (OUTPATIENT)
Dept: GASTROENTEROLOGY | Facility: CLINIC | Age: 72
End: 2022-09-29

## 2022-09-29 VITALS
DIASTOLIC BLOOD PRESSURE: 60 MMHG | WEIGHT: 165 LBS | BODY MASS INDEX: 25.9 KG/M2 | OXYGEN SATURATION: 96 % | SYSTOLIC BLOOD PRESSURE: 142 MMHG | TEMPERATURE: 96.2 F | HEART RATE: 83 BPM | HEIGHT: 67 IN

## 2022-09-29 DIAGNOSIS — R07.9 CHEST PAIN, UNSPECIFIED TYPE: ICD-10-CM

## 2022-09-29 DIAGNOSIS — K75.81 NASH (NONALCOHOLIC STEATOHEPATITIS): Primary | ICD-10-CM

## 2022-09-29 PROCEDURE — 99213 OFFICE O/P EST LOW 20 MIN: CPT | Performed by: INTERNAL MEDICINE

## 2022-09-29 NOTE — PROGRESS NOTES
Patient Name: Leela Muller  YOB: 1950   Medical Record number: [id number]     PCP: Connor Ritter MD    Chief Complaint   Patient presents with   • Follow-up   Follow-up for liver issue.    History of Present Illness:   HPI  Mrs. Muller returns to the office for follow-up visit.  The patient denies any abdominal pain or nausea at this time.  She has a good appetite overall.  There is no history of change in the color of her eyes or skin.  The patient denies any abdominal swelling.  There have been no signs of gastrointestinal bleeding. Mrs. Muller did have  back and neck discomfort over a month ago and required an injection.  She also complains of an episode of chest pain that required the use of nitroglycerin.  Mrs. Muller had an episode with shortness of breath a few weeks ago.  Past Medical History:   Diagnosis Date   • Anxiety    • Arm pain    • Arthritis    • Breast injury     fell 6/2019    • Cancer (HCC)    • Chronic pancreatitis (HCC)    • COVID-19    • Depression    • Diabetes mellitus (HCC)    • Hyperlipidemia    • Hypertension    • Liver mass    • Neck pain on left side    • Palpitations 4/18/2018   • Pancreatitis    • Pulmonary embolism (HCC)    • Stroke syndrome 9/14/2016    · Assessed By: Devaughn Lewis (Neurology); Last Assessed: 16 Jun 2015  Right cerebrovascular accident in July or August 2010, evaluated at [facility].  Admission to [facility] on 09/28/2010 with headache and stuttering, consistent with TIA.  Chronic Coumadin therapy, initiated following bilateral pulmonary emboli in 2007 after fall and hip replacement.  She was already on 81 mg of aspirin as well, 09/2010.  MRI of the brain on 09/28/2010 revealing old right parietal cerebrovascular accident.  MRA revealing normal carotids, middle anterior and posterior cerebral arteries with minimal ostial stenosis of both vertebral arteries.  GENARO by Dr. Oliver Mobley on 09/28/2010:  patent  foramen ovale with a small amount of right to left shunting.  Normal LVEF and normal valvular structures.   Patent foramen ovale closure using a 25 mm. Amplatzer cribriform occluder, 10/05/2010.   Echocardiogram, 06/23/2014:  LVEF (55% to 60%) with and Amplatzer device noted to be well-seated in    • Thrombocytopenia (HCC)    • Transient cerebral ischemia    • Type 2 diabetes mellitus (HCC)        Past Surgical History:   Procedure Laterality Date   • APPENDECTOMY     • BREAST BIOPSY Left 2012    benign   • BREAST BIOPSY     • BREAST EXCISIONAL BIOPSY Left     benign   • BREAST EXCISIONAL BIOPSY Right     benign   • BREAST EXCISIONAL BIOPSY Right 2020    benign   • BREAST SURGERY     • CHOLECYSTECTOMY     • COLONOSCOPY     • HYSTERECTOMY     • LIVER BIOPSY     • OOPHORECTOMY     • RECTAL SURGERY     • SKIN CANCER EXCISION     • TONSILLECTOMY     • TOTAL HIP ARTHROPLASTY REVISION           Current Outpatient Medications:   •  acetaminophen (TYLENOL) 500 MG tablet, Take 500 mg by mouth Every 6 (Six) Hours As Needed., Disp: , Rfl:   •  acyclovir (ZOVIRAX) 5 % ointment, Apply 1 unit topically to the anal area as needed, Disp: 15 g, Rfl: PRN  •  albuterol sulfate  (90 Base) MCG/ACT inhaler, Inhale 2 puffs by mouth every 4 (Four) Hours As Needed, Disp: 8.5 g, Rfl: 2  •  albuterol sulfate  (90 Base) MCG/ACT inhaler, Inhale 2 puffs by mouth every 4 hours, Disp: 8.5 g, Rfl: 3  •  aspirin 81 MG tablet, Take 81 mg by mouth Daily. Taken at night. Last dose 9-18-21, Disp: , Rfl:   •  baclofen (LIORESAL) 10 MG tablet, Take 1 tablet by mouth At Night As Needed for Muscle Spasms., Disp: 90 tablet, Rfl: 3  •  butalbital-acetaminophen-caffeine (FIORICET, ESGIC) -40 MG per tablet, Take 1 tablet by mouth Daily As Needed for headache, Disp: 30 tablet, Rfl: 0  •  clidinium-chlordiazePOXIDE (LIBRAX) 5-2.5 MG per capsule, Take 1 capsule by mouth 2 (Two) Times a Day before meals, Disp: 60 capsule, Rfl: 0  •  clonazePAM  (KlonoPIN) 0.5 MG tablet, As Needed., Disp: , Rfl:   •  clotrimazole-betamethasone (LOTRISONE) 1-0.05 % cream, Apply topically to the appropriate private area two times daily as directed., Disp: 15 g, Rfl: 0  •  digoxin (LANOXIN) 250 MCG tablet, Take 1 tablet by mouth daily, Disp: 90 tablet, Rfl: 1  •  fludrocortisone 0.1 MG tablet, Take 1 tablet by mouth Daily., Disp: 90 tablet, Rfl: 1  •  fluticasone (FLONASE) 50 MCG/ACT nasal spray, Instill 1 spray in each nostril Once a day, Disp: 16 g, Rfl: 3  •  furosemide (LASIX) 40 MG tablet, Take 1 tablet by mouth Daily. May have extra 1/2 to 1 tablet daily if needed for edema, Disp: 135 tablet, Rfl: 1  •  gabapentin (NEURONTIN) 800 MG tablet, Take 1 tablet by mouth Every Night., Disp: 90 tablet, Rfl: 1  •  glipizide (GLUCOTROL XL) 10 MG 24 hr tablet, Take 2 tablets by mouth Daily., Disp: 180 tablet, Rfl: 3  •  glucose blood (OneTouch Verio) test strip, Use to test blood glucose 3 times a day as directed (Patient taking differently: Use to test blood glucose 3 times a day as directed), Disp: 300 each, Rfl: 3  •  HYDROcodone-acetaminophen (NORCO) 7.5-325 MG per tablet, Take 1 tablet by mouth 3 (Three) Times a Day., Disp: 90 tablet, Rfl: 0  •  hydrocortisone (ANUSOL-HC) 25 MG suppository, Insert 1 suppository rectally twice a day as needed (remove wrapper and moisten with water), Disp: 12 suppository, Rfl: 3  •  Insulin Glargine (BASAGLAR KWIKPEN) 100 UNIT/ML injection pen, Inject 50 units subcutaneously once in the morning and 75 units at night, Disp: 45 mL, Rfl: 5  •  Insulin Pen Needle (B-D UF III MINI PEN NEEDLES) 31G X 5 MM misc, Use to inject insulin twice a day as directed, Disp: 100 each, Rfl: 12  •  Insulin Syringe-Needle U-100 29G 0.5 ML misc, Inject 0.3 mL as directed Daily., Disp: , Rfl:   •  loperamide (IMODIUM) 2 MG capsule, Imodium A-D  As directed prn.  Initial dose 2 tablets followed by 1 tablet daily,, Disp: , Rfl:   •  loratadine (CLARITIN) 10 MG tablet,  Take 1 tablet by mouth Daily., Disp: 30 tablet, Rfl: 3  •  meclizine (ANTIVERT) 25 MG tablet, Take 1 tablet by mouth 3 (Three) Times a Day As Needed for Dizziness., Disp: 90 tablet, Rfl: 3  •  metoclopramide (REGLAN) 10 MG tablet, Take 1 tablet by mouth Daily As Needed for migraine., Disp: 30 tablet, Rfl: 1  •  metoprolol succinate XL (TOPROL-XL) 100 MG 24 hr tablet, Take 1 tablet by mouth Daily., Disp: 90 tablet, Rfl: 3  •  metroNIDAZOLE (METROCREAM) 0.75 % cream, Apply to the face once every night (Patient taking differently: Apply  topically to the appropriate area as directed As Needed.), Disp: 45 g, Rfl: 0  •  mupirocin (BACTROBAN) 2 % ointment, Apply topically to the affected area twice a day, Disp: 22 g, Rfl: 0  •  nitroglycerin (Nitrostat) 0.4 MG SL tablet, Place 1 tablet under the tongue Every 5 Minutes As Needed for Chest Pain for up to 3 total doses. Call 911 if pain remains after 1 dose., Disp: 25 tablet, Rfl: 6  •  pancrelipase, Lip-Prot-Amyl, (Pancreaze) 58565-63847 units capsule delayed-release particles capsule, Take 1 capsule with each meal and with each snack, Disp: 100 capsule, Rfl: 2  •  potassium chloride (KLOR-CON) 10 MEQ CR tablet, Take 1 tablet by mouth Daily as directed, Disp: 90 tablet, Rfl: 0  •  promethazine (PHENERGAN) 25 MG tablet, Take 1 tablet by mouth Every 6 (Six) Hours As Needed for Nausea or Vomiting., Disp: 30 tablet, Rfl: 1  •  RABEprazole (ACIPHEX) 20 MG EC tablet, Take 1 tablet by mouth Daily., Disp: 90 tablet, Rfl: 1  •  rosuvastatin (CRESTOR) 10 MG tablet, Take 1 tablet by mouth Daily., Disp: 90 tablet, Rfl: 1  •  topiramate (TOPAMAX) 25 MG tablet, Take 1 tablet by mouth Every Night., Disp: 90 tablet, Rfl: 3  •  triamcinolone (KENALOG) 0.1 % cream, Apply topically to the affected area twice a day, Disp: 30 g, Rfl: 0  •  venlafaxine XR (EFFEXOR-XR) 75 MG 24 hr capsule, Take 1 capsule by mouth Daily., Disp: 90 capsule, Rfl: 3  •  vitamin B-12 (CYANOCOBALAMIN) 1000 MCG tablet,  Take 1,000 mcg by mouth Daily., Disp: , Rfl:   •  amoxicillin (AMOXIL) 500 MG capsule, Take 1 capsule by mouth 2 (Two) Times a Day For 7 Days, Disp: 14 capsule, Rfl: 0  •  pilocarpine (SALAGEN) 5 MG tablet, Take 1 tablet by mouth 2 (Two) Times a Day., Disp: 60 tablet, Rfl: 2  •  pimecrolimus (Elidel) 1 % cream, Apply topically to the face two times daily as directed., Disp: 60 g, Rfl: 0    Allergies   Allergen Reactions   • Atorvastatin Swelling   • Tetanus Toxoid Hives   • Acyclovir Seizure   • Cefprozil Other (See Comments)   • Lansoprazole Nausea Only and Nausea And Vomiting   • Mestinon [Pyridostigmine] Itching and Other (See Comments)     Leg cramps, shooting vaginal pains, frequent urination   • Ranexa [Ranolazine Er] Nausea And Vomiting       Family History   Problem Relation Age of Onset   • Alzheimer's disease Mother    • Cancer Mother    • Coronary artery disease Other    • Diabetes Other    • Hypertension Other    • Stroke Other    • Cancer Other    • Dementia Other    • Heart attack Father    • Colon polyps Father    • Breast cancer Neg Hx    • Ovarian cancer Neg Hx    • Colon cancer Neg Hx    • Esophageal cancer Neg Hx        Social History     Socioeconomic History   • Marital status:    Tobacco Use   • Smoking status: Never Smoker   • Smokeless tobacco: Never Used   Vaping Use   • Vaping Use: Never used   Substance and Sexual Activity   • Alcohol use: No   • Drug use: No   • Sexual activity: Not Currently       Review of Systems   Constitutional: Negative for activity change, appetite change, fatigue, fever and unexpected weight change.   HENT: Negative for dental problem, hearing loss, mouth sores, postnasal drip, sneezing, trouble swallowing and voice change.    Eyes: Negative for pain, redness, itching and visual disturbance.   Respiratory: Negative for cough, choking, chest tightness, shortness of breath and wheezing.    Cardiovascular: Negative for chest pain, palpitations and leg  swelling.   Gastrointestinal: Positive for abdominal distention (bloating & gas) and constipation. Negative for abdominal pain, anal bleeding, blood in stool, diarrhea, nausea, rectal pain and vomiting.   Endocrine: Negative for cold intolerance, heat intolerance, polydipsia, polyphagia and polyuria.   Genitourinary: Negative.  Negative for dysuria, enuresis, flank pain, hematuria and urgency.   Musculoskeletal: Negative for arthralgias, back pain, gait problem, joint swelling and myalgias.   Skin: Negative for color change, pallor and rash.   Allergic/Immunologic: Negative for environmental allergies, food allergies and immunocompromised state.   Neurological: Negative for dizziness, tremors, seizures, facial asymmetry, speech difficulty, numbness and headaches.   Hematological: Negative for adenopathy.   Psychiatric/Behavioral: Negative for behavioral problems, confusion, dysphoric mood, hallucinations and self-injury.       Vitals:    09/29/22 1426   BP: 142/60   Pulse: 83   Temp: 96.2 °F (35.7 °C)   SpO2: 96%       Physical Exam  Vitals reviewed.   Constitutional:       General: She is not in acute distress.     Appearance: Normal appearance.   HENT:      Head: Normocephalic and atraumatic.      Nose: Nose normal.      Mouth/Throat:      Mouth: Mucous membranes are moist.      Pharynx: Oropharynx is clear.   Eyes:      General: No scleral icterus.     Extraocular Movements: Extraocular movements intact.   Cardiovascular:      Rate and Rhythm: Normal rate and regular rhythm.      Heart sounds: No murmur heard.  Pulmonary:      Breath sounds: Normal breath sounds. No wheezing or rales.   Abdominal:      General: Bowel sounds are normal.      Palpations: Abdomen is soft.      Tenderness: There is no abdominal tenderness. There is no guarding.   Musculoskeletal:         General: No swelling. Normal range of motion.      Cervical back: Normal range of motion and neck supple.   Skin:     General: Skin is dry.       Coloration: Skin is not jaundiced.   Neurological:      Mental Status: She is alert and oriented to person, place, and time.   Psychiatric:         Mood and Affect: Mood normal.         Thought Content: Thought content normal.         Judgment: Judgment normal.         Diagnoses and all orders for this visit:    1. LEDBETTER (nonalcoholic steatohepatitis) (Primary)  -     US Liver; Future    2. Chest pain, unspecified type    The patient has biopsy-proven Ledbetter.  The albumin and bilirubin are normal.  There are no signs of hepatic decompensation.  She recently has experienced 2 episodes of chest pain.  The pain did respond to nitroglycerin.  There is a history of coronary artery disease.      Plan: My recommendation is for the patient to contact Dr. Sullivan office for an office appointment.           I stated that if she has chest pain that recurs then presentation to the emergency department is appropriate.           Will schedule ultrasound of the liver.           The patient will follow-up in 6 months.

## 2022-10-12 RX ORDER — FLUCONAZOLE 150 MG/1
150 TABLET ORAL ONCE
Qty: 1 TABLET | Refills: 2 | Status: SHIPPED | OUTPATIENT
Start: 2022-10-12 | End: 2023-04-07

## 2022-10-13 RX ORDER — FLUCONAZOLE 150 MG/1
TABLET ORAL
Qty: 2 TABLET | Refills: 2 | OUTPATIENT
Start: 2022-10-13

## 2022-10-18 ENCOUNTER — HOSPITAL ENCOUNTER (OUTPATIENT)
Dept: ULTRASOUND IMAGING | Facility: HOSPITAL | Age: 72
Discharge: HOME | End: 2022-10-18
Admitting: INTERNAL MEDICINE

## 2022-10-18 ENCOUNTER — TELEPHONE (OUTPATIENT)
Dept: GASTROENTEROLOGY | Facility: CLINIC | Age: 72
End: 2022-10-18

## 2022-10-18 DIAGNOSIS — K75.81 NASH (NONALCOHOLIC STEATOHEPATITIS): ICD-10-CM

## 2022-10-18 PROCEDURE — 76705 ECHO EXAM OF ABDOMEN: CPT

## 2022-10-18 NOTE — TELEPHONE ENCOUNTER
----- Message from Catracho Garcia MD sent at 10/18/2022  2:06 PM EDT -----  Let Mrs. Muller know there was no fluid around the liver and this is good.

## 2022-10-26 DIAGNOSIS — G43.009 MIGRAINE WITHOUT AURA AND WITHOUT STATUS MIGRAINOSUS, NOT INTRACTABLE: ICD-10-CM

## 2022-10-26 DIAGNOSIS — F41.9 ANXIETY: ICD-10-CM

## 2022-10-27 RX ORDER — VENLAFAXINE HYDROCHLORIDE 75 MG/1
75 CAPSULE, EXTENDED RELEASE ORAL DAILY
Qty: 90 CAPSULE | Refills: 3 | Status: SHIPPED | OUTPATIENT
Start: 2022-10-27

## 2022-10-27 RX ORDER — FLUDROCORTISONE ACETATE 0.1 MG/1
0.1 TABLET ORAL DAILY
Qty: 90 TABLET | Refills: 1 | Status: SHIPPED | OUTPATIENT
Start: 2022-10-27 | End: 2023-01-18 | Stop reason: SDUPTHER

## 2022-10-27 RX ORDER — DIGOXIN 250 MCG
250 TABLET ORAL DAILY
Qty: 90 TABLET | Refills: 1 | Status: SHIPPED | OUTPATIENT
Start: 2022-10-27 | End: 2023-01-18 | Stop reason: SDUPTHER

## 2022-10-28 ENCOUNTER — OFFICE VISIT (OUTPATIENT)
Dept: NEUROLOGY | Facility: CLINIC | Age: 72
End: 2022-10-28

## 2022-10-28 VITALS
OXYGEN SATURATION: 98 % | DIASTOLIC BLOOD PRESSURE: 70 MMHG | BODY MASS INDEX: 25.43 KG/M2 | HEIGHT: 67 IN | SYSTOLIC BLOOD PRESSURE: 120 MMHG | WEIGHT: 162 LBS | HEART RATE: 83 BPM

## 2022-10-28 DIAGNOSIS — R42 DIZZINESS AND GIDDINESS: ICD-10-CM

## 2022-10-28 DIAGNOSIS — M48.061 DEGENERATIVE LUMBAR SPINAL STENOSIS: ICD-10-CM

## 2022-10-28 DIAGNOSIS — M50.30 DDD (DEGENERATIVE DISC DISEASE), CERVICAL: ICD-10-CM

## 2022-10-28 DIAGNOSIS — E08.42 DIABETIC POLYNEUROPATHY ASSOCIATED WITH DIABETES MELLITUS DUE TO UNDERLYING CONDITION: Primary | ICD-10-CM

## 2022-10-28 DIAGNOSIS — G43.009 MIGRAINE WITHOUT AURA AND WITHOUT STATUS MIGRAINOSUS, NOT INTRACTABLE: ICD-10-CM

## 2022-10-28 DIAGNOSIS — R29.6 RECURRENT FALLS WHILE WALKING: ICD-10-CM

## 2022-10-28 PROCEDURE — 99214 OFFICE O/P EST MOD 30 MIN: CPT | Performed by: NURSE PRACTITIONER

## 2022-10-28 RX ORDER — BUTALBITAL, ACETAMINOPHEN AND CAFFEINE 50; 325; 40 MG/1; MG/1; MG/1
1 TABLET ORAL DAILY PRN
Qty: 30 TABLET | Refills: 0 | Status: SHIPPED | OUTPATIENT
Start: 2022-10-28

## 2022-10-28 RX ORDER — BACLOFEN 10 MG/1
10 TABLET ORAL NIGHTLY
Qty: 90 TABLET | Refills: 3 | Status: SHIPPED | OUTPATIENT
Start: 2022-10-28

## 2022-10-28 RX ORDER — GABAPENTIN 800 MG/1
800 TABLET ORAL NIGHTLY
Qty: 90 TABLET | Refills: 1 | Status: SHIPPED | OUTPATIENT
Start: 2022-10-28 | End: 2023-01-18 | Stop reason: SDUPTHER

## 2022-10-28 RX ORDER — FLURBIPROFEN SODIUM 0.3 MG/ML
SOLUTION/ DROPS OPHTHALMIC
Qty: 100 EACH | Refills: 12 | Status: SHIPPED | OUTPATIENT
Start: 2022-10-28

## 2022-10-28 RX ORDER — FLURBIPROFEN SODIUM 0.3 MG/ML
SOLUTION/ DROPS OPHTHALMIC
Qty: 100 EACH | Refills: 12 | Status: SHIPPED | OUTPATIENT
Start: 2022-10-28 | End: 2022-10-28 | Stop reason: SDUPTHER

## 2022-10-28 NOTE — PROGRESS NOTES
"Subjective:     Patient ID: Leela Muller is a 72 y.o. female.    CC:   Chief Complaint   Patient presents with   • Gait Problem   • Difficulty Walking   • Migraine       HPI:   History of Present Illness   Today 10/28/2022- This is a pleasant 72-year-old female who presents for 3-month neurology follow-up on chronic migraine headaches present for many years along with chronic neck pain, chronic and intermittent vertigo, anxiety, and history of prior strokes in 06/2015 and in 2010. Previously was followed long term in our clinic by Dr. Devaughn Lewis, with neurology. We did refer her to physical therapy for recurrent falls while walking last visit. She does have known cervical degenerative disc disease as well. We refilled her gabapentin, which she takes 800 mg, 1 pill every night. She also takes baclofen 10 mg, 1 pill at night as needed for muscle spasms. She takes Reglan or Phenergan if needed for nausea and vomiting and uses this sparingly and does not take these together. She takes topiramate 25 mg at night for migraine prevention. She uses meclizine as needed for her vertigo. She uses butalbital with acetaminophen and caffeine also as needed. She is here for follow-up today and refills.    She reports that she is not doing the best. She states that she is still dealing with a mass on her liver and recently had another scan. She is seeing Dr. Garcia with gastroenterology and she has a scheduled appointment in approximately 1 week. He called her and told her that he did not see any fluid around the liver and no cancer in the liver. He has been giving her injections in the liver. She has LEDBETTER cirrhosis.    She reports that she is still having dizzy spells and falling. She denies going to physical therapy because she has been \"sick for a month\". She had a viral infection after 07/25/2022 with emesis. She states that Dr. Ritter was treating her and trying to keep her out of the hospital. She reports pyrexia, " "constant headaches, instability and falling when ambulating from her bed to the bathroom without falling. She was placed on antibiotics. She was told she had a tympanic membrane perforation and \"fluid behind it\" and an infection in her lymph nodes. She confirms she has completed her course of amoxicillin and that she has had dizziness for a long time. She indicates to a \"big knot\" on her right shin which she sustained when she recently fell. She reports being weak currently. She states that she does not know how she can fit in physical therapy presently because she wants to see what the recommended treatment for her ear will be when she sees Dr. Cantor with ENT on 11/01/2022. She confirms that she is still taking the meclizine for the dizziness. She explains that when she falls it is intermittently either because of dizziness or loss of balance. She confirms using a cane when ambulating. She will go to physical therapy after she finishes with ENT.    She reports having an eye examination scheduled with Dr. Gandara on 11/02/2022, who has seen her 3 times after he detected something behind her eyes. She was prescribed eye drops and a black patch to put over her eyes for 10 minutes twice a day, morning and evening. She was told to stop driving.    She states that she is taking Topamax every night and it has been helping her headaches. She denies having to have occipital nerve blocks and trigger point injections, like she used to have. She confirms taking baclofen every night. She is uncertain if she needs refills for gabapentin. She takes gabapentin 800 mg every night and Fioricet as needed. She states that she has been trying to take extra strength Tylenol if she has it, but it does not do anything for her headaches.     She confirms having severe neck, shoulder, left worse than right, and low back pain and it is so bad intermittently that she cannot get up. She denies currently seeing a pain specialist. She states " that her PCP recently gave her an injection in her neck, shoulder and lower back because she was very tight. She states that she used to go to a pain specialist years ago. She states that she has an appointment with Dr. Dominguez on 11/03/2022 and then Dr. Noel for a corticosteroid injection to her left shoulder.    She states that her blood glucose levels fluctuate and Dr. Santiago with Endocrinology follows her. She states that last night her blood glucose was 300 mg/dL. She states that her appetite is bad. She states that she gets recurrent yeast infections and she is prescribed Diflucan for that.    She states that her neuropathy is better with taking gabapentin and she is taking a lower dosage. She confirms not feeling as drowsy.      She reports getting her influenza vaccine on 10/26/2022.    Takes Lortab as needed for pain. Known lumbar and cervical spinal stenosis and DDD.    Prior neurology workup and history:  At her last visit in clinic on 04/25/2022, she did have two falls with the most recent being on 03/28/2022. She had fractured her coccyx and actually injured her head and back. She did go to [facility] and had a CT scan. She did not have syncope or loss of consciousness, but did have a little bit of confusion after that fall.      MRI of the brain and cervical spine with and without contrast. She did have both of these completed on 06/6/2022 at [facility]. MRI of the brain with and without contrast showed age-related changes of mild generalized volume loss with some encephalomalacia and gliotic changes in the right occipital lobe with the appearance of a prior stroke. No acute intracranial abnormalities and no abnormal contrast enhancement were seen. Her MRI of the cervical spine with and without contrast did show some mild cervical spondylosis similar to 2019 imaging with no focal cord signal abnormalities and no significant spinal stenosis.     She does take aspirin and rosuvastatin.  She does follow with cardiology for erratic orthostatic blood pressure and dizziness and was previously prescribed pyridostigmine, but was unable to tolerate it.      Generally headaches have aching pain, some throbbing, photophobia and phonophobia with nausea, and usually are in the frontal region, or on either side, or in the back of the neck on either side. She does use a cane for ambulation and stability.      Muscle relaxer cream she uses as needed with [facility] that has . Continues magnesium chloride is 15%, guaifenesin is 10%, baclofen is 5%, and cyclobenzaprine is 4%.     She uses the [facility] pharmacy in [place], [place].     She follows with cardiology, pulmonology, endocrinology, and gastroenterology, all with [facility].    The following portions of the patient's history were reviewed and updated as appropriate: allergies, current medications, past family history, past medical history, past social history, past surgical history and problem list.    Past Medical History:   Diagnosis Date   • Anxiety    • Arm pain    • Arthritis    • Breast injury     fell 2019    • Cancer (HCC)    • Chronic pancreatitis (HCC)    • COVID-19    • Depression    • Diabetes mellitus (HCC)    • Hyperlipidemia    • Hypertension    • Liver mass    • Neck pain on left side    • Palpitations 2018   • Pancreatitis    • Pulmonary embolism (HCC)    • Stroke syndrome 2016    · Assessed By: Devaughn Lewis (Neurology); Last Assessed: 2015  Right cerebrovascular accident in July or 2010, evaluated at [facility].  Admission to [facility] on 2010 with headache and stuttering, consistent with TIA.  Chronic Coumadin therapy, initiated following bilateral pulmonary emboli in  after fall and hip replacement.  She was already on 81 mg of aspirin as well, 2010.  MRI of the brain on 2010 revealing old right parietal cerebrovascular  accident.  MRA revealing normal carotids, middle anterior and posterior cerebral arteries with minimal ostial stenosis of both vertebral arteries.  GENARO by Dr. Oliver Mobley on 09/28/2010:  patent foramen ovale with a small amount of right to left shunting.  Normal LVEF and normal valvular structures.   Patent foramen ovale closure using a 25 mm. Amplatzer cribriform occluder, 10/05/2010.   Echocardiogram, 06/23/2014:  LVEF (55% to 60%) with and Amplatzer device noted to be well-seated in    • Thrombocytopenia (HCC)    • Transient cerebral ischemia    • Type 2 diabetes mellitus (HCC)        Past Surgical History:   Procedure Laterality Date   • APPENDECTOMY     • BREAST BIOPSY Left 2012    benign   • BREAST BIOPSY     • BREAST EXCISIONAL BIOPSY Left     benign   • BREAST EXCISIONAL BIOPSY Right     benign   • BREAST EXCISIONAL BIOPSY Right 2020    benign   • BREAST SURGERY     • CHOLECYSTECTOMY     • COLONOSCOPY     • HYSTERECTOMY     • LIVER BIOPSY     • OOPHORECTOMY     • RECTAL SURGERY     • SKIN CANCER EXCISION     • TONSILLECTOMY     • TOTAL HIP ARTHROPLASTY REVISION         Social History     Socioeconomic History   • Marital status:    Tobacco Use   • Smoking status: Never   • Smokeless tobacco: Never   Vaping Use   • Vaping Use: Never used   Substance and Sexual Activity   • Alcohol use: No   • Drug use: No   • Sexual activity: Not Currently       Family History   Problem Relation Age of Onset   • Alzheimer's disease Mother    • Cancer Mother    • Coronary artery disease Other    • Diabetes Other    • Hypertension Other    • Stroke Other    • Cancer Other    • Dementia Other    • Heart attack Father    • Colon polyps Father    • Breast cancer Neg Hx    • Ovarian cancer Neg Hx    • Colon cancer Neg Hx    • Esophageal cancer Neg Hx           Current Outpatient Medications:   •  acetaminophen (TYLENOL) 500 MG tablet, Take 500 mg by mouth Every 6 (Six) Hours As Needed., Disp: , Rfl:   •  acyclovir  (ZOVIRAX) 5 % ointment, Apply 1 unit topically to the anal area as needed, Disp: 15 g, Rfl: PRN  •  albuterol sulfate  (90 Base) MCG/ACT inhaler, Inhale 2 puffs by mouth every 4 (Four) Hours As Needed, Disp: 8.5 g, Rfl: 2  •  albuterol sulfate  (90 Base) MCG/ACT inhaler, Inhale 2 puffs by mouth every 4 hours, Disp: 8.5 g, Rfl: 3  •  aspirin 81 MG tablet, Take 81 mg by mouth Daily. Taken at night. Last dose 9-18-21, Disp: , Rfl:   •  baclofen (LIORESAL) 10 MG tablet, Take 1 tablet by mouth Every Night., Disp: 90 tablet, Rfl: 3  •  butalbital-acetaminophen-caffeine (FIORICET, ESGIC) -40 MG per tablet, Take 1 tablet by mouth Daily As Needed for headache, Disp: 30 tablet, Rfl: 0  •  clidinium-chlordiazePOXIDE (LIBRAX) 5-2.5 MG per capsule, Take 1 capsule by mouth 2 (Two) Times a Day As Needed., Disp: 60 capsule, Rfl: 2  •  clonazePAM (KlonoPIN) 0.5 MG tablet, As Needed., Disp: , Rfl:   •  clotrimazole-betamethasone (LOTRISONE) 1-0.05 % cream, Apply topically to the appropriate private area two times daily as directed., Disp: 15 g, Rfl: 0  •  digoxin (LANOXIN) 250 MCG tablet, Take 1 tablet by mouth Daily., Disp: 90 tablet, Rfl: 1  •  fludrocortisone 0.1 MG tablet, Take 1 tablet by mouth Daily., Disp: 90 tablet, Rfl: 1  •  fluticasone (FLONASE) 50 MCG/ACT nasal spray, Instill 1 spray in each nostril Once a day, Disp: 16 g, Rfl: 3  •  furosemide (LASIX) 40 MG tablet, Take 1 tablet by mouth Daily. May have extra 1/2 to 1 tablet daily if needed for edema, Disp: 135 tablet, Rfl: 1  •  gabapentin (NEURONTIN) 800 MG tablet, Take 1 tablet by mouth Every Night., Disp: 90 tablet, Rfl: 1  •  glipizide (GLUCOTROL XL) 10 MG 24 hr tablet, Take 2 tablets by mouth Daily., Disp: 180 tablet, Rfl: 3  •  glucose blood (OneTouch Verio) test strip, Use to test blood glucose 3 times a day as directed (Patient taking differently: Use to test blood glucose 3 times a day as directed), Disp: 300 each, Rfl: 3  •   HYDROcodone-acetaminophen (NORCO) 7.5-325 MG per tablet, Take 1 tablet by mouth 3 (Three) Times a Day., Disp: 90 tablet, Rfl: 0  •  hydrocortisone (ANUSOL-HC) 25 MG suppository, Insert 1 suppository rectally twice a day as needed (remove wrapper and moisten with water), Disp: 12 suppository, Rfl: 3  •  Insulin Glargine (BASAGLAR KWIKPEN) 100 UNIT/ML injection pen, Inject 50 units subcutaneously once in the morning and 75 units at night, Disp: 45 mL, Rfl: 5  •  Insulin Pen Needle (B-D UF III MINI PEN NEEDLES) 31G X 5 MM misc, Use to inject insulin twice a day as directed, Disp: 100 each, Rfl: 12  •  Insulin Syringe-Needle U-100 29G 0.5 ML misc, Inject 0.3 mL as directed Daily., Disp: , Rfl:   •  loratadine (CLARITIN) 10 MG tablet, Take 1 tablet by mouth Daily., Disp: 30 tablet, Rfl: 3  •  meclizine (ANTIVERT) 25 MG tablet, Take 1 tablet by mouth 3 (Three) Times a Day As Needed for Dizziness., Disp: 90 tablet, Rfl: 3  •  metoclopramide (REGLAN) 10 MG tablet, Take 1 tablet by mouth Daily As Needed for migraine., Disp: 30 tablet, Rfl: 1  •  metoprolol succinate XL (TOPROL-XL) 100 MG 24 hr tablet, Take 1 tablet by mouth Daily., Disp: 90 tablet, Rfl: 3  •  metroNIDAZOLE (METROCREAM) 0.75 % cream, Apply to the face once every night (Patient taking differently: Apply  topically to the appropriate area as directed As Needed.), Disp: 45 g, Rfl: 0  •  mupirocin (BACTROBAN) 2 % ointment, Apply topically to the affected area twice a day, Disp: 22 g, Rfl: 0  •  nitroglycerin (Nitrostat) 0.4 MG SL tablet, Place 1 tablet under the tongue Every 5 Minutes As Needed for Chest Pain for up to 3 total doses. Call 911 if pain remains after 1 dose., Disp: 25 tablet, Rfl: 6  •  pancrelipase, Lip-Prot-Amyl, (Pancreaze) 11791-93228 units capsule delayed-release particles capsule, Take 1 capsule with each meal and with each snack, Disp: 100 capsule, Rfl: 2  •  pilocarpine (SALAGEN) 5 MG tablet, Take 1 tablet by mouth 2 (Two) Times a Day.,  Disp: 60 tablet, Rfl: 2  •  pimecrolimus (Elidel) 1 % cream, Apply topically to the face two times daily as directed., Disp: 60 g, Rfl: 0  •  potassium chloride (KLOR-CON) 10 MEQ CR tablet, Take 1 tablet by mouth Daily as directed, Disp: 90 tablet, Rfl: 0  •  promethazine (PHENERGAN) 25 MG tablet, Take 1 tablet by mouth Every 6 (Six) Hours As Needed for Nausea or Vomiting., Disp: 30 tablet, Rfl: 1  •  RABEprazole (ACIPHEX) 20 MG EC tablet, Take 1 tablet by mouth Daily., Disp: 90 tablet, Rfl: 1  •  rosuvastatin (CRESTOR) 10 MG tablet, Take 1 tablet by mouth Daily., Disp: 90 tablet, Rfl: 1  •  topiramate (TOPAMAX) 25 MG tablet, Take 1 tablet by mouth Every Night., Disp: 90 tablet, Rfl: 3  •  triamcinolone (KENALOG) 0.1 % cream, Apply topically to the affected area twice a day, Disp: 30 g, Rfl: 0  •  venlafaxine XR (EFFEXOR-XR) 75 MG 24 hr capsule, Take 1 capsule by mouth Daily., Disp: 90 capsule, Rfl: 3  •  loperamide (IMODIUM) 2 MG capsule, Imodium A-D  As directed prn.  Initial dose 2 tablets followed by 1 tablet daily,, Disp: , Rfl:   •  RABEprazole (ACIPHEX) 20 MG EC tablet, Take 1 tablet by mouth Daily., Disp: 90 tablet, Rfl: 1  •  vitamin B-12 (CYANOCOBALAMIN) 1000 MCG tablet, Take 1,000 mcg by mouth Daily., Disp: , Rfl:      Review of Systems   Constitutional: Negative for chills, fatigue, fever and unexpected weight change.   HENT: Negative for ear pain, hearing loss, nosebleeds, rhinorrhea and sore throat.    Eyes: Negative for photophobia, pain, discharge, itching and visual disturbance.   Respiratory: Negative for cough, chest tightness, shortness of breath and wheezing.    Cardiovascular: Negative for chest pain, palpitations and leg swelling.   Gastrointestinal: Negative for abdominal pain, blood in stool, constipation, diarrhea, nausea and vomiting.   Genitourinary: Negative for dysuria, frequency, hematuria and urgency.   Musculoskeletal: Positive for gait problem. Negative for arthralgias, back pain,  "joint swelling, myalgias, neck pain and neck stiffness.   Skin: Negative for rash and wound.   Allergic/Immunologic: Negative for environmental allergies and food allergies.   Neurological: Positive for dizziness and headaches. Negative for tremors, seizures, syncope, speech difficulty, weakness, light-headedness and numbness.   Hematological: Negative for adenopathy. Does not bruise/bleed easily.   Psychiatric/Behavioral: Negative for agitation, confusion, decreased concentration, hallucinations, sleep disturbance and suicidal ideas. The patient is not nervous/anxious.    All other systems reviewed and are negative.       Objective:  /70   Pulse 83   Ht 170.2 cm (67\")   Wt 73.5 kg (162 lb)   SpO2 98%   BMI 25.37 kg/m²     Neurologic Exam     Mental Status   Oriented to person, place, and time.   Speech: speech is normal   Level of consciousness: alert    Cranial Nerves   Cranial nerves II through XII intact.     Motor Exam   Muscle bulk: normal  Overall muscle tone: normal    Strength   Strength 5/5 except as noted.   Hip arthritis, chronic neck and back pain, limited ROM and mobility due to her pain and arthritis     Sensory Exam   Right leg vibration: decreased from ankle  Left leg vibration: decreased from ankle    Gait, Coordination, and Reflexes     Gait  Gait: (antalgic, using cane for stability)    Coordination   Romberg: negative  Finger to nose coordination: normal  Heel to shin coordination: normal  Tandem walking coordination: abnormal    Tremor   Resting tremor: absent  Intention tremor: absent  Action tremor: absent    Reflexes   Reflexes 2+ except as noted.   Right : 2+  Left : 2+      Physical Exam  Constitutional:       Appearance: Normal appearance.   Musculoskeletal:      Cervical back: No edema, erythema, signs of trauma, rigidity, torticollis or crepitus. Pain with movement, spinous process tenderness and muscular tenderness present. Decreased range of motion.      Lumbar " back: Tenderness present. No swelling, edema, deformity, signs of trauma, lacerations, spasms or bony tenderness. Decreased range of motion. Negative right straight leg raise test and negative left straight leg raise test.   Neurological:      Mental Status: She is alert and oriented to person, place, and time.      Cranial Nerves: Cranial nerves 2-12 are intact.      Coordination: Finger-Nose-Finger Test, Heel to Shin Test and Romberg Test normal.      Gait: Tandem walk abnormal.   Psychiatric:         Mood and Affect: Mood is anxious.         Speech: Speech normal.         Behavior: Behavior normal.         Thought Content: Thought content normal.         Cognition and Memory: Cognition and memory normal.         Judgment: Judgment normal.     Results:    She had laboratory testing on 08/05/2022 with [facility] endocrinology showing glucose 162 mg/dL and otherwise comprehensive metabolic panel was normal. Hemoglobin A1c was 7.4 percent.    Assessment/Plan:     Diagnoses and all orders for this visit:    1. Diabetic polyneuropathy associated with diabetes mellitus due to underlying condition (HCC) (Primary)  -     gabapentin (NEURONTIN) 800 MG tablet; Take 1 tablet by mouth Every Night.  Dispense: 90 tablet; Refill: 1    2. DDD (degenerative disc disease), cervical  -     baclofen (LIORESAL) 10 MG tablet; Take 1 tablet by mouth Every Night.  Dispense: 90 tablet; Refill: 3  -     gabapentin (NEURONTIN) 800 MG tablet; Take 1 tablet by mouth Every Night.  Dispense: 90 tablet; Refill: 1  -     Ambulatory Referral to Pain Management    3. Migraine without aura and without status migrainosus, not intractable  -     butalbital-acetaminophen-caffeine (FIORICET, ESGIC) -40 MG per tablet; Take 1 tablet by mouth Daily As Needed for headache  Dispense: 30 tablet; Refill: 0  -     gabapentin (NEURONTIN) 800 MG tablet; Take 1 tablet by mouth Every Night.  Dispense: 90 tablet; Refill: 1    4. Recurrent falls while  walking  Comments:  recommend Physical therapy when able-ordered twice and has not completed this    5. Dizziness and giddiness  Comments:  seeing ENT 11/1/2022, recommend Physical therapy when able-ordered twice and has not completed this. continue meclizine    6. Degenerative lumbar spinal stenosis  -     Ambulatory Referral to Pain Management    She is going to continue with her gastroenterologist, her cardiologist, her primary care provider, and is seeing her ear, nose, and throat specialist on Tuesday for evaluation of her dizziness and ear issues. Highly recommended physical therapy and this is the third time. Two prior orders have been given, but she has not scheduled. She has had some other health concerns, would recommend she discuss with them, but highly recommend physical therapy to work on her balance, gait and prevent falls.     We are referring her to pain management for her cervical and lumbar disc changes and possible injections. She is agreeable to this today.     Her migraines have been much better with low dose Topamax, which she will continue. She will continue her gabapentin just at night, which helps her with sleep and her neuropathy. Fioricet PRN sparingly. Refilled today.    She will follow up with us in 6 months or sooner if needed.     Reviewed medications, potential side effects and signs and symptoms to report. Discussed risk versus benefits of treatment plan with patient and/or family-including medications, labs and radiology that may be ordered. Addressed questions and concerns during visit. Patient and/or family verbalized understanding and agree with plan.    AS THE PROVIDER, I PERSONALLY WORE PPE DURING ENTIRE FACE TO FACE ENCOUNTER IN CLINIC WITH THE PATIENT. PATIENT ALSO WORE PPE DURING ENTIRE FACE TO FACE ENCOUNTER EXCEPT FOR A MAX OF 30 SECONDS DURING NEUROLOGICAL EVALUATION OF CRANIAL NERVES AND THEN MASK WAS PLACED BACK OVER PATIENT FACE FOR REMAINDER OF VISIT. I WASHED MY  HANDS BEFORE AND AFTER VISIT.    As part of this patient's treatment plan I am prescribing controlled substances. The patient has been made aware of appropriate use of such medications, including potential risk of somnolence, limited ability to drive and/or work safely, and potential for dependence or overdose. It has also been made clear that these medications are for use by the patient only, without concomitant use of alcohol or other substances unless prescribed. Keep out of reach of children.  Diogenes report has been reviewed. If this is going to be prescribed long term, [facility] Controlled Substance Agreement Contract has also been read and signed by patient and myself.    During this visit the following were done:  Labs Reviewed [x]    Labs Ordered []    Radiology Reports Reviewed []    Radiology Ordered []    PCP Records Reviewed []    Referring Provider Records Reviewed []    ER Records Reviewed []    Hospital Records Reviewed []    History Obtained From Family []    Radiology Images Reviewed []    Other Reviewed [x]    Records Requested []      Transcribed from ambient dictation for [facility] by Nuzhat Montana.  10/28/22   16:24 EDT    Patient or patient representative verbalized consent to the visit recording.  I have personally performed the services described in this document as transcribed by the above individual, and it is both accurate and complete.  [facility]  10/31/2022  08:10 EDT

## 2022-10-31 ENCOUNTER — HOSPITAL ENCOUNTER (OUTPATIENT)
Dept: MAMMOGRAPHY | Facility: HOSPITAL | Age: 72
Discharge: HOME | End: 2022-10-31
Admitting: INTERNAL MEDICINE

## 2022-10-31 DIAGNOSIS — Z12.31 VISIT FOR SCREENING MAMMOGRAM: ICD-10-CM

## 2022-10-31 PROCEDURE — 77063 BREAST TOMOSYNTHESIS BI: CPT

## 2022-10-31 PROCEDURE — 77067 SCR MAMMO BI INCL CAD: CPT

## 2022-10-31 PROCEDURE — 77067 SCR MAMMO BI INCL CAD: CPT | Performed by: RADIOLOGY

## 2022-10-31 PROCEDURE — 77063 BREAST TOMOSYNTHESIS BI: CPT | Performed by: RADIOLOGY

## 2022-11-21 ENCOUNTER — LAB (OUTPATIENT)
Dept: LAB | Facility: HOSPITAL | Age: 72
End: 2022-11-21

## 2022-11-21 ENCOUNTER — OFFICE VISIT (OUTPATIENT)
Dept: ENDOCRINOLOGY | Facility: CLINIC | Age: 72
End: 2022-11-21

## 2022-11-21 VITALS
DIASTOLIC BLOOD PRESSURE: 60 MMHG | HEIGHT: 67 IN | BODY MASS INDEX: 25.9 KG/M2 | SYSTOLIC BLOOD PRESSURE: 144 MMHG | OXYGEN SATURATION: 97 % | HEART RATE: 73 BPM | WEIGHT: 165 LBS

## 2022-11-21 DIAGNOSIS — E78.5 DYSLIPIDEMIA: ICD-10-CM

## 2022-11-21 DIAGNOSIS — E11.65 TYPE 2 DIABETES MELLITUS WITH HYPERGLYCEMIA, WITH LONG-TERM CURRENT USE OF INSULIN: Primary | ICD-10-CM

## 2022-11-21 DIAGNOSIS — Z79.4 TYPE 2 DIABETES MELLITUS WITH HYPERGLYCEMIA, WITH LONG-TERM CURRENT USE OF INSULIN: ICD-10-CM

## 2022-11-21 DIAGNOSIS — I10 PRIMARY HYPERTENSION: ICD-10-CM

## 2022-11-21 DIAGNOSIS — E11.65 TYPE 2 DIABETES MELLITUS WITH HYPERGLYCEMIA, WITH LONG-TERM CURRENT USE OF INSULIN: ICD-10-CM

## 2022-11-21 DIAGNOSIS — Z79.4 TYPE 2 DIABETES MELLITUS WITH HYPERGLYCEMIA, WITH LONG-TERM CURRENT USE OF INSULIN: Primary | ICD-10-CM

## 2022-11-21 LAB
ALBUMIN UR-MCNC: 2.3 MG/DL
CREAT UR-MCNC: 182.2 MG/DL
EXPIRATION DATE: ABNORMAL
EXPIRATION DATE: NORMAL
GLUCOSE BLDC GLUCOMTR-MCNC: 160 MG/DL (ref 70–130)
HBA1C MFR BLD: 7.8 %
Lab: ABNORMAL
Lab: NORMAL
MICROALBUMIN/CREAT UR: 12.6 MG/G

## 2022-11-21 PROCEDURE — 80061 LIPID PANEL: CPT

## 2022-11-21 PROCEDURE — 80053 COMPREHEN METABOLIC PANEL: CPT

## 2022-11-21 PROCEDURE — 83036 HEMOGLOBIN GLYCOSYLATED A1C: CPT | Performed by: INTERNAL MEDICINE

## 2022-11-21 PROCEDURE — 3051F HG A1C>EQUAL 7.0%<8.0%: CPT | Performed by: INTERNAL MEDICINE

## 2022-11-21 PROCEDURE — 82947 ASSAY GLUCOSE BLOOD QUANT: CPT | Performed by: INTERNAL MEDICINE

## 2022-11-21 PROCEDURE — 82570 ASSAY OF URINE CREATININE: CPT

## 2022-11-21 PROCEDURE — 82043 UR ALBUMIN QUANTITATIVE: CPT

## 2022-11-21 PROCEDURE — 84443 ASSAY THYROID STIM HORMONE: CPT

## 2022-11-21 PROCEDURE — 99214 OFFICE O/P EST MOD 30 MIN: CPT | Performed by: INTERNAL MEDICINE

## 2022-11-21 NOTE — ASSESSMENT & PLAN NOTE
Hypertension is unchanged.  SBP borderline but DBP is low.  Continue current treatment regimen.  Blood pressure will be reassessed at the next regular appointment.

## 2022-11-21 NOTE — PROGRESS NOTES
"     Office Note      Date: 2022  Patient Name: Leela Muller  MRN: [id number]  : 1950    Chief Complaint   Patient presents with   • Diabetes       History of Present Illness:   Leela Muller is a 72 y.o. female who presents for Diabetes type 2. Diagnosed in: . Treated in past with oral agents. Current treatments: glipizide and basal insulin. Number of insulin shots per day: 2. Checks blood sugar 3 times a day. She is making frequent adjustments in insulin based on glucose readings.  Has low blood sugar: occ. Aspirin use: Yes. Statin use: Yes. ACE-I/ARB use: No. Changes in health since last visit: cautery for nose bleeds. Last eye exam 2022.    Subjective      Diabetic Complications:  Eyes: No  Kidneys: No  Feet: Yes -    Heart: No    Diet and Exercise:  Meals per day: 2  Minutes of exercise per week: 0 mins.    Review of Systems:   Review of Systems   Constitutional: Negative.    Cardiovascular: Negative.    Gastrointestinal: Negative.    Endocrine: Negative.        The following portions of the patient's history were reviewed and updated as appropriate: allergies, current medications, past family history, past medical history, past social history, past surgical history and problem list.    Objective       Visit Vitals  /60 (BP Location: Left arm, Patient Position: Sitting, Cuff Size: Adult)   Pulse 73   Ht 170.2 cm (67\")   Wt 74.8 kg (165 lb)   SpO2 97%   BMI 25.84 kg/m²       Physical Exam:  Physical Exam  Constitutional:       Appearance: Normal appearance.   Cardiovascular:      Pulses:           Dorsalis pedis pulses are 2+ on the right side and 2+ on the left side.        Posterior tibial pulses are 2+ on the right side and 2+ on the left side.   Musculoskeletal:      Right foot: Deformity present.      Left foot: Deformity present.   Feet:      Right foot:      Protective Sensation: 5 sites tested. 5 sites sensed.      Skin integrity: Skin integrity normal.      Toenail Condition: " Right toenails are normal.      Left foot:      Protective Sensation: 5 sites tested. 5 sites sensed.      Skin integrity: Skin integrity normal.      Toenail Condition: Left toenails are normal.      Comments: Hammer toes; 5th toe on right underides 4th toe  Neurological:      Mental Status: She is alert.         Labs:    HbA1c  Lab Results   Component Value Date    HGBA1C 7.8 11/21/2022       CMP  Lab Results   Component Value Date    GLUCOSE 162 (H) 08/05/2022    BUN 13 08/05/2022    CREATININE 0.77 08/05/2022    EGFRIFNONA 73 12/17/2021    BCR 16.9 08/05/2022    K 4.7 08/05/2022    CO2 28.4 08/05/2022    CALCIUM 9.3 08/05/2022    LABIL2 1.5 03/20/2015    AST 20 08/05/2022    ALT 20 08/05/2022        Lipid Panel  Lab Results   Component Value Date    HDL 31 (L) 12/17/2021    LDL 25 12/17/2021    TRIG 321 (H) 12/17/2021        TSH  Lab Results   Component Value Date    TSH 3.170 12/17/2021        Hemoglobin A1C  Lab Results   Component Value Date    HGBA1C 7.8 11/21/2022        Microalbumin/Creatinine  Lab Results   Component Value Date    MALBCRERATIO  12/17/2021      Comment:      Unable to calculate    MICROALBUR <1.2 12/17/2021           Assessment / Plan      Assessment & Plan:  Diagnoses and all orders for this visit:    1. Type 2 diabetes mellitus with hyperglycemia, with long-term current use of insulin (HCC) (Primary)  Assessment & Plan:  Diabetes is worsening.  A1c a bit increased but acceptable at 7.8%.  Recent fasting  FSBS okay.  Daytime FSBS more variable.    Continue current treatment regimen.  Diabetes will be reassessed in 3 months.    We discussed CGM today.  Will send Rx to see if this is covered.    Orders:  -     POC Glucose, Blood  -     POC Glycosylated Hemoglobin (Hb A1C)  -     Comprehensive Metabolic Panel; Future  -     Lipid Panel; Future  -     Microalbumin / Creatinine Urine Ratio - Urine, Clean Catch; Future  -     TSH; Future  -     Ambulatory Referral to Podiatry  -     Ambulatory  Referral to Diabetic Education    2. Primary hypertension  Assessment & Plan:  Hypertension is unchanged.  SBP borderline but DBP is low.  Continue current treatment regimen.  Blood pressure will be reassessed at the next regular appointment.      3. Dyslipidemia  Assessment & Plan:  Continue statin.  Check lipids.  Will send note about results.      Other orders  -     Continuous Blood Gluc Sensor (FreeStyle Colin 2 Sensor) misc; Change sensor Every 14 (Fourteen) Days.  Dispense: 2 each; Refill: 5  -     Continuous Blood Gluc  (FreeStyle Colin 2 [place]) device; Use daily as directed  Dispense: 1 each; Refill: 0      Return in about 3 months (around 2/21/2023) for Recheck with A1c.    Jac Santiago MD   11/21/2022

## 2022-11-21 NOTE — ASSESSMENT & PLAN NOTE
Diabetes is worsening.  A1c a bit increased but acceptable at 7.8%.  Recent fasting  FSBS okay.  Daytime FSBS more variable.    Continue current treatment regimen.  Diabetes will be reassessed in 3 months.    We discussed CGM today.  Will send Rx to see if this is covered.

## 2022-11-22 LAB
ALBUMIN SERPL-MCNC: 4 G/DL (ref 3.5–5.2)
ALBUMIN/GLOB SERPL: 1.3 G/DL
ALP SERPL-CCNC: 65 U/L (ref 39–117)
ALT SERPL W P-5'-P-CCNC: 19 U/L (ref 1–33)
ANION GAP SERPL CALCULATED.3IONS-SCNC: 13.8 MMOL/L (ref 5–15)
AST SERPL-CCNC: 19 U/L (ref 1–32)
BILIRUB SERPL-MCNC: 0.3 MG/DL (ref 0–1.2)
BUN SERPL-MCNC: 10 MG/DL (ref 8–23)
BUN/CREAT SERPL: 15.2 (ref 7–25)
CALCIUM SPEC-SCNC: 9.3 MG/DL (ref 8.6–10.5)
CHLORIDE SERPL-SCNC: 101 MMOL/L (ref 98–107)
CHOLEST SERPL-MCNC: 98 MG/DL (ref 0–200)
CO2 SERPL-SCNC: 28.2 MMOL/L (ref 22–29)
CREAT SERPL-MCNC: 0.66 MG/DL (ref 0.57–1)
EGFRCR SERPLBLD CKD-EPI 2021: 92.2 ML/MIN/1.73
GLOBULIN UR ELPH-MCNC: 3 GM/DL
GLUCOSE SERPL-MCNC: 89 MG/DL (ref 65–99)
HDLC SERPL-MCNC: 38 MG/DL (ref 40–60)
LDLC SERPL CALC-MCNC: 35 MG/DL (ref 0–100)
LDLC/HDLC SERPL: 0.82 {RATIO}
POTASSIUM SERPL-SCNC: 3.6 MMOL/L (ref 3.5–5.2)
PROT SERPL-MCNC: 7 G/DL (ref 6–8.5)
SODIUM SERPL-SCNC: 143 MMOL/L (ref 136–145)
TRIGL SERPL-MCNC: 145 MG/DL (ref 0–150)
TSH SERPL DL<=0.05 MIU/L-ACNC: 1.47 UIU/ML (ref 0.27–4.2)
VLDLC SERPL-MCNC: 25 MG/DL (ref 5–40)

## 2022-11-30 ENCOUNTER — OFFICE VISIT (OUTPATIENT)
Dept: PAIN MEDICINE | Facility: CLINIC | Age: 72
End: 2022-11-30

## 2022-11-30 VITALS
TEMPERATURE: 98 F | BODY MASS INDEX: 25.99 KG/M2 | RESPIRATION RATE: 16 BRPM | WEIGHT: 165.6 LBS | OXYGEN SATURATION: 99 % | HEART RATE: 80 BPM | DIASTOLIC BLOOD PRESSURE: 80 MMHG | HEIGHT: 67 IN | SYSTOLIC BLOOD PRESSURE: 130 MMHG

## 2022-11-30 DIAGNOSIS — G89.29 CHRONIC LEFT SHOULDER PAIN: ICD-10-CM

## 2022-11-30 DIAGNOSIS — G89.29 CHRONIC LOW BACK PAIN WITH SCIATICA, SCIATICA LATERALITY UNSPECIFIED, UNSPECIFIED BACK PAIN LATERALITY: ICD-10-CM

## 2022-11-30 DIAGNOSIS — G89.29 CHRONIC NECK PAIN: Primary | ICD-10-CM

## 2022-11-30 DIAGNOSIS — M25.512 CHRONIC LEFT SHOULDER PAIN: ICD-10-CM

## 2022-11-30 DIAGNOSIS — M54.2 CHRONIC NECK PAIN: Primary | ICD-10-CM

## 2022-11-30 DIAGNOSIS — M54.40 CHRONIC LOW BACK PAIN WITH SCIATICA, SCIATICA LATERALITY UNSPECIFIED, UNSPECIFIED BACK PAIN LATERALITY: ICD-10-CM

## 2022-11-30 DIAGNOSIS — M50.30 DDD (DEGENERATIVE DISC DISEASE), CERVICAL: Primary | ICD-10-CM

## 2022-11-30 DIAGNOSIS — G89.29 CHRONIC BILATERAL LOW BACK PAIN WITHOUT SCIATICA: ICD-10-CM

## 2022-11-30 DIAGNOSIS — M54.50 CHRONIC BILATERAL LOW BACK PAIN WITHOUT SCIATICA: ICD-10-CM

## 2022-11-30 PROCEDURE — 99204 OFFICE O/P NEW MOD 45 MIN: CPT | Performed by: STUDENT IN AN ORGANIZED HEALTH CARE EDUCATION/TRAINING PROGRAM

## 2022-11-30 NOTE — PROGRESS NOTES
"11/30/2022      Referring Physician: Jelly Lombardi, APRN  [address] [id number]    Primary Physician: Connor Ritter MD    CHIEF COMPLAINT or REASON FOR VISIT: No chief complaint on file.      HISTORY OF PRESENT ILLNESS:  Ms. Leela Muller is 72 y.o. female who presents as a new patient referral for evaluation and treatment of multifocal pain complaints including chronic neck pain, chronic low back pain, left shoulder pain.  Ms. Muller states that she has a longstanding history of axial neck pain without radiation into the upper extremities and axial low back pain without radiation into the lower extremities.  Patient denies any bowel or bladder dysfunction, lower extremity weakness, new onset saddle anesthesia or unexplained weight loss.   She describes insidious onset without any inciting event or trauma many years ago.  She reportedly had cervical epidural steroid injections with a physician in the past however was lost to follow-up.  She states that these injections significantly ameliorated her neck pain.    She additionally has right hip pain s/p CRISTIAN.  States that she needs to have a \"redone.\"  She endorses bilateral foot numbness and tingling secondary to diabetic neuropathy.  She does complain of a sensation in her right foot with her right small toe curling underneath her foot, apparently was told this is from her back.    She has not recently changed to physical therapy.  She is trialed acetaminophen with mild benefit.  She additionally complains of left shoulder pain, apparently was told by her orthopedist that she needs a left shoulder replacement.  She did have a left shoulder injection 1 week ago without benefit.    Low back pain is exacerbated with standing still while washing dishes, ameliorated with rest.        Objective Pain Scoring:   BRIEF PAIN INVENTORY:  Total score:   Pain Score    11/30/22 1139   PainSc:   4   PainLoc: Neck      PHQ-2: PHQ-2 Total Score: " 0  PHQ-9: PHQ-9: Brief Depression Severity Measure Score: 0  Opioid Risk Tool:         Review of Systems:   ROS negative except as otherwise noted     Past Medical History:   Past Medical History:   Diagnosis Date   • Anxiety    • Arm pain    • Arthritis    • Breast injury     fell 6/2019    • Cancer (HCC)    • Chronic pancreatitis (HCC)    • COVID-19    • Depression    • Diabetes mellitus (HCC)    • Hyperlipidemia    • Hypertension    • Liver mass    • Neck pain on left side    • Palpitations 4/18/2018   • Pancreatitis    • Pulmonary embolism (HCC)    • Stroke syndrome 9/14/2016    · Assessed By: Devaughn Lewis (Neurology); Last Assessed: 16 Jun 2015  Right cerebrovascular accident in July or August 2010, evaluated at [facility].  [facility] on 09/28/2010 with headache and stuttering, consistent with TIA.  Chronic Coumadin therapy, initiated following bilateral pulmonary emboli in 2007 after fall and hip replacement.  She was already on 81 mg of aspirin as well, 09/2010.  MRI of the brain on 09/28/2010 revealing old right parietal cerebrovascular accident.  MRA revealing normal carotids, middle anterior and posterior cerebral arteries with minimal ostial stenosis of both vertebral arteries.  GENARO by Dr. Oliver Mobley on 09/28/2010:  patent foramen ovale with a small amount of right to left shunting.  Normal LVEF and normal valvular structures.   Patent foramen ovale closure using a 25 mm. Amplatzer cribriform occluder, 10/05/2010.   Echocardiogram, 06/23/2014:  LVEF (55% to 60%) with and Amplatzer device noted to be well-seated in    • Thrombocytopenia (HCC)    • Transient cerebral ischemia    • Type 2 diabetes mellitus (HCC)          Past Surgical History:   Past Surgical History:   Procedure Laterality Date   • APPENDECTOMY     • BREAST BIOPSY Left 2012    benign   • BREAST BIOPSY     • BREAST EXCISIONAL BIOPSY Left     benign   • BREAST EXCISIONAL BIOPSY Right      benign   • BREAST EXCISIONAL BIOPSY Right 2020    benign   • BREAST SURGERY     • CHOLECYSTECTOMY     • COLONOSCOPY     • HYSTERECTOMY     • LIVER BIOPSY     • OOPHORECTOMY     • RECTAL SURGERY     • SKIN CANCER EXCISION     • TONSILLECTOMY     • TOTAL HIP ARTHROPLASTY REVISION           Family History   Family History   Problem Relation Age of Onset   • Alzheimer's disease Mother    • Cancer Mother    • Coronary artery disease Other    • Diabetes Other    • Hypertension Other    • Stroke Other    • Cancer Other    • Dementia Other    • Heart attack Father    • Colon polyps Father    • Breast cancer Neg Hx    • Ovarian cancer Neg Hx    • Colon cancer Neg Hx    • Esophageal cancer Neg Hx          Social History   Social History     Socioeconomic History   • Marital status:    Tobacco Use   • Smoking status: Never   • Smokeless tobacco: Never   Vaping Use   • Vaping Use: Never used   Substance and Sexual Activity   • Alcohol use: No   • Drug use: No   • Sexual activity: Not Currently        Medications:     Current Outpatient Medications:   •  acetaminophen (TYLENOL) 500 MG tablet, Take 500 mg by mouth Every 6 (Six) Hours As Needed., Disp: , Rfl:   •  acyclovir (ZOVIRAX) 5 % ointment, Apply 1 unit topically to the anal area as needed, Disp: 15 g, Rfl: PRN  •  albuterol sulfate  (90 Base) MCG/ACT inhaler, Inhale 2 puffs by mouth every 4 (Four) Hours As Needed, Disp: 8.5 g, Rfl: 2  •  albuterol sulfate  (90 Base) MCG/ACT inhaler, Inhale 2 puffs by mouth every 4 hours, Disp: 8.5 g, Rfl: 3  •  amoxicillin-clavulanate (AUGMENTIN) 875-125 MG per tablet, Take 1 tablet by mouth every 12 hours for 7 days, Disp: 14 tablet, Rfl: 0  •  aspirin 81 MG tablet, Take 81 mg by mouth Daily. Taken at night. Last dose 9-18-21, Disp: , Rfl:   •  azelastine (ASTELIN) 0.1 % nasal spray, Instill 1 spray in each nostil 2 (Two) Times A Day, Disp: 30 mL, Rfl: 11  •  baclofen (LIORESAL) 10 MG tablet, Take 1 tablet by mouth  Every Night., Disp: 90 tablet, Rfl: 3  •  butalbital-acetaminophen-caffeine (FIORICET, ESGIC) -40 MG per tablet, Take 1 tablet by mouth Daily As Needed for headache, Disp: 30 tablet, Rfl: 0  •  clidinium-chlordiazePOXIDE (LIBRAX) 5-2.5 MG per capsule, Take 1 capsule by mouth 2 (Two) Times a Day As Needed., Disp: 60 capsule, Rfl: 2  •  clonazePAM (KlonoPIN) 0.5 MG tablet, As Needed., Disp: , Rfl:   •  clotrimazole-betamethasone (LOTRISONE) 1-0.05 % cream, Apply topically to the appropriate private area two times daily as directed., Disp: 15 g, Rfl: 0  •  Continuous Blood Gluc  (FreeStyle Colin 2 [place]) device, Use daily as directed, Disp: 1 each, Rfl: 0  •  Continuous Blood Gluc Sensor (FreeStyle Colin 2 Sensor) misc, Change sensor Every 14 (Fourteen) Days., Disp: 2 each, Rfl: 5  •  digoxin (LANOXIN) 250 MCG tablet, Take 1 tablet by mouth Daily., Disp: 90 tablet, Rfl: 1  •  fludrocortisone 0.1 MG tablet, Take 1 tablet by mouth Daily., Disp: 90 tablet, Rfl: 1  •  fluticasone (FLONASE) 50 MCG/ACT nasal spray, Instill 1 spray in each nostril Once a day, Disp: 16 g, Rfl: 3  •  furosemide (LASIX) 40 MG tablet, Take 1 tablet by mouth Daily. May have extra 1/2 to 1 tablet daily if needed for edema, Disp: 135 tablet, Rfl: 1  •  gabapentin (NEURONTIN) 800 MG tablet, Take 1 tablet by mouth Every Night., Disp: 90 tablet, Rfl: 1  •  glipizide (GLUCOTROL XL) 10 MG 24 hr tablet, Take 2 tablets by mouth Daily., Disp: 180 tablet, Rfl: 3  •  glucose blood (OneTouch Verio) test strip, Use to test blood glucose 3 times a day as directed (Patient taking differently: Use to test blood glucose 3 times a day as directed), Disp: 300 each, Rfl: 3  •  HYDROcodone-acetaminophen (NORCO) 7.5-325 MG per tablet, Take 1 tablet by mouth 3 (Three) Times a Day., Disp: 90 tablet, Rfl: 0  •  hydrocortisone (ANUSOL-HC) 25 MG suppository, Insert 1 suppository rectally twice a day as needed (remove wrapper and moisten with water), Disp: 12  suppository, Rfl: 3  •  Insulin Glargine (BASAGLAR KWIKPEN) 100 UNIT/ML injection pen, Inject 50 units subcutaneously once in the morning and 75 units at night, Disp: 45 mL, Rfl: 5  •  Insulin Pen Needle (B-D UF III MINI PEN NEEDLES) 31G X 5 MM misc, Use to inject insulin twice a day as directed, Disp: 100 each, Rfl: 12  •  Insulin Syringe-Needle U-100 29G 0.5 ML misc, Inject 0.3 mL as directed Daily., Disp: , Rfl:   •  loratadine (CLARITIN) 10 MG tablet, Take 1 tablet by mouth Daily., Disp: 30 tablet, Rfl: 3  •  meclizine (ANTIVERT) 25 MG tablet, Take 1 tablet by mouth 3 (Three) Times a Day As Needed for Dizziness., Disp: 90 tablet, Rfl: 3  •  metoclopramide (REGLAN) 10 MG tablet, Take 1 tablet by mouth Daily As Needed for migraine., Disp: 30 tablet, Rfl: 1  •  metoprolol succinate XL (TOPROL-XL) 100 MG 24 hr tablet, Take 1 tablet by mouth Daily., Disp: 90 tablet, Rfl: 3  •  metroNIDAZOLE (METROCREAM) 0.75 % cream, Apply to the face once every night (Patient taking differently: Apply  topically to the appropriate area as directed As Needed.), Disp: 45 g, Rfl: 0  •  mupirocin (BACTROBAN) 2 % ointment, Apply topically to the affected area twice a day, Disp: 22 g, Rfl: 0  •  nitroglycerin (Nitrostat) 0.4 MG SL tablet, Place 1 tablet under the tongue Every 5 Minutes As Needed for Chest Pain for up to 3 total doses. Call 911 if pain remains after 1 dose., Disp: 25 tablet, Rfl: 6  •  pancrelipase, Lip-Prot-Amyl, (Pancreaze) 19834-24502 units capsule delayed-release particles capsule, Take 1 capsule with each meal and with each snack, Disp: 100 capsule, Rfl: 2  •  pilocarpine (SALAGEN) 5 MG tablet, Take 1 tablet by mouth 2 (Two) Times a Day., Disp: 60 tablet, Rfl: 2  •  pimecrolimus (Elidel) 1 % cream, Apply topically to the face two times daily as directed., Disp: 60 g, Rfl: 0  •  potassium chloride (KLOR-CON) 10 MEQ CR tablet, Take 1 tablet by mouth Daily as directed, Disp: 90 tablet, Rfl: 0  •  promethazine (PHENERGAN)  "25 MG tablet, Take 1 tablet by mouth Every 6 (Six) Hours As Needed for Nausea or Vomiting., Disp: 30 tablet, Rfl: 1  •  RABEprazole (ACIPHEX) 20 MG EC tablet, Take 1 tablet by mouth Daily., Disp: 90 tablet, Rfl: 1  •  RABEprazole (ACIPHEX) 20 MG EC tablet, Take 1 tablet by mouth Daily., Disp: 90 tablet, Rfl: 1  •  rosuvastatin (CRESTOR) 10 MG tablet, Take 1 tablet by mouth Daily., Disp: 90 tablet, Rfl: 1  •  topiramate (TOPAMAX) 25 MG tablet, Take 1 tablet by mouth Every Night., Disp: 90 tablet, Rfl: 3  •  triamcinolone (KENALOG) 0.1 % cream, Apply topically to the affected area twice a day, Disp: 30 g, Rfl: 0  •  venlafaxine XR (EFFEXOR-XR) 75 MG 24 hr capsule, Take 1 capsule by mouth Daily., Disp: 90 capsule, Rfl: 3        Physical Exam:     Vitals:    11/30/22 1139   BP: 130/80   Pulse: 80   Resp: 16   Temp: 98 °F (36.7 °C)   SpO2: 99%   Weight: 75.1 kg (165 lb 9.6 oz)   Height: 170.2 cm (67\")   PainSc:   4   PainLoc: Neck        General: Alert and oriented, No acute distress.   HEENT: Normocephalic, atraumatic.   Cardiovascular: No gross edema  Respiratory: Respirations are non-labored    Cervical Spine:   No masses or atrophy  Range of motion - Flexion normal. Extension normal. Lateral rotation normal.   Palpation -tender bilaterally  Spurling's - negative   Cervical facet loading positive bilaterally    Thoracic Spine:   Inspection: no gross abnormality  Paraspinal muscle palpation: nontender  Spinous process palpation: nontender    Lumbar Spine:   No masses or atrophy  Range of motion - Flexion normal. Extension normal. Right Lat Bending normal. Left Lat Bending normal  Facet Loading: Positive bilaterally  Facet Palpation - tender bilaterally  PSIS tenderness - Negative bilaterally  Vic's/SAKSHI/Thigh thrust - Negative bilaterally  Straight leg raise: Negative bilaterally  Slump test: Negative bilaterally    SHOULDER Exam  Left   Inspection  No erythema, swelling, obvious bony deformity, or atrophy " "  Palpation  Subacromial bursa: Painful  Bicipital tendon: Painful  Anterior joint: Negative  Posterior joint: Painful  AC Joint: Negative   ROM  Active Flexion (0-180):  Active Abduction (0-180):  Passive Flexion (0-180):  Apley scratch (ER+Abd):  Scapular reach (IR+Add):     Rotator Cuff Strength  Supraspinatus (Abd 1st 30°): 5/5  Infraspinatus / Teres Minor (ER): 5/5  Subscapularis (IR): 5/5   Impingement   Neer's: Positive  Hawkin's Evelio: Positive   Supraspinatus  Empty Can: Positive  Drop arm test:   Biceps  Speed's: Negative   AC Joint  Crossed body adduction:        Motor Exam:  Strength: Rate on 1-5 scale Right Left    L1/2- hip flexion 5 5    L3- knee extension 5 5    L4- ankle dorsiflexion 5 5    L5- great toe extension 5 5    S1- ankle plantarflexion 5 5    Sensory Exam: Full and equal sensation to light touch throughout.    Neurologic: Cranial Nerves II-XII are grossly intact.   Hernandez’s -negative bilaterally   Clonus -negative bilaterally    Psychiatric: Cooperative.   Gait: Normal   Assistive Devices: Straight cane    Imaging Studies:   No results found for this or any previous visit.      Impression & Plan:   Ms. Leela Muller is a 72 y.o. female with past medical history significant for DM, right CRISTIAN who presents with multifocal chronic pain complaints including axial neck pain, axial low back pain, left shoulder pain, right hip pain.  Primary complaint today is her neck which is most consistent with cervical spondylosis.  She has had good benefit with epidural steroid injections in the past therefore it is reasonable to repeat if she fails conservative management.  We will first have her trial physical therapy for her neck and low back.      She has not had any low back imaging however her low back is consistent with lumbar spinal stenosis.  I will obtain a lumbar flexion/extension x-ray.    For her left shoulder she had a steroid injection 1 week ago which \"did not take.\"  Therefore I recommend " considering fluoroscopically guided intra-articular steroid injection and next office visit.    1. Chronic neck pain    2. Chronic bilateral low back pain without sciatica    3. Chronic left shoulder pain        PLAN:  1. Medication Recommendations: Continue per PCP    2. Physical Therapy: PT referral given today    3. Psychological: defer    4. Complementary and alternative (CAM) Therapies:     5. Labs: None indicated     6. Imaging: Obtain lumbar x-ray flexion-extension.  I did personally review the patient cervical MRI which demonstrated multilevel cervical spondylosis, no significant neuroforaminal or central canal stenosis.    7. Interventions: Consider ASHWINI, left shoulder IA injection    8. Referrals: None indicated     9. Records requested: n/a    10. Lifestyle goals:    No follow-ups on file.       [facility]  Mohit Brown MD

## 2022-12-05 ENCOUNTER — TELEPHONE (OUTPATIENT)
Dept: ENDOCRINOLOGY | Facility: CLINIC | Age: 72
End: 2022-12-05

## 2022-12-05 NOTE — TELEPHONE ENCOUNTER
Pt called and said no one has called her to have blood sugar monitor hooked up.    Dr norris wanted it on asap. It has been two weeks now. Please call today.    Call pt:  [phone]

## 2022-12-12 ENCOUNTER — TELEPHONE (OUTPATIENT)
Dept: ENDOCRINOLOGY | Facility: CLINIC | Age: 72
End: 2022-12-12

## 2022-12-27 RX ORDER — INSULIN GLARGINE 100 [IU]/ML
INJECTION, SOLUTION SUBCUTANEOUS
Qty: 135 ML | Refills: 1 | Status: SHIPPED | OUTPATIENT
Start: 2022-12-27

## 2022-12-28 ENCOUNTER — PRIOR AUTHORIZATION (OUTPATIENT)
Dept: ENDOCRINOLOGY | Facility: CLINIC | Age: 72
End: 2022-12-28

## 2022-12-28 RX ORDER — FUROSEMIDE 40 MG/1
TABLET ORAL
Qty: 135 TABLET | Refills: 1 | Status: SHIPPED | OUTPATIENT
Start: 2022-12-28 | End: 2023-01-18 | Stop reason: SDUPTHER

## 2022-12-28 NOTE — TELEPHONE ENCOUNTER
Your PA has been resolved, no additional PA is required. For further inquiries please contact the number on the back of the member prescription card. (Message [phone])  Drug  Basaglar KwikPen 100UNIT/ML pen-injectors  Form  [facility] Electronic PA Form (2017 [employer])

## 2022-12-28 NOTE — TELEPHONE ENCOUNTER
Lab Results   Component Value Date    GLUCOSE 89 11/21/2022    BUN 10 11/21/2022    CREATININE 0.66 11/21/2022    EGFRIFNONA 73 12/17/2021    BCR 15.2 11/21/2022    K 3.6 11/21/2022    CO2 28.2 11/21/2022    CALCIUM 9.3 11/21/2022    ALBUMIN 4.00 11/21/2022    LABIL2 1.5 03/20/2015    AST 19 11/21/2022    ALT 19 11/21/2022

## 2023-01-05 ENCOUNTER — HOSPITAL ENCOUNTER (OUTPATIENT)
Dept: GENERAL RADIOLOGY | Facility: HOSPITAL | Age: 73
Discharge: HOME | End: 2023-01-05
Admitting: STUDENT IN AN ORGANIZED HEALTH CARE EDUCATION/TRAINING PROGRAM
Payer: MEDICARE

## 2023-01-05 DIAGNOSIS — G89.29 CHRONIC LOW BACK PAIN WITH SCIATICA, SCIATICA LATERALITY UNSPECIFIED, UNSPECIFIED BACK PAIN LATERALITY: ICD-10-CM

## 2023-01-05 DIAGNOSIS — M54.40 CHRONIC LOW BACK PAIN WITH SCIATICA, SCIATICA LATERALITY UNSPECIFIED, UNSPECIFIED BACK PAIN LATERALITY: ICD-10-CM

## 2023-01-05 PROCEDURE — 72120 X-RAY BEND ONLY L-S SPINE: CPT

## 2023-01-18 ENCOUNTER — OFFICE VISIT (OUTPATIENT)
Dept: CARDIOLOGY | Facility: CLINIC | Age: 73
End: 2023-01-18
Payer: MEDICARE

## 2023-01-18 VITALS
OXYGEN SATURATION: 97 % | SYSTOLIC BLOOD PRESSURE: 132 MMHG | BODY MASS INDEX: 24.55 KG/M2 | WEIGHT: 162 LBS | DIASTOLIC BLOOD PRESSURE: 72 MMHG | HEIGHT: 68 IN | HEART RATE: 75 BPM

## 2023-01-18 DIAGNOSIS — Z01.818 PRE-OPERATIVE CLEARANCE: ICD-10-CM

## 2023-01-18 DIAGNOSIS — Q21.12 PATENT FORAMEN OVALE: ICD-10-CM

## 2023-01-18 DIAGNOSIS — R00.0 TACHYCARDIA: ICD-10-CM

## 2023-01-18 DIAGNOSIS — I95.1 ORTHOSTATIC HYPOTENSION: Primary | ICD-10-CM

## 2023-01-18 DIAGNOSIS — R07.89 OTHER CHEST PAIN: ICD-10-CM

## 2023-01-18 DIAGNOSIS — E78.5 DYSLIPIDEMIA: ICD-10-CM

## 2023-01-18 DIAGNOSIS — I10 ESSENTIAL HYPERTENSION: ICD-10-CM

## 2023-01-18 DIAGNOSIS — R94.31 ABNORMAL EKG: ICD-10-CM

## 2023-01-18 PROCEDURE — 93000 ELECTROCARDIOGRAM COMPLETE: CPT | Performed by: INTERNAL MEDICINE

## 2023-01-18 PROCEDURE — 99214 OFFICE O/P EST MOD 30 MIN: CPT | Performed by: INTERNAL MEDICINE

## 2023-01-18 RX ORDER — FLUDROCORTISONE ACETATE 0.1 MG/1
0.1 TABLET ORAL DAILY
Qty: 90 TABLET | Refills: 3 | Status: SHIPPED | OUTPATIENT
Start: 2023-01-18

## 2023-01-18 RX ORDER — DIGOXIN 250 MCG
250 TABLET ORAL DAILY
Qty: 90 TABLET | Refills: 3 | Status: SHIPPED | OUTPATIENT
Start: 2023-01-18

## 2023-01-18 RX ORDER — NITROGLYCERIN 0.4 MG/1
0.4 TABLET SUBLINGUAL
Qty: 25 TABLET | Refills: 6 | Status: SHIPPED | OUTPATIENT
Start: 2023-01-18

## 2023-01-18 RX ORDER — FUROSEMIDE 40 MG/1
TABLET ORAL
Qty: 135 TABLET | Refills: 3 | Status: SHIPPED | OUTPATIENT
Start: 2023-01-18

## 2023-01-18 RX ORDER — METOPROLOL SUCCINATE 100 MG/1
100 TABLET, EXTENDED RELEASE ORAL DAILY
Qty: 90 TABLET | Refills: 3 | Status: SHIPPED | OUTPATIENT
Start: 2023-01-18

## 2023-01-18 NOTE — PROGRESS NOTES
[facility]    Patient ID: Leela Muller is a 72 y.o. female.  : 1950   Contact: [phone]    Encounter date: 2023    PCP: Connor Ritter MD      Chief complaint:   Chief Complaint   Patient presents with   • Orthostatic hypotension       Problem List:  1. Cerebrovascular accident:  a. Right CVA in July or 2010, evaluated at [facility].   b. Admission to [facility], 2010 with headache and stuttering, c/w TIA.   c. Chronic Coumadin therapy, initiated following bilateral PE in  after fall and hip replacement. She was already on 81 mg of aspirin as well, 2010.   d. MRI brain, 2010: Old right parietal CVA.   e. MRA, 2010: Normal carotids, middle anterior and posterior cerebral arteries with minimal ostial stenosis of both vertebral arteries.   f. GENARO, 2010, Dr. Mobley: PFO with a small amount of right to left shunting. Normal LVEF and normal valves.  g. PFO closure, 10/05/2010: 25 mm Amplatzer cribriform occluder.    h. Echocardiogram, 2014: Amplatzer device is well-seated in the interatrial septum. EF 55-60%. Trace MR and mild TR.  i. Echocardiogram, 2019: Intact Amplatzer septal occluder with no evidence of flow across the device. EF 60%. Trace MR/TR.   2. Abnormal myocardial perfusion study:    a. Patient reports having a MI in . Documentation only supports bilateral PE. She did not have a LHC or stents at time of alleged MI.   b. Cardiolite GXT, 2010, Dr. Monteiro: Small area of ischemia in the mid anteroseptal, mid inferior, and apical lateral regions. EF 68%.  c. LHC, 2010, Dr. Monteiro: Normal coronary arteries with normal LVEF.  3. Chest pain   a. Cardiolite stress test, 2021: EF 58%. No CP at baseline but had CP with infusion located in the center of her chest, which continued into recovery, but was less intense. Abnormal baseline EKG with 0.5 to 1 mm of ST segment depression  which did not change appreciably with the infusion. No evidence of inducible ischemia. Low risk study.   4. DM2.   5. Bilateral pulmonary emboli:  a. Following hip replacement in 2007.   b. No evidence of prior anti-coagulable workup.   c. Chronic Coumadin therapy.   d. No evidence of DVT on bilateral lower extremity duplex.  e. The decision was made to discontinue the patient's warfarin therapy due to her fall risk.  6. Palpitations:  a. 2 week Holter, 04/23/2018: Normal study.  b. 14-day Holter, 1/19/2022: SR/SB/ST. 3 pauses longest 2.2 secs. Brief a tach. Rare PVCs and PACs. Average HR: 73. Min HR: 50. Max HR: 131.  7. Hypertension.   8. Dyslipidemia.   9. Pancreatitis:  a. Recent episode  in March 2013.  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    11. Rectal cancer; surgically removed by Dr Martinez.  12.  Surgical history:  a. Hip replacement.  b. Hysterectomy.  c. Tonsillectomy  d. Appendectomy.  e. Cholecystectomy.    Allergies   Allergen Reactions   • Ampicillin GI Intolerance     Diarrhea   • Atorvastatin Swelling   • Tetanus Toxoid Hives   • Acyclovir Seizure   • Cefprozil Other (See Comments)   • Lansoprazole Nausea Only and Nausea And Vomiting   • Mestinon [Pyridostigmine] Itching and Other (See Comments)     Leg cramps, shooting vaginal pains, frequent urination   • Ranexa [Ranolazine Er] Nausea And Vomiting       Current Medications:    Current Outpatient Medications:   •  acetaminophen (TYLENOL) 500 MG tablet, Take 500 mg by mouth Every 6 (Six) Hours As Needed., Disp: , Rfl:   •  acyclovir (ZOVIRAX) 5 % ointment, Apply 1 unit topically to the anal area as needed, Disp: 15 g, Rfl: PRN  •  albuterol sulfate  (90 Base) MCG/ACT inhaler, Inhale 2 puffs by mouth every 4 (Four) Hours As Needed, Disp: 8.5 g, Rfl: 2  •  aspirin 81 MG tablet, Take 81 mg by mouth Daily. Taken at night. Last dose 9-18-21, Disp: , Rfl:   •  azelastine (ASTELIN) 0.1 % nasal spray, Instill 1 spray in each nostil 2 (Two)  Times A Day, Disp: 30 mL, Rfl: 11  •  baclofen (LIORESAL) 10 MG tablet, Take 1 tablet by mouth Every Night., Disp: 90 tablet, Rfl: 3  •  butalbital-acetaminophen-caffeine (FIORICET, ESGIC) -40 MG per tablet, Take 1 tablet by mouth Daily As Needed for headache, Disp: 30 tablet, Rfl: 0  •  clidinium-chlordiazePOXIDE (LIBRAX) 5-2.5 MG per capsule, Take 1 capsule by mouth 2 (Two) Times a Day As Needed., Disp: 60 capsule, Rfl: 2  •  clonazePAM (KlonoPIN) 0.5 MG tablet, As Needed., Disp: , Rfl:   •  clotrimazole-betamethasone (LOTRISONE) 1-0.05 % cream, Apply topically to the appropriate private area two times daily as directed., Disp: 15 g, Rfl: 0  •  Continuous Blood Gluc  (FreeStyle Colin 2 [place]) device, Use daily as directed, Disp: 1 each, Rfl: 0  •  Continuous Blood Gluc Sensor (FreeStyle Colin 2 Sensor) misc, Change sensor Every 14 (Fourteen) Days., Disp: 2 each, Rfl: 5  •  Continuous Blood Gluc Sensor (FreeStyle Colin 2 Sensor) misc, Change sensor every 14 days, Disp: 1 each, Rfl: 5  •  digoxin (LANOXIN) 250 MCG tablet, Take 1 tablet by mouth Daily., Disp: 90 tablet, Rfl: 1  •  fludrocortisone 0.1 MG tablet, Take 1 tablet by mouth Daily., Disp: 90 tablet, Rfl: 1  •  fluticasone (FLONASE) 50 MCG/ACT nasal spray, Instill 1 spray in each nostril Once a day, Disp: 16 g, Rfl: 3  •  furosemide (LASIX) 40 MG tablet, Take 1 tablet by mouth Daily. May have extra 1/2 to 1 tablet daily if needed for edema, Disp: 135 tablet, Rfl: 1  •  gabapentin (NEURONTIN) 800 MG tablet, Take 1 tablet by mouth Every Night., Disp: 90 tablet, Rfl: 1  •  glipizide (GLUCOTROL XL) 10 MG 24 hr tablet, Take 2 tablets by mouth Daily., Disp: 180 tablet, Rfl: 3  •  HYDROcodone-acetaminophen (NORCO) 7.5-325 MG per tablet, Take 1 tablet by mouth 3 (Three) Times a Day., Disp: 90 tablet, Rfl: 0  •  hydrocortisone (ANUSOL-HC) 25 MG suppository, Insert 1 suppository rectally twice a day as needed (remove wrapper and moisten with water),  Disp: 12 suppository, Rfl: 3  •  Insulin Glargine (BASAGLAR KWIKPEN) 100 UNIT/ML injection pen, Inject 50 units subcutaneously once in the morning and 75 units at night, Disp: 135 mL, Rfl: 1  •  Insulin Pen Needle (B-D UF III MINI PEN NEEDLES) 31G X 5 MM misc, Use to inject insulin twice a day as directed, Disp: 100 each, Rfl: 12  •  Insulin Syringe-Needle U-100 29G 0.5 ML misc, Inject 0.3 mL as directed Daily., Disp: , Rfl:   •  loratadine (CLARITIN) 10 MG tablet, Take 1 tablet by mouth Daily., Disp: 30 tablet, Rfl: 3  •  meclizine (ANTIVERT) 25 MG tablet, Take 1 tablet by mouth 3 (Three) Times a Day As Needed for Dizziness., Disp: 90 tablet, Rfl: 3  •  metoclopramide (REGLAN) 10 MG tablet, Take 1 tablet by mouth Daily As Needed for migraine., Disp: 30 tablet, Rfl: 1  •  metoprolol succinate XL (TOPROL-XL) 100 MG 24 hr tablet, Take 1 tablet by mouth Daily., Disp: 90 tablet, Rfl: 3  •  metroNIDAZOLE (METROCREAM) 0.75 % cream, Apply to the face once every night (Patient taking differently: Apply  topically to the appropriate area as directed As Needed.), Disp: 45 g, Rfl: 0  •  mupirocin (BACTROBAN) 2 % ointment, Apply topically to the affected area twice a day, Disp: 22 g, Rfl: 0  •  nitroglycerin (Nitrostat) 0.4 MG SL tablet, Place 1 tablet under the tongue Every 5 Minutes As Needed for Chest Pain for up to 3 total doses. Call 911 if pain remains after 1 dose., Disp: 25 tablet, Rfl: 6  •  pancrelipase, Lip-Prot-Amyl, (Pancreaze) 07288-77375 units capsule delayed-release particles capsule, Take 1 capsule with each meal and with each snack, Disp: 100 capsule, Rfl: 2  •  pilocarpine (SALAGEN) 5 MG tablet, Take 1 tablet by mouth 3 (Three) Times a Day., Disp: 90 tablet, Rfl: 2  •  pimecrolimus (Elidel) 1 % cream, Apply topically to the face two times daily as directed., Disp: 60 g, Rfl: 0  •  potassium chloride (KLOR-CON) 10 MEQ CR tablet, Take 1 tablet by mouth Daily as directed, Disp: 90 tablet, Rfl: 0  •  promethazine  (PHENERGAN) 25 MG tablet, Take 1 tablet by mouth Every 6 (Six) Hours As Needed for Nausea or Vomiting., Disp: 30 tablet, Rfl: 1  •  RABEprazole (ACIPHEX) 20 MG EC tablet, Take 1 tablet by mouth Daily., Disp: 90 tablet, Rfl: 1  •  rosuvastatin (CRESTOR) 10 MG tablet, Take 1 tablet by mouth Daily., Disp: 90 tablet, Rfl: 0  •  topiramate (TOPAMAX) 25 MG tablet, Take 1 tablet by mouth Every Night., Disp: 90 tablet, Rfl: 3  •  triamcinolone (KENALOG) 0.1 % cream, Apply topically to the affected area twice a day, Disp: 30 g, Rfl: 0  •  venlafaxine XR (EFFEXOR-XR) 75 MG 24 hr capsule, Take 1 capsule by mouth Daily., Disp: 90 capsule, Rfl: 3  •  glucose blood (OneTouch Verio) test strip, Use to test blood glucose 3 times a day as directed (Patient taking differently: Use to test blood glucose 3 times a day as directed), Disp: 300 each, Rfl: 3    HPI    Leela Muller is a 72 y.o. female who presents today for a follow up of PFO, tachycardia, palpitations, and cardiac risk factors. Since last visit, patient states she believes her blood pressure has been running high. Patient does not monitor their blood pressure at home on a regular basis. She only checks it at the doctors office or when she feels bad.     She has had 3 episodes of chest pain in 6 months.  Patient states she did have one chest pain episode at 3:30 AM when she was sleeping where she did have to use nitroglycerin and it did subside. She states that she was cleaning her house a few weeks prior and her heart rate increased which caused chest pain and she took nitroglycerin which it did subside. She states she did have another episode before that which caused her to take two nitroglycerin.      Patient maintains a stable activity level by walking on the treadmill when she can. She is limited due to discomfort in her hip and needing a hip replacement. She does note she will have to stop and rest when walking in the grocery store. Patient denies shortness of breath,  "orthopnea, palpitations, edema, dizziness, and syncope.      The following portions of the patient's history were reviewed and updated as appropriate: allergies, current medications and problem list.    Pertinent positives as listed in the HPI.  All other systems reviewed are negative.         Vitals:    01/18/23 1101   BP: 132/72   BP Location: Left arm   Patient Position: Sitting   Pulse: 75   SpO2: 97%   Weight: 73.5 kg (162 lb)   Height: 171.5 cm (67.5\")       Physical Exam:  General: Alert and oriented.  Neck: Jugular venous pressure is within normal limits. Carotids have normal upstrokes without bruits.   Cardiovascular: Heart has a nondisplaced focal PMI. Regular rate and rhythm. No murmur, gallop or rub.  Lungs: Clear, no rales or wheezes. Equal expansion is noted.   Extremities: Show no edema.  Skin: Warm and dry.  Neurologic: Nonfocal.     Diagnostic Data (reviewed with patient):   Lab Results   Component Value Date    GLUCOSE 89 11/21/2022    BUN 10 11/21/2022    CREATININE 0.66 11/21/2022    EGFRIFNONA 73 12/17/2021    BCR 15.2 11/21/2022     11/21/2022    K 3.6 11/21/2022     11/21/2022    CO2 28.2 11/21/2022    CALCIUM 9.3 11/21/2022    ALBUMIN 4.00 11/21/2022    ALKPHOS 65 11/21/2022    AST 19 11/21/2022    ALT 19 11/21/2022     Lab Results   Component Value Date    CHOL 98 11/21/2022    TRIG 145 11/21/2022    HDL 38 (L) 11/21/2022    LDL 35 11/21/2022       Lab Results   Component Value Date    TSH 1.470 11/21/2022        Advance Care Planning   ACP discussion was held with the patient during this visit. Patient does not have an advance directive, information provided.         ECG 12 Lead    Date/Time: 1/18/2023 11:20 AM  Performed by: Rika Sullivan MD  Authorized by: Rika Sullivan MD   Comparison: compared with previous ECG from 6/27/2019  Comparison to previous ECG: Anterior infarct , age undetermined changed to ST &T wave abnormality  Rhythm: sinus rhythm  BPM: " 75  Other findings: non-specific ST-T wave changes and T wave abnormality    Clinical impression: abnormal EKG  Comments: Consider inferior ischemia              Assessment:    ICD-10-CM ICD-9-CM   1. Orthostatic hypotension  I95.1 458.0   2. Patent foramen ovale  Q21.12 745.5   3. Tachycardia  R00.0 785.0   4. Essential hypertension  I10 401.9   5. Dyslipidemia  E78.5 272.4   6. Abnormal EKG  R94.31 794.31   7. Other chest pain  R07.89 786.59   8. Pre-operative clearance  Z01.818 V72.84         Plan:  1. Routinely monitor your blood pressure at home with a goal for systolic to be <140 mmHg. Please keep a log of these numbers and contact us if numbers are regularly elevated. If numbers are routinely elevated, we will discontinue the fludrocortisone.  2. Lexiscan stress test to be ordered to further assess cardiac function due to ongoing chest pain.   (Lexiscan required due to hip pain)  3. Continue on aspirin 81 mg for antiplatelet therapy and nitroglycerin 0.4 mg as needed for angina.    4. Continue on fludrocortisone 0.1 mg daily for hypotension.  If blood pressures are found to be elevated we will discontinue this.  5. Continue on digoxin 250 mcg daily for rhythm control.  6. Continue on furosemide 40 mg daily for fluid retention.  7. Continue on metoprolol 100 mg daily for rate control.  8. Continue on rosuvastatin 10 mg daily for hyperlipidemia.  9. Continue all other current medications.  10. F/up in 12 months, sooner if needed.      Scribed for Rika Sullivan MD by Molly Urban. 1/18/2023 11:09 EST         I Rika Sullivan MD personally performed the services described in this documentation as scribed by the above individual in my presence, and it is both accurate and complete.    Rika Sullivan MD, [facility]

## 2023-02-03 ENCOUNTER — TELEPHONE (OUTPATIENT)
Dept: CARDIOLOGY | Facility: CLINIC | Age: 73
End: 2023-02-03
Payer: MEDICARE

## 2023-02-14 ENCOUNTER — HOSPITAL ENCOUNTER (OUTPATIENT)
Dept: CARDIOLOGY | Facility: HOSPITAL | Age: 73
Discharge: HOME | End: 2023-02-14
Admitting: INTERNAL MEDICINE
Payer: MEDICARE

## 2023-02-14 VITALS
WEIGHT: 162.04 LBS | SYSTOLIC BLOOD PRESSURE: 140 MMHG | DIASTOLIC BLOOD PRESSURE: 62 MMHG | HEIGHT: 68 IN | HEART RATE: 71 BPM | BODY MASS INDEX: 24.56 KG/M2

## 2023-02-14 DIAGNOSIS — Z01.818 PRE-OPERATIVE CLEARANCE: ICD-10-CM

## 2023-02-14 DIAGNOSIS — I10 ESSENTIAL HYPERTENSION: ICD-10-CM

## 2023-02-14 DIAGNOSIS — R07.89 OTHER CHEST PAIN: ICD-10-CM

## 2023-02-14 DIAGNOSIS — R94.31 ABNORMAL EKG: ICD-10-CM

## 2023-02-14 LAB
BH CV REST NUCLEAR ISOTOPE DOSE: 9.3 MCI
BH CV STRESS BP STAGE 2: NORMAL
BH CV STRESS BP STAGE 3: NORMAL
BH CV STRESS COMMENTS STAGE 1: NORMAL
BH CV STRESS DOSE REGADENOSON STAGE 1: 0.4
BH CV STRESS DURATION MIN STAGE 1: 1
BH CV STRESS DURATION MIN STAGE 2: 1
BH CV STRESS DURATION MIN STAGE 3: 1
BH CV STRESS DURATION MIN STAGE 4: 1
BH CV STRESS HR STAGE 1: 78
BH CV STRESS HR STAGE 2: 104
BH CV STRESS HR STAGE 3: 99
BH CV STRESS HR STAGE 4: 93
BH CV STRESS NUCLEAR ISOTOPE DOSE: 31.1 MCI
BH CV STRESS O2 STAGE 1: 98
BH CV STRESS O2 STAGE 2: 98
BH CV STRESS O2 STAGE 3: 100
BH CV STRESS O2 STAGE 4: 100
BH CV STRESS PROTOCOL 1: NORMAL
BH CV STRESS RECOVERY BP: NORMAL MMHG
BH CV STRESS RECOVERY HR: 87 BPM
BH CV STRESS RECOVERY O2: 100 %
BH CV STRESS STAGE 1: 1
BH CV STRESS STAGE 2: 2
BH CV STRESS STAGE 3: 3
BH CV STRESS STAGE 4: 4
LV EF NUC BP: 55 %
MAXIMAL PREDICTED HEART RATE: 145 BPM
PERCENT MAX PREDICTED HR: 71.72 %
STRESS BASELINE BP: NORMAL MMHG
STRESS BASELINE HR: 78 BPM
STRESS O2 SAT REST: 98 %
STRESS PERCENT HR: 84 %
STRESS POST EXERCISE DUR MIN: 4 MIN
STRESS POST EXERCISE DUR SEC: 0 SEC
STRESS POST O2 SAT PEAK: 100 %
STRESS POST PEAK BP: NORMAL MMHG
STRESS POST PEAK HR: 104 BPM
STRESS TARGET HR: 123 BPM

## 2023-02-14 PROCEDURE — 93018 CV STRESS TEST I&R ONLY: CPT | Performed by: INTERNAL MEDICINE

## 2023-02-14 PROCEDURE — 78452 HT MUSCLE IMAGE SPECT MULT: CPT

## 2023-02-14 PROCEDURE — 78452 HT MUSCLE IMAGE SPECT MULT: CPT | Performed by: INTERNAL MEDICINE

## 2023-02-14 PROCEDURE — 25010000002 REGADENOSON 0.4 MG/5ML SOLUTION: Performed by: INTERNAL MEDICINE

## 2023-02-14 PROCEDURE — 0 TECHNETIUM SESTAMIBI: Performed by: INTERNAL MEDICINE

## 2023-02-14 PROCEDURE — A9500 TC99M SESTAMIBI: HCPCS | Performed by: INTERNAL MEDICINE

## 2023-02-14 PROCEDURE — 93017 CV STRESS TEST TRACING ONLY: CPT

## 2023-02-14 RX ADMIN — TECHNETIUM TC 99M SESTAMIBI 1 DOSE: 1 INJECTION INTRAVENOUS at 11:18

## 2023-02-14 RX ADMIN — REGADENOSON 0.4 MG: 0.08 INJECTION, SOLUTION INTRAVENOUS at 13:15

## 2023-02-14 RX ADMIN — TECHNETIUM TC 99M SESTAMIBI 1 DOSE: 1 INJECTION INTRAVENOUS at 13:20

## 2023-02-27 DIAGNOSIS — G43.009 MIGRAINE WITHOUT AURA AND WITHOUT STATUS MIGRAINOSUS, NOT INTRACTABLE: ICD-10-CM

## 2023-02-27 DIAGNOSIS — M50.30 DDD (DEGENERATIVE DISC DISEASE), CERVICAL: ICD-10-CM

## 2023-02-27 RX ORDER — GABAPENTIN 800 MG/1
800 TABLET ORAL NIGHTLY
Qty: 90 TABLET | Refills: 1 | Status: SHIPPED | OUTPATIENT
Start: 2023-02-27

## 2023-02-27 NOTE — TELEPHONE ENCOUNTER
Caller: BRITT    Relationship: SELF    Best call back number: [phone]    Requested Prescriptions:   Requested Prescriptions     Pending Prescriptions Disp Refills   • gabapentin (NEURONTIN) 800 MG tablet 90 tablet 1     Sig: Take 1 tablet by mouth Every Night.        Pharmacy where request should be sent: [facility]     Additional details provided by patient: PATIENT HAS LESS THEN A 3 DAY SUPPLY     PLEASE BE ADVISED     Does the patient have less than a 3 day supply:  [x] Yes  [] No    Would you like a call back once the refill request has been completed: [x] Yes [] No    If the office needs to give you a call back, can they leave a voicemail: [x] Yes [] No    Milagros Valenzuela Rep   02/27/23 14:31 EST

## 2023-02-27 NOTE — TELEPHONE ENCOUNTER
Rx Refill Note  Requested Prescriptions     Pending Prescriptions Disp Refills   • gabapentin (NEURONTIN) 800 MG tablet 90 tablet 1     Sig: Take 1 tablet by mouth Every Night.      Last office visit with prescribing clinician: 10/28/2022   Last telemedicine visit with prescribing clinician: 5/1/2023   Next office visit with prescribing clinician: 5/1/2023                         Would you like a call back once the refill request has been completed: [] Yes [] No    If the office needs to give you a call back, can they leave a voicemail: [] Yes [] No    Anjali Mcgraw CMA  02/27/23, 14:37 EST

## 2023-03-01 ENCOUNTER — OFFICE VISIT (OUTPATIENT)
Dept: PAIN MEDICINE | Facility: CLINIC | Age: 73
End: 2023-03-01
Payer: MEDICARE

## 2023-03-01 VITALS
TEMPERATURE: 96.2 F | WEIGHT: 164.8 LBS | OXYGEN SATURATION: 95 % | DIASTOLIC BLOOD PRESSURE: 78 MMHG | BODY MASS INDEX: 24.98 KG/M2 | HEART RATE: 68 BPM | RESPIRATION RATE: 14 BRPM | HEIGHT: 68 IN | SYSTOLIC BLOOD PRESSURE: 128 MMHG

## 2023-03-01 DIAGNOSIS — M54.40 CHRONIC LOW BACK PAIN WITH SCIATICA, SCIATICA LATERALITY UNSPECIFIED, UNSPECIFIED BACK PAIN LATERALITY: ICD-10-CM

## 2023-03-01 DIAGNOSIS — M54.2 CHRONIC NECK PAIN: Primary | ICD-10-CM

## 2023-03-01 DIAGNOSIS — G89.29 CHRONIC NECK PAIN: Primary | ICD-10-CM

## 2023-03-01 DIAGNOSIS — G89.29 CHRONIC LOW BACK PAIN WITH SCIATICA, SCIATICA LATERALITY UNSPECIFIED, UNSPECIFIED BACK PAIN LATERALITY: ICD-10-CM

## 2023-03-01 PROCEDURE — 99213 OFFICE O/P EST LOW 20 MIN: CPT | Performed by: STUDENT IN AN ORGANIZED HEALTH CARE EDUCATION/TRAINING PROGRAM

## 2023-03-01 RX ORDER — ACYCLOVIR 800 MG/1
TABLET ORAL
COMMUNITY
Start: 2023-02-17

## 2023-03-01 NOTE — PROGRESS NOTES
"  Primary Physician: Connor Ritter MD    CHIEF COMPLAINT or REASON FOR VISIT: Follow-up      Initial HPI 11/30/2022:  Ms. Leela Muller is 73 y.o. female who presents as a new patient referral for evaluation and treatment of multifocal pain complaints including chronic neck pain, chronic low back pain, left shoulder pain.  Ms. Muller states that she has a longstanding history of axial neck pain without radiation into the upper extremities and axial low back pain without radiation into the lower extremities.  Patient denies any bowel or bladder dysfunction, lower extremity weakness, new onset saddle anesthesia or unexplained weight loss.   She describes insidious onset without any inciting event or trauma many years ago.  She reportedly had cervical epidural steroid injections with a physician in the past however was lost to follow-up.  She states that these injections significantly ameliorated her neck pain.    She additionally has right hip pain s/p CRISTIAN.  States that she needs to have a \"redone.\"  She endorses bilateral foot numbness and tingling secondary to diabetic neuropathy.  She does complain of a sensation in her right foot with her right small toe curling underneath her foot, apparently was told this is from her back.    She has not recently changed to physical therapy.  She is trialed acetaminophen with mild benefit.  She additionally complains of left shoulder pain, apparently was told by her orthopedist that she needs a left shoulder replacement.  She did have a left shoulder injection 1 week ago without benefit.    Low back pain is exacerbated with standing still while washing dishes, ameliorated with rest.    Interval history: Patient returns to clinic for neck and low back pain.  She states that physical therapy has been helpful but minimal doing exercises.  She does continue home exercise program.  Continues to complain of cervical and thoracic paraspinal muscle pain.      Objective Pain Scoring: "   BRIEF PAIN INVENTORY:  Total score:   Pain Score    03/01/23 1347   PainSc:   6   PainLoc: Back  Comment: back of neck and head      PHQ-2: PHQ-2 Total Score: 0  PHQ-9: PHQ-9: Brief Depression Severity Measure Score: 0  Opioid Risk Tool:         Review of Systems:   ROS negative except as otherwise noted     Past Medical History:   Past Medical History:   Diagnosis Date   • Anxiety    • Arm pain    • Arthritis    • Breast injury     fell 6/2019    • Cancer (HCC)    • Chronic pancreatitis (HCC)    • COVID-19    • Depression    • Diabetes mellitus (HCC)    • Hyperlipidemia    • Hypertension    • Liver mass    • Neck pain on left side    • Palpitations 4/18/2018   • Pancreatitis    • Pulmonary embolism (HCC)    • Stroke syndrome 9/14/2016    · Assessed By: Devaughn Lewis (Neurology); Last Assessed: 16 Jun 2015  Right cerebrovascular accident in July or August 2010, evaluated at [facility].  Admission to [facility] on 09/28/2010 with headache and stuttering, consistent with TIA.  Chronic Coumadin therapy, initiated following bilateral pulmonary emboli in 2007 after fall and hip replacement.  She was already on 81 mg of aspirin as well, 09/2010.  MRI of the brain on 09/28/2010 revealing old right parietal cerebrovascular accident.  MRA revealing normal carotids, middle anterior and posterior cerebral arteries with minimal ostial stenosis of both vertebral arteries.  GENARO by Dr. Oliver Mobley on 09/28/2010:  patent foramen ovale with a small amount of right to left shunting.  Normal LVEF and normal valvular structures.   Patent foramen ovale closure using a 25 mm. Amplatzer cribriform occluder, 10/05/2010.   Echocardiogram, 06/23/2014:  LVEF (55% to 60%) with and Amplatzer device noted to be well-seated in    • Thrombocytopenia (HCC)    • Transient cerebral ischemia    • Type 2 diabetes mellitus (HCC)          Past Surgical History:   Past Surgical History:   Procedure Laterality Date    • APPENDECTOMY     • BREAST BIOPSY Left 2012    benign   • BREAST BIOPSY     • BREAST EXCISIONAL BIOPSY Left     benign   • BREAST EXCISIONAL BIOPSY Right     benign   • BREAST EXCISIONAL BIOPSY Right 2020    benign   • BREAST SURGERY     • CAUTERIZATION NASAL BLEEDERS  2022   • CHOLECYSTECTOMY     • COLONOSCOPY     • HYSTERECTOMY     • LIVER BIOPSY     • OOPHORECTOMY     • RECTAL SURGERY     • SKIN CANCER EXCISION     • TONSILLECTOMY     • TOTAL HIP ARTHROPLASTY REVISION           Family History   Family History   Problem Relation Age of Onset   • Alzheimer's disease Mother    • Cancer Mother    • Coronary artery disease Other    • Diabetes Other    • Hypertension Other    • Stroke Other    • Cancer Other    • Dementia Other    • Heart attack Father    • Colon polyps Father    • Breast cancer Neg Hx    • Ovarian cancer Neg Hx    • Colon cancer Neg Hx    • Esophageal cancer Neg Hx          Social History   Social History     Socioeconomic History   • Marital status:    Tobacco Use   • Smoking status: Never   • Smokeless tobacco: Never   Vaping Use   • Vaping Use: Never used   Substance and Sexual Activity   • Alcohol use: No   • Drug use: No   • Sexual activity: Yes        Medications:     Current Outpatient Medications:   •  acetaminophen (TYLENOL) 500 MG tablet, Take 1 tablet by mouth Every 6 (Six) Hours As Needed., Disp: , Rfl:   •  acyclovir (ZOVIRAX) 800 MG tablet, TAKE 1 TABLET BY MOUTH FIVE TIMES DAILY FOR 7 DAYS, Disp: , Rfl:   •  albuterol sulfate  (90 Base) MCG/ACT inhaler, Inhale 2 puffs by mouth every 4 (Four) Hours As Needed, Disp: 8.5 g, Rfl: 2  •  aspirin 81 MG tablet, Take 1 tablet by mouth Daily. Taken at night. Last dose 9-18-21, Disp: , Rfl:   •  azelastine (ASTELIN) 0.1 % nasal spray, Instill 1 spray in each nostil 2 (Two) Times A Day, Disp: 30 mL, Rfl: 11  •  baclofen (LIORESAL) 10 MG tablet, Take 1 tablet by mouth Every Night., Disp: 90 tablet, Rfl: 3  •   butalbital-acetaminophen-caffeine (FIORICET, ESGIC) -40 MG per tablet, Take 1 tablet by mouth Daily As Needed for headache, Disp: 30 tablet, Rfl: 0  •  clidinium-chlordiazePOXIDE (LIBRAX) 5-2.5 MG per capsule, Take 1 capsule by mouth 2 (Two) Times a Day As Needed., Disp: 60 capsule, Rfl: 2  •  Continuous Blood Gluc  (FreeStyle Colin 2 [place]) device, Use daily as directed, Disp: 1 each, Rfl: 0  •  Continuous Blood Gluc Sensor (FreeStyle Colin 2 Sensor) misc, Change sensor Every 14 (Fourteen) Days., Disp: 2 each, Rfl: 5  •  Continuous Blood Gluc Sensor (FreeStyle Colin 2 Sensor) misc, Change sensor every 14 days, Disp: 1 each, Rfl: 5  •  digoxin (LANOXIN) 250 MCG tablet, Take 1 tablet by mouth Daily., Disp: 90 tablet, Rfl: 3  •  fludrocortisone 0.1 MG tablet, Take 1 tablet by mouth Daily., Disp: 90 tablet, Rfl: 3  •  fluticasone (FLONASE) 50 MCG/ACT nasal spray, Instill 1 spray in each nostril Once a day, Disp: 16 g, Rfl: 3  •  furosemide (LASIX) 40 MG tablet, Take 1 tablet by mouth Daily. May have extra 1/2 to 1 tablet daily if needed for edema, Disp: 135 tablet, Rfl: 3  •  gabapentin (NEURONTIN) 800 MG tablet, Take 1 tablet by mouth Every Night., Disp: 90 tablet, Rfl: 1  •  glipizide (GLUCOTROL XL) 10 MG 24 hr tablet, Take 2 tablets by mouth Daily., Disp: 180 tablet, Rfl: 3  •  glucose blood (OneTouch Verio) test strip, Use to test blood glucose 3 times a day as directed (Patient taking differently: Use to test blood glucose 3 times a day as directed), Disp: 300 each, Rfl: 3  •  HYDROcodone-acetaminophen (NORCO) 7.5-325 MG per tablet, Take one tablet by mouth Three times a day 30 days, Disp: 90 tablet, Rfl: 0  •  hydrocortisone (ANUSOL-HC) 25 MG suppository, Insert 1 suppository rectally twice a day as needed (remove wrapper and moisten with water), Disp: 12 suppository, Rfl: 3  •  Insulin Glargine (BASAGLAR KWIKPEN) 100 UNIT/ML injection pen, Inject 50 units subcutaneously once in the morning and 75  units at night, Disp: 135 mL, Rfl: 1  •  Insulin Pen Needle (B-D UF III MINI PEN NEEDLES) 31G X 5 MM misc, Use to inject insulin twice a day as directed, Disp: 100 each, Rfl: 12  •  Insulin Syringe-Needle U-100 29G 0.5 ML misc, Inject 0.3 mL as directed Daily., Disp: , Rfl:   •  loratadine (CLARITIN) 10 MG tablet, Take 1 tablet by mouth Daily., Disp: 30 tablet, Rfl: 3  •  meclizine (ANTIVERT) 25 MG tablet, Take 1 tablet by mouth 3 (Three) Times a Day As Needed for Dizziness., Disp: 90 tablet, Rfl: 3  •  metoclopramide (REGLAN) 10 MG tablet, Take 1 tablet by mouth Daily As Needed for migraine., Disp: 30 tablet, Rfl: 1  •  metoprolol succinate XL (TOPROL-XL) 100 MG 24 hr tablet, Take 1 tablet by mouth Daily., Disp: 90 tablet, Rfl: 3  •  neomycin-colistin-hydrocortisone-thonzonium (Cortisporin-TC) 3.3-3-10-0.5 MG/ML otic suspension, Instill 5 drops into affected ear 3 (Three) times a day for 10 days, Disp: 10 mL, Rfl: 0  •  nitroglycerin (Nitrostat) 0.4 MG SL tablet, Place 1 tablet under the tongue Every 5 Minutes As Needed for Chest Pain for up to 3 total doses. Call 911 if pain remains after 1 dose., Disp: 25 tablet, Rfl: 6  •  pancrelipase, Lip-Prot-Amyl, (Pancreaze) 83625-61288 units capsule delayed-release particles capsule, Take 1 capsule with each meal and with each snack, Disp: 100 capsule, Rfl: 2  •  pilocarpine (SALAGEN) 5 MG tablet, Take 1 tablet by mouth 3 (Three) Times a Day., Disp: 90 tablet, Rfl: 2  •  potassium chloride (KLOR-CON) 10 MEQ CR tablet, Take 1 tablet by mouth Daily as directed, Disp: 90 tablet, Rfl: 0  •  promethazine (PHENERGAN) 25 MG tablet, Take 1 tablet by mouth Every 6 (Six) Hours As Needed for Nausea or Vomiting., Disp: 30 tablet, Rfl: 1  •  RABEprazole (ACIPHEX) 20 MG EC tablet, Take 1 tablet by mouth Daily., Disp: 90 tablet, Rfl: 1  •  rosuvastatin (CRESTOR) 10 MG tablet, Take 1 tablet by mouth Daily., Disp: 90 tablet, Rfl: 0  •  topiramate (TOPAMAX) 25 MG tablet, Take 1 tablet by  mouth Every Night., Disp: 90 tablet, Rfl: 3  •  venlafaxine XR (EFFEXOR-XR) 75 MG 24 hr capsule, Take 1 capsule by mouth Daily., Disp: 90 capsule, Rfl: 3  •  acyclovir (ZOVIRAX) 5 % ointment, Apply 1 unit topically to the anal area as needed, Disp: 15 g, Rfl: PRN  •  amoxicillin-clavulanate (AUGMENTIN) 875-125 MG per tablet, Take 1 tablet by mouth Every 12 (Twelve) Hours., Disp: 14 tablet, Rfl: 0  •  ciprofloxacin (CILOXAN) 0.3 % ophthalmic solution, Instill 1- 2 drops into the lower eyelid of affected eye 4 (Four) times a day 5 days, Disp: 2.5 mL, Rfl: 0  •  clidinium-chlordiazePOXIDE (LIBRAX) 5-2.5 MG per capsule, Take one capsule by mouth 3 (Three) Times a Day as needed for 30 days, Disp: 90 capsule, Rfl: 2  •  clonazePAM (KlonoPIN) 0.5 MG tablet, As Needed., Disp: , Rfl:   •  clotrimazole-betamethasone (LOTRISONE) 1-0.05 % cream, Apply topically to the appropriate private area two times daily as directed., Disp: 15 g, Rfl: 0  •  fluticasone (FLONASE) 50 MCG/ACT nasal spray, Instill 2 sprays in each nostril 2 (Two) Times A Day, Disp: 16 g, Rfl: 0  •  metroNIDAZOLE (METROCREAM) 0.75 % cream, Apply to the face once every night (Patient taking differently: Apply  topically to the appropriate area as directed As Needed.), Disp: 45 g, Rfl: 0  •  mupirocin (BACTROBAN) 2 % ointment, Apply topically to the affected area twice a day, Disp: 22 g, Rfl: 0  •  ofloxacin (FLOXIN) 0.3 % otic solution, Administer 5 drops into Affected ears Daily., Disp: 10 mL, Rfl: 0  •  ofloxacin (OCUFLOX) 0.3 % ophthalmic solution, Instill 5 drops into affected ear once a day 7 days, Disp: 5 mL, Rfl: 0  •  pimecrolimus (Elidel) 1 % cream, Apply topically to the face two times daily as directed., Disp: 60 g, Rfl: 0  •  RABEprazole (ACIPHEX) 20 MG EC tablet, Take 1 tablet Orally Once a day, Disp: 90 tablet, Rfl: 1  •  RABEprazole (ACIPHEX) 20 MG EC tablet, Take one tablet by mouth once a day, Disp: 90 tablet, Rfl: 1  •  rosuvastatin (CRESTOR) 10  "MG tablet, Take 1 tablet Orally Once a day, Disp: 90 tablet, Rfl: 0  •  rosuvastatin (CRESTOR) 10 MG tablet, Take one tablet by mouth once a day, Disp: 90 tablet, Rfl: 0  •  triamcinolone (KENALOG) 0.1 % cream, Apply topically to the affected area twice a day, Disp: 30 g, Rfl: 0        Physical Exam:     Vitals:    03/01/23 1347   BP: 128/78   BP Location: Left arm   Patient Position: Sitting   Cuff Size: Adult   Pulse: 68   Resp: 14   Temp: 96.2 °F (35.7 °C)   TempSrc: Infrared   SpO2: 95%   Weight: 74.8 kg (164 lb 12.8 oz)   Height: 171.5 cm (67.5\")   PainSc:   6   PainLoc: Back  Comment: back of neck and head        General: Alert and oriented, No acute distress.   HEENT: Normocephalic, atraumatic.   Cardiovascular: No gross edema  Respiratory: Respirations are non-labored    Cervical Spine:   No masses or atrophy  Range of motion - Flexion normal. Extension normal. Lateral rotation normal.   Palpation -tender bilaterally  Spurling's - negative   Cervical facet loading positive bilaterally    Thoracic Spine:   Inspection: no gross abnormality  Paraspinal muscle palpation: nontender  Spinous process palpation: nontender    Lumbar Spine:   No masses or atrophy  Range of motion - Flexion normal. Extension normal. Right Lat Bending normal. Left Lat Bending normal  Facet Loading: Positive bilaterally  Facet Palpation - tender bilaterally  PSIS tenderness - Negative bilaterally  Vic's/SAKSHI/Thigh thrust - Negative bilaterally  Straight leg raise: Negative bilaterally  Slump test: Negative bilaterally    SHOULDER Exam  Left   Inspection  No erythema, swelling, obvious bony deformity, or atrophy   Palpation  Subacromial bursa: Painful  Bicipital tendon: Painful  Anterior joint: Negative  Posterior joint: Painful  AC Joint: Negative   ROM  Active Flexion (0-180):  Active Abduction (0-180):  Passive Flexion (0-180):  Apley scratch (ER+Abd):  Scapular reach (IR+Add):     Rotator Cuff Strength  Supraspinatus (Abd 1st " "30°): 5/5  Infraspinatus / Teres Minor (ER): 5/5  Subscapularis (IR): 5/5   Impingement   Neer's: Positive  Hawkin's Evelio: Positive   Supraspinatus  Empty Can: Positive  Drop arm test:   Biceps  Speed's: Negative   AC Joint  Crossed body adduction:        Motor Exam:  Strength: Rate on 1-5 scale Right Left    L1/2- hip flexion 5 5    L3- knee extension 5 5    L4- ankle dorsiflexion 5 5    L5- great toe extension 5 5    S1- ankle plantarflexion 5 5    Sensory Exam: Full and equal sensation to light touch throughout.    Neurologic: Cranial Nerves II-XII are grossly intact.   Hernandez’s -negative bilaterally   Clonus -negative bilaterally    Psychiatric: Cooperative.   Gait: Normal   Assistive Devices: Straight cane    Imaging Studies:   No results found for this or any previous visit.      Impression & Plan:   11/30/2022: Leela Muller is a 73 y.o. female with past medical history significant for DM, right CRISTIAN who presents with multifocal chronic pain complaints including axial neck pain, axial low back pain, left shoulder pain, right hip pain.  Primary complaint today is her neck which is most consistent with cervical spondylosis.  She has had good benefit with epidural steroid injections in the past therefore it is reasonable to repeat if she fails conservative management.  We will first have her trial physical therapy for her neck and low back.  She has not had any low back imaging however her low back is consistent with lumbar spinal stenosis.  I will obtain a lumbar flexion/extension x-ray.  For her left shoulder she had a steroid injection 1 week ago which \"did not take.\"  Therefore I recommend considering fluoroscopically guided intra-articular steroid injection and next office visit.  3/1/2023: Persistent pain after PT.  Will order lumbar MRI and trapezius TPI's.    1. Chronic neck pain    2. Chronic low back pain with sciatica, sciatica laterality unspecified, unspecified back pain laterality        PLAN:  1. " Medication Recommendations: Continue per PCP    2. Physical Therapy: Continue HEP    3. Psychological: defer    4. Complementary and alternative (CAM) Therapies:     5. Labs: None indicated     6. Imaging: Obtain lumbar MRI without contrast.    7. Interventions: Schedule bilateral cervical paraspinal, trapezius, rhomboid trigger point injections (CPT 29052)    8. Referrals: None indicated     9. Records requested: n/a    10. Lifestyle goals:    Follow-up 2 to 3 months      [facility]  Mohit Brown MD

## 2023-03-06 ENCOUNTER — TELEPHONE (OUTPATIENT)
Dept: ENDOCRINOLOGY | Facility: CLINIC | Age: 73
End: 2023-03-06

## 2023-03-06 ENCOUNTER — DOCUMENTATION (OUTPATIENT)
Dept: ENDOCRINOLOGY | Facility: CLINIC | Age: 73
End: 2023-03-06
Payer: MEDICARE

## 2023-03-06 ENCOUNTER — OFFICE VISIT (OUTPATIENT)
Dept: ENDOCRINOLOGY | Facility: CLINIC | Age: 73
End: 2023-03-06
Payer: MEDICARE

## 2023-03-06 VITALS
WEIGHT: 162 LBS | OXYGEN SATURATION: 97 % | BODY MASS INDEX: 25.43 KG/M2 | HEART RATE: 81 BPM | DIASTOLIC BLOOD PRESSURE: 68 MMHG | SYSTOLIC BLOOD PRESSURE: 138 MMHG | HEIGHT: 67 IN

## 2023-03-06 DIAGNOSIS — E08.42 DIABETIC POLYNEUROPATHY ASSOCIATED WITH DIABETES MELLITUS DUE TO UNDERLYING CONDITION: ICD-10-CM

## 2023-03-06 DIAGNOSIS — E11.65 TYPE 2 DIABETES MELLITUS WITH HYPERGLYCEMIA, WITH LONG-TERM CURRENT USE OF INSULIN: Primary | ICD-10-CM

## 2023-03-06 DIAGNOSIS — I25.10 CORONARY ARTERY DISEASE INVOLVING NATIVE HEART WITHOUT ANGINA PECTORIS, UNSPECIFIED VESSEL OR LESION TYPE: ICD-10-CM

## 2023-03-06 DIAGNOSIS — Z79.4 TYPE 2 DIABETES MELLITUS WITH HYPERGLYCEMIA, WITH LONG-TERM CURRENT USE OF INSULIN: Primary | ICD-10-CM

## 2023-03-06 DIAGNOSIS — E78.5 DYSLIPIDEMIA: ICD-10-CM

## 2023-03-06 DIAGNOSIS — Z79.4 INSULIN LONG-TERM USE: ICD-10-CM

## 2023-03-06 DIAGNOSIS — I10 PRIMARY HYPERTENSION: ICD-10-CM

## 2023-03-06 LAB
EXPIRATION DATE: ABNORMAL
EXPIRATION DATE: NORMAL
GLUCOSE BLDC GLUCOMTR-MCNC: 194 MG/DL (ref 70–130)
HBA1C MFR BLD: 7.5 %
Lab: ABNORMAL
Lab: NORMAL

## 2023-03-06 PROCEDURE — 95251 CONT GLUC MNTR ANALYSIS I&R: CPT | Performed by: INTERNAL MEDICINE

## 2023-03-06 PROCEDURE — 99214 OFFICE O/P EST MOD 30 MIN: CPT | Performed by: INTERNAL MEDICINE

## 2023-03-06 PROCEDURE — 83036 HEMOGLOBIN GLYCOSYLATED A1C: CPT | Performed by: INTERNAL MEDICINE

## 2023-03-06 PROCEDURE — 3051F HG A1C>EQUAL 7.0%<8.0%: CPT | Performed by: INTERNAL MEDICINE

## 2023-03-06 RX ORDER — DAPAGLIFLOZIN 5 MG/1
5 TABLET, FILM COATED ORAL DAILY
Qty: 90 TABLET | Refills: 3 | Status: SHIPPED | OUTPATIENT
Start: 2023-03-06

## 2023-03-06 RX ORDER — DAPAGLIFLOZIN 5 MG/1
5 TABLET, FILM COATED ORAL DAILY
Qty: 90 TABLET | Refills: 3 | Status: SHIPPED | OUTPATIENT
Start: 2023-03-06 | End: 2023-03-06 | Stop reason: SDUPTHER

## 2023-03-06 RX ORDER — GLIPIZIDE 10 MG/1
20 TABLET, FILM COATED, EXTENDED RELEASE ORAL DAILY
Qty: 180 TABLET | Refills: 3 | Status: SHIPPED | OUTPATIENT
Start: 2023-03-06

## 2023-03-06 NOTE — TELEPHONE ENCOUNTER
Pt called to see if we received the paperwork for farxiga to be approved    pts number  [phone]

## 2023-03-06 NOTE — TELEPHONE ENCOUNTER
PATIENT IS CALLING BACK STATING HER PHARMACY SENT THE DENIAL TWICE TO US AFTER THE CONVERSATION WITH US. SHE IS CALLING TO CHECK STATUS OF RECEIVING THE DENIAL AND IF WE ARE OBTAINING PRIOR AUTH FROM INSURANCE. PHARMACY NUMBER [id number]. PATIENTS NUMBER [phone]

## 2023-03-06 NOTE — PROGRESS NOTES
"     Office Note      Date: 2023  Patient Name: Leela Muller  MRN: [id number]  : 1950    Chief Complaint   Patient presents with   • Diabetes       History of Present Illness:   Leela Muller is a 73 y.o. female who presents for Diabetes type 2. Diagnosed in: . Treated in past with oral agents. Current treatments: glipizide and basal insulin. Number of insulin shots per day: 2. Checks blood sugar 288 times a day - on FreeStyle Hilaria. She is making frequent adjustments in insulin based on glucose readings.  Has low blood sugar: occ. Aspirin use: Yes. Statin use: Yes. ACE-I/ARB use: No. Changes in health since last visit: none. Last eye exam 2023.    Subjective      Diabetic Complications:  Eyes: No  Kidneys: No  Feet: Yes -    Heart: No    Diet and Exercise:  Meals per day: 2  Minutes of exercise per week: 0 mins.    Review of Systems:   Review of Systems   Constitutional: Negative.    Cardiovascular: Negative.    Gastrointestinal: Negative.    Endocrine: Negative.        The following portions of the patient's history were reviewed and updated as appropriate: allergies, current medications, past family history, past medical history, past social history, past surgical history and problem list.    Objective       Visit Vitals  /68 (BP Location: Left arm, Patient Position: Sitting, Cuff Size: Adult)   Pulse 81   Ht 171 cm (67.32\")   Wt 73.5 kg (162 lb)   SpO2 97%   BMI 25.13 kg/m²       Physical Exam:  Physical Exam  Constitutional:       Appearance: Normal appearance.   Neurological:      Mental Status: She is alert.         Labs:    HbA1c  Lab Results   Component Value Date    HGBA1C 7.5 2023       CMP  Lab Results   Component Value Date    GLUCOSE 89 2022    BUN 10 2022    CREATININE 0.66 2022    EGFRIFNONA 73 2021    BCR 15.2 2022    K 3.6 2022    CO2 28.2 2022    CALCIUM 9.3 2022    LABIL2 1.5 2015    AST 19 2022    ALT 19 " 11/21/2022        Lipid Panel  Lab Results   Component Value Date    HDL 38 (L) 11/21/2022    LDL 35 11/21/2022    TRIG 145 11/21/2022        TSH  Lab Results   Component Value Date    TSH 1.470 11/21/2022        Hemoglobin A1C  Lab Results   Component Value Date    HGBA1C 7.5 03/06/2023        Microalbumin/Creatinine  Lab Results   Component Value Date    MALBCRERATIO 12.6 11/21/2022    MICROALBUR 2.3 11/21/2022           Assessment / Plan      Assessment & Plan:  Diagnoses and all orders for this visit:    1. Type 2 diabetes mellitus with hyperglycemia, with long-term current use of insulin (HCC) (Primary)  Assessment & Plan:  Diabetes is improving with treatment.   We discussed treatment options.  Try adding SGLT-2 inhibitor.  Potential s/e discussed.  Diabetes will be reassessed in 3 months.    FreeStyle Colin 2 CGM was downloaded today.  Data was reviewed from 2/21/23 to 3/6/23.  This showed better overnight control but consistent rise in glucose during the day.  This indicates inadequate mealtime coverage.  We discussed adding additional medication to address this.    Orders:  -     POC Glycosylated Hemoglobin (Hb A1C)  -     POC Glucose, Blood    2. Primary hypertension  Assessment & Plan:  Hypertension is unchanged.  Continue current treatment regimen.  Blood pressure will be reassessed at the next regular appointment.      3. Dyslipidemia  Assessment & Plan:  Continue statin.  Recent lipids okay.      4. Coronary artery disease involving native heart without angina pectoris, unspecified vessel or lesion type  Assessment & Plan:  Continue ASA and statin.  We discussed GLP-1 RA vs SGLT-2 inhibitor.      5. Diabetic polyneuropathy associated with diabetes mellitus due to underlying condition ([facility])    6. Insulin long-term use (HCC)    Other orders  -     dapagliflozin (Farxiga) 5 MG tablet tablet; Take 1 tablet by mouth Daily.  Dispense: 90 tablet; Refill: 3  -     glipizide (GLUCOTROL XL) 10 MG 24 hr tablet;  Take 2 tablets by mouth Daily.  Dispense: 180 tablet; Refill: 3    Current Outpatient Medications   Medication Instructions   • acetaminophen (TYLENOL) 500 mg, Oral, Every 6 Hours PRN   • acyclovir (ZOVIRAX) 5 % ointment Apply 1 unit topically to the anal area as needed   • acyclovir (ZOVIRAX) 800 MG tablet TAKE 1 TABLET BY MOUTH FIVE TIMES DAILY FOR 7 DAYS   • albuterol sulfate  (90 Base) MCG/ACT inhaler Inhale 2 puffs by mouth every 4 (Four) Hours As Needed   • amoxicillin-clavulanate (AUGMENTIN) 875-125 MG per tablet 1 tablet, Oral, Every 12 Hours   • aspirin 81 mg, Oral, Daily, Taken at night. Last dose 9-18-21   • azelastine (ASTELIN) 0.1 % nasal spray Instill 1 spray in each nostil 2 (Two) Times A Day   • baclofen (LIORESAL) 10 mg, Oral, Nightly   • butalbital-acetaminophen-caffeine (FIORICET, ESGIC) -40 MG per tablet Take 1 tablet by mouth Daily As Needed for headache   • ciprofloxacin (CILOXAN) 0.3 % ophthalmic solution Instill 1- 2 drops into the lower eyelid of affected eye 4 (Four) times a day 5 days   • clidinium-chlordiazePOXIDE (LIBRAX) 5-2.5 MG per capsule 1 capsule, Oral, 2 Times Daily PRN   • clidinium-chlordiazePOXIDE (LIBRAX) 5-2.5 MG per capsule Take one capsule by mouth 3 (Three) Times a Day as needed for 30 days   • clonazePAM (KlonoPIN) 0.5 MG tablet As Needed   • clotrimazole-betamethasone (LOTRISONE) 1-0.05 % cream Apply topically to the appropriate private area two times daily as directed.   • Continuous Blood Gluc  (FreeStyle Colin 2 [place]) device Use daily as directed   • Continuous Blood Gluc Sensor (FreeStyle Colin 2 Sensor) misc Change sensor Every 14 (Fourteen) Days.   • Continuous Blood Gluc Sensor (FreeStyle Colin 2 Sensor) misc Change sensor every 14 days   • digoxin (LANOXIN) 250 mcg, Oral, Daily   • Farxiga 5 mg, Oral, Daily   • fludrocortisone 0.1 mg, Oral, Daily   • fluticasone (FLONASE) 50 MCG/ACT nasal spray Instill 1 spray in each nostril Once a day   •  fluticasone (FLONASE) 50 MCG/ACT nasal spray Instill 2 sprays in each nostril 2 (Two) Times A Day   • furosemide (LASIX) 40 MG tablet Take 1 tablet by mouth Daily. May have extra 1/2 to 1 tablet daily if needed for edema   • gabapentin (NEURONTIN) 800 mg, Oral, Nightly   • glipizide (GLUCOTROL XL) 20 mg, Oral, Daily   • glucose blood (OneTouch Verio) test strip Use to test blood glucose 3 times a day as directed   • HYDROcodone-acetaminophen (NORCO) 7.5-325 MG per tablet Take one tablet by mouth Three times a day 30 days   • hydrocortisone (ANUSOL-HC) 25 MG suppository Insert 1 suppository rectally twice a day as needed (remove wrapper and moisten with water)   • Insulin Glargine (BASAGLAR KWIKPEN) 100 UNIT/ML injection pen Inject 50 units subcutaneously once in the morning and 75 units at night   • Insulin Pen Needle (B-D UF III MINI PEN NEEDLES) 31G X 5 MM misc Use to inject insulin twice a day as directed   • Insulin Syringe-Needle U-100 29G 0.5 ML misc 0.3 mL, Injection, Daily   • loratadine (CLARITIN) 10 mg, Oral, Daily   • meclizine (ANTIVERT) 25 mg, Oral, 3 Times Daily PRN   • metoclopramide (REGLAN) 10 MG tablet Take 1 tablet by mouth Daily As Needed for migraine.   • metoprolol succinate XL (TOPROL-XL) 100 mg, Oral, Daily   • metroNIDAZOLE (METROCREAM) 0.75 % cream Apply to the face once every night   • mupirocin (BACTROBAN) 2 % ointment Apply topically to the affected area twice a day   • neomycin-colistin-hydrocortisone-thonzonium (Cortisporin-TC) 3.3-3-10-0.5 MG/ML otic suspension Instill 5 drops into affected ear 3 (Three) times a day for 10 days   • nitroglycerin (Nitrostat) 0.4 MG SL tablet Place 1 tablet under the tongue Every 5 Minutes As Needed for Chest Pain for up to 3 total doses. Call 911 if pain remains after 1 dose.   • ofloxacin (FLOXIN) 0.3 % otic solution Administer 5 drops into Affected ears Daily.   • ofloxacin (OCUFLOX) 0.3 % ophthalmic solution Instill 5 drops into affected ear once a  day 7 days   • pancrelipase, Lip-Prot-Amyl, (Pancreaze) 02645-88344 units capsule delayed-release particles capsule Take 1 capsule with each meal and with each snack   • pilocarpine (SALAGEN) 5 mg, Oral, 3 Times Daily   • pimecrolimus (Elidel) 1 % cream Apply topically to the face two times daily as directed.   • potassium chloride (KLOR-CON) 10 MEQ CR tablet Take 1 tablet by mouth Daily as directed   • promethazine (PHENERGAN) 25 mg, Oral, Every 6 Hours PRN   • RABEprazole (ACIPHEX) 20 MG EC tablet Take 1 tablet Orally Once a day   • RABEprazole (ACIPHEX) 20 MG EC tablet Take one tablet by mouth once a day   • RABEprazole (ACIPHEX) 20 mg, Oral, Daily   • rosuvastatin (CRESTOR) 10 MG tablet Take 1 tablet Orally Once a day   • rosuvastatin (CRESTOR) 10 MG tablet Take one tablet by mouth once a day   • rosuvastatin (CRESTOR) 10 mg, Oral, Daily   • topiramate (TOPAMAX) 25 mg, Oral, Nightly   • triamcinolone (KENALOG) 0.1 % cream Apply topically to the affected area twice a day   • venlafaxine XR (EFFEXOR-XR) 75 mg, Oral, Daily      Return in about 3 months (around 6/6/2023) for Recheck with A1c, CMP.    Jac Santiago MD   03/06/2023

## 2023-03-06 NOTE — ASSESSMENT & PLAN NOTE
Diabetes is improving with treatment.   We discussed treatment options.  Try adding SGLT-2 inhibitor.  Potential s/e discussed.  Diabetes will be reassessed in 3 months.    FreeStyle Colin 2 CGM was downloaded today.  Data was reviewed from 2/21/23 to 3/6/23.  This showed better overnight control but consistent rise in glucose during the day.  This indicates inadequate mealtime coverage.  We discussed adding additional medication to address this.

## 2023-03-06 NOTE — PROGRESS NOTES
Specialty Pharmacy Patient Management Program  One-Time Clinical Outreach     Leela Muller is a 73 y.o. female with Type 2 Diabetes seen by an Endocrinology provider and enrolled in the Endocrinology Patient Management program offered by [facility].      A one-time clinical outreach was conducted on 3/6/23 to educate pt on newly started Farxiga.      FARXIGA® (dapagliflozin)  Medication Expectations   Why am I taking this medication? You are taking Farxiga to lower blood sugar because you have type 2 diabetes. Diabetes is not curable but with proper medication and treatment, we can keep your blood sugar within your personalized target range. This medication also helps reduce the risk of progression of kidney disease, reduce the risk of death from heart attack or stroke, and reduce the risk of heart failure hospitalization.    What should I expect while on this medication? You should expect to see your blood sugar and A1c decrease over time. You may also see a decrease in your blood pressure and it can help some people lose weight.     How does the medication work? Farxiga works by helping to remove some sugar that the body doesn't need through urination.    How long will I be on this medication for? The amount of time you will be on this medication will be determined by your doctor based on blood sugar and A1c control. You will most likely be on this medication or another diabetes medication throughout your lifetime. Do not abruptly stop this medication without talking to your doctor first.    How do I take this medication? Take as directed on your prescription label. This medication is usually taken in the morning and can be given with or without food.    What are some possible side effects? You may notice increased urination, especially when you first start Farxiga. Stuffy or runny nose can also occur. The most common side effects are urinary tract infections and yeast infections and are  more commonly seen in females. Talk with your doctor if you notice white or yellow vaginal discharge, vaginal itching or odor of if you notice redness, itching, pain, or swelling of the penis and/or bad-smelling discharge from the penis.    What happens if I miss a dose? If you miss a dose, take it as soon as you remember. If it is close to your next dose, skip it (do not take 2 doses at once)     Medication Safety   What are things I should warn my doctor immediately about? Tell your doctor if you have kidney disease, liver disease, heart failure, pancreas problems, or history of frequent genital yeast or urinary tract infections. Tell your doctor if you are on a low-salt diet, if you drink alcohol, or if you are having surgery. Talk to your doctor if you are pregnant, planning to become pregnant, or breastfeeding. Also tell your doctor if you notice any signs/symptoms of an allergic reaction (rash, hives, difficulty breathing, etc.).   What are things that I should be cautious of? Be cautious of any side effects from this medication. Talk to your doctor if any new ones develop or aren't getting better.   What are some medications that can interact with this one? Some medications that interact include diuretics (water pills) and other medications that may also lower your blood sugar such as insulins and glipizide/glimepiride/glyburide. Your doctor may reduce the dose of these medications when you start Farxiga to minimize low blood sugars. Always tell your doctor or pharmacist immediately if you start taking any new medications, including over-the-counter medications, vitamins, and herbal supplements.      Medication Storage/Handling   How should I handle this medication? Keep this medication out of reach of pets/children in tightly sealed container   How does this medication need to be stored? Store at room temperature and keep dry (don't keep in bathroom or other room with moisture)   How should I dispose of this  medication? There should not be a need to dispose of this medication unless your provider decides to change the dose or therapy. If that is the case, take to your local police station for proper disposal. Some pharmacies also have take-back bins for medication drop-off.      Resources/Support   How can I remind myself to take this medication? You can download reminder apps to help you manage your refills. You may also set an alarm on your phone to remind you. The pharmacy carries pill boxes that you can place next to an area you pass everyday (such as where you place your car keys or where you charge your phone)   Is financial support available?  [employer] can provide co-pay cards if you have commercial insurance or patient assistance if you have Medicare or no insurance.    Which vaccines are recommended for me? Talk to your doctor about these vaccines: Flu, Coronavirus (COVID-19), Pneumococcal (pneumonia), Tdap, Hepatitis B, Zoster (shingles)       Pt verbalized understanding using teach back method.        Fredi Martinez, PharmD, MPH  Clinical Specialty Pharmacist, Endocrinology  3/6/2023  10:59 EST

## 2023-03-07 ENCOUNTER — PRIOR AUTHORIZATION (OUTPATIENT)
Dept: ENDOCRINOLOGY | Facility: CLINIC | Age: 73
End: 2023-03-07
Payer: MEDICARE

## 2023-03-07 NOTE — TELEPHONE ENCOUNTER
BRITT PRICE Key: [id number] - PA Case ID: [id number] help? Call us at [phone]  Outcome  Approvedon March 6  Your PA request has been approved. Additional information will be provided in the approval communication. (Message 1145)  Drug  Farxiga 5MG tablets  Form  [facility] Electronic [place] Form (2017 [place])

## 2023-03-24 ENCOUNTER — TELEPHONE (OUTPATIENT)
Dept: ENDOCRINOLOGY | Facility: CLINIC | Age: 73
End: 2023-03-24
Payer: MEDICARE

## 2023-03-24 NOTE — TELEPHONE ENCOUNTER
Pt called states  on her free style chelsie 2 meter her blood sugar reading is 87 to 127 pt did finger stick blood sugar readings 166. Pt unsure if she needs to go by meter or finger stick. Pt did mention she is taking prednisone. This has been going on for a couple of days. Pt last f/u 03/06/23 pt next f/u 07/12/23

## 2023-03-28 ENCOUNTER — OFFICE VISIT (OUTPATIENT)
Dept: PULMONOLOGY | Facility: CLINIC | Age: 73
End: 2023-03-28
Payer: MEDICARE

## 2023-03-28 VITALS
HEIGHT: 67 IN | DIASTOLIC BLOOD PRESSURE: 68 MMHG | RESPIRATION RATE: 18 BRPM | BODY MASS INDEX: 25.36 KG/M2 | WEIGHT: 161.6 LBS | OXYGEN SATURATION: 100 % | SYSTOLIC BLOOD PRESSURE: 122 MMHG | HEART RATE: 67 BPM

## 2023-03-28 DIAGNOSIS — G47.19 EXCESSIVE DAYTIME SLEEPINESS: ICD-10-CM

## 2023-03-28 DIAGNOSIS — G47.33 OBSTRUCTIVE SLEEP APNEA: Primary | ICD-10-CM

## 2023-03-28 DIAGNOSIS — R06.83 SNORING: ICD-10-CM

## 2023-03-28 PROCEDURE — 3078F DIAST BP <80 MM HG: CPT | Performed by: INTERNAL MEDICINE

## 2023-03-28 PROCEDURE — 99204 OFFICE O/P NEW MOD 45 MIN: CPT | Performed by: INTERNAL MEDICINE

## 2023-03-28 PROCEDURE — 3074F SYST BP LT 130 MM HG: CPT | Performed by: INTERNAL MEDICINE

## 2023-03-28 NOTE — PROGRESS NOTES
CONSULT NOTE    Requested by:   Connor Ritter MD Gillespie, John Paul, MD      Chief Complaint   Patient presents with   • Breathing Problem   • Consult       Subjective:  Leela Muller is a 73 y.o. female.     History of Present Illness   Patient came in today for evaluation of possible sleep apnea. Patient says that for the past few years she snores sometimes with a choking sensation.     she feels that she doesn't get restful night sleep and her quality has diminished considerably. she does feel sleepy watching TV and reading a book.      she is not complaining of occasional headaches. Patient mentions having rare nights when she has nightmares     There is no known family history of sleep apnea     Patient suffers from diabetes, hypertension & atrial fibrillation.       The following portions of the patient's history were reviewed and updated as appropriate: allergies, current medications, past family history, past medical history, past social history and past surgical history.    Review of Systems   HENT: Positive for sinus pressure, sneezing and sore throat.    Respiratory: Positive for cough, chest tightness and shortness of breath. Negative for wheezing.    Cardiovascular: Positive for palpitations. Negative for leg swelling.   All other systems reviewed and are negative.      Past Medical History:   Diagnosis Date   • Anxiety    • Arm pain    • Arthritis    • Breast injury     fell 6/2019    • Cancer (HCC)    • Chronic pancreatitis (HCC)    • COVID-19    • Depression    • Diabetes mellitus (HCC)    • Hyperlipidemia    • Hypertension    • Liver mass    • Neck pain on left side    • Palpitations 4/18/2018   • Pancreatitis    • Pulmonary embolism (HCC)    • Stroke syndrome 9/14/2016    · Assessed By: Devaughn Lewis (Neurology); Last Assessed: 16 Jun 2015  Right cerebrovascular accident in July or August 2010, evaluated at [facility].  Admission to [facility] on  "09/28/2010 with headache and stuttering, consistent with TIA.  Chronic Coumadin therapy, initiated following bilateral pulmonary emboli in 2007 after fall and hip replacement.  She was already on 81 mg of aspirin as well, 09/2010.  MRI of the brain on 09/28/2010 revealing old right parietal cerebrovascular accident.  MRA revealing normal carotids, middle anterior and posterior cerebral arteries with minimal ostial stenosis of both vertebral arteries.  GENARO by Dr. Oliver Mobley on 09/28/2010:  patent foramen ovale with a small amount of right to left shunting.  Normal LVEF and normal valvular structures.   Patent foramen ovale closure using a 25 mm. Amplatzer cribriform occluder, 10/05/2010.   Echocardiogram, 06/23/2014:  LVEF (55% to 60%) with and Amplatzer device noted to be well-seated in    • Thrombocytopenia (HCC)    • Transient cerebral ischemia    • Type 2 diabetes mellitus (HCC)        Social History     Tobacco Use   • Smoking status: Never   • Smokeless tobacco: Never   Substance Use Topics   • Alcohol use: No         Objective:  Visit Vitals  /68   Pulse 67   Resp 18   Ht 171 cm (67.32\")   Wt 73.3 kg (161 lb 9.6 oz)   SpO2 100%   BMI 25.07 kg/m²       Physical Exam  Vitals reviewed.   Constitutional:       Appearance: She is well-developed.   HENT:      Head: Atraumatic.      Mouth/Throat:      Comments: Oropharynx was crowded.  Eyes:      Pupils: Pupils are equal, round, and reactive to light.   Neck:      Thyroid: No thyromegaly.      Vascular: No JVD.      Trachea: No tracheal deviation.      Comments: Increased neck circumference noted.  Cardiovascular:      Rate and Rhythm: Normal rate and regular rhythm.   Pulmonary:      Effort: Pulmonary effort is normal. No respiratory distress.      Breath sounds: Normal breath sounds.   Musculoskeletal:      Right lower leg: No edema.      Left lower leg: No edema.      Comments: Used a cane   Skin:     General: Skin is warm and dry.   Neurological:      " Mental Status: She is alert and oriented to person, place, and time.   Psychiatric:         Mood and Affect: Mood normal.         Behavior: Behavior normal.         Assessment/Plan:  Diagnoses and all orders for this visit:    1. Obstructive sleep apnea (Primary)  -     Polysomnography 4 or More Parameters; Future    2. Snoring  -     Polysomnography 4 or More Parameters; Future    3. Excessive daytime sleepiness  -     Polysomnography 4 or More Parameters; Future        Return in about 20 weeks (around 8/15/2023) for SleepONGUSTAVO/Rosita.    DISCUSSION(if any):  Laboratory workup was also reviewed which showed   Lab Results   Component Value Date    CO2 28.2 11/21/2022   ,   Lab Results   Component Value Date    HGBA1C 7.5 03/06/2023    HGBA1C 7.8 11/21/2022    HGBA1C 7.4 08/05/2022     Laboratory workup also showed   Lab Results   Component Value Date    HGB 13.6 12/17/2021    HGB 14.2 09/15/2021    HGB 15.5 03/08/2020   ,   Lab Results   Component Value Date    HCT 41.7 12/17/2021    HCT 43.1 09/15/2021    HCT 46.7 (H) 03/08/2020     Patient's cardiologist last office note was also reviewed that did mention history of CVA, abnormal myocardial perfusion study, hypertension and palpitations.    ===========================  ===========================    Sleep questionnaire was provided to the patient    The pathophysiology of sleep apnea was discussed, with her.     We will encourage her to schedule the sleep study soon after her dental procedures have been performed.     The patient will definitely need to be considered for an in lab study due to atrial fibrillation among other reasons.  It should be noted that a home sleep study is likely to underestimate the true AHI and is unable to assess sleep stages and abnormal sleep behaviors etc. The patient has understood.    The patient is agreeable to try CPAP/BiPAP, if needed.     Patient was educated on good sleep hygiene measures and voiced understanding of the same.      Patient was given reading material regarding sleep apnea.      Dictated utilizing Dragon dictation.    This document was electronically signed by Ngoc Mccrary MD on 03/28/23 at 13:55 EDT

## 2023-03-29 ENCOUNTER — OFFICE VISIT (OUTPATIENT)
Dept: GASTROENTEROLOGY | Facility: CLINIC | Age: 73
End: 2023-03-29
Payer: MEDICARE

## 2023-03-29 VITALS
OXYGEN SATURATION: 95 % | WEIGHT: 161 LBS | HEIGHT: 67 IN | HEART RATE: 95 BPM | BODY MASS INDEX: 25.27 KG/M2 | TEMPERATURE: 96.7 F | SYSTOLIC BLOOD PRESSURE: 124 MMHG | DIASTOLIC BLOOD PRESSURE: 60 MMHG

## 2023-03-29 DIAGNOSIS — K75.81 NASH (NONALCOHOLIC STEATOHEPATITIS): Primary | ICD-10-CM

## 2023-03-29 PROCEDURE — 3074F SYST BP LT 130 MM HG: CPT | Performed by: INTERNAL MEDICINE

## 2023-03-29 PROCEDURE — 1160F RVW MEDS BY RX/DR IN RCRD: CPT | Performed by: INTERNAL MEDICINE

## 2023-03-29 PROCEDURE — 3078F DIAST BP <80 MM HG: CPT | Performed by: INTERNAL MEDICINE

## 2023-03-29 PROCEDURE — 99213 OFFICE O/P EST LOW 20 MIN: CPT | Performed by: INTERNAL MEDICINE

## 2023-03-29 PROCEDURE — 1159F MED LIST DOCD IN RCRD: CPT | Performed by: INTERNAL MEDICINE

## 2023-03-29 NOTE — PROGRESS NOTES
Patient Name: Leela Muller  YOB: 1950   Medical Record number: [id number]     PCP: Connor Ritter MD    Chief Complaint   Patient presents with   • Follow-up   Follow-up for LEDBETTER.    History of Present Illness:   HPI  Mrs. Muller presents for follow-up.  The patient is recovering from eye surgery.  She denies any abdominal pain.  There is no complaint of nausea.  Mrs. Muller denies any bowel irregularity or blood in the stool.  There is no history of abdominal swelling.  Mrs. Muller denies any change in the color of her eyes or skin.  Past Medical History:   Diagnosis Date   • Anxiety    • Arm pain    • Arthritis    • Breast injury     fell 6/2019    • Cancer (HCC)    • Chronic pancreatitis (HCC)    • COVID-19    • Depression    • Diabetes mellitus (HCC)    • Hyperlipidemia    • Hypertension    • Liver mass    • Neck pain on left side    • Palpitations 4/18/2018   • Pancreatitis    • Pulmonary embolism (HCC)    • Stroke syndrome 9/14/2016    · Assessed By: Devaughn Lewis (Neurology); Last Assessed: 16 Jun 2015  Right cerebrovascular accident in July or August 2010, evaluated at [facility].  Admission to [facility] on 09/28/2010 with headache and stuttering, consistent with TIA.  Chronic Coumadin therapy, initiated following bilateral pulmonary emboli in 2007 after fall and hip replacement.  She was already on 81 mg of aspirin as well, 09/2010.  MRI of the brain on 09/28/2010 revealing old right parietal cerebrovascular accident.  MRA revealing normal carotids, middle anterior and posterior cerebral arteries with minimal ostial stenosis of both vertebral arteries.  GENARO by Dr. Oliver Mobley on 09/28/2010:  patent foramen ovale with a small amount of right to left shunting.  Normal LVEF and normal valvular structures.   Patent foramen ovale closure using a 25 mm. Amplatzer cribriform occluder, 10/05/2010.   Echocardiogram, 06/23/2014:  LVEF (55% to 60%) with and  Amplatzer device noted to be well-seated in    • Thrombocytopenia (HCC)    • Transient cerebral ischemia    • Type 2 diabetes mellitus (HCC)        Past Surgical History:   Procedure Laterality Date   • APPENDECTOMY     • BREAST BIOPSY Left 2012    benign   • BREAST BIOPSY     • BREAST EXCISIONAL BIOPSY Left     benign   • BREAST EXCISIONAL BIOPSY Right     benign   • BREAST EXCISIONAL BIOPSY Right 2020    benign   • BREAST SURGERY     • CAUTERIZATION NASAL BLEEDERS  2022   • CHOLECYSTECTOMY     • COLONOSCOPY     • HYSTERECTOMY     • LIVER BIOPSY     • OOPHORECTOMY     • RECTAL SURGERY     • SKIN CANCER EXCISION     • TONSILLECTOMY     • TOTAL HIP ARTHROPLASTY REVISION           Current Outpatient Medications:   •  acetaminophen (TYLENOL) 500 MG tablet, Take 1 tablet by mouth Every 6 (Six) Hours As Needed., Disp: , Rfl:   •  acyclovir (ZOVIRAX) 5 % ointment, Apply 1 unit topically to the anal area as needed, Disp: 15 g, Rfl: PRN  •  albuterol sulfate  (90 Base) MCG/ACT inhaler, Inhale 2 puffs by mouth every 4 (Four) Hours As Needed, Disp: 8.5 g, Rfl: 2  •  aspirin 81 MG tablet, Take 1 tablet by mouth Daily. Taken at night. Last dose 9-18-21, Disp: , Rfl:   •  azelastine (ASTELIN) 0.1 % nasal spray, Instill 1 spray in each nostil 2 (Two) Times A Day, Disp: 30 mL, Rfl: 11  •  baclofen (LIORESAL) 10 MG tablet, Take 1 tablet by mouth Every Night., Disp: 90 tablet, Rfl: 3  •  butalbital-acetaminophen-caffeine (FIORICET, ESGIC) -40 MG per tablet, Take 1 tablet by mouth Daily As Needed for headache, Disp: 30 tablet, Rfl: 0  •  clidinium-chlordiazePOXIDE (LIBRAX) 5-2.5 MG per capsule, Take 1 capsule by mouth 2 (Two) Times a Day As Needed., Disp: 60 capsule, Rfl: 2  •  clidinium-chlordiazePOXIDE (LIBRAX) 5-2.5 MG per capsule, Take one capsule by mouth 3 (Three) Times a Day as needed for 30 days, Disp: 90 capsule, Rfl: 2  •  clonazePAM (KlonoPIN) 0.5 MG tablet, As Needed., Disp: , Rfl:   •   clotrimazole-betamethasone (LOTRISONE) 1-0.05 % cream, Apply topically to the appropriate private area two times daily as directed., Disp: 15 g, Rfl: 0  •  Continuous Blood Gluc  (FreeStyle Colin 2 Grand Chain) device, Use daily as directed, Disp: 1 each, Rfl: 0  •  Continuous Blood Gluc Sensor (FreeStyle Colin 2 Sensor) misc, Change sensor Every 14 (Fourteen) Days., Disp: 2 each, Rfl: 5  •  Continuous Blood Gluc Sensor (FreeStyle Colin 2 Sensor) misc, Change sensor every 14 days, Disp: 1 each, Rfl: 5  •  dapagliflozin (Farxiga) 5 MG tablet tablet, Take 1 tablet by mouth Daily., Disp: 90 tablet, Rfl: 3  •  digoxin (LANOXIN) 250 MCG tablet, Take 1 tablet by mouth Daily., Disp: 90 tablet, Rfl: 3  •  fludrocortisone 0.1 MG tablet, Take 1 tablet by mouth Daily., Disp: 90 tablet, Rfl: 3  •  fluticasone (FLONASE) 50 MCG/ACT nasal spray, Instill 1 spray in each nostril Once a day, Disp: 16 g, Rfl: 3  •  fluticasone (FLONASE) 50 MCG/ACT nasal spray, Instill 2 sprays in each nostril 2 (Two) Times A Day, Disp: 16 g, Rfl: 0  •  furosemide (LASIX) 40 MG tablet, Take 1 tablet by mouth Daily. May have extra 1/2 to 1 tablet daily if needed for edema, Disp: 135 tablet, Rfl: 3  •  gabapentin (NEURONTIN) 800 MG tablet, Take 1 tablet by mouth Every Night., Disp: 90 tablet, Rfl: 1  •  glipizide (GLUCOTROL XL) 10 MG 24 hr tablet, Take 2 tablets by mouth Daily., Disp: 180 tablet, Rfl: 3  •  glucose blood (OneTouch Verio) test strip, Use to test blood glucose 3 times a day as directed (Patient taking differently: Use to test blood glucose 3 times a day as directed), Disp: 300 each, Rfl: 3  •  HYDROcodone-acetaminophen (NORCO) 7.5-325 MG per tablet, Take one tablet by mouth Three times a day 30 days, Disp: 90 tablet, Rfl: 0  •  hydrocortisone (ANUSOL-HC) 25 MG suppository, Insert 1 suppository rectally twice a day as needed (remove wrapper and moisten with water), Disp: 12 suppository, Rfl: 3  •  Insulin Glargine (BASAGLAR KWIKPEN) 100  UNIT/ML injection pen, Inject 50 units subcutaneously once in the morning and 75 units at night, Disp: 135 mL, Rfl: 1  •  Insulin Pen Needle (B-D UF III MINI PEN NEEDLES) 31G X 5 MM misc, Use to inject insulin twice a day as directed, Disp: 100 each, Rfl: 12  •  Insulin Syringe-Needle U-100 29G 0.5 ML misc, Inject 0.3 mL as directed Daily., Disp: , Rfl:   •  loratadine (CLARITIN) 10 MG tablet, Take 1 tablet by mouth Daily., Disp: 30 tablet, Rfl: 3  •  meclizine (ANTIVERT) 25 MG tablet, Take 1 tablet by mouth 3 (Three) Times a Day As Needed for Dizziness., Disp: 90 tablet, Rfl: 3  •  metoclopramide (REGLAN) 10 MG tablet, Take 1 tablet by mouth Daily As Needed for migraine., Disp: 30 tablet, Rfl: 1  •  metoprolol succinate XL (TOPROL-XL) 100 MG 24 hr tablet, Take 1 tablet by mouth Daily., Disp: 90 tablet, Rfl: 3  •  metroNIDAZOLE (METROCREAM) 0.75 % cream, Apply to the face once every night, Disp: 45 g, Rfl: 0  •  mupirocin (BACTROBAN) 2 % ointment, Apply topically to the affected area twice a day, Disp: 22 g, Rfl: 0  •  neomycin-colistin-hydrocortisone-thonzonium (Cortisporin-TC) 3.3-3-10-0.5 MG/ML otic suspension, Instill 5 drops into affected ear 3 (Three) times a day for 10 days, Disp: 10 mL, Rfl: 0  •  nitroglycerin (Nitrostat) 0.4 MG SL tablet, Place 1 tablet under the tongue Every 5 Minutes As Needed for Chest Pain for up to 3 total doses. Call 911 if pain remains after 1 dose., Disp: 25 tablet, Rfl: 6  •  ofloxacin (FLOXIN) 0.3 % otic solution, Administer 5 drops into Affected ears Daily., Disp: 10 mL, Rfl: 0  •  pancrelipase, Lip-Prot-Amyl, (Pancreaze) 62662-86154 units capsule delayed-release particles capsule, Take 1 capsule with each meal and with each snack, Disp: 100 capsule, Rfl: 2  •  pilocarpine (SALAGEN) 5 MG tablet, Take 1 tablet by mouth 3 (Three) Times a Day., Disp: 90 tablet, Rfl: 2  •  pimecrolimus (Elidel) 1 % cream, Apply topically to the face two times daily as directed., Disp: 60 g, Rfl:  0  •  potassium chloride (KLOR-CON) 10 MEQ CR tablet, Take 1 tablet by mouth Daily as directed, Disp: 90 tablet, Rfl: 0  •  promethazine (PHENERGAN) 25 MG tablet, Take 1 tablet by mouth Every 6 (Six) Hours As Needed for Nausea or Vomiting., Disp: 30 tablet, Rfl: 1  •  RABEprazole (ACIPHEX) 20 MG EC tablet, Take 1 tablet Orally Once a day, Disp: 90 tablet, Rfl: 1  •  RABEprazole (ACIPHEX) 20 MG EC tablet, Take one tablet by mouth once a day, Disp: 90 tablet, Rfl: 1  •  rosuvastatin (CRESTOR) 10 MG tablet, Take one tablet by mouth once a day, Disp: 90 tablet, Rfl: 0  •  topiramate (TOPAMAX) 25 MG tablet, Take 1 tablet by mouth Every Night., Disp: 90 tablet, Rfl: 3  •  venlafaxine XR (EFFEXOR-XR) 75 MG 24 hr capsule, Take 1 capsule by mouth Daily., Disp: 90 capsule, Rfl: 3  •  acyclovir (ZOVIRAX) 800 MG tablet, , Disp: , Rfl:   •  amoxicillin-clavulanate (AUGMENTIN) 875-125 MG per tablet, Take 1 tablet by mouth Every 12 (Twelve) Hours., Disp: 14 tablet, Rfl: 0  •  ciprofloxacin (CILOXAN) 0.3 % ophthalmic solution, Instill 1- 2 drops into the lower eyelid of affected eye 4 (Four) times a day 5 days (Patient not taking: Reported on 3/29/2023), Disp: 2.5 mL, Rfl: 0  •  ofloxacin (OCUFLOX) 0.3 % ophthalmic solution, Instill 5 drops into affected ear once a day 7 days, Disp: 5 mL, Rfl: 0  •  prednisoLONE acetate (PRED FORTE) 1 % ophthalmic suspension, Instill 1 drop in both eyes twice a day; Shake Well Before Use, Disp: 5 mL, Rfl: 0  •  RABEprazole (ACIPHEX) 20 MG EC tablet, Take 1 tablet by mouth Daily., Disp: 90 tablet, Rfl: 1  •  rosuvastatin (CRESTOR) 10 MG tablet, Take 1 tablet by mouth Daily., Disp: 90 tablet, Rfl: 0  •  rosuvastatin (CRESTOR) 10 MG tablet, Take 1 tablet Orally Once a day, Disp: 90 tablet, Rfl: 0  •  triamcinolone (KENALOG) 0.1 % cream, Apply topically to the affected area twice a day, Disp: 30 g, Rfl: 0    Allergies   Allergen Reactions   • Ampicillin GI Intolerance     Diarrhea   • Atorvastatin  Swelling   • Tetanus Toxoid Hives   • Acyclovir Seizure   • Cefprozil Other (See Comments)   • Lansoprazole Nausea Only and Nausea And Vomiting   • Mestinon [Pyridostigmine] Itching and Other (See Comments)     Leg cramps, shooting vaginal pains, frequent urination   • Ranexa [Ranolazine Er] Nausea And Vomiting       Family History   Problem Relation Age of Onset   • Alzheimer's disease Mother    • Cancer Mother    • Coronary artery disease Other    • Diabetes Other    • Hypertension Other    • Stroke Other    • Cancer Other    • Dementia Other    • Heart attack Father    • Colon polyps Father    • Breast cancer Neg Hx    • Ovarian cancer Neg Hx    • Colon cancer Neg Hx    • Esophageal cancer Neg Hx        Social History     Socioeconomic History   • Marital status:    Tobacco Use   • Smoking status: Never   • Smokeless tobacco: Never   Vaping Use   • Vaping Use: Never used   Substance and Sexual Activity   • Alcohol use: No   • Drug use: No   • Sexual activity: Yes       Review of Systems   Constitutional: Negative for activity change, appetite change, fatigue, fever and unexpected weight change.   HENT: Negative for dental problem, hearing loss, mouth sores, postnasal drip, sneezing, trouble swallowing and voice change.    Eyes: Negative for pain, redness, itching and visual disturbance.   Respiratory: Negative for cough, choking, chest tightness, shortness of breath and wheezing.    Cardiovascular: Negative for chest pain, palpitations and leg swelling.   Gastrointestinal: Negative for abdominal distention, abdominal pain, anal bleeding, blood in stool, constipation, diarrhea, nausea, rectal pain and vomiting.   Endocrine: Negative for cold intolerance, heat intolerance, polydipsia, polyphagia and polyuria.   Genitourinary: Negative.  Negative for dysuria, enuresis, flank pain, hematuria and urgency.   Musculoskeletal: Negative for arthralgias, back pain, gait problem, joint swelling and myalgias.   Skin:  Negative for color change, pallor and rash.   Allergic/Immunologic: Negative for environmental allergies, food allergies and immunocompromised state.   Neurological: Negative for dizziness, tremors, seizures, facial asymmetry, speech difficulty, numbness and headaches.   Hematological: Negative for adenopathy.   Psychiatric/Behavioral: Negative for behavioral problems, confusion, dysphoric mood, hallucinations and self-injury.       Vitals:    03/29/23 1516   BP: 124/60   Pulse: 95   Temp: 96.7 °F (35.9 °C)   SpO2: 95%       Physical Exam  Vitals reviewed.   Constitutional:       General: She is not in acute distress.     Appearance: Normal appearance.   HENT:      Head: Normocephalic and atraumatic.      Nose: Nose normal.      Mouth/Throat:      Mouth: Mucous membranes are moist.      Pharynx: Oropharynx is clear.   Eyes:      General: No scleral icterus.     Extraocular Movements: Extraocular movements intact.   Cardiovascular:      Rate and Rhythm: Normal rate and regular rhythm.      Heart sounds: No murmur heard.  Pulmonary:      Breath sounds: Normal breath sounds. No wheezing or rales.   Abdominal:      General: Bowel sounds are normal.      Palpations: Abdomen is soft.      Tenderness: There is no abdominal tenderness.   Musculoskeletal:         General: No swelling. Normal range of motion.      Cervical back: Normal range of motion and neck supple.   Skin:     General: Skin is dry.      Coloration: Skin is not jaundiced.   Neurological:      Mental Status: She is alert and oriented to person, place, and time.   Psychiatric:         Mood and Affect: Mood normal.         Thought Content: Thought content normal.         Judgment: Judgment normal.         Diagnoses and all orders for this visit:    1. LEDBETTER (nonalcoholic steatohepatitis) (Primary)  -     US Liver; Future    The patient has biopsy-proven Ledbetter without cirrhosis.  She has not experienced any signs of decompensation.  The albumin and bilirubin have  been normal.      Plan: Will proceed with an ultrasound of the liver.           The patient will follow-up in 6 months.

## 2023-04-03 ENCOUNTER — HOSPITAL ENCOUNTER (OUTPATIENT)
Dept: MRI IMAGING | Facility: HOSPITAL | Age: 73
Discharge: HOME | End: 2023-04-03
Admitting: STUDENT IN AN ORGANIZED HEALTH CARE EDUCATION/TRAINING PROGRAM
Payer: MEDICARE

## 2023-04-03 DIAGNOSIS — G89.29 CHRONIC LOW BACK PAIN WITH SCIATICA, SCIATICA LATERALITY UNSPECIFIED, UNSPECIFIED BACK PAIN LATERALITY: ICD-10-CM

## 2023-04-03 DIAGNOSIS — M54.40 CHRONIC LOW BACK PAIN WITH SCIATICA, SCIATICA LATERALITY UNSPECIFIED, UNSPECIFIED BACK PAIN LATERALITY: ICD-10-CM

## 2023-04-03 PROCEDURE — 72148 MRI LUMBAR SPINE W/O DYE: CPT

## 2023-04-24 ENCOUNTER — HOSPITAL ENCOUNTER (OUTPATIENT)
Dept: ULTRASOUND IMAGING | Facility: HOSPITAL | Age: 73
Discharge: HOME | End: 2023-04-24
Admitting: INTERNAL MEDICINE
Payer: MEDICARE

## 2023-04-24 DIAGNOSIS — K75.81 NASH (NONALCOHOLIC STEATOHEPATITIS): ICD-10-CM

## 2023-04-24 PROCEDURE — 76705 ECHO EXAM OF ABDOMEN: CPT

## 2023-04-26 ENCOUNTER — TELEPHONE (OUTPATIENT)
Dept: GASTROENTEROLOGY | Facility: CLINIC | Age: 73
End: 2023-04-26
Payer: MEDICARE

## 2023-04-26 NOTE — TELEPHONE ENCOUNTER
----- Message from Catracho Garcia MD sent at 4/25/2023  4:27 PM EDT -----  Let Ms. Muller know the ultrasound demonstrated fatty liver.  There is no change.

## 2023-05-01 ENCOUNTER — OFFICE VISIT (OUTPATIENT)
Dept: NEUROLOGY | Facility: CLINIC | Age: 73
End: 2023-05-01
Payer: MEDICARE

## 2023-05-01 VITALS
WEIGHT: 159 LBS | DIASTOLIC BLOOD PRESSURE: 64 MMHG | HEART RATE: 79 BPM | BODY MASS INDEX: 24.96 KG/M2 | HEIGHT: 67 IN | OXYGEN SATURATION: 97 % | SYSTOLIC BLOOD PRESSURE: 112 MMHG

## 2023-05-01 DIAGNOSIS — M50.30 DDD (DEGENERATIVE DISC DISEASE), CERVICAL: ICD-10-CM

## 2023-05-01 DIAGNOSIS — G43.009 MIGRAINE WITHOUT AURA AND WITHOUT STATUS MIGRAINOSUS, NOT INTRACTABLE: ICD-10-CM

## 2023-05-01 DIAGNOSIS — R29.6 RECURRENT FALLS WHILE WALKING: ICD-10-CM

## 2023-05-01 DIAGNOSIS — E08.42 DIABETIC POLYNEUROPATHY ASSOCIATED WITH DIABETES MELLITUS DUE TO UNDERLYING CONDITION: Primary | ICD-10-CM

## 2023-05-01 PROCEDURE — 99214 OFFICE O/P EST MOD 30 MIN: CPT | Performed by: NURSE PRACTITIONER

## 2023-05-01 PROCEDURE — 1159F MED LIST DOCD IN RCRD: CPT | Performed by: NURSE PRACTITIONER

## 2023-05-01 PROCEDURE — 3078F DIAST BP <80 MM HG: CPT | Performed by: NURSE PRACTITIONER

## 2023-05-01 PROCEDURE — 1160F RVW MEDS BY RX/DR IN RCRD: CPT | Performed by: NURSE PRACTITIONER

## 2023-05-01 PROCEDURE — 3074F SYST BP LT 130 MM HG: CPT | Performed by: NURSE PRACTITIONER

## 2023-05-01 RX ORDER — RIMEGEPANT SULFATE 75 MG/75MG
75 TABLET, ORALLY DISINTEGRATING ORAL DAILY PRN
Qty: 8 TABLET | Refills: 11 | Status: SHIPPED | OUTPATIENT
Start: 2023-05-01

## 2023-05-01 RX ORDER — PROMETHAZINE HYDROCHLORIDE 25 MG/1
25 TABLET ORAL EVERY 6 HOURS PRN
Qty: 30 TABLET | Refills: 1 | Status: SHIPPED | OUTPATIENT
Start: 2023-05-01

## 2023-05-01 RX ORDER — BUTALBITAL, ACETAMINOPHEN AND CAFFEINE 50; 325; 40 MG/1; MG/1; MG/1
1 TABLET ORAL DAILY PRN
Qty: 30 TABLET | Refills: 0 | Status: SHIPPED | OUTPATIENT
Start: 2023-05-01

## 2023-05-01 RX ORDER — BACLOFEN 10 MG/1
10 TABLET ORAL NIGHTLY
Qty: 90 TABLET | Refills: 3 | Status: SHIPPED | OUTPATIENT
Start: 2023-05-01

## 2023-05-01 RX ORDER — TOPIRAMATE 25 MG/1
25 TABLET ORAL NIGHTLY
Qty: 90 TABLET | Refills: 3 | Status: SHIPPED | OUTPATIENT
Start: 2023-05-01

## 2023-05-01 RX ORDER — METOCLOPRAMIDE 10 MG/1
10 TABLET ORAL DAILY PRN
Qty: 30 TABLET | Refills: 1 | Status: SHIPPED | OUTPATIENT
Start: 2023-05-01 | End: 2023-05-01 | Stop reason: SINTOL

## 2023-05-01 RX ORDER — GABAPENTIN 800 MG/1
800 TABLET ORAL NIGHTLY
Qty: 90 TABLET | Refills: 1 | Status: SHIPPED | OUTPATIENT
Start: 2023-05-01

## 2023-05-01 NOTE — LETTER
May 2, 2023     Connor Ritter MD  [address]  [address]  [facility] [place] [address]    Patient: Leela Muller   YOB: 1950   Date of Visit: 5/1/2023       Dear Connor Ritter MD    Leela Muller was in my office today. Below is a copy of my note.    If you have questions, please do not hesitate to call me. I look forward to following Leela along with you.         Sincerely,        [facility]        CC: MD Mohit Almanza MD Gregory M Woolfolk, MD Wendell Rance Miers, MD Rachel Davenport-Campbell, APRN    Subjective:     Patient ID: Leela Muller is a 73 y.o. female.    CC:   Chief Complaint   Patient presents with   • Diabetic polyneuropathy   • Migraine   • Difficulty Walking       HPI:   History of Present Illness   Today 5/1/2023- This is a very pleasant 73-year-old female who presents for a 6-month neurology follow-up on multiple conditions including chronic migraine headaches, chronic neck pain, chronic intermittent vertigo, anxiety, prior strokes, and diabetic peripheral neuropathy. She has been on gabapentin 800 mg nightly long-term. She also has been on baclofen 10 mg nightly, and uses Fioricet as needed. She is here for follow-up and refills on medications today.    We referred her to pain management. She has seen Dr. Mohit Brown with pain management. She has a follow-up with him on 05/31/2023. She states that she has received the injections in her neck before with Dr. Lewis, and it really helped with her headaches. She was supposed to have the injection with pain management, but she got sick with a viral infection, so she had to reschedule. She states that she had an MRI of the lumbar spine done. It showed arthritis in her low back, but no evidence of any concerning narrowing of the spine per radiology. She reports having severe pain in her lower back. She confirms that she is still taking hydrocodone.     She has also been evaluated by ENT.  She follows with several specialists including endocrinology, pulmonology, gastroenterology, and cardiology.    Most recent labs available for review on 03/06/2023, hemoglobin A1c 7.5 percent. On 11/21/2022, TSH was 1.470 uIU/mL. Comprehensive metabolic panel was within normal limits.    She reports that she has been having headaches. She takes the Topamax every night. She also takes butalbital-acetaminophen-caffeine sometimes. She localizes the pain to the back of her neck and her head, and it radiates up to her forehead. She attended physical therapy, and it helped, but her headaches return as soon as she quits physical therapy. She is doing the exercises at home as well. She states that her headaches improved at her last visit, but they have been worsening again. She reports that she has not tried the Aimovig, the Ajovy, and the Emgality injections before. She has not used Ubrelvy or Nurtec before. She has been taking the extra strength Tylenol. She took it last night because she had a headache, and it helped. She had 2 migraine days in the past month. She has nausea, vomiting, photophobia, and phonophobia with the migraines. She takes the Phenergan, and it helps with vomiting. She is no longer taking the Reglan due to drug interactions with her other medications.    She states that she has had falls since her last visit. Her last fall was 2 weeks ago, and she had an injury. She is still using a cane. She did physical therapy. She reports that she has been doing the home physical therapy exercises.    She states that she got exposed to poison oak on the right side of her face, and was given a steroid injection today with her PCP.      She reports that she has neuropathy symptoms, and is taking gabapentin for it. She has a corn lesion on her left great toe.    She was informed that she has a fatty liver and follows with [facility] with close monitoring. She has lost 37 pounds in the past year.     Prior  neurology workup and history:  Chronic migraine headaches present for many years along with chronic neck pain, chronic and intermittent vertigo, anxiety, and history of prior strokes in 06/2015 and in 2010. Previously was followed long term in our clinic by Dr. Devaughn Lewis, with neurology.      She states that she is taking Topamax every night and it has been helping her headaches. Migraines for years. She confirms taking baclofen every night. She takes gabapentin 800 mg every night and Fioricet as needed. She states that she has been trying to take extra strength Tylenol as needed. Headaches have nausea, vomiting, photophobia and phonophobia, begin in occipital region, radiate to bilateral frontal region with moderate throbbing pain. She has these about 2 days per month currently. She is already on a beta blocker. Triptans contraindicated with CV risk factors. She has taken venlafaxine with no benefit. Has not tried nurtec or ubrelvy.    She had fractured her coccyx and actually injured her head and back. She did go to [facility] and had a CT scan. Recurrent falls continue.     MRI of the brain and cervical spine with and without contrast. She did have both of these completed on 06/6/2022 at [facility]. MRI of the brain with and without contrast showed age-related changes of mild generalized volume loss with some encephalomalacia and gliotic changes in the right occipital lobe with the appearance of a prior stroke. No acute intracranial abnormalities and no abnormal contrast enhancement were seen. Her MRI of the cervical spine with and without contrast did show some mild cervical spondylosis similar to 2019 imaging with no focal cord signal abnormalities and no significant spinal stenosis.     She does take aspirin and rosuvastatin. No recurrent strokes.    She does follow with cardiology for erratic orthostatic blood pressure and dizziness and was previously prescribed pyridostigmine,  but was unable to tolerate it.      Muscle relaxer cream she uses as needed with [facility] that has . Continues magnesium chloride is 15%, guaifenesin is 10%, baclofen is 5%, and cyclobenzaprine is 4%. Uses PRN.     She uses the [facility] pharmacy in [place], [place].      She follows with cardiology, pulmonology, endocrinology, and gastroenterology, pain management- all with [facility].    Takes Lortab as needed for pain. Known lumbar and cervical spinal stenosis and DDD.    She confirms having severe neck, shoulder, left worse than right, and low back pain and it is so bad intermittently that she cannot get up. She has been scheduled for injections.    The following portions of the patient's history were reviewed and updated as appropriate: allergies, current medications, past family history, past medical history, past social history, past surgical history and problem list.    Past Medical History:   Diagnosis Date   • Anxiety    • Arm pain    • Arthritis    • Breast injury     fell 2019    • Cancer    • Chronic pancreatitis    • COVID-19    • Depression    • Diabetes mellitus    • Hyperlipidemia    • Hypertension    • Liver mass    • Neck pain on left side    • Palpitations 2018   • Pancreatitis    • Pulmonary embolism    • Stroke syndrome 2016    · Assessed By: Devaughn Lewis (Neurology); Last Assessed: 2015  Right cerebrovascular accident in July or 2010, evaluated at [facility].  Admission to [facility] on 2010 with headache and stuttering, consistent with TIA.  Chronic Coumadin therapy, initiated following bilateral pulmonary emboli in  after fall and hip replacement.  She was already on 81 mg of aspirin as well, 2010.  MRI of the brain on 2010 revealing old right parietal cerebrovascular accident.  MRA revealing normal carotids, middle anterior and posterior cerebral arteries with minimal ostial  stenosis of both vertebral arteries.  GENARO by Dr. Oliver Mobley on 09/28/2010:  patent foramen ovale with a small amount of right to left shunting.  Normal LVEF and normal valvular structures.   Patent foramen ovale closure using a 25 mm. Amplatzer cribriform occluder, 10/05/2010.   Echocardiogram, 06/23/2014:  LVEF (55% to 60%) with and Amplatzer device noted to be well-seated in    • Thrombocytopenia    • Transient cerebral ischemia    • Type 2 diabetes mellitus        Past Surgical History:   Procedure Laterality Date   • APPENDECTOMY     • BREAST BIOPSY Left 2012    benign   • BREAST BIOPSY     • BREAST EXCISIONAL BIOPSY Left     benign   • BREAST EXCISIONAL BIOPSY Right     benign   • BREAST EXCISIONAL BIOPSY Right 2020    benign   • BREAST SURGERY     • CAUTERIZATION NASAL BLEEDERS  2022   • CHOLECYSTECTOMY     • COLONOSCOPY     • HYSTERECTOMY     • LIVER BIOPSY     • OOPHORECTOMY     • RECTAL SURGERY     • SKIN CANCER EXCISION     • TONSILLECTOMY     • TOTAL HIP ARTHROPLASTY REVISION         Social History     Socioeconomic History   • Marital status:    Tobacco Use   • Smoking status: Never     Passive exposure: Never   • Smokeless tobacco: Never   Vaping Use   • Vaping Use: Never used   Substance and Sexual Activity   • Alcohol use: No   • Drug use: No   • Sexual activity: Yes       Family History   Problem Relation Age of Onset   • Alzheimer's disease Mother    • Cancer Mother    • Coronary artery disease Other    • Diabetes Other    • Hypertension Other    • Stroke Other    • Cancer Other    • Dementia Other    • Heart attack Father    • Colon polyps Father    • Breast cancer Neg Hx    • Ovarian cancer Neg Hx    • Colon cancer Neg Hx    • Esophageal cancer Neg Hx           Current Outpatient Medications:   •  acetaminophen (TYLENOL) 500 MG tablet, Take 1 tablet by mouth Every 6 (Six) Hours As Needed., Disp: , Rfl:   •  acyclovir (ZOVIRAX) 800 MG tablet, , Disp: , Rfl:   •  albuterol sulfate   (90 Base) MCG/ACT inhaler, Inhale 2 puffs by mouth every 4 (Four) Hours As Needed, Disp: 8.5 g, Rfl: 2  •  aspirin 81 MG tablet, Take 1 tablet by mouth Daily. Taken at night. Last dose 9-18-21, Disp: , Rfl:   •  azelastine (ASTELIN) 0.1 % nasal spray, Instill 1 spray in each nostil 2 (Two) Times A Day, Disp: 30 mL, Rfl: 11  •  baclofen (LIORESAL) 10 MG tablet, Take 1 tablet by mouth Every Night., Disp: 90 tablet, Rfl: 3  •  butalbital-acetaminophen-caffeine (FIORICET, ESGIC) -40 MG per tablet, Take 1 tablet by mouth Daily As Needed for headache, Disp: 30 tablet, Rfl: 0  •  clidinium-chlordiazePOXIDE (LIBRAX) 5-2.5 MG per capsule, Take 1 capsule by mouth 2 (Two) Times a Day As Needed., Disp: 60 capsule, Rfl: 2  •  clonazePAM (KlonoPIN) 0.5 MG tablet, As Needed., Disp: , Rfl:   •  clotrimazole-betamethasone (LOTRISONE) 1-0.05 % cream, Apply topically to the appropriate private area two times daily as directed., Disp: 15 g, Rfl: 0  •  Continuous Blood Gluc  (IC[employer]yle Colin 2 Edmond) device, Use daily as directed, Disp: 1 each, Rfl: 0  •  dapagliflozin (Farxiga) 5 MG tablet tablet, Take 1 tablet by mouth Daily., Disp: 90 tablet, Rfl: 3  •  digoxin (LANOXIN) 250 MCG tablet, Take 1 tablet by mouth Daily., Disp: 90 tablet, Rfl: 3  •  fludrocortisone 0.1 MG tablet, Take 1 tablet by mouth Daily., Disp: 90 tablet, Rfl: 3  •  fluticasone (FLONASE) 50 MCG/ACT nasal spray, Instill 2 sprays in each nostril 2 (Two) Times A Day, Disp: 16 g, Rfl: 1  •  furosemide (LASIX) 40 MG tablet, Take 1 tablet by mouth Daily. May have extra 1/2 to 1 tablet daily if needed for edema, Disp: 135 tablet, Rfl: 3  •  gabapentin (NEURONTIN) 800 MG tablet, Take 1 tablet by mouth Every Night., Disp: 90 tablet, Rfl: 1  •  glipizide (GLUCOTROL XL) 10 MG 24 hr tablet, Take 2 tablets by mouth Daily., Disp: 180 tablet, Rfl: 3  •  glucose blood (OneTouch Verio) test strip, Use to test blood glucose 3 times a day as directed (Patient taking  differently: Use to test blood glucose 3 times a day as directed), Disp: 300 each, Rfl: 3  •  HYDROcodone-acetaminophen (NORCO) 7.5-325 MG per tablet, Take 1 tablet by mouth 3 (Three) Times a Day., Disp: 90 tablet, Rfl: 0  •  hydrocortisone (ANUSOL-HC) 25 MG suppository, Insert 1 suppository rectally twice a day as needed (remove wrapper and moisten with water), Disp: 12 suppository, Rfl: 3  •  Insulin Glargine (BASAGLAR KWIKPEN) 100 UNIT/ML injection pen, Inject 50 units subcutaneously once in the morning and 75 units at night, Disp: 135 mL, Rfl: 1  •  Insulin Pen Needle (B-D UF III MINI PEN NEEDLES) 31G X 5 MM misc, Use to inject insulin twice a day as directed, Disp: 100 each, Rfl: 12  •  Insulin Syringe-Needle U-100 29G 0.5 ML misc, Inject 0.3 mL as directed Daily., Disp: , Rfl:   •  loratadine (CLARITIN) 10 MG tablet, Take 1 tablet by mouth Daily., Disp: 30 tablet, Rfl: 3  •  meclizine (ANTIVERT) 25 MG tablet, Take 1 tablet by mouth 3 (Three) Times a Day As Needed for Dizziness., Disp: 90 tablet, Rfl: 3  •  metoprolol succinate XL (TOPROL-XL) 100 MG 24 hr tablet, Take 1 tablet by mouth Daily., Disp: 90 tablet, Rfl: 3  •  metroNIDAZOLE (METROCREAM) 0.75 % cream, Apply to the face once every night, Disp: 45 g, Rfl: 0  •  mupirocin (BACTROBAN) 2 % ointment, Apply topically to the affected area twice a day, Disp: 22 g, Rfl: 0  •  neomycin-colistin-hydrocortisone-thonzonium (Cortisporin-TC) 3.3-3-10-0.5 MG/ML otic suspension, Instill 5 drops into affected ear 3 (Three) times a day for 10 days, Disp: 10 mL, Rfl: 0  •  nitroglycerin (Nitrostat) 0.4 MG SL tablet, Place 1 tablet under the tongue Every 5 Minutes As Needed for Chest Pain for up to 3 total doses. Call 911 if pain remains after 1 dose., Disp: 25 tablet, Rfl: 6  •  ofloxacin (FLOXIN) 0.3 % otic solution, Instill 5 drops into affected ear once a day for 7 days, Disp: 10 mL, Rfl: 0  •  pancrelipase, Lip-Prot-Amyl, (Pancreaze) 00393-30230 units capsule  "delayed-release particles capsule, Take 1 capsule with each meal and with each snack (Patient taking differently: As Needed.), Disp: 100 capsule, Rfl: 2  •  pilocarpine (SALAGEN) 5 MG tablet, Take 1 tablet by mouth 3 (Three) Times a Day., Disp: 90 tablet, Rfl: 2  •  pimecrolimus (Elidel) 1 % cream, Apply topically to the face two times daily as directed., Disp: 60 g, Rfl: 0  •  potassium chloride (KLOR-CON) 10 MEQ CR tablet, Take 1 tablet by mouth Daily as directed, Disp: 90 tablet, Rfl: 0  •  prednisoLONE acetate (PRED FORTE) 1 % ophthalmic suspension, Instill 1 drop in both eyes twice a day; Shake Well Before Use, Disp: 5 mL, Rfl: 0  •  promethazine (PHENERGAN) 25 MG tablet, Take 1 tablet by mouth Every 6 (Six) Hours As Needed for Nausea or Vomiting., Disp: 30 tablet, Rfl: 1  •  RABEprazole (ACIPHEX) 20 MG EC tablet, Take 1 tablet by mouth Once a day, Disp: 90 tablet, Rfl: 1  •  rosuvastatin (CRESTOR) 10 MG tablet, Take one tablet by mouth once a day, Disp: 90 tablet, Rfl: 0  •  topiramate (TOPAMAX) 25 MG tablet, Take 1 tablet by mouth Every Night., Disp: 90 tablet, Rfl: 3  •  triamcinolone (KENALOG) 0.1 % cream, Apply topically to the affected area twice a day, Disp: 30 g, Rfl: 0  •  venlafaxine XR (EFFEXOR-XR) 75 MG 24 hr capsule, Take 1 capsule by mouth Daily., Disp: 90 capsule, Rfl: 3  •  Nurtec 75 MG dispersible tablet, Take 1 tablet by mouth Daily As Needed (migraine)., Disp: 8 tablet, Rfl: 11     Review of Systems   Musculoskeletal: Positive for arthralgias, back pain, gait problem and neck pain.   Neurological: Positive for headaches.   Psychiatric/Behavioral: The patient is nervous/anxious.    All other systems reviewed and are negative.       Objective:  /64   Pulse 79   Ht 171 cm (67.32\")   Wt 72.1 kg (159 lb)   SpO2 97%   BMI 24.66 kg/m²     Neurologic Exam     Mental Status   Oriented to person, place, and time.   Speech: speech is normal   Level of consciousness: alert    Cranial Nerves "   Cranial nerves II through XII intact.     Motor Exam   Muscle bulk: normal  Overall muscle tone: normal    Strength   Strength 5/5 except as noted.   Chronic pain and arthritis multiple joints. Uses cane for ambulation.     Sensory Exam   Right leg light touch: decreased from ankle  Left leg light touch: decreased from ankle  Right leg vibration: decreased from ankle  Left leg vibration: decreased from ankle  Right leg pinprick: decreased from ankle  Left leg pinprick: decreased from ankle    Right more than Left decreased sensation-stocking distribution.     Gait, Coordination, and Reflexes     Gait  Gait: (antalgic with cane)    Coordination   Finger to nose coordination: normal    Tremor   Resting tremor: absent  Intention tremor: absent  Action tremor: absent    Reflexes   Right brachioradialis: 2+  Left brachioradialis: 2+  Right biceps: 2+  Left biceps: 2+  Right patellar: 1+  Left patellar: 1+  Right achilles: 1+  Left achilles: 1+  Right : 2+  Left : 2+      Physical Exam  Constitutional:       Appearance: Normal appearance.   Skin:         Neurological:      Mental Status: She is alert and oriented to person, place, and time.      Cranial Nerves: Cranial nerves 2-12 are intact.      Coordination: Finger-Nose-Finger Test normal.      Deep Tendon Reflexes:      Reflex Scores:       Bicep reflexes are 2+ on the right side and 2+ on the left side.       Brachioradialis reflexes are 2+ on the right side and 2+ on the left side.       Patellar reflexes are 1+ on the right side and 1+ on the left side.       Achilles reflexes are 1+ on the right side and 1+ on the left side.  Psychiatric:         Mood and Affect: Mood is anxious.         Speech: Speech normal.         Behavior: Behavior normal.         Thought Content: Thought content normal.         Cognition and Memory: Cognition and memory normal.         Judgment: Judgment normal.         Assessment/Plan:      Diagnoses and all orders for this  visit:    1. Diabetic polyneuropathy associated with diabetes mellitus due to underlying condition (Primary)  -     gabapentin (NEURONTIN) 800 MG tablet; Take 1 tablet by mouth Every Night.  Dispense: 90 tablet; Refill: 1    2. DDD (degenerative disc disease), cervical  -     gabapentin (NEURONTIN) 800 MG tablet; Take 1 tablet by mouth Every Night.  Dispense: 90 tablet; Refill: 1  -     baclofen (LIORESAL) 10 MG tablet; Take 1 tablet by mouth Every Night.  Dispense: 90 tablet; Refill: 3    3. Migraine without aura and without status migrainosus, not intractable  -     gabapentin (NEURONTIN) 800 MG tablet; Take 1 tablet by mouth Every Night.  Dispense: 90 tablet; Refill: 1  -     topiramate (TOPAMAX) 25 MG tablet; Take 1 tablet by mouth Every Night.  Dispense: 90 tablet; Refill: 3  -     butalbital-acetaminophen-caffeine (FIORICET, ESGIC) -40 MG per tablet; Take 1 tablet by mouth Daily As Needed for headache  Dispense: 30 tablet; Refill: 0  -     Discontinue: metoclopramide (REGLAN) 10 MG tablet; Take 1 tablet by mouth Daily As Needed for migraine.  Dispense: 30 tablet; Refill: 1  -     promethazine (PHENERGAN) 25 MG tablet; Take 1 tablet by mouth Every 6 (Six) Hours As Needed for Nausea or Vomiting.  Dispense: 30 tablet; Refill: 1  -     Nurtec 75 MG dispersible tablet; Take 1 tablet by mouth Daily As Needed (migraine).  Dispense: 8 tablet; Refill: 11    4. Recurrent falls while walking  Comments:  keep doing home physical therapy exercises, continue using cane        She will continue with all of her specialists she is seeing.     We are going to get her some Nurtec ODT to take at onset of migraine. Triptans would be contraindicated for her with cardiovascular risk factors.     We discussed falls prevention, continuing to complete physical therapy exercises, which she always feels is helpful while she is doing these, but once she stops, she finds ithat she worsens. Encouraged her to do home physical therapy  exercises. Continue using a cane.    She will continue with pain management. She has had recent MRI of the lumbar spine and will follow up with them.      She will continue current medications. A controlled substance agreement for gabapentin is signed today.     She will follow up next available in clinic or sooner if needed. She will call us with any additional questions or concerns prior to follow-up. It is always a pleasure seeing her.    Reviewed medications, potential side effects and signs and symptoms to report. Discussed risk versus benefits of treatment plan with patient and/or family-including medications, labs and radiology that may be ordered. Addressed questions and concerns during visit. Patient and/or family verbalized understanding and agree with plan.    As part of this patient's treatment plan I am prescribing controlled substances. The patient has been made aware of appropriate use of such medications, including potential risk of somnolence, limited ability to drive and/or work safely, and potential for dependence or overdose. It has also been made clear that these medications are for use by the patient only, without concomitant use of alcohol or other substances unless prescribed. Keep out of reach of children.  Diogenes report has been reviewed. If this is going to be prescribed long term, [facility] Controlled Substance Agreement Contract has also been read and signed by patient and myself.    During this visit the following were done:  Labs Reviewed [x]    Labs Ordered []    Radiology Reports Reviewed [x]    Radiology Ordered []    PCP Records Reviewed []    Referring Provider Records Reviewed []    ER Records Reviewed []    Hospital Records Reviewed []    History Obtained From Family []    Radiology Images Reviewed []    Other Reviewed [x] pain management notes  Records Requested []      Transcribed from ambient dictation for [facility] by Robert Jefferson.  05/01/23   16:47  EDT    Patient or patient representative verbalized consent to the visit recording.  I have personally performed the services described in this document as transcribed by the above individual, and it is both accurate and complete.  Jelly Lombardi, APRN  5/2/2023  09:39 EDT

## 2023-05-01 NOTE — LETTER
May 2, 2023     Connor Ritter MD  [address]  [address]  [facility] [address]    Patient: Leela Muller   YOB: 1950   Date of Visit: 5/1/2023       Dear Connor Ritter MD    Leela Muller was in my office today. Below is a copy of my note.    If you have questions, please do not hesitate to call me. I look forward to following Leela along with you.         Sincerely,        [facility]        CC: No Recipients    Subjective:     Patient ID: Leela Muller is a 73 y.o. female.    CC:   Chief Complaint   Patient presents with   • Diabetic polyneuropathy   • Migraine   • Difficulty Walking       HPI:   History of Present Illness   Today 5/1/2023- This is a very pleasant 73-year-old female who presents for a 6-month neurology follow-up on multiple conditions including chronic migraine headaches, chronic neck pain, chronic intermittent vertigo, anxiety, prior strokes, and diabetic peripheral neuropathy. She has been on gabapentin 800 mg nightly long-term. She also has been on baclofen 10 mg nightly, and uses Fioricet as needed. She is here for follow-up and refills on medications today.    We referred her to pain management. She has seen Dr. Mohit Brown with pain management. She has a follow-up with him on 05/31/2023. She states that she has received the injections in her neck before with Dr. Lewis, and it really helped with her headaches. She was supposed to have the injection with pain management, but she got sick with a viral infection, so she had to reschedule. She states that she had an MRI of the lumbar spine done. It showed arthritis in her low back, but no evidence of any concerning narrowing of the spine per radiology. She reports having severe pain in her lower back. She confirms that she is still taking hydrocodone.     She has also been evaluated by ENT. She follows with several specialists including endocrinology, pulmonology, gastroenterology, and cardiology.    Most  recent labs available for review on 03/06/2023, hemoglobin A1c 7.5 percent. On 11/21/2022, TSH was 1.470 uIU/mL. Comprehensive metabolic panel was within normal limits.    She reports that she has been having headaches. She takes the Topamax every night. She also takes butalbital-acetaminophen-caffeine sometimes. She localizes the pain to the back of her neck and her head, and it radiates up to her forehead. She attended physical therapy, and it helped, but her headaches return as soon as she quits physical therapy. She is doing the exercises at home as well. She states that her headaches improved at her last visit, but they have been worsening again. She reports that she has not tried the Aimovig, the Ajovy, and the Emgality injections before. She has not used Ubrelvy or Nurtec before. She has been taking the extra strength Tylenol. She took it last night because she had a headache, and it helped. She had 2 migraine days in the past month. She has nausea, vomiting, photophobia, and phonophobia with the migraines. She takes the Phenergan, and it helps with vomiting. She is no longer taking the Reglan due to drug interactions with her other medications.    She states that she has had falls since her last visit. Her last fall was 2 weeks ago, and she had an injury. She is still using a cane. She did physical therapy. She reports that she has been doing the home physical therapy exercises.    She states that she got exposed to poison oak on the right side of her face, and was given a steroid injection today with her PCP.      She reports that she has neuropathy symptoms, and is taking gabapentin for it. She has a corn lesion on her left great toe.    She was informed that she has a fatty liver and follows with [facility] with close monitoring. She has lost 37 pounds in the past year.     Prior neurology workup and history:  Chronic migraine headaches present for many years along with chronic neck pain,  chronic and intermittent vertigo, anxiety, and history of prior strokes in 06/2015 and in 2010. Previously was followed long term in our clinic by Dr. Devaughn Lewis, with neurology.      She states that she is taking Topamax every night and it has been helping her headaches. Migraines for years. She confirms taking baclofen every night. She takes gabapentin 800 mg every night and Fioricet as needed. She states that she has been trying to take extra strength Tylenol as needed. Headaches have nausea, vomiting, photophobia and phonophobia, begin in occipital region, radiate to bilateral frontal region with moderate throbbing pain. She has these about 2 days per month currently. She is already on a beta blocker. Triptans contraindicated with CV risk factors. She has taken venlafaxine with no benefit. Has not tried nurtec or ubrelvy.    She had fractured her coccyx and actually injured her head and back. She did go to [facility] and had a CT scan. Recurrent falls continue.     MRI of the brain and cervical spine with and without contrast. She did have both of these completed on 06/6/2022 at [facility]. MRI of the brain with and without contrast showed age-related changes of mild generalized volume loss with some encephalomalacia and gliotic changes in the right occipital lobe with the appearance of a prior stroke. No acute intracranial abnormalities and no abnormal contrast enhancement were seen. Her MRI of the cervical spine with and without contrast did show some mild cervical spondylosis similar to 2019 imaging with no focal cord signal abnormalities and no significant spinal stenosis.     She does take aspirin and rosuvastatin. No recurrent strokes.    She does follow with cardiology for erratic orthostatic blood pressure and dizziness and was previously prescribed pyridostigmine, but was unable to tolerate it.      Muscle relaxer cream she [facility] as needed [facility]  that has . Continues magnesium chloride is 15%, guaifenesin is 10%, baclofen is 5%, and cyclobenzaprine is 4%. Uses PRN.     She uses the [facility] pharmacy in [place], [place].      She follows with cardiology, pulmonology, endocrinology, and gastroenterology, pain management- all with [facility].    Takes Lortab as needed for pain. Known lumbar and cervical spinal stenosis and DDD.    She confirms having severe neck, shoulder, left worse than right, and low back pain and it is so bad intermittently that she cannot get up. She has been scheduled for injections.    The following portions of the patient's history were reviewed and updated as appropriate: allergies, current medications, past family history, past medical history, past social history, past surgical history and problem list.    Past Medical History:   Diagnosis Date   • Anxiety    • Arm pain    • Arthritis    • Breast injury     fell 2019    • Cancer    • Chronic pancreatitis    • COVID-19    • Depression    • Diabetes mellitus    • Hyperlipidemia    • Hypertension    • Liver mass    • Neck pain on left side    • Palpitations 2018   • Pancreatitis    • Pulmonary embolism    • Stroke syndrome 2016    · Assessed By: Devaughn Lewis (Neurology); Last Assessed: 2015  Right cerebrovascular accident in July or 2010, evaluated at [facility].  Admission to [facility] on 2010 with headache and stuttering, consistent with TIA.  Chronic Coumadin therapy, initiated following bilateral pulmonary emboli in  after fall and hip replacement.  She was already on 81 mg of aspirin as well, 2010.  MRI of the brain on 2010 revealing old right parietal cerebrovascular accident.  MRA revealing normal carotids, middle anterior and posterior cerebral arteries with minimal ostial stenosis of both vertebral arteries.  GENARO by Dr. Oliver Mobley on 2010:  patent foramen ovale with a small  amount of right to left shunting.  Normal LVEF and normal valvular structures.   Patent foramen ovale closure using a 25 mm. Amplatzer cribriform occluder, 10/05/2010.   Echocardiogram, 06/23/2014:  LVEF (55% to 60%) with and Amplatzer device noted to be well-seated in    • Thrombocytopenia    • Transient cerebral ischemia    • Type 2 diabetes mellitus        Past Surgical History:   Procedure Laterality Date   • APPENDECTOMY     • BREAST BIOPSY Left 2012    benign   • BREAST BIOPSY     • BREAST EXCISIONAL BIOPSY Left     benign   • BREAST EXCISIONAL BIOPSY Right     benign   • BREAST EXCISIONAL BIOPSY Right 2020    benign   • BREAST SURGERY     • CAUTERIZATION NASAL BLEEDERS  2022   • CHOLECYSTECTOMY     • COLONOSCOPY     • HYSTERECTOMY     • LIVER BIOPSY     • OOPHORECTOMY     • RECTAL SURGERY     • SKIN CANCER EXCISION     • TONSILLECTOMY     • TOTAL HIP ARTHROPLASTY REVISION         Social History     Socioeconomic History   • Marital status:    Tobacco Use   • Smoking status: Never     Passive exposure: Never   • Smokeless tobacco: Never   Vaping Use   • Vaping Use: Never used   Substance and Sexual Activity   • Alcohol use: No   • Drug use: No   • Sexual activity: Yes       Family History   Problem Relation Age of Onset   • Alzheimer's disease Mother    • Cancer Mother    • Coronary artery disease Other    • Diabetes Other    • Hypertension Other    • Stroke Other    • Cancer Other    • Dementia Other    • Heart attack Father    • Colon polyps Father    • Breast cancer Neg Hx    • Ovarian cancer Neg Hx    • Colon cancer Neg Hx    • Esophageal cancer Neg Hx           Current Outpatient Medications:   •  acetaminophen (TYLENOL) 500 MG tablet, Take 1 tablet by mouth Every 6 (Six) Hours As Needed., Disp: , Rfl:   •  acyclovir (ZOVIRAX) 800 MG tablet, , Disp: , Rfl:   •  albuterol sulfate  (90 Base) MCG/ACT inhaler, Inhale 2 puffs by mouth every 4 (Four) Hours As Needed, Disp: 8.5 g, Rfl: 2  •   aspirin 81 MG tablet, Take 1 tablet by mouth Daily. Taken at night. Last dose 9-18-21, Disp: , Rfl:   •  azelastine (ASTELIN) 0.1 % nasal spray, Instill 1 spray in each nostil 2 (Two) Times A Day, Disp: 30 mL, Rfl: 11  •  baclofen (LIORESAL) 10 MG tablet, Take 1 tablet by mouth Every Night., Disp: 90 tablet, Rfl: 3  •  butalbital-acetaminophen-caffeine (FIORICET, ESGIC) -40 MG per tablet, Take 1 tablet by mouth Daily As Needed for headache, Disp: 30 tablet, Rfl: 0  •  clidinium-chlordiazePOXIDE (LIBRAX) 5-2.5 MG per capsule, Take 1 capsule by mouth 2 (Two) Times a Day As Needed., Disp: 60 capsule, Rfl: 2  •  clonazePAM (KlonoPIN) 0.5 MG tablet, As Needed., Disp: , Rfl:   •  clotrimazole-betamethasone (LOTRISONE) 1-0.05 % cream, Apply topically to the appropriate private area two times daily as directed., Disp: 15 g, Rfl: 0  •  Continuous Blood Gluc  ([employer] Colin 2 Floweree) device, Use daily as directed, Disp: 1 each, Rfl: 0  •  dapagliflozin (Farxiga) 5 MG tablet tablet, Take 1 tablet by mouth Daily., Disp: 90 tablet, Rfl: 3  •  digoxin (LANOXIN) 250 MCG tablet, Take 1 tablet by mouth Daily., Disp: 90 tablet, Rfl: 3  •  fludrocortisone 0.1 MG tablet, Take 1 tablet by mouth Daily., Disp: 90 tablet, Rfl: 3  •  fluticasone (FLONASE) 50 MCG/ACT nasal spray, Instill 2 sprays in each nostril 2 (Two) Times A Day, Disp: 16 g, Rfl: 1  •  furosemide (LASIX) 40 MG tablet, Take 1 tablet by mouth Daily. May have extra 1/2 to 1 tablet daily if needed for edema, Disp: 135 tablet, Rfl: 3  •  gabapentin (NEURONTIN) 800 MG tablet, Take 1 tablet by mouth Every Night., Disp: 90 tablet, Rfl: 1  •  glipizide (GLUCOTROL XL) 10 MG 24 hr tablet, Take 2 tablets by mouth Daily., Disp: 180 tablet, Rfl: 3  •  glucose blood (OneTouch Verio) test strip, Use to test blood glucose 3 times a day as directed (Patient taking differently: Use to test blood glucose 3 times a day as directed), Disp: 300 each, Rfl: 3  •   HYDROcodone-acetaminophen (NORCO) 7.5-325 MG per tablet, Take 1 tablet by mouth 3 (Three) Times a Day., Disp: 90 tablet, Rfl: 0  •  hydrocortisone (ANUSOL-HC) 25 MG suppository, Insert 1 suppository rectally twice a day as needed (remove wrapper and moisten with water), Disp: 12 suppository, Rfl: 3  •  Insulin Glargine (BASAGLAR KWIKPEN) 100 UNIT/ML injection pen, Inject 50 units subcutaneously once in the morning and 75 units at night, Disp: 135 mL, Rfl: 1  •  Insulin Pen Needle (B-D UF III MINI PEN NEEDLES) 31G X 5 MM misc, Use to inject insulin twice a day as directed, Disp: 100 each, Rfl: 12  •  Insulin Syringe-Needle U-100 29G 0.5 ML misc, Inject 0.3 mL as directed Daily., Disp: , Rfl:   •  loratadine (CLARITIN) 10 MG tablet, Take 1 tablet by mouth Daily., Disp: 30 tablet, Rfl: 3  •  meclizine (ANTIVERT) 25 MG tablet, Take 1 tablet by mouth 3 (Three) Times a Day As Needed for Dizziness., Disp: 90 tablet, Rfl: 3  •  metoprolol succinate XL (TOPROL-XL) 100 MG 24 hr tablet, Take 1 tablet by mouth Daily., Disp: 90 tablet, Rfl: 3  •  metroNIDAZOLE (METROCREAM) 0.75 % cream, Apply to the face once every night, Disp: 45 g, Rfl: 0  •  mupirocin (BACTROBAN) 2 % ointment, Apply topically to the affected area twice a day, Disp: 22 g, Rfl: 0  •  neomycin-colistin-hydrocortisone-thonzonium (Cortisporin-TC) 3.3-3-10-0.5 MG/ML otic suspension, Instill 5 drops into affected ear 3 (Three) times a day for 10 days, Disp: 10 mL, Rfl: 0  •  nitroglycerin (Nitrostat) 0.4 MG SL tablet, Place 1 tablet under the tongue Every 5 Minutes As Needed for Chest Pain for up to 3 total doses. Call 911 if pain remains after 1 dose., Disp: 25 tablet, Rfl: 6  •  ofloxacin (FLOXIN) 0.3 % otic solution, Instill 5 drops into affected ear once a day for 7 days, Disp: 10 mL, Rfl: 0  •  pancrelipase, Lip-Prot-Amyl, (Pancreaze) 53023-71022 units capsule delayed-release particles capsule, Take 1 capsule with each meal and with each snack (Patient taking  "differently: As Needed.), Disp: 100 capsule, Rfl: 2  •  pilocarpine (SALAGEN) 5 MG tablet, Take 1 tablet by mouth 3 (Three) Times a Day., Disp: 90 tablet, Rfl: 2  •  pimecrolimus (Elidel) 1 % cream, Apply topically to the face two times daily as directed., Disp: 60 g, Rfl: 0  •  potassium chloride (KLOR-CON) 10 MEQ CR tablet, Take 1 tablet by mouth Daily as directed, Disp: 90 tablet, Rfl: 0  •  prednisoLONE acetate (PRED FORTE) 1 % ophthalmic suspension, Instill 1 drop in both eyes twice a day; Shake Well Before Use, Disp: 5 mL, Rfl: 0  •  promethazine (PHENERGAN) 25 MG tablet, Take 1 tablet by mouth Every 6 (Six) Hours As Needed for Nausea or Vomiting., Disp: 30 tablet, Rfl: 1  •  RABEprazole (ACIPHEX) 20 MG EC tablet, Take 1 tablet by mouth Once a day, Disp: 90 tablet, Rfl: 1  •  rosuvastatin (CRESTOR) 10 MG tablet, Take one tablet by mouth once a day, Disp: 90 tablet, Rfl: 0  •  topiramate (TOPAMAX) 25 MG tablet, Take 1 tablet by mouth Every Night., Disp: 90 tablet, Rfl: 3  •  triamcinolone (KENALOG) 0.1 % cream, Apply topically to the affected area twice a day, Disp: 30 g, Rfl: 0  •  venlafaxine XR (EFFEXOR-XR) 75 MG 24 hr capsule, Take 1 capsule by mouth Daily., Disp: 90 capsule, Rfl: 3  •  Nurtec 75 MG dispersible tablet, Take 1 tablet by mouth Daily As Needed (migraine)., Disp: 8 tablet, Rfl: 11     Review of Systems   Musculoskeletal: Positive for arthralgias, back pain, gait problem and neck pain.   Neurological: Positive for headaches.   Psychiatric/Behavioral: The patient is nervous/anxious.    All other systems reviewed and are negative.       Objective:  /64   Pulse 79   Ht 171 cm (67.32\")   Wt 72.1 kg (159 lb)   SpO2 97%   BMI 24.66 kg/m²     Neurologic Exam     Mental Status   Oriented to person, place, and time.   Speech: speech is normal   Level of consciousness: alert    Cranial Nerves   Cranial nerves II through XII intact.     Motor Exam   Muscle bulk: normal  Overall muscle tone: " normal    Strength   Strength 5/5 except as noted.   Chronic pain and arthritis multiple joints. Uses cane for ambulation.     Sensory Exam   Right leg light touch: decreased from ankle  Left leg light touch: decreased from ankle  Right leg vibration: decreased from ankle  Left leg vibration: decreased from ankle  Right leg pinprick: decreased from ankle  Left leg pinprick: decreased from ankle    Right more than Left decreased sensation-stocking distribution.     Gait, Coordination, and Reflexes     Gait  Gait: (antalgic with cane)    Coordination   Finger to nose coordination: normal    Tremor   Resting tremor: absent  Intention tremor: absent  Action tremor: absent    Reflexes   Right brachioradialis: 2+  Left brachioradialis: 2+  Right biceps: 2+  Left biceps: 2+  Right patellar: 1+  Left patellar: 1+  Right achilles: 1+  Left achilles: 1+  Right : 2+  Left : 2+      Physical Exam  Constitutional:       Appearance: Normal appearance.   Skin:         Neurological:      Mental Status: She is alert and oriented to person, place, and time.      Cranial Nerves: Cranial nerves 2-12 are intact.      Coordination: Finger-Nose-Finger Test normal.      Deep Tendon Reflexes:      Reflex Scores:       Bicep reflexes are 2+ on the right side and 2+ on the left side.       Brachioradialis reflexes are 2+ on the right side and 2+ on the left side.       Patellar reflexes are 1+ on the right side and 1+ on the left side.       Achilles reflexes are 1+ on the right side and 1+ on the left side.  Psychiatric:         Mood and Affect: Mood is anxious.         Speech: Speech normal.         Behavior: Behavior normal.         Thought Content: Thought content normal.         Cognition and Memory: Cognition and memory normal.         Judgment: Judgment normal.         Assessment/Plan:      Diagnoses and all orders for this visit:    1. Diabetic polyneuropathy associated with diabetes mellitus due to underlying condition  (Primary)  -     gabapentin (NEURONTIN) 800 MG tablet; Take 1 tablet by mouth Every Night.  Dispense: 90 tablet; Refill: 1    2. DDD (degenerative disc disease), cervical  -     gabapentin (NEURONTIN) 800 MG tablet; Take 1 tablet by mouth Every Night.  Dispense: 90 tablet; Refill: 1  -     baclofen (LIORESAL) 10 MG tablet; Take 1 tablet by mouth Every Night.  Dispense: 90 tablet; Refill: 3    3. Migraine without aura and without status migrainosus, not intractable  -     gabapentin (NEURONTIN) 800 MG tablet; Take 1 tablet by mouth Every Night.  Dispense: 90 tablet; Refill: 1  -     topiramate (TOPAMAX) 25 MG tablet; Take 1 tablet by mouth Every Night.  Dispense: 90 tablet; Refill: 3  -     butalbital-acetaminophen-caffeine (FIORICET, ESGIC) -40 MG per tablet; Take 1 tablet by mouth Daily As Needed for headache  Dispense: 30 tablet; Refill: 0  -     Discontinue: metoclopramide (REGLAN) 10 MG tablet; Take 1 tablet by mouth Daily As Needed for migraine.  Dispense: 30 tablet; Refill: 1  -     promethazine (PHENERGAN) 25 MG tablet; Take 1 tablet by mouth Every 6 (Six) Hours As Needed for Nausea or Vomiting.  Dispense: 30 tablet; Refill: 1  -     Nurtec 75 MG dispersible tablet; Take 1 tablet by mouth Daily As Needed (migraine).  Dispense: 8 tablet; Refill: 11    4. Recurrent falls while walking  Comments:  keep doing home physical therapy exercises, continue using cane        She will continue with all of her specialists she is seeing.     We are going to get her some Nurtec ODT to take at onset of migraine. Triptans would be contraindicated for her with cardiovascular risk factors.     We discussed falls prevention, continuing to complete physical therapy exercises, which she always feels is helpful while she is doing these, but once she stops, she finds ithat she worsens. Encouraged her to do home physical therapy exercises. Continue using a cane.    She will continue with pain management. She has had recent MRI  of the lumbar spine and will follow up with them.      She will continue current medications. A controlled substance agreement for gabapentin is signed today.     She will follow up next available in clinic or sooner if needed. She will call us with any additional questions or concerns prior to follow-up. It is always a pleasure seeing her.    Reviewed medications, potential side effects and signs and symptoms to report. Discussed risk versus benefits of treatment plan with patient and/or family-including medications, labs and radiology that may be ordered. Addressed questions and concerns during visit. Patient and/or family verbalized understanding and agree with plan.    As part of this patient's treatment plan I am prescribing controlled substances. The patient has been made aware of appropriate use of such medications, including potential risk of somnolence, limited ability to drive and/or work safely, and potential for dependence or overdose. It has also been made clear that these medications are for use by the patient only, without concomitant use of alcohol or other substances unless prescribed. Keep out of reach of children.  Diogenes report has been reviewed. If this is going to be prescribed long term, [facility] Cont[place] Agreement Contract has also been read and signed by patient and myself.    During this visit the following were done:  Labs Reviewed [x]    Labs Ordered []    Radiology Reports Reviewed [x]    Radiology Ordered []    PCP Records Reviewed []    Referring Provider Records Reviewed []    ER Records Reviewed []    Hospital Records Reviewed []    History Obtained From Family []    Radiology Images Reviewed []    Other Reviewed [x] pain management notes  Records Requested []      Transcribed from ambient dictation for [facility] by Robert Jefferson.  05/01/23   16:47 EDT    Patient or patient representative verbalized consent to the visit recording.  I have personally  performed the services described in this document as transcribed by the above individual, and it is both accurate and complete.  Jelly Lombardi, APRN  5/2/2023  09:39 EDT

## 2023-05-01 NOTE — LETTER
May 2, 2023     Connor Ritter MD  [address]  [address]  [facility] [address]    Patient: Leela Muller   YOB: 1950   Date of Visit: 5/1/2023       Dear Connor Ritter MD    Leela Muller was in my office today. Below is a copy of my note.    If you have questions, please do not hesitate to call me. I look forward to following Leela along with you.         Sincerely,        [facility]        CC: No Recipients    Subjective:     Patient ID: Leela Muller is a 73 y.o. female.    CC:   Chief Complaint   Patient presents with   • Diabetic polyneuropathy   • Migraine   • Difficulty Walking       HPI:   History of Present Illness   Today 5/1/2023- This is a very pleasant 73-year-old female who presents for a 6-month neurology follow-up on multiple conditions including chronic migraine headaches, chronic neck pain, chronic intermittent vertigo, anxiety, prior strokes, and diabetic peripheral neuropathy. She has been on gabapentin 800 mg nightly long-term. She also has been on baclofen 10 mg nightly, and uses Fioricet as needed. She is here for follow-up and refills on medications today.    We referred her to pain management. She has seen Dr. Mohit Brown with pain management. She has a follow-up with him on 05/31/2023. She states that she has received the injections in her neck before with Dr. Lewis, and it really helped with her headaches. She was supposed to have the injection with pain management, but she got sick with a viral infection, so she had to reschedule. She states that she had an MRI of the lumbar spine done. It showed arthritis in her low back, but no evidence of any concerning narrowing of the spine per radiology. She reports having severe pain in her lower back. She confirms that she is still taking hydrocodone.     She has also been evaluated by ENT. She follows with several specialists including endocrinology, pulmonology, gastroenterology, and cardiology.    Most  recent labs available for review on 03/06/2023, hemoglobin A1c 7.5 percent. On 11/21/2022, TSH was 1.470 uIU/mL. Comprehensive metabolic panel was within normal limits.    She reports that she has been having headaches. She takes the Topamax every night. She also takes butalbital-acetaminophen-caffeine sometimes. She localizes the pain to the back of her neck and her head, and it radiates up to her forehead. She attended physical therapy, and it helped, but her headaches return as soon as she quits physical therapy. She is doing the exercises at home as well. She states that her headaches improved at her last visit, but they have been worsening again. She reports that she has not tried the Aimovig, the Ajovy, and the Emgality injections before. She has not used Ubrelvy or Nurtec before. She has been taking the extra strength Tylenol. She took it last night because she had a headache, and it helped. She had 2 migraine days in the past month. She has nausea, vomiting, photophobia, and phonophobia with the migraines. She takes the Phenergan, and it helps with vomiting. She is no longer taking the Reglan due to drug interactions with her other medications.    She states that she has had falls since her last visit. Her last fall was 2 weeks ago, and she had an injury. She is still using a cane. She did physical therapy. She reports that she has been doing the home physical therapy exercises.    She states that she got exposed to poison oak on the right side of her face, and was given a steroid injection today with her PCP.      She reports that she has neuropathy symptoms, and is taking gabapentin for it. She has a corn lesion on her left great toe.    She was informed that she has a fatty liver and follows with [facility] with close monitoring. She has lost 37 pounds in the past year.     Prior neurology workup and history:  Chronic migraine headaches present for many years along with chronic neck pain,  chronic and intermittent vertigo, anxiety, and history of prior strokes in 06/2015 and in 2010. Previously was followed long term in our clinic by Dr. Devaughn Lewis, with neurology.      She states that she is taking Topamax every night and it has been helping her headaches. Migraines for years. She confirms taking baclofen every night. She takes gabapentin 800 mg every night and Fioricet as needed. She states that she has been trying to take extra strength Tylenol as needed. Headaches have nausea, vomiting, photophobia and phonophobia, begin in occipital region, radiate to bilateral frontal region with moderate throbbing pain. She has these about 2 days per month currently. She is already on a beta blocker. Triptans contraindicated with CV risk factors. She has taken venlafaxine with no benefit. Has not tried nurtec or ubrelvy.    She had fractured her coccyx and actually injured her head and back. She did go to [facility] and had a CT scan. Recurrent falls continue.     MRI of the brain and cervical spine with and without contrast. She did have both of these completed on 06/6/2022 at [facility]. MRI of the brain with and without contrast showed age-related changes of mild generalized volume loss with some encephalomalacia and gliotic changes in the right occipital lobe with the appearance of a prior stroke. No acute intracranial abnormalities and no abnormal contrast enhancement were seen. Her MRI of the cervical spine with and without contrast did show some mild cervical spondylosis similar to 2019 imaging with no focal cord signal abnormalities and no significant spinal stenosis.     She does take aspirin and rosuvastatin. No recurrent strokes.    She does follow with cardiology for erratic orthostatic blood pressure and dizziness and was previously prescribed pyridostigmine, but was unable to tolerate it.      Muscle relaxer cream she uses as needed [facility]  that has . Continues magnesium chloride is 15%, guaifenesin is 10%, baclofen is 5%, and cyclobenzaprine is 4%. Uses PRN.     She uses the [facility] pharmacy in [place], [place].      She follows with cardiology, pulmonology, endocrinology, and gastroenterology, pain management- all with [facility].    Takes Lortab as needed for pain. Known lumbar and cervical spinal stenosis and DDD.    She confirms having severe neck, shoulder, left worse than right, and low back pain and it is so bad intermittently that she cannot get up. She has been scheduled for injections.    The following portions of the patient's history were reviewed and updated as appropriate: allergies, current medications, past family history, past medical history, past social history, past surgical history and problem list.    Past Medical History:   Diagnosis Date   • Anxiety    • Arm pain    • Arthritis    • Breast injury     fell 2019    • Cancer    • Chronic pancreatitis    • COVID-19    • Depression    • Diabetes mellitus    • Hyperlipidemia    • Hypertension    • Liver mass    • Neck pain on left side    • Palpitations 2018   • Pancreatitis    • Pulmonary embolism    • Stroke syndrome 2016    · Assessed By: Devaughn Lewis (Neurology); Last Assessed: 2015  Right cerebrovascular accident in July or 2010, evaluated at [facility].  Admission to [facility] on 2010 with headache and stuttering, consistent with TIA.  Chronic Coumadin therapy, initiated following bilateral pulmonary emboli in  after fall and hip replacement.  She was already on 81 mg of aspirin as well, 2010.  MRI of the brain on 2010 revealing old right parietal cerebrovascular accident.  MRA revealing normal carotids, middle anterior and posterior cerebral arteries with minimal ostial stenosis of both vertebral arteries.  GENARO by Dr. Oliver Mobley on 2010:  patent foramen ovale with a small  amount of right to left shunting.  Normal LVEF and normal valvular structures.   Patent foramen ovale closure using a 25 mm. Amplatzer cribriform occluder, 10/05/2010.   Echocardiogram, 06/23/2014:  LVEF (55% to 60%) with and Amplatzer device noted to be well-seated in    • Thrombocytopenia    • Transient cerebral ischemia    • Type 2 diabetes mellitus        Past Surgical History:   Procedure Laterality Date   • APPENDECTOMY     • BREAST BIOPSY Left 2012    benign   • BREAST BIOPSY     • BREAST EXCISIONAL BIOPSY Left     benign   • BREAST EXCISIONAL BIOPSY Right     benign   • BREAST EXCISIONAL BIOPSY Right 2020    benign   • BREAST SURGERY     • CAUTERIZATION NASAL BLEEDERS  2022   • CHOLECYSTECTOMY     • COLONOSCOPY     • HYSTERECTOMY     • LIVER BIOPSY     • OOPHORECTOMY     • RECTAL SURGERY     • SKIN CANCER EXCISION     • TONSILLECTOMY     • TOTAL HIP ARTHROPLASTY REVISION         Social History     Socioeconomic History   • Marital status:    Tobacco Use   • Smoking status: Never     Passive exposure: Never   • Smokeless tobacco: Never   Vaping Use   • Vaping Use: Never used   Substance and Sexual Activity   • Alcohol use: No   • Drug use: No   • Sexual activity: Yes       Family History   Problem Relation Age of Onset   • Alzheimer's disease Mother    • Cancer Mother    • Coronary artery disease Other    • Diabetes Other    • Hypertension Other    • Stroke Other    • Cancer Other    • Dementia Other    • Heart attack Father    • Colon polyps Father    • Breast cancer Neg Hx    • Ovarian cancer Neg Hx    • Colon cancer Neg Hx    • Esophageal cancer Neg Hx           Current Outpatient Medications:   •  acetaminophen (TYLENOL) 500 MG tablet, Take 1 tablet by mouth Every 6 (Six) Hours As Needed., Disp: , Rfl:   •  acyclovir (ZOVIRAX) 800 MG tablet, , Disp: , Rfl:   •  albuterol sulfate  (90 Base) MCG/ACT inhaler, Inhale 2 puffs by mouth every 4 (Four) Hours As Needed, Disp: 8.5 g, Rfl: 2  •   aspirin 81 MG tablet, Take 1 tablet by mouth Daily. Taken at night. Last dose 9-18-21, Disp: , Rfl:   •  azelastine (ASTELIN) 0.1 % nasal spray, Instill 1 spray in each nostil 2 (Two) Times A Day, Disp: 30 mL, Rfl: 11  •  baclofen (LIORESAL) 10 MG tablet, Take 1 tablet by mouth Every Night., Disp: 90 tablet, Rfl: 3  •  butalbital-acetaminophen-caffeine (FIORICET, ESGIC) -40 MG per tablet, Take 1 tablet by mouth Daily As Needed for headache, Disp: 30 tablet, Rfl: 0  •  clidinium-chlordiazePOXIDE (LIBRAX) 5-2.5 MG per capsule, Take 1 capsule by mouth 2 (Two) Times a Day As Needed., Disp: 60 capsule, Rfl: 2  •  clonazePAM (KlonoPIN) 0.5 MG tablet, As Needed., Disp: , Rfl:   •  clotrimazole-betamethasone (LOTRISONE) 1-0.05 % cream, Apply topically to the appropriate private area two times daily as directed., Disp: 15 g, Rfl: 0  •  Continuous Blood Gluc  (youwhoyle [place] [address] [place]) device, Use daily as directed, Disp: 1 each, Rfl: 0  •  dapagliflozin (Farxiga) 5 MG tablet tablet, Take 1 tablet by mouth Daily., Disp: 90 tablet, Rfl: 3  •  digoxin (LANOXIN) 250 MCG tablet, Take 1 tablet by mouth Daily., Disp: 90 tablet, Rfl: 3  •  fludrocortisone 0.1 MG tablet, Take 1 tablet by mouth Daily., Disp: 90 tablet, Rfl: 3  •  fluticasone (FLONASE) 50 MCG/ACT nasal spray, Instill 2 sprays in each nostril 2 (Two) Times A Day, Disp: 16 g, Rfl: 1  •  furosemide (LASIX) 40 MG tablet, Take 1 tablet by mouth Daily. May have extra 1/2 to 1 tablet daily if needed for edema, Disp: 135 tablet, Rfl: 3  •  gabapentin (NEURONTIN) 800 MG tablet, Take 1 tablet by mouth Every Night., Disp: 90 tablet, Rfl: 1  •  glipizide (GLUCOTROL XL) 10 MG 24 hr tablet, Take 2 tablets by mouth Daily., Disp: 180 tablet, Rfl: 3  •  glucose blood (OneTouch Verio) test strip, Use to test blood glucose 3 times a day as directed (Patient taking differently: Use to test blood glucose 3 times a day as directed), Disp: 300 each, Rfl: 3  •   HYDROcodone-acetaminophen (NORCO) 7.5-325 MG per tablet, Take 1 tablet by mouth 3 (Three) Times a Day., Disp: 90 tablet, Rfl: 0  •  hydrocortisone (ANUSOL-HC) 25 MG suppository, Insert 1 suppository rectally twice a day as needed (remove wrapper and moisten with water), Disp: 12 suppository, Rfl: 3  •  Insulin Glargine (BASAGLAR KWIKPEN) 100 UNIT/ML injection pen, Inject 50 units subcutaneously once in the morning and 75 units at night, Disp: 135 mL, Rfl: 1  •  Insulin Pen Needle (B-D UF III MINI PEN NEEDLES) 31G X 5 MM misc, Use to inject insulin twice a day as directed, Disp: 100 each, Rfl: 12  •  Insulin Syringe-Needle U-100 29G 0.5 ML misc, Inject 0.3 mL as directed Daily., Disp: , Rfl:   •  loratadine (CLARITIN) 10 MG tablet, Take 1 tablet by mouth Daily., Disp: 30 tablet, Rfl: 3  •  meclizine (ANTIVERT) 25 MG tablet, Take 1 tablet by mouth 3 (Three) Times a Day As Needed for Dizziness., Disp: 90 tablet, Rfl: 3  •  metoprolol succinate XL (TOPROL-XL) 100 MG 24 hr tablet, Take 1 tablet by mouth Daily., Disp: 90 tablet, Rfl: 3  •  metroNIDAZOLE (METROCREAM) 0.75 % cream, Apply to the face once every night, Disp: 45 g, Rfl: 0  •  mupirocin (BACTROBAN) 2 % ointment, Apply topically to the affected area twice a day, Disp: 22 g, Rfl: 0  •  neomycin-colistin-hydrocortisone-thonzonium (Cortisporin-TC) 3.3-3-10-0.5 MG/ML otic suspension, Instill 5 drops into affected ear 3 (Three) times a day for 10 days, Disp: 10 mL, Rfl: 0  •  nitroglycerin (Nitrostat) 0.4 MG SL tablet, Place 1 tablet under the tongue Every 5 Minutes As Needed for Chest Pain for up to 3 total doses. Call 911 if pain remains after 1 dose., Disp: 25 tablet, Rfl: 6  •  ofloxacin (FLOXIN) 0.3 % otic solution, Instill 5 drops into affected ear once a day for 7 days, Disp: 10 mL, Rfl: 0  •  pancrelipase, Lip-Prot-Amyl, (Pancreaze) 75462-06397 units capsule delayed-release particles capsule, Take 1 capsule with each meal and with each snack (Patient taking  "differently: As Needed.), Disp: 100 capsule, Rfl: 2  •  pilocarpine (SALAGEN) 5 MG tablet, Take 1 tablet by mouth 3 (Three) Times a Day., Disp: 90 tablet, Rfl: 2  •  pimecrolimus (Elidel) 1 % cream, Apply topically to the face two times daily as directed., Disp: 60 g, Rfl: 0  •  potassium chloride (KLOR-CON) 10 MEQ CR tablet, Take 1 tablet by mouth Daily as directed, Disp: 90 tablet, Rfl: 0  •  prednisoLONE acetate (PRED FORTE) 1 % ophthalmic suspension, Instill 1 drop in both eyes twice a day; Shake Well Before Use, Disp: 5 mL, Rfl: 0  •  promethazine (PHENERGAN) 25 MG tablet, Take 1 tablet by mouth Every 6 (Six) Hours As Needed for Nausea or Vomiting., Disp: 30 tablet, Rfl: 1  •  RABEprazole (ACIPHEX) 20 MG EC tablet, Take 1 tablet by mouth Once a day, Disp: 90 tablet, Rfl: 1  •  rosuvastatin (CRESTOR) 10 MG tablet, Take one tablet by mouth once a day, Disp: 90 tablet, Rfl: 0  •  topiramate (TOPAMAX) 25 MG tablet, Take 1 tablet by mouth Every Night., Disp: 90 tablet, Rfl: 3  •  triamcinolone (KENALOG) 0.1 % cream, Apply topically to the affected area twice a day, Disp: 30 g, Rfl: 0  •  venlafaxine XR (EFFEXOR-XR) 75 MG 24 hr capsule, Take 1 capsule by mouth Daily., Disp: 90 capsule, Rfl: 3  •  Nurtec 75 MG dispersible tablet, Take 1 tablet by mouth Daily As Needed (migraine)., Disp: 8 tablet, Rfl: 11     Review of Systems   Musculoskeletal: Positive for arthralgias, back pain, gait problem and neck pain.   Neurological: Positive for headaches.   Psychiatric/Behavioral: The patient is nervous/anxious.    All other systems reviewed and are negative.       Objective:  /64   Pulse 79   Ht 171 cm (67.32\")   Wt 72.1 kg (159 lb)   SpO2 97%   BMI 24.66 kg/m²     Neurologic Exam     Mental Status   Oriented to person, place, and time.   Speech: speech is normal   Level of consciousness: alert    Cranial Nerves   Cranial nerves II through XII intact.     Motor Exam   Muscle bulk: normal  Overall muscle tone: " normal    Strength   Strength 5/5 except as noted.   Chronic pain and arthritis multiple joints. Uses cane for ambulation.     Sensory Exam   Right leg light touch: decreased from ankle  Left leg light touch: decreased from ankle  Right leg vibration: decreased from ankle  Left leg vibration: decreased from ankle  Right leg pinprick: decreased from ankle  Left leg pinprick: decreased from ankle    Right more than Left decreased sensation-stocking distribution.     Gait, Coordination, and Reflexes     Gait  Gait: (antalgic with cane)    Coordination   Finger to nose coordination: normal    Tremor   Resting tremor: absent  Intention tremor: absent  Action tremor: absent    Reflexes   Right brachioradialis: 2+  Left brachioradialis: 2+  Right biceps: 2+  Left biceps: 2+  Right patellar: 1+  Left patellar: 1+  Right achilles: 1+  Left achilles: 1+  Right : 2+  Left : 2+      Physical Exam  Constitutional:       Appearance: Normal appearance.   Skin:         Neurological:      Mental Status: She is alert and oriented to person, place, and time.      Cranial Nerves: Cranial nerves 2-12 are intact.      Coordination: Finger-Nose-Finger Test normal.      Deep Tendon Reflexes:      Reflex Scores:       Bicep reflexes are 2+ on the right side and 2+ on the left side.       Brachioradialis reflexes are 2+ on the right side and 2+ on the left side.       Patellar reflexes are 1+ on the right side and 1+ on the left side.       Achilles reflexes are 1+ on the right side and 1+ on the left side.  Psychiatric:         Mood and Affect: Mood is anxious.         Speech: Speech normal.         Behavior: Behavior normal.         Thought Content: Thought content normal.         Cognition and Memory: Cognition and memory normal.         Judgment: Judgment normal.         Assessment/Plan:      Diagnoses and all orders for this visit:    1. Diabetic polyneuropathy associated with diabetes mellitus due to underlying condition  (Primary)  -     gabapentin (NEURONTIN) 800 MG tablet; Take 1 tablet by mouth Every Night.  Dispense: 90 tablet; Refill: 1    2. DDD (degenerative disc disease), cervical  -     gabapentin (NEURONTIN) 800 MG tablet; Take 1 tablet by mouth Every Night.  Dispense: 90 tablet; Refill: 1  -     baclofen (LIORESAL) 10 MG tablet; Take 1 tablet by mouth Every Night.  Dispense: 90 tablet; Refill: 3    3. Migraine without aura and without status migrainosus, not intractable  -     gabapentin (NEURONTIN) 800 MG tablet; Take 1 tablet by mouth Every Night.  Dispense: 90 tablet; Refill: 1  -     topiramate (TOPAMAX) 25 MG tablet; Take 1 tablet by mouth Every Night.  Dispense: 90 tablet; Refill: 3  -     butalbital-acetaminophen-caffeine (FIORICET, ESGIC) -40 MG per tablet; Take 1 tablet by mouth Daily As Needed for headache  Dispense: 30 tablet; Refill: 0  -     Discontinue: metoclopramide (REGLAN) 10 MG tablet; Take 1 tablet by mouth Daily As Needed for migraine.  Dispense: 30 tablet; Refill: 1  -     promethazine (PHENERGAN) 25 MG tablet; Take 1 tablet by mouth Every 6 (Six) Hours As Needed for Nausea or Vomiting.  Dispense: 30 tablet; Refill: 1  -     Nurtec 75 MG dispersible tablet; Take 1 tablet by mouth Daily As Needed (migraine).  Dispense: 8 tablet; Refill: 11    4. Recurrent falls while walking  Comments:  keep doing home physical therapy exercises, continue using cane        She will continue with all of her specialists she is seeing.     We are going to get her some Nurtec ODT to take at onset of migraine. Triptans would be contraindicated for her with cardiovascular risk factors.     We discussed falls prevention, continuing to complete physical therapy exercises, which she always feels is helpful while she is doing these, but once she stops, she finds ithat she worsens. Encouraged her to do home physical therapy exercises. Continue using a cane.    She will continue with pain management. She has had recent MRI  of the lumbar spine and will follow up with them.      She will continue current medications. A controlled substance agreement for gabapentin is signed today.     She will follow up next available in clinic or sooner if needed. She will call us with any additional questions or concerns prior to follow-up. It is always a pleasure seeing her.    Reviewed medications, potential side effects and signs and symptoms to report. Discussed risk versus benefits of treatment plan with patient and/or family-including medications, labs and radiology that may be ordered. Addressed questions and concerns during visit. Patient and/or family verbalized understanding and agree with plan.    As part of this patient's treatment plan I am prescribing controlled substances. The patient has been made aware of appropriate use of such medications, including potential risk of somnolence, limited ability to drive and/or work safely, and potential for dependence or overdose. It has also been made clear that these medications are for use by the patient only, without concomitant use of alcohol or other substances unless prescribed. Keep out of reach of children.  Diogenes report has been reviewed. If this is going to be prescribed long term, [facility] Controlled Substance Agreement Contract has also been read and signed by patient and myself.    During this visit the following were done:  Labs Reviewed [x]    Labs Ordered []    Radiology Reports Reviewed [x]    Radiology Ordered []    PCP Records Reviewed []    Referring Provider Records Reviewed []    ER Records Reviewed []    Hospital Records Reviewed []    History Obtained From Family []    Radiology Images Reviewed []    Other Reviewed [x] pain management notes  Records Requested []      Transcribed from ambient dictation for [facility] by Robert Jefferson.  05/01/23   16:47 EDT    Patient or patient representative verbalized consent to the visit recording.  I have personally  performed the services described in this document as transcribed by the above individual, and it is both accurate and complete.  Jelly Lombardi, APRN  5/2/2023  09:39 EDT

## 2023-05-01 NOTE — PROGRESS NOTES
Subjective:     Patient ID: Leela Muller is a 73 y.o. female.    CC:   Chief Complaint   Patient presents with   • Diabetic polyneuropathy   • Migraine   • Difficulty Walking       HPI:   History of Present Illness   Today 5/1/2023- This is a very pleasant 73-year-old female who presents for a 6-month neurology follow-up on multiple conditions including chronic migraine headaches, chronic neck pain, chronic intermittent vertigo, anxiety, prior strokes, and diabetic peripheral neuropathy. She has been on gabapentin 800 mg nightly long-term. She also has been on baclofen 10 mg nightly, and uses Fioricet as needed. She is here for follow-up and refills on medications today.    We referred her to pain management. She has seen Dr. Mohit Brown with pain management. She has a follow-up with him on 05/31/2023. She states that she has received the injections in her neck before with Dr. Lewis, and it really helped with her headaches. She was supposed to have the injection with pain management, but she got sick with a viral infection, so she had to reschedule. She states that she had an MRI of the lumbar spine done. It showed arthritis in her low back, but no evidence of any concerning narrowing of the spine per radiology. She reports having severe pain in her lower back. She confirms that she is still taking hydrocodone.     She has also been evaluated by ENT. She follows with several specialists including endocrinology, pulmonology, gastroenterology, and cardiology.    Most recent labs available for review on 03/06/2023, hemoglobin A1c 7.5 percent. On 11/21/2022, TSH was 1.470 uIU/mL. Comprehensive metabolic panel was within normal limits.    She reports that she has been having headaches. She takes the Topamax every night. She also takes butalbital-acetaminophen-caffeine sometimes. She localizes the pain to the back of her neck and her head, and it radiates up to her forehead. She attended physical therapy, and it  helped, but her headaches return as soon as she quits physical therapy. She is doing the exercises at home as well. She states that her headaches improved at her last visit, but they have been worsening again. She reports that she has not tried the Aimovig, the Ajovy, and the Emgality injections before. She has not used Ubrelvy or Nurtec before. She has been taking the extra strength Tylenol. She took it last night because she had a headache, and it helped. She had 2 migraine days in the past month. She has nausea, vomiting, photophobia, and phonophobia with the migraines. She takes the Phenergan, and it helps with vomiting. She is no longer taking the Reglan due to drug interactions with her other medications.    She states that she has had falls since her last visit. Her last fall was 2 weeks ago, and she had an injury. She is still using a cane. She did physical therapy. She reports that she has been doing the home physical therapy exercises.    She states that she got exposed to poison oak on the right side of her face, and was given a steroid injection today with her PCP.      She reports that she has neuropathy symptoms, and is taking gabapentin for it. She has a corn lesion on her left great toe.    She was informed that she has a fatty liver and follows with [facility] with close monitoring. She has lost 37 pounds in the past year.     Prior neurology workup and history:  Chronic migraine headaches present for many years along with chronic neck pain, chronic and intermittent vertigo, anxiety, and history of prior strokes in 06/2015 and in 2010. Previously was followed long term in our clinic by Dr. Devaughn Lewis, with neurology.      She states that she is taking Topamax every night and it has been helping her headaches. Migraines for years. She confirms taking baclofen every night. She takes gabapentin 800 mg every night and Fioricet as needed. She states that she has been trying to take  extra strength Tylenol as needed. Headaches have nausea, vomiting, photophobia and phonophobia, begin in occipital region, radiate to bilateral frontal region with moderate throbbing pain. She has these about 2 days per month currently. She is already on a beta blocker. Triptans contraindicated with CV risk factors. She has taken venlafaxine with no benefit. Has not tried nurtec or ubrelvy.    She had fractured her coccyx and actually injured her head and back. She did go to [facility] and had a CT scan. Recurrent falls continue.     MRI of the brain and cervical spine with and without contrast. She did have both of these completed on 2022 at [facility]. MRI of the brain with and without contrast showed age-related changes of mild generalized volume loss with some encephalomalacia and gliotic changes in the right occipital lobe with the appearance of a prior stroke. No acute intracranial abnormalities and no abnormal contrast enhancement were seen. Her MRI of the cervical spine with and without contrast did show some mild cervical spondylosis similar to 2019 imaging with no focal cord signal abnormalities and no significant spinal stenosis.     She does take aspirin and rosuvastatin. No recurrent strokes.    She does follow with cardiology for erratic orthostatic blood pressure and dizziness and was previously prescribed pyridostigmine, but was unable to tolerate it.      Muscle relaxer cream she uses as needed with [facility] that has . Continues magnesium chloride is 15%, guaifenesin is 10%, baclofen is 5%, and cyclobenzaprine is 4%. Uses PRN.     She uses the [facility] pharmacy in [place], [place].      She follows with cardiology, pulmonology, endocrinology, and gastroenterology, pain management- all with [facility].    Takes Lortab as needed for pain. Known lumbar and cervical spinal stenosis and DDD.    She confirms having severe neck, shoulder, left  worse than right, and low back pain and it is so bad intermittently that she cannot get up. She has been scheduled for injections.    The following portions of the patient's history were reviewed and updated as appropriate: allergies, current medications, past family history, past medical history, past social history, past surgical history and problem list.    Past Medical History:   Diagnosis Date   • Anxiety    • Arm pain    • Arthritis    • Breast injury     fell 6/2019    • Cancer    • Chronic pancreatitis    • COVID-19    • Depression    • Diabetes mellitus    • Hyperlipidemia    • Hypertension    • Liver mass    • Neck pain on left side    • Palpitations 4/18/2018   • Pancreatitis    • Pulmonary embolism    • Stroke syndrome 9/14/2016    · Assessed By: Devaughn Lewis (Neurology); Last Assessed: 16 Jun 2015  Right cerebrovascular accident in July or August 2010, evaluated at [facility].  Admission to [facility] on 09/28/2010 with headache and stuttering, consistent with TIA.  Chronic Coumadin therapy, initiated following bilateral pulmonary emboli in 2007 after fall and hip replacement.  She was already on 81 mg of aspirin as well, 09/2010.  MRI of the brain on 09/28/2010 revealing old right parietal cerebrovascular accident.  MRA revealing normal carotids, middle anterior and posterior cerebral arteries with minimal ostial stenosis of both vertebral arteries.  GENARO by Dr. Oliver Mobley on 09/28/2010:  patent foramen ovale with a small amount of right to left shunting.  Normal LVEF and normal valvular structures.   Patent foramen ovale closure using a 25 mm. Amplatzer cribriform occluder, 10/05/2010.   Echocardiogram, 06/23/2014:  LVEF (55% to 60%) with and Amplatzer device noted to be well-seated in    • Thrombocytopenia    • Transient cerebral ischemia    • Type 2 diabetes mellitus        Past Surgical History:   Procedure Laterality Date   • APPENDECTOMY     • BREAST BIOPSY  Left 2012    benign   • BREAST BIOPSY     • BREAST EXCISIONAL BIOPSY Left     benign   • BREAST EXCISIONAL BIOPSY Right     benign   • BREAST EXCISIONAL BIOPSY Right 2020    benign   • BREAST SURGERY     • CAUTERIZATION NASAL BLEEDERS  2022   • CHOLECYSTECTOMY     • COLONOSCOPY     • HYSTERECTOMY     • LIVER BIOPSY     • OOPHORECTOMY     • RECTAL SURGERY     • SKIN CANCER EXCISION     • TONSILLECTOMY     • TOTAL HIP ARTHROPLASTY REVISION         Social History     Socioeconomic History   • Marital status:    Tobacco Use   • Smoking status: Never     Passive exposure: Never   • Smokeless tobacco: Never   Vaping Use   • Vaping Use: Never used   Substance and Sexual Activity   • Alcohol use: No   • Drug use: No   • Sexual activity: Yes       Family History   Problem Relation Age of Onset   • Alzheimer's disease Mother    • Cancer Mother    • Coronary artery disease Other    • Diabetes Other    • Hypertension Other    • Stroke Other    • Cancer Other    • Dementia Other    • Heart attack Father    • Colon polyps Father    • Breast cancer Neg Hx    • Ovarian cancer Neg Hx    • Colon cancer Neg Hx    • Esophageal cancer Neg Hx           Current Outpatient Medications:   •  acetaminophen (TYLENOL) 500 MG tablet, Take 1 tablet by mouth Every 6 (Six) Hours As Needed., Disp: , Rfl:   •  acyclovir (ZOVIRAX) 800 MG tablet, , Disp: , Rfl:   •  albuterol sulfate  (90 Base) MCG/ACT inhaler, Inhale 2 puffs by mouth every 4 (Four) Hours As Needed, Disp: 8.5 g, Rfl: 2  •  aspirin 81 MG tablet, Take 1 tablet by mouth Daily. Taken at night. Last dose 9-18-21, Disp: , Rfl:   •  azelastine (ASTELIN) 0.1 % nasal spray, Instill 1 spray in each nostil 2 (Two) Times A Day, Disp: 30 mL, Rfl: 11  •  baclofen (LIORESAL) 10 MG tablet, Take 1 tablet by mouth Every Night., Disp: 90 tablet, Rfl: 3  •  butalbital-acetaminophen-caffeine (FIORICET, ESGIC) -40 MG per tablet, Take 1 tablet by mouth Daily As Needed for headache, Disp:  30 tablet, Rfl: 0  •  clidinium-chlordiazePOXIDE (LIBRAX) 5-2.5 MG per capsule, Take 1 capsule by mouth 2 (Two) Times a Day As Needed., Disp: 60 capsule, Rfl: 2  •  clonazePAM (KlonoPIN) 0.5 MG tablet, As Needed., Disp: , Rfl:   •  clotrimazole-betamethasone (LOTRISONE) 1-0.05 % cream, Apply topically to the appropriate private area two times daily as directed., Disp: 15 g, Rfl: 0  •  Continuous Blood Gluc  ([place] 2 Ware) device, Use daily as directed, Disp: 1 each, Rfl: 0  •  dapagliflozin (Farxiga) 5 MG tablet tablet, Take 1 tablet by mouth Daily., Disp: 90 tablet, Rfl: 3  •  digoxin (LANOXIN) 250 MCG tablet, Take 1 tablet by mouth Daily., Disp: 90 tablet, Rfl: 3  •  fludrocortisone 0.1 MG tablet, Take 1 tablet by mouth Daily., Disp: 90 tablet, Rfl: 3  •  fluticasone (FLONASE) 50 MCG/ACT nasal spray, Instill 2 sprays in each nostril 2 (Two) Times A Day, Disp: 16 g, Rfl: 1  •  furosemide (LASIX) 40 MG tablet, Take 1 tablet by mouth Daily. May have extra 1/2 to 1 tablet daily if needed for edema, Disp: 135 tablet, Rfl: 3  •  gabapentin (NEURONTIN) 800 MG tablet, Take 1 tablet by mouth Every Night., Disp: 90 tablet, Rfl: 1  •  glipizide (GLUCOTROL XL) 10 MG 24 hr tablet, Take 2 tablets by mouth Daily., Disp: 180 tablet, Rfl: 3  •  glucose blood (OneTouch Verio) test strip, Use to test blood glucose 3 times a day as directed (Patient taking differently: Use to test blood glucose 3 times a day as directed), Disp: 300 each, Rfl: 3  •  HYDROcodone-acetaminophen (NORCO) 7.5-325 MG per tablet, Take 1 tablet by mouth 3 (Three) Times a Day., Disp: 90 tablet, Rfl: 0  •  hydrocortisone (ANUSOL-HC) 25 MG suppository, Insert 1 suppository rectally twice a day as needed (remove wrapper and moisten with water), Disp: 12 suppository, Rfl: 3  •  Insulin Glargine (BASAGLAR KWIKPEN) 100 UNIT/ML injection pen, Inject 50 units subcutaneously once in the morning and 75 units at night, Disp: 135 mL, Rfl: 1  •  Insulin  Pen Needle (B-D UF III MINI PEN NEEDLES) 31G X 5 MM misc, Use to inject insulin twice a day as directed, Disp: 100 each, Rfl: 12  •  Insulin Syringe-Needle U-100 29G 0.5 ML misc, Inject 0.3 mL as directed Daily., Disp: , Rfl:   •  loratadine (CLARITIN) 10 MG tablet, Take 1 tablet by mouth Daily., Disp: 30 tablet, Rfl: 3  •  meclizine (ANTIVERT) 25 MG tablet, Take 1 tablet by mouth 3 (Three) Times a Day As Needed for Dizziness., Disp: 90 tablet, Rfl: 3  •  metoprolol succinate XL (TOPROL-XL) 100 MG 24 hr tablet, Take 1 tablet by mouth Daily., Disp: 90 tablet, Rfl: 3  •  metroNIDAZOLE (METROCREAM) 0.75 % cream, Apply to the face once every night, Disp: 45 g, Rfl: 0  •  mupirocin (BACTROBAN) 2 % ointment, Apply topically to the affected area twice a day, Disp: 22 g, Rfl: 0  •  neomycin-colistin-hydrocortisone-thonzonium (Cortisporin-TC) 3.3-3-10-0.5 MG/ML otic suspension, Instill 5 drops into affected ear 3 (Three) times a day for 10 days, Disp: 10 mL, Rfl: 0  •  nitroglycerin (Nitrostat) 0.4 MG SL tablet, Place 1 tablet under the tongue Every 5 Minutes As Needed for Chest Pain for up to 3 total doses. Call 911 if pain remains after 1 dose., Disp: 25 tablet, Rfl: 6  •  ofloxacin (FLOXIN) 0.3 % otic solution, Instill 5 drops into affected ear once a day for 7 days, Disp: 10 mL, Rfl: 0  •  pancrelipase, Lip-Prot-Amyl, (Pancreaze) 87130-74306 units capsule delayed-release particles capsule, Take 1 capsule with each meal and with each snack (Patient taking differently: As Needed.), Disp: 100 capsule, Rfl: 2  •  pilocarpine (SALAGEN) 5 MG tablet, Take 1 tablet by mouth 3 (Three) Times a Day., Disp: 90 tablet, Rfl: 2  •  pimecrolimus (Elidel) 1 % cream, Apply topically to the face two times daily as directed., Disp: 60 g, Rfl: 0  •  potassium chloride (KLOR-CON) 10 MEQ CR tablet, Take 1 tablet by mouth Daily as directed, Disp: 90 tablet, Rfl: 0  •  prednisoLONE acetate (PRED FORTE) 1 % ophthalmic suspension, Instill 1 drop  "in both eyes twice a day; Shake Well Before Use, Disp: 5 mL, Rfl: 0  •  promethazine (PHENERGAN) 25 MG tablet, Take 1 tablet by mouth Every 6 (Six) Hours As Needed for Nausea or Vomiting., Disp: 30 tablet, Rfl: 1  •  RABEprazole (ACIPHEX) 20 MG EC tablet, Take 1 tablet by mouth Once a day, Disp: 90 tablet, Rfl: 1  •  rosuvastatin (CRESTOR) 10 MG tablet, Take one tablet by mouth once a day, Disp: 90 tablet, Rfl: 0  •  topiramate (TOPAMAX) 25 MG tablet, Take 1 tablet by mouth Every Night., Disp: 90 tablet, Rfl: 3  •  triamcinolone (KENALOG) 0.1 % cream, Apply topically to the affected area twice a day, Disp: 30 g, Rfl: 0  •  venlafaxine XR (EFFEXOR-XR) 75 MG 24 hr capsule, Take 1 capsule by mouth Daily., Disp: 90 capsule, Rfl: 3  •  Nurtec 75 MG dispersible tablet, Take 1 tablet by mouth Daily As Needed (migraine)., Disp: 8 tablet, Rfl: 11     Review of Systems   Musculoskeletal: Positive for arthralgias, back pain, gait problem and neck pain.   Neurological: Positive for headaches.   Psychiatric/Behavioral: The patient is nervous/anxious.    All other systems reviewed and are negative.       Objective:  /64   Pulse 79   Ht 171 cm (67.32\")   Wt 72.1 kg (159 lb)   SpO2 97%   BMI 24.66 kg/m²     Neurologic Exam     Mental Status   Oriented to person, place, and time.   Speech: speech is normal   Level of consciousness: alert    Cranial Nerves   Cranial nerves II through XII intact.     Motor Exam   Muscle bulk: normal  Overall muscle tone: normal    Strength   Strength 5/5 except as noted.   Chronic pain and arthritis multiple joints. Uses cane for ambulation.     Sensory Exam   Right leg light touch: decreased from ankle  Left leg light touch: decreased from ankle  Right leg vibration: decreased from ankle  Left leg vibration: decreased from ankle  Right leg pinprick: decreased from ankle  Left leg pinprick: decreased from ankle    Right more than Left decreased sensation-stocking distribution.     Gait, " Coordination, and Reflexes     Gait  Gait: (antalgic with cane)    Coordination   Finger to nose coordination: normal    Tremor   Resting tremor: absent  Intention tremor: absent  Action tremor: absent    Reflexes   Right brachioradialis: 2+  Left brachioradialis: 2+  Right biceps: 2+  Left biceps: 2+  Right patellar: 1+  Left patellar: 1+  Right achilles: 1+  Left achilles: 1+  Right : 2+  Left : 2+      Physical Exam  Constitutional:       Appearance: Normal appearance.   Skin:         Neurological:      Mental Status: She is alert and oriented to person, place, and time.      Cranial Nerves: Cranial nerves 2-12 are intact.      Coordination: Finger-Nose-Finger Test normal.      Deep Tendon Reflexes:      Reflex Scores:       Bicep reflexes are 2+ on the right side and 2+ on the left side.       Brachioradialis reflexes are 2+ on the right side and 2+ on the left side.       Patellar reflexes are 1+ on the right side and 1+ on the left side.       Achilles reflexes are 1+ on the right side and 1+ on the left side.  Psychiatric:         Mood and Affect: Mood is anxious.         Speech: Speech normal.         Behavior: Behavior normal.         Thought Content: Thought content normal.         Cognition and Memory: Cognition and memory normal.         Judgment: Judgment normal.         Assessment/Plan:       Diagnoses and all orders for this visit:    1. Diabetic polyneuropathy associated with diabetes mellitus due to underlying condition (Primary)  -     gabapentin (NEURONTIN) 800 MG tablet; Take 1 tablet by mouth Every Night.  Dispense: 90 tablet; Refill: 1    2. DDD (degenerative disc disease), cervical  -     gabapentin (NEURONTIN) 800 MG tablet; Take 1 tablet by mouth Every Night.  Dispense: 90 tablet; Refill: 1  -     baclofen (LIORESAL) 10 MG tablet; Take 1 tablet by mouth Every Night.  Dispense: 90 tablet; Refill: 3    3. Migraine without aura and without status migrainosus, not intractable  -      gabapentin (NEURONTIN) 800 MG tablet; Take 1 tablet by mouth Every Night.  Dispense: 90 tablet; Refill: 1  -     topiramate (TOPAMAX) 25 MG tablet; Take 1 tablet by mouth Every Night.  Dispense: 90 tablet; Refill: 3  -     butalbital-acetaminophen-caffeine (FIORICET, ESGIC) -40 MG per tablet; Take 1 tablet by mouth Daily As Needed for headache  Dispense: 30 tablet; Refill: 0  -     Discontinue: metoclopramide (REGLAN) 10 MG tablet; Take 1 tablet by mouth Daily As Needed for migraine.  Dispense: 30 tablet; Refill: 1  -     promethazine (PHENERGAN) 25 MG tablet; Take 1 tablet by mouth Every 6 (Six) Hours As Needed for Nausea or Vomiting.  Dispense: 30 tablet; Refill: 1  -     Nurtec 75 MG dispersible tablet; Take 1 tablet by mouth Daily As Needed (migraine).  Dispense: 8 tablet; Refill: 11    4. Recurrent falls while walking  Comments:  keep doing home physical therapy exercises, continue using cane         She will continue with all of her specialists she is seeing.     We are going to get her some Nurtec ODT to take at onset of migraine. Triptans would be contraindicated for her with cardiovascular risk factors.     We discussed falls prevention, continuing to complete physical therapy exercises, which she always feels is helpful while she is doing these, but once she stops, she finds ithat she worsens. Encouraged her to do home physical therapy exercises. Continue using a cane.    She will continue with pain management. She has had recent MRI of the lumbar spine and will follow up with them.      She will continue current medications. A controlled substance agreement for gabapentin is signed today.     She will follow up next available in clinic or sooner if needed. She will call us with any additional questions or concerns prior to follow-up. It is always a pleasure seeing her.    Reviewed medications, potential side effects and signs and symptoms to report. Discussed risk versus benefits of treatment plan  with patient and/or family-including medications, labs and radiology that may be ordered. Addressed questions and concerns during visit. Patient and/or family verbalized understanding and agree with plan.    As part of this patient's treatment plan I am prescribing controlled substances. The patient has been made aware of appropriate use of such medications, including potential risk of somnolence, limited ability to drive and/or work safely, and potential for dependence or overdose. It has also been made clear that these medications are for use by the patient only, without concomitant use of alcohol or other substances unless prescribed. Keep out of reach of children.  Diogenes report has been reviewed. If this is going to be prescribed long term, [facility] Controlled Substance Agreement Contract has also been read and signed by patient and myself.    During this visit the following were done:  Labs Reviewed [x]    Labs Ordered []    Radiology Reports Reviewed [x]    Radiology Ordered []    PCP Records Reviewed []    Referring Provider Records Reviewed []    ER Records Reviewed []    Hospital Records Reviewed []    History Obtained From Family []    Radiology Images Reviewed []    Other Reviewed [x] pain management notes  Records Requested []      Transcribed from ambient dictation for [facility] by Robert Jefferson.  05/01/23   16:47 EDT    Patient or patient representative verbalized consent to the visit recording.  I have personally performed the services described in this document as transcribed by the above individual, and it is both accurate and complete.  [facility]  5/2/2023  09:39 EDT

## 2023-05-03 ENCOUNTER — TELEPHONE (OUTPATIENT)
Dept: NEUROLOGY | Facility: CLINIC | Age: 73
End: 2023-05-03
Payer: MEDICARE

## 2023-05-03 ENCOUNTER — TELEPHONE (OUTPATIENT)
Dept: ENDOCRINOLOGY | Facility: CLINIC | Age: 73
End: 2023-05-03
Payer: MEDICARE

## 2023-05-03 ENCOUNTER — PRIOR AUTHORIZATION (OUTPATIENT)
Dept: NEUROLOGY | Facility: CLINIC | Age: 73
End: 2023-05-03
Payer: MEDICARE

## 2023-05-03 RX ORDER — FLUCONAZOLE 150 MG/1
150 TABLET ORAL ONCE
Qty: 1 TABLET | Refills: 1 | Status: SHIPPED | OUTPATIENT
Start: 2023-05-03 | End: 2023-05-05

## 2023-05-03 NOTE — TELEPHONE ENCOUNTER
Patient called stated she is having really bad side effects to farxiga. She said she has already had two yeast infections and had to take diflucan. She said it is also making her pee constantly. She also said she is now irritated on the outside of her female private area. Please advise.

## 2023-05-03 NOTE — TELEPHONE ENCOUNTER
Caller: Leela Muller    Relationship: Self    Best call back number: [phone]    What was the call regarding: PT CALLED IN TO SEE IF PRIOR AUTHORIZATION WAS STARTED. ADVISED I DID NOT FIND ONE IN THE CHART. PT IS REQUESTING THAT A PRIOR AUTHORIZATION BE INITATED     PLEASE REVIEW AND ADVISE      THANK YOU

## 2023-05-03 NOTE — TELEPHONE ENCOUNTER
LVM to return call to office #[phone]    FOR HUB: please inform pt this has been sent to pharmacy. Please reach out if this worsens or does not improve

## 2023-05-04 ENCOUNTER — PRIOR AUTHORIZATION (OUTPATIENT)
Dept: NEUROLOGY | Facility: CLINIC | Age: 73
End: 2023-05-04
Payer: MEDICARE

## 2023-05-09 ENCOUNTER — TELEPHONE (OUTPATIENT)
Dept: NEUROLOGY | Facility: CLINIC | Age: 73
End: 2023-05-09
Payer: MEDICARE

## 2023-05-09 RX ORDER — RIMEGEPANT SULFATE 75 MG/75MG
75 TABLET, ORALLY DISINTEGRATING ORAL AS NEEDED
Qty: 2 TABLET | Refills: 4 | COMMUNITY
Start: 2023-05-09

## 2023-05-09 NOTE — TELEPHONE ENCOUNTER
I called and spoke with pt and let her know I had mailed her 4 packets of the nurtec with # 8 and instructed on how to take and advised to let our office know when she needed more mailed.  Pt aware there may be times I may need to request from drug rep so may be a delay in mailing.  Lot # is [id number], Exp is 6/2026

## 2023-05-09 NOTE — TELEPHONE ENCOUNTER
As we get samples, you can [facility] the [facility] to use as needed.  If she needs refills on the Fioricet that is okay to.  Thanks, [facility]

## 2023-05-09 NOTE — TELEPHONE ENCOUNTER
Caller: Leela Muller    Relationship: Self    Best call back number: [phone]    What was the call regarding: PATIENT IS CALLING [facility] TO LET HER KNOW THAT [facility] AND SHE CAN'T AFFORD THAT. SHE WILL STICK WITH THE FIORICET, ESGIC.    Do you require a callback: NO

## 2023-05-10 ENCOUNTER — TELEPHONE (OUTPATIENT)
Dept: ENDOCRINOLOGY | Facility: CLINIC | Age: 73
End: 2023-05-10
Payer: MEDICARE

## 2023-05-10 NOTE — TELEPHONE ENCOUNTER
Patient was on farxiga and stopped taking it due to giving her a uti patient said she cannot control her sugar and either needs to be out back on farxiga or another medication

## 2023-05-10 NOTE — TELEPHONE ENCOUNTER
Spoke with patient.  She states her blood sugar has been elevated since stopping the farxiga.  Last night blood sugar went up to 300 after dinner and all she ate was fish.  At 530am got up and blood sugar was 79.  After she ate a banana and drank a 1/2 cup of orange juice it went to 114.  It is 241 at time of call and all patient has had to eat is strawberries and a slice of broiled fish.

## 2023-05-11 ENCOUNTER — TELEPHONE (OUTPATIENT)
Dept: ENDOCRINOLOGY | Facility: CLINIC | Age: 73
End: 2023-05-11
Payer: MEDICARE

## 2023-05-24 ENCOUNTER — TELEPHONE (OUTPATIENT)
Dept: ENDOCRINOLOGY | Facility: CLINIC | Age: 73
End: 2023-05-24
Payer: MEDICARE

## 2023-05-24 RX ORDER — INSULIN ASPART 100 [IU]/ML
5 INJECTION, SOLUTION INTRAVENOUS; SUBCUTANEOUS
Qty: 15 ML | Refills: 2 | Status: SHIPPED | OUTPATIENT
Start: 2023-05-24

## 2023-05-24 NOTE — TELEPHONE ENCOUNTER
Patient states her blood sugar has been high in the morning.  05/23/2023- 200 and today it was 160.  She states she only eats one meal a day and eats a snack like an apple or strawberries at night.

## 2023-05-24 NOTE — TELEPHONE ENCOUNTER
Patient called wanted to let Dr. Santiago know that Januvia is doing nothing for her. She said her sugar has been in the 300's at night. Please advise.

## 2023-06-05 RX ORDER — RIMEGEPANT SULFATE 75 MG/75MG
75 TABLET, ORALLY DISINTEGRATING ORAL AS NEEDED
Qty: 2 TABLET | Refills: 4 | COMMUNITY
Start: 2023-06-05

## 2023-06-05 NOTE — TELEPHONE ENCOUNTER
Caller: Leela Muller    Relationship: Self    Best call back number: [phone]    Requested Prescriptions:   Requested Prescriptions     Pending Prescriptions Disp Refills    Rimegepant Sulfate (Nurtec) 75 MG tablet dispersible tablet 2 tablet 4     Sig: Take 1 tablet by mouth As Needed (as needed). Indications: lot # is [id number], expiration is 6/2026, # of 8        Pharmacy where request should be sent:    [facility] [phone]     Last office visit with prescribing clinician: 3/6/2023   Last telemedicine visit with prescribing clinician: Visit date not found   Next office visit with prescribing clinician: 7/12/2023     Additional details provided by patient: PT FEELS LIKE THE RX ISNT STRONG ENOUGH, SAYS ITS NOT DOING ANYTHING FOR HER, WOULD LIKE TO SEE ABOUT GETTING A STRONGER DOSE    Does the patient have less than a 3 day supply:  [] Yes  [x] No    Would you like a call back once the refill request has been completed: [] Yes [] No    If the office needs to give you a call back, can they leave a voicemail: [x] Yes [] No    Milagros Hernandez Rep   06/05/23 10:34 EDT

## 2023-07-20 NOTE — PROGRESS NOTES
Patient: Leela Muller  YOB: 1950    Date: 07/24/2023    Primary Care Provider: Connor Ritter MD    Chief Complaint   Patient presents with    Follow-up     Follow up for right breast.       History:   I saw the patient in the office today for a 1 year follow up her right breast.  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  Mammogram last year was normal.  She states she feels a lump in both breasts, patient states she has pain in both breasts, patient states she no accidental discharge, patient denies swelling, patient states she has pain in tenderness in both breasts, patient states breast cancer does not run in the family.  Ultrasound today indicates multiple cysts bilaterally and a questionable intraductal lesion on the left breast.    The following portions of the patient's history were reviewed and updated as appropriate: allergies, current medications, past family history, past medical history, past social history, past surgical history and problem list.    Review of Systems   Constitutional:  Negative for chills, fever and unexpected weight change.   HENT:  Negative for hearing loss, trouble swallowing and voice change.    Eyes:  Negative for visual disturbance.   Respiratory:  Negative for apnea, cough, chest tightness, shortness of breath and wheezing.    Cardiovascular:  Negative for chest pain, palpitations and leg swelling.   Gastrointestinal:  Negative for abdominal distention, abdominal pain, anal bleeding, blood in stool, constipation, diarrhea, nausea, rectal pain and vomiting.   Endocrine: Negative for cold intolerance and heat intolerance.   Genitourinary:  Negative for difficulty urinating, dysuria and flank pain.   Musculoskeletal:  Negative for back pain and gait problem.   Skin:  Negative for color change, rash and wound.   Neurological:  Negative for dizziness, syncope, speech difficulty, weakness, light-headedness, numbness and headaches.   Hematological:   "Negative for adenopathy. Does not bruise/bleed easily.   Psychiatric/Behavioral:  Negative for confusion. The patient is not nervous/anxious.      Vital Signs  Vitals:    07/24/23 1439   BP: 142/70   BP Location: Right arm   Patient Position: Sitting   Cuff Size: Adult   Pulse: 93   Temp: 96.3 °F (35.7 °C)   TempSrc: Temporal   SpO2: 96%   Weight: 74.8 kg (165 lb)   Height: 170.2 cm (67\")       Allergies:  Allergies   Allergen Reactions    Ampicillin GI Intolerance     Diarrhea    Atorvastatin Swelling    Tetanus Toxoid Hives    Acyclovir Seizure    Cefprozil Other (See Comments)    Lansoprazole Nausea Only and Nausea And Vomiting    Mestinon [Pyridostigmine] Itching and Other (See Comments)     Leg cramps, shooting vaginal pains, frequent urination    Ranexa [Ranolazine Er] Nausea And Vomiting       Medications:    Current Outpatient Medications:     acetaminophen (TYLENOL) 500 MG tablet, Take 1 tablet by mouth Every 6 (Six) Hours As Needed., Disp: , Rfl:     albuterol sulfate  (90 Base) MCG/ACT inhaler, Inhale 2 puffs by mouth every 4 (Four) Hours As Needed, Disp: 8.5 g, Rfl: 2    aspirin 81 MG tablet, Take 1 tablet by mouth Daily. Taken at night. Last dose 9-18-21, Disp: , Rfl:     azelastine (ASTELIN) 0.1 % nasal spray, Instill 1 spray in each nostil 2 (Two) Times A Day, Disp: 30 mL, Rfl: 11    baclofen (LIORESAL) 10 MG tablet, Take 1 tablet by mouth Every Night., Disp: 90 tablet, Rfl: 3    betamethasone dipropionate (DIPROLENE) 0.05 % ointment, Apply topically to the appropriate area as directed once daily as needed, Disp: 15 g, Rfl: 0    butalbital-acetaminophen-caffeine (FIORICET, ESGIC) -40 MG per tablet, Take 1 tablet by mouth Daily As Needed for headache, Disp: 30 tablet, Rfl: 0    clidinium-chlordiazePOXIDE (LIBRAX) 5-2.5 MG per capsule, Take one capsule by mouth three times a day as needed, Disp: 90 capsule, Rfl: 2    clonazePAM (KlonoPIN) 0.5 MG tablet, As Needed., Disp: , Rfl:     " Continuous Blood Gluc  (FreeStyle [place]) device, Use daily as directed, Disp: 1 each, Rfl: 0    digoxin (LANOXIN) 250 MCG tablet, Take 1 tablet by mouth Daily., Disp: 90 tablet, Rfl: 3    fludrocortisone 0.1 MG tablet, Take 1 tablet by mouth Daily., Disp: 90 tablet, Rfl: 3    fluticasone (FLONASE) 50 MCG/ACT nasal spray, Instill 2 sprays into the nostril(s) as directed by provider 2 (Two) Times a Day., Disp: 112 g, Rfl: 3    fluticasone (FLONASE) 50 MCG/ACT nasal spray, Instill 2 sprays in each nostril 2 (Two) Times A Day, Disp: 16 g, Rfl: 0    furosemide (LASIX) 40 MG tablet, Take 1 tablet by mouth Daily. May have extra 1/2 to 1 tablet daily if needed for edema, Disp: 135 tablet, Rfl: 3    gabapentin (NEURONTIN) 800 MG tablet, Take 1 tablet by mouth Every Night., Disp: 90 tablet, Rfl: 1    glucose blood (OneTouch Verio) test strip, Use to test blood glucose 3 times a day as directed (Patient taking differently: Use to test blood glucose 3 times a day as directed), Disp: 300 each, Rfl: 3    HYDROcodone-acetaminophen (NORCO) 7.5-325 MG per tablet, Take 1 tablet by mouth 3 (Three) Times a Day., Disp: 90 tablet, Rfl: 0    hydrocortisone (ANUSOL-HC) 25 MG suppository, Insert 1 suppository rectally twice a day as needed (remove wrapper and moisten with water), Disp: 12 suppository, Rfl: 3    insulin aspart (NovoLOG FlexPen) 100 UNIT/ML solution pen-injector sc pen, Inject 10 Units under the skin into the appropriate area as directed 3 (Three) Times a Day With Meals., Disp: 27 mL, Rfl: 3    Insulin Glargine (BASAGLAR KWIKPEN) 100 UNIT/ML injection pen, Inject 50 units subcutaneously once in the morning and 75 units at night, Disp: 135 mL, Rfl: 1    Insulin Pen Needle (B-D UF III MINI PEN NEEDLES) 31G X 5 MM misc, Use to inject insulin twice a day as directed, Disp: 100 each, Rfl: 12    Insulin Syringe-Needle U-100 29G 0.5 ML misc, Inject 0.3 mL as directed Daily., Disp: , Rfl:     meclizine (ANTIVERT) 25  MG tablet, Take 1 tablet by mouth 3 (Three) Times a Day As Needed for Dizziness., Disp: 90 tablet, Rfl: 3    metoprolol succinate XL (TOPROL-XL) 100 MG 24 hr tablet, Take 1 tablet by mouth Daily., Disp: 90 tablet, Rfl: 3    metroNIDAZOLE (METROCREAM) 0.75 % cream, Apply to the face once every night, Disp: 45 g, Rfl: 0    neomycin-colistin-hydrocortisone-thonzonium (Cortisporin-TC) 3.3-3-10-0.5 MG/ML otic suspension, Instill 5 drops into affected ear 3 (Three) times a day for 10 days, Disp: 10 mL, Rfl: 0    nitroglycerin (Nitrostat) 0.4 MG SL tablet, Place 1 tablet under the tongue Every 5 Minutes As Needed for Chest Pain for up to 3 total doses. Call 911 if pain remains after 1 dose., Disp: 25 tablet, Rfl: 6    Nurtec 75 MG dispersible tablet, Take 1 tablet by mouth Daily As Needed (migraine)., Disp: 8 tablet, Rfl: 11    ofloxacin (FLOXIN) 0.3 % otic solution, Instill 5 drops into affected ear once a day for 7 days, Disp: 10 mL, Rfl: 0    pancrelipase, Lip-Prot-Amyl, (Pancreaze) 17811-29490 units capsule delayed-release particles capsule, Take 1 capsule with each meal and with each snack (Patient taking differently: As Needed.), Disp: 100 capsule, Rfl: 2    potassium chloride (KLOR-CON) 10 MEQ CR tablet, Take 1 tablet by mouth Daily as directed, Disp: 90 tablet, Rfl: 0    prednisoLONE acetate (PRED FORTE) 1 % ophthalmic suspension, Instill 1 drop in both eyes twice a day; Shake Well Before Use, Disp: 5 mL, Rfl: 0    promethazine (PHENERGAN) 25 MG tablet, Take 1 tablet by mouth Every 6 (Six) Hours As Needed for Nausea or Vomiting., Disp: 30 tablet, Rfl: 1    RABEprazole (ACIPHEX) 20 MG EC tablet, Take 1 tablet by mouth Once a day, Disp: 90 tablet, Rfl: 1    Rimegepant Sulfate (Nurtec) 75 MG tablet dispersible tablet, Take 1 tablet by mouth As Needed (as needed). Indications: lot # is [id number], expiration is 6/2026, # of 8, Disp: 2 tablet, Rfl: 4    Rimegepant Sulfate (Nurtec) 75 MG tablet dispersible tablet, Take 1  tablet by mouth Daily As Needed (migraine)., Disp: 8 tablet, Rfl: 0    rosuvastatin (CRESTOR) 10 MG tablet, Take one tablet by mouth once every evening, Disp: 90 tablet, Rfl: 2    topiramate (TOPAMAX) 25 MG tablet, Take 1 tablet by mouth Every Night., Disp: 90 tablet, Rfl: 3    triamcinolone (KENALOG) 0.1 % cream, Apply topically to the affected area twice a day, Disp: 30 g, Rfl: 0    venlafaxine XR (EFFEXOR-XR) 75 MG 24 hr capsule, Take 1 capsule by mouth Daily., Disp: 90 capsule, Rfl: 3    Physical Exam:   General Appearance:    Alert, cooperative, in no acute distress   Head:    Normocephalic, without obvious abnormality, atraumatic   Lungs:     Clear to auscultation,respirations regular, even and                  unlabored    Heart:    Regular rhythm and normal rate, normal S1 and S2, no            murmur, no gallop, no rub, no click  Breast-no palpable mass.   Abdomen:     Normal bowel sounds, no masses, no organomegaly, soft        non-tender, non-distended, no guarding, no rebound                tenderness   Extremities:   Moves all extremities well, no edema, no cyanosis, no             redness   Pulses:   Pulses palpable and equal bilaterally   Skin:   No bleeding, bruising or rash     Results Review:   I reviewed the patient's new clinical results.     Review of Systems was reviewed and confirmed as accurate as documented by the MA.    ASSESSMENT/PLAN:    1. History of breast lump/mass excision    2. Other abnormal and inconclusive findings on diagnostic imaging of breast       Bilateral breast cyst, intraductal lesion left breast not suspicious.  Recommend mammogram to be scheduled in the next 2 months and follow-up in 6 months for repeat ultrasound.    Electronically signed by Benji Martinez MD  07/24/23

## 2023-07-24 ENCOUNTER — OFFICE VISIT (OUTPATIENT)
Dept: SURGERY | Facility: CLINIC | Age: 73
End: 2023-07-24
Payer: MEDICARE

## 2023-07-24 VITALS
WEIGHT: 165 LBS | OXYGEN SATURATION: 96 % | HEIGHT: 67 IN | SYSTOLIC BLOOD PRESSURE: 142 MMHG | HEART RATE: 93 BPM | BODY MASS INDEX: 25.9 KG/M2 | DIASTOLIC BLOOD PRESSURE: 70 MMHG | TEMPERATURE: 96.3 F

## 2023-07-24 DIAGNOSIS — Z98.890 HISTORY OF BREAST LUMP/MASS EXCISION: Primary | ICD-10-CM

## 2023-07-24 DIAGNOSIS — R92.8 OTHER ABNORMAL AND INCONCLUSIVE FINDINGS ON DIAGNOSTIC IMAGING OF BREAST: ICD-10-CM

## 2023-07-24 PROCEDURE — 3077F SYST BP >= 140 MM HG: CPT | Performed by: SURGERY

## 2023-07-24 PROCEDURE — 1160F RVW MEDS BY RX/DR IN RCRD: CPT | Performed by: SURGERY

## 2023-07-24 PROCEDURE — 99212 OFFICE O/P EST SF 10 MIN: CPT | Performed by: SURGERY

## 2023-07-24 PROCEDURE — 1159F MED LIST DOCD IN RCRD: CPT | Performed by: SURGERY

## 2023-07-24 PROCEDURE — 3078F DIAST BP <80 MM HG: CPT | Performed by: SURGERY

## 2023-07-26 ENCOUNTER — TRANSCRIBE ORDERS (OUTPATIENT)
Dept: ADMINISTRATIVE | Facility: HOSPITAL | Age: 73
End: 2023-07-26
Payer: MEDICARE

## 2023-07-26 DIAGNOSIS — Z12.31 VISIT FOR SCREENING MAMMOGRAM: Primary | ICD-10-CM

## 2023-08-02 ENCOUNTER — TELEPHONE (OUTPATIENT)
Dept: PAIN MEDICINE | Facility: CLINIC | Age: 73
End: 2023-08-02
Payer: MEDICARE

## 2023-08-15 ENCOUNTER — OUTSIDE FACILITY SERVICE (OUTPATIENT)
Dept: PAIN MEDICINE | Facility: CLINIC | Age: 73
End: 2023-08-15
Payer: MEDICARE

## 2023-08-15 ENCOUNTER — DOCUMENTATION (OUTPATIENT)
Dept: PAIN MEDICINE | Facility: CLINIC | Age: 73
End: 2023-08-15

## 2023-08-15 ENCOUNTER — TELEPHONE (OUTPATIENT)
Dept: PAIN MEDICINE | Facility: CLINIC | Age: 73
End: 2023-08-15

## 2023-08-15 PROCEDURE — 20553 NJX 1/MLT TRIGGER POINTS 3/>: CPT | Performed by: STUDENT IN AN ORGANIZED HEALTH CARE EDUCATION/TRAINING PROGRAM

## 2023-08-15 NOTE — PROGRESS NOTES
[facility]  [address] [place] [address]    08/15/2023  Mohit Brown MD      PROCEDURE: Trigger Point Injections  MUSCLES INJECTED: bilateral cervical paraspinal, trapezius, levator scapulae  PRE-OP DIAGNOSIS: Thoracic pain  POST-OP DIAGNOSIS: Thoracic pain    ANTIPLATELET/ANTICOAGULANT STOP DATE: Discussed with patient and managed according to THIAGO guidelines  CONSENT: Risks, benefits and options were explained to the patient, all questions were answered and written informed consent was obtained.  ANESTHESIA: None    PROCEDURE NOTE:  After timeout was performed, the patient was placed in the seated position with all pressure points padded and the selected side upward. The areas of maximal muscle tenderness and spasm were identified and marked. The skin was prepped with chlorhexidine. Then a 27 gauge 1.5 inch needle was inserted into the trigger point. After negative aspiration, 1 mL of a mixture containing 5 cc 1% lidocaine, 3 cc 0.5% bupivacaine, 30 mg Toradol, 40 mg methylprednisolone was injected into each trigger point.  A total of 10 cc was injected.  Pressure was held and a dressing placed.     EBL: None    COMPLICATIONS: None    The patient was monitored for at least 15 minutes prior to discharge. Vital signs remained stable. There were no immediate complications and the patient tolerated the procedure well. Sensory and motor exam was unchanged from baseline. The patient received written discharge instructions prior to discharge.    FOLLOW UP: As scheduled    ADDITIONAL NOTES:     [facility]    Mohit Brown MD

## 2023-08-15 NOTE — TELEPHONE ENCOUNTER
Caller: ZADA PRICE    Relationship to Patient: SELF    Phone Number: [phone]    Reason for Call:  PATIENT STATES SHE GOT A CALL TO SHOW UP TO SX  BUT WHERE SHE WAS TOLD TO GO DR. LOPEZ ISNT THERE AND WAS SENT SOMEWHERE ELSE. PATIENT WAS TOLD TO COME IN EARLIER BC [facility] LOPEZ' WOULDN'T BE ABLE TO DO SX AT HER SCHEDULED TIMED. WT FAILED NO ANSWER. CALLED [facility] SCHEDULING THEY STATED THEY COULDN'T HELP. PLEASE CALL PATIENT BACK ASAP.

## 2023-08-23 ENCOUNTER — OFFICE VISIT (OUTPATIENT)
Dept: PULMONOLOGY | Facility: CLINIC | Age: 73
End: 2023-08-23
Payer: MEDICARE

## 2023-08-23 VITALS
HEART RATE: 91 BPM | WEIGHT: 162 LBS | RESPIRATION RATE: 18 BRPM | SYSTOLIC BLOOD PRESSURE: 128 MMHG | OXYGEN SATURATION: 96 % | BODY MASS INDEX: 25.43 KG/M2 | HEIGHT: 67 IN | DIASTOLIC BLOOD PRESSURE: 60 MMHG

## 2023-08-23 DIAGNOSIS — G47.19 EXCESSIVE DAYTIME SLEEPINESS: ICD-10-CM

## 2023-08-23 DIAGNOSIS — G47.33 OBSTRUCTIVE SLEEP APNEA: Primary | ICD-10-CM

## 2023-08-23 PROCEDURE — 3078F DIAST BP <80 MM HG: CPT | Performed by: NURSE PRACTITIONER

## 2023-08-23 PROCEDURE — 99212 OFFICE O/P EST SF 10 MIN: CPT | Performed by: NURSE PRACTITIONER

## 2023-08-23 PROCEDURE — 3074F SYST BP LT 130 MM HG: CPT | Performed by: NURSE PRACTITIONER

## 2023-08-23 NOTE — PROGRESS NOTES
"Chief Complaint   Patient presents with    Sleeping Problem    Follow-up         Subjective   Leela Muller is a 73 y.o. female.   The patient comes in today for f/u of ERAN.     She had an in lab study ordered but canceled the appointment. She states she is not having another in lab sleep study. It was a horrible experience.     She had 2 overnight pulse ox tests which were good.     She yawns a lot and wakes SOB at times.     Bed 10 pm and will read for 1-2 hours some nights. She gets up 8-9 am. She will get up to urinate once but goes back to sleep fairly easily. She does not feel rested upon awakening.     She is diabetic and states her BS is all over the place. She takes insulin.       The following portions of the patient's history were reviewed and updated as appropriate: allergies, current medications, past family history, past medical history, past social history, and past surgical history.      Review of Systems   HENT:  Positive for sinus pressure.    Respiratory:  Positive for shortness of breath.    Psychiatric/Behavioral:  Positive for sleep disturbance.        Objective   Visit Vitals  /60   Pulse 91   Resp 18   Ht 170.2 cm (67\")   Wt 73.5 kg (162 lb)   SpO2 96%   BMI 25.37 kg/mý         Physical Exam  Vitals reviewed.   Constitutional:       Appearance: She is well-developed.   HENT:      Head: Atraumatic.      Mouth/Throat:      Mouth: Mucous membranes are moist.      Comments: Crowded oropharynx.   Eyes:      Extraocular Movements: Extraocular movements intact.   Cardiovascular:      Rate and Rhythm: Normal rate and regular rhythm.   Musculoskeletal:      Comments: Gait with cane.    Skin:     General: Skin is warm.   Neurological:      Mental Status: She is alert and oriented to person, place, and time.           Assessment & Plan   Diagnoses and all orders for this visit:    1. Obstructive sleep apnea (Primary)  -     Home Sleep Study; Future    2. Excessive daytime sleepiness  -     Home " Sleep Study; Future           Return in about 4 months (around 12/23/2023) for Recheck, For Me, Sleep study, Sleep ONLY.    DISCUSSION (if any):  I have ordered a home sleep study which the patient is willing to do.     It should be noted that a home sleep study is likely to underestimate the true AHI and is unable to assess sleep stages and abnormal sleep behaviors etc. The patient has understood.    She had a horrible experience with the last in lab sleep study and refuses to have another one.      The patient is agreeable to try CPAP/BiPAP, if needed.      Patient was educated on good sleep hygiene measures and voiced understanding of the same.     I have discussed with patient that her unstable blood sugar could cause some increased fatigue as well.      Dictated utilizing Dragon dictation.    This document was electronically signed by WEST Miles August 23, 2023  15:29 EDT

## 2023-08-29 RX ORDER — BLOOD SUGAR DIAGNOSTIC
STRIP MISCELLANEOUS
Qty: 300 EACH | Refills: 3 | Status: SHIPPED | OUTPATIENT
Start: 2023-08-29

## 2023-09-01 ENCOUNTER — TELEPHONE (OUTPATIENT)
Dept: ENDOCRINOLOGY | Facility: CLINIC | Age: 73
End: 2023-09-01

## 2023-09-01 NOTE — TELEPHONE ENCOUNTER
"Spoke with patient.  She states her blood sugars have been \"all over the place\".  States her blood sugars have been doing this since she was here last.  She states it wakes her up in the middle of the night stating it's low (70-79) and then when she sits up and rechecks it, it goes up 50 points.  She states that she does not sleep on the side she wears her chelsie.  This morning fasting her blood sugar was 70 and when she sat up it went to 90.  She ate oatmeal and dry toast and blood sugar went to 306.  Took 10 units of Novolog and then 1-2 hours later took 50 units of basaglar.  She usually takes 8-10 units of Novolog with each meal and 50 units of Basaglar twice daily.     "

## 2023-09-01 NOTE — TELEPHONE ENCOUNTER
DELETE AFTER REVIEWING: Telephone encounter to be sent to the clinical pool     Hub staff attempted to follow warm transfer process and was unsuccessful     Caller: Renzo Leela YAMIL    Relationship to patient: Self    Best call back number: [phone] HOME [phone] CELL    Patient is needing:   PT STATING SUGAR REALLY HIGH AND HAVE PROBLEMS WITH MEDICINE NOVOLOG FLEX PEN NOT WORKING  AFTER AN HOUR AND A HALF AFTER TAKING 10 UNITS, IT  STILL  BLOOD SUGAR 306 AFTER EATING OATMEAL NO SUGAR AND DRY TOAST.   OVER 160 THIS MORNING.  1:30- 3 AT A . LEAVE VM THEN CALL BACK AFTER 3 PLEASE CALL PT TO ADVISE.        DELETE AFTER READING TO PATIENT: “I was unable to reach my scheduling contact. I will send a message to the scheduling team. Please allow 48 hours for the  staff to follow up on this request.”

## 2023-09-11 ENCOUNTER — TELEPHONE (OUTPATIENT)
Dept: GASTROENTEROLOGY | Facility: CLINIC | Age: 73
End: 2023-09-11
Payer: MEDICARE

## 2023-09-11 NOTE — TELEPHONE ENCOUNTER
Provider: DR. BEEBE  Caller: BRITT ALVARENGA  Relationship to Patient: SELF  Phone Number: [phone]  Reason for Call: PT CALLED STATING THAT THE SIDE HER PANCREAS IS ON IS HURTING// PT STATES THAT SHE IS TAKING PANCREAZE// PT OFFERED APPT WITH [facility], BUT PT STATES THAT SHE'D RATHER STICK WITH DR. BEEBE//PLEASE CALL BACK

## 2023-09-11 NOTE — TELEPHONE ENCOUNTER
I spoke with patient and she said she will just wait to speak with Dr. Garcia about the pain when she has an office with him on 9/27/23.

## 2023-09-27 ENCOUNTER — OFFICE VISIT (OUTPATIENT)
Dept: GASTROENTEROLOGY | Facility: CLINIC | Age: 73
End: 2023-09-27
Payer: MEDICARE

## 2023-09-27 VITALS — HEIGHT: 67 IN | WEIGHT: 166 LBS | BODY MASS INDEX: 26.06 KG/M2

## 2023-09-27 DIAGNOSIS — K86.1 OTHER CHRONIC PANCREATITIS: ICD-10-CM

## 2023-09-27 DIAGNOSIS — R10.84 GENERALIZED ABDOMINAL PAIN: ICD-10-CM

## 2023-09-27 DIAGNOSIS — R11.0 NAUSEA: Primary | ICD-10-CM

## 2023-09-27 PROCEDURE — 1160F RVW MEDS BY RX/DR IN RCRD: CPT | Performed by: INTERNAL MEDICINE

## 2023-09-27 PROCEDURE — 99213 OFFICE O/P EST LOW 20 MIN: CPT | Performed by: INTERNAL MEDICINE

## 2023-09-27 PROCEDURE — 1159F MED LIST DOCD IN RCRD: CPT | Performed by: INTERNAL MEDICINE

## 2023-09-27 NOTE — PROGRESS NOTES
Patient Name: Leela Muller  YOB: 1950   Medical Record number: [id number]     PCP: oCnnor Ritter MD    Chief Complaint   Patient presents with    Follow-up   Issues with abdominal pain and nausea.    History of Present Illness:   HPI  Ms. Muller comes to the office for a follow-up visit.  The patient states she had an injection for back pain in the neck about 2 months ago.  The patient has a week later she began experiencing upper abdominal pain and nausea.  There was some slight improvement in the symptoms but the pain has recurred.  There is no issue with vomiting.  Ms. Muller denies any breakthrough heartburn.  There is no history of difficult or painful swallowing.  Past Medical History:   Diagnosis Date    Anxiety     Arm pain     Arthritis     Breast injury     fell 6/2019     Cancer     Chronic pancreatitis     COVID-19     Depression     Diabetes mellitus     Hyperlipidemia     Hypertension     Liver mass     Neck pain on left side     Palpitations 4/18/2018    Pancreatitis     Pulmonary embolism     Stroke syndrome 9/14/2016    · Assessed By: Devaughn Lewis (Neurology); Last Assessed: 16 Jun 2015  Right cerebrovascular accident in July or August 2010, evaluated at [facility].  Admission to [facility] on 09/28/2010 with headache and stuttering, consistent with TIA.  Chronic Coumadin therapy, initiated following bilateral pulmonary emboli in 2007 after fall and hip replacement.  She was already on 81 mg of aspirin as well, 09/2010.  MRI of the brain on 09/28/2010 revealing old right parietal cerebrovascular accident.  MRA revealing normal carotids, middle anterior and posterior cerebral arteries with minimal ostial stenosis of both vertebral arteries.  GENARO by Dr. Oliver Mobley on 09/28/2010:  patent foramen ovale with a small amount of right to left shunting.  Normal LVEF and normal valvular structures.   Patent foramen ovale closure using a 25 mm.  Amplatzer cribriform occluder, 10/05/2010.   Echocardiogram, 06/23/2014:  LVEF (55% to 60%) with and Amplatzer device noted to be well-seated in     Thrombocytopenia     Transient cerebral ischemia     Type 2 diabetes mellitus        Past Surgical History:   Procedure Laterality Date    APPENDECTOMY      BREAST BIOPSY Left 2012    benign    BREAST BIOPSY      BREAST EXCISIONAL BIOPSY Left     benign    BREAST EXCISIONAL BIOPSY Right     benign    BREAST EXCISIONAL BIOPSY Right 2020    benign    BREAST SURGERY      CAUTERIZATION NASAL BLEEDERS  2022    CHOLECYSTECTOMY      COLONOSCOPY      HYSTERECTOMY      LIVER BIOPSY      OOPHORECTOMY      RECTAL SURGERY      SKIN CANCER EXCISION      TONSILLECTOMY      TOTAL HIP ARTHROPLASTY REVISION           Current Outpatient Medications:     acetaminophen (TYLENOL) 500 MG tablet, Take 1 tablet by mouth Every 6 (Six) Hours As Needed., Disp: , Rfl:     albuterol sulfate  (90 Base) MCG/ACT inhaler, Inhale 2 puffs by mouth every 4 (Four) Hours As Needed, Disp: 8.5 g, Rfl: 2    aspirin 81 MG tablet, Take 1 tablet by mouth Daily. Taken at night. Last dose 9-18-21, Disp: , Rfl:     azelastine (ASTELIN) 0.1 % nasal spray, Instill 1 spray in each nostil 2 (Two) Times A Day, Disp: 30 mL, Rfl: 11    baclofen (LIORESAL) 10 MG tablet, Take 1 tablet by mouth Every Night., Disp: 90 tablet, Rfl: 3    betamethasone dipropionate (DIPROLENE) 0.05 % ointment, Apply topically to the appropriate area as directed once daily as needed, Disp: 15 g, Rfl: 0    butalbital-acetaminophen-caffeine (FIORICET, ESGIC) -40 MG per tablet, Take 1 tablet by mouth Daily As Needed for headache, Disp: 30 tablet, Rfl: 0    clidinium-chlordiazePOXIDE (LIBRAX) 5-2.5 MG per capsule, Take 1 capsule by mouth 2 (Two) Times a Day as needed, Disp: 60 capsule, Rfl: 0    clonazePAM (KlonoPIN) 0.5 MG tablet, As Needed., Disp: , Rfl:     Continuous Blood Gluc  (FreeStyle [place] 2 [place]) device, Use daily as  directed, Disp: 1 each, Rfl: 0    digoxin (LANOXIN) 250 MCG tablet, Take 1 tablet by mouth Daily., Disp: 90 tablet, Rfl: 3    fludrocortisone 0.1 MG tablet, Take 1 tablet by mouth Daily., Disp: 90 tablet, Rfl: 3    fluticasone (FLONASE) 50 MCG/ACT nasal spray, Instill 2 sprays into the nostril(s) as directed by provider 2 (Two) Times a Day., Disp: 112 g, Rfl: 3    fluticasone (FLONASE) 50 MCG/ACT nasal spray, Instill 2 sprays in each nostril 2 (Two) Times A Day, Disp: 16 g, Rfl: 0    furosemide (LASIX) 40 MG tablet, Take 1 tablet by mouth Daily. May have extra 1/2 to 1 tablet daily if needed for edema, Disp: 135 tablet, Rfl: 3    gabapentin (NEURONTIN) 800 MG tablet, Take 1 tablet by mouth Every Night., Disp: 90 tablet, Rfl: 1    glucose blood (OneTouch Verio) test strip, Use to test blood glucose 3 times a day as directed, Disp: 300 each, Rfl: 3    HYDROcodone-acetaminophen (NORCO) 7.5-325 MG per tablet, Take 1 tablet by mouth 3 times a day., Disp: 90 tablet, Rfl: 0    hydrocortisone (ANUSOL-HC) 25 MG suppository, Insert 1 suppository rectally twice a day as needed (remove wrapper and moisten with water), Disp: 12 suppository, Rfl: 3    insulin aspart (NovoLOG FlexPen) 100 UNIT/ML solution pen-injector sc pen, Inject 10 Units under the skin into the appropriate area as directed 3 (Three) Times a Day With Meals., Disp: 27 mL, Rfl: 3    Insulin Glargine (BASAGLAR KWIKPEN) 100 UNIT/ML injection pen, Inject 50 units subcutaneously once in the morning and 75 units at night, Disp: 135 mL, Rfl: 1    Insulin Pen Needle (B-D UF III MINI PEN NEEDLES) 31G X 5 MM misc, Use to inject insulin twice a day as directed, Disp: 100 each, Rfl: 12    Insulin Syringe-Needle U-100 29G 0.5 ML misc, Inject 0.3 mL as directed Daily., Disp: , Rfl:     meclizine (ANTIVERT) 25 MG tablet, Take 1 tablet by mouth 3 (Three) Times a Day As Needed for Dizziness., Disp: 90 tablet, Rfl: 3    metoprolol succinate XL (TOPROL-XL) 100 MG 24 hr tablet,  Take 1 tablet by mouth Daily., Disp: 90 tablet, Rfl: 3    metroNIDAZOLE (METROCREAM) 0.75 % cream, Apply to the face once every night, Disp: 45 g, Rfl: 0    neomycin-colistin-hydrocortisone-thonzonium (Cortisporin-TC) 3.3-3-10-0.5 MG/ML otic suspension, Instill 5 drops into affected ear 3 (Three) times a day for 10 days, Disp: 10 mL, Rfl: 0    nitroglycerin (Nitrostat) 0.4 MG SL tablet, Place 1 tablet under the tongue Every 5 Minutes As Needed for Chest Pain for up to 3 total doses. Call 911 if pain remains after 1 dose., Disp: 25 tablet, Rfl: 6    Nurtec 75 MG dispersible tablet, Take 1 tablet by mouth Daily As Needed (migraine)., Disp: 8 tablet, Rfl: 11    ofloxacin (FLOXIN) 0.3 % otic solution, Instill 5 drops into affected ear once a day for 7 days, Disp: 10 mL, Rfl: 0    pancrelipase, Lip-Prot-Amyl, (Pancreaze) 65101-58364 units capsule delayed-release particles capsule, Take 1 capsule with each meal and with each snack (Patient taking differently: As Needed.), Disp: 100 capsule, Rfl: 2    potassium chloride (KLOR-CON) 10 MEQ CR tablet, Take 1 tablet by mouth Daily as directed, Disp: 90 tablet, Rfl: 0    promethazine (PHENERGAN) 25 MG tablet, Take 1 tablet by mouth Every 6 (Six) Hours As Needed for Nausea or Vomiting., Disp: 30 tablet, Rfl: 1    RABEprazole (ACIPHEX) 20 MG EC tablet, Take 1 tablet by mouth Once a day, Disp: 90 tablet, Rfl: 1    Rimegepant Sulfate (Nurtec) 75 MG tablet dispersible tablet, Take 1 tablet by mouth As Needed (as needed). Indications: lot # is [id number], expiration is 6/2026, # of 8, Disp: 2 tablet, Rfl: 4    Rimegepant Sulfate (Nurtec) 75 MG tablet dispersible tablet, Take 1 tablet by mouth Daily As Needed (migraine)., Disp: 8 tablet, Rfl: 0    rosuvastatin (CRESTOR) 10 MG tablet, Take one tablet by mouth once every evening, Disp: 90 tablet, Rfl: 2    topiramate (TOPAMAX) 25 MG tablet, Take 1 tablet by mouth Every Night., Disp: 90 tablet, Rfl: 3    triamcinolone (KENALOG) 0.1 %  cream, Apply topically to the affected area twice a day, Disp: 30 g, Rfl: 0    venlafaxine XR (EFFEXOR-XR) 75 MG 24 hr capsule, Take 1 capsule by mouth Daily., Disp: 90 capsule, Rfl: 3    prednisoLONE acetate (PRED FORTE) 1 % ophthalmic suspension, Instill 1 drop in both eyes twice a day; Shake Well Before Use (Patient not taking: Reported on 9/27/2023), Disp: 5 mL, Rfl: 0    Allergies   Allergen Reactions    Ampicillin GI Intolerance     Diarrhea    Atorvastatin Swelling    Tetanus Toxoid Hives    Acyclovir Seizure    Cefprozil Other (See Comments)    Lansoprazole Nausea Only and Nausea And Vomiting    Mestinon [Pyridostigmine] Itching and Other (See Comments)     Leg cramps, shooting vaginal pains, frequent urination    Ranexa [Ranolazine Er] Nausea And Vomiting       Family History   Problem Relation Age of Onset    Alzheimer's disease Mother     Cancer Mother     Coronary artery disease Other     Diabetes Other     Hypertension Other     Stroke Other     Cancer Other     Dementia Other     Heart attack Father     Colon polyps Father     Breast cancer Neg Hx     Ovarian cancer Neg Hx     Colon cancer Neg Hx     Esophageal cancer Neg Hx        Social History     Socioeconomic History    Marital status:    Tobacco Use    Smoking status: Never     Passive exposure: Never    Smokeless tobacco: Never   Vaping Use    Vaping Use: Never used   Substance and Sexual Activity    Alcohol use: No    Drug use: No    Sexual activity: Yes       Review of Systems   Constitutional:  Negative for activity change, appetite change, fatigue, fever and unexpected weight change.   HENT:  Negative for dental problem, hearing loss, mouth sores, postnasal drip, sneezing, trouble swallowing and voice change.    Eyes:  Negative for pain, redness, itching and visual disturbance.   Respiratory:  Negative for cough, choking, chest tightness, shortness of breath and wheezing.    Cardiovascular:  Negative for chest pain, palpitations and  leg swelling.   Gastrointestinal:  Positive for abdominal pain, diarrhea and nausea. Negative for abdominal distention, anal bleeding, blood in stool, constipation, rectal pain and vomiting.   Endocrine: Negative for cold intolerance, heat intolerance, polydipsia, polyphagia and polyuria.   Genitourinary: Negative.  Negative for dysuria, enuresis, flank pain, hematuria and urgency.   Musculoskeletal:  Negative for arthralgias, back pain, gait problem, joint swelling and myalgias.   Skin:  Negative for color change, pallor and rash.   Allergic/Immunologic: Negative for environmental allergies, food allergies and immunocompromised state.   Neurological:  Negative for dizziness, tremors, seizures, facial asymmetry, speech difficulty, numbness and headaches.   Hematological:  Negative for adenopathy.   Psychiatric/Behavioral:  Negative for behavioral problems, confusion, dysphoric mood, hallucinations and self-injury.      There were no vitals filed for this visit.    Physical Exam  Vitals reviewed.   Constitutional:       General: She is not in acute distress.     Appearance: Normal appearance.   HENT:      Head: Normocephalic and atraumatic.      Nose: Nose normal.      Mouth/Throat:      Mouth: Mucous membranes are moist.      Pharynx: Oropharynx is clear.   Eyes:      General: No scleral icterus.     Extraocular Movements: Extraocular movements intact.   Cardiovascular:      Rate and Rhythm: Normal rate and regular rhythm.      Heart sounds: No murmur heard.  Pulmonary:      Breath sounds: Normal breath sounds. No wheezing or rales.   Abdominal:      General: Bowel sounds are normal.      Palpations: Abdomen is soft.      Tenderness: There is abdominal tenderness (epigastrium). There is no guarding.   Musculoskeletal:         General: No swelling. Normal range of motion.      Cervical back: Normal range of motion and neck supple.   Skin:     General: Skin is dry.      Coloration: Skin is not jaundiced.    Neurological:      Mental Status: She is alert and oriented to person, place, and time.   Psychiatric:         Mood and Affect: Mood normal.         Thought Content: Thought content normal.         Judgment: Judgment normal.       Diagnoses and all orders for this visit:    1. Nausea (Primary)  -     CT Abdomen Pelvis With & Without Contrast; Future    2. Generalized abdominal pain  -     CT Abdomen Pelvis With & Without Contrast; Future    3. Other chronic pancreatitis  -     CT Abdomen Pelvis With & Without Contrast; Future    The patient has experienced abdominal pain that is generalized but slightly more involving the epigastric region.  The patient reports pancreatitis years ago after ERCP.  She has been on  pancreas enzyme replacement in the past.  The most recent issues began after corticosteroid injection.      Plan: Will schedule CT scan of the abdomen and pelvis with oral and IV contrast.           Will ask for a copy of recent labs ordered by Dr. Ritter.

## 2023-10-02 ENCOUNTER — TELEPHONE (OUTPATIENT)
Dept: GASTROENTEROLOGY | Facility: CLINIC | Age: 73
End: 2023-10-02
Payer: MEDICARE

## 2023-10-02 NOTE — TELEPHONE ENCOUNTER
Hub staff attempted to follow warm transfer process and was unsuccessful     Caller: Leela Muller    Relationship to patient: Self    Best call back number: [phone]  CAN CALL ANY TIME    Patient is needing: PATIENT REQUESTING CALL BACK.   PATIENT REQUESTING THAT DR. BEEBE CALL OVER ORDERS FOR CT OF ABDOMEN AS SCHEDULING TOLD HER THAT SHE COULD NOT BE SEEN UNTIL THE END OF THE MONTH UNLESS IF DR. BEEBE CALLED THE ORDERS IN.

## 2023-10-03 NOTE — TELEPHONE ENCOUNTER
I TRIED TO CALL MS ALVARENGA BACK. NO ANSWER; LEFT VOICE MESSAGE. I CALLED CENTRAL SCHEDULING TO SEE IF I COULD GET PATIENT IN SOONER. REPS WERE BUSY. I REQUEST A CALL BACK.

## 2023-10-03 NOTE — TELEPHONE ENCOUNTER
I tried to call Central scheduling to see if I could get patient in sooner for CT Scan. No answer; left voice message for a call back.

## 2023-10-04 ENCOUNTER — TELEPHONE (OUTPATIENT)
Dept: NEUROLOGY | Facility: CLINIC | Age: 73
End: 2023-10-04
Payer: MEDICARE

## 2023-10-04 NOTE — TELEPHONE ENCOUNTER
I spoke with pt and let her know she had the first available appt and I would place her on the wait list

## 2023-10-04 NOTE — TELEPHONE ENCOUNTER
Provider: ANA DUFFY APRN    Caller: BRITT    Relationship to Patient: SELF      Phone Number: [phone]    Reason for Call: CALLING FOR A SOONER APPT.   STATED SHE HAS BEEN FALLING A LOT LATELY.  STATED SHE FELL YESTERDAY (10-3-23) BACKWARDS.  STATED EVERY TIME SHE FALLS IT IS BACKWARDS.  TRIED TO R/S APPT & NOTHING SOONER THAN HER 11-30-23 APPT.   DOES CLINIC HAVE ANYTHING SOONER?    When was the patient last seen: 5-1-23    PLEASE CALL & ADVISE

## 2023-10-16 ENCOUNTER — HOSPITAL ENCOUNTER (OUTPATIENT)
Dept: CT IMAGING | Facility: HOSPITAL | Age: 73
Discharge: HOME | End: 2023-10-16
Admitting: INTERNAL MEDICINE
Payer: MEDICARE

## 2023-10-16 DIAGNOSIS — R11.0 NAUSEA: ICD-10-CM

## 2023-10-16 DIAGNOSIS — R10.84 GENERALIZED ABDOMINAL PAIN: ICD-10-CM

## 2023-10-16 DIAGNOSIS — K86.1 OTHER CHRONIC PANCREATITIS: ICD-10-CM

## 2023-10-16 LAB — CREAT BLDA-MCNC: 0.8 MG/DL (ref 0.6–1.3)

## 2023-10-16 PROCEDURE — 82565 ASSAY OF CREATININE: CPT

## 2023-10-16 PROCEDURE — 74178 CT ABD&PLV WO CNTR FLWD CNTR: CPT

## 2023-10-16 PROCEDURE — 25510000001 IOPAMIDOL 61 % SOLUTION: Performed by: INTERNAL MEDICINE

## 2023-10-16 RX ADMIN — IOPAMIDOL 95 ML: 612 INJECTION, SOLUTION INTRAVENOUS at 15:40

## 2023-10-18 ENCOUNTER — TELEPHONE (OUTPATIENT)
Dept: GASTROENTEROLOGY | Facility: CLINIC | Age: 73
End: 2023-10-18
Payer: MEDICARE

## 2023-10-18 NOTE — TELEPHONE ENCOUNTER
I called and spoke to Ms. Muller regarding the CT scan.  The pancreas was normal as well as the bowel.  There was some less than centimeter densities in the kidneys.  These were difficult to characterize but may be renal cysts.  Will send a copy of the CT scan to Dr. Ritter for follow-up of the kidney findings.

## 2023-10-19 ENCOUNTER — TELEPHONE (OUTPATIENT)
Dept: GASTROENTEROLOGY | Facility: CLINIC | Age: 73
End: 2023-10-19
Payer: MEDICARE

## 2023-10-19 ENCOUNTER — TELEPHONE (OUTPATIENT)
Dept: GASTROENTEROLOGY | Facility: CLINIC | Age: 73
End: 2023-10-19

## 2023-10-19 NOTE — TELEPHONE ENCOUNTER
Dr Ritter office called and stated that the provider would like to speak with Dr Garcia in regards to the patient.    Please advise and call Dr Ritter back at [phone].

## 2023-11-01 ENCOUNTER — HOSPITAL ENCOUNTER (OUTPATIENT)
Dept: MAMMOGRAPHY | Facility: HOSPITAL | Age: 73
Discharge: HOME | End: 2023-11-01
Admitting: INTERNAL MEDICINE
Payer: MEDICARE

## 2023-11-01 DIAGNOSIS — Z12.31 VISIT FOR SCREENING MAMMOGRAM: ICD-10-CM

## 2023-11-01 PROCEDURE — 77067 SCR MAMMO BI INCL CAD: CPT

## 2023-11-01 PROCEDURE — 77063 BREAST TOMOSYNTHESIS BI: CPT

## 2023-11-02 DIAGNOSIS — G43.009 MIGRAINE WITHOUT AURA AND WITHOUT STATUS MIGRAINOSUS, NOT INTRACTABLE: ICD-10-CM

## 2023-11-02 DIAGNOSIS — F41.9 ANXIETY: ICD-10-CM

## 2023-11-02 RX ORDER — VENLAFAXINE HYDROCHLORIDE 75 MG/1
75 CAPSULE, EXTENDED RELEASE ORAL DAILY
Qty: 90 CAPSULE | Refills: 3 | Status: SHIPPED | OUTPATIENT
Start: 2023-11-02

## 2023-11-03 ENCOUNTER — TELEPHONE (OUTPATIENT)
Dept: NEUROLOGY | Facility: CLINIC | Age: 73
End: 2023-11-03
Payer: MEDICARE

## 2023-11-03 DIAGNOSIS — M50.30 DDD (DEGENERATIVE DISC DISEASE), CERVICAL: ICD-10-CM

## 2023-11-03 DIAGNOSIS — E08.42 DIABETIC POLYNEUROPATHY ASSOCIATED WITH DIABETES MELLITUS DUE TO UNDERLYING CONDITION: ICD-10-CM

## 2023-11-03 DIAGNOSIS — G43.009 MIGRAINE WITHOUT AURA AND WITHOUT STATUS MIGRAINOSUS, NOT INTRACTABLE: ICD-10-CM

## 2023-11-03 RX ORDER — GABAPENTIN 800 MG/1
800 TABLET ORAL NIGHTLY
Qty: 90 TABLET | Refills: 1 | Status: SHIPPED | OUTPATIENT
Start: 2023-11-03

## 2023-11-03 NOTE — TELEPHONE ENCOUNTER
Patient called to request that a refill be sent in for her Gabapentin. She is currently at the pharmacy. [facility] in [facility].

## 2023-11-03 NOTE — TELEPHONE ENCOUNTER
I approved this refill. We have never received a request, but I sent it for 90 days with 1 refill. Thanks, Jelly

## 2023-11-09 ENCOUNTER — OFFICE VISIT (OUTPATIENT)
Dept: UROLOGY | Facility: CLINIC | Age: 73
End: 2023-11-09
Payer: MEDICARE

## 2023-11-09 VITALS
DIASTOLIC BLOOD PRESSURE: 64 MMHG | BODY MASS INDEX: 26.06 KG/M2 | HEART RATE: 66 BPM | OXYGEN SATURATION: 93 % | WEIGHT: 166 LBS | SYSTOLIC BLOOD PRESSURE: 132 MMHG | HEIGHT: 67 IN

## 2023-11-09 DIAGNOSIS — N28.1 RENAL CYST: Primary | ICD-10-CM

## 2023-11-09 NOTE — PROGRESS NOTES
Follow Up Office Visit      Patient Name: Leela Muller  : 1950   MRN: [id number]     Chief Complaint:    Chief Complaint   Patient presents with    benign neoplasm of kidney       Referring Provider: Connor Ritter MD    History of Present Illness: Leela Muller is a 73 y.o. female who presents today for follow up of bilateral renal cysts.  She underwent CT scan for her liver mass.  She is undergoing surveillance.   She as found to have bilateral subcentimeter renal cysts.  These appear to be simple cysts although they are very small.   No dysuria or gross hematuria.  No LUTS.    She was being followed with Dr. Santoro previously and had a urethral dilation previously.    She has not had trouble since then.      Subjective      Review of System:   Review of Systems   Constitutional: Negative.    Eyes: Negative.    Respiratory:  Positive for shortness of breath.    Cardiovascular: Negative.    Gastrointestinal:  Positive for constipation.   Endocrine: Negative.    Genitourinary:  Positive for flank pain.   Skin: Negative.    Allergic/Immunologic: Negative.    Neurological:  Positive for headaches.   Hematological: Negative.    Psychiatric/Behavioral: Negative.        I have reviewed the ROS documented by my clinical staff, I have updated appropriately and I agree. Zuleyka Rodriguez MD    I have reviewed and the following portions of the patient's history were updated as appropriate: past family history, past medical history, past social history, past surgical history and problem list.    Past Medical History:   Past Medical History:   Diagnosis Date    Anxiety     Arm pain     Arthritis     Breast injury     fell 2019     Cancer     Chronic pancreatitis     COVID-19     Depression     Diabetes mellitus     Hyperlipidemia     Hypertension     Liver mass     Neck pain on left side     Palpitations 2018    Pancreatitis     Pulmonary embolism     Stroke syndrome 2016    · Assessed By: Joshua  Devaughn (Neurology); Last Assessed: 16 Jun 2015  Right cerebrovascular accident in July or August 2010, evaluated at [facility].  Admission to [facility] on 09/28/2010 with headache and stuttering, consistent with TIA.  Chronic Coumadin therapy, initiated following bilateral pulmonary emboli in 2007 after fall and hip replacement.  She was already on 81 mg of aspirin as well, 09/2010.  MRI of the brain on 09/28/2010 revealing old right parietal cerebrovascular accident.  MRA revealing normal carotids, middle anterior and posterior cerebral arteries with minimal ostial stenosis of both vertebral arteries.  GENARO by Dr. Oliver Mobley on 09/28/2010:  patent foramen ovale with a small amount of right to left shunting.  Normal LVEF and normal valvular structures.   Patent foramen ovale closure using a 25 mm. Amplatzer cribriform occluder, 10/05/2010.   Echocardiogram, 06/23/2014:  LVEF (55% to 60%) with and Amplatzer device noted to be well-seated in     Thrombocytopenia     Transient cerebral ischemia     Type 2 diabetes mellitus        Past Surgical History:  Past Surgical History:   Procedure Laterality Date    APPENDECTOMY      BREAST BIOPSY Left 2012    benign    BREAST BIOPSY      BREAST EXCISIONAL BIOPSY Left     benign    BREAST EXCISIONAL BIOPSY Right     benign    BREAST EXCISIONAL BIOPSY Right 2020    benign    BREAST SURGERY      CAUTERIZATION NASAL BLEEDERS  2022    CHOLECYSTECTOMY      COLONOSCOPY      HYSTERECTOMY      LIVER BIOPSY      OOPHORECTOMY      RECTAL SURGERY      SKIN CANCER EXCISION      TONSILLECTOMY      TOTAL HIP ARTHROPLASTY REVISION         Family History:   family history includes Alzheimer's disease in her mother; Cancer in her mother and another family member; Colon polyps in her father; Coronary artery disease in an other family member; Dementia in an other family member; Diabetes in an other family member; Heart attack in her father; Hypertension in an  other family member; Stroke in an other family member.   Otherwise pertinent FHx was reviewed and not pertinent to current issue.    Social History:    reports that she has never smoked. She has never been exposed to tobacco smoke. She has never used smokeless tobacco. She reports that she does not drink alcohol and does not use drugs.    Medications:     Current Outpatient Medications:     acetaminophen (TYLENOL) 500 MG tablet, Take 1 tablet by mouth Every 6 (Six) Hours As Needed., Disp: , Rfl:     albuterol sulfate  (90 Base) MCG/ACT inhaler, Inhale 2 puffs by mouth every 4 (Four) Hours As Needed, Disp: 8.5 g, Rfl: 2    aspirin 81 MG tablet, Take 1 tablet by mouth Daily. Taken at night. Last dose 9-18-21, Disp: , Rfl:     azelastine (ASTELIN) 0.1 % nasal spray, Instill 1 spray in each nostil 2 (Two) Times A Day, Disp: 30 mL, Rfl: 11    baclofen (LIORESAL) 10 MG tablet, Take 1 tablet by mouth Every Night., Disp: 90 tablet, Rfl: 3    betamethasone dipropionate (DIPROLENE) 0.05 % ointment, Apply topically to the appropriate area as directed once daily as needed, Disp: 15 g, Rfl: 0    butalbital-acetaminophen-caffeine (FIORICET, ESGIC) -40 MG per tablet, Take 1 tablet by mouth Daily As Needed for headache, Disp: 30 tablet, Rfl: 0    clidinium-chlordiazePOXIDE (LIBRAX) 5-2.5 MG per capsule, Take 1 capsule by mouth 2 (Two) Times a Day as needed., Disp: 60 capsule, Rfl: 0    clonazePAM (KlonoPIN) 0.5 MG tablet, As Needed., Disp: , Rfl:     Continuous Blood Gluc  ([employer] 2 [place]) device, Use daily as directed, Disp: 1 each, Rfl: 0    digoxin (LANOXIN) 250 MCG tablet, Take 1 tablet by mouth Daily., Disp: 90 tablet, Rfl: 3    fludrocortisone 0.1 MG tablet, Take 1 tablet by mouth Daily., Disp: 90 tablet, Rfl: 3    fluticasone (FLONASE) 50 MCG/ACT nasal spray, Instill 2 sprays into the nostril(s) as directed by provider 2 (Two) Times a Day., Disp: 112 g, Rfl: 3    fluticasone (FLONASE) 50  MCG/ACT nasal spray, Instill 2 sprays in each nostril 2 (Two) Times A Day, Disp: 16 g, Rfl: 0    furosemide (LASIX) 40 MG tablet, Take 1 tablet by mouth Daily. May have extra 1/2 to 1 tablet daily if needed for edema, Disp: 135 tablet, Rfl: 3    gabapentin (NEURONTIN) 800 MG tablet, Take 1 tablet by mouth Every Night., Disp: 90 tablet, Rfl: 1    glucose blood (OneTouch Verio) test strip, Use to test blood glucose 3 times a day as directed, Disp: 300 each, Rfl: 3    HYDROcodone-acetaminophen (NORCO) 7.5-325 MG per tablet, Take 1 tablet by mouth 3 times a day., Disp: 90 tablet, Rfl: 0    hydrocortisone (ANUSOL-HC) 25 MG suppository, Insert 1 suppository rectally twice a day as needed (remove wrapper and moisten with water), Disp: 12 suppository, Rfl: 3    insulin aspart (NovoLOG FlexPen) 100 UNIT/ML solution pen-injector sc pen, Inject 10 Units under the skin into the appropriate area as directed 3 (Three) Times a Day With Meals., Disp: 27 mL, Rfl: 3    Insulin Glargine (BASAGLAR KWIKPEN) 100 UNIT/ML injection pen, Inject 50 units subcutaneously once in the morning and 75 units at night, Disp: 135 mL, Rfl: 1    Insulin Pen Needle (B-D UF III MINI PEN NEEDLES) 31G X 5 MM misc, Use to inject insulin twice a day as directed, Disp: 100 each, Rfl: 12    Insulin Syringe-Needle U-100 29G 0.5 ML misc, Inject 0.3 mL as directed Daily., Disp: , Rfl:     meclizine (ANTIVERT) 25 MG tablet, Take 1 tablet by mouth 3 (Three) Times a Day As Needed for Dizziness., Disp: 90 tablet, Rfl: 3    metoprolol succinate XL (TOPROL-XL) 100 MG 24 hr tablet, Take 1 tablet by mouth Daily., Disp: 90 tablet, Rfl: 3    metroNIDAZOLE (METROCREAM) 0.75 % cream, Apply to the face once every night, Disp: 45 g, Rfl: 0    neomycin-colistin-hydrocortisone-thonzonium (Cortisporin-TC) 3.3-3-10-0.5 MG/ML otic suspension, Instill 5 drops into affected ear 3 (Three) times a day for 10 days, Disp: 10 mL, Rfl: 0    nitroglycerin (Nitrostat) 0.4 MG SL tablet, Place  1 tablet under the tongue Every 5 Minutes As Needed for Chest Pain for up to 3 total doses. Call 911 if pain remains after 1 dose., Disp: 25 tablet, Rfl: 6    Nurtec 75 MG dispersible tablet, Take 1 tablet by mouth Daily As Needed (migraine)., Disp: 8 tablet, Rfl: 11    ofloxacin (FLOXIN) 0.3 % otic solution, Instill 5 drops into affected ear once a day for 7 days, Disp: 10 mL, Rfl: 0    pancrelipase, Lip-Prot-Amyl, (Pancreaze) 92214-06249 units capsule delayed-release particles capsule, Take 1 capsule with each meal and with each snack (Patient taking differently: As Needed.), Disp: 100 capsule, Rfl: 2    potassium chloride (KLOR-CON) 10 MEQ CR tablet, Take 1 tablet by mouth Daily as directed, Disp: 90 tablet, Rfl: 0    prednisoLONE acetate (PRED FORTE) 1 % ophthalmic suspension, Instill 1 drop in both eyes twice a day; Shake Well Before Use, Disp: 5 mL, Rfl: 0    promethazine (PHENERGAN) 25 MG tablet, Take 1 tablet by mouth Every 6 (Six) Hours As Needed for Nausea or Vomiting., Disp: 30 tablet, Rfl: 1    RABEprazole (ACIPHEX) 20 MG EC tablet, Take 1 tablet by mouth Daily., Disp: 90 tablet, Rfl: 1    Rimegepant Sulfate (Nurtec) 75 MG tablet dispersible tablet, Take 1 tablet by mouth As Needed (as needed). Indications: lot # is [id number], expiration is 6/2026, # of 8, Disp: 2 tablet, Rfl: 4    Rimegepant Sulfate (Nurtec) 75 MG tablet dispersible tablet, Take 1 tablet by mouth Daily As Needed (migraine)., Disp: 8 tablet, Rfl: 0    rosuvastatin (CRESTOR) 10 MG tablet, Take one tablet by mouth once every evening, Disp: 90 tablet, Rfl: 2    topiramate (TOPAMAX) 25 MG tablet, Take 1 tablet by mouth Every Night., Disp: 90 tablet, Rfl: 3    triamcinolone (KENALOG) 0.1 % cream, Apply topically to the affected area twice a day, Disp: 30 g, Rfl: 0    venlafaxine XR (EFFEXOR-XR) 75 MG 24 hr capsule, Take 1 capsule by mouth Daily., Disp: 90 capsule, Rfl: 3    Allergies:   Allergies   Allergen Reactions    Ampicillin GI  "Intolerance     Diarrhea    Atorvastatin Swelling    Tetanus Toxoid Hives    Acyclovir Seizure    Cefprozil Other (See Comments)    Lansoprazole Nausea Only and Nausea And Vomiting    Mestinon [Pyridostigmine] Itching and Other (See Comments)     Leg cramps, shooting vaginal pains, frequent urination    Ranexa [Ranolazine Er] Nausea And Vomiting       IPSS Questionnaire (AUA-7):  Bladder & Bowel Symptom Questionnaire    How often do you usually urinate during the day ?   4 - At least once an hour    2.   How many timed do you urinate at night?   1 - About every 3-4 hours   3.   What is the reason that you usually urinate?   1 - About every 3-4 hours   4.   Once you get the urge to go, how long can you     comfortably delay?   3 - About every 1-2 hours   5.   How often do you get a sudden urge that makes you rush to the bathroom?   1 - About every 3-4 hours   6.   How often does a sudden urge to urinate result in you leaking urine or wetting pads?   1 - About every 3-4 hours   7.  In your opinion, how good is your bladder control?   1 - About every 3-4 hours   8.  Do you have accidental bowel leakage?   yes   9.  Do you have difficulty fully emptying your bladder?   no   10.  Do you experience accidental leakage when performing some physical activity such as coughing, sneezing, laughing or exercise?   yes   11. Have you tried medications to help improve your symptoms?   no   12. Would you be interested in learning about a long-lasting option that may help you with your symptoms?   no                                                                             Total Score   12     Objective     Physical Exam:   Vital Signs:   Vitals:    11/09/23 1500   BP: 132/64   Pulse: 66   SpO2: 93%   Weight: 75.3 kg (166 lb)   Height: 170.2 cm (67\")   PainSc:   6     Body mass index is 26 kg/m².     Physical Exam  Vitals and nursing note reviewed. Exam conducted with a chaperone present.   Constitutional:       General: She is " "awake. She is not in acute distress.     Appearance: Normal appearance.   HENT:      Head: Normocephalic and atraumatic.      Right Ear: External ear normal.      Left Ear: External ear normal.      Nose: Nose normal.   Eyes:      Conjunctiva/sclera: Conjunctivae normal.   Pulmonary:      Effort: Pulmonary effort is normal. No respiratory distress.   Abdominal:      General: Abdomen is flat. There is no distension.      Palpations: Abdomen is soft. There is no mass.      Tenderness: There is no abdominal tenderness. There is no right CVA tenderness, left CVA tenderness, guarding or rebound.      Hernia: No hernia is present.   Genitourinary:     Exam position: Lithotomy position.   Skin:     General: Skin is warm.   Neurological:      General: No focal deficit present.      Mental Status: She is alert and oriented to person, place, and time.      Gait: Gait normal.   Psychiatric:         Behavior: Behavior normal. Behavior is cooperative.         Thought Content: Thought content normal.         Judgment: Judgment normal.         Labs:   Brief Urine Lab Results  (Last result in the past 365 days)        Color   Clarity   Blood   Leuk Est   Nitrite   Protein   CREAT   Urine HCG        11/21/22 1053             182.2                      Lab Results   Component Value Date    GLUCOSE 89 11/21/2022    CALCIUM 9.3 11/21/2022     11/21/2022    K 3.6 11/21/2022    CO2 28.2 11/21/2022     11/21/2022    BUN 10 11/21/2022    CREATININE 0.80 10/16/2023    EGFRIFNONA 73 12/17/2021    BCR 15.2 11/21/2022    ANIONGAP 13.8 11/21/2022       Lab Results   Component Value Date    WBC 7.26 12/17/2021    HGB 13.6 12/17/2021    HCT 41.7 12/17/2021    MCV 90.8 12/17/2021     12/17/2021       No results found for: \"PSA\"    Images:   Mammo Screening Digital Tomosynthesis Bilateral With CAD    Result Date: 11/6/2023  Impression: BI-RADS  0 Incomplete: NEED ADDITIONAL IMAGING EVALUATION  Recommendation:  The patient needs " additional imaging. Follow-up ultrasound for intraductal mass visualized on ultrasound of the breast dated 7/24/2023 at [facility].. She will be contacted by our office to schedule an appointment for the additional studies. Please accept this as an order.   CAD was utilized.  The standard false-negative rate of mammography is between 10% and 25%. Complex patterns or increased breast density will markedly elevate the false-negative rate of mammography.    A letter, in lay terminology, with the results of this exam will be mailed to the patient.    _____________________________________________________ Physician Order  Diagnostic Mammogram with Breast Ultrasound if needed.  Diagnosis:   Abnormal Mammogram   This report was finalized on 11/6/2023 11:18 AM by Dr. Mary Fitzpatrick MD.      CT Abdomen Pelvis With & Without Contrast    Result Date: 10/16/2023  Impression: 1.No acute abnormality identified within the abdomen or pelvis. 2.There is mild biliary dilation which may be related to prior cholecystectomy. Please correlate with serum bilirubin levels. 3.Distal colon is decompressed which limits its evaluation. 4.Please see above for additional details. Electronically Signed: Julio Shane MD  10/16/2023 4:14 PM EDT  Workstation ID: [id number]    US Breast Bilateral Limited    Result Date: 7/25/2023  Bilateral breast cyst.  Question intraductal mass left breast.  Recommend correlation with mammogram and follow-up in 6 months.       Measures:   Tobacco:   Leela Muller  reports that she has never smoked. She has never been exposed to tobacco smoke. She has never used smokeless tobacco..    Urine Incontinence: Patient reports that she is not currently experiencing any symptoms of urinary incontinence.         Assessment / Plan      Assessment/Plan:   73 y.o. female who presented today for follow up of bilateral renal cysts.  She also complains of constipation which we discussed.  As far as her cysts are  concerned these are likely benign findings.  These do not require any follow up although she will have surveillance imaging for her liver.  I will see her back in 6 months.  She does not need separate imaging for her kidneys.     Diagnoses and all orders for this visit:    1. Renal cyst (Primary)         Follow Up:   Return in about 6 months (around 5/9/2024) for Recheck.      Zuleyka Rodriguez MD  [facility] [place]

## 2023-11-14 ENCOUNTER — OFFICE VISIT (OUTPATIENT)
Dept: ENDOCRINOLOGY | Facility: CLINIC | Age: 73
End: 2023-11-14
Payer: MEDICARE

## 2023-11-14 VITALS
OXYGEN SATURATION: 99 % | HEART RATE: 61 BPM | SYSTOLIC BLOOD PRESSURE: 132 MMHG | BODY MASS INDEX: 25.8 KG/M2 | HEIGHT: 67 IN | DIASTOLIC BLOOD PRESSURE: 80 MMHG | WEIGHT: 164.4 LBS

## 2023-11-14 DIAGNOSIS — E78.5 DYSLIPIDEMIA: ICD-10-CM

## 2023-11-14 DIAGNOSIS — I25.10 CORONARY ARTERY DISEASE INVOLVING NATIVE HEART WITHOUT ANGINA PECTORIS, UNSPECIFIED VESSEL OR LESION TYPE: ICD-10-CM

## 2023-11-14 DIAGNOSIS — E11.65 TYPE 2 DIABETES MELLITUS WITH HYPERGLYCEMIA, WITH LONG-TERM CURRENT USE OF INSULIN: Primary | ICD-10-CM

## 2023-11-14 DIAGNOSIS — I10 PRIMARY HYPERTENSION: ICD-10-CM

## 2023-11-14 DIAGNOSIS — Z79.4 TYPE 2 DIABETES MELLITUS WITH HYPERGLYCEMIA, WITH LONG-TERM CURRENT USE OF INSULIN: Primary | ICD-10-CM

## 2023-11-14 LAB
ALBUMIN UR-MCNC: <1.2 MG/DL
CHOLEST SERPL-MCNC: 112 MG/DL (ref 0–200)
CREAT UR-MCNC: 138.6 MG/DL
EXPIRATION DATE: ABNORMAL
EXPIRATION DATE: NORMAL
GLUCOSE BLDC GLUCOMTR-MCNC: 101 MG/DL (ref 70–130)
HBA1C MFR BLD: 7 % (ref 4.5–5.7)
HDLC SERPL-MCNC: 45 MG/DL (ref 40–60)
LDLC SERPL CALC-MCNC: 43 MG/DL (ref 0–100)
LDLC/HDLC SERPL: 0.87 {RATIO}
Lab: ABNORMAL
Lab: NORMAL
MICROALBUMIN/CREAT UR: NORMAL MG/G{CREAT}
TRIGL SERPL-MCNC: 140 MG/DL (ref 0–150)
TSH SERPL DL<=0.05 MIU/L-ACNC: 2.57 UIU/ML (ref 0.27–4.2)
VLDLC SERPL-MCNC: 24 MG/DL (ref 5–40)

## 2023-11-14 PROCEDURE — 82947 ASSAY GLUCOSE BLOOD QUANT: CPT | Performed by: INTERNAL MEDICINE

## 2023-11-14 PROCEDURE — 82043 UR ALBUMIN QUANTITATIVE: CPT | Performed by: INTERNAL MEDICINE

## 2023-11-14 PROCEDURE — 84443 ASSAY THYROID STIM HORMONE: CPT | Performed by: INTERNAL MEDICINE

## 2023-11-14 PROCEDURE — 82570 ASSAY OF URINE CREATININE: CPT | Performed by: INTERNAL MEDICINE

## 2023-11-14 PROCEDURE — 99214 OFFICE O/P EST MOD 30 MIN: CPT | Performed by: INTERNAL MEDICINE

## 2023-11-14 PROCEDURE — 36415 COLL VENOUS BLD VENIPUNCTURE: CPT | Performed by: INTERNAL MEDICINE

## 2023-11-14 PROCEDURE — 80061 LIPID PANEL: CPT | Performed by: INTERNAL MEDICINE

## 2023-11-14 PROCEDURE — 3079F DIAST BP 80-89 MM HG: CPT | Performed by: INTERNAL MEDICINE

## 2023-11-14 PROCEDURE — 83036 HEMOGLOBIN GLYCOSYLATED A1C: CPT | Performed by: INTERNAL MEDICINE

## 2023-11-14 PROCEDURE — 3075F SYST BP GE 130 - 139MM HG: CPT | Performed by: INTERNAL MEDICINE

## 2023-11-14 PROCEDURE — 3051F HG A1C>EQUAL 7.0%<8.0%: CPT | Performed by: INTERNAL MEDICINE

## 2023-11-14 NOTE — ASSESSMENT & PLAN NOTE
Diabetes is improving with treatment.   Continue current treatment regimen.  Diabetes will be reassessed in 3 months.    We weren't able to download the Colin CGM today.    She asks about insulin pumps today.  Brochures were provided.

## 2023-11-14 NOTE — PROGRESS NOTES
"     Office Note      Date: 2023  Patient Name: Leela Muller  MRN: [id number]  : 1950    Chief Complaint   Patient presents with    Diabetes     She has been sick recently so her glucose levels have been all over the place. The readings are not getting back to normal       History of Present Illness:   Leela Muller is a 73 y.o. female who presents for Diabetes type 2. Diagnosed in: . Treated in past with oral agents. She didn't tolerate farxiga due to yeast infections.  Current treatments: basal-bolus insulin. Number of insulin shots per day: 4. Checks blood sugar 288 times a day - on FreeStyle [place]ilaria. She is making frequent adjustments in insulin based on glucose readings.  Has low blood sugar: occ. Aspirin use: Yes. Statin use: Yes. ACE-I/ARB use: No. Changes in health since last visit: none. Last eye exam 2023.      Subjective      Diabetic Complications:  Eyes: No  Kidneys: No  Feet: Yes -    Heart: Yes -      Diet and Exercise:  Meals per day: 2  Minutes of exercise per week: 0 mins.    Review of Systems:   Review of Systems   Constitutional: Negative.    Cardiovascular: Negative.    Gastrointestinal: Negative.    Endocrine: Negative.        The following portions of the patient's history were reviewed and updated as appropriate: allergies, current medications, past family history, past medical history, past social history, past surgical history, and problem list.    Objective     Visit Vitals  /80 (BP Location: Left arm, Patient Position: Sitting, Cuff Size: Adult)   Pulse 61   Ht 170.2 cm (67\")   Wt 74.6 kg (164 lb 6.4 oz)   SpO2 99%   BMI 25.75 kg/m²       Physical Exam:  Physical Exam  Constitutional:       Appearance: Normal appearance.   Cardiovascular:      Pulses:           Dorsalis pedis pulses are 2+ on the right side and 2+ on the left side.        Posterior tibial pulses are 2+ on the right side and 2+ on the left side.   Musculoskeletal:      Right foot: Deformity present. "   Feet:      Right foot:      Protective Sensation: 5 sites tested.  5 sites sensed.      Skin integrity: Skin integrity normal.      Toenail Condition: Right toenails are normal.      Left foot:      Protective Sensation: 5 sites tested.  5 sites sensed.      Skin integrity: Skin integrity normal.      Toenail Condition: Left toenails are normal.      Comments: Right 5th toe under-riding 4th toe  Neurological:      Mental Status: She is alert.         Labs:    HbA1c  Lab Results   Component Value Date    HGBA1C 7.0 (A) 11/14/2023       CMP  Lab Results   Component Value Date    GLUCOSE 89 11/21/2022    BUN 10 11/21/2022    CREATININE 0.80 10/16/2023    EGFRIFNONA 73 12/17/2021    BCR 15.2 11/21/2022    K 3.6 11/21/2022    CO2 28.2 11/21/2022    CALCIUM 9.3 11/21/2022    LABIL2 1.5 03/20/2015    AST 19 11/21/2022    ALT 19 11/21/2022        Lipid Panel  Lab Results   Component Value Date    HDL 38 (L) 11/21/2022    LDL 35 11/21/2022    TRIG 145 11/21/2022        TSH  Lab Results   Component Value Date    TSH 1.470 11/21/2022        Hemoglobin A1C  Lab Results   Component Value Date    HGBA1C 7.0 (A) 11/14/2023        Microalbumin/Creatinine  Lab Results   Component Value Date    MALBCRERATIO 12.6 11/21/2022    MICROALBUR 2.3 11/21/2022           Assessment / Plan      Assessment & Plan:  Diagnoses and all orders for this visit:    1. Type 2 diabetes mellitus with hyperglycemia, with long-term current use of insulin (Primary)  Assessment & Plan:  Diabetes is improving with treatment.   Continue current treatment regimen.  Diabetes will be reassessed in 3 months.    We weren't able to download the Colin CGM today.    She asks about insulin pumps today.  Brochures were provided.    Orders:  -     POC Glucose, Blood  -     POC Glycosylated Hemoglobin (Hb A1C)  -     Lipid Panel; Future  -     Microalbumin / Creatinine Urine Ratio - Urine, Clean Catch; Future  -     TSH; Future    2. Primary hypertension  Assessment &  Plan:  Hypertension is unchanged.  Continue current treatment regimen.  Blood pressure will be reassessed at the next regular appointment.      3. Dyslipidemia  Assessment & Plan:  Continue statin.  Check lipids.      4. Coronary artery disease involving native heart without angina pectoris, unspecified vessel or lesion type  Assessment & Plan:  Continue ASA and statin.  No GLP-1 RA due to pancreas issues.  Intolerant of SGLT-2 inhibitors.        Current Outpatient Medications   Medication Instructions    acetaminophen (TYLENOL) 500 mg, Oral, Every 6 Hours PRN    albuterol sulfate  (90 Base) MCG/ACT inhaler Inhale 2 puffs by mouth every 4 (Four) Hours As Needed    aspirin 81 mg, Oral, Daily, Taken at night. Last dose 9-18-21    azelastine (ASTELIN) 0.1 % nasal spray Instill 1 spray in each nostil 2 (Two) Times A Day    baclofen (LIORESAL) 10 mg, Oral, Nightly    betamethasone dipropionate (DIPROLENE) 0.05 % ointment Apply topically to the appropriate area as directed once daily as needed    butalbital-acetaminophen-caffeine (FIORICET, ESGIC) -40 MG per tablet Take 1 tablet by mouth Daily As Needed for headache    clidinium-chlordiazePOXIDE (LIBRAX) 5-2.5 MG per capsule Take 1 capsule by mouth 2 (Two) Times a Day as needed.    clonazePAM (KlonoPIN) 0.5 MG tablet As Needed    Continuous Blood Gluc  (FreeStyle Colin 2 [place]) device Use daily as directed    digoxin (LANOXIN) 250 mcg, Oral, Daily    fludrocortisone 0.1 mg, Oral, Daily    fluticasone (FLONASE) 50 MCG/ACT nasal spray Instill 2 sprays in each nostril 2 (Two) Times A Day    furosemide (LASIX) 40 MG tablet Take 1 tablet by mouth Daily. May have extra 1/2 to 1 tablet daily if needed for edema    gabapentin (NEURONTIN) 800 mg, Oral, Nightly    glucose blood (OneTouch Verio) test strip Use to test blood glucose 3 times a day as directed    HYDROcodone-acetaminophen (NORCO) 7.5-325 MG per tablet 1 tablet, Oral, 3 times daily     hydrocortisone (ANUSOL-HC) 25 MG suppository Insert 1 suppository rectally twice a day as needed (remove wrapper and moisten with water)    Insulin Glargine (BASAGLAR KWIKPEN) 100 UNIT/ML injection pen Inject 50 units subcutaneously once in the morning and 75 units at night    Insulin Pen Needle (B-D UF III MINI PEN NEEDLES) 31G X 5 MM misc Use to inject insulin twice a day as directed    Insulin Syringe-Needle U-100 29G 0.5 ML misc 0.3 mL, Injection, Daily    meclizine (ANTIVERT) 25 mg, Oral, 3 Times Daily PRN    metoprolol succinate XL (TOPROL-XL) 100 mg, Oral, Daily    metroNIDAZOLE (METROCREAM) 0.75 % cream Apply to the face once every night    neomycin-colistin-hydrocortisone-thonzonium (Cortisporin-TC) 3.3-3-10-0.5 MG/ML otic suspension Instill 5 drops into affected ear 3 (Three) times a day for 10 days    nitroglycerin (Nitrostat) 0.4 MG SL tablet Place 1 tablet under the tongue Every 5 Minutes As Needed for Chest Pain for up to 3 total doses. Call 911 if pain remains after 1 dose.    NovoLOG FlexPen 10 Units, Subcutaneous, 3 Times Daily With Meals    Nurtec 75 mg, Oral, Daily PRN    Nurtec 75 mg, Oral, As Needed    Nurtec 75 mg, Oral, Daily PRN    ofloxacin (FLOXIN) 0.3 % otic solution Instill 5 drops into affected ear once a day for 7 days    pancrelipase, Lip-Prot-Amyl, (Pancreaze) 28142-72725 units capsule delayed-release particles capsule Take 1 capsule with each meal and with each snack    potassium chloride (KLOR-CON) 10 MEQ CR tablet Take 1 tablet by mouth Daily as directed    prednisoLONE acetate (PRED FORTE) 1 % ophthalmic suspension Instill 1 drop in both eyes twice a day; Shake Well Before Use    promethazine (PHENERGAN) 25 mg, Oral, Every 6 Hours PRN    RABEprazole (ACIPHEX) 20 mg, Oral, Daily    rosuvastatin (CRESTOR) 10 MG tablet Take one tablet by mouth once every evening    topiramate (TOPAMAX) 25 mg, Oral, Nightly    triamcinolone (KENALOG) 0.1 % cream Apply topically to the affected area  twice a day    venlafaxine XR (EFFEXOR-XR) 75 mg, Oral, Daily      Return in about 3 months (around 2/14/2024) for Recheck with A1c.    Jac Santiago MD   11/14/2023

## 2023-11-30 ENCOUNTER — OFFICE VISIT (OUTPATIENT)
Dept: NEUROLOGY | Facility: CLINIC | Age: 73
End: 2023-11-30
Payer: MEDICARE

## 2023-11-30 VITALS
SYSTOLIC BLOOD PRESSURE: 120 MMHG | HEIGHT: 67 IN | WEIGHT: 162 LBS | HEART RATE: 74 BPM | OXYGEN SATURATION: 94 % | BODY MASS INDEX: 25.43 KG/M2 | DIASTOLIC BLOOD PRESSURE: 82 MMHG

## 2023-11-30 DIAGNOSIS — G43.009 MIGRAINE WITHOUT AURA AND WITHOUT STATUS MIGRAINOSUS, NOT INTRACTABLE: ICD-10-CM

## 2023-11-30 DIAGNOSIS — M48.061 DEGENERATIVE LUMBAR SPINAL STENOSIS: ICD-10-CM

## 2023-11-30 DIAGNOSIS — H81.13 BENIGN PAROXYSMAL VERTIGO OF BOTH EARS: ICD-10-CM

## 2023-11-30 DIAGNOSIS — M50.30 DDD (DEGENERATIVE DISC DISEASE), CERVICAL: ICD-10-CM

## 2023-11-30 DIAGNOSIS — E08.42 DIABETIC POLYNEUROPATHY ASSOCIATED WITH DIABETES MELLITUS DUE TO UNDERLYING CONDITION: Primary | ICD-10-CM

## 2023-11-30 RX ORDER — RIMEGEPANT SULFATE 75 MG/75MG
75 TABLET, ORALLY DISINTEGRATING ORAL DAILY PRN
Qty: 10 TABLET | Refills: 0 | COMMUNITY
Start: 2023-11-30

## 2023-11-30 RX ORDER — MECLIZINE HYDROCHLORIDE 25 MG/1
25 TABLET ORAL 3 TIMES DAILY PRN
Qty: 90 TABLET | Refills: 3 | Status: SHIPPED | OUTPATIENT
Start: 2023-11-30

## 2023-11-30 RX ORDER — GABAPENTIN 800 MG/1
TABLET ORAL
Qty: 135 TABLET | Refills: 1 | Status: SHIPPED | OUTPATIENT
Start: 2023-11-30

## 2023-11-30 NOTE — LETTER
December 1, 2023     Connor Ritter MD  [address]  [address]  [facility] [address]    Patient: Leela Muller   YOB: 1950   Date of Visit: 11/30/2023       Dear Connor Ritter MD    Leela Muller was in my office today. Below is a copy of my note.    If you have questions, please do not hesitate to call me. I look forward to following Leela along with you.         Sincerely,        [facility]        CC: MD Catracho Giles MD Melissa L Knuckles, MD Wendell Rance Miers, MD Paula W Hollingsworth, MD Rachel Davenport-Campbell, APRN    Subjective:     Patient ID: Leela Muller is a 73 y.o. female.    CC:   Chief Complaint   Patient presents with   • Peripheral Neuropathy   • Migraine       HPI:   History of Present Illness  Today 11/30/2023-  This is a very pleasant 73-year-old female who presents for 7-month neurology follow-up on chronic migraine headaches, chronic neck pain, chronic intermittent vertigo, anxiety, prior strokes, and diabetic peripheral neuropathy. Long term, we are prescribing gabapentin 800 mg at bedtime, Effexor XR 75 mg daily. She takes Nurtec at onset of migraine as needed. She has used butalbital with acetaminophen also as needed. She is also on low-dose topiramate 25 mg. She takes baclofen as needed and Phenergan as needed.     She is currently under the care of multiple specialists. She sees endocrinology and pain management. She reports she recently had a rectal surgery to remove a mass that they were certain was cancerous. She had this on 11/20/2023 and has been waiting on results of the pathology. I am not able to see records of this as it was not done with [facility].     She is here for follow-up and refills on her medications today. She signed her updated controlled substance agreement at last visit.    Today, she reports she underwent her rectal surgery in Dr. Yoo' office. Dr. Yoo is her dermatologist in [place], [place].  "She states Dr. Yoo saw it the mass on the outside, but she also cut the inside of her rectum. She previously saw Dr. Martinez in the past for surgical removal of this similar area. She has not received the results yet. She states if Dr. Yoo \"did not get it all, she will have to see Dr. Martinez at [facility], and they will have to operate on her\". She confirms she has had this before and that it is in the same spot.    She has had an increase in her neuropathy symptoms. She states if she does not take gabapentin, her feet \"burn off on the bottom.\" She inquires about increasing her dose. She notes her feet are burning currently, and she has not taken gabapentin since last night. She has pain in her back, legs, and feet which is constant and moderate to severe at times.    She reports her headaches are \"a little bit better,\" but not a lot. She took 2 Tylenol last night but notes she should have taken Nurtec. She has 1 tablet of Nurtec left, so she did not want to run out of this. She still has a headache today. She states she cannot afford Nurtec, so she is given samples.     She recently saw Dr. Santiago and states her blood glucose had improved.    She saw Dr. Brown and received injections in the neck and shoulders, but they were not helpful.     She confirms she has had falls since her last visit. She states she had \"a big knot\" and a contusion on her leg that has now improved. She also had an abrasion. She states she goes to get up and falls backwards. She reports she does not have anything to hold onto at times. Her cane is not helpful when she is getting up. She states she has a walker that has wheels on it, but it does not lock. She declines a referral to physical therapy for now.    She reports she had an ultrasound of her breasts, and it \"looks bad.\" They found lumps in both of her breasts. She is scheduled for additional testing on 12/04/2023. She states they tried to pull out fluid, but \"they are " "hard\". She notes she may be facing surgery. She reports when they did the PET scan, they \"highlighted for cancer\" in both breasts. She states her kidneys did the same thing, and she was going to be placed on the transplant list for both kidneys. Upon further investigation, Dr. Rodriguez ruled out kidney cancer last week. She states she has had several cysts in both kidneys, but Dr. Rodriguez is going to monitor this. She reports when Dr. Garcia \"does the diagnostic\" on her liver to follow the mass, they are going to watch her kidneys too.    She has a history of breast cancer. Dr. Martinez removed the breast cancer under her nipple. She did not require chemotherapy or radiation. She had a benign breast excision in 2020 and one in the left breast in 2012 removed by Dr. Hoffmann. The growths in 2020 and 2012 were not cancerous, but the growth under her nipple was.     Prior neurology workup and history:  Chronic migraine headaches present for many years along with chronic neck pain, chronic and intermittent vertigo, anxiety, and history of prior strokes in 06/2015 and in 2010. Previously was followed long term in our clinic by Dr. Devaughn Lewis, with neurology.      She states that she is taking Topamax every night and it has been helping her headaches. Migraines for years. She has a few migraines a month. She states that she has been trying to take extra strength Tylenol as needed. Headaches have nausea, vomiting, photophobia and phonophobia, begin in occipital region, radiate to bilateral frontal region with moderate throbbing pain. She has these about 2 days per month currently. She is already on a beta blocker. Triptans contraindicated with CV risk factors. She has taken venlafaxine with no benefit. Has not tried ubrelvy. Finds nurtec helpful.     Recurrent falls continue.     MRI of the brain and cervical spine with and without contrast. She did have both of these completed on 06/6/2022 at [facility]. MRI of " the brain with and without contrast showed age-related changes of mild generalized volume loss with some encephalomalacia and gliotic changes in the right occipital lobe with the appearance of a prior stroke. No acute intracranial abnormalities and no abnormal contrast enhancement were seen. Her MRI of the cervical spine with and without contrast did show some mild cervical spondylosis similar to 2019 imaging with no focal cord signal abnormalities and no significant spinal stenosis.     She does take aspirin and rosuvastatin. No recurrent strokes.     She does follow with cardiology for erratic orthostatic blood pressure and dizziness and was previously prescribed pyridostigmine, but was unable to tolerate it.      Muscle relaxer cream she uses as needed with [facility] that has . Continues magnesium chloride is 15%, guaifenesin is 10%, baclofen is 5%, and cyclobenzaprine is 4%. Uses PRN.     She uses the [facility] pharmacy in [place], [place].      She follows with cardiology, pulmonology, endocrinology, and gastroenterology, pain management- all with [facility].     Takes Lortab as needed for pain. Known lumbar and cervical spinal stenosis and DDD.     She confirms having severe neck, shoulder, left worse than right, and low back pain and it is so bad intermittently that she cannot get up. She has been scheduled for injections.    The following portions of the patient's history were reviewed and updated as appropriate: allergies, current medications, past family history, past medical history, past social history, past surgical history, and problem list.    Past Medical History:   Diagnosis Date   • Anxiety    • Arm pain    • Arthritis    • Breast injury     fell 2019    • Cancer    • Chronic pancreatitis    • COVID-19    • Depression    • Diabetes mellitus    • History of rectal surgery    • Hyperlipidemia    • Hypertension    • Liver mass    • Medication monitoring encounter  07/24/2018   • Neck pain on left side    • Palpitations 04/18/2018   • Pancreatitis    • Pulmonary embolism    • Stroke syndrome 09/14/2016    · Assessed By: Devuaghn Lewis (Neurology); Last Assessed: 16 Jun 2015  Right cerebrovascular accident in July or August 2010, evaluated at [facility].  Admission to [facility] on 09/28/2010 with headache and stuttering, consistent with TIA.  Chronic Coumadin therapy, initiated following bilateral pulmonary emboli in 2007 after fall and hip replacement.  She was already on 81 mg of aspirin as well, 09/2010.  MRI of the brain on 09/28/2010 revealing old right parietal cerebrovascular accident.  MRA revealing normal carotids, middle anterior and posterior cerebral arteries with minimal ostial stenosis of both vertebral arteries.  GENARO by Dr. Oliver Mobley on 09/28/2010:  patent foramen ovale with a small amount of right to left shunting.  Normal LVEF and normal valvular structures.   Patent foramen ovale closure using a 25 mm. Amplatzer cribriform occluder, 10/05/2010.   Echocardiogram, 06/23/2014:  LVEF (55% to 60%) with and Amplatzer device noted to be well-seated in    • Thrombocytopenia    • Transient cerebral ischemia    • Type 2 diabetes mellitus        Past Surgical History:   Procedure Laterality Date   • APPENDECTOMY     • BREAST BIOPSY Left 2012    benign   • BREAST BIOPSY     • BREAST EXCISIONAL BIOPSY Left     benign   • BREAST EXCISIONAL BIOPSY Right     benign   • BREAST EXCISIONAL BIOPSY Right 2020    benign   • BREAST SURGERY     • CAUTERIZATION NASAL BLEEDERS  2022   • CHOLECYSTECTOMY     • COLONOSCOPY     • HYSTERECTOMY     • LIVER BIOPSY     • OOPHORECTOMY     • RECTAL SURGERY  11/20/2023    cancer   • SKIN CANCER EXCISION     • TONSILLECTOMY     • TOTAL HIP ARTHROPLASTY REVISION         Social History     Socioeconomic History   • Marital status:    Tobacco Use   • Smoking status: Never     Passive exposure: Never   •  Smokeless tobacco: Never   Vaping Use   • Vaping Use: Never used   Substance and Sexual Activity   • Alcohol use: No   • Drug use: No   • Sexual activity: Yes       Family History   Problem Relation Age of Onset   • Alzheimer's disease Mother    • Cancer Mother    • Coronary artery disease Other    • Diabetes Other    • Hypertension Other    • Stroke Other    • Cancer Other    • Dementia Other    • Heart attack Father    • Colon polyps Father    • Breast cancer Neg Hx    • Ovarian cancer Neg Hx    • Colon cancer Neg Hx    • Esophageal cancer Neg Hx           Current Outpatient Medications:   •  acetaminophen (TYLENOL) 500 MG tablet, Take 1 tablet by mouth Every 6 (Six) Hours As Needed., Disp: , Rfl:   •  albuterol sulfate  (90 Base) MCG/ACT inhaler, Inhale 2 puffs by mouth every 4 (Four) Hours As Needed, Disp: 8.5 g, Rfl: 2  •  aspirin 81 MG tablet, Take 1 tablet by mouth Daily. Taken at night. Last dose 9-18-21, Disp: , Rfl:   •  azelastine (ASTELIN) 0.1 % nasal spray, Instill 1 spray in each nostil 2 (Two) Times A Day, Disp: 30 mL, Rfl: 11  •  azelastine (ASTELIN) 0.1 % nasal spray, Administer 1 spray in the nostrils twice a day, Disp: 90 mL, Rfl: 1  •  baclofen (LIORESAL) 10 MG tablet, Take 1 tablet by mouth Every Night., Disp: 90 tablet, Rfl: 3  •  betamethasone dipropionate (DIPROLENE) 0.05 % ointment, Apply topically to the appropriate area as directed once daily as needed, Disp: 15 g, Rfl: 0  •  butalbital-acetaminophen-caffeine (FIORICET, ESGIC) -40 MG per tablet, Take 1 tablet by mouth Daily As Needed for headache, Disp: 30 tablet, Rfl: 0  •  clidinium-chlordiazePOXIDE (LIBRAX) 5-2.5 MG per capsule, Take 1 capsule by mouth 2 (Two) Times a Day as needed., Disp: 60 capsule, Rfl: 0  •  clidinium-chlordiazePOXIDE (LIBRAX) 5-2.5 MG per capsule, Take 1 capsule by mouth 2 (Two) Times a Day As Needed., Disp: 60 capsule, Rfl: 0  •  clonazePAM (KlonoPIN) 0.5 MG tablet, As Needed., Disp: , Rfl:   •   Continuous Blood Gluc  (FreeStyle Colin 2 [place]) device, Use daily as directed, Disp: 1 each, Rfl: 0  •  digoxin (LANOXIN) 250 MCG tablet, Take 1 tablet by mouth Daily., Disp: 90 tablet, Rfl: 3  •  fludrocortisone 0.1 MG tablet, Take 1 tablet by mouth Daily., Disp: 90 tablet, Rfl: 3  •  fluticasone (FLONASE) 50 MCG/ACT nasal spray, Instill 2 sprays in each nostril 2 (Two) Times A Day, Disp: 16 g, Rfl: 0  •  furosemide (LASIX) 40 MG tablet, Take 1 tablet by mouth Daily. May have extra 1/2 to 1 tablet daily if needed for edema, Disp: 135 tablet, Rfl: 3  •  gabapentin (NEURONTIN) 800 MG tablet, Take 1/2 tablet in midday and 1 tablet at bedtime, Disp: 135 tablet, Rfl: 1  •  glucose blood (OneTouch Verio) test strip, Use to test blood glucose 3 times a day as directed, Disp: 300 each, Rfl: 3  •  HYDROcodone-acetaminophen (NORCO) 7.5-325 MG per tablet, Take 1 tablet by mouth 3 times a day., Disp: 90 tablet, Rfl: 0  •  hydrocortisone (ANUSOL-HC) 25 MG suppository, Insert 1 suppository rectally twice a day as needed (remove wrapper and moisten with water), Disp: 12 suppository, Rfl: 3  •  insulin aspart (NovoLOG FlexPen) 100 UNIT/ML solution pen-injector sc pen, Inject 10 Units under the skin into the appropriate area as directed 3 (Three) Times a Day With Meals., Disp: 27 mL, Rfl: 3  •  Insulin Glargine (BASAGLAR KWIKPEN) 100 UNIT/ML injection pen, Inject 50 units subcutaneously once in the morning and 75 units at night, Disp: 135 mL, Rfl: 1  •  Insulin Pen Needle (B-D UF III MINI PEN NEEDLES) 31G X 5 MM misc, Use to inject insulin twice a day as directed, Disp: 100 each, Rfl: 12  •  Insulin Syringe-Needle U-100 29G 0.5 ML misc, Inject 0.3 mL as directed Daily., Disp: , Rfl:   •  meclizine (ANTIVERT) 25 MG tablet, Take 1 tablet by mouth 3 (Three) Times a Day As Needed for Dizziness., Disp: 90 tablet, Rfl: 3  •  metoprolol succinate XL (TOPROL-XL) 100 MG 24 hr tablet, Take 1 tablet by mouth Daily., Disp: 90 tablet,  Rfl: 3  •  metroNIDAZOLE (METROCREAM) 0.75 % cream, Apply to the face once every night, Disp: 45 g, Rfl: 0  •  neomycin-colistin-hydrocortisone-thonzonium (Cortisporin-TC) 3.3-3-10-0.5 MG/ML otic suspension, Instill 5 drops into affected ear 3 (Three) times a day for 10 days, Disp: 10 mL, Rfl: 0  •  nitroglycerin (Nitrostat) 0.4 MG SL tablet, Place 1 tablet under the tongue Every 5 Minutes As Needed for Chest Pain for up to 3 total doses. Call 911 if pain remains after 1 dose., Disp: 25 tablet, Rfl: 6  •  Nurtec 75 MG dispersible tablet, Take 1 tablet by mouth Daily As Needed (migraine)., Disp: 8 tablet, Rfl: 11  •  ofloxacin (FLOXIN) 0.3 % otic solution, Instill 5 drops into affected ear once a day for 7 days, Disp: 10 mL, Rfl: 0  •  pancrelipase, Lip-Prot-Amyl, (Pancreaze) 85636-51383 units capsule delayed-release particles capsule, Take 1 capsule with each meal and with each snack (Patient taking differently: As Needed.), Disp: 100 capsule, Rfl: 2  •  potassium chloride (KLOR-CON) 10 MEQ CR tablet, Take 1 tablet by mouth Daily as directed, Disp: 90 tablet, Rfl: 0  •  prednisoLONE acetate (PRED FORTE) 1 % ophthalmic suspension, Instill 1 drop in both eyes twice a day; Shake Well Before Use, Disp: 5 mL, Rfl: 0  •  promethazine (PHENERGAN) 25 MG tablet, Take 1 tablet by mouth Every 6 (Six) Hours As Needed for Nausea or Vomiting., Disp: 30 tablet, Rfl: 1  •  RABEprazole (ACIPHEX) 20 MG EC tablet, Take 1 tablet by mouth Daily., Disp: 90 tablet, Rfl: 1  •  rosuvastatin (CRESTOR) 10 MG tablet, Take one tablet by mouth once every evening, Disp: 90 tablet, Rfl: 2  •  topiramate (TOPAMAX) 25 MG tablet, Take 1 tablet by mouth Every Night., Disp: 90 tablet, Rfl: 3  •  triamcinolone (KENALOG) 0.1 % cream, Apply topically to the affected area twice a day, Disp: 30 g, Rfl: 0  •  venlafaxine XR (EFFEXOR-XR) 75 MG 24 hr capsule, Take 1 capsule by mouth Daily., Disp: 90 capsule, Rfl: 3  •  Rimegepant Sulfate (Nurtec) 75 MG tablet  "dispersible tablet, Take 1 tablet by mouth Daily As Needed (migraines)., Disp: 10 tablet, Rfl: 0     Review of Systems   Musculoskeletal:  Positive for arthralgias, back pain, gait problem and neck pain.   Neurological:  Positive for numbness and headaches.   Psychiatric/Behavioral:  The patient is nervous/anxious.    All other systems reviewed and are negative.       Objective:  /82   Pulse 74   Ht 170.2 cm (67\")   Wt 73.5 kg (162 lb)   SpO2 94%   BMI 25.37 kg/m²     Neurologic Exam  Mental Status   Oriented to person, place, and time.   Speech: speech is normal   Level of consciousness: alert     Cranial Nerves   Cranial nerves II through XII intact.     Motor Exam   Muscle bulk: normal  Overall muscle tone: normal     Strength   Strength 5/5 except as noted.   Chronic pain and arthritis multiple joints. Uses cane for ambulation.      Sensory Exam   Right leg light touch: decreased from ankle  Left leg light touch: decreased from ankle  Right leg vibration: decreased from ankle  Left leg vibration: decreased from ankle  Right leg pinprick: decreased from ankle  Left leg pinprick: decreased from ankle    Right more than Left decreased sensation-stocking distribution-chronic. Prior surgery to right ankle.     Gait, Coordination, and Reflexes      Gait  Gait: (antalgic with cane)     Coordination   Finger to nose coordination: normal     Tremor   Resting tremor: absent  Intention tremor: absent  Action tremor: absent     Reflexes   Right brachioradialis: 2+  Left brachioradialis: 2+  Right biceps: 2+  Left biceps: 2+  Right patellar: 1+  Left patellar: 1+  Right achilles: 1+  Left achilles: 1+  Right : 2+  Left : 2+     Physical Exam  Constitutional:       Appearance: Normal appearance.   Neurological:      Mental Status: She is alert and oriented to person, place, and time.      Cranial Nerves: Cranial nerves 2-12 are intact.      Coordination: Finger-Nose-Finger Test normal.      Deep Tendon " Reflexes:      Reflex Scores:       Bicep reflexes are 2+ on the right side and 2+ on the left side.       Brachioradialis reflexes are 2+ on the right side and 2+ on the left side.       Patellar reflexes are 1+ on the right side and 1+ on the left side.       Achilles reflexes are 1+ on the right side and 1+ on the left side.  Psychiatric:         Mood and Affect: Mood is anxious.         Speech: Speech normal.         Behavior: Behavior normal.         Thought Content: Thought content normal.         Cognition and Memory: Cognition and memory normal.         Judgment: Judgment normal.     Assessment/Plan:       Diagnoses and all orders for this visit:    1. Diabetic polyneuropathy associated with diabetes mellitus due to underlying condition (Primary)  -     gabapentin (NEURONTIN) 800 MG tablet; Take 1/2 tablet in midday and 1 tablet at bedtime  Dispense: 135 tablet; Refill: 1    2. Migraine without aura and without status migrainosus, not intractable  Comments:  nurtec odt  Orders:  -     gabapentin (NEURONTIN) 800 MG tablet; Take 1/2 tablet in midday and 1 tablet at bedtime  Dispense: 135 tablet; Refill: 1  -     Rimegepant Sulfate (Nurtec) 75 MG tablet dispersible tablet; Take 1 tablet by mouth Daily As Needed (migraines).  Dispense: 10 tablet; Refill: 0    3. DDD (degenerative disc disease), cervical  -     gabapentin (NEURONTIN) 800 MG tablet; Take 1/2 tablet in midday and 1 tablet at bedtime  Dispense: 135 tablet; Refill: 1    4. Degenerative lumbar spinal stenosis  -     gabapentin (NEURONTIN) 800 MG tablet; Take 1/2 tablet in midday and 1 tablet at bedtime  Dispense: 135 tablet; Refill: 1    5. Benign paroxysmal vertigo of both ears  -     meclizine (ANTIVERT) 25 MG tablet; Take 1 tablet by mouth 3 (Three) Times a Day As Needed for Dizziness.  Dispense: 90 tablet; Refill: 3         Our pharmacy team is working with her today to get the Nurtec more affordable for her. I have provided her with 10 samples.  She has had an increase in her neuropathy symptoms, and we will go ahead and increase her gabapentin where she can take a half a pill in the daytime and then the full pill at night. We will keep her other medications the same. She has multiple other health concerns right now, and she is following with multiple specialists. I encouraged her to keep her appointments with all of the specialists to follow up. She will see us in 6 to 7 months in clinic or sooner if needed. She verbalizes understanding and agrees with plan moving forward today.    Reviewed medications, potential side effects and signs and symptoms to report. Discussed risk versus benefits of treatment plan with patient and/or family-including medications, labs and radiology that may be ordered. Addressed questions and concerns during visit. Patient and/or family verbalized understanding and agree with plan.    As part of this patient's treatment plan I am prescribing controlled substances. The patient has been made aware of appropriate use of such medications, including potential risk of somnolence, limited ability to drive and/or work safely, and potential for dependence or overdose. It has also been made clear that these medications are for use by the patient only, without concomitant use of alcohol or other substances unless prescribed. Keep out of reach of children.  Diogenes report has been reviewed. If this is going to be prescribed long term, [facility] Controlled Substance Agreement Contract has also been read and signed by patient and myself.    During this visit the following were done:  Labs Reviewed []    Labs Ordered []    Radiology Reports Reviewed []    Radiology Ordered []    PCP Records Reviewed []    Referring Provider Records Reviewed []    ER Records Reviewed []    Hospital Records Reviewed []    History Obtained From Family []    Radiology Images Reviewed []    Other Reviewed [x]  Specialty notes reviewed with [facility]  Records Requested []       Transcribed from ambient dictation for Jelly SiddiqiWEST thomas by Jil Hernandez.  11/30/23   17:53 EST    Patient or patient representative verbalized consent to the visit recording.  I have personally performed the services described in this document as transcribed by the above individual, and it is both accurate and complete.  Jelly Lombardi, WEST  12/1/2023  07:49 EST       Note to patient: The 21st Century Cures Act makes medical notes like these available to patients in the interest of transparency. However, be advised this is a medical document. It is intended as peer to peer communication. It is written in medical language and may contain abbreviations or verbiage that are unfamiliar. It may appear blunt or direct. Medical documents are intended to carry relevant information, facts as evident, and the clinical opinion of the provider.

## 2023-11-30 NOTE — PROGRESS NOTES
"Subjective:     Patient ID: Leela Muller is a 73 y.o. female.    CC:   Chief Complaint   Patient presents with    Peripheral Neuropathy    Migraine       HPI:   History of Present Illness  Today 11/30/2023-  This is a very pleasant 73-year-old female who presents for 7-month neurology follow-up on chronic migraine headaches, chronic neck pain, chronic intermittent vertigo, anxiety, prior strokes, and diabetic peripheral neuropathy. Long term, we are prescribing gabapentin 800 mg at bedtime, Effexor XR 75 mg daily. She takes Nurtec at onset of migraine as needed. She has used butalbital with acetaminophen also as needed. She is also on low-dose topiramate 25 mg. She takes baclofen as needed and Phenergan as needed.     She is currently under the care of multiple specialists. She sees endocrinology and pain management. She reports she recently had a rectal surgery to remove a mass that they were certain was cancerous. She had this on 11/20/2023 and has been waiting on results of the pathology. I am not able to see records of this as it was not done with [facility].     She is here for follow-up and refills on her medications today. She signed her updated controlled substance agreement at last visit.    Today, she reports she underwent her rectal surgery in Dr. Yoo' office. Dr. Yoo is her dermatologist in [place], [place]. She states Dr. Yoo saw it the mass on the outside, but she also cut the inside of her rectum. She previously saw Dr. Martinez in the past for surgical removal of this similar area. She has not received the results yet. She states if Dr. Yoo \"did not get it all, she will have to see Dr. Martinez at [facility], and they will have to operate on her\". She confirms she has had this before and that it is in the same spot.    She has had an increase in her neuropathy symptoms. She states if she does not take gabapentin, her feet \"burn off on the bottom.\" She inquires about increasing her " "dose. She notes her feet are burning currently, and she has not taken gabapentin since last night. She has pain in her back, legs, and feet which is constant and moderate to severe at times.    She reports her headaches are \"a little bit better,\" but not a lot. She took 2 Tylenol last night but notes she should have taken Nurtec. She has 1 tablet of Nurtec left, so she did not want to run out of this. She still has a headache today. She states she cannot afford Nurtec, so she is given samples.     She recently saw Dr. Santiago and states her blood glucose had improved.    She saw Dr. Brown and received injections in the neck and shoulders, but they were not helpful.     She confirms she has had falls since her last visit. She states she had \"a big knot\" and a contusion on her leg that has now improved. She also had an abrasion. She states she goes to get up and falls backwards. She reports she does not have anything to hold onto at times. Her cane is not helpful when she is getting up. She states she has a walker that has wheels on it, but it does not lock. She declines a referral to physical therapy for now.    She reports she had an ultrasound of her breasts, and it \"looks bad.\" They found lumps in both of her breasts. She is scheduled for additional testing on 12/04/2023. She states they tried to pull out fluid, but \"they are hard\". She notes she may be facing surgery. She reports when they did the PET scan, they \"highlighted for cancer\" in both breasts. She states her kidneys did the same thing, and she was going to be placed on the transplant list for both kidneys. Upon further investigation, Dr. Rodriguez ruled out kidney cancer last week. She states she has had several cysts in both kidneys, but Dr. Rodriguez is going to monitor this. She reports when Dr. Garcia \"does the diagnostic\" on her liver to follow the mass, they are going to watch her kidneys too.    She has a history of breast cancer. Dr. Martinez removed " the breast cancer under her nipple. She did not require chemotherapy or radiation. She had a benign breast excision in 2020 and one in the left breast in 2012 removed by Dr. Hoffmann. The growths in 2020 and 2012 were not cancerous, but the growth under her nipple was.     Prior neurology workup and history:  Chronic migraine headaches present for many years along with chronic neck pain, chronic and intermittent vertigo, anxiety, and history of prior strokes in 06/2015 and in 2010. Previously was followed long term in our clinic by Dr. Devaughn Lewis, with neurology.      She states that she is taking Topamax every night and it has been helping her headaches. Migraines for years. She has a few migraines a month. She states that she has been trying to take extra strength Tylenol as needed. Headaches have nausea, vomiting, photophobia and phonophobia, begin in occipital region, radiate to bilateral frontal region with moderate throbbing pain. She has these about 2 days per month currently. She is already on a beta blocker. Triptans contraindicated with CV risk factors. She has taken venlafaxine with no benefit. Has not tried ubrelvy. Finds nurtec helpful.     Recurrent falls continue.     MRI of the brain and cervical spine with and without contrast. She did have both of these completed on 06/6/2022 at [facility]. MRI of the brain with and without contrast showed age-related changes of mild generalized volume loss with some encephalomalacia and gliotic changes in the right occipital lobe with the appearance of a prior stroke. No acute intracranial abnormalities and no abnormal contrast enhancement were seen. Her MRI of the cervical spine with and without contrast did show some mild cervical spondylosis similar to 2019 imaging with no focal cord signal abnormalities and no significant spinal stenosis.     She does take aspirin and rosuvastatin. No recurrent strokes.     She does follow with cardiology for  erratic orthostatic blood pressure and dizziness and was previously prescribed pyridostigmine, but was unable to tolerate it.      Muscle relaxer cream she uses as needed with [facility] that has . Continues magnesium chloride is 15%, guaifenesin is 10%, baclofen is 5%, and cyclobenzaprine is 4%. Uses PRN.     She uses the [facility] pharmacy in [place], [place].      She follows with cardiology, pulmonology, endocrinology, and gastroenterology, pain management- all with [facility].     Takes Lortab as needed for pain. Known lumbar and cervical spinal stenosis and DDD.     She confirms having severe neck, shoulder, left worse than right, and low back pain and it is so bad intermittently that she cannot get up. She has been scheduled for injections.    The following portions of the patient's history were reviewed and updated as appropriate: allergies, current medications, past family history, past medical history, past social history, past surgical history, and problem list.    Past Medical History:   Diagnosis Date    Anxiety     Arm pain     Arthritis     Breast injury     fell 2019     Cancer     Chronic pancreatitis     COVID-19     Depression     Diabetes mellitus     History of rectal surgery     Hyperlipidemia     Hypertension     Liver mass     Medication monitoring encounter 2018    Neck pain on left side     Palpitations 2018    Pancreatitis     Pulmonary embolism     Stroke syndrome 2016    · Assessed By: Devaughn Lewis (Neurology); Last Assessed: 2015  Right cerebrovascular accident in July or 2010, evaluated at [facility].  Admission to [facility] on 2010 with headache and stuttering, consistent with TIA.  Chronic Coumadin therapy, initiated following bilateral pulmonary emboli in  after fall and hip replacement.  She was already on 81 mg of aspirin as well, 2010.  MRI of the brain on  09/28/2010 revealing old right parietal cerebrovascular accident.  MRA revealing normal carotids, middle anterior and posterior cerebral arteries with minimal ostial stenosis of both vertebral arteries.  GENARO by Dr. Oliver Mobley on 09/28/2010:  patent foramen ovale with a small amount of right to left shunting.  Normal LVEF and normal valvular structures.   Patent foramen ovale closure using a 25 mm. Amplatzer cribriform occluder, 10/05/2010.   Echocardiogram, 06/23/2014:  LVEF (55% to 60%) with and Amplatzer device noted to be well-seated in     Thrombocytopenia     Transient cerebral ischemia     Type 2 diabetes mellitus        Past Surgical History:   Procedure Laterality Date    APPENDECTOMY      BREAST BIOPSY Left 2012    benign    BREAST BIOPSY      BREAST EXCISIONAL BIOPSY Left     benign    BREAST EXCISIONAL BIOPSY Right     benign    BREAST EXCISIONAL BIOPSY Right 2020    benign    BREAST SURGERY      CAUTERIZATION NASAL BLEEDERS  2022    CHOLECYSTECTOMY      COLONOSCOPY      HYSTERECTOMY      LIVER BIOPSY      OOPHORECTOMY      RECTAL SURGERY  11/20/2023    cancer    SKIN CANCER EXCISION      TONSILLECTOMY      TOTAL HIP ARTHROPLASTY REVISION         Social History     Socioeconomic History    Marital status:    Tobacco Use    Smoking status: Never     Passive exposure: Never    Smokeless tobacco: Never   Vaping Use    Vaping Use: Never used   Substance and Sexual Activity    Alcohol use: No    Drug use: No    Sexual activity: Yes       Family History   Problem Relation Age of Onset    Alzheimer's disease Mother     Cancer Mother     Coronary artery disease Other     Diabetes Other     Hypertension Other     Stroke Other     Cancer Other     Dementia Other     Heart attack Father     Colon polyps Father     Breast cancer Neg Hx     Ovarian cancer Neg Hx     Colon cancer Neg Hx     Esophageal cancer Neg Hx           Current Outpatient Medications:     acetaminophen (TYLENOL) 500 MG tablet, Take 1  tablet by mouth Every 6 (Six) Hours As Needed., Disp: , Rfl:     albuterol sulfate  (90 Base) MCG/ACT inhaler, Inhale 2 puffs by mouth every 4 (Four) Hours As Needed, Disp: 8.5 g, Rfl: 2    aspirin 81 MG tablet, Take 1 tablet by mouth Daily. Taken at night. Last dose 9-18-21, Disp: , Rfl:     azelastine (ASTELIN) 0.1 % nasal spray, Instill 1 spray in each nostil 2 (Two) Times A Day, Disp: 30 mL, Rfl: 11    azelastine (ASTELIN) 0.1 % nasal spray, Administer 1 spray in the nostrils twice a day, Disp: 90 mL, Rfl: 1    baclofen (LIORESAL) 10 MG tablet, Take 1 tablet by mouth Every Night., Disp: 90 tablet, Rfl: 3    betamethasone dipropionate (DIPROLENE) 0.05 % ointment, Apply topically to the appropriate area as directed once daily as needed, Disp: 15 g, Rfl: 0    butalbital-acetaminophen-caffeine (FIORICET, ESGIC) -40 MG per tablet, Take 1 tablet by mouth Daily As Needed for headache, Disp: 30 tablet, Rfl: 0    clidinium-chlordiazePOXIDE (LIBRAX) 5-2.5 MG per capsule, Take 1 capsule by mouth 2 (Two) Times a Day as needed., Disp: 60 capsule, Rfl: 0    clidinium-chlordiazePOXIDE (LIBRAX) 5-2.5 MG per capsule, Take 1 capsule by mouth 2 (Two) Times a Day As Needed., Disp: 60 capsule, Rfl: 0    clonazePAM (KlonoPIN) 0.5 MG tablet, As Needed., Disp: , Rfl:     Continuous Blood Gluc  (FreeStyle Colin 2 [place]) device, Use daily as directed, Disp: 1 each, Rfl: 0    digoxin (LANOXIN) 250 MCG tablet, Take 1 tablet by mouth Daily., Disp: 90 tablet, Rfl: 3    fludrocortisone 0.1 MG tablet, Take 1 tablet by mouth Daily., Disp: 90 tablet, Rfl: 3    fluticasone (FLONASE) 50 MCG/ACT nasal spray, Instill 2 sprays in each nostril 2 (Two) Times A Day, Disp: 16 g, Rfl: 0    furosemide (LASIX) 40 MG tablet, Take 1 tablet by mouth Daily. May have extra 1/2 to 1 tablet daily if needed for edema, Disp: 135 tablet, Rfl: 3    gabapentin (NEURONTIN) 800 MG tablet, Take 1/2 tablet in midday and 1 tablet at bedtime, Disp: 135  tablet, Rfl: 1    glucose blood (OneTouch Verio) test strip, Use to test blood glucose 3 times a day as directed, Disp: 300 each, Rfl: 3    HYDROcodone-acetaminophen (NORCO) 7.5-325 MG per tablet, Take 1 tablet by mouth 3 times a day., Disp: 90 tablet, Rfl: 0    hydrocortisone (ANUSOL-HC) 25 MG suppository, Insert 1 suppository rectally twice a day as needed (remove wrapper and moisten with water), Disp: 12 suppository, Rfl: 3    insulin aspart (NovoLOG FlexPen) 100 UNIT/ML solution pen-injector sc pen, Inject 10 Units under the skin into the appropriate area as directed 3 (Three) Times a Day With Meals., Disp: 27 mL, Rfl: 3    Insulin Glargine (BASAGLAR KWIKPEN) 100 UNIT/ML injection pen, Inject 50 units subcutaneously once in the morning and 75 units at night, Disp: 135 mL, Rfl: 1    Insulin Pen Needle (B-D UF III MINI PEN NEEDLES) 31G X 5 MM misc, Use to inject insulin twice a day as directed, Disp: 100 each, Rfl: 12    Insulin Syringe-Needle U-100 29G 0.5 ML misc, Inject 0.3 mL as directed Daily., Disp: , Rfl:     meclizine (ANTIVERT) 25 MG tablet, Take 1 tablet by mouth 3 (Three) Times a Day As Needed for Dizziness., Disp: 90 tablet, Rfl: 3    metoprolol succinate XL (TOPROL-XL) 100 MG 24 hr tablet, Take 1 tablet by mouth Daily., Disp: 90 tablet, Rfl: 3    metroNIDAZOLE (METROCREAM) 0.75 % cream, Apply to the face once every night, Disp: 45 g, Rfl: 0    neomycin-colistin-hydrocortisone-thonzonium (Cortisporin-TC) 3.3-3-10-0.5 MG/ML otic suspension, Instill 5 drops into affected ear 3 (Three) times a day for 10 days, Disp: 10 mL, Rfl: 0    nitroglycerin (Nitrostat) 0.4 MG SL tablet, Place 1 tablet under the tongue Every 5 Minutes As Needed for Chest Pain for up to 3 total doses. Call 911 if pain remains after 1 dose., Disp: 25 tablet, Rfl: 6    Nurtec 75 MG dispersible tablet, Take 1 tablet by mouth Daily As Needed (migraine)., Disp: 8 tablet, Rfl: 11    ofloxacin (FLOXIN) 0.3 % otic solution, Instill 5 drops  "into affected ear once a day for 7 days, Disp: 10 mL, Rfl: 0    pancrelipase, Lip-Prot-Amyl, (Pancreaze) 89066-03013 units capsule delayed-release particles capsule, Take 1 capsule with each meal and with each snack (Patient taking differently: As Needed.), Disp: 100 capsule, Rfl: 2    potassium chloride (KLOR-CON) 10 MEQ CR tablet, Take 1 tablet by mouth Daily as directed, Disp: 90 tablet, Rfl: 0    prednisoLONE acetate (PRED FORTE) 1 % ophthalmic suspension, Instill 1 drop in both eyes twice a day; Shake Well Before Use, Disp: 5 mL, Rfl: 0    promethazine (PHENERGAN) 25 MG tablet, Take 1 tablet by mouth Every 6 (Six) Hours As Needed for Nausea or Vomiting., Disp: 30 tablet, Rfl: 1    RABEprazole (ACIPHEX) 20 MG EC tablet, Take 1 tablet by mouth Daily., Disp: 90 tablet, Rfl: 1    rosuvastatin (CRESTOR) 10 MG tablet, Take one tablet by mouth once every evening, Disp: 90 tablet, Rfl: 2    topiramate (TOPAMAX) 25 MG tablet, Take 1 tablet by mouth Every Night., Disp: 90 tablet, Rfl: 3    triamcinolone (KENALOG) 0.1 % cream, Apply topically to the affected area twice a day, Disp: 30 g, Rfl: 0    venlafaxine XR (EFFEXOR-XR) 75 MG 24 hr capsule, Take 1 capsule by mouth Daily., Disp: 90 capsule, Rfl: 3    Rimegepant Sulfate (Nurtec) 75 MG tablet dispersible tablet, Take 1 tablet by mouth Daily As Needed (migraines)., Disp: 10 tablet, Rfl: 0     Review of Systems   Musculoskeletal:  Positive for arthralgias, back pain, gait problem and neck pain.   Neurological:  Positive for numbness and headaches.   Psychiatric/Behavioral:  The patient is nervous/anxious.    All other systems reviewed and are negative.       Objective:  /82   Pulse 74   Ht 170.2 cm (67\")   Wt 73.5 kg (162 lb)   SpO2 94%   BMI 25.37 kg/m²     Neurologic Exam  Mental Status   Oriented to person, place, and time.   Speech: speech is normal   Level of consciousness: alert     Cranial Nerves   Cranial nerves II through XII intact.     Motor Exam "   Muscle bulk: normal  Overall muscle tone: normal     Strength   Strength 5/5 except as noted.   Chronic pain and arthritis multiple joints. Uses cane for ambulation.      Sensory Exam   Right leg light touch: decreased from ankle  Left leg light touch: decreased from ankle  Right leg vibration: decreased from ankle  Left leg vibration: decreased from ankle  Right leg pinprick: decreased from ankle  Left leg pinprick: decreased from ankle    Right more than Left decreased sensation-stocking distribution-chronic. Prior surgery to right ankle.     Gait, Coordination, and Reflexes      Gait  Gait: (antalgic with cane)     Coordination   Finger to nose coordination: normal     Tremor   Resting tremor: absent  Intention tremor: absent  Action tremor: absent     Reflexes   Right brachioradialis: 2+  Left brachioradialis: 2+  Right biceps: 2+  Left biceps: 2+  Right patellar: 1+  Left patellar: 1+  Right achilles: 1+  Left achilles: 1+  Right : 2+  Left : 2+     Physical Exam  Constitutional:       Appearance: Normal appearance.   Neurological:      Mental Status: She is alert and oriented to person, place, and time.      Cranial Nerves: Cranial nerves 2-12 are intact.      Coordination: Finger-Nose-Finger Test normal.      Deep Tendon Reflexes:      Reflex Scores:       Bicep reflexes are 2+ on the right side and 2+ on the left side.       Brachioradialis reflexes are 2+ on the right side and 2+ on the left side.       Patellar reflexes are 1+ on the right side and 1+ on the left side.       Achilles reflexes are 1+ on the right side and 1+ on the left side.  Psychiatric:         Mood and Affect: Mood is anxious.         Speech: Speech normal.         Behavior: Behavior normal.         Thought Content: Thought content normal.         Cognition and Memory: Cognition and memory normal.         Judgment: Judgment normal.     Assessment/Plan:       Diagnoses and all orders for this visit:    1. Diabetic  polyneuropathy associated with diabetes mellitus due to underlying condition (Primary)  -     gabapentin (NEURONTIN) 800 MG tablet; Take 1/2 tablet in midday and 1 tablet at bedtime  Dispense: 135 tablet; Refill: 1    2. Migraine without aura and without status migrainosus, not intractable  Comments:  nurtec odt  Orders:  -     gabapentin (NEURONTIN) 800 MG tablet; Take 1/2 tablet in midday and 1 tablet at bedtime  Dispense: 135 tablet; Refill: 1  -     Rimegepant Sulfate (Nurtec) 75 MG tablet dispersible tablet; Take 1 tablet by mouth Daily As Needed (migraines).  Dispense: 10 tablet; Refill: 0    3. DDD (degenerative disc disease), cervical  -     gabapentin (NEURONTIN) 800 MG tablet; Take 1/2 tablet in midday and 1 tablet at bedtime  Dispense: 135 tablet; Refill: 1    4. Degenerative lumbar spinal stenosis  -     gabapentin (NEURONTIN) 800 MG tablet; Take 1/2 tablet in midday and 1 tablet at bedtime  Dispense: 135 tablet; Refill: 1    5. Benign paroxysmal vertigo of both ears  -     meclizine (ANTIVERT) 25 MG tablet; Take 1 tablet by mouth 3 (Three) Times a Day As Needed for Dizziness.  Dispense: 90 tablet; Refill: 3         Our pharmacy team is working with her today to get the Nurtec more affordable for her. I have provided her with 10 samples. She has had an increase in her neuropathy symptoms, and we will go ahead and increase her gabapentin where she can take a half a pill in the daytime and then the full pill at night. We will keep her other medications the same. She has multiple other health concerns right now, and she is following with multiple specialists. I encouraged her to keep her appointments with all of the specialists to follow up. She will see us in 6 to 7 months in clinic or sooner if needed. She verbalizes understanding and agrees with plan moving forward today.    Reviewed medications, potential side effects and signs and symptoms to report. Discussed risk versus benefits of treatment plan  with patient and/or family-including medications, labs and radiology that may be ordered. Addressed questions and concerns during visit. Patient and/or family verbalized understanding and agree with plan.    As part of this patient's treatment plan I am prescribing controlled substances. The patient has been made aware of appropriate use of such medications, including potential risk of somnolence, limited ability to drive and/or work safely, and potential for dependence or overdose. It has also been made clear that these medications are for use by the patient only, without concomitant use of alcohol or other substances unless prescribed. Keep out of reach of children.  Diogenes report has been reviewed. If this is going to be prescribed long term, [facility] Controlled Substance Agreement Contract has also been read and signed by patient and myself.    During this visit the following were done:  Labs Reviewed []    Labs Ordered []    Radiology Reports Reviewed []    Radiology Ordered []    PCP Records Reviewed []    Referring Provider Records Reviewed []    ER Records Reviewed []    Hospital Records Reviewed []    History Obtained From Family []    Radiology Images Reviewed []    Other Reviewed [x]  Specialty notes reviewed with [facility]  Records Requested []      Transcribed from ambient dictation for [facility] by Jil Hernandez.  11/30/23   17:53 EST    Patient or patient representative verbalized consent to the visit recording.  I have personally performed the services described in this document as transcribed by the above individual, and it is both accurate and complete.  [facility]  12/1/2023  07:49 EST       Note to patient: The 21st Century Cures Act makes medical notes like these available to patients in the interest of transparency. However, be advised this is a medical document. It is intended as peer to peer communication. It is written in medical language and may contain abbreviations  or verbiage that are unfamiliar. It may appear blunt or direct. Medical documents are intended to carry relevant information, facts as evident, and the clinical opinion of the provider.

## 2023-12-01 ENCOUNTER — TELEPHONE (OUTPATIENT)
Dept: NEUROLOGY | Facility: CLINIC | Age: 73
End: 2023-12-01
Payer: MEDICARE

## 2023-12-01 ENCOUNTER — SPECIALTY PHARMACY (OUTPATIENT)
Dept: NEUROLOGY | Facility: CLINIC | Age: 73
End: 2023-12-01
Payer: MEDICARE

## 2023-12-01 NOTE — TELEPHONE ENCOUNTER
PLEASE CONTACT SHARON AT  PHARMACY, [phone]. SHE NEEDS A DATE THAT PATIENT WOULD COMPLETE TAKING NURTEC BY.

## 2023-12-04 ENCOUNTER — HOSPITAL ENCOUNTER (OUTPATIENT)
Dept: ULTRASOUND IMAGING | Facility: HOSPITAL | Age: 73
Discharge: HOME | End: 2023-12-04
Admitting: RADIOLOGY
Payer: MEDICARE

## 2023-12-04 DIAGNOSIS — R92.8 ABNORMAL MAMMOGRAM: ICD-10-CM

## 2023-12-04 PROCEDURE — 76642 ULTRASOUND BREAST LIMITED: CPT | Performed by: RADIOLOGY

## 2023-12-04 PROCEDURE — 76642 ULTRASOUND BREAST LIMITED: CPT

## 2023-12-11 ENCOUNTER — TELEPHONE (OUTPATIENT)
Dept: NEUROLOGY | Facility: CLINIC | Age: 73
End: 2023-12-11

## 2023-12-11 NOTE — TELEPHONE ENCOUNTER
Caller: Leela Muller    Relationship: Self    Best call back number: [phone] IF NO ANSWER PLEASE CALL PT'S CELL PHONE [phone]    What is the best time to reach you:     Who are you requesting to speak with (clinical staff, provider,  specific staff member): TANA AT PHARMACY    What was the call regarding:   [facility] IF NO ANSWER ON PT'S HOME PHONE PLEASE CALL HER CELL PHONE.

## 2023-12-12 ENCOUNTER — SPECIALTY PHARMACY (OUTPATIENT)
Dept: ONCOLOGY | Facility: HOSPITAL | Age: 73
End: 2023-12-12
Payer: MEDICARE

## 2023-12-12 NOTE — PROGRESS NOTES
Specialty Pharmacy Patient Management Program  Neurology Initial Assessment     Leela Muller is a 73 y.o. female with Migraines seen by a Neurology provider and enrolled in the Neurology Patient Management program offered by [facility].  An initial outreach was conducted, including assessment of therapy appropriateness and specialty medication education for [place]. The patient was introduced to services offered by [facility], including: regular assessments, refill coordination, curbside pick-up or mail order delivery options, prior authorization maintenance, and financial assistance programs as applicable. The patient was also provided with contact information for the pharmacy team.     Insurance Coverage & Financial Support  [facility]    Relevant Past Medical History and Comorbidities  Relevant medical history and concomitant health conditions were discussed with the patient. The patient's chart has been reviewed for relevant past medical history and comorbid health conditions and updated as necessary.   Past Medical History:   Diagnosis Date    Anxiety     Arm pain     Arthritis     Breast injury     fell 6/2019     Cancer     Chronic pancreatitis     COVID-19     Depression     Diabetes mellitus     History of rectal surgery     Hyperlipidemia     Hypertension     Liver mass     Medication monitoring encounter 07/24/2018    Neck pain on left side     Palpitations 04/18/2018    Pancreatitis     Pulmonary embolism     Stroke syndrome 09/14/2016    · Assessed By: Devaughn Lewis (Neurology); Last Assessed: 16 Jun 2015  Right cerebrovascular accident in July or August 2010, evaluated at [facility].  Admission to [facility] on 09/28/2010 with headache and stuttering, consistent with TIA.  Chronic Coumadin therapy, initiated following bilateral pulmonary emboli in 2007 after fall and hip replacement.  She was already on 81 mg of  aspirin as well, 09/2010.  MRI of the brain on 09/28/2010 revealing old right parietal cerebrovascular accident.  MRA revealing normal carotids, middle anterior and posterior cerebral arteries with minimal ostial stenosis of both vertebral arteries.  GENARO by Dr. Oliver Mobley on 09/28/2010:  patent foramen ovale with a small amount of right to left shunting.  Normal LVEF and normal valvular structures.   Patent foramen ovale closure using a 25 mm. Amplatzer cribriform occluder, 10/05/2010.   Echocardiogram, 06/23/2014:  LVEF (55% to 60%) with and Amplatzer device noted to be well-seated in     Thrombocytopenia     Transient cerebral ischemia     Type 2 diabetes mellitus      Social History     Socioeconomic History    Marital status:    Tobacco Use    Smoking status: Never     Passive exposure: Never    Smokeless tobacco: Never   Vaping Use    Vaping Use: Never used   Substance and Sexual Activity    Alcohol use: No    Drug use: No    Sexual activity: Yes     Problem list reviewed by Sanford Holt, Dale on 12/12/2023 at  1:38 PM    Allergies  Known allergies and reactions were discussed with the patient. The patient's chart has been reviewed for  allergy information and updated as necessary.   Allergies   Allergen Reactions    Ampicillin GI Intolerance     Diarrhea    Atorvastatin Swelling    Tetanus Toxoid Hives    Acyclovir Seizure    Cefprozil Other (See Comments)    Lansoprazole Nausea Only and Nausea And Vomiting    Mestinon [Pyridostigmine] Itching and Other (See Comments)     Leg cramps, shooting vaginal pains, frequent urination    Ranexa [Ranolazine Er] Nausea And Vomiting     Allergies reviewed by Sanford Holt, Dale on 12/12/2023 at  1:38 PM    Relevant Laboratory Values  Common labs          7/12/2023    13:53 10/16/2023    15:30 11/14/2023    10:31 11/14/2023    11:09   Common Labs   Creatinine  0.80      Total Cholesterol    112    Triglycerides    140    HDL Cholesterol    45    LDL  Cholesterol     43    Hemoglobin A1C 7.5   7.0     Microalbumin, Urine    <1.2        Lab Assessment  N/A. The above labs have been reviewed. No dose adjustments are needed for the specialty medication(s) based on the labs.     Current Medication List  This medication list has been reviewed with the patient and evaluated for any interactions or necessary modifications/recommendations, and updated to include all prescription medications, OTC medications, and supplements the patient is currently taking.  This list reflects what is contained in the patient's profile, which has also been marked as reviewed to communicate to other providers it is the most up to date version of the patient's current medication therapy.     Current Outpatient Medications:     acetaminophen (TYLENOL) 500 MG tablet, Take 1 tablet by mouth Every 6 (Six) Hours As Needed., Disp: , Rfl:     albuterol sulfate  (90 Base) MCG/ACT inhaler, Inhale 2 puffs by mouth every 4 (Four) Hours As Needed, Disp: 8.5 g, Rfl: 2    aspirin 81 MG tablet, Take 1 tablet by mouth Daily. Taken at night. Last dose 9-18-21, Disp: , Rfl:     azelastine (ASTELIN) 0.1 % nasal spray, Instill 1 spray in each nostil 2 (Two) Times A Day, Disp: 30 mL, Rfl: 11    azelastine (ASTELIN) 0.1 % nasal spray, Administer 1 spray in the nostrils twice a day, Disp: 90 mL, Rfl: 1    baclofen (LIORESAL) 10 MG tablet, Take 1 tablet by mouth Every Night., Disp: 90 tablet, Rfl: 3    betamethasone dipropionate (DIPROLENE) 0.05 % ointment, Apply topically to the appropriate area as directed once daily as needed, Disp: 15 g, Rfl: 0    butalbital-acetaminophen-caffeine (FIORICET, ESGIC) -40 MG per tablet, Take 1 tablet by mouth Daily As Needed for headache, Disp: 30 tablet, Rfl: 0    clidinium-chlordiazePOXIDE (LIBRAX) 5-2.5 MG per capsule, Take 1 capsule by mouth 2 (Two) Times a Day as needed., Disp: 60 capsule, Rfl: 0    clidinium-chlordiazePOXIDE (LIBRAX) 5-2.5 MG per capsule, Take  1 capsule by mouth 2 (Two) Times a Day As Needed., Disp: 60 capsule, Rfl: 0    clonazePAM (KlonoPIN) 0.5 MG tablet, As Needed., Disp: , Rfl:     Continuous Blood Gluc  (FreeStyle [place] 2 [place]) device, Use daily as directed, Disp: 1 each, Rfl: 0    digoxin (LANOXIN) 250 MCG tablet, Take 1 tablet by mouth Daily., Disp: 90 tablet, Rfl: 3    fludrocortisone 0.1 MG tablet, Take 1 tablet by mouth Daily., Disp: 90 tablet, Rfl: 3    fluticasone (FLONASE) 50 MCG/ACT nasal spray, Instill 2 sprays in each nostril 2 (Two) Times A Day, Disp: 16 g, Rfl: 0    furosemide (LASIX) 40 MG tablet, Take 1 tablet by mouth Daily. May have extra 1/2 to 1 tablet daily if needed for edema, Disp: 135 tablet, Rfl: 3    gabapentin (NEURONTIN) 800 MG tablet, Take 1/2 tablet in midday and 1 tablet at bedtime, Disp: 135 tablet, Rfl: 1    glucose blood (OneTouch Verio) test strip, Use to test blood glucose 3 times a day as directed, Disp: 300 each, Rfl: 3    HYDROcodone-acetaminophen (NORCO) 7.5-325 MG per tablet, Take 1 tablet by mouth 3 times a day., Disp: 90 tablet, Rfl: 0    hydrocortisone (ANUSOL-HC) 25 MG suppository, Insert 1 suppository rectally twice a day as needed (remove wrapper and moisten with water), Disp: 12 suppository, Rfl: 3    insulin aspart (NovoLOG FlexPen) 100 UNIT/ML solution pen-injector sc pen, Inject 10 Units under the skin into the appropriate area as directed 3 (Three) Times a Day With Meals., Disp: 27 mL, Rfl: 3    Insulin Glargine (BASAGLAR KWIKPEN) 100 UNIT/ML injection pen, Inject 50 units subcutaneously once in the morning and 75 units at night, Disp: 135 mL, Rfl: 1    Insulin Pen Needle (B-D UF III MINI PEN NEEDLES) 31G X 5 MM misc, Use to inject insulin twice a day as directed, Disp: 100 each, Rfl: 12    Insulin Syringe-Needle U-100 29G 0.5 ML misc, Inject 0.3 mL as directed Daily., Disp: , Rfl:     meclizine (ANTIVERT) 25 MG tablet, Take 1 tablet by mouth 3 (Three) Times a Day As Needed for Dizziness.,  Disp: 90 tablet, Rfl: 3    metoprolol succinate XL (TOPROL-XL) 100 MG 24 hr tablet, Take 1 tablet by mouth Daily., Disp: 90 tablet, Rfl: 3    metroNIDAZOLE (METROCREAM) 0.75 % cream, Apply to the face once every night, Disp: 45 g, Rfl: 0    neomycin-colistin-hydrocortisone-thonzonium (Cortisporin-TC) 3.3-3-10-0.5 MG/ML otic suspension, Instill 5 drops into affected ear 3 (Three) times a day for 10 days, Disp: 10 mL, Rfl: 0    nitroglycerin (Nitrostat) 0.4 MG SL tablet, Place 1 tablet under the tongue Every 5 Minutes As Needed for Chest Pain for up to 3 total doses. Call 911 if pain remains after 1 dose., Disp: 25 tablet, Rfl: 6    Nurtec 75 MG dispersible tablet, Take 1 tablet by mouth Daily As Needed (migraine)., Disp: 8 tablet, Rfl: 11    ofloxacin (FLOXIN) 0.3 % otic solution, Instill 5 drops into affected ear once a day for 7 days, Disp: 10 mL, Rfl: 0    pancrelipase, Lip-Prot-Amyl, (Pancreaze) 48107-08446 units capsule delayed-release particles capsule, Take 1 capsule with each meal and with each snack (Patient taking differently: As Needed.), Disp: 100 capsule, Rfl: 2    potassium chloride (KLOR-CON) 10 MEQ CR tablet, Take 1 tablet by mouth Daily as directed, Disp: 90 tablet, Rfl: 0    prednisoLONE acetate (PRED FORTE) 1 % ophthalmic suspension, Instill 1 drop in both eyes twice a day; Shake Well Before Use, Disp: 5 mL, Rfl: 0    promethazine (PHENERGAN) 25 MG tablet, Take 1 tablet by mouth Every 6 (Six) Hours As Needed for Nausea or Vomiting., Disp: 30 tablet, Rfl: 1    RABEprazole (ACIPHEX) 20 MG EC tablet, Take 1 tablet by mouth Daily., Disp: 90 tablet, Rfl: 1    Rimegepant Sulfate (Nurtec) 75 MG tablet dispersible tablet, Take 1 tablet by mouth Daily As Needed (migraines)., Disp: 10 tablet, Rfl: 0    rosuvastatin (CRESTOR) 10 MG tablet, Take one tablet by mouth once every evening, Disp: 90 tablet, Rfl: 2    topiramate (TOPAMAX) 25 MG tablet, Take 1 tablet by mouth Every Night., Disp: 90 tablet, Rfl: 3     triamcinolone (KENALOG) 0.1 % cream, Apply topically to the affected area twice a day, Disp: 30 g, Rfl: 0    venlafaxine XR (EFFEXOR-XR) 75 MG 24 hr capsule, Take 1 capsule by mouth Daily., Disp: 90 capsule, Rfl: 3    Medicines reviewed by Sanford Holt, PharmD on 12/12/2023 at  1:38 PM    Drug Interactions  None     Initial Education Provided for Specialty Medication  The patient has been provided with the following education and any applicable administration techniques (i.e. self-injection) have been demonstrated for the therapies indicated. All questions and concerns have been addressed prior to the patient receiving the medication, and the patient has verbalized understanding of the education and any materials provided.  Additional patient education shall be provided and documented upon request by the patient, provider or payer.      Nurtec (rimegepant)  Medication Expectations   Why am I taking this medication? You are taking this medication for migraine prophylaxis or to treat an acute migraine.   What should I expect while on this medication? You should expect to see a decrease in the frequency and severity of your migraines.   How does the medication work? Nurtec is a monoclonal antibody that binds to calcitonin gene-related peptide (CGRP) and blocks its binding to the receptor decreasing the severity of migraines.   How long will I be on this medication for? The amount of time you will be on this medication will be determined by your doctor and your response to the medication.    How do I take this medication? Take as directed on your prescription label.   What are some possible side effects? Potential side effects including, but not limited to nausea. Pt verbalized understanding.   What happens if I miss a dose? Take the missed dose as soon as possible, and resume the every other day timed from the last dose..     Medication Safety   What are things I should warn my doctor immediately about?  Hypersensitivity reactions - trouble breathing or swallowing.   What are things that I should be cautious of? Hypersensitivity reactions (eg, dyspnea, rash), including delayed serious reactions, have occurred; discontinue use if suspected    What are some medications that can interact with this one? Avoid concomitant administration of Nurtec ODT with strong inhibitors of CY, strong or moderate inducers of CYP3A or inhibitors of P-gp or BCRP. Avoid another dose of Nurtec ODT within 48 hours when it is administered with moderate inhibitors of CY.  Ask your pharmacist or health care provider before starting new medications     Medication Storage/Handling   How should I handle this medication? Keep this medication out of reach of pets/children in original container. Ensure hands are dry before opening blister pack.   How does this medication need to be stored? Store at room temperature away from heat/cold, sunlight or moisture   How should I dispose of this medication? There should not be a need to dispose of this medication unless your provider decides to change the dose or therapy. If that is the case, take to your local police station for proper disposal. Some pharmacies also have take-back bins for medication drop-off.      Resources/Support   How can I remind myself to take this medication? You can download reminder apps to help you manage your refills. You may also set an alarm on your phone to remind you. The pharmacy carries pill boxes that you can place next to an area you pass everyday (such as where you place your car keys or where you charge your phone)   Is financial support available?  Yes, [employer] can provide co-pay cards if you have commercial insurance or patient assistance if you have Medicare or no insurance.    Which vaccines are recommended for me? Talk to your doctor about these vaccines: Flu, Coronavirus (COVID-19), Pneumococcal (pneumonia), Tdap, Hepatitis B, Zoster  (shingles)       Enter Specific Medication SmartPhrase Here    Adherence and Self-Administration  Adherence related to the patient's specialty therapy was discussed with the patient. The Adherence segment of this outreach has been reviewed and updated.   Is there a concern with patient's ability to self administer the medication correctly and without issue?: No  Were any potential barriers to adherence identified during the initial assessment or patient education?: No  Are there any concerns regarding the patient's understanding of the importance of medication adherence?: No  Methods for Supporting Patient Adherence and/or Self-Administration: Not required.    Goals of Therapy  Goals related to the patient's specialty therapy were discussed with the patient. The Patient Goals segment of this outreach has been reviewed and updated.   Goals Addressed Today        Specialty Pharmacy General Goal      Decrease frequency, severity and duration of migraine attacks.                Reassessment Plan & Follow-Up  Medication Therapy Changes: Nurtec 75mg take 1 tab po qday prn onset of migraine (max 1 tab/day)  Related Plans, Therapy Recommendations, or Therapy Problems to Be Addressed: Pt is to continue on Nurtec as needed for migraine treatment. I advised her to take 1 tab at the onset of the migraine and the wafer tablet may be placed on the tongue. It begins to work fairly quickly and she should see full benefits within 1.5 hours. Nurtec may cause some nausea and she may take this with a small snack if needed. Pt prefers to  from [facility] instead of mailing at this time. I will revisit this in the spring. Pt just received #10 sample tabs of Nurtec from neurology provider on 11/30/23. She just picked up the Nurtec on 12/1/23 and we'll push her next fill to 12/26/23. We will call the patient on this date to see how she is doing on the medication and if there are any questions or concerns. I advised  her to call our office with any questions or concerns.   Pharmacist to perform regular reassessments no more than (6) months from the previous assessment.  Care Coordinator to set up future refill outreaches, coordinate prescription delivery, and escalate clinical questions to pharmacist.   Welcome information and patient satisfaction survey to be sent by specialty pharmacy team with patient's initial fill.    Attestation  Therapeutic appropriateness: Appropriate   I attest the patient was actively involved in and has agreed to the above plan of care. If the prescribed therapy is at any point deemed not appropriate based on the current or future assessments, a consultation will be initiated with the patient's specialty care provider to determine the best course of action. The revised plan of therapy will be documented along with any additional patient education provided. Discussed aforementioned material with patient via telemedicine.    Sanford Holt, PharmD  Clinic Specialty Pharmacist, Neurology  12/12/2023  13:41 EST

## 2023-12-13 RX ORDER — DULOXETIN HYDROCHLORIDE 20 MG/1
20 CAPSULE, DELAYED RELEASE ORAL DAILY
Qty: 30 CAPSULE | Refills: 3 | Status: SHIPPED | OUTPATIENT
Start: 2023-12-13

## 2023-12-13 NOTE — TELEPHONE ENCOUNTER
Caller: Leela Muller    Relationship: Self    Best call back number: [phone] (home)       What is the best time to reach you: ANYTIME TODAY, IF THURSDAY CALL CELL [phone]    Who are you requesting to speak with (clinical staff, provider,  specific staff member): PROVIDER       What was the call regarding: PT IS SLOWLY REGAINING FEELING IN HER FOOT AND IS NOW HAVING CUTTING PAINS AND WOULD LIKE TO KNOW IF THERE IS ANYTHING THAT CAN HELP.     Is it okay if the provider responds through MyChart: NO

## 2023-12-26 ENCOUNTER — TELEPHONE (OUTPATIENT)
Dept: ENDOCRINOLOGY | Facility: CLINIC | Age: 73
End: 2023-12-26

## 2023-12-26 NOTE — TELEPHONE ENCOUNTER
Patient notified and verbalized understanding.  She states she already has a neurologist and will discuss with him.

## 2023-12-26 NOTE — TELEPHONE ENCOUNTER
Caller: Leela Muller    Relationship to patient: Self    Best call back number: [phone]    Patient is needing: PATIENT CALLED TO INFORM  SHE WILL NO LONGER BE TAKING THE CYMBALTA 20MG HE PUT HER ON. SHE STATED SHE HAS TAKEN IT IN THE MORNING AND TRIED AT NIGHT. BUT IT MAKES HER STOMACH SICK AND HER TOES HURT VERY BADLY. SHE ALSO STATED SHE IS GOING BACK ON HER EFFEXOR. SHE STATED IF HE WOULD LIKE HER TO CHANGE ANYTHING TO CALL HER AND LET HER KNOW.

## 2023-12-27 RX ORDER — INSULIN GLARGINE 100 [IU]/ML
INJECTION, SOLUTION SUBCUTANEOUS
Qty: 135 ML | Refills: 1 | Status: SHIPPED | OUTPATIENT
Start: 2023-12-27

## 2023-12-27 NOTE — TELEPHONE ENCOUNTER
PT CALLED STATING SHE IS OUT OF BASAGLAR. SHE REQUESTED AN RX TO BE SENT IN TO  PHARM IN [place], [place].

## 2023-12-27 NOTE — TELEPHONE ENCOUNTER
Rx Refill Note  Requested Prescriptions     Pending Prescriptions Disp Refills    Insulin Glargine (BASAGLAR KWIKPEN) 100 UNIT/ML injection pen 135 mL 1     Sig: Inject 50 units subcutaneously once in the morning and 75 units at night    Dx Code:  E11.65 Next OV 03/26/24 with Dr NEVA Santiago  Last office visit with prescribing clinician: 11/14/2023   Last telemedicine visit with prescribing clinician: Visit date not found   Next office visit with prescribing clinician: 3/26/2024                         Would you like a call back once the refill request has been completed: [] Yes [] No    If the office needs to give you a call back, can they leave a voicemail: [] Yes [] No    Kaci Wagner MA  12/27/23, 15:42 EST

## 2024-02-01 RX ORDER — FLUDROCORTISONE ACETATE 0.1 MG/1
0.1 TABLET ORAL DAILY
Qty: 90 TABLET | Refills: 3 | Status: SHIPPED | OUTPATIENT
Start: 2024-02-01

## 2024-02-01 RX ORDER — DIGOXIN 250 MCG
250 TABLET ORAL DAILY
Qty: 90 TABLET | Refills: 3 | Status: SHIPPED | OUTPATIENT
Start: 2024-02-01

## 2024-02-01 NOTE — TELEPHONE ENCOUNTER
CMP 9/22/23  Glu 131  BUN 9  Creat 0.77  GFR 80.39  Protein   6.3  Na 141  K+ 3.9    CO2 28  Calcium 8.6  Albumin 4.1  Total Glob 2.2  Total Bili 0.3  Alk Phos 65  ALT  19  AST  18

## 2024-02-06 NOTE — PROGRESS NOTES
[facility] Cardiology    Encounter Date: 2024    Patient ID: Leela Muller is a 74 y.o. female.  : 1950     PCP: Connor Ritter MD       Chief Complaint: Orthostatic hypotension and Heartburn      PROBLEM LIST:  Cerebrovascular accident:  Right CVA in July or 2010, evaluated at [facility].   Admission to [facility], 2010 with headache and stuttering, c/w TIA.   Chronic Coumadin therapy, initiated following bilateral PE in  after fall and hip replacement. She was already on 81 mg of aspirin as well, 2010.   MRI brain, 2010: Old right parietal CVA.   MRA, 2010: Normal carotids, middle anterior and posterior cerebral arteries with minimal ostial stenosis of both vertebral arteries.   GENARO, 2010, Dr. Mobley: PFO with a small amount of right to left shunting. Normal LVEF and normal valves.  PFO closure, 10/05/2010: 25 mm Amplatzer cribriform occluder.    Echocardiogram, 2014: Amplatzer device is well-seated in the interatrial septum. EF 55-60%. Trace MR and mild TR.  Echocardiogram, 2019: Intact Amplatzer septal occluder with no evidence of flow across the device. EF 60%. Trace MR/TR.   Chest pain/abnormal myocardial perfusion study:    Patient reports having a MI in . Documentation only supports bilateral PE. She did not have a LHC or stents at time of alleged MI.   Cardiolite GXT, 2010, Dr. Monteiro: Small area of ischemia in the mid anteroseptal, mid inferior, and apical lateral regions. EF 68%.  LHC, 2010, Dr. Monteiro: Normal coronary arteries with normal LVEF.  Cardiolite stress test, 2021: EF 58%. No CP at baseline but had CP with infusion located in the center of her chest, which continued into recovery, but was less intense. Abnormal baseline EKG with 0.5 to 1 mm of ST segment depression which did not change appreciably with the infusion. No evidence of inducible ischemia. Low risk study.   MPS  "2/14/2023: No evidence of inducible ischemia by scintigraphic criteria.   DM2.   Bilateral pulmonary emboli:  Following hip replacement in 2007.   No evidence of prior anti-coagulable workup.   Chronic Coumadin therapy.   No evidence of DVT on bilateral lower extremity duplex.  The decision was made to discontinue the patient's warfarin therapy due to her fall risk.  Palpitations:  2 week Holter, 04/23/2018: Normal study.  14-day Holter, 1/19/2022: SR/SB/ST. 3 pauses longest 2.2 secs. Brief a tach. Rare PVCs and PACs. Average HR: 73. Min HR: 50. Max HR: 131.  Hypertension.   Dyslipidemia.   Pancreatitis:  Recent episode  in March 2013.  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    Rectal cancer; surgically removed by Dr Martinez.   Surgical history:  Hip replacement.  Hysterectomy.  Tonsillectomy  Appendectomy.  Cholecystectomy.    History of Present Illness  Patient presents today for 12 month follow-up with a history of CVA with PFO and cardiac risk factors. Since last visit, patient has been having a lot of health issues, none of which are cardiac related. She has had covid for the second time and has been having both diarrhea and constipation. She has been having burning sensation in her chest x2 months. Can occur at rest or with exertion. States this is different than when she had her \"heart attck.\" She denies any chest pain with exertion. States her HR speeds up when she walks. Otherwise is doing well from a cardiac standpoint.     Allergies   Allergen Reactions    Ampicillin GI Intolerance     Diarrhea    Atorvastatin Swelling    Tetanus Toxoid Hives    Acyclovir Seizure    Cefprozil Other (See Comments)    Lansoprazole Nausea Only and Nausea And Vomiting    Mestinon [Pyridostigmine] Itching and Other (See Comments)     Leg cramps, shooting vaginal pains, frequent urination    Ranexa [Ranolazine Er] Nausea And Vomiting         Current Outpatient Medications:     acetaminophen (TYLENOL) 500 MG tablet, Take 1 " tablet by mouth Every 6 (Six) Hours As Needed., Disp: , Rfl:     albuterol sulfate  (90 Base) MCG/ACT inhaler, Inhale 2 puffs by mouth every 4 (Four) Hours As Needed, Disp: 8.5 g, Rfl: 2    aspirin 81 MG tablet, Take 1 tablet by mouth Daily. Taken at night. Last dose 9-18-21, Disp: , Rfl:     azelastine (ASTELIN) 0.1 % nasal spray, Instill 1 spray in each nostil 2 (Two) Times A Day, Disp: 30 mL, Rfl: 11    baclofen (LIORESAL) 10 MG tablet, Take 1 tablet by mouth Every Night., Disp: 90 tablet, Rfl: 3    betamethasone dipropionate (DIPROLENE) 0.05 % ointment, Apply topically to the appropriate area as directed once daily as needed, Disp: 15 g, Rfl: 0    butalbital-acetaminophen-caffeine (FIORICET, ESGIC) -40 MG per tablet, Take 1 tablet by mouth Daily As Needed for headache, Disp: 30 tablet, Rfl: 0    clidinium-chlordiazePOXIDE (LIBRAX) 5-2.5 MG per capsule, Take 1 capsule by mouth 2 (Two) Times a Day as needed., Disp: 60 capsule, Rfl: 0    Continuous Blood Gluc  (FreeStyle Colin 2 [place]) device, Use daily as directed, Disp: 1 each, Rfl: 0    digoxin (LANOXIN) 250 MCG tablet, Take 1 tablet by mouth Daily., Disp: 90 tablet, Rfl: 3    fludrocortisone 0.1 MG tablet, Take 1 tablet by mouth Daily., Disp: 90 tablet, Rfl: 3    fluticasone (FLONASE) 50 MCG/ACT nasal spray, Instill 2 sprays in each nostril 2 (Two) Times A Day, Disp: 120 g, Rfl: 1    furosemide (LASIX) 40 MG tablet, Take 1 tablet by mouth Daily. May have extra 1/2 to 1 tablet daily if needed for edema, Disp: 135 tablet, Rfl: 3    gabapentin (NEURONTIN) 800 MG tablet, Take 1/2 tablet in midday and 1 tablet at bedtime, Disp: 135 tablet, Rfl: 1    glucose blood (OneTouch Verio) test strip, Use to test blood glucose 3 times a day as directed, Disp: 300 each, Rfl: 3    HYDROcodone-acetaminophen (NORCO) 7.5-325 MG per tablet, Take 1 tablet by mouth 3 times a day., Disp: 90 tablet, Rfl: 0    hydrocortisone (ANUSOL-HC) 25 MG suppository, Insert 1  suppository rectally twice a day as needed (remove wrapper and moisten with water), Disp: 12 suppository, Rfl: 3    insulin aspart (NovoLOG FlexPen) 100 UNIT/ML solution pen-injector sc pen, Inject 10 Units under the skin into the appropriate area as directed 3 (Three) Times a Day With Meals., Disp: 27 mL, Rfl: 3    Insulin Glargine (BASAGLAR KWIKPEN) 100 UNIT/ML injection pen, Inject 50 units subcutaneously once in the morning and 75 units at night, Disp: 135 mL, Rfl: 1    Insulin Pen Needle (B-D UF III MINI PEN NEEDLES) 31G X 5 MM misc, Use to inject insulin twice a day as directed, Disp: 100 each, Rfl: 12    Insulin Syringe-Needle U-100 29G 0.5 ML misc, Inject 0.3 mL as directed Daily., Disp: , Rfl:     meclizine (ANTIVERT) 25 MG tablet, Take 1 tablet by mouth 3 (Three) Times a Day As Needed for Dizziness., Disp: 90 tablet, Rfl: 3    metoprolol succinate XL (TOPROL-XL) 100 MG 24 hr tablet, Take 1 tablet by mouth Daily., Disp: 90 tablet, Rfl: 3    benzonatate (TESSALON) 100 MG capsule, Take 1 capsule by mouth every 6 hours as needed (Patient not taking: Reported on 2/7/2024), Disp: 20 capsule, Rfl: 0    cefuroxime (CEFTIN) 500 MG tablet, Take 1 tablet by mouth 2 (Two) Times a Day. (Patient not taking: Reported on 2/7/2024), Disp: 14 tablet, Rfl: 0    clonazePAM (KlonoPIN) 0.5 MG tablet, As Needed. (Patient not taking: Reported on 2/7/2024), Disp: , Rfl:     guaiFENesin (Mucinex) 600 MG 12 hr tablet, Take 1 tablet by mouth twice a day (Patient not taking: Reported on 2/7/2024), Disp: 14 tablet, Rfl: 0    metroNIDAZOLE (METROCREAM) 0.75 % cream, Apply to the face once every night (Patient not taking: Reported on 2/7/2024), Disp: 45 g, Rfl: 0    mupirocin (BACTROBAN) 2 % ointment, Apply 1 application  topically to the appropriate area as directed 2 (Two) Times a Day. (Patient not taking: Reported on 2/7/2024), Disp: 22 g, Rfl: 0    neomycin-colistin-hydrocortisone-thonzonium (Cortisporin-TC) 3.3-3-10-0.5 MG/ML otic  suspension, Instill 5 drops into affected ear 3 (Three) times a day for 10 days, Disp: 10 mL, Rfl: 0    nitroglycerin (Nitrostat) 0.4 MG SL tablet, Place 1 tablet under the tongue Every 5 Minutes As Needed for Chest Pain for up to 3 total doses. Call 911 if pain remains after 1 dose., Disp: 25 tablet, Rfl: 6    Nurtec 75 MG dispersible tablet, Take 1 tablet by mouth Daily As Needed (migraine)., Disp: 8 tablet, Rfl: 11    ofloxacin (FLOXIN) 0.3 % otic solution, Instill 5 drops into affected ear once a day for 7 days, Disp: 10 mL, Rfl: 0    pancrelipase, Lip-Prot-Amyl, (Pancreaze) 44462-31553 units capsule delayed-release particles capsule, Take 1 capsule with each meal and with each snack (Patient taking differently: As Needed.), Disp: 100 capsule, Rfl: 2    potassium chloride (KLOR-CON) 10 MEQ CR tablet, Take 1 tablet by mouth Daily as directed, Disp: 90 tablet, Rfl: 0    prednisoLONE acetate (PRED FORTE) 1 % ophthalmic suspension, Instill 1 drop in both eyes twice a day; Shake Well Before Use, Disp: 5 mL, Rfl: 0    promethazine (PHENERGAN) 25 MG tablet, Take 1 tablet by mouth Every 6 (Six) Hours As Needed for Nausea or Vomiting., Disp: 30 tablet, Rfl: 1    promethazine (PHENERGAN) 25 MG tablet, Take 1 tablet by mouth every 6 hours as needed, Disp: 56 tablet, Rfl: 0    promethazine (PHENERGAN) 25 MG tablet, Take 1 tablet by mouth Every 6 (Six) Hours as needed, Disp: 56 tablet, Rfl: 0    RABEprazole (ACIPHEX) 20 MG EC tablet, Take 1 tablet by mouth Daily., Disp: 90 tablet, Rfl: 1    Rimegepant Sulfate (Nurtec) 75 MG tablet dispersible tablet, Take 1 tablet by mouth Daily As Needed (migraines)., Disp: 10 tablet, Rfl: 0    rosuvastatin (CRESTOR) 10 MG tablet, Take one tablet by mouth once every evening, Disp: 90 tablet, Rfl: 2    topiramate (TOPAMAX) 25 MG tablet, Take 1 tablet by mouth Every Night., Disp: 90 tablet, Rfl: 3    triamcinolone (KENALOG) 0.1 % cream, Apply topically to the affected area twice a day, Disp:  "30 g, Rfl: 0    venlafaxine XR (EFFEXOR-XR) 75 MG 24 hr capsule, Take 1 capsule by mouth Daily., Disp: 90 capsule, Rfl: 3    venlafaxine XR (EFFEXOR-XR) 75 MG 24 hr capsule, Take 1 capsule by mouth Daily., Disp: 90 capsule, Rfl: 1    The following portions of the patient's history were reviewed and updated as appropriate: allergies, current medications, past family history, past medical history, past social history, past surgical history and problem list.    Review of Systems   12 point ROS negative except for that listed in the HPI.        Objective:     /60 (BP Location: Left arm, Patient Position: Sitting, Cuff Size: Adult)   Pulse 69   Ht 170.2 cm (67\")   Wt 73.7 kg (162 lb 6.4 oz)   SpO2 91%   BMI 25.44 kg/m²      Physical Exam  Constitutional: Patient appears well-developed and well-nourished.   HENT: HEENT exam unremarkable.   Neck: Neck supple. No JVD present.   Cardiovascular: Normal rate, regular rhythm and normal heart sounds. No murmur heard.   2+ symmetric pulses.   Pulmonary/Chest: Breath sounds normal. Does not exhibit tenderness.   Musculoskeletal: Does not exhibit edema.   Neurological: Neurological exam unremarkable.   Vitals reviewed.    Data Review:   Lab Results   Component Value Date    GLUCOSE 89 11/21/2022    BUN 10 11/21/2022    CREATININE 0.80 10/16/2023    EGFRIFNONA 73 12/17/2021    BCR 15.2 11/21/2022    K 3.6 11/21/2022    CO2 28.2 11/21/2022    CALCIUM 9.3 11/21/2022    ALBUMIN 4.00 11/21/2022    AST 19 11/21/2022    ALT 19 11/21/2022     Lab Results   Component Value Date    CHOL 112 11/14/2023    TRIG 140 11/14/2023    HDL 45 11/14/2023    LDL 43 11/14/2023      Lab Results   Component Value Date    WBC 7.26 12/17/2021    RBC 4.59 12/17/2021    HGB 13.6 12/17/2021    HCT 41.7 12/17/2021    MCV 90.8 12/17/2021     12/17/2021     Lab Results   Component Value Date    TSH 2.570 11/14/2023     Lab Results   Component Value Date    HGBA1C 7.0 (A) 11/14/2023          ECG " 12 Lead    Date/Time: 2/7/2024 12:49 PM  Performed by: Sydnie Ludwig PA-C    Authorized by: Sydnie Ludwig PA-C  Comparison: compared with previous ECG from 1/18/2023  Similar to previous ECG  Rhythm: sinus rhythm  Rate: normal  BPM: 69  Other findings: left ventricular hypertrophy with strain    Clinical impression: abnormal EKG                          Assessment and plan:      Diagnosis Plan   1. Coronary artery disease involving native heart without angina pectoris, unspecified vessel or lesion type  Atypical chest burning not made worse with exertion.  Normal stress test 3/2023.  Continue aspirin 81 mg daily and Crestor 10 mg daily.      2. Dyslipidemia  LDL at target.  Continue Crestor 10 mg daily.      3. Primary hypertension  Well-controlled.  Continue Toprol.  Has had issues with hypotension in the past and is taken fludrocortisone      4. Palpitations  Controlled with metoprolol and digoxin.      5. Patent foramen ovale  Status post PFO closure.  Recurrent TIA or strokelike symptoms.  Continue aspirin.        Overall stable cardiac status.  Continue current medications.   FU in 12 MO, sooner as needed.  Thank you for allowing us to participate in the care of your patient.         Sydnie Ludwig PA-C    Please note that portions of this note may have been completed with a voice recognition program. Efforts were made to edit the dictations, but occasionally words are mistranscribed.

## 2024-02-07 ENCOUNTER — OFFICE VISIT (OUTPATIENT)
Dept: CARDIOLOGY | Facility: CLINIC | Age: 74
End: 2024-02-07
Payer: MEDICARE

## 2024-02-07 VITALS
DIASTOLIC BLOOD PRESSURE: 60 MMHG | WEIGHT: 162.4 LBS | HEART RATE: 69 BPM | HEIGHT: 67 IN | SYSTOLIC BLOOD PRESSURE: 120 MMHG | OXYGEN SATURATION: 91 % | BODY MASS INDEX: 25.49 KG/M2

## 2024-02-07 DIAGNOSIS — E78.5 DYSLIPIDEMIA: ICD-10-CM

## 2024-02-07 DIAGNOSIS — I25.10 CORONARY ARTERY DISEASE INVOLVING NATIVE HEART WITHOUT ANGINA PECTORIS, UNSPECIFIED VESSEL OR LESION TYPE: Primary | ICD-10-CM

## 2024-02-07 DIAGNOSIS — I10 PRIMARY HYPERTENSION: ICD-10-CM

## 2024-02-07 DIAGNOSIS — R00.2 PALPITATIONS: ICD-10-CM

## 2024-02-07 DIAGNOSIS — Q21.12 PATENT FORAMEN OVALE: ICD-10-CM

## 2024-02-07 PROCEDURE — 3074F SYST BP LT 130 MM HG: CPT

## 2024-02-07 PROCEDURE — 1160F RVW MEDS BY RX/DR IN RCRD: CPT

## 2024-02-07 PROCEDURE — 1159F MED LIST DOCD IN RCRD: CPT

## 2024-02-07 PROCEDURE — 3078F DIAST BP <80 MM HG: CPT

## 2024-02-07 PROCEDURE — 99214 OFFICE O/P EST MOD 30 MIN: CPT

## 2024-02-07 PROCEDURE — 93000 ELECTROCARDIOGRAM COMPLETE: CPT

## 2024-02-07 RX ORDER — METOPROLOL SUCCINATE 100 MG/1
100 TABLET, EXTENDED RELEASE ORAL DAILY
Qty: 90 TABLET | Refills: 3 | Status: SHIPPED | OUTPATIENT
Start: 2024-02-07

## 2024-02-07 RX ORDER — DIGOXIN 250 MCG
250 TABLET ORAL DAILY
Qty: 90 TABLET | Refills: 3 | Status: SHIPPED | OUTPATIENT
Start: 2024-02-07

## 2024-02-13 NOTE — PROGRESS NOTES
Patient: Leela Muller  YOB: 1950    Date: 02/19/2024    Primary Care Provider: Connor Ritter MD    Chief Complaint   Patient presents with    Follow-up     L breast        History: I saw the patient in the office today for follow up of the left breast. Questionable intraductal mass was seen on ultrasound done in our office July of 2023.  The patient had a screening mammogram on November 1st 2023.  This was read as a BI RADS 0.  An ultrasound was then done on 12-4-23 at [facility] and this was read as a BI RADS 2.  Recommend patient to resume annual screening mammograms. The patient states she has tenderness that comes and goes in her R breast and a burning sensation on the inside of her L breast that started 2 months ago.  No nipple drainage or palpable masses.    The following portions of the patient's history were reviewed and updated as appropriate: allergies, current medications, past family history, past medical history, past social history, past surgical history and problem list.    Review of Systems   Constitutional:  Negative for chills, fever and unexpected weight change.   HENT:  Negative for hearing loss, trouble swallowing and voice change.    Eyes:  Negative for visual disturbance.   Respiratory:  Negative for apnea, cough, chest tightness, shortness of breath and wheezing.    Cardiovascular:  Negative for chest pain, palpitations and leg swelling.   Gastrointestinal:  Negative for abdominal distention, abdominal pain, anal bleeding, blood in stool, constipation, diarrhea, nausea, rectal pain and vomiting.   Endocrine: Negative for cold intolerance and heat intolerance.   Genitourinary:  Negative for difficulty urinating, dysuria and flank pain.   Musculoskeletal:  Negative for back pain and gait problem.   Skin:  Negative for color change, rash and wound.   Neurological:  Negative for dizziness, syncope, speech difficulty, weakness, light-headedness, numbness and headaches.  "  Hematological:  Negative for adenopathy. Does not bruise/bleed easily.   Psychiatric/Behavioral:  Negative for confusion. The patient is not nervous/anxious.        Vital Signs  Vitals:    02/19/24 1244   BP: 132/72   Pulse: 88   Temp: 98 °F (36.7 °C)   SpO2: 97%   Weight: 73.5 kg (162 lb)   Height: 170.2 cm (67.01\")       Allergies:  Allergies   Allergen Reactions    Ampicillin GI Intolerance     Diarrhea    Beta lactam allergy details  Antibiotic reaction: unknown  Age at reaction: unknown  Dose to reaction time: unknown  Reason for antibiotic: unknown  Epinephrine required for reaction?: unknown  Tolerated antibiotics: unknown        Atorvastatin Swelling    Tetanus Toxoid Hives    Acyclovir Seizure    Cefprozil Other (See Comments)    Lansoprazole Nausea Only and Nausea And Vomiting    Mestinon [Pyridostigmine] Itching and Other (See Comments)     Leg cramps, shooting vaginal pains, frequent urination    Ranexa [Ranolazine Er] Nausea And Vomiting       Medications:    Current Outpatient Medications:     acetaminophen (TYLENOL) 500 MG tablet, Take 1 tablet by mouth Every 6 (Six) Hours As Needed., Disp: , Rfl:     albuterol sulfate  (90 Base) MCG/ACT inhaler, Inhale 2 puffs by mouth every 4 (Four) Hours As Needed, Disp: 8.5 g, Rfl: 2    aspirin 81 MG tablet, Take 1 tablet by mouth Daily. Taken at night. Last dose 9-18-21, Disp: , Rfl:     azelastine (ASTELIN) 0.1 % nasal spray, Instill 1 spray in each nostil 2 (Two) Times A Day, Disp: 30 mL, Rfl: 11    baclofen (LIORESAL) 10 MG tablet, Take 1 tablet by mouth Every Night., Disp: 90 tablet, Rfl: 3    betamethasone dipropionate (DIPROLENE) 0.05 % ointment, Apply topically to the appropriate area as directed once daily as needed, Disp: 15 g, Rfl: 0    butalbital-acetaminophen-caffeine (FIORICET, ESGIC) -40 MG per tablet, Take 1 tablet by mouth Daily As Needed for headache, Disp: 30 tablet, Rfl: 0    clidinium-chlordiazePOXIDE (LIBRAX) 5-2.5 MG per " capsule, Take 1 capsule by mouth 2 (Two) Times a Day as needed., Disp: 60 capsule, Rfl: 0    Continuous Blood Gluc  (FreeStyle Colin 2 [place]) device, Use daily as directed, Disp: 1 each, Rfl: 0    digoxin (LANOXIN) 250 MCG tablet, Take 1 tablet by mouth Daily., Disp: 90 tablet, Rfl: 3    fludrocortisone 0.1 MG tablet, Take 1 tablet by mouth Daily., Disp: 90 tablet, Rfl: 3    fluticasone (FLONASE) 50 MCG/ACT nasal spray, Instill 2 sprays in each nostril 2 (Two) Times A Day, Disp: 120 g, Rfl: 1    furosemide (LASIX) 40 MG tablet, Take 1 tablet by mouth Daily. May have extra 1/2 to 1 tablet daily if needed for edema, Disp: 135 tablet, Rfl: 3    gabapentin (NEURONTIN) 800 MG tablet, Take 1/2 tablet in midday and 1 tablet at bedtime, Disp: 135 tablet, Rfl: 1    glucose blood (OneTouch Verio) test strip, Use to test blood glucose 3 times a day as directed, Disp: 300 each, Rfl: 3    HYDROcodone-acetaminophen (NORCO) 7.5-325 MG per tablet, Take 1 tablet by mouth 3 times a day., Disp: 90 tablet, Rfl: 0    hydrocortisone (ANUSOL-HC) 25 MG suppository, Insert 1 suppository rectally twice a day as needed (remove wrapper and moisten with water), Disp: 12 suppository, Rfl: 3    insulin aspart (NovoLOG FlexPen) 100 UNIT/ML solution pen-injector sc pen, Inject 10 Units under the skin into the appropriate area as directed 3 (Three) Times a Day With Meals., Disp: 27 mL, Rfl: 3    Insulin Glargine (BASAGLAR KWIKPEN) 100 UNIT/ML injection pen, Inject 50 units subcutaneously once in the morning and 75 units at night, Disp: 135 mL, Rfl: 1    Insulin Pen Needle (B-D UF III MINI PEN NEEDLES) 31G X 5 MM misc, Use to inject insulin twice a day as directed, Disp: 100 each, Rfl: 12    Insulin Syringe-Needle U-100 29G 0.5 ML misc, Inject 0.3 mL as directed Daily., Disp: , Rfl:     meclizine (ANTIVERT) 25 MG tablet, Take 1 tablet by mouth 3 (Three) Times a Day As Needed for Dizziness., Disp: 90 tablet, Rfl: 3    metoprolol succinate XL  (TOPROL-XL) 100 MG 24 hr tablet, Take 1 tablet by mouth Daily., Disp: 90 tablet, Rfl: 3    neomycin-colistin-hydrocortisone-thonzonium (Cortisporin-TC) 3.3-3-10-0.5 MG/ML otic suspension, Instill 5 drops into affected ear 3 (Three) times a day for 10 days, Disp: 10 mL, Rfl: 0    nitroglycerin (Nitrostat) 0.4 MG SL tablet, Place 1 tablet under the tongue Every 5 Minutes As Needed for Chest Pain for up to 3 total doses. Call 911 if pain remains after 1 dose., Disp: 25 tablet, Rfl: 6    Nurtec 75 MG dispersible tablet, Take 1 tablet by mouth Daily As Needed (migraine)., Disp: 8 tablet, Rfl: 11    ofloxacin (FLOXIN) 0.3 % otic solution, Instill 5 drops into affected ear once a day for 7 days, Disp: 10 mL, Rfl: 0    pancrelipase, Lip-Prot-Amyl, (Pancreaze) 81091-76272 units capsule delayed-release particles capsule, Take 1 capsule with each meal and with each snack (Patient taking differently: As Needed.), Disp: 100 capsule, Rfl: 2    potassium chloride (KLOR-CON) 10 MEQ CR tablet, Take 1 tablet by mouth Daily as directed, Disp: 90 tablet, Rfl: 0    prednisoLONE acetate (PRED FORTE) 1 % ophthalmic suspension, Instill 1 drop in both eyes twice a day; Shake Well Before Use, Disp: 5 mL, Rfl: 0    promethazine (PHENERGAN) 25 MG tablet, Take 1 tablet by mouth Every 6 (Six) Hours As Needed for Nausea or Vomiting., Disp: 30 tablet, Rfl: 1    RABEprazole (ACIPHEX) 20 MG EC tablet, Take 1 tablet by mouth Daily., Disp: 90 tablet, Rfl: 1    rosuvastatin (CRESTOR) 10 MG tablet, Take one tablet by mouth once every evening, Disp: 90 tablet, Rfl: 2    topiramate (TOPAMAX) 25 MG tablet, Take 1 tablet by mouth Every Night., Disp: 90 tablet, Rfl: 3    triamcinolone (KENALOG) 0.1 % cream, Apply topically to the affected area twice a day, Disp: 30 g, Rfl: 0    venlafaxine XR (EFFEXOR-XR) 75 MG 24 hr capsule, Take 1 capsule by mouth Daily., Disp: 90 capsule, Rfl: 3    benzonatate (TESSALON) 100 MG capsule, Take 1 capsule by mouth every 6  hours as needed (Patient not taking: Reported on 2/7/2024), Disp: 20 capsule, Rfl: 0    cefuroxime (CEFTIN) 500 MG tablet, Take 1 tablet by mouth 2 (Two) Times a Day. (Patient not taking: Reported on 2/7/2024), Disp: 14 tablet, Rfl: 0    clonazePAM (KlonoPIN) 0.5 MG tablet, As Needed. (Patient not taking: Reported on 2/7/2024), Disp: , Rfl:     guaiFENesin (Mucinex) 600 MG 12 hr tablet, Take 1 tablet by mouth twice a day (Patient not taking: Reported on 2/7/2024), Disp: 14 tablet, Rfl: 0    metroNIDAZOLE (METROCREAM) 0.75 % cream, Apply to the face once every night (Patient not taking: Reported on 2/7/2024), Disp: 45 g, Rfl: 0    mupirocin (BACTROBAN) 2 % ointment, Apply 1 application  topically to the appropriate area as directed 2 (Two) Times a Day. (Patient not taking: Reported on 2/7/2024), Disp: 22 g, Rfl: 0    Physical Exam:   General Appearance:    Alert, cooperative, in no acute distress   Head:    Normocephalic, without obvious abnormality, atraumatic   Lungs:     Clear to auscultation,respirations regular, even and                  unlabored    Heart:    Regular rhythm and normal rate, normal S1 and S2, no            murmur, no gallop, no rub, no click  Breast-both breast tender, no palpable masses.   Abdomen:     Normal bowel sounds, no masses, no organomegaly, soft        non-tender, non-distended, no guarding, no rebound                tenderness   Extremities:   Moves all extremities well, no edema, no cyanosis, no             redness   Pulses:   Pulses palpable and equal bilaterally   Skin:   No bleeding, bruising or rash     Results Review:   I reviewed the patient's new clinical results.     Review of Systems was reviewed and confirmed as accurate as documented by the MA.    ASSESSMENT/PLAN:    1. History of breast lump/mass excision    2. Other abnormal and inconclusive findings on diagnostic imaging of breast       Ultrasound today indicates a right breast cyst, 1.8 cm in size and symptomatic.   Recommend FNA.        RIGHT BREAST ULTRASOUND with FINE NEEDLE ASPIRATION    The patient did have a breast ultrasound performed today in the normal manner.  There was good visualization obtained of the corresponding breast as mentioned above, there was noted to be a 1.8 cm cystic mass of the right breast that was successfully aspirated under direct ultrasound guidance, cells were sent to pathology.       IMPRESSION:    Successful FNA right breast cyst with complete resolution.  Repeat ultrasound in 1 year    Electronically signed by eBnji Martinez MD  02/19/24

## 2024-02-19 ENCOUNTER — OFFICE VISIT (OUTPATIENT)
Dept: SURGERY | Facility: CLINIC | Age: 74
End: 2024-02-19
Payer: MEDICARE

## 2024-02-19 VITALS
HEART RATE: 88 BPM | SYSTOLIC BLOOD PRESSURE: 132 MMHG | BODY MASS INDEX: 25.43 KG/M2 | OXYGEN SATURATION: 97 % | HEIGHT: 67 IN | WEIGHT: 162 LBS | DIASTOLIC BLOOD PRESSURE: 72 MMHG | TEMPERATURE: 98 F

## 2024-02-19 DIAGNOSIS — R92.8 OTHER ABNORMAL AND INCONCLUSIVE FINDINGS ON DIAGNOSTIC IMAGING OF BREAST: ICD-10-CM

## 2024-02-19 DIAGNOSIS — Z98.890 HISTORY OF BREAST LUMP/MASS EXCISION: Primary | ICD-10-CM

## 2024-02-19 PROCEDURE — 1159F MED LIST DOCD IN RCRD: CPT | Performed by: SURGERY

## 2024-02-19 PROCEDURE — 99213 OFFICE O/P EST LOW 20 MIN: CPT | Performed by: SURGERY

## 2024-02-19 PROCEDURE — 3078F DIAST BP <80 MM HG: CPT | Performed by: SURGERY

## 2024-02-19 PROCEDURE — 1160F RVW MEDS BY RX/DR IN RCRD: CPT | Performed by: SURGERY

## 2024-02-19 PROCEDURE — 3075F SYST BP GE 130 - 139MM HG: CPT | Performed by: SURGERY

## 2024-02-19 PROCEDURE — 10005 FNA BX W/US GDN 1ST LES: CPT | Performed by: SURGERY

## 2024-02-20 LAB — REF LAB TEST METHOD: NORMAL

## 2024-03-05 ENCOUNTER — SPECIALTY PHARMACY (OUTPATIENT)
Dept: NEUROLOGY | Facility: CLINIC | Age: 74
End: 2024-03-05
Payer: MEDICARE

## 2024-03-13 ENCOUNTER — OFFICE VISIT (OUTPATIENT)
Dept: GASTROENTEROLOGY | Facility: CLINIC | Age: 74
End: 2024-03-13
Payer: MEDICARE

## 2024-03-13 VITALS
DIASTOLIC BLOOD PRESSURE: 52 MMHG | HEART RATE: 72 BPM | TEMPERATURE: 96.8 F | WEIGHT: 168 LBS | BODY MASS INDEX: 26.37 KG/M2 | SYSTOLIC BLOOD PRESSURE: 122 MMHG | HEIGHT: 67 IN | OXYGEN SATURATION: 97 %

## 2024-03-13 DIAGNOSIS — K59.09 OTHER CONSTIPATION: Primary | ICD-10-CM

## 2024-03-13 DIAGNOSIS — M54.31 SCIATICA OF RIGHT SIDE: ICD-10-CM

## 2024-03-13 PROCEDURE — 3074F SYST BP LT 130 MM HG: CPT | Performed by: INTERNAL MEDICINE

## 2024-03-13 PROCEDURE — 1160F RVW MEDS BY RX/DR IN RCRD: CPT | Performed by: INTERNAL MEDICINE

## 2024-03-13 PROCEDURE — 1159F MED LIST DOCD IN RCRD: CPT | Performed by: INTERNAL MEDICINE

## 2024-03-13 PROCEDURE — 3078F DIAST BP <80 MM HG: CPT | Performed by: INTERNAL MEDICINE

## 2024-03-13 PROCEDURE — 99214 OFFICE O/P EST MOD 30 MIN: CPT | Performed by: INTERNAL MEDICINE

## 2024-03-13 NOTE — PROGRESS NOTES
Patient Name: Leela Muller  YOB: 1950   Medical Record number: [id number]     PCP: Connor Ritter MD    Chief Complaint   Patient presents with    Follow-up   Follow-up for abdominal discomfort and constipation.    History of Present Illness:   HPI  Mrs. Muller presents to the office for a follow-up visit.  She states the abdominal discomfort is better.  However, the patient stopped taking the MiraLAX on a regular basis and there have been days in between bowel movements.  Mrs. Muller denies any gross blood in the stool.  There is no history of weight loss.  She has no issues with abdominal pain with meals.  Mrs. Muller has experienced some lower back discomfort with radiation down the right leg.  Past Medical History:   Diagnosis Date    Anxiety     Arm pain     Arthritis     Breast injury     fell 6/2019     Cancer     Chronic pancreatitis     COVID-19     Depression     Diabetes mellitus     History of rectal surgery     Hyperlipidemia     Hypertension     Liver mass     Medication monitoring encounter 07/24/2018    Neck pain on left side     Palpitations 04/18/2018    Pancreatitis     Pulmonary embolism     Stroke syndrome 09/14/2016    · Assessed By: Devaughn Lewis (Neurology); Last Assessed: 16 Jun 2015  Right cerebrovascular accident in July or August 2010, evaluated at [facility].  Admission to [facility] on 09/28/2010 with headache and stuttering, consistent with TIA.  Chronic Coumadin therapy, initiated following bilateral pulmonary emboli in 2007 after fall and hip replacement.  She was already on 81 mg of aspirin as well, 09/2010.  MRI of the brain on 09/28/2010 revealing old right parietal cerebrovascular accident.  MRA revealing normal carotids, middle anterior and posterior cerebral arteries with minimal ostial stenosis of both vertebral arteries.  GENARO by Dr. Oliver Mobley on 09/28/2010:  patent foramen ovale with a small amount of right to left  shunting.  Normal LVEF and normal valvular structures.   Patent foramen ovale closure using a 25 mm. Amplatzer cribriform occluder, 10/05/2010.   Echocardiogram, 06/23/2014:  LVEF (55% to 60%) with and Amplatzer device noted to be well-seated in     Thrombocytopenia     Transient cerebral ischemia     Type 2 diabetes mellitus        Past Surgical History:   Procedure Laterality Date    APPENDECTOMY      BREAST BIOPSY Left 2012    benign    BREAST BIOPSY      BREAST EXCISIONAL BIOPSY Left     benign    BREAST EXCISIONAL BIOPSY Right     benign    BREAST EXCISIONAL BIOPSY Right 2020    benign    BREAST SURGERY      CAUTERIZATION NASAL BLEEDERS  2022    CHOLECYSTECTOMY      COLONOSCOPY      HYSTERECTOMY      LIVER BIOPSY      OOPHORECTOMY      RECTAL SURGERY  11/20/2023    cancer    SKIN CANCER EXCISION      TONSILLECTOMY      TOTAL HIP ARTHROPLASTY REVISION      US GUIDED CYST ASPIRATION BREAST N/A 2/19/2024         Current Outpatient Medications:     acetaminophen (TYLENOL) 500 MG tablet, Take 1 tablet by mouth Every 6 (Six) Hours As Needed., Disp: , Rfl:     albuterol sulfate  (90 Base) MCG/ACT inhaler, Inhale 2 puffs by mouth every 4 (Four) Hours As Needed, Disp: 8.5 g, Rfl: 2    aspirin 81 MG tablet, Take 1 tablet by mouth Daily. Taken at night. Last dose 9-18-21, Disp: , Rfl:     azelastine (ASTELIN) 0.1 % nasal spray, Instill 1 spray in each nostil 2 (Two) Times A Day, Disp: 30 mL, Rfl: 11    baclofen (LIORESAL) 10 MG tablet, Take 1 tablet by mouth Every Night., Disp: 90 tablet, Rfl: 3    betamethasone dipropionate (DIPROLENE) 0.05 % ointment, Apply topically to the appropriate area as directed once daily as needed, Disp: 15 g, Rfl: 0    clidinium-chlordiazePOXIDE (LIBRAX) 5-2.5 MG per capsule, Take 1 capsule by mouth 2 (Two) Times a Day as needed., Disp: 60 capsule, Rfl: 0    clidinium-chlordiazePOXIDE (LIBRAX) 5-2.5 MG per capsule, Take 1 capsule by mouth 2 (Two) Times a Day as needed, Disp: 60 capsule,  Rfl: 0    Continuous Blood Gluc  (FreeStyle Colin 2 Elaine) device, Use daily as directed, Disp: 1 each, Rfl: 0    digoxin (LANOXIN) 250 MCG tablet, Take 1 tablet by mouth Daily., Disp: 90 tablet, Rfl: 3    fludrocortisone 0.1 MG tablet, Take 1 tablet by mouth Daily., Disp: 90 tablet, Rfl: 3    fluticasone (FLONASE) 50 MCG/ACT nasal spray, Instill 2 sprays in each nostril 2 (Two) Times A Day, Disp: 120 g, Rfl: 1    furosemide (LASIX) 40 MG tablet, Take 1 tablet by mouth Daily. May have extra 1/2 to 1 tablet daily if needed for edema, Disp: 135 tablet, Rfl: 3    gabapentin (NEURONTIN) 800 MG tablet, Take 1/2 tablet in midday and 1 tablet at bedtime, Disp: 135 tablet, Rfl: 1    glucose blood (OneTouch Verio) test strip, Use to test blood glucose 3 times a day as directed, Disp: 300 each, Rfl: 3    HYDROcodone-acetaminophen (NORCO) 7.5-325 MG per tablet, Take 1 tablet by mouth 3 times a day., Disp: 90 tablet, Rfl: 0    hydrocortisone (ANUSOL-HC) 25 MG suppository, Insert 1 suppository rectally twice a day as needed (remove wrapper and moisten with water), Disp: 12 suppository, Rfl: 3    Influenza Vac High-Dose Quad (Fluzone High-Dose Quadrivalent) 0.7 ML suspension prefilled syringe injection, Inject  into the appropriate muscle as directed by prescriber., Disp: 0.7 mL, Rfl: 0    insulin aspart (NovoLOG FlexPen) 100 UNIT/ML solution pen-injector sc pen, Inject 10 Units under the skin into the appropriate area as directed 3 (Three) Times a Day With Meals., Disp: 27 mL, Rfl: 3    Insulin Glargine (BASAGLAR KWIKPEN) 100 UNIT/ML injection pen, Inject 50 units subcutaneously once in the morning and 75 units at night, Disp: 135 mL, Rfl: 1    Insulin Pen Needle (B-D UF III MINI PEN NEEDLES) 31G X 5 MM misc, Use to inject insulin twice a day as directed, Disp: 100 each, Rfl: 12    Insulin Syringe-Needle U-100 29G 0.5 ML misc, Inject 0.3 mL as directed Daily., Disp: , Rfl:     meclizine (ANTIVERT) 25 MG tablet, Take 1  tablet by mouth 3 (Three) Times a Day As Needed for Dizziness., Disp: 90 tablet, Rfl: 3    metoprolol succinate XL (TOPROL-XL) 100 MG 24 hr tablet, Take 1 tablet by mouth Daily., Disp: 90 tablet, Rfl: 3    mupirocin (BACTROBAN) 2 % ointment, Apply 1 application  topically to the appropriate area as directed 2 (Two) Times a Day., Disp: 22 g, Rfl: 0    neomycin-colistin-hydrocortisone-thonzonium (Cortisporin-TC) 3.3-3-10-0.5 MG/ML otic suspension, Instill 5 drops into affected ear 3 (Three) times a day for 10 days, Disp: 10 mL, Rfl: 0    nitroglycerin (Nitrostat) 0.4 MG SL tablet, Place 1 tablet under the tongue Every 5 Minutes As Needed for Chest Pain for up to 3 total doses. Call 911 if pain remains after 1 dose., Disp: 25 tablet, Rfl: 6    Nurtec 75 MG dispersible tablet, Take 1 tablet by mouth Daily As Needed (migraine)., Disp: 8 tablet, Rfl: 11    ofloxacin (FLOXIN) 0.3 % otic solution, Instill 5 drops into affected ear once a day for 7 days, Disp: 10 mL, Rfl: 0    pancrelipase, Lip-Prot-Amyl, (Pancreaze) 07834-78710 units capsule delayed-release particles capsule, Take 1 capsule with each meal and with each snack (Patient taking differently: As Needed.), Disp: 100 capsule, Rfl: 2    potassium chloride (KLOR-CON) 10 MEQ CR tablet, Take 1 tablet by mouth Daily as directed, Disp: 90 tablet, Rfl: 0    prednisoLONE acetate (PRED FORTE) 1 % ophthalmic suspension, Instill 1 drop in both eyes twice a day; Shake Well Before Use, Disp: 5 mL, Rfl: 0    promethazine (PHENERGAN) 25 MG tablet, Take 1 tablet by mouth Every 6 (Six) Hours As Needed for Nausea or Vomiting., Disp: 30 tablet, Rfl: 1    RABEprazole (ACIPHEX) 20 MG EC tablet, Take 1 tablet by mouth Daily., Disp: 90 tablet, Rfl: 1    rosuvastatin (CRESTOR) 10 MG tablet, Take one tablet by mouth once every evening, Disp: 90 tablet, Rfl: 2    rosuvastatin (CRESTOR) 10 MG tablet, Take 1 tablet by mouth Daily., Disp: 90 tablet, Rfl: 1    topiramate (TOPAMAX) 25 MG tablet,  Take 1 tablet by mouth Every Night., Disp: 90 tablet, Rfl: 3    triamcinolone (KENALOG) 0.1 % cream, Apply topically to the affected area twice a day, Disp: 30 g, Rfl: 0    venlafaxine XR (EFFEXOR-XR) 75 MG 24 hr capsule, Take 1 capsule by mouth Daily., Disp: 90 capsule, Rfl: 3    benzonatate (TESSALON) 100 MG capsule, Take 1 capsule by mouth every 6 hours as needed (Patient not taking: Reported on 2/7/2024), Disp: 20 capsule, Rfl: 0    butalbital-acetaminophen-caffeine (FIORICET, ESGIC) -40 MG per tablet, Take 1 tablet by mouth Daily As Needed for headache (Patient not taking: Reported on 3/13/2024), Disp: 30 tablet, Rfl: 0    cefuroxime (CEFTIN) 500 MG tablet, Take 1 tablet by mouth 2 (Two) Times a Day. (Patient not taking: Reported on 2/7/2024), Disp: 14 tablet, Rfl: 0    clonazePAM (KlonoPIN) 0.5 MG tablet, As Needed. (Patient not taking: Reported on 2/7/2024), Disp: , Rfl:     guaiFENesin (Mucinex) 600 MG 12 hr tablet, Take 1 tablet by mouth twice a day, Disp: 14 tablet, Rfl: 0    metroNIDAZOLE (METROCREAM) 0.75 % cream, Apply to the face once every night (Patient not taking: Reported on 2/7/2024), Disp: 45 g, Rfl: 0    Allergies   Allergen Reactions    Ampicillin GI Intolerance     Diarrhea    Beta lactam allergy details  Antibiotic reaction: unknown  Age at reaction: unknown  Dose to reaction time: unknown  Reason for antibiotic: unknown  Epinephrine required for reaction?: unknown  Tolerated antibiotics: unknown        Atorvastatin Swelling    Tetanus Toxoid Hives    Acyclovir Seizure    Cefprozil Other (See Comments)    Lansoprazole Nausea Only and Nausea And Vomiting    Mestinon [Pyridostigmine] Itching and Other (See Comments)     Leg cramps, shooting vaginal pains, frequent urination    Ranexa [Ranolazine Er] Nausea And Vomiting       Family History   Problem Relation Age of Onset    Alzheimer's disease Mother     Cancer Mother     Coronary artery disease Other     Diabetes Other     Hypertension  Other     Stroke Other     Cancer Other     Dementia Other     Heart attack Father     Colon polyps Father     Breast cancer Neg Hx     Ovarian cancer Neg Hx     Colon cancer Neg Hx     Esophageal cancer Neg Hx        Social History     Socioeconomic History    Marital status:    Tobacco Use    Smoking status: Never     Passive exposure: Never    Smokeless tobacco: Never   Vaping Use    Vaping status: Never Used   Substance and Sexual Activity    Alcohol use: No    Drug use: No    Sexual activity: Yes       Review of Systems   Constitutional:  Negative for activity change, appetite change, fatigue, fever and unexpected weight change.   HENT:  Negative for dental problem, hearing loss, mouth sores, postnasal drip, sneezing, trouble swallowing and voice change.    Eyes:  Negative for pain, redness, itching and visual disturbance.   Respiratory:  Negative for cough, choking, chest tightness, shortness of breath and wheezing.    Cardiovascular:  Negative for chest pain, palpitations and leg swelling.   Gastrointestinal:  Positive for constipation. Negative for abdominal distention (bloating/ gas), abdominal pain, anal bleeding, blood in stool, diarrhea, nausea, rectal pain and vomiting.   Endocrine: Negative for cold intolerance, heat intolerance, polydipsia, polyphagia and polyuria.   Genitourinary: Negative.  Negative for dysuria, enuresis, flank pain, hematuria and urgency.   Musculoskeletal:  Negative for arthralgias, back pain, gait problem, joint swelling and myalgias.   Skin:  Negative for color change, pallor and rash.   Allergic/Immunologic: Negative for environmental allergies, food allergies and immunocompromised state.   Neurological:  Negative for dizziness, tremors, seizures, facial asymmetry, speech difficulty, numbness and headaches.   Hematological:  Negative for adenopathy.   Psychiatric/Behavioral:  Negative for behavioral problems, confusion, dysphoric mood, hallucinations and self-injury.         Vitals:    03/13/24 1433   BP: 122/52   Pulse: 72   Temp: 96.8 °F (36 °C)   SpO2: 97%       Physical Exam  Vitals reviewed.   Constitutional:       General: She is not in acute distress.     Appearance: Normal appearance.   HENT:      Head: Normocephalic and atraumatic.      Nose: Nose normal.      Mouth/Throat:      Mouth: Mucous membranes are moist.      Pharynx: Oropharynx is clear.   Eyes:      General: No scleral icterus.     Extraocular Movements: Extraocular movements intact.   Cardiovascular:      Rate and Rhythm: Normal rate and regular rhythm.      Heart sounds: No murmur heard.  Pulmonary:      Breath sounds: Normal breath sounds. No wheezing or rales.   Abdominal:      General: Bowel sounds are normal.      Palpations: Abdomen is soft.      Tenderness: There is no abdominal tenderness. There is no guarding.   Musculoskeletal:         General: No swelling. Normal range of motion.      Cervical back: Normal range of motion and neck supple.   Skin:     General: Skin is dry.      Coloration: Skin is not jaundiced.   Neurological:      Mental Status: She is alert and oriented to person, place, and time.   Psychiatric:         Mood and Affect: Mood normal.         Thought Content: Thought content normal.         Judgment: Judgment normal.         Diagnoses and all orders for this visit:    1. Other constipation (Primary)    2. Sciatica of right side    The patient is experiencing some constipation that is multifactorial.  The CT scan a few months ago did not demonstrate any bowel wall abnormalities.  There was no evidence of obstruction.  The clinical history at this time suggests sciatic pain.  She does have history of degenerative changes in the lower back on CT imaging.      Plan: Discussed with the patient that she can take a capful of MiraLAX with juice or water on a regular basis to keep her bowel function normal.           Recommend follow-up evaluation of the sciatic pain on the right side with  Dr. Ritter.           Will follow-up in 6 to 7 months.

## 2024-03-26 ENCOUNTER — OFFICE VISIT (OUTPATIENT)
Dept: ENDOCRINOLOGY | Facility: CLINIC | Age: 74
End: 2024-03-26
Payer: MEDICARE

## 2024-03-26 VITALS
DIASTOLIC BLOOD PRESSURE: 54 MMHG | HEART RATE: 66 BPM | WEIGHT: 163 LBS | HEIGHT: 67 IN | SYSTOLIC BLOOD PRESSURE: 120 MMHG | OXYGEN SATURATION: 96 % | BODY MASS INDEX: 25.58 KG/M2

## 2024-03-26 DIAGNOSIS — Z79.4 TYPE 2 DIABETES MELLITUS WITH HYPERGLYCEMIA, WITH LONG-TERM CURRENT USE OF INSULIN: Primary | ICD-10-CM

## 2024-03-26 DIAGNOSIS — E78.5 DYSLIPIDEMIA: ICD-10-CM

## 2024-03-26 DIAGNOSIS — I10 PRIMARY HYPERTENSION: ICD-10-CM

## 2024-03-26 DIAGNOSIS — E11.65 TYPE 2 DIABETES MELLITUS WITH HYPERGLYCEMIA, WITH LONG-TERM CURRENT USE OF INSULIN: Primary | ICD-10-CM

## 2024-03-26 DIAGNOSIS — I25.10 CORONARY ARTERY DISEASE INVOLVING NATIVE HEART WITHOUT ANGINA PECTORIS, UNSPECIFIED VESSEL OR LESION TYPE: ICD-10-CM

## 2024-03-26 LAB
EXPIRATION DATE: ABNORMAL
EXPIRATION DATE: ABNORMAL
GLUCOSE BLDC GLUCOMTR-MCNC: 182 MG/DL (ref 70–130)
HBA1C MFR BLD: 6.6 % (ref 4.5–5.7)
Lab: ABNORMAL
Lab: ABNORMAL

## 2024-03-26 PROCEDURE — 3074F SYST BP LT 130 MM HG: CPT | Performed by: INTERNAL MEDICINE

## 2024-03-26 PROCEDURE — 95251 CONT GLUC MNTR ANALYSIS I&R: CPT | Performed by: INTERNAL MEDICINE

## 2024-03-26 PROCEDURE — 1159F MED LIST DOCD IN RCRD: CPT | Performed by: INTERNAL MEDICINE

## 2024-03-26 PROCEDURE — 3044F HG A1C LEVEL LT 7.0%: CPT | Performed by: INTERNAL MEDICINE

## 2024-03-26 PROCEDURE — 99214 OFFICE O/P EST MOD 30 MIN: CPT | Performed by: INTERNAL MEDICINE

## 2024-03-26 PROCEDURE — 83036 HEMOGLOBIN GLYCOSYLATED A1C: CPT | Performed by: INTERNAL MEDICINE

## 2024-03-26 PROCEDURE — 1160F RVW MEDS BY RX/DR IN RCRD: CPT | Performed by: INTERNAL MEDICINE

## 2024-03-26 PROCEDURE — 3078F DIAST BP <80 MM HG: CPT | Performed by: INTERNAL MEDICINE

## 2024-03-26 RX ORDER — FLUCONAZOLE 150 MG/1
150 TABLET ORAL ONCE
Qty: 1 TABLET | Refills: 0 | Status: SHIPPED | OUTPATIENT
Start: 2024-03-26 | End: 2024-03-29

## 2024-03-26 RX ORDER — MELATONIN
1000 DAILY
COMMUNITY

## 2024-03-26 NOTE — PROGRESS NOTES
"     Office Note      Date: 2024  Patient Name: Leela Muller  MRN: [id number]  : 1950    Chief Complaint   Patient presents with    Diabetes     Type 2 diabetes mellitus with hyperglycemia, with long-term current use of insulin         History of Present Illness:   Leela Muller is a 74 y.o. female who presents for Diabetes type 2. Diagnosed in: . Treated in past with oral agents. She didn't tolerate farxiga due to yeast infections.  Current treatments: basal-bolus insulin. Number of insulin shots per day: 4. Checks blood sugar 288 times a day - on FreeStyle Hilaria. She is making frequent adjustments in insulin based on glucose readings.  Has low blood sugar: occ. Aspirin use: Yes. Statin use: Yes. ACE-I/ARB use: No. Changes in health since last visit: vit D deficiency; COVID-19 infection. Last eye exam 2023.       Subjective      Diabetic Complications:  Eyes: No  Kidneys: No  Feet: Yes -    Heart: Yes -      Diet and Exercise:  Meals per day: 2  Minutes of exercise per week: 0 mins.    Review of Systems:   Review of Systems   Constitutional: Negative.    Cardiovascular: Negative.    Gastrointestinal: Negative.    Endocrine: Negative.        The following portions of the patient's history were reviewed and updated as appropriate: allergies, current medications, past family history, past medical history, past social history, past surgical history, and problem list.    Objective     Visit Vitals  /54 (BP Location: Left arm, Patient Position: Sitting, Cuff Size: Adult)   Pulse 66   Ht 170.2 cm (67\")   Wt 73.9 kg (163 lb)   SpO2 96%   BMI 25.53 kg/m²       Physical Exam:  Physical Exam  Constitutional:       Appearance: Normal appearance.   Neurological:      Mental Status: She is alert.         Labs:    HbA1c  Lab Results   Component Value Date    HGBA1C 6.6 (A) 2024       CMP  Lab Results   Component Value Date    GLUCOSE 89 2022    BUN 10 2022    CREATININE 0.80 10/16/2023    " EGFRIFNONA 73 12/17/2021    BCR 15.2 11/21/2022    K 3.6 11/21/2022    CO2 28.2 11/21/2022    CALCIUM 9.3 11/21/2022    LABIL2 1.5 03/20/2015    AST 19 11/21/2022    ALT 19 11/21/2022        Lipid Panel  Lab Results   Component Value Date    HDL 45 11/14/2023    LDL 43 11/14/2023    TRIG 140 11/14/2023        TSH  Lab Results   Component Value Date    TSH 2.570 11/14/2023        Hemoglobin A1C  Lab Results   Component Value Date    HGBA1C 6.6 (A) 03/26/2024        Microalbumin/Creatinine  Lab Results   Component Value Date    MALBCRERATIO  11/14/2023      Comment:      Unable to calculate    MICROALBUR <1.2 11/14/2023           Assessment / Plan      Assessment & Plan:  Diagnoses and all orders for this visit:    1. Type 2 diabetes mellitus with hyperglycemia, with long-term current use of insulin (Primary)  Assessment & Plan:  Diabetes is improving with treatment.   Continue current treatment regimen.  Diabetes will be reassessed in 6 months.    She reports yeast infection currently after recent steroids.  She asks for fluconazole Rx.    FreeStyle Colin 2 CGM was downloaded today.  Data was reviewed from 3/13/24 to 3/26/24.  This showed good overnight control with rare hypoglycemia.  Occ postprandial spike.  Encouraged her to decrease PM basal insulin by 2u.    Orders:  -     POC Glycosylated Hemoglobin (Hb A1C)  -     POC Glucose, Blood    2. Primary hypertension  Assessment & Plan:  Hypertension is stable and controlled  Continue current treatment regimen.  Blood pressure will be reassessed in 6 months.      3. Dyslipidemia  Assessment & Plan:  Continue statin.  Lipids okay last visit.      4. Coronary artery disease involving native heart without angina pectoris, unspecified vessel or lesion type  Assessment & Plan:  Continue ASA and statin. No GLP-1 RA due to pancreas issues. Intolerant of SGLT-2 inhibitors.       Other orders  -     fluconazole (DIFLUCAN) 150 MG tablet; Take 1 tablet by mouth 1 (One) Time for  1 dose.  Dispense: 1 tablet; Refill: 0      Current Outpatient Medications   Medication Instructions    acetaminophen (TYLENOL) 500 mg, Oral, Every 6 Hours PRN    albuterol sulfate  (90 Base) MCG/ACT inhaler Inhale 2 puffs by mouth every 4 (Four) Hours As Needed    aspirin 81 mg, Oral, Daily, Taken at night. Last dose 9-18-21    azelastine (ASTELIN) 0.1 % nasal spray Instill 1 spray in each nostil 2 (Two) Times A Day    baclofen (LIORESAL) 10 mg, Oral, Nightly    betamethasone dipropionate (DIPROLENE) 0.05 % ointment Apply topically to the appropriate area as directed once daily as needed    butalbital-acetaminophen-caffeine (FIORICET, ESGIC) -40 MG per tablet Take 1 tablet by mouth Daily As Needed for headache    cholecalciferol 1,000 Units, Oral, Daily    clidinium-chlordiazePOXIDE (LIBRAX) 5-2.5 MG per capsule Take 1 capsule by mouth 2 (Two) Times a Day as needed.    clidinium-chlordiazePOXIDE (LIBRAX) 5-2.5 MG per capsule Take 1 capsule by mouth 2 (Two) Times a Day as needed    clonazePAM (KlonoPIN) 0.5 MG tablet As Needed    Continuous Blood Gluc  (FreeStyle Colin 2 Severn) device Use daily as directed    digoxin (LANOXIN) 250 mcg, Oral, Daily    fluconazole (DIFLUCAN) 150 mg, Oral, Once    fludrocortisone 0.1 mg, Oral, Daily    fluticasone (FLONASE) 50 MCG/ACT nasal spray Instill 2 sprays in each nostril 2 (Two) Times A Day    furosemide (LASIX) 40 MG tablet Take 1 tablet by mouth Daily. May have extra 1/2 to 1 tablet daily if needed for edema    gabapentin (NEURONTIN) 800 MG tablet Take 1/2 tablet in midday and 1 tablet at bedtime    glucose blood (OneTouch Verio) test strip Use to test blood glucose 3 times a day as directed    guaiFENesin (Mucinex) 600 MG 12 hr tablet Take 1 tablet by mouth twice a day    HYDROcodone-acetaminophen (NORCO) 7.5-325 MG per tablet 1 tablet, Oral, 3 times daily    hydrocortisone (ANUSOL-HC) 25 MG suppository Insert 1 suppository rectally twice a day as  needed (remove wrapper and moisten with water)    Influenza Vac High-Dose Quad (Fluzone High-Dose Quadrivalent) 0.7 ML suspension prefilled syringe injection Intramuscular    Insulin Glargine (BASAGLAR KWIKPEN) 100 UNIT/ML injection pen Inject 50 units subcutaneously once in the morning and 75 units at night    Insulin Pen Needle (B-D UF III MINI PEN NEEDLES) 31G X 5 MM misc Use to inject insulin twice a day as directed    Insulin Syringe-Needle U-100 29G 0.5 ML misc 0.3 mL, Injection, Daily    meclizine (ANTIVERT) 25 mg, Oral, 3 Times Daily PRN    metoprolol succinate XL (TOPROL-XL) 100 mg, Oral, Daily    metroNIDAZOLE (METROCREAM) 0.75 % cream Apply to the face once every night    mupirocin (BACTROBAN) 2 % ointment 1 application , Topical, 2 Times Daily    neomycin-colistin-hydrocortisone-thonzonium (Cortisporin-TC) 3.3-3-10-0.5 MG/ML otic suspension Instill 5 drops into affected ear 3 (Three) times a day for 10 days    nitroglycerin (Nitrostat) 0.4 MG SL tablet Place 1 tablet under the tongue Every 5 Minutes As Needed for Chest Pain for up to 3 total doses. Call 911 if pain remains after 1 dose.    NovoLOG FlexPen 10 Units, Subcutaneous, 3 Times Daily With Meals    Nurtec 75 mg, Oral, Daily PRN    ofloxacin (FLOXIN) 0.3 % otic solution Instill 5 drops into affected ear once a day for 7 days    pancrelipase, Lip-Prot-Amyl, (Pancreaze) 55533-75694 units capsule delayed-release particles capsule Take 1 capsule with each meal and with each snack    potassium chloride (KLOR-CON) 10 MEQ CR tablet Take 1 tablet by mouth Daily as directed    prednisoLONE acetate (PRED FORTE) 1 % ophthalmic suspension Instill 1 drop in both eyes twice a day; Shake Well Before Use    promethazine (PHENERGAN) 25 mg, Oral, Every 6 Hours PRN    RABEprazole (ACIPHEX) 20 mg, Oral, Daily    rosuvastatin (CRESTOR) 10 MG tablet Take one tablet by mouth once every evening    rosuvastatin (CRESTOR) 10 mg, Oral, Daily    topiramate (TOPAMAX) 25 mg,  Oral, Nightly    triamcinolone (KENALOG) 0.1 % cream Apply topically to the affected area twice a day    venlafaxine XR (EFFEXOR-XR) 75 mg, Oral, Daily      Return in about 3 months (around 6/26/2024) for Recheck with A1c.    Electronically signed by: Jac Santiago MD  03/26/2024

## 2024-03-26 NOTE — ASSESSMENT & PLAN NOTE
Diabetes is improving with treatment.   Continue current treatment regimen.  Diabetes will be reassessed in 6 months.    She reports yeast infection currently after recent steroids.  She asks for fluconazole Rx.    FreeStyle Colin 2 CGM was downloaded today.  Data was reviewed from 3/13/24 to 3/26/24.  This showed good overnight control with rare hypoglycemia.  Occ postprandial spike.  Encouraged her to decrease PM basal insulin by 2u.

## 2024-04-30 ENCOUNTER — TELEPHONE (OUTPATIENT)
Dept: ENDOCRINOLOGY | Facility: CLINIC | Age: 74
End: 2024-04-30
Payer: MEDICARE

## 2024-04-30 NOTE — TELEPHONE ENCOUNTER
Caller: Leela Muller    Relationship to patient: Self    Best call back number: [phone] (home)       Patient is needing: [employer] NEED[employer] COPY OF NOTES FROM LAST VISIT 3/26 FOR RANDOLPH 2 SUPPLIES TO CONTINUE.   FAX: [phone]

## 2024-05-03 DIAGNOSIS — G43.009 MIGRAINE WITHOUT AURA AND WITHOUT STATUS MIGRAINOSUS, NOT INTRACTABLE: ICD-10-CM

## 2024-05-03 DIAGNOSIS — M50.30 DDD (DEGENERATIVE DISC DISEASE), CERVICAL: ICD-10-CM

## 2024-05-03 RX ORDER — BACLOFEN 10 MG/1
10 TABLET ORAL NIGHTLY
Qty: 60 TABLET | Refills: 0 | Status: SHIPPED | OUTPATIENT
Start: 2024-05-03

## 2024-05-03 RX ORDER — TOPIRAMATE 25 MG/1
25 TABLET ORAL NIGHTLY
Qty: 60 TABLET | Refills: 0 | Status: SHIPPED | OUTPATIENT
Start: 2024-05-03

## 2024-05-03 RX ORDER — FUROSEMIDE 40 MG/1
TABLET ORAL
Qty: 135 TABLET | Refills: 3 | Status: SHIPPED | OUTPATIENT
Start: 2024-05-03

## 2024-05-03 NOTE — TELEPHONE ENCOUNTER
Rx Refill Note  Requested Prescriptions     Pending Prescriptions Disp Refills    topiramate (TOPAMAX) 25 MG tablet [Pharmacy Med Name: Topiramate 25 MG Oral Tablet (TOPAMAX)] 90 tablet 3     Sig: Take 1 tablet by mouth Every Night.    baclofen (LIORESAL) 10 MG tablet [Pharmacy Med Name: Baclofen 10 MG Oral Tablet (LIORESAL)] 90 tablet 3     Sig: Take 1 tablet by mouth Every Night.      Last filled:5/1/23  3 refill  Last office visit with prescribing clinician: 11/30/2023      Next office visit with prescribing clinician: 7/1/2024     Last filled:5/1/233 1 of 3 remaining  Last office visit with prescribing clinician: 11/30/2023      Next office visit with prescribing clinician: 7/1/2024     NISHI BOONE  05/03/24, 15:55 EDT    Topiramate-sent to pharmacy 0 refill until upcoming appt    Baclofen-sent to pharmacy 0 refill until upcoming appt

## 2024-05-06 ENCOUNTER — TRANSCRIBE ORDERS (OUTPATIENT)
Dept: ADMINISTRATIVE | Facility: HOSPITAL | Age: 74
End: 2024-05-06
Payer: MEDICARE

## 2024-05-06 ENCOUNTER — TELEPHONE (OUTPATIENT)
Dept: UROLOGY | Facility: CLINIC | Age: 74
End: 2024-05-06
Payer: MEDICARE

## 2024-05-06 DIAGNOSIS — M54.50 LOW BACK PAIN, UNSPECIFIED BACK PAIN LATERALITY, UNSPECIFIED CHRONICITY, UNSPECIFIED WHETHER SCIATICA PRESENT: Primary | ICD-10-CM

## 2024-05-13 ENCOUNTER — OFFICE VISIT (OUTPATIENT)
Dept: UROLOGY | Facility: CLINIC | Age: 74
End: 2024-05-13
Payer: MEDICARE

## 2024-05-13 VITALS — WEIGHT: 163 LBS | BODY MASS INDEX: 25.58 KG/M2 | HEIGHT: 67 IN

## 2024-05-13 DIAGNOSIS — N28.1 RENAL CYST: Primary | ICD-10-CM

## 2024-05-13 LAB
BILIRUB BLD-MCNC: NEGATIVE MG/DL
CLARITY, POC: CLEAR
COLOR UR: YELLOW
EXPIRATION DATE: NORMAL
GLUCOSE UR STRIP-MCNC: NEGATIVE MG/DL
KETONES UR QL: NEGATIVE
LEUKOCYTE EST, POC: NEGATIVE
Lab: NORMAL
NITRITE UR-MCNC: NEGATIVE MG/ML
PH UR: 6 [PH] (ref 5–8)
PROT UR STRIP-MCNC: NEGATIVE MG/DL
RBC # UR STRIP: NEGATIVE /UL
SP GR UR: 1.02 (ref 1–1.03)
UROBILINOGEN UR QL: NORMAL

## 2024-05-13 NOTE — PROGRESS NOTES
Follow Up Office Visit      Patient Name: Leela Muller  : 1950   MRN: [id number]     Chief Complaint:    Chief Complaint   Patient presents with    Renal cyst       Referring Provider: No ref. provider found    History of Present Illness: Leela Muller is a 74 y.o. female who presents today for follow up of renal cysts.  She has subcentimeter cysts which appear to be simple.  No LUTS.  No dysuria or gross hematuria.    She has undergone dilations previously.        Subjective      Review of System:   Review of Systems   Constitutional: Negative.    HENT: Negative.     Eyes: Negative.    Respiratory: Negative.     Cardiovascular: Negative.    Gastrointestinal: Negative.    Endocrine: Negative.    Genitourinary: Negative.    Musculoskeletal: Negative.    Skin: Negative.    Allergic/Immunologic: Negative.    Neurological: Negative.    Hematological: Negative.    Psychiatric/Behavioral: Negative.        I have reviewed the ROS documented by my clinical staff, I have updated appropriately and I agree. Zuleyka Rodriguez MD    I have reviewed and the following portions of the patient's history were updated as appropriate: past family history, past medical history, past social history, past surgical history and problem list.    Past Medical History:   Past Medical History:   Diagnosis Date    Anxiety     Arm pain     Arthritis     Breast injury     fell 2019     Cancer     Chronic pancreatitis     COVID-19     Depression     Diabetes mellitus     History of rectal surgery     Hyperlipidemia     Hypertension     Liver mass     Medication monitoring encounter 2018    Neck pain on left side     Palpitations 2018    Pancreatitis     Pulmonary embolism     Stroke syndrome 2016    · Assessed By: Devaughn Lewis (Neurology); Last Assessed: 2015  Right cerebrovascular accident in July or 2010, evaluated at [facility].  Admission to [facility] on 2010  with headache and stuttering, consistent with TIA.  Chronic Coumadin therapy, initiated following bilateral pulmonary emboli in 2007 after fall and hip replacement.  She was already on 81 mg of aspirin as well, 09/2010.  MRI of the brain on 09/28/2010 revealing old right parietal cerebrovascular accident.  MRA revealing normal carotids, middle anterior and posterior cerebral arteries with minimal ostial stenosis of both vertebral arteries.  GENARO by Dr. Oliver Mobley on 09/28/2010:  patent foramen ovale with a small amount of right to left shunting.  Normal LVEF and normal valvular structures.   Patent foramen ovale closure using a 25 mm. Amplatzer cribriform occluder, 10/05/2010.   Echocardiogram, 06/23/2014:  LVEF (55% to 60%) with and Amplatzer device noted to be well-seated in     Thrombocytopenia     Transient cerebral ischemia     Type 2 diabetes mellitus        Past Surgical History:  Past Surgical History:   Procedure Laterality Date    APPENDECTOMY      BREAST BIOPSY Left 2012    benign    BREAST BIOPSY      BREAST EXCISIONAL BIOPSY Left     benign    BREAST EXCISIONAL BIOPSY Right     benign    BREAST EXCISIONAL BIOPSY Right 2020    benign    BREAST SURGERY      CAUTERIZATION NASAL BLEEDERS  2022    CHOLECYSTECTOMY      COLONOSCOPY      HYSTERECTOMY      LIVER BIOPSY      OOPHORECTOMY      RECTAL SURGERY  11/20/2023    cancer    SKIN CANCER EXCISION      TONSILLECTOMY      TOTAL HIP ARTHROPLASTY REVISION      US GUIDED CYST ASPIRATION BREAST N/A 2/19/2024       Family History:   family history includes Alzheimer's disease in her mother; Cancer in her mother and another family member; Colon polyps in her father; Coronary artery disease in an other family member; Dementia in an other family member; Diabetes in an other family member; Heart attack in her father; Hypertension in an other family member; Stroke in an other family member.   Otherwise pertinent FHx was reviewed and not pertinent to current  issue.    Social History:    reports that she has never smoked. She has never been exposed to tobacco smoke. She has never used smokeless tobacco. She reports that she does not drink alcohol and does not use drugs.    Medications:     Current Outpatient Medications:     acetaminophen (TYLENOL) 500 MG tablet, Take 1 tablet by mouth Every 6 (Six) Hours As Needed., Disp: , Rfl:     albuterol sulfate  (90 Base) MCG/ACT inhaler, Inhale 2 puffs by mouth every 4 (Four) Hours As Needed, Disp: 8.5 g, Rfl: 2    aspirin 81 MG tablet, Take 1 tablet by mouth Daily. Taken at night. Last dose 9-18-21, Disp: , Rfl:     azelastine (ASTELIN) 0.1 % nasal spray, Instill 1 spray in each nostil 2 (Two) Times A Day, Disp: 30 mL, Rfl: 11    azelastine (ASTELIN) 0.1 % nasal spray, Administer 1 spray in the nostrils twice a day, Disp: 100 mL, Rfl: 2    baclofen (LIORESAL) 10 MG tablet, Take 1 tablet by mouth Every Night., Disp: 60 tablet, Rfl: 0    betamethasone dipropionate (DIPROLENE) 0.05 % ointment, Apply topically to the appropriate area as directed once daily as needed, Disp: 15 g, Rfl: 0    butalbital-acetaminophen-caffeine (FIORICET, ESGIC) -40 MG per tablet, Take 1 tablet by mouth Daily As Needed for headache, Disp: 30 tablet, Rfl: 0    Cholecalciferol 25 MCG (1000 UT) tablet, Take 1 tablet by mouth Daily., Disp: , Rfl:     clidinium-chlordiazePOXIDE (LIBRAX) 5-2.5 MG per capsule, Take 1 capsule by mouth 2 (Two) Times a Day as needed., Disp: 60 capsule, Rfl: 0    clidinium-chlordiazePOXIDE (LIBRAX) 5-2.5 MG per capsule, Take 1 capsule by mouth 2 (Two) Times a Day as needed., Disp: 60 capsule, Rfl: 0    clidinium-chlordiazePOXIDE (LIBRAX) 5-2.5 MG per capsule, Take 1 capsule by mouth 2 (Two) Times a Day As Needed., Disp: 60 capsule, Rfl: 0    clonazePAM (KlonoPIN) 0.5 MG tablet, As Needed., Disp: , Rfl:     Continuous Blood Gluc  (FreeStyle Colin 2 Monroe) device, Use daily as directed, Disp: 1 each, Rfl: 0     digoxin (LANOXIN) 250 MCG tablet, Take 1 tablet by mouth Daily., Disp: 90 tablet, Rfl: 3    fludrocortisone 0.1 MG tablet, Take 1 tablet by mouth Daily., Disp: 90 tablet, Rfl: 3    fluticasone (FLONASE) 50 MCG/ACT nasal spray, Instill 2 sprays in each nostril 2 (Two) Times A Day, Disp: 120 g, Rfl: 1    fluticasone (FLONASE) 50 MCG/ACT nasal spray, Instill 2 sprays in each nostril 2 (Two) Times A Day, Disp: 120 g, Rfl: 5    furosemide (LASIX) 40 MG tablet, Take 1 tablet by mouth Daily. May have extra 1/2 to 1 tablet daily if needed for edema, Disp: 135 tablet, Rfl: 3    gabapentin (NEURONTIN) 800 MG tablet, Take 1/2 tablet in midday and 1 tablet at bedtime, Disp: 135 tablet, Rfl: 1    glucose blood (OneTouch Verio) test strip, Use to test blood glucose 3 times a day as directed, Disp: 300 each, Rfl: 3    guaiFENesin (Mucinex) 600 MG 12 hr tablet, Take 1 tablet by mouth twice a day, Disp: 14 tablet, Rfl: 0    HYDROcodone-acetaminophen (NORCO) 7.5-325 MG per tablet, Take 1 tablet by mouth 3 times a day., Disp: 90 tablet, Rfl: 0    hydrocortisone (ANUSOL-HC) 25 MG suppository, Insert 1 suppository rectally twice a day as needed (remove wrapper and moisten with water), Disp: 12 suppository, Rfl: 3    Influenza Vac High-Dose Quad (Fluzone High-Dose Quadrivalent) 0.7 ML suspension prefilled syringe injection, Inject  into the appropriate muscle as directed by prescriber., Disp: 0.7 mL, Rfl: 0    insulin aspart (NovoLOG FlexPen) 100 UNIT/ML solution pen-injector sc pen, Inject 10 Units under the skin into the appropriate area as directed 3 (Three) Times a Day With Meals., Disp: 27 mL, Rfl: 3    Insulin Glargine (BASAGLAR KWIKPEN) 100 UNIT/ML injection pen, Inject 50 units subcutaneously once in the morning and 75 units at night, Disp: 135 mL, Rfl: 1    Insulin Pen Needle (B-D UF III MINI PEN NEEDLES) 31G X 5 MM misc, Use to inject insulin twice a day as directed, Disp: 100 each, Rfl: 12    Insulin Pen Needle (B-D UF III  MINI PEN NEEDLES) 31G X 5 MM misc, Use as directed twice a day, Disp: 100 each, Rfl: 1    Insulin Pen Needle (B-D UF III MINI PEN NEEDLES) 31G X 5 MM misc, Use as directed twice a day, Disp: 180 each, Rfl: 0    Insulin Syringe-Needle U-100 29G 0.5 ML misc, Inject 0.3 mL as directed Daily., Disp: , Rfl:     meclizine (ANTIVERT) 25 MG tablet, Take 1 tablet by mouth 3 (Three) Times a Day As Needed for Dizziness., Disp: 90 tablet, Rfl: 3    metoprolol succinate XL (TOPROL-XL) 100 MG 24 hr tablet, Take 1 tablet by mouth Daily., Disp: 90 tablet, Rfl: 3    metroNIDAZOLE (METROCREAM) 0.75 % cream, Apply to the face once every night, Disp: 45 g, Rfl: 0    mupirocin (BACTROBAN) 2 % ointment, Apply 1 application  topically to the appropriate area as directed 2 (Two) Times a Day., Disp: 22 g, Rfl: 0    neomycin-colistin-hydrocortisone-thonzonium (Cortisporin-TC) 3.3-3-10-0.5 MG/ML otic suspension, Instill 5 drops into affected ear 3 (Three) times a day for 10 days, Disp: 10 mL, Rfl: 0    nitroglycerin (Nitrostat) 0.4 MG SL tablet, Place 1 tablet under the tongue Every 5 Minutes As Needed for Chest Pain for up to 3 total doses. Call 911 if pain remains after 1 dose., Disp: 25 tablet, Rfl: 6    Nurtec 75 MG dispersible tablet, Take 1 tablet by mouth Daily As Needed (migraine)., Disp: 8 tablet, Rfl: 11    ofloxacin (FLOXIN) 0.3 % otic solution, Instill 5 drops into affected ear once a day for 7 days, Disp: 10 mL, Rfl: 0    pancrelipase, Lip-Prot-Amyl, (Pancreaze) 26617-98453 units capsule delayed-release particles capsule, Take 1 capsule with each meal and with each snack (Patient taking differently: As Needed.), Disp: 100 capsule, Rfl: 2    potassium chloride (KLOR-CON) 10 MEQ CR tablet, Take 1 tablet by mouth Daily as directed, Disp: 90 tablet, Rfl: 0    prednisoLONE acetate (PRED FORTE) 1 % ophthalmic suspension, Instill 1 drop in both eyes twice a day; Shake Well Before Use, Disp: 5 mL, Rfl: 0    promethazine (PHENERGAN) 25 MG  tablet, Take 1 tablet by mouth Every 6 (Six) Hours As Needed for Nausea or Vomiting., Disp: 30 tablet, Rfl: 1    RABEprazole (ACIPHEX) 20 MG EC tablet, Take 1 tablet by mouth Daily., Disp: 90 tablet, Rfl: 2    rosuvastatin (CRESTOR) 10 MG tablet, Take one tablet by mouth once every evening, Disp: 90 tablet, Rfl: 2    rosuvastatin (CRESTOR) 10 MG tablet, Take 1 tablet by mouth Daily., Disp: 90 tablet, Rfl: 1    topiramate (TOPAMAX) 25 MG tablet, Take 1 tablet by mouth Every Night., Disp: 60 tablet, Rfl: 0    triamcinolone (KENALOG) 0.1 % cream, Apply topically to the affected area twice a day, Disp: 30 g, Rfl: 0    venlafaxine XR (EFFEXOR-XR) 75 MG 24 hr capsule, Take 1 capsule by mouth Daily., Disp: 90 capsule, Rfl: 3    Allergies:   Allergies   Allergen Reactions    Ampicillin GI Intolerance     Diarrhea    Beta lactam allergy details  Antibiotic reaction: unknown  Age at reaction: unknown  Dose to reaction time: unknown  Reason for antibiotic: unknown  Epinephrine required for reaction?: unknown  Tolerated antibiotics: unknown        Atorvastatin Swelling    Tetanus Toxoid Hives    Acyclovir Seizure    Cefprozil Other (See Comments)    Lansoprazole Nausea Only and Nausea And Vomiting    Mestinon [Pyridostigmine] Itching and Other (See Comments)     Leg cramps, shooting vaginal pains, frequent urination    Ranexa [Ranolazine Er] Nausea And Vomiting       IPSS Questionnaire (AUA-7):Bladder & Bowel Symptom Questionnaire    How often do you usually urinate during the day ?   1 - About every 3-4 hours   2.   How many timed do you urinate at night?   1 - 2 times at night   3.   What is the reason that you usually urinate?   2 - Moderate urge (can delay 10-60 min)   4.   Once you get the urge to go, how long can you     comfortably delay?   4 - Must go immediately    5.   How often do you get a sudden urge that makes you rush to the bathroom?   1 - Rarely   6.   How often does a sudden urge to urinate result in you  "leaking urine or wetting pads?   3 - A few times a week   7.  In your opinion, how good is your bladder control?   2 - Good   8.  Do you have accidental bowel leakage?   no   9.  Do you have difficulty fully emptying your bladder?   no   10.  Do you experience accidental leakage when performing some physical activity such as coughing, sneezing, laughing or exercise?   yes   11. Have you tried medications to help improve your symptoms?   no   12. Would you be interested in learning about a long-lasting option that may help you with your symptoms?   no                                                                             Total Score   14     0-7 (Mild) 8-16 (Moderate) 17-28 (Severe)          Objective     Physical Exam:   Vital Signs:   Vitals:    05/13/24 1500   Weight: 73.9 kg (163 lb)   Height: 170.2 cm (67.01\")   PainSc: 0-No pain     Body mass index is 25.52 kg/m².     Physical Exam  Vitals and nursing note reviewed. Exam conducted with a chaperone present.   Constitutional:       General: She is awake. She is not in acute distress.     Appearance: Normal appearance.   HENT:      Head: Normocephalic and atraumatic.      Right Ear: External ear normal.      Left Ear: External ear normal.      Nose: Nose normal.   Eyes:      Conjunctiva/sclera: Conjunctivae normal.   Pulmonary:      Effort: Pulmonary effort is normal. No respiratory distress.   Abdominal:      General: Abdomen is flat. There is no distension.      Palpations: Abdomen is soft. There is no mass.      Tenderness: There is no abdominal tenderness. There is no right CVA tenderness, left CVA tenderness, guarding or rebound.      Hernia: No hernia is present.   Genitourinary:     Exam position: Lithotomy position.   Skin:     General: Skin is warm.   Neurological:      General: No focal deficit present.      Mental Status: She is alert and oriented to person, place, and time.      Gait: Gait normal.   Psychiatric:         Behavior: Behavior normal. " "Behavior is cooperative.         Thought Content: Thought content normal.         Judgment: Judgment normal.         Labs:   Brief Urine Lab Results  (Last result in the past 365 days)        Color   Clarity   Blood   Leuk Est   Nitrite   Protein   CREAT   Urine HCG        11/14/23 1109             138.6                      Lab Results   Component Value Date    GLUCOSE 89 11/21/2022    CALCIUM 9.3 11/21/2022     11/21/2022    K 3.6 11/21/2022    CO2 28.2 11/21/2022     11/21/2022    BUN 10 11/21/2022    CREATININE 0.80 10/16/2023    EGFRIFNONA 73 12/17/2021    BCR 15.2 11/21/2022    ANIONGAP 13.8 11/21/2022       Lab Results   Component Value Date    WBC 7.26 12/17/2021    HGB 13.6 12/17/2021    HCT 41.7 12/17/2021    MCV 90.8 12/17/2021     12/17/2021       No results found for: \"PSA\"    Images:   US Guided Cyst Aspiration Breast Right    Result Date: 2/19/2024  Successful FNA with complete resolution of cyst right breast.     US breast right limited    Result Date: 2/19/2024  Simple right breast cyst, otherwise stable.       Measures:   Tobacco:   Leela Muller  reports that she has never smoked. She has never been exposed to tobacco smoke. She has never used smokeless tobacco.     Urine Incontinence: Patient reports that she is not currently experiencing any symptoms of urinary incontinence.         Assessment / Plan      Assessment/Plan:   74 y.o. female who presented today for follow up of bilateral subcentimeter renal cysts.  I discussed that these appear to be simple cysts.  We will follow her with surveillance imaging for her liver.  From my standpoint, we do not need dedicated renal imaging at this time.      Diagnoses and all orders for this visit:    1. Renal cyst (Primary)         Follow Up:   Return in about 6 months (around 11/13/2024) for Recheck.    I spent approximately 30 minutes providing clinical care for this patient; including review of patient's chart and provider " documentation, face to face time spent with patient in examination room (obtaining history, performing physical exam, discussing diagnosis and management options), placing orders, and completing patient documentation.     Zuleyka Rodriguez MD  [facility]

## 2024-05-28 ENCOUNTER — HOSPITAL ENCOUNTER (OUTPATIENT)
Dept: MRI IMAGING | Facility: HOSPITAL | Age: 74
Discharge: HOME | End: 2024-05-28
Admitting: PHYSICIAN ASSISTANT
Payer: MEDICARE

## 2024-05-28 DIAGNOSIS — M54.50 LOW BACK PAIN, UNSPECIFIED BACK PAIN LATERALITY, UNSPECIFIED CHRONICITY, UNSPECIFIED WHETHER SCIATICA PRESENT: ICD-10-CM

## 2024-05-28 PROCEDURE — 72158 MRI LUMBAR SPINE W/O & W/DYE: CPT

## 2024-05-28 PROCEDURE — A9577 INJ MULTIHANCE: HCPCS | Performed by: PHYSICIAN ASSISTANT

## 2024-05-28 PROCEDURE — 0 GADOBENATE DIMEGLUMINE 529 MG/ML SOLUTION: Performed by: PHYSICIAN ASSISTANT

## 2024-05-28 RX ADMIN — GADOBENATE DIMEGLUMINE 15 ML: 529 INJECTION, SOLUTION INTRAVENOUS at 08:16

## 2024-05-30 DIAGNOSIS — G43.009 MIGRAINE WITHOUT AURA AND WITHOUT STATUS MIGRAINOSUS, NOT INTRACTABLE: ICD-10-CM

## 2024-05-30 DIAGNOSIS — E08.42 DIABETIC POLYNEUROPATHY ASSOCIATED WITH DIABETES MELLITUS DUE TO UNDERLYING CONDITION: ICD-10-CM

## 2024-05-30 DIAGNOSIS — M50.30 DDD (DEGENERATIVE DISC DISEASE), CERVICAL: ICD-10-CM

## 2024-05-30 DIAGNOSIS — M48.061 DEGENERATIVE LUMBAR SPINAL STENOSIS: ICD-10-CM

## 2024-05-30 RX ORDER — GABAPENTIN 800 MG/1
TABLET ORAL
Qty: 135 TABLET | Refills: 1 | Status: CANCELLED | OUTPATIENT
Start: 2024-05-30

## 2024-05-30 RX ORDER — GABAPENTIN 800 MG/1
TABLET ORAL
Qty: 135 TABLET | Refills: 1 | Status: SHIPPED | OUTPATIENT
Start: 2024-05-30

## 2024-05-30 NOTE — TELEPHONE ENCOUNTER
Caller: BRITT    Relationship: SELF    Best call back number: [phone]    Requested Prescriptions:   Requested Prescriptions      No prescriptions requested or ordered in this encounter    gabapentin (NEURONTIN) 800 MG tablet     Pharmacy where request should be sent:    [facility]    Last office visit with prescribing clinician: 11/30/2023   Last telemedicine visit with prescribing clinician: Visit date not found   Next office visit with prescribing clinician: 7/1/2024     Additional details provided by patient:   PT ONLY HAS 2 DAYS LEFT.    Does the patient have less than a 3 day supply:  [x] Yes  [] No    Would you like a call back once the refill request has been completed: [] Yes [] No    If the office needs to give you a call back, can they leave a voicemail: [] Yes [] No    Milagros Gutierrez Rep   05/30/24 09:17 EDT

## 2024-05-31 NOTE — TELEPHONE ENCOUNTER
Rx Refill Note  Requested Prescriptions      No prescriptions requested or ordered in this encounter      Last office visit with prescribing clinician: 11/30/2023   Last telemedicine visit with prescribing clinician: Visit date not found   Next office visit with prescribing clinician: 7/1/2024                         Would you like a call back once the refill request has been completed: [] Yes [] No    If the office needs to give you a call back, can they leave a voicemail: [] Yes [] No    Anjali Mcgraw CMA  05/31/24, 08:42 EDT

## 2024-05-31 NOTE — TELEPHONE ENCOUNTER
I approved this yesterday and sent it electronically. This is the 2nd request. It is [employer] so they should be able to see this was already sent. Thanks, Jelly

## 2024-06-03 LAB
CREAT BLDA-MCNC: 0.9 MG/DL (ref 0.6–1.3)
CREAT BLDA-MCNC: 1.2 MG/DL (ref 0.6–1.3)

## 2024-06-10 ENCOUNTER — TELEPHONE (OUTPATIENT)
Dept: CARDIOLOGY | Facility: CLINIC | Age: 74
End: 2024-06-10
Payer: MEDICARE

## 2024-06-10 NOTE — TELEPHONE ENCOUNTER
"Patient left voice mail message.  She states she has been having blood pressure issues and a rapid heart rate for two weeks.  Her blood pressure this morning was 151/82 with a heart rate of 104 and 133/73 with a heart rate of 107.  She requests a return call.  Spoke with patient.  She reports elevated heart rates as high as 121.  It resolves with rest.  She states it beats so hard \"it gets up in my throat\":  Last week she missed 2 days of metoprolol due to a gout flare up.  She states she had an episode last night that woke her up and she found her self in \"a puddle of water\"/sweat.  She states this has happened multiple times.  Medications reviewed and correct.  I will discuss with [facility] and get back with patient.  She verbalizes understanding.  "

## 2024-06-12 DIAGNOSIS — I95.1 ORTHOSTATIC HYPOTENSION: ICD-10-CM

## 2024-06-12 DIAGNOSIS — R00.2 PALPITATIONS: ICD-10-CM

## 2024-06-12 DIAGNOSIS — Z91.81 STATUS POST FALL: Primary | ICD-10-CM

## 2024-06-12 DIAGNOSIS — R42 DIZZINESS AND GIDDINESS: ICD-10-CM

## 2024-06-24 ENCOUNTER — TELEPHONE (OUTPATIENT)
Dept: CARDIOLOGY | Facility: CLINIC | Age: 74
End: 2024-06-24
Payer: MEDICARE

## 2024-06-24 NOTE — TELEPHONE ENCOUNTER
Caller: Leela Muller    Relationship: Self    Best call back number: [phone]    What is the best time to reach you: ANY    Who are you requesting to speak with (clinical staff, provider,  specific staff member): CLINICAL    What was the call regarding: PATIENT CALLED TO INQUIRE ON THE STATUS OF A CLEARANCE REQUEST THAT WAS FAXED ON 6-21-24 FROM [facility] FOR THE PATIENT TO RECEIVE AN INJECTION IN HER BACK. THEY ARE REQUESTING HER TO COME OFF BABY ASPIRIN. PLEASE CONTACT PATIENT IN REGARDS TO THIS ISSUE.    Is it okay if the provider responds through [employer]: PLEASE CALL

## 2024-06-24 NOTE — TELEPHONE ENCOUNTER
Spoke with patient.  Cardiac clearance sent to [facility] and when she gets back with me.  She verbalizes understanding.

## 2024-07-01 ENCOUNTER — OFFICE VISIT (OUTPATIENT)
Dept: NEUROLOGY | Facility: CLINIC | Age: 74
End: 2024-07-01
Payer: MEDICARE

## 2024-07-01 VITALS
HEART RATE: 80 BPM | WEIGHT: 162 LBS | SYSTOLIC BLOOD PRESSURE: 120 MMHG | OXYGEN SATURATION: 92 % | BODY MASS INDEX: 25.43 KG/M2 | DIASTOLIC BLOOD PRESSURE: 90 MMHG | HEIGHT: 67 IN

## 2024-07-01 DIAGNOSIS — G47.33 OBSTRUCTIVE SLEEP APNEA SYNDROME: Primary | ICD-10-CM

## 2024-07-01 DIAGNOSIS — M48.061 DEGENERATIVE LUMBAR SPINAL STENOSIS: ICD-10-CM

## 2024-07-01 DIAGNOSIS — E08.42 DIABETIC POLYNEUROPATHY ASSOCIATED WITH DIABETES MELLITUS DUE TO UNDERLYING CONDITION: ICD-10-CM

## 2024-07-01 DIAGNOSIS — I47.10 PAROXYSMAL SVT (SUPRAVENTRICULAR TACHYCARDIA): ICD-10-CM

## 2024-07-01 DIAGNOSIS — F41.1 GENERALIZED ANXIETY DISORDER: ICD-10-CM

## 2024-07-01 DIAGNOSIS — G43.009 MIGRAINE WITHOUT AURA AND WITHOUT STATUS MIGRAINOSUS, NOT INTRACTABLE: ICD-10-CM

## 2024-07-01 DIAGNOSIS — M50.30 DDD (DEGENERATIVE DISC DISEASE), CERVICAL: ICD-10-CM

## 2024-07-01 PROCEDURE — 1159F MED LIST DOCD IN RCRD: CPT | Performed by: NURSE PRACTITIONER

## 2024-07-01 PROCEDURE — 1160F RVW MEDS BY RX/DR IN RCRD: CPT | Performed by: NURSE PRACTITIONER

## 2024-07-01 PROCEDURE — 3080F DIAST BP >= 90 MM HG: CPT | Performed by: NURSE PRACTITIONER

## 2024-07-01 PROCEDURE — 99214 OFFICE O/P EST MOD 30 MIN: CPT | Performed by: NURSE PRACTITIONER

## 2024-07-01 PROCEDURE — 3074F SYST BP LT 130 MM HG: CPT | Performed by: NURSE PRACTITIONER

## 2024-07-01 RX ORDER — GABAPENTIN 800 MG/1
800 TABLET ORAL NIGHTLY
Qty: 90 TABLET | Refills: 1 | Status: SHIPPED | OUTPATIENT
Start: 2024-07-01

## 2024-07-01 RX ORDER — BACLOFEN 10 MG/1
10 TABLET ORAL NIGHTLY
Qty: 90 TABLET | Refills: 3 | Status: SHIPPED | OUTPATIENT
Start: 2024-07-01

## 2024-07-01 RX ORDER — VENLAFAXINE HYDROCHLORIDE 75 MG/1
75 CAPSULE, EXTENDED RELEASE ORAL DAILY
Qty: 90 CAPSULE | Refills: 3 | Status: SHIPPED | OUTPATIENT
Start: 2024-07-01

## 2024-07-01 RX ORDER — TOPIRAMATE 25 MG/1
25 TABLET ORAL NIGHTLY
Qty: 90 TABLET | Refills: 3 | Status: SHIPPED | OUTPATIENT
Start: 2024-07-01

## 2024-07-01 RX ORDER — RIMEGEPANT SULFATE 75 MG/75MG
75 TABLET, ORALLY DISINTEGRATING ORAL AS NEEDED
Qty: 10 TABLET | Refills: 0 | COMMUNITY
Start: 2024-07-01

## 2024-07-01 RX ORDER — PROMETHAZINE HYDROCHLORIDE 25 MG/1
25 TABLET ORAL EVERY 6 HOURS PRN
Qty: 30 TABLET | Refills: 1 | Status: SHIPPED | OUTPATIENT
Start: 2024-07-01

## 2024-07-01 NOTE — PROGRESS NOTES
"Subjective:     Patient ID: Leela Muller is a 74 y.o. female.    CC:   Chief Complaint   Patient presents with    Migraine    Peripheral Neuropathy    Anxiety       HPI:   History of Present Illness  This is a pleasant 74-year-old female who presents for 8-month neurology follow-up on longstanding chronic migraine headaches, chronic neck pain, chronic intermittent vertigo, anxiety, prior strokes, and diabetic peripheral neuropathy. Long term, she has been taking gabapentin 800 mg at bedtime, Effexor XR 75 mg daily. She takes Nurtec at onset of migraine as needed. She also takes butalbital with acetaminophen sparingly. She is on low-dose topiramate 25 mg daily for migraine prevention. She takes baclofen and promethazine if needed.     She does follow with multiple specialists. She does have a history of breast cancer. She is here for follow-up and refills on medications today. She has signed her updated controlled substance agreement today.    She has been managing her headaches with Nurtec. She also takes Extra Strength Tylenol as needed, approximately 3 to 4 times per month. She takes fioricet very sparingly. She takes topiramate 25 mg nightly for migraine prevention and requests a refill. She also takes gabapentin 800 mg nightly, but does not take it in the middle of the day. She reports feeling \"unwell, particularly in her head\".    She is taking Effexor every night for anxiety. She is still taking promethazine as needed.     Supplemental Information  She has 2 pinched nerves in her back, one on each side. It is worse on the left side and it is going across the middle of her back, going down her leg into her knee. Her knee has been swollen up and her PCP drained a lot of fluid off of it 2 to 3 weeks ago. They are going to do injections on her lower back on Friday.     She takes meclizine as needed for dizziness.     She has a heart monitor on for a month. Her heart rate and blood pressure have been high and that is " why she was put on a new blood pressure pill.     She is using a cane to make sure she is stable. She fell down the [place] step on Sunday, but someone slowed her fall and she denies any injuries.    She is still seeing endocrinology, gastroenterology, urology, cardiology, and general surgery.      Prior neurology workup and history:  Chronic migraine headaches, chronic neck pain, chronic intermittent vertigo, anxiety, prior strokes, and diabetic peripheral neuropathy.     Chronic migraine headaches present for many years along with chronic neck pain, chronic and intermittent vertigo, anxiety, and history of prior strokes in 06/2015 and in 2010. Previously was followed long term in our clinic by Dr. Devaughn Lewis, with neurology.      She states that she is taking Topamax every night and it has been helping her headaches. Migraines for years. She has a few migraines a month. She states that she has been trying to take extra strength Tylenol as needed. Headaches have nausea, vomiting, photophobia and phonophobia, begin in occipital region, radiate to bilateral frontal region with moderate throbbing pain. She has these about 2 days per month currently. She is already on a beta blocker. Triptans contraindicated with CV risk factors. She has taken venlafaxine with no benefit. Has not tried ubrelvy. Finds nurtec helpful.     Recurrent falls continue.     MRI of the brain and cervical spine with and without contrast. She did have both of these completed on 06/6/2022 at [facility]. MRI of the brain with and without contrast showed age-related changes of mild generalized volume loss with some encephalomalacia and gliotic changes in the right occipital lobe with the appearance of a prior stroke. No acute intracranial abnormalities and no abnormal contrast enhancement were seen. Her MRI of the cervical spine with and without contrast did show some mild cervical spondylosis similar to 2019 imaging with no focal cord  signal abnormalities and no significant spinal stenosis.    6/27/2019 MRA head and neck unremarkable.     She does take aspirin and rosuvastatin. No recurrent strokes.     She does follow with cardiology for erratic orthostatic blood pressure and dizziness and was previously prescribed pyridostigmine, but was unable to tolerate it.      Muscle relaxer cream too costly and stopped.     She follows with cardiology, pulmonology, endocrinology, and gastroenterology, pain management- all with [facility].     Takes Lortab as needed for pain. Known lumbar and cervical spinal stenosis and DDD.     She confirms having severe neck, shoulder, left worse than right, and low back pain and it is so bad intermittently that she cannot get up. Gets injections.    She has a history of breast cancer. Dr. Martinez removed the breast cancer under her nipple. She did not require chemotherapy or radiation. She had a benign breast excision in 2020 and one in the left breast in 2012 removed by Dr. Hoffmann. The growths in 2020 and 2012 were not cancerous, but the growth under her nipple was.    The following portions of the patient's history were reviewed and updated as appropriate: allergies, current medications, past family history, past medical history, past social history, past surgical history, and problem list.    Past Medical History:   Diagnosis Date    Anxiety     Arm pain     Arthritis     Breast injury     fell 6/2019     Cancer     Chronic pancreatitis     COVID-19     Depression     Diabetes mellitus     History of rectal surgery     Hyperlipidemia     Hypertension     Liver mass     Medication monitoring encounter 07/24/2018    Neck pain on left side     Palpitations 04/18/2018    Pancreatitis     Pulmonary embolism     Stroke syndrome 09/14/2016    · Assessed By: Devaughn Lewis (Neurology); Last Assessed: 16 Jun 2015  Right cerebrovascular accident in July or August 2010, evaluated at [facility].   Admission to [facility] on 09/28/2010 with headache and stuttering, consistent with TIA.  Chronic Coumadin therapy, initiated following bilateral pulmonary emboli in 2007 after fall and hip replacement.  She was already on 81 mg of aspirin as well, 09/2010.  MRI of the brain on 09/28/2010 revealing old right parietal cerebrovascular accident.  MRA revealing normal carotids, middle anterior and posterior cerebral arteries with minimal ostial stenosis of both vertebral arteries.  GENARO by Dr. Oliver Mobley on 09/28/2010:  patent foramen ovale with a small amount of right to left shunting.  Normal LVEF and normal valvular structures.   Patent foramen ovale closure using a 25 mm. Amplatzer cribriform occluder, 10/05/2010.   Echocardiogram, 06/23/2014:  LVEF (55% to 60%) with and Amplatzer device noted to be well-seated in     Thrombocytopenia     Transient cerebral ischemia     Type 2 diabetes mellitus        Past Surgical History:   Procedure Laterality Date    APPENDECTOMY      BREAST BIOPSY Left 2012    benign    BREAST BIOPSY      BREAST EXCISIONAL BIOPSY Left     benign    BREAST EXCISIONAL BIOPSY Right     benign    BREAST EXCISIONAL BIOPSY Right 2020    benign    BREAST SURGERY      CAUTERIZATION NASAL BLEEDERS  2022    CHOLECYSTECTOMY      COLONOSCOPY      HYSTERECTOMY      LIVER BIOPSY      OOPHORECTOMY      RECTAL SURGERY  11/20/2023    cancer    SKIN CANCER EXCISION      TONSILLECTOMY      TOTAL HIP ARTHROPLASTY REVISION      US GUIDED CYST ASPIRATION BREAST N/A 2/19/2024       Social History     Socioeconomic History    Marital status:    Tobacco Use    Smoking status: Never     Passive exposure: Never    Smokeless tobacco: Never   Vaping Use    Vaping status: Never Used   Substance and Sexual Activity    Alcohol use: No    Drug use: No    Sexual activity: Yes       Family History   Problem Relation Age of Onset    Alzheimer's disease Mother     Cancer Mother     Coronary artery disease  Other     Diabetes Other     Hypertension Other     Stroke Other     Cancer Other     Dementia Other     Heart attack Father     Colon polyps Father     Breast cancer Neg Hx     Ovarian cancer Neg Hx     Colon cancer Neg Hx     Esophageal cancer Neg Hx           Current Outpatient Medications:     acetaminophen (TYLENOL) 500 MG tablet, Take 1 tablet by mouth Every 6 (Six) Hours As Needed., Disp: , Rfl:     albuterol sulfate  (90 Base) MCG/ACT inhaler, Inhale 2 puffs by mouth every 4 (Four) Hours As Needed, Disp: 8.5 g, Rfl: 2    aspirin 81 MG tablet, Take 1 tablet by mouth Daily. Taken at night. Last dose 9-18-21, Disp: , Rfl:     azelastine (ASTELIN) 0.1 % nasal spray, Administer 1 spray in the nostrils twice a day, Disp: 100 mL, Rfl: 2    baclofen (LIORESAL) 10 MG tablet, Take 1 tablet by mouth Every Night., Disp: 90 tablet, Rfl: 3    betamethasone dipropionate (DIPROLENE) 0.05 % ointment, Apply topically to the appropriate area as directed once daily as needed, Disp: 15 g, Rfl: 0    butalbital-acetaminophen-caffeine (FIORICET, ESGIC) -40 MG per tablet, Take 1 tablet by mouth Daily As Needed for headache, Disp: 30 tablet, Rfl: 0    Cholecalciferol 25 MCG (1000 UT) tablet, Take 1 tablet by mouth Daily., Disp: , Rfl:     clidinium-chlordiazePOXIDE (LIBRAX) 5-2.5 MG per capsule, Take 1 capsule by mouth 2 (Two) Times a Day as needed., Disp: 60 capsule, Rfl: 0    clidinium-chlordiazePOXIDE (LIBRAX) 5-2.5 MG per capsule, Take 1 capsule by mouth 2 (Two) Times a Day as needed., Disp: 60 capsule, Rfl: 0    clidinium-chlordiazePOXIDE (LIBRAX) 5-2.5 MG per capsule, Take 1 capsule by mouth 2 (Two) Times a Day As Needed., Disp: 60 capsule, Rfl: 0    clidinium-chlordiazePOXIDE (LIBRAX) 5-2.5 MG per capsule, Take 1 capsule by mouth 2 (Two) Times a Day As Needed., Disp: 60 capsule, Rfl: 0    clidinium-chlordiazePOXIDE (LIBRAX) 5-2.5 MG per capsule, Take 1 capsule by mouth twice a day as needed, Disp: 60 capsule, Rfl:  0    clonazePAM (KlonoPIN) 0.5 MG tablet, As Needed., Disp: , Rfl:     Continuous Blood Gluc  (FreeStyle Colin 2 [place]) device, Use daily as directed, Disp: 1 each, Rfl: 0    digoxin (LANOXIN) 250 MCG tablet, Take 1 tablet by mouth Daily., Disp: 90 tablet, Rfl: 3    fludrocortisone 0.1 MG tablet, Take 1 tablet by mouth Daily., Disp: 90 tablet, Rfl: 3    fluticasone (FLONASE) 50 MCG/ACT nasal spray, Instill 2 sprays in each nostril 2 (Two) Times A Day, Disp: 120 g, Rfl: 1    fluticasone (FLONASE) 50 MCG/ACT nasal spray, Instill 2 sprays in each nostril 2 (Two) Times A Day, Disp: 120 g, Rfl: 5    furosemide (LASIX) 40 MG tablet, Take 1 tablet by mouth Daily. May have extra 1/2 to 1 tablet daily if needed for edema, Disp: 135 tablet, Rfl: 3    gabapentin (NEURONTIN) 800 MG tablet, Take 1 tablet by mouth Every Night., Disp: 90 tablet, Rfl: 1    glucose blood (OneTouch Verio) test strip, Use to test blood glucose 3 times a day as directed, Disp: 300 each, Rfl: 3    guaiFENesin (Mucinex) 600 MG 12 hr tablet, Take 1 tablet by mouth twice a day, Disp: 14 tablet, Rfl: 0    HYDROcodone-acetaminophen (NORCO) 7.5-325 MG per tablet, Take 1 tablet by mouth three times a day, Disp: 90 tablet, Rfl: 0    hydrocortisone (ANUSOL-HC) 25 MG suppository, Insert 1 suppository rectally twice a day as needed (remove wrapper and moisten with water), Disp: 12 suppository, Rfl: 3    Influenza Vac High-Dose Quad (Fluzone High-Dose Quadrivalent) 0.7 ML suspension prefilled syringe injection, Inject  into the appropriate muscle as directed by prescriber., Disp: 0.7 mL, Rfl: 0    insulin aspart (NovoLOG FlexPen) 100 UNIT/ML solution pen-injector sc pen, Inject 10 Units under the skin into the appropriate area as directed 3 (Three) Times a Day With Meals., Disp: 27 mL, Rfl: 3    Insulin Glargine (BASAGLAR KWIKPEN) 100 UNIT/ML injection pen, Inject 50 units subcutaneously once in the morning and 75 units at night, Disp: 135 mL, Rfl: 1     Insulin Pen Needle (B-D UF III MINI PEN NEEDLES) 31G X 5 MM misc, Use as directed twice a day, Disp: 180 each, Rfl: 0    Insulin Syringe-Needle U-100 29G 0.5 ML misc, Inject 0.3 mL as directed Daily., Disp: , Rfl:     losartan (COZAAR) 50 MG tablet, Take 1 tablet by mouth once Daily., Disp: 90 tablet, Rfl: 3    meclizine (ANTIVERT) 25 MG tablet, Take 1 tablet by mouth 3 (Three) Times a Day As Needed for Dizziness., Disp: 90 tablet, Rfl: 3    metoprolol succinate XL (TOPROL-XL) 100 MG 24 hr tablet, Take 1 tablet by mouth Daily., Disp: 90 tablet, Rfl: 3    metroNIDAZOLE (METROCREAM) 0.75 % cream, Apply to the face once every night, Disp: 45 g, Rfl: 0    mupirocin (BACTROBAN) 2 % ointment, Apply 1 application  topically to the appropriate area as directed 2 (Two) Times a Day., Disp: 22 g, Rfl: 0    neomycin-colistin-hydrocortisone-thonzonium (Cortisporin-TC) 3.3-3-10-0.5 MG/ML otic suspension, Instill 5 drops into affected ear 3 (Three) times a day for 10 days, Disp: 10 mL, Rfl: 0    nitroglycerin (Nitrostat) 0.4 MG SL tablet, Place 1 tablet under the tongue Every 5 Minutes As Needed for Chest Pain for up to 3 total doses. Call 911 if pain remains after 1 dose., Disp: 25 tablet, Rfl: 6    Nurtec 75 MG dispersible tablet, Take 1 tablet by mouth Daily As Needed (migraine)., Disp: 8 tablet, Rfl: 11    ofloxacin (FLOXIN) 0.3 % otic solution, Instill 5 drops into affected ear once a day for 7 days, Disp: 10 mL, Rfl: 0    ofloxacin (FLOXIN) 0.3 % otic solution, Instill 5 drops in both ears daily, Disp: 10 mL, Rfl: 0    pancrelipase, Lip-Prot-Amyl, (Pancreaze) 38472-11639 units capsule delayed-release particles capsule, Take 1 capsule with each meal and with each snack (Patient taking differently: As Needed.), Disp: 100 capsule, Rfl: 2    potassium chloride (KLOR-CON) 10 MEQ CR tablet, Take 1 tablet by mouth Daily as directed, Disp: 90 tablet, Rfl: 0    prednisoLONE acetate (PRED FORTE) 1 % ophthalmic suspension, Instill 1  "drop in both eyes twice a day; Shake Well Before Use, Disp: 5 mL, Rfl: 0    promethazine (PHENERGAN) 25 MG tablet, Take 1 tablet by mouth Every 6 (Six) Hours As Needed for Nausea or Vomiting., Disp: 30 tablet, Rfl: 1    RABEprazole (ACIPHEX) 20 MG EC tablet, Take 1 tablet by mouth Daily., Disp: 90 tablet, Rfl: 2    rosuvastatin (CRESTOR) 10 MG tablet, Take one tablet by mouth once every evening, Disp: 90 tablet, Rfl: 2    topiramate (TOPAMAX) 25 MG tablet, Take 1 tablet by mouth Every Night., Disp: 90 tablet, Rfl: 3    triamcinolone (KENALOG) 0.1 % cream, Apply topically to the affected area twice a day, Disp: 30 g, Rfl: 0    venlafaxine XR (EFFEXOR-XR) 75 MG 24 hr capsule, Take 1 capsule by mouth Daily., Disp: 90 capsule, Rfl: 3    Rimegepant Sulfate (Nurtec) 75 MG tablet dispersible tablet, Take 1 tablet by mouth As Needed (Place one tablet on tongue at onset of migraine, may not repeat for 24 hours)., Disp: 10 tablet, Rfl: 0     Review of Systems   Musculoskeletal:  Positive for arthralgias, back pain, gait problem and neck pain.   Neurological:  Positive for headaches.   Psychiatric/Behavioral:  The patient is nervous/anxious.    All other systems reviewed and are negative.       Objective:  /90   Pulse 80   Ht 170.2 cm (67\")   Wt 73.5 kg (162 lb)   SpO2 92%   BMI 25.37 kg/m²     Neurologic Exam  Mental Status   Oriented to person, place, and time.   Speech: speech is normal   Level of consciousness: alert     Cranial Nerves   Cranial nerves II through XII intact.     Motor Exam   Muscle bulk: normal  Overall muscle tone: normal     Strength   Strength 5/5 except as noted.   Chronic pain and arthritis multiple joints. Uses cane for ambulation.      Sensory Exam   Right leg light touch: decreased from ankle  Left leg light touch: decreased from ankle  Right leg vibration: decreased from ankle  Left leg vibration: decreased from ankle  Right leg pinprick: decreased from ankle  Left leg pinprick: " decreased from ankle    Right more than Left decreased sensation-stocking distribution-chronic. Prior surgery to right ankle.     Gait, Coordination, and Reflexes      Gait  Gait: (antalgic with cane)     Coordination   Finger to nose coordination: normal  Romberg: Negative     Tremor   Resting tremor: absent  Intention tremor: absent  Action tremor: absent     Reflexes   Right brachioradialis: 2+  Left brachioradialis: 2+  Right biceps: 2+  Left biceps: 2+  Right patellar: 1+  Left patellar: 1+  Right achilles: 1+  Left achilles: 1+  Right : 2+  Left : 2+    Physical Exam  Constitutional:       Appearance: Normal appearance.   Neurological:      Mental Status: She is alert and oriented to person, place, and time.      Cranial Nerves: Cranial nerves 2-12 are intact.      Coordination: Finger-Nose-Finger Test normal.      Deep Tendon Reflexes:      Reflex Scores:       Bicep reflexes are 2+ on the right side and 2+ on the left side.       Brachioradialis reflexes are 2+ on the right side and 2+ on the left side.       Patellar reflexes are 1+ on the right side and 1+ on the left side.       Achilles reflexes are 1+ on the right side and 1+ on the left side.  Psychiatric:         Mood and Affect: Mood is anxious.         Speech: Speech normal.         Behavior: Behavior normal.         Thought Content: Thought content normal.         Cognition and Memory: Cognition and memory normal.         Judgment: Judgment normal.     Results:  Results  Laboratory Studies 11/24/2023  TSH normal. Lipid panel unremarkable. Hemoglobin A1c 7%. Creatinine level 1.20.    Assessment/Plan:     Diagnoses and all orders for this visit:    1. Diabetic polyneuropathy associated with diabetes mellitus due to underlying condition  -     gabapentin (NEURONTIN) 800 MG tablet; Take 1 tablet by mouth Every Night.  Dispense: 90 tablet; Refill: 1    2. Migraine without aura and without status migrainosus, not intractable  Comments:  nurtec  odt samples  Orders:  -     topiramate (TOPAMAX) 25 MG tablet; Take 1 tablet by mouth Every Night.  Dispense: 90 tablet; Refill: 3  -     gabapentin (NEURONTIN) 800 MG tablet; Take 1 tablet by mouth Every Night.  Dispense: 90 tablet; Refill: 1  -     venlafaxine XR (EFFEXOR-XR) 75 MG 24 hr capsule; Take 1 capsule by mouth Daily.  Dispense: 90 capsule; Refill: 3  -     promethazine (PHENERGAN) 25 MG tablet; Take 1 tablet by mouth Every 6 (Six) Hours As Needed for Nausea or Vomiting.  Dispense: 30 tablet; Refill: 1    3. DDD (degenerative disc disease), cervical  -     gabapentin (NEURONTIN) 800 MG tablet; Take 1 tablet by mouth Every Night.  Dispense: 90 tablet; Refill: 1  -     baclofen (LIORESAL) 10 MG tablet; Take 1 tablet by mouth Every Night.  Dispense: 90 tablet; Refill: 3    4. Degenerative lumbar spinal stenosis  -     gabapentin (NEURONTIN) 800 MG tablet; Take 1 tablet by mouth Every Night.  Dispense: 90 tablet; Refill: 1  -     baclofen (LIORESAL) 10 MG tablet; Take 1 tablet by mouth Every Night.  Dispense: 90 tablet; Refill: 3    5. Generalized anxiety disorder  -     venlafaxine XR (EFFEXOR-XR) 75 MG 24 hr capsule; Take 1 capsule by mouth Daily.  Dispense: 90 capsule; Refill: 3           Assessment & Plan  Chronic migraine headaches, chronic neck pain, chronic intermittent vertigo, anxiety, prior strokes, and diabetic peripheral neuropathy.  She will maintain her current medication regimen under the supervision of neurology. The possibility of gradually discontinuing topiramate in the future may be beneficial. Her gabapentin prescription has been updated, as she is not currently on any additional doses, and it is unclear why this prescription was previously changed. Nurtec samples have been provided. A follow-up appointment is scheduled for 7 months from now for re-evaluation of symptoms, or sooner if necessary. Baseline cognitive testing will also be conducted at that time. She is advised to maintain her  scheduled follow-ups with all specialists.    Reviewed medications, potential side effects and signs and symptoms to report. Discussed risk versus benefits of treatment plan with patient and/or family-including medications, labs and radiology that may be ordered. Addressed questions and concerns during visit. Patient and/or family verbalized understanding and agree with plan.    As part of this patient's treatment plan I am prescribing controlled substances. The patient has been made aware of appropriate use of such medications, including potential risk of somnolence, limited ability to drive and/or work safely, and potential for dependence or overdose. It has also been made clear that these medications are for use by the patient only, without concomitant use of alcohol or other substances unless prescribed. Keep out of reach of children.  Diogenes report has been reviewed. If this is going to be prescribed long term, [facility] Controlled Substance Agreement Contract has also been read and signed by patient and myself.    During this visit the following were done:  Labs Reviewed [x]    Labs Ordered []    Radiology Reports Reviewed [x]    Radiology Ordered []    PCP Records Reviewed []    Referring Provider Records Reviewed []    ER Records Reviewed []    Hospital Records Reviewed []    History Obtained From Family []    Radiology Images Reviewed []    Other Reviewed [x]    Records Requested []      07/01/24   13:38 EDT    Patient or patient representative verbalized consent for the use of Ambient Listening during the visit with  [facility] for chart documentation. 7/2/2024  08:29 EDT    Note to patient: The 21st Century Cures Act makes medical notes like these available to patients in the interest of transparency. However, be advised this is a medical document. It is intended as peer to peer communication. It is written in medical language and may contain abbreviations or verbiage that are unfamiliar. It may  appear blunt or direct. Medical documents are intended to carry relevant information, facts as evident, and the clinical opinion of the provider.

## 2024-07-01 NOTE — LETTER
"July 2, 2024     Connor Ritter MD  [address]  [facility] [id number]    Patient: Leela Muller   YOB: 1950   Date of Visit: 7/1/2024       Dear Connor Ritter MD    Leela Muller was in my office today. Below is a copy of my note.    If you have questions, please do not hesitate to call me. I look forward to following Leela along with you.         Sincerely,        [facility]        CC: MD Zuleyka Almanza MD Gregory M Woolfolk, MD Wendell Rance Miers, MD Hameed I Koury, MD    Subjective:     Patient ID: Leela Muller is a 74 y.o. female.    CC:   Chief Complaint   Patient presents with   • Migraine   • Peripheral Neuropathy   • Anxiety       HPI:   History of Present Illness  This is a pleasant 74-year-old female who presents for 8-month neurology follow-up on longstanding chronic migraine headaches, chronic neck pain, chronic intermittent vertigo, anxiety, prior strokes, and diabetic peripheral neuropathy. Long term, she has been taking gabapentin 800 mg at bedtime, Effexor XR 75 mg daily. She takes Nurtec at onset of migraine as needed. She also takes butalbital with acetaminophen sparingly. She is on low-dose topiramate 25 mg daily for migraine prevention. She takes baclofen and promethazine if needed.     She does follow with multiple specialists. She does have a history of breast cancer. She is here for follow-up and refills on medications today. She has signed her updated controlled substance agreement today.    She has been managing her headaches with Nurtec. She also takes Extra Strength Tylenol as needed, approximately 3 to 4 times per month. She takes fioricet very sparingly. She takes topiramate 25 mg nightly for migraine prevention and requests a refill. She also takes gabapentin 800 mg nightly, but does not take it in the middle of the day. She reports feeling \"unwell, particularly in her head\".    She is taking Effexor every night for " anxiety. She is still taking promethazine as needed.     Supplemental Information  She has 2 pinched nerves in her back, one on each side. It is worse on the left side and it is going across the middle of her back, going down her leg into her knee. Her knee has been swollen up and her PCP drained a lot of fluid off of it 2 to 3 weeks ago. They are going to do injections on her lower back on Friday.     She takes meclizine as needed for dizziness.     She has a heart monitor on for a month. Her heart rate and blood pressure have been high and that is why she was put on a new blood pressure pill.     She is using a cane to make sure she is stable. She fell down the [employer] step on Sunday, but someone slowed her fall and she denies any injuries.    She is still seeing endocrinology, gastroenterology, urology, cardiology, and general surgery.      Prior neurology workup and history:  Chronic migraine headaches, chronic neck pain, chronic intermittent vertigo, anxiety, prior strokes, and diabetic peripheral neuropathy.     Chronic migraine headaches present for many years along with chronic neck pain, chronic and intermittent vertigo, anxiety, and history of prior strokes in 06/2015 and in 2010. Previously was followed long term in our clinic by Dr. Devaughn Lewis, with neurology.      She states that she is taking Topamax every night and it has been helping her headaches. Migraines for years. She has a few migraines a month. She states that she has been trying to take extra strength Tylenol as needed. Headaches have nausea, vomiting, photophobia and phonophobia, begin in occipital region, radiate to bilateral frontal region with moderate throbbing pain. She has these about 2 days per month currently. She is already on a beta blocker. Triptans contraindicated with CV risk factors. She has taken venlafaxine with no benefit. Has not tried ubrelvy. Finds nurtec helpful.     Recurrent falls continue.     MRI of the brain  and cervical spine with and without contrast. She did have both of these completed on 06/6/2022 at [facility]. MRI of the brain with and without contrast showed age-related changes of mild generalized volume loss with some encephalomalacia and gliotic changes in the right occipital lobe with the appearance of a prior stroke. No acute intracranial abnormalities and no abnormal contrast enhancement were seen. Her MRI of the cervical spine with and without contrast did show some mild cervical spondylosis similar to 2019 imaging with no focal cord signal abnormalities and no significant spinal stenosis.    6/27/2019 MRA head and neck unremarkable.     She does take aspirin and rosuvastatin. No recurrent strokes.     She does follow with cardiology for erratic orthostatic blood pressure and dizziness and was previously prescribed pyridostigmine, but was unable to tolerate it.      Muscle relaxer cream too costly and stopped.     She follows with cardiology, pulmonology, endocrinology, and gastroenterology, pain management- all with [facility].     Takes Lortab as needed for pain. Known lumbar and cervical spinal stenosis and DDD.     She confirms having severe neck, shoulder, left worse than right, and low back pain and it is so bad intermittently that she cannot get up. Gets injections.    She has a history of breast cancer. Dr. Martinez removed the breast cancer under her nipple. She did not require chemotherapy or radiation. She had a benign breast excision in 2020 and one in the left breast in 2012 removed by Dr. Hoffmann. The growths in 2020 and 2012 were not cancerous, but the growth under her nipple was.    The following portions of the patient's history were reviewed and updated as appropriate: allergies, current medications, past family history, past medical history, past social history, past surgical history, and problem list.    Past Medical History:   Diagnosis Date   • Anxiety    • Arm pain    •  Arthritis    • Breast injury     fell 6/2019    • Cancer    • Chronic pancreatitis    • COVID-19    • Depression    • Diabetes mellitus    • History of rectal surgery    • Hyperlipidemia    • Hypertension    • Liver mass    • Medication monitoring encounter 07/24/2018   • Neck pain on left side    • Palpitations 04/18/2018   • Pancreatitis    • Pulmonary embolism    • Stroke syndrome 09/14/2016    · Assessed By: Devaughn eLwis (Neurology); Last Assessed: 16 Jun 2015  Right cerebrovascular accident in July or August 2010, evaluated at [facility].  Admission to [facility] on 09/28/2010 with headache and stuttering, consistent with TIA.  Chronic Coumadin therapy, initiated following bilateral pulmonary emboli in 2007 after fall and hip replacement.  She was already on 81 mg of aspirin as well, 09/2010.  MRI of the brain on 09/28/2010 revealing old right parietal cerebrovascular accident.  MRA revealing normal carotids, middle anterior and posterior cerebral arteries with minimal ostial stenosis of both vertebral arteries.  GENARO by Dr. Oliver Mobley on 09/28/2010:  patent foramen ovale with a small amount of right to left shunting.  Normal LVEF and normal valvular structures.   Patent foramen ovale closure using a 25 mm. Amplatzer cribriform occluder, 10/05/2010.   Echocardiogram, 06/23/2014:  LVEF (55% to 60%) with and Amplatzer device noted to be well-seated in    • Thrombocytopenia    • Transient cerebral ischemia    • Type 2 diabetes mellitus        Past Surgical History:   Procedure Laterality Date   • APPENDECTOMY     • BREAST BIOPSY Left 2012    benign   • BREAST BIOPSY     • BREAST EXCISIONAL BIOPSY Left     benign   • BREAST EXCISIONAL BIOPSY Right     benign   • BREAST EXCISIONAL BIOPSY Right 2020    benign   • BREAST SURGERY     • CAUTERIZATION NASAL BLEEDERS  2022   • CHOLECYSTECTOMY     • COLONOSCOPY     • HYSTERECTOMY     • LIVER BIOPSY     • OOPHORECTOMY     • RECTAL  SURGERY  11/20/2023    cancer   • SKIN CANCER EXCISION     • TONSILLECTOMY     • TOTAL HIP ARTHROPLASTY REVISION     • US GUIDED CYST ASPIRATION BREAST N/A 2/19/2024       Social History     Socioeconomic History   • Marital status:    Tobacco Use   • Smoking status: Never     Passive exposure: Never   • Smokeless tobacco: Never   Vaping Use   • Vaping status: Never Used   Substance and Sexual Activity   • Alcohol use: No   • Drug use: No   • Sexual activity: Yes       Family History   Problem Relation Age of Onset   • Alzheimer's disease Mother    • Cancer Mother    • Coronary artery disease Other    • Diabetes Other    • Hypertension Other    • Stroke Other    • Cancer Other    • Dementia Other    • Heart attack Father    • Colon polyps Father    • Breast cancer Neg Hx    • Ovarian cancer Neg Hx    • Colon cancer Neg Hx    • Esophageal cancer Neg Hx           Current Outpatient Medications:   •  acetaminophen (TYLENOL) 500 MG tablet, Take 1 tablet by mouth Every 6 (Six) Hours As Needed., Disp: , Rfl:   •  albuterol sulfate  (90 Base) MCG/ACT inhaler, Inhale 2 puffs by mouth every 4 (Four) Hours As Needed, Disp: 8.5 g, Rfl: 2  •  aspirin 81 MG tablet, Take 1 tablet by mouth Daily. Taken at night. Last dose 9-18-21, Disp: , Rfl:   •  azelastine (ASTELIN) 0.1 % nasal spray, Administer 1 spray in the nostrils twice a day, Disp: 100 mL, Rfl: 2  •  baclofen (LIORESAL) 10 MG tablet, Take 1 tablet by mouth Every Night., Disp: 90 tablet, Rfl: 3  •  betamethasone dipropionate (DIPROLENE) 0.05 % ointment, Apply topically to the appropriate area as directed once daily as needed, Disp: 15 g, Rfl: 0  •  butalbital-acetaminophen-caffeine (FIORICET, ESGIC) -40 MG per tablet, Take 1 tablet by mouth Daily As Needed for headache, Disp: 30 tablet, Rfl: 0  •  Cholecalciferol 25 MCG (1000 UT) tablet, Take 1 tablet by mouth Daily., Disp: , Rfl:   •  clidinium-chlordiazePOXIDE (LIBRAX) 5-2.5 MG per capsule, Take 1  capsule by mouth 2 (Two) Times a Day as needed., Disp: 60 capsule, Rfl: 0  •  clidinium-chlordiazePOXIDE (LIBRAX) 5-2.5 MG per capsule, Take 1 capsule by mouth 2 (Two) Times a Day as needed., Disp: 60 capsule, Rfl: 0  •  clidinium-chlordiazePOXIDE (LIBRAX) 5-2.5 MG per capsule, Take 1 capsule by mouth 2 (Two) Times a Day As Needed., Disp: 60 capsule, Rfl: 0  •  clidinium-chlordiazePOXIDE (LIBRAX) 5-2.5 MG per capsule, Take 1 capsule by mouth 2 (Two) Times a Day As Needed., Disp: 60 capsule, Rfl: 0  •  clidinium-chlordiazePOXIDE (LIBRAX) 5-2.5 MG per capsule, Take 1 capsule by mouth twice a day as needed, Disp: 60 capsule, Rfl: 0  •  clonazePAM (KlonoPIN) 0.5 MG tablet, As Needed., Disp: , Rfl:   •  Continuous Blood Gluc  (FreeStyle Colin 2 [place]) device, Use daily as directed, Disp: 1 each, Rfl: 0  •  digoxin (LANOXIN) 250 MCG tablet, Take 1 tablet by mouth Daily., Disp: 90 tablet, Rfl: 3  •  fludrocortisone 0.1 MG tablet, Take 1 tablet by mouth Daily., Disp: 90 tablet, Rfl: 3  •  fluticasone (FLONASE) 50 MCG/ACT nasal spray, Instill 2 sprays in each nostril 2 (Two) Times A Day, Disp: 120 g, Rfl: 1  •  fluticasone (FLONASE) 50 MCG/ACT nasal spray, Instill 2 sprays in each nostril 2 (Two) Times A Day, Disp: 120 g, Rfl: 5  •  furosemide (LASIX) 40 MG tablet, Take 1 tablet by mouth Daily. May have extra 1/2 to 1 tablet daily if needed for edema, Disp: 135 tablet, Rfl: 3  •  gabapentin (NEURONTIN) 800 MG tablet, Take 1 tablet by mouth Every Night., Disp: 90 tablet, Rfl: 1  •  glucose blood (OneTouch Verio) test strip, Use to test blood glucose 3 times a day as directed, Disp: 300 each, Rfl: 3  •  guaiFENesin (Mucinex) 600 MG 12 hr tablet, Take 1 tablet by mouth twice a day, Disp: 14 tablet, Rfl: 0  •  HYDROcodone-acetaminophen (NORCO) 7.5-325 MG per tablet, Take 1 tablet by mouth three times a day, Disp: 90 tablet, Rfl: 0  •  hydrocortisone (ANUSOL-HC) 25 MG suppository, Insert 1 suppository rectally twice a  day as needed (remove wrapper and moisten with water), Disp: 12 suppository, Rfl: 3  •  Influenza Vac High-Dose Quad (Fluzone High-Dose Quadrivalent) 0.7 ML suspension prefilled syringe injection, Inject  into the appropriate muscle as directed by prescriber., Disp: 0.7 mL, Rfl: 0  •  insulin aspart (NovoLOG FlexPen) 100 UNIT/ML solution pen-injector sc pen, Inject 10 Units under the skin into the appropriate area as directed 3 (Three) Times a Day With Meals., Disp: 27 mL, Rfl: 3  •  Insulin Glargine (BASAGLAR KWIKPEN) 100 UNIT/ML injection pen, Inject 50 units subcutaneously once in the morning and 75 units at night, Disp: 135 mL, Rfl: 1  •  Insulin Pen Needle (B-D UF III MINI PEN NEEDLES) 31G X 5 MM misc, Use as directed twice a day, Disp: 180 each, Rfl: 0  •  Insulin Syringe-Needle U-100 29G 0.5 ML misc, Inject 0.3 mL as directed Daily., Disp: , Rfl:   •  losartan (COZAAR) 50 MG tablet, Take 1 tablet by mouth once Daily., Disp: 90 tablet, Rfl: 3  •  meclizine (ANTIVERT) 25 MG tablet, Take 1 tablet by mouth 3 (Three) Times a Day As Needed for Dizziness., Disp: 90 tablet, Rfl: 3  •  metoprolol succinate XL (TOPROL-XL) 100 MG 24 hr tablet, Take 1 tablet by mouth Daily., Disp: 90 tablet, Rfl: 3  •  metroNIDAZOLE (METROCREAM) 0.75 % cream, Apply to the face once every night, Disp: 45 g, Rfl: 0  •  mupirocin (BACTROBAN) 2 % ointment, Apply 1 application  topically to the appropriate area as directed 2 (Two) Times a Day., Disp: 22 g, Rfl: 0  •  neomycin-colistin-hydrocortisone-thonzonium (Cortisporin-TC) 3.3-3-10-0.5 MG/ML otic suspension, Instill 5 drops into affected ear 3 (Three) times a day for 10 days, Disp: 10 mL, Rfl: 0  •  nitroglycerin (Nitrostat) 0.4 MG SL tablet, Place 1 tablet under the tongue Every 5 Minutes As Needed for Chest Pain for up to 3 total doses. Call 911 if pain remains after 1 dose., Disp: 25 tablet, Rfl: 6  •  Nurtec 75 MG dispersible tablet, Take 1 tablet by mouth Daily As Needed (migraine).,  "Disp: 8 tablet, Rfl: 11  •  ofloxacin (FLOXIN) 0.3 % otic solution, Instill 5 drops into affected ear once a day for 7 days, Disp: 10 mL, Rfl: 0  •  ofloxacin (FLOXIN) 0.3 % otic solution, Instill 5 drops in both ears daily, Disp: 10 mL, Rfl: 0  •  pancrelipase, Lip-Prot-Amyl, (Pancreaze) 31325-73585 units capsule delayed-release particles capsule, Take 1 capsule with each meal and with each snack (Patient taking differently: As Needed.), Disp: 100 capsule, Rfl: 2  •  potassium chloride (KLOR-CON) 10 MEQ CR tablet, Take 1 tablet by mouth Daily as directed, Disp: 90 tablet, Rfl: 0  •  prednisoLONE acetate (PRED FORTE) 1 % ophthalmic suspension, Instill 1 drop in both eyes twice a day; Shake Well Before Use, Disp: 5 mL, Rfl: 0  •  promethazine (PHENERGAN) 25 MG tablet, Take 1 tablet by mouth Every 6 (Six) Hours As Needed for Nausea or Vomiting., Disp: 30 tablet, Rfl: 1  •  RABEprazole (ACIPHEX) 20 MG EC tablet, Take 1 tablet by mouth Daily., Disp: 90 tablet, Rfl: 2  •  rosuvastatin (CRESTOR) 10 MG tablet, Take one tablet by mouth once every evening, Disp: 90 tablet, Rfl: 2  •  topiramate (TOPAMAX) 25 MG tablet, Take 1 tablet by mouth Every Night., Disp: 90 tablet, Rfl: 3  •  triamcinolone (KENALOG) 0.1 % cream, Apply topically to the affected area twice a day, Disp: 30 g, Rfl: 0  •  venlafaxine XR (EFFEXOR-XR) 75 MG 24 hr capsule, Take 1 capsule by mouth Daily., Disp: 90 capsule, Rfl: 3  •  Rimegepant Sulfate (Nurtec) 75 MG tablet dispersible tablet, Take 1 tablet by mouth As Needed (Place one tablet on tongue at onset of migraine, may not repeat for 24 hours)., Disp: 10 tablet, Rfl: 0     Review of Systems   Musculoskeletal:  Positive for arthralgias, back pain, gait problem and neck pain.   Neurological:  Positive for headaches.   Psychiatric/Behavioral:  The patient is nervous/anxious.    All other systems reviewed and are negative.       Objective:  /90   Pulse 80   Ht 170.2 cm (67\")   Wt 73.5 kg (162 lb) "   SpO2 92%   BMI 25.37 kg/m²     Neurologic Exam  Mental Status   Oriented to person, place, and time.   Speech: speech is normal   Level of consciousness: alert     Cranial Nerves   Cranial nerves II through XII intact.     Motor Exam   Muscle bulk: normal  Overall muscle tone: normal     Strength   Strength 5/5 except as noted.   Chronic pain and arthritis multiple joints. Uses cane for ambulation.      Sensory Exam   Right leg light touch: decreased from ankle  Left leg light touch: decreased from ankle  Right leg vibration: decreased from ankle  Left leg vibration: decreased from ankle  Right leg pinprick: decreased from ankle  Left leg pinprick: decreased from ankle    Right more than Left decreased sensation-stocking distribution-chronic. Prior surgery to right ankle.     Gait, Coordination, and Reflexes      Gait  Gait: (antalgic with cane)     Coordination   Finger to nose coordination: normal  Romberg: Negative     Tremor   Resting tremor: absent  Intention tremor: absent  Action tremor: absent     Reflexes   Right brachioradialis: 2+  Left brachioradialis: 2+  Right biceps: 2+  Left biceps: 2+  Right patellar: 1+  Left patellar: 1+  Right achilles: 1+  Left achilles: 1+  Right : 2+  Left : 2+    Physical Exam  Constitutional:       Appearance: Normal appearance.   Neurological:      Mental Status: She is alert and oriented to person, place, and time.      Cranial Nerves: Cranial nerves 2-12 are intact.      Coordination: Finger-Nose-Finger Test normal.      Deep Tendon Reflexes:      Reflex Scores:       Bicep reflexes are 2+ on the right side and 2+ on the left side.       Brachioradialis reflexes are 2+ on the right side and 2+ on the left side.       Patellar reflexes are 1+ on the right side and 1+ on the left side.       Achilles reflexes are 1+ on the right side and 1+ on the left side.  Psychiatric:         Mood and Affect: Mood is anxious.         Speech: Speech normal.         Behavior:  Behavior normal.         Thought Content: Thought content normal.         Cognition and Memory: Cognition and memory normal.         Judgment: Judgment normal.     Results:  Results  Laboratory Studies 11/24/2023  TSH normal. Lipid panel unremarkable. Hemoglobin A1c 7%. Creatinine level 1.20.    Assessment/Plan:     Diagnoses and all orders for this visit:    1. Diabetic polyneuropathy associated with diabetes mellitus due to underlying condition  -     gabapentin (NEURONTIN) 800 MG tablet; Take 1 tablet by mouth Every Night.  Dispense: 90 tablet; Refill: 1    2. Migraine without aura and without status migrainosus, not intractable  Comments:  nurtec odt samples  Orders:  -     topiramate (TOPAMAX) 25 MG tablet; Take 1 tablet by mouth Every Night.  Dispense: 90 tablet; Refill: 3  -     gabapentin (NEURONTIN) 800 MG tablet; Take 1 tablet by mouth Every Night.  Dispense: 90 tablet; Refill: 1  -     venlafaxine XR (EFFEXOR-XR) 75 MG 24 hr capsule; Take 1 capsule by mouth Daily.  Dispense: 90 capsule; Refill: 3  -     promethazine (PHENERGAN) 25 MG tablet; Take 1 tablet by mouth Every 6 (Six) Hours As Needed for Nausea or Vomiting.  Dispense: 30 tablet; Refill: 1    3. DDD (degenerative disc disease), cervical  -     gabapentin (NEURONTIN) 800 MG tablet; Take 1 tablet by mouth Every Night.  Dispense: 90 tablet; Refill: 1  -     baclofen (LIORESAL) 10 MG tablet; Take 1 tablet by mouth Every Night.  Dispense: 90 tablet; Refill: 3    4. Degenerative lumbar spinal stenosis  -     gabapentin (NEURONTIN) 800 MG tablet; Take 1 tablet by mouth Every Night.  Dispense: 90 tablet; Refill: 1  -     baclofen (LIORESAL) 10 MG tablet; Take 1 tablet by mouth Every Night.  Dispense: 90 tablet; Refill: 3    5. Generalized anxiety disorder  -     venlafaxine XR (EFFEXOR-XR) 75 MG 24 hr capsule; Take 1 capsule by mouth Daily.  Dispense: 90 capsule; Refill: 3           Assessment & Plan  Chronic migraine headaches, chronic neck pain,  chronic intermittent vertigo, anxiety, prior strokes, and diabetic peripheral neuropathy.  She will maintain her current medication regimen under the supervision of neurology. The possibility of gradually discontinuing topiramate in the future may be beneficial. Her gabapentin prescription has been updated, as she is not currently on any additional doses, and it is unclear why this prescription was previously changed. Nurtec samples have been provided. A follow-up appointment is scheduled for 7 months from now for re-evaluation of symptoms, or sooner if necessary. Baseline cognitive testing will also be conducted at that time. She is advised to maintain her scheduled follow-ups with all specialists.    Reviewed medications, potential side effects and signs and symptoms to report. Discussed risk versus benefits of treatment plan with patient and/or family-including medications, labs and radiology that may be ordered. Addressed questions and concerns during visit. Patient and/or family verbalized understanding and agree with plan.    As part of this patient's treatment plan I am prescribing controlled substances. The patient has been made aware of appropriate use of such medications, including potential risk of somnolence, limited ability to drive and/or work safely, and potential for dependence or overdose. It has also been made clear that these medications are for use by the patient only, without concomitant use of alcohol or other substances unless prescribed. Keep out of reach of children.  Diogenes report has been reviewed. If this is going to be prescribed long term, [facility] Controlled Substance Agreement Contract has also been read and signed by patient and myself.    During this visit the following were done:  Labs Reviewed [x]    Labs Ordered []    Radiology Reports Reviewed [x]    Radiology Ordered []    PCP Records Reviewed []    Referring Provider Records Reviewed []    ER Records Reviewed []    Hospital  Records Reviewed []    History Obtained From Family []    Radiology Images Reviewed []    Other Reviewed [x]    Records Requested []      07/01/24   13:38 EDT    Patient or patient representative verbalized consent for the use of Ambient Listening during the visit with  [facility] for chart documentation. 7/2/2024  08:29 EDT    Note to patient: The 21st Century Cures Act makes medical notes like these available to patients in the interest of transparency. However, be advised this is a medical document. It is intended as peer to peer communication. It is written in medical language and may contain abbreviations or verbiage that are unfamiliar. It may appear blunt or direct. Medical documents are intended to carry relevant information, facts as evident, and the clinical opinion of the provider.

## 2024-07-05 ENCOUNTER — TELEPHONE (OUTPATIENT)
Dept: ENDOCRINOLOGY | Facility: CLINIC | Age: 74
End: 2024-07-05

## 2024-07-05 NOTE — TELEPHONE ENCOUNTER
PT CALLED STATING SHE HAD AN EPIDURAL IN HER BACK TODAY. SHE STATED HER BS HAS BEEN RUNNING 250+. SHE REQUESTED A CALL BACK TO CONSULT.

## 2024-07-08 DIAGNOSIS — G43.009 MIGRAINE WITHOUT AURA AND WITHOUT STATUS MIGRAINOSUS, NOT INTRACTABLE: Primary | ICD-10-CM

## 2024-07-08 DIAGNOSIS — G89.4 CHRONIC PAIN DISORDER: ICD-10-CM

## 2024-07-11 ENCOUNTER — TELEPHONE (OUTPATIENT)
Dept: CARDIOLOGY | Facility: CLINIC | Age: 74
End: 2024-07-11
Payer: MEDICARE

## 2024-07-11 NOTE — TELEPHONE ENCOUNTER
Spoke with patient.  Ambulatory referral to Sleep medicine made for her on 7/1/24.  Their office is saying they called her and she hung up.  She is instructed that [facility] wants her to be evaluated d/t SVT on her MCOT.  Sometimes these arrhythmias can be related to ERAN.  I will send a referral to Dr. Mccrary.  She states she has done this before but ended up with COVID and has not followed up.  She agrees with plan of care.

## 2024-07-17 ENCOUNTER — TELEPHONE (OUTPATIENT)
Dept: CARDIOLOGY | Facility: CLINIC | Age: 74
End: 2024-07-17

## 2024-07-17 NOTE — TELEPHONE ENCOUNTER
Caller: Leela Muller    Relationship: Self    Best call back number: [phone]      What was the call regarding: PATIENT WAS CALLING TO GIVE YOU THE PHONE NUMBER FOR THE SLEEP CLINIC SHE WOULD LIKE TO GO TO.    PHONE NUMBER [phone]  CAN'T GET INTO THIS DR UNTIL 09.05.24. SHE WOULD LIKE YOU TO CALL AND GET HER IN SOONER. PLEASE REACH OUT TO PATIENT WITH FURTHER QUESTIONS.

## 2024-07-29 ENCOUNTER — TRANSCRIBE ORDERS (OUTPATIENT)
Dept: ADMINISTRATIVE | Facility: HOSPITAL | Age: 74
End: 2024-07-29
Payer: MEDICARE

## 2024-07-29 DIAGNOSIS — Z12.31 VISIT FOR SCREENING MAMMOGRAM: Primary | ICD-10-CM

## 2024-07-30 ENCOUNTER — OFFICE VISIT (OUTPATIENT)
Dept: ENDOCRINOLOGY | Facility: CLINIC | Age: 74
End: 2024-07-30
Payer: MEDICARE

## 2024-07-30 VITALS
HEIGHT: 67 IN | BODY MASS INDEX: 25.43 KG/M2 | SYSTOLIC BLOOD PRESSURE: 130 MMHG | OXYGEN SATURATION: 96 % | DIASTOLIC BLOOD PRESSURE: 62 MMHG | HEART RATE: 79 BPM | WEIGHT: 162 LBS

## 2024-07-30 DIAGNOSIS — E08.42 DIABETIC POLYNEUROPATHY ASSOCIATED WITH DIABETES MELLITUS DUE TO UNDERLYING CONDITION: ICD-10-CM

## 2024-07-30 DIAGNOSIS — E78.5 DYSLIPIDEMIA: ICD-10-CM

## 2024-07-30 DIAGNOSIS — I25.10 CORONARY ARTERY DISEASE INVOLVING NATIVE HEART WITHOUT ANGINA PECTORIS, UNSPECIFIED VESSEL OR LESION TYPE: ICD-10-CM

## 2024-07-30 DIAGNOSIS — E11.65 TYPE 2 DIABETES MELLITUS WITH HYPERGLYCEMIA, WITH LONG-TERM CURRENT USE OF INSULIN: Primary | ICD-10-CM

## 2024-07-30 DIAGNOSIS — Z79.4 TYPE 2 DIABETES MELLITUS WITH HYPERGLYCEMIA, WITH LONG-TERM CURRENT USE OF INSULIN: Primary | ICD-10-CM

## 2024-07-30 DIAGNOSIS — I10 PRIMARY HYPERTENSION: ICD-10-CM

## 2024-07-30 LAB
EXPIRATION DATE: ABNORMAL
EXPIRATION DATE: ABNORMAL
GLUCOSE BLDC GLUCOMTR-MCNC: 257 MG/DL (ref 70–130)
HBA1C MFR BLD: 6.7 % (ref 4.5–5.7)
Lab: ABNORMAL
Lab: ABNORMAL

## 2024-07-30 NOTE — ASSESSMENT & PLAN NOTE
Diabetes is improving with treatment.   Continue current treatment regimen.  Diabetes will be reassessed in 6 months.    FreeStyle Colin 3 CGM was downloaded today.  Data was reviewed from 7/17/24 to 7/30/24.  This showed fairly good overnight glucose control.  Having occ postprandial spike.  No patterns for insulin adjustments.

## 2024-07-30 NOTE — PROGRESS NOTES
"     Office Note      Date: 2024  Patient Name: Leela Muller  MRN: [id number]  : 1950    Chief Complaint   Patient presents with    Diabetes     Type 2 diabetes mellitus with hyperglycemia, with long-term current use of insulin       History of Present Illness:   Leela Muller is a 74 y.o. female who presents for Diabetes type 2. Diagnosed in: . Treated in past with oral agents. She didn't tolerate farxiga due to yeast infections.  Current treatments: basal-bolus insulin. Number of insulin shots per day: 4. Checks blood sugar 288 times a day - on FreeStyle Hilaria. She is making frequent adjustments in insulin based on glucose readings.  Has low blood sugar: occ. Aspirin use: Yes. Statin use: Yes. ACE-I/ARB use: Yes. Changes in health since last visit: back surgery - has helped with right leg; steroid injection in shoulder. Last eye exam 2023.       Subjective      Diabetic Complications:  Eyes: No  Kidneys: No  Feet: Yes -    Heart: Yes -      Diet and Exercise:  Meals per day: 2  Minutes of exercise per week: 0 mins.    Review of Systems:   Review of Systems   Constitutional: Negative.    Cardiovascular:  Positive for chest pain.   Gastrointestinal: Negative.    Endocrine: Negative.        The following portions of the patient's history were reviewed and updated as appropriate: allergies, current medications, past family history, past medical history, past social history, past surgical history, and problem list.    Objective     Visit Vitals  /62 (BP Location: Right arm, Patient Position: Sitting, Cuff Size: Adult)   Pulse 79   Ht 170.2 cm (67\")   Wt 73.5 kg (162 lb)   SpO2 96%   BMI 25.37 kg/m²       Physical Exam:  Physical Exam  Constitutional:       Appearance: Normal appearance.   Neurological:      Mental Status: She is alert.         Labs:    HbA1c  Lab Results   Component Value Date    HGBA1C 6.7 (A) 2024       CMP  Lab Results   Component Value Date    GLUCOSE 89 2022    " BUN 10 11/21/2022    CREATININE 1.20 05/28/2024    EGFRIFNONA 73 12/17/2021    BCR 15.2 11/21/2022    K 3.6 11/21/2022    CO2 28.2 11/21/2022    CALCIUM 9.3 11/21/2022    LABIL2 1.5 03/20/2015    AST 19 11/21/2022    ALT 19 11/21/2022        Lipid Panel  Lab Results   Component Value Date    HDL 45 11/14/2023    LDL 43 11/14/2023    TRIG 140 11/14/2023        TSH  Lab Results   Component Value Date    TSH 2.570 11/14/2023        Hemoglobin A1C  Lab Results   Component Value Date    HGBA1C 6.7 (A) 07/30/2024        Microalbumin/Creatinine  Lab Results   Component Value Date    MALBCRERATIO  11/14/2023      Comment:      Unable to calculate    MICROALBUR <1.2 11/14/2023           Assessment / Plan      Assessment & Plan:  Diagnoses and all orders for this visit:    1. Type 2 diabetes mellitus with hyperglycemia, with long-term current use of insulin (Primary)  Assessment & Plan:  Diabetes is improving with treatment.   Continue current treatment regimen.  Diabetes will be reassessed in 6 months.    FreeStyle Colin 3 CGM was downloaded today.  Data was reviewed from 7/17/24 to 7/30/24.  This showed fairly good overnight glucose control.  Having occ postprandial spike.  No patterns for insulin adjustments.      Orders:  -     POC Glycosylated Hemoglobin (Hb A1C)  -     POC Glucose, Blood    2. Primary hypertension  Assessment & Plan:  Hypertension is stable and controlled  Continue current treatment regimen.  Blood pressure will be reassessed in 6 months.      3. Dyslipidemia  Assessment & Plan:  Continue statin.  Plan to check lipids next visit.      4. Coronary artery disease involving native heart without angina pectoris, unspecified vessel or lesion type  Assessment & Plan:  Continue ASA and statin.  Continue cardiology follow up.  No GLP-1 RA due to pancreas issues. Intolerant of SGLT-2 inhibitors.       5. Diabetic polyneuropathy associated with diabetes mellitus due to underlying condition      Current Outpatient  Medications   Medication Instructions    acetaminophen (TYLENOL) 500 mg, Oral, Every 6 Hours PRN    albuterol sulfate  (90 Base) MCG/ACT inhaler Inhale 2 puffs by mouth every 4 (Four) Hours As Needed    aspirin 81 mg, Oral, Daily, Taken at night. Last dose 9-18-21    azelastine (ASTELIN) 0.1 % nasal spray Administer 1 spray in the nostrils twice a day    baclofen (LIORESAL) 10 mg, Oral, Nightly    betamethasone dipropionate (DIPROLENE) 0.05 % ointment Apply topically to the appropriate area as directed once daily as needed    butalbital-acetaminophen-caffeine (FIORICET, ESGIC) -40 MG per tablet Take 1 tablet by mouth Daily As Needed for headache    cholecalciferol (VITAMIN D3) 1,000 Units, Oral, Daily    clidinium-chlordiazePOXIDE (LIBRAX) 5-2.5 MG per capsule Take 1 capsule by mouth 2 (Two) Times a Day as needed.    clidinium-chlordiazePOXIDE (LIBRAX) 5-2.5 MG per capsule Take 1 capsule by mouth 2 (Two) Times a Day as needed.    clidinium-chlordiazePOXIDE (LIBRAX) 5-2.5 MG per capsule 1 capsule, Oral, 2 Times Daily PRN    clidinium-chlordiazePOXIDE (LIBRAX) 5-2.5 MG per capsule 1 capsule, Oral, 2 Times Daily PRN    clidinium-chlordiazePOXIDE (LIBRAX) 5-2.5 MG per capsule Take 1 capsule by mouth twice a day as needed    clidinium-chlordiazePOXIDE (LIBRAX) 5-2.5 MG per capsule 1 capsule, Oral, 2 Times Daily    clonazePAM (KlonoPIN) 0.5 MG tablet As Needed    Continuous Blood Gluc  (FreeStyle Colin 2 Gerlach) device Use daily as directed    digoxin (LANOXIN) 250 mcg, Oral, Daily    fludrocortisone 0.1 mg, Oral, Daily    fluticasone (FLONASE) 50 MCG/ACT nasal spray Instill 2 sprays in each nostril 2 (Two) Times A Day    fluticasone (FLONASE) 50 MCG/ACT nasal spray Instill 2 sprays in each nostril 2 (Two) Times A Day    furosemide (LASIX) 40 MG tablet Take 1 tablet by mouth Daily. May have extra 1/2 to 1 tablet daily if needed for edema    gabapentin (NEURONTIN) 800 mg, Oral, Nightly    glucose blood  (OneTouch Verio) test strip Use to test blood glucose 3 times a day as directed    guaiFENesin (Mucinex) 600 MG 12 hr tablet Take 1 tablet by mouth twice a day    HYDROcodone-acetaminophen (NORCO) 7.5-325 MG per tablet 1 tablet, Oral, 3 times daily    hydrocortisone (ANUSOL-HC) 25 MG suppository Insert 1 suppository rectally twice a day as needed (remove wrapper and moisten with water)    Influenza Vac High-Dose Quad (Fluzone High-Dose Quadrivalent) 0.7 ML suspension prefilled syringe injection Intramuscular    Insulin Glargine (BASAGLAR KWIKPEN) 100 UNIT/ML injection pen Inject 50 units subcutaneously once in the morning and 75 units at night    Insulin Pen Needle (B-D UF III MINI PEN NEEDLES) 31G X 5 MM misc Use as directed twice a day    Insulin Syringe-Needle U-100 29G 0.5 ML misc 0.3 mL, Injection, Daily    losartan (COZAAR) 50 MG tablet Take 1 tablet by mouth once Daily.    Magnesium Chloride 15 % Muscle relaxant gel apply 1-2 grams pain get to affected area 3 to 4 times a day    meclizine (ANTIVERT) 25 mg, Oral, 3 Times Daily PRN    metoprolol succinate XL (TOPROL-XL) 100 mg, Oral, Daily    metroNIDAZOLE (METROCREAM) 0.75 % cream Apply to the face once every night    mupirocin (BACTROBAN) 2 % ointment 1 application , Topical, 2 Times Daily    neomycin-colistin-hydrocortisone-thonzonium (Cortisporin-TC) 3.3-3-10-0.5 MG/ML otic suspension Instill 5 drops into affected ear 3 (Three) times a day for 10 days    nitroglycerin (Nitrostat) 0.4 MG SL tablet Place 1 tablet under the tongue Every 5 Minutes As Needed for Chest Pain for up to 3 total doses. Call 911 if pain remains after 1 dose.    NovoLOG FlexPen 10 Units, Subcutaneous, 3 Times Daily With Meals    Nurtec 75 mg, Oral, Daily PRN    Nurtec 75 mg, Oral, As Needed    ofloxacin (FLOXIN) 0.3 % otic solution Instill 5 drops into affected ear once a day for 7 days    ofloxacin (FLOXIN) 0.3 % otic solution Instill 5 drops in both ears daily    pancrelipase,  Lip-Prot-Amyl, (Pancreaze) 02804-28693 units capsule delayed-release particles capsule Take 1 capsule with each meal and with each snack    potassium chloride (KLOR-CON) 10 MEQ CR tablet Take 1 tablet by mouth Daily as directed    prednisoLONE acetate (PRED FORTE) 1 % ophthalmic suspension Instill 1 drop in both eyes twice a day; Shake Well Before Use    promethazine (PHENERGAN) 25 mg, Oral, Every 6 Hours PRN    RABEprazole (ACIPHEX) 20 mg, Oral, Daily    rosuvastatin (CRESTOR) 10 MG tablet Take one tablet by mouth once every evening    topiramate (TOPAMAX) 25 mg, Oral, Nightly    triamcinolone (KENALOG) 0.1 % cream Apply topically to the affected area twice a day    venlafaxine XR (EFFEXOR-XR) 75 mg, Oral, Daily      Return in about 6 months (around 1/30/2025) for Recheck with A1c, CMP, lipid, TSH, microalbumin, foot exam.    Electronically signed by: Jac Santiago MD  07/30/2024

## 2024-07-30 NOTE — ASSESSMENT & PLAN NOTE
Continue ASA and statin.  Continue cardiology follow up.  No GLP-1 RA due to pancreas issues. Intolerant of SGLT-2 inhibitors.

## 2024-08-01 RX ORDER — INSULIN ASPART 100 [IU]/ML
10 INJECTION, SOLUTION INTRAVENOUS; SUBCUTANEOUS
Qty: 30 ML | Refills: 1 | Status: SHIPPED | OUTPATIENT
Start: 2024-08-01

## 2024-08-01 NOTE — TELEPHONE ENCOUNTER
Caller: [facility]    Relationship:     Best call back number: [phone]     Requested Prescriptions:   Requested Prescriptions     Pending Prescriptions Disp Refills    insulin aspart (NovoLOG FlexPen) 100 UNIT/ML solution pen-injector sc pen 27 mL 3     Sig: Inject 10 Units under the skin into the appropriate area as directed 3 (Three) Times a Day With Meals.        Pharmacy where request should be sent: [facility]     Last office visit with prescribing clinician: 7/30/2024   Last telemedicine visit with prescribing clinician: Visit date not found   Next office visit with prescribing clinician: 10/30/2024     Additional details provided by patient: PATIENT CALLED NEEDING A REFILL ON THEIR NOVOLOG FLEXPEN'S. PATIENT IS COMPLETELY OUT AND THE REQUEST NEEDS TO BE CALLED INTO THE [facility] ON FILE. ANY QUESTIONS PLEASE CALL [phone].     Does the patient have less than a 3 day supply:  [x] Yes  [] No    Milagros Alcazar Rep   08/01/24 10:33 EDT

## 2024-08-01 NOTE — TELEPHONE ENCOUNTER
Rx Refill Note  Requested Prescriptions     Pending Prescriptions Disp Refills    insulin aspart (NovoLOG FlexPen) 100 UNIT/ML solution pen-injector sc pen 27 mL 3     Sig: Inject 10 Units under the skin into the appropriate area as directed 3 (Three) Times a Day With Meals.          Last office visit with prescribing clinician: 7/30/2024     Next office visit with prescribing clinician: 10/30/2024         Nelida Joyce MA  08/01/24, 11:00 EDT

## 2024-08-02 ENCOUNTER — TELEPHONE (OUTPATIENT)
Dept: ENDOCRINOLOGY | Facility: CLINIC | Age: 74
End: 2024-08-02
Payer: MEDICARE

## 2024-08-02 RX ORDER — KETOROLAC TROMETHAMINE 30 MG/ML
1 INJECTION, SOLUTION INTRAMUSCULAR; INTRAVENOUS TAKE AS DIRECTED
Qty: 1 EACH | Refills: 0 | Status: SHIPPED | OUTPATIENT
Start: 2024-08-02

## 2024-08-02 NOTE — TELEPHONE ENCOUNTER
Spoke with patient.  She states she needs a reader sent to local pharmacy due to insurance not covering but one every 5 years.  Rx sent per protocol.

## 2024-08-02 NOTE — TELEPHONE ENCOUNTER
Called and spoke with patient.  She is going to check with Michi to see about replacing reader as she cannot afford a new one.

## 2024-08-02 NOTE — TELEPHONE ENCOUNTER
Caller: Leela Muller    Relationship to patient: Self    Best call back number: [phone]    Patient is needing: PT NEEDS FREESTCAD Best RANDOLPH 3 PRESCRIPTION SENT TO [facility] . TOTAL MED WONT SUPPLY ONE BUT EVERY 5 YEARS. PLEASE CALL PT TO ADVISE

## 2024-08-29 NOTE — TELEPHONE ENCOUNTER
PER DR. DEWEY MEDINA TO CALL IN GABAPENTIN UNTIL NEXT APPT      PT IS AWARE THAT WE WILL ONLY DO THIS ONCE.      PT WAS INFORMED THAT WE WILL NOT REFILL CONTROLLED MEDICATIONS OVER THE PHONE OR THROUGH PHARMACY REQUESTS ANYMORE. PT UNDERSTANDS THAT THEY MUST MAKE THEIR FOLLOW UP APPT IN ORDER TO CONTINUE TO RECEIVE THEIR CONTROLLED MEDICATION. PT VERBALIZED UNDERSTANDING.     
Yes

## 2024-09-12 ENCOUNTER — APPOINTMENT (OUTPATIENT)
Dept: CT IMAGING | Facility: HOSPITAL | Age: 74
End: 2024-09-12
Payer: MEDICARE

## 2024-09-12 ENCOUNTER — HOSPITAL ENCOUNTER (EMERGENCY)
Facility: HOSPITAL | Age: 74
Discharge: HOME | End: 2024-09-12
Attending: STUDENT IN AN ORGANIZED HEALTH CARE EDUCATION/TRAINING PROGRAM
Payer: MEDICARE

## 2024-09-12 VITALS
OXYGEN SATURATION: 99 % | HEART RATE: 96 BPM | WEIGHT: 158 LBS | HEIGHT: 67 IN | BODY MASS INDEX: 24.8 KG/M2 | RESPIRATION RATE: 17 BRPM | SYSTOLIC BLOOD PRESSURE: 136 MMHG | TEMPERATURE: 98.2 F | DIASTOLIC BLOOD PRESSURE: 62 MMHG

## 2024-09-12 DIAGNOSIS — R11.2 NAUSEA, VOMITING, AND DIARRHEA: Primary | ICD-10-CM

## 2024-09-12 DIAGNOSIS — R19.7 NAUSEA, VOMITING, AND DIARRHEA: Primary | ICD-10-CM

## 2024-09-12 LAB
ALBUMIN SERPL-MCNC: 4.6 G/DL (ref 3.5–5.2)
ALBUMIN/GLOB SERPL: 1.5 G/DL
ALP SERPL-CCNC: 78 U/L (ref 39–117)
ALT SERPL W P-5'-P-CCNC: 31 U/L (ref 1–33)
ANION GAP SERPL CALCULATED.3IONS-SCNC: 12.5 MMOL/L (ref 5–15)
AST SERPL-CCNC: 32 U/L (ref 1–32)
BASOPHILS # BLD AUTO: 0.06 10*3/MM3 (ref 0–0.2)
BASOPHILS NFR BLD AUTO: 0.6 % (ref 0–1.5)
BILIRUB SERPL-MCNC: 0.5 MG/DL (ref 0–1.2)
BUN SERPL-MCNC: 11 MG/DL (ref 8–23)
BUN/CREAT SERPL: 12.8 (ref 7–25)
CALCIUM SPEC-SCNC: 9.7 MG/DL (ref 8.6–10.5)
CHLORIDE SERPL-SCNC: 101 MMOL/L (ref 98–107)
CO2 SERPL-SCNC: 25.5 MMOL/L (ref 22–29)
CREAT SERPL-MCNC: 0.86 MG/DL (ref 0.57–1)
D-LACTATE SERPL-SCNC: 1.1 MMOL/L (ref 0.5–2)
D-LACTATE SERPL-SCNC: 2.8 MMOL/L (ref 0.5–2)
DEPRECATED RDW RBC AUTO: 49.8 FL (ref 37–54)
EGFRCR SERPLBLD CKD-EPI 2021: 70.1 ML/MIN/1.73
EOSINOPHIL # BLD AUTO: 0.13 10*3/MM3 (ref 0–0.4)
EOSINOPHIL NFR BLD AUTO: 1.4 % (ref 0.3–6.2)
ERYTHROCYTE [DISTWIDTH] IN BLOOD BY AUTOMATED COUNT: 15.2 % (ref 12.3–15.4)
GLOBULIN UR ELPH-MCNC: 3 GM/DL
GLUCOSE SERPL-MCNC: 160 MG/DL (ref 65–99)
HCT VFR BLD AUTO: 45.2 % (ref 34–46.6)
HGB BLD-MCNC: 14.9 G/DL (ref 12–15.9)
HOLD SPECIMEN: NORMAL
HOLD SPECIMEN: NORMAL
IMM GRANULOCYTES # BLD AUTO: 0.02 10*3/MM3 (ref 0–0.05)
IMM GRANULOCYTES NFR BLD AUTO: 0.2 % (ref 0–0.5)
LIPASE SERPL-CCNC: 17 U/L (ref 13–60)
LYMPHOCYTES # BLD AUTO: 2.78 10*3/MM3 (ref 0.7–3.1)
LYMPHOCYTES NFR BLD AUTO: 29.5 % (ref 19.6–45.3)
MCH RBC QN AUTO: 29.4 PG (ref 26.6–33)
MCHC RBC AUTO-ENTMCNC: 33 G/DL (ref 31.5–35.7)
MCV RBC AUTO: 89.3 FL (ref 79–97)
MONOCYTES # BLD AUTO: 0.88 10*3/MM3 (ref 0.1–0.9)
MONOCYTES NFR BLD AUTO: 9.4 % (ref 5–12)
NEUTROPHILS NFR BLD AUTO: 5.54 10*3/MM3 (ref 1.7–7)
NEUTROPHILS NFR BLD AUTO: 58.9 % (ref 42.7–76)
NRBC BLD AUTO-RTO: 0 /100 WBC (ref 0–0.2)
PLATELET # BLD AUTO: 198 10*3/MM3 (ref 140–450)
PMV BLD AUTO: 10.7 FL (ref 6–12)
POTASSIUM SERPL-SCNC: 4.3 MMOL/L (ref 3.5–5.2)
PROT SERPL-MCNC: 7.6 G/DL (ref 6–8.5)
RBC # BLD AUTO: 5.06 10*6/MM3 (ref 3.77–5.28)
SODIUM SERPL-SCNC: 139 MMOL/L (ref 136–145)
WBC NRBC COR # BLD AUTO: 9.41 10*3/MM3 (ref 3.4–10.8)
WHOLE BLOOD HOLD COAG: NORMAL
WHOLE BLOOD HOLD SPECIMEN: NORMAL

## 2024-09-12 PROCEDURE — 36415 COLL VENOUS BLD VENIPUNCTURE: CPT

## 2024-09-12 PROCEDURE — 99285 EMERGENCY DEPT VISIT HI MDM: CPT

## 2024-09-12 PROCEDURE — 85025 COMPLETE CBC W/AUTO DIFF WBC: CPT

## 2024-09-12 PROCEDURE — 83690 ASSAY OF LIPASE: CPT

## 2024-09-12 PROCEDURE — 96361 HYDRATE IV INFUSION ADD-ON: CPT

## 2024-09-12 PROCEDURE — 25510000001 IOPAMIDOL 61 % SOLUTION: Performed by: STUDENT IN AN ORGANIZED HEALTH CARE EDUCATION/TRAINING PROGRAM

## 2024-09-12 PROCEDURE — 74177 CT ABD & PELVIS W/CONTRAST: CPT

## 2024-09-12 PROCEDURE — 25010000002 ONDANSETRON PER 1 MG: Performed by: STUDENT IN AN ORGANIZED HEALTH CARE EDUCATION/TRAINING PROGRAM

## 2024-09-12 PROCEDURE — 80053 COMPREHEN METABOLIC PANEL: CPT

## 2024-09-12 PROCEDURE — 96374 THER/PROPH/DIAG INJ IV PUSH: CPT

## 2024-09-12 PROCEDURE — 83605 ASSAY OF LACTIC ACID: CPT | Performed by: STUDENT IN AN ORGANIZED HEALTH CARE EDUCATION/TRAINING PROGRAM

## 2024-09-12 PROCEDURE — 25810000003 SODIUM CHLORIDE 0.9 % SOLUTION: Performed by: STUDENT IN AN ORGANIZED HEALTH CARE EDUCATION/TRAINING PROGRAM

## 2024-09-12 RX ORDER — ONDANSETRON 4 MG/1
4 TABLET, ORALLY DISINTEGRATING ORAL EVERY 8 HOURS PRN
Qty: 12 TABLET | Refills: 0 | Status: SHIPPED | OUTPATIENT
Start: 2024-09-12

## 2024-09-12 RX ORDER — ONDANSETRON 4 MG/1
4 TABLET, ORALLY DISINTEGRATING ORAL EVERY 8 HOURS PRN
Qty: 12 TABLET | Refills: 0 | Status: SHIPPED | OUTPATIENT
Start: 2024-09-12 | End: 2024-09-12

## 2024-09-12 RX ORDER — IOPAMIDOL 612 MG/ML
100 INJECTION, SOLUTION INTRAVASCULAR
Status: COMPLETED | OUTPATIENT
Start: 2024-09-12 | End: 2024-09-12

## 2024-09-12 RX ORDER — ONDANSETRON 2 MG/ML
4 INJECTION INTRAMUSCULAR; INTRAVENOUS ONCE
Status: COMPLETED | OUTPATIENT
Start: 2024-09-12 | End: 2024-09-12

## 2024-09-12 RX ORDER — SODIUM CHLORIDE 0.9 % (FLUSH) 0.9 %
10 SYRINGE (ML) INJECTION AS NEEDED
Status: DISCONTINUED | OUTPATIENT
Start: 2024-09-12 | End: 2024-09-12 | Stop reason: HOSPADM

## 2024-09-12 RX ADMIN — SODIUM CHLORIDE 1000 ML: 9 INJECTION, SOLUTION INTRAVENOUS at 16:42

## 2024-09-12 RX ADMIN — IOPAMIDOL 100 ML: 612 INJECTION, SOLUTION INTRAVENOUS at 17:26

## 2024-09-12 RX ADMIN — ONDANSETRON 4 MG: 2 INJECTION INTRAMUSCULAR; INTRAVENOUS at 16:42

## 2024-09-12 NOTE — ED PROVIDER NOTES
[facility] EMERGENCY DEPARTMENT  Emergency Department Encounter  Emergency Medicine Physician Note       Pt Name: Leela Muller  MRN: [id number]  Pt :   1950  Room Number:    Date of encounter:  2024  PCP: Connor Ritter MD  ED Physician: Madan German DO    HPI:  Leela Muller is a 74 y.o. female who presents to the ED with nausea, vomiting, diarrhea.  Gradual onset of symptoms on .  Patient reports symptoms have been constant without any modifying factors.  She tried to treat with Phenergan without any relief.  Unable to tolerate anything by mouth secondary to symptoms.  Reports associated generalized abdominal pain.  Denies fever, chills, chest pain or shortness of breath, cough, problems with urination, leg pain or swelling.    Past medical history of liver mass, which is currently being worked up in the outpatient setting.  Also has a history of pancreatitis.  She saw her PCP today which prompted ED evaluation for concerns of dehydration.    PAST MEDICAL HISTORY  Past Medical History:   Diagnosis Date    Anxiety     Arm pain     Arthritis     Breast injury     fell 2019     Cancer     Chronic pancreatitis     COVID-19     Depression     Diabetes mellitus     History of rectal surgery     Hyperlipidemia     Hypertension     Liver mass     Medication monitoring encounter 2018    Neck pain on left side     Palpitations 2018    Pancreatitis     Pulmonary embolism     Stroke syndrome 2016    · Assessed By: Devaughn Lewis (Neurology); Last Assessed: 2015  Right cerebrovascular accident in July or 2010, evaluated at [facility].  Admission to [facility] on 2010 with headache and stuttering, consistent with TIA.  Chronic Coumadin therapy, initiated following bilateral pulmonary emboli in  after fall and hip replacement.  She was already on 81 mg of aspirin as well, 2010.  MRI of the brain on  09/28/2010 revealing old right parietal cerebrovascular accident.  MRA revealing normal carotids, middle anterior and posterior cerebral arteries with minimal ostial stenosis of both vertebral arteries.  GENARO by Dr. Oliver Mobley on 09/28/2010:  patent foramen ovale with a small amount of right to left shunting.  Normal LVEF and normal valvular structures.   Patent foramen ovale closure using a 25 mm. Amplatzer cribriform occluder, 10/05/2010.   Echocardiogram, 06/23/2014:  LVEF (55% to 60%) with and Amplatzer device noted to be well-seated in     Thrombocytopenia     Transient cerebral ischemia     Type 2 diabetes mellitus      Current Outpatient Medications   Medication Instructions    acetaminophen (TYLENOL) 500 mg, Oral, Every 6 Hours PRN    albuterol sulfate  (90 Base) MCG/ACT inhaler Inhale 2 puffs by mouth every 4 (Four) Hours As Needed    aspirin 81 mg, Oral, Daily, Taken at night. Last dose 9-18-21    azelastine (ASTELIN) 0.1 % nasal spray Administer 1 spray in the nostrils twice a day    baclofen (LIORESAL) 10 mg, Oral, Nightly    betamethasone dipropionate (DIPROLENE) 0.05 % ointment Apply topically to the appropriate area as directed once daily as needed    butalbital-acetaminophen-caffeine (FIORICET, ESGIC) -40 MG per tablet Take 1 tablet by mouth Daily As Needed for headache    cholecalciferol (VITAMIN D3) 1,000 Units, Oral, Daily    clidinium-chlordiazePOXIDE (LIBRAX) 5-2.5 MG per capsule Take 1 capsule by mouth 2 (Two) Times a Day as needed.    clidinium-chlordiazePOXIDE (LIBRAX) 5-2.5 MG per capsule Take 1 capsule by mouth 2 (Two) Times a Day as needed.    clidinium-chlordiazePOXIDE (LIBRAX) 5-2.5 MG per capsule 1 capsule, Oral, 2 Times Daily PRN    clidinium-chlordiazePOXIDE (LIBRAX) 5-2.5 MG per capsule 1 capsule, Oral, 2 Times Daily PRN    clidinium-chlordiazePOXIDE (LIBRAX) 5-2.5 MG per capsule Take 1 capsule by mouth twice a day as needed    clidinium-chlordiazePOXIDE (LIBRAX)  5-2.5 MG per capsule 1 capsule, Oral, 2 Times Daily    clidinium-chlordiazePOXIDE (LIBRAX) 5-2.5 MG per capsule 1 capsule, Oral, 2 Times Daily    clidinium-chlordiazePOXIDE (LIBRAX) 5-2.5 MG per capsule 1 capsule, Oral, 2 Times Daily    clindamycin (CLEOCIN) 300 mg, Oral, 3 times daily    clindamycin (CLEOCIN) 300 mg, Oral, 3 times daily    clonazePAM (KlonoPIN) 0.5 MG tablet As Needed    Continuous Glucose  (FreeStyle Colin 3 Gleason) device Use As Directed    digoxin (LANOXIN) 250 mcg, Oral, Daily    fludrocortisone 0.1 mg, Oral, Daily    fluticasone (FLONASE) 50 MCG/ACT nasal spray Instill 2 sprays in each nostril 2 (Two) Times A Day    fluticasone (FLONASE) 50 MCG/ACT nasal spray Instill 2 sprays in each nostril 2 (Two) Times A Day    furosemide (LASIX) 40 MG tablet Take 1 tablet by mouth Daily. May have extra 1/2 to 1 tablet daily if needed for edema    gabapentin (NEURONTIN) 800 mg, Oral, Nightly    glucose blood (OneTouch Verio) test strip Use to test blood glucose 3 times a day as directed    guaiFENesin (Mucinex) 600 MG 12 hr tablet Take 1 tablet by mouth twice a day    HYDROcodone-acetaminophen (NORCO) 7.5-325 MG per tablet 1 tablet, Oral, 3 times daily    hydrocortisone (ANUSOL-HC) 25 MG suppository Insert 1 suppository rectally twice a day as needed (remove wrapper and moisten with water)    Influenza Vac High-Dose Quad (Fluzone High-Dose Quadrivalent) 0.7 ML suspension prefilled syringe injection Intramuscular    Insulin Glargine (BASAGLAR KWIKPEN) 100 UNIT/ML injection pen Inject 50 units subcutaneously once in the morning and 75 units at night    Insulin Pen Needle (B-D UF III MINI PEN NEEDLES) 31G X 5 MM misc Use as directed twice a day    Insulin Syringe-Needle U-100 29G 0.5 ML misc 0.3 mL, Injection, Daily    losartan (COZAAR) 50 MG tablet Take 1 tablet by mouth once Daily.    Magnesium Chloride 15 % Muscle relaxant gel apply 1-2 grams pain get to affected area 3 to 4 times a day     meclizine (ANTIVERT) 25 mg, Oral, 3 Times Daily PRN    metoprolol succinate XL (TOPROL-XL) 100 mg, Oral, Daily    metroNIDAZOLE (METROCREAM) 0.75 % cream Apply to the face once every night    mupirocin (BACTROBAN) 2 % ointment 1 application , Topical, 2 Times Daily    neomycin-colistin-hydrocortisone-thonzonium (Cortisporin-TC) 3.3-3-10-0.5 MG/ML otic suspension Instill 5 drops into affected ear 3 (Three) times a day for 10 days    nitroglycerin (Nitrostat) 0.4 MG SL tablet Place 1 tablet under the tongue Every 5 Minutes As Needed for Chest Pain for up to 3 total doses. Call 911 if pain remains after 1 dose.    NovoLOG FlexPen 10 Units, Subcutaneous, 3 Times Daily With Meals    Nurtec 75 mg, Oral, Daily PRN    Nurtec 75 mg, Oral, As Needed    ofloxacin (FLOXIN) 0.3 % otic solution Instill 5 drops into affected ear once a day for 7 days    ofloxacin (FLOXIN) 0.3 % otic solution Instill 5 drops in both ears daily    ondansetron ODT (ZOFRAN-ODT) 4 mg, Oral, Every 8 Hours PRN    pancrelipase, Lip-Prot-Amyl, (Pancreaze) 62038-69481 units capsule delayed-release particles capsule Take 1 capsule with each meal and with each snack    pancrelipase, Lip-Prot-Amyl, (Pancreaze) 64157-45405 units capsule delayed-release particles capsule Take 1 capsule with each meal and with each snack    potassium chloride (KLOR-CON) 10 MEQ CR tablet Take 1 tablet by mouth Daily as directed    prednisoLONE acetate (PRED FORTE) 1 % ophthalmic suspension Instill 1 drop in both eyes twice a day; Shake Well Before Use    promethazine (PHENERGAN) 25 mg, Oral, Every 6 Hours PRN    promethazine (PHENERGAN) 25 mg, Oral, Every 8 Hours PRN    RABEprazole (ACIPHEX) 20 mg, Oral, Daily    rosuvastatin (CRESTOR) 10 MG tablet Take one tablet by mouth once every evening    rosuvastatin (CRESTOR) 10 mg, Oral, Daily    rosuvastatin (CRESTOR) 10 mg, Oral, Daily    topiramate (TOPAMAX) 25 mg, Oral, Nightly    triamcinolone (KENALOG) 0.1 % cream Apply topically to  the affected area twice a day    venlafaxine XR (EFFEXOR-XR) 75 mg, Oral, Daily      PAST SURGICAL HISTORY  Past Surgical History:   Procedure Laterality Date    APPENDECTOMY      BREAST BIOPSY Left 2012    benign    BREAST BIOPSY      BREAST EXCISIONAL BIOPSY Left     benign    BREAST EXCISIONAL BIOPSY Right     benign    BREAST EXCISIONAL BIOPSY Right 2020    benign    BREAST SURGERY      CAUTERIZATION NASAL BLEEDERS  2022    CHOLECYSTECTOMY      COLONOSCOPY      HYSTERECTOMY      LIVER BIOPSY      OOPHORECTOMY      RECTAL SURGERY  11/20/2023    cancer    SKIN CANCER EXCISION      TONSILLECTOMY      TOTAL HIP ARTHROPLASTY REVISION      US GUIDED CYST ASPIRATION BREAST N/A 2/19/2024       FAMILY HISTORY  Family History   Problem Relation Age of Onset    Alzheimer's disease Mother     Cancer Mother     Coronary artery disease Other     Diabetes Other     Hypertension Other     Stroke Other     Cancer Other     Dementia Other     Heart attack Father     Colon polyps Father     Breast cancer Neg Hx     Ovarian cancer Neg Hx     Colon cancer Neg Hx     Esophageal cancer Neg Hx        SOCIAL HISTORY  Social History     Socioeconomic History    Marital status:    Tobacco Use    Smoking status: Never     Passive exposure: Never    Smokeless tobacco: Never   Vaping Use    Vaping status: Never Used   Substance and Sexual Activity    Alcohol use: No    Drug use: No    Sexual activity: Yes     ALLERGIES  Ampicillin, Atorvastatin, Tetanus toxoid, Acyclovir, Cefprozil, Clindamycin/lincomycin, Lansoprazole, Mestinon [pyridostigmine], and Ranexa [ranolazine er]    REVIEW OF SYSTEMS  All systems reviewed and negative except for those discussed in HPI.     PHYSICAL EXAM  ED Triage Vitals [09/12/24 1541]   Temp Heart Rate Resp BP SpO2   98.2 °F (36.8 °C) (!) 123 17 131/68 99 %      Temp src Heart Rate Source Patient Position BP Location FiO2 (%)   Oral Monitor Sitting Right arm --     I have reviewed the triage vital signs  and nursing notes.    General: Alert.  Nontoxic appearance.  No acute distress.  Head: Normocephalic.  Atraumatic.  Eyes: No scleral icterus.  ENT: Moist mucous membranes.  Cardiovascular: Regular rate and rhythm.  No murmurs.  No rubs.  2+ distal pulses bilaterally.  Respiratory: Equal breath sounds bilaterally.  No rales.  No rhonchi.  No wheezing.  GI: Abdomen is soft.  Nondistended.  Nontender to palpation.  No rebound.  No guarding.  No CVA tenderness.  MSK: Moves all 4 extremities.  Neurologic: Oriented x 3.  No focal deficits.  Skin: No erythema.  No edema. No pallor. No cyanosis.  Psych: Normal mood.  Pleasant affect.    LAB RESULTS  Recent Results (from the past 24 hour(s))   Comprehensive Metabolic Panel    Collection Time: 09/12/24  3:50 PM    Specimen: Blood   Result Value Ref Range    Glucose 160 (H) 65 - 99 mg/dL    BUN 11 8 - 23 mg/dL    Creatinine 0.86 0.57 - 1.00 mg/dL    Sodium 139 136 - 145 mmol/L    Potassium 4.3 3.5 - 5.2 mmol/L    Chloride 101 98 - 107 mmol/L    CO2 25.5 22.0 - 29.0 mmol/L    Calcium 9.7 8.6 - 10.5 mg/dL    Total Protein 7.6 6.0 - 8.5 g/dL    Albumin 4.6 3.5 - 5.2 g/dL    ALT (SGPT) 31 1 - 33 U/L    AST (SGOT) 32 1 - 32 U/L    Alkaline Phosphatase 78 39 - 117 U/L    Total Bilirubin 0.5 0.0 - 1.2 mg/dL    Globulin 3.0 gm/dL    A/G Ratio 1.5 g/dL    BUN/Creatinine Ratio 12.8 7.0 - 25.0    Anion Gap 12.5 5.0 - 15.0 mmol/L    eGFR 70.1 >60.0 mL/min/1.73   Lipase    Collection Time: 09/12/24  3:50 PM    Specimen: Blood   Result Value Ref Range    Lipase 17 13 - 60 U/L   Lactic Acid, Plasma    Collection Time: 09/12/24  3:50 PM    Specimen: Blood   Result Value Ref Range    Lactate 2.8 (C) 0.5 - 2.0 mmol/L   Green Top (Gel)    Collection Time: 09/12/24  3:50 PM   Result Value Ref Range    Extra Tube Hold for add-ons.    Lavender Top    Collection Time: 09/12/24  3:50 PM   Result Value Ref Range    Extra Tube hold for add-on    Gold Top - SST    Collection Time: 09/12/24  3:50 PM    Result Value Ref Range    Extra Tube Hold for add-ons.    Light Blue Top    Collection Time: 09/12/24  3:50 PM   Result Value Ref Range    Extra Tube Hold for add-ons.    CBC Auto Differential    Collection Time: 09/12/24  3:50 PM    Specimen: Blood   Result Value Ref Range    WBC 9.41 3.40 - 10.80 10*3/mm3    RBC 5.06 3.77 - 5.28 10*6/mm3    Hemoglobin 14.9 12.0 - 15.9 g/dL    Hematocrit 45.2 34.0 - 46.6 %    MCV 89.3 79.0 - 97.0 fL    MCH 29.4 26.6 - 33.0 pg    MCHC 33.0 31.5 - 35.7 g/dL    RDW 15.2 12.3 - 15.4 %    RDW-SD 49.8 37.0 - 54.0 fl    MPV 10.7 6.0 - 12.0 fL    Platelets 198 140 - 450 10*3/mm3    Neutrophil % 58.9 42.7 - 76.0 %    Lymphocyte % 29.5 19.6 - 45.3 %    Monocyte % 9.4 5.0 - 12.0 %    Eosinophil % 1.4 0.3 - 6.2 %    Basophil % 0.6 0.0 - 1.5 %    Immature Grans % 0.2 0.0 - 0.5 %    Neutrophils, Absolute 5.54 1.70 - 7.00 10*3/mm3    Lymphocytes, Absolute 2.78 0.70 - 3.10 10*3/mm3    Monocytes, Absolute 0.88 0.10 - 0.90 10*3/mm3    Eosinophils, Absolute 0.13 0.00 - 0.40 10*3/mm3    Basophils, Absolute 0.06 0.00 - 0.20 10*3/mm3    Immature Grans, Absolute 0.02 0.00 - 0.05 10*3/mm3    nRBC 0.0 0.0 - 0.2 /100 WBC   STAT Lactic Acid, Reflex    Collection Time: 09/12/24  6:43 PM    Specimen: Blood   Result Value Ref Range    Lactate 1.1 0.5 - 2.0 mmol/L       RADIOLOGY  CT Abdomen Pelvis With Contrast    Result Date: 9/12/2024  FINAL REPORT TECHNIQUE: null CLINICAL HISTORY: Nausea vomiting diarrhea COMPARISON: null FINDINGS: CT abdomen and pelvis with IV contrast. COMPARISON: None FINDINGS: Minimal atelectasis along the posterior lower lobes. Calcified lower paraesophageal lymph node. Liver is mildly enlarged with the right lobe measuring 18.7 cm. Cholecystectomy. Small splenic granulomas present. Normal pancreas. Normal adrenal glands. Symmetric renal enhancement. Small hypoattenuating lesions present on the left kidney measuring up to 6 mm, too small to further characterize but likely representing  renal cysts. No hydronephrosis. Appendix is not seen. No right lower quadrant or pericecal inflammatory changes. No bowel obstruction. No mesenteric or retroperitoneal lymphadenopathy. Moderate aortoiliac atherosclerotic vascular calcifications. Urinary bladder is unremarkable given degree of distention. No adnexal mass. Small fat containing umbilical hernia. Moderate-to-marked multilevel spondylosis. Right total hip arthroplasty. Mild levocurvature of the upper lumbar spine. No acute fracture identified.     IMPRESSION: 1. No cause for patient's symptoms identified. No bowel obstruction. 2. Appendix is not seen. No secondary evidence of appendicitis. Authenticated and Electronically Signed by Marlon Coffman MD on 09/12/2024 06:09:04 PM     PROCEDURES  Procedures    RISK STRATIFICATION    MEDICAL DECISION MAKING  74 y.o. female with past medical history listed above who presents with nausea, vomiting, diarrhea, generalized abdominal pain.    Vital signs in triage remarkable tachycardia, otherwise within normal limits.    Based on clinical presentation and physical exam, differential diagnosis includes, but is not limited to, gastroenteritis, colitis, small bowel obstruction, dehydration, metabolic derangement.    At least 3 different tests have been ordered on this patient.    Please see ED course below for my interpretation of the ED workup.  ED Course as of 09/12/24 2046   Thu Sep 12, 2024   2036 I reviewed the labs listed above. Notable findings are highlighted below.    Old laboratory data was reviewed from the medical records and compared to today's results.   [JS]   2036 CBC & Differential [JS]   2036 Comprehensive Metabolic Panel(!) [JS]   2036 Glucose(!): 160 [JS]   2037 Lactate(!!): 2.8 [JS]   2037 Lactate: 1.1 [JS]   2044 CT Abdomen Pelvis With Contrast  I have independently reviewed and interpreted the CT abdomen pelvis.  My interpretation is negative for small bowel obstruction.    [JS]      ED Course User  Index  [JS] Madan German DO     Medications administered in ED:  Medications   sodium chloride 0.9 % flush 10 mL (has no administration in time range)   sodium chloride 0.9 % bolus 1,000 mL (0 mL Intravenous Stopped 9/12/24 1841)   ondansetron (ZOFRAN) injection 4 mg (4 mg Intravenous Given 9/12/24 1642)   iopamidol (ISOVUE-300) 61 % injection 100 mL (100 mL Intravenous Given 9/12/24 1726)     On re-evaluation, patient resting comfortably.  States symptoms have improved following therapy. Repeat abdominal exam performed.  Abdomen remains soft, nondistended, nonsurgical in nature. Vital signs remained stable on room air.  Patient was ambulatory in the ED with steady gait.  Able to tolerate oral intake appropriately.    I discussed the findings of the ED workup with the patient at bedside.  No clinical indication for admission.  I recommended outpatient follow-up with PCP.  Patient was deemed medically stable for discharge with close outpatient follow-up and strict ED return precautions. Patient agreeable with plan and disposition.    Home medications were reviewed.  Prescriptions for discharge: Zofran ODT    Chronic conditions affecting care: None    Social determinants of health impacting treatment or disposition: None    REPEAT VITAL SIGNS  AS OF 20:46 EDT VITALS:  BP - 136/62  HR - 96  TEMP - 98.2 °F (36.8 °C) (Oral)  O2 SATS - 99%    DIAGNOSIS  Final diagnoses:   Nausea, vomiting, and diarrhea     DISPOSITION  ED Disposition       ED Disposition   Discharge    Condition   Stable    Comment   --               PATIENT REFERRALS  Connor Ritter MD  [address] [facility]  [address]  [place] [address] [phone]      PCP. 48 hours.            Please note that portions of this document were completed with voice recognition software.        Madan German DO  09/13/24 0238

## 2024-09-16 ENCOUNTER — OFFICE VISIT (OUTPATIENT)
Dept: GASTROENTEROLOGY | Facility: CLINIC | Age: 74
End: 2024-09-16
Payer: MEDICARE

## 2024-09-16 VITALS
HEART RATE: 81 BPM | DIASTOLIC BLOOD PRESSURE: 70 MMHG | TEMPERATURE: 97.3 F | WEIGHT: 160 LBS | HEIGHT: 67 IN | SYSTOLIC BLOOD PRESSURE: 130 MMHG | BODY MASS INDEX: 25.11 KG/M2 | OXYGEN SATURATION: 98 %

## 2024-09-16 DIAGNOSIS — A08.4 VIRAL GASTROENTERITIS: Primary | ICD-10-CM

## 2024-09-16 PROCEDURE — 99213 OFFICE O/P EST LOW 20 MIN: CPT | Performed by: INTERNAL MEDICINE

## 2024-09-16 PROCEDURE — 1159F MED LIST DOCD IN RCRD: CPT | Performed by: INTERNAL MEDICINE

## 2024-09-16 PROCEDURE — 3078F DIAST BP <80 MM HG: CPT | Performed by: INTERNAL MEDICINE

## 2024-09-16 PROCEDURE — 3075F SYST BP GE 130 - 139MM HG: CPT | Performed by: INTERNAL MEDICINE

## 2024-09-16 PROCEDURE — 1160F RVW MEDS BY RX/DR IN RCRD: CPT | Performed by: INTERNAL MEDICINE

## 2024-09-17 ENCOUNTER — TELEPHONE (OUTPATIENT)
Dept: GASTROENTEROLOGY | Facility: CLINIC | Age: 74
End: 2024-09-17
Payer: MEDICARE

## 2024-09-20 ENCOUNTER — TRANSCRIBE ORDERS (OUTPATIENT)
Dept: HOSPITALIST | Facility: HOSPITAL | Age: 74
End: 2024-09-20
Payer: MEDICARE

## 2024-09-20 DIAGNOSIS — Z12.31 ENCOUNTER FOR SCREENING MAMMOGRAM FOR MALIGNANT NEOPLASM OF BREAST: Primary | ICD-10-CM

## 2024-10-28 ENCOUNTER — TRANSCRIBE ORDERS (OUTPATIENT)
Dept: ADMINISTRATIVE | Facility: HOSPITAL | Age: 74
End: 2024-10-28
Payer: MEDICARE

## 2024-10-28 DIAGNOSIS — Z12.31 SCREENING MAMMOGRAM FOR BREAST CANCER: Primary | ICD-10-CM

## 2024-10-29 RX ORDER — INSULIN GLARGINE 100 [IU]/ML
INJECTION, SOLUTION SUBCUTANEOUS
Qty: 135 ML | Refills: 1 | Status: CANCELLED | OUTPATIENT
Start: 2024-10-29

## 2024-10-29 RX ORDER — INSULIN ASPART 100 [IU]/ML
10 INJECTION, SOLUTION INTRAVENOUS; SUBCUTANEOUS
Qty: 30 ML | Refills: 1 | Status: CANCELLED | OUTPATIENT
Start: 2024-10-29

## 2024-10-30 ENCOUNTER — OFFICE VISIT (OUTPATIENT)
Dept: ENDOCRINOLOGY | Facility: CLINIC | Age: 74
End: 2024-10-30
Payer: MEDICARE

## 2024-10-30 VITALS
DIASTOLIC BLOOD PRESSURE: 58 MMHG | OXYGEN SATURATION: 97 % | BODY MASS INDEX: 26.68 KG/M2 | SYSTOLIC BLOOD PRESSURE: 132 MMHG | HEIGHT: 67 IN | WEIGHT: 170 LBS | HEART RATE: 70 BPM

## 2024-10-30 DIAGNOSIS — E08.42 DIABETIC POLYNEUROPATHY ASSOCIATED WITH DIABETES MELLITUS DUE TO UNDERLYING CONDITION: ICD-10-CM

## 2024-10-30 DIAGNOSIS — I25.10 CORONARY ARTERY DISEASE INVOLVING NATIVE HEART WITHOUT ANGINA PECTORIS, UNSPECIFIED VESSEL OR LESION TYPE: ICD-10-CM

## 2024-10-30 DIAGNOSIS — Z79.4 TYPE 2 DIABETES MELLITUS WITH HYPERGLYCEMIA, WITH LONG-TERM CURRENT USE OF INSULIN: Primary | ICD-10-CM

## 2024-10-30 DIAGNOSIS — E11.65 TYPE 2 DIABETES MELLITUS WITH HYPERGLYCEMIA, WITH LONG-TERM CURRENT USE OF INSULIN: Primary | ICD-10-CM

## 2024-10-30 DIAGNOSIS — I10 PRIMARY HYPERTENSION: ICD-10-CM

## 2024-10-30 DIAGNOSIS — E78.5 DYSLIPIDEMIA: ICD-10-CM

## 2024-10-30 LAB
ALBUMIN SERPL-MCNC: 3.7 G/DL (ref 3.5–5.2)
ALBUMIN UR-MCNC: <1.2 MG/DL
ALBUMIN/GLOB SERPL: 1.3 G/DL
ALP SERPL-CCNC: 63 U/L (ref 39–117)
ALT SERPL W P-5'-P-CCNC: 22 U/L (ref 1–33)
ANION GAP SERPL CALCULATED.3IONS-SCNC: 7.5 MMOL/L (ref 5–15)
AST SERPL-CCNC: 21 U/L (ref 1–32)
BILIRUB SERPL-MCNC: 0.2 MG/DL (ref 0–1.2)
BUN SERPL-MCNC: 16 MG/DL (ref 8–23)
BUN/CREAT SERPL: 19.8 (ref 7–25)
CALCIUM SPEC-SCNC: 9.5 MG/DL (ref 8.6–10.5)
CHLORIDE SERPL-SCNC: 106 MMOL/L (ref 98–107)
CHOLEST SERPL-MCNC: 100 MG/DL (ref 0–200)
CO2 SERPL-SCNC: 29.5 MMOL/L (ref 22–29)
CREAT SERPL-MCNC: 0.81 MG/DL (ref 0.57–1)
CREAT UR-MCNC: 82.9 MG/DL
EGFRCR SERPLBLD CKD-EPI 2021: 75.3 ML/MIN/1.73
EXPIRATION DATE: ABNORMAL
EXPIRATION DATE: NORMAL
GLOBULIN UR ELPH-MCNC: 2.9 GM/DL
GLUCOSE BLDC GLUCOMTR-MCNC: 130 MG/DL (ref 70–130)
GLUCOSE SERPL-MCNC: 99 MG/DL (ref 65–99)
HBA1C MFR BLD: 7 % (ref 4.5–5.7)
HDLC SERPL-MCNC: 32 MG/DL (ref 40–60)
LDLC SERPL CALC-MCNC: 28 MG/DL (ref 0–100)
LDLC/HDLC SERPL: 0.46 {RATIO}
Lab: ABNORMAL
Lab: NORMAL
MICROALBUMIN/CREAT UR: NORMAL MG/G{CREAT}
POTASSIUM SERPL-SCNC: 4.6 MMOL/L (ref 3.5–5.2)
PROT SERPL-MCNC: 6.6 G/DL (ref 6–8.5)
SODIUM SERPL-SCNC: 143 MMOL/L (ref 136–145)
TRIGL SERPL-MCNC: 267 MG/DL (ref 0–150)
TSH SERPL DL<=0.05 MIU/L-ACNC: 3.34 UIU/ML (ref 0.27–4.2)
VLDLC SERPL-MCNC: 40 MG/DL (ref 5–40)

## 2024-10-30 PROCEDURE — 80053 COMPREHEN METABOLIC PANEL: CPT | Performed by: INTERNAL MEDICINE

## 2024-10-30 PROCEDURE — 84443 ASSAY THYROID STIM HORMONE: CPT | Performed by: INTERNAL MEDICINE

## 2024-10-30 PROCEDURE — 82043 UR ALBUMIN QUANTITATIVE: CPT | Performed by: INTERNAL MEDICINE

## 2024-10-30 PROCEDURE — 80061 LIPID PANEL: CPT | Performed by: INTERNAL MEDICINE

## 2024-10-30 PROCEDURE — 82570 ASSAY OF URINE CREATININE: CPT | Performed by: INTERNAL MEDICINE

## 2024-10-30 RX ORDER — ROSUVASTATIN CALCIUM 10 MG/1
10 TABLET, COATED ORAL
Qty: 90 TABLET | Refills: 3 | Status: SHIPPED | OUTPATIENT
Start: 2024-10-30

## 2024-10-30 RX ORDER — INSULIN ASPART 100 [IU]/ML
10 INJECTION, SOLUTION INTRAVENOUS; SUBCUTANEOUS
Qty: 30 ML | Refills: 3 | Status: SHIPPED | OUTPATIENT
Start: 2024-10-30

## 2024-10-30 RX ORDER — INSULIN GLARGINE 100 [IU]/ML
INJECTION, SOLUTION SUBCUTANEOUS
Qty: 135 ML | Refills: 3 | Status: SHIPPED | OUTPATIENT
Start: 2024-10-30

## 2024-10-30 NOTE — ASSESSMENT & PLAN NOTE
Continue cardiology follow up.  Intolerant of SGLT-2 inhibitor.  I don't want to use GLP-1 RA due to h/o pancreatitis.  Continue ASA and statin.

## 2024-10-30 NOTE — ASSESSMENT & PLAN NOTE
Diabetes is stable.   Continue current treatment regimen.  Diabetes will be reassessed in 3 months.    FreeStyle Colin 2 CGM was downloaded today.  Data was reviewed from 10/17/24 to 10/30/24.  This showed good overnight glucose levels.  Some acceptable postprandial spikes.  Time in range was 57%.  No patterns for insulin adjustments.

## 2024-10-30 NOTE — PROGRESS NOTES
"     Office Note      Date: 10/30/2024  Patient Name: Leela Muller  MRN: [id number]  : 1950    Chief Complaint   Patient presents with    Diabetes     Type II    Hypertension       History of Present Illness:   Leela Muller is a 74 y.o. female who presents for Diabetes type 2. Diagnosed in: . Treated in past with oral agents. She didn't tolerate farxiga due to yeast infections.  Current treatments: basal-bolus insulin. Number of insulin shots per day: 4. Checks blood sugar 288 times a day - on FreeStyle Colin. She is making frequent adjustments in insulin based on glucose readings.  Has low blood sugar: occ. Aspirin use: Yes. Statin use: Yes. ACE-I/ARB use: Yes. Changes in health since last visit: gastroenteritis; skin cancer removed; steroid injection. Last eye exam 2024.     Subjective      Diabetic Complications:  Eyes: No  Kidneys: No  Feet: Yes -    Heart: Yes -      Diet and Exercise:  Meals per day: 2  Minutes of exercise per week: 0 mins.    Review of Systems:   Review of Systems   Constitutional: Negative.    Cardiovascular: Negative.    Gastrointestinal:  Positive for constipation and diarrhea.   Endocrine: Negative.        The following portions of the patient's history were reviewed and updated as appropriate: allergies, current medications, past family history, past medical history, past social history, past surgical history, and problem list.    Objective     Visit Vitals  /58 (BP Location: Left arm, Patient Position: Sitting, Cuff Size: Adult)   Pulse 70   Ht 170.2 cm (67.01\")   Wt 77.1 kg (170 lb)   SpO2 97%   BMI 26.62 kg/m²       Physical Exam:  Physical Exam  Constitutional:       Appearance: Normal appearance.   Cardiovascular:      Pulses:           Dorsalis pedis pulses are 2+ on the right side and 2+ on the left side.        Posterior tibial pulses are 2+ on the right side and 2+ on the left side.   Musculoskeletal:      Right foot: Deformity present.   Feet:      Right foot: "      Protective Sensation: 5 sites tested.  2 sites sensed.      Skin integrity: Dry skin present.      Toenail Condition: Right toenails are normal.      Left foot:      Protective Sensation: 5 sites tested.  5 sites sensed.      Skin integrity: Dry skin present.      Toenail Condition: Left toenails are normal.      Comments: Decreased sensation to midfoot on right; right 5th toe medially deviated  Neurological:      Mental Status: She is alert.         Labs:    HbA1c  Lab Results   Component Value Date    HGBA1C 7.0 (A) 10/30/2024       CMP  Lab Results   Component Value Date    GLUCOSE 160 (H) 09/12/2024    BUN 11 09/12/2024    CREATININE 0.86 09/12/2024    EGFRIFNONA 73 12/17/2021    BCR 12.8 09/12/2024    K 4.3 09/12/2024    CO2 25.5 09/12/2024    CALCIUM 9.7 09/12/2024    LABIL2 1.5 03/20/2015    AST 32 09/12/2024    ALT 31 09/12/2024        Lipid Panel  Lab Results   Component Value Date    HDL 45 11/14/2023    LDL 43 11/14/2023    TRIG 140 11/14/2023        TSH  Lab Results   Component Value Date    TSH 2.570 11/14/2023        Hemoglobin A1C  Lab Results   Component Value Date    HGBA1C 7.0 (A) 10/30/2024        Microalbumin/Creatinine  Lab Results   Component Value Date    MALBCRERATIO  11/14/2023      Comment:      Unable to calculate    MICROALBUR <1.2 11/14/2023           Assessment / Plan      Assessment & Plan:  Diagnoses and all orders for this visit:    1. Type 2 diabetes mellitus with hyperglycemia, with long-term current use of insulin (Primary)  Assessment & Plan:  Diabetes is stable.   Continue current treatment regimen.  Diabetes will be reassessed in 3 months.    FreeStyle Colin 2 CGM was downloaded today.  Data was reviewed from 10/17/24 to 10/30/24.  This showed good overnight glucose levels.  Some acceptable postprandial spikes.  Time in range was 57%.  No patterns for insulin adjustments.    Orders:  -     POC Glucose, Blood  -     POC Glycosylated Hemoglobin (Hb A1C)  -     Comprehensive  Metabolic Panel; Future  -     Lipid Panel; Future  -     Microalbumin / Creatinine Urine Ratio - Urine, Clean Catch; Future  -     TSH; Future    2. Primary hypertension  Assessment & Plan:  Hypertension is stable and controlled  Continue current treatment regimen.  Blood pressure will be reassessed in 3 months.      3. Dyslipidemia  Assessment & Plan:  Continue statin.  Check lipids.      4. Diabetic polyneuropathy associated with diabetes mellitus due to underlying condition  Assessment & Plan:  Foot exam okay today.      5. Coronary artery disease involving native heart without angina pectoris, unspecified vessel or lesion type  Assessment & Plan:  Continue cardiology follow up.  Intolerant of SGLT-2 inhibitor.  I don't want to use GLP-1 RA due to h/o pancreatitis.  Continue ASA and statin.      Other orders  -     Insulin Glargine (BASAGLAR KWIKPEN) 100 UNIT/ML injection pen; Inject 50 units subcutaneously once in the morning and 75 units at night  Dispense: 135 mL; Refill: 3  -     insulin aspart (NovoLOG FlexPen) 100 UNIT/ML solution pen-injector sc pen; Inject 10 Units under the skin into the appropriate area as directed 3 (Three) Times a Day With Meals.  Dispense: 30 mL; Refill: 3      Current Outpatient Medications   Medication Instructions    acetaminophen (TYLENOL) 500 mg, Every 6 Hours PRN    albuterol sulfate  (90 Base) MCG/ACT inhaler Inhale 2 puffs by mouth every 4 (Four) Hours As Needed    aspirin 81 mg, Daily    azelastine (ASTELIN) 0.1 % nasal spray Administer 1 spray in the nostrils twice a day    Azelastine HCl 137 MCG/SPRAY solution Use 1 spray in each nostril twice daily    baclofen (LIORESAL) 10 mg, Oral, Nightly    betamethasone dipropionate (DIPROLENE) 0.05 % ointment Apply topically to the appropriate area as directed once daily as needed    butalbital-acetaminophen-caffeine (FIORICET, ESGIC) -40 MG per tablet Take 1 tablet by mouth Daily As Needed for headache     cholecalciferol (VITAMIN D3) 1,000 Units, Daily    clidinium-chlordiazePOXIDE (LIBRAX) 5-2.5 MG per capsule Take 1 capsule by mouth 2 (Two) Times a Day as needed.    clidinium-chlordiazePOXIDE (LIBRAX) 5-2.5 MG per capsule Take 1 capsule by mouth 2 (Two) Times a Day as needed.    clidinium-chlordiazePOXIDE (LIBRAX) 5-2.5 MG per capsule 1 capsule, Oral, 2 Times Daily    clindamycin (CLEOCIN) 300 mg, Oral, 3 times daily    clonazePAM (KlonoPIN) 0.5 MG tablet As Needed    Continuous Glucose  (FreeStyle Colin 3 [place]) device Use As Directed    digoxin (LANOXIN) 250 mcg, Oral, Daily    fluconazole (DIFLUCAN) 150 mg, Oral, Every 72 Hours    fludrocortisone 0.1 mg, Oral, Daily    fluticasone (FLONASE) 50 MCG/ACT nasal spray Instill 2 sprays in each nostril 2 (Two) Times A Day    furosemide (LASIX) 40 MG tablet Take 1 tablet by mouth Daily. May have extra 1/2 to 1 tablet daily if needed for edema    gabapentin (NEURONTIN) 800 mg, Oral, Nightly    glucose blood (OneTouch Verio) test strip Use to test blood glucose 3 times a day as directed    guaiFENesin (Mucinex) 600 MG 12 hr tablet Take 1 tablet by mouth twice a day    HYDROcodone-acetaminophen (NORCO) 7.5-325 MG per tablet 1 tablet, Oral, 3 times daily    hydrocortisone (ANUSOL-HC) 25 MG suppository Insert 1 suppository rectally twice a day as needed (remove wrapper and moisten with water)    Influenza Vac High-Dose Quad (Fluzone High-Dose Quadrivalent) 0.7 ML suspension prefilled syringe injection Intramuscular    Insulin Glargine (BASAGLAR KWIKPEN) 100 UNIT/ML injection pen Inject 50 units subcutaneously once in the morning and 75 units at night    Insulin Pen Needle (B-D UF III MINI PEN NEEDLES) 31G X 5 MM misc Use as directed twice a day    Insulin Syringe-Needle U-100 29G 0.5 ML misc 0.3 mL, Daily    losartan (COZAAR) 50 MG tablet Take 1 tablet by mouth once Daily.    Magnesium Chloride 15 % Muscle relaxant gel apply 1-2 grams pain get to affected area 3 to  4 times a day    meclizine (ANTIVERT) 25 mg, Oral, 3 Times Daily PRN    metoprolol succinate XL (TOPROL-XL) 100 mg, Oral, Daily    metroNIDAZOLE (METROCREAM) 0.75 % cream Apply to the face once every night    mupirocin (BACTROBAN) 2 % ointment 1 application , Topical, 2 Times Daily    mupirocin (BACTROBAN) 2 % ointment APPLY SPARINGLY TO THE AFFECTED AREA(S) TWICE DAILY    neomycin-colistin-hydrocortisone-thonzonium (Cortisporin-TC) 3.3-3-10-0.5 MG/ML otic suspension Instill 5 drops into affected ear 3 (Three) times a day for 10 days    nitroglycerin (Nitrostat) 0.4 MG SL tablet Place 1 tablet under the tongue Every 5 Minutes As Needed for Chest Pain for up to 3 total doses. Call 911 if pain remains after 1 dose.    NovoLOG FlexPen 10 Units, Subcutaneous, 3 Times Daily With Meals    Nurtec 75 mg, Oral, Daily PRN    Nurtec 75 mg, Oral, As Needed    ofloxacin (FLOXIN) 0.3 % otic solution Instill 5 drops into affected ear once a day for 7 days    ondansetron ODT (ZOFRAN-ODT) 4 mg, Oral, Every 8 Hours PRN    pancrelipase, Lip-Prot-Amyl, (Pancreaze) 53143-04921 units capsule delayed-release particles capsule Take 1 capsule with each meal and with each snack    potassium chloride (KLOR-CON) 10 MEQ CR tablet Take 1 tablet by mouth Daily as directed    prednisoLONE acetate (PRED FORTE) 1 % ophthalmic suspension Instill 1 drop in both eyes twice a day; Shake Well Before Use    promethazine (PHENERGAN) 25 mg, Oral, Every 6 Hours PRN    RABEprazole (ACIPHEX) 20 mg, Oral, Daily    rosuvastatin (CRESTOR) 10 MG tablet Take one tablet by mouth once every evening    topiramate (TOPAMAX) 25 mg, Oral, Nightly    triamcinolone (KENALOG) 0.1 % cream Apply topically to the affected area twice a day    valACYclovir (VALTREX) 1,000 mg, Oral, Daily, Increase to 1 tablet 3 times a day with flare up    venlafaxine XR (EFFEXOR-XR) 75 mg, Oral, Daily      Return in about 3 months (around 1/30/2025) for Recheck with A1c.    Electronically  signed by: Jac Santiago MD  10/30/2024

## 2024-10-31 ENCOUNTER — TELEPHONE (OUTPATIENT)
Dept: ENDOCRINOLOGY | Facility: CLINIC | Age: 74
End: 2024-10-31
Payer: MEDICARE

## 2024-10-31 NOTE — TELEPHONE ENCOUNTER
Pt called checking on the status of meter pt is aware Nelli has sent order for meter to [employer] supply. Pt stated she received a call from [employer] they would not pay for the meter only covered ever 5 year that we needed to contact [employer] insurance company

## 2024-11-13 ENCOUNTER — OFFICE VISIT (OUTPATIENT)
Dept: UROLOGY | Facility: CLINIC | Age: 74
End: 2024-11-13
Payer: MEDICARE

## 2024-11-13 VITALS — BODY MASS INDEX: 26.68 KG/M2 | HEIGHT: 67 IN | WEIGHT: 169.97 LBS

## 2024-11-13 DIAGNOSIS — N28.1 RENAL CYST: Primary | ICD-10-CM

## 2024-11-13 DIAGNOSIS — R39.16 STRAINING DURING URINATION: ICD-10-CM

## 2024-11-13 NOTE — PROGRESS NOTES
Follow Up Office Visit      Patient Name: Leela Muller  : 1950   MRN: [id number]     Chief Complaint:    Chief Complaint   Patient presents with    Renal cyst       Referring Provider: No ref. provider found    History of Present Illness: Leela Muller is a 74 y.o. female who presents today for follow up of  renal cysts.  She has subcentimeter cysts which appear to be simple.  She has undergone dilations previously.      She presents today for follow-up.  She is doing well.  No significant LUTS but does report having to push a little bit at the end of her void.  She believes that she is completely emptying and said her PCP obtained a PVR that showed that she was emptying.  Denies dysuria or gross hematuria.      Subjective      Review of System:   Review of Systems   I have reviewed the ROS documented by my clinical staff, I have updated appropriately and I agree. Ash Pop MD    I have reviewed and the following portions of the patient's history were updated as appropriate: past family history, past medical history, past social history, past surgical history and problem list.    Past Medical History:   Past Medical History:   Diagnosis Date    Anxiety     Arm pain     Arthritis     Breast injury     fell 2019     Cancer     Chronic pancreatitis     COVID-19     Depression     Diabetes mellitus     History of rectal surgery     Hyperlipidemia     Hypertension     Liver mass     Medication monitoring encounter 2018    Neck pain on left side     Palpitations 2018    Pancreatitis     Pulmonary embolism     Stroke syndrome 2016    · Assessed By: Devaughn Lewis (Neurology); Last Assessed: 2015  Right cerebrovascular accident in July or 2010, evaluated at [facility].  Admission to [facility] on 2010 with headache and stuttering, consistent with TIA.  Chronic Coumadin therapy, initiated following bilateral pulmonary emboli in   after fall and hip replacement.  She was already on 81 mg of aspirin as well, 09/2010.  MRI of the brain on 09/28/2010 revealing old right parietal cerebrovascular accident.  MRA revealing normal carotids, middle anterior and posterior cerebral arteries with minimal ostial stenosis of both vertebral arteries.  GENARO by Dr. Oliver Mobley on 09/28/2010:  patent foramen ovale with a small amount of right to left shunting.  Normal LVEF and normal valvular structures.   Patent foramen ovale closure using a 25 mm. Amplatzer cribriform occluder, 10/05/2010.   Echocardiogram, 06/23/2014:  LVEF (55% to 60%) with and Amplatzer device noted to be well-seated in     Thrombocytopenia     Transient cerebral ischemia     Type 2 diabetes mellitus        Past Surgical History:  Past Surgical History:   Procedure Laterality Date    APPENDECTOMY      BREAST BIOPSY Left 2012    benign    BREAST BIOPSY      BREAST EXCISIONAL BIOPSY Left     benign    BREAST EXCISIONAL BIOPSY Right     benign    BREAST EXCISIONAL BIOPSY Right 2020    benign    BREAST SURGERY      CAUTERIZATION NASAL BLEEDERS  2022    CHOLECYSTECTOMY      COLONOSCOPY      HYSTERECTOMY      LIVER BIOPSY      OOPHORECTOMY      RECTAL SURGERY  11/20/2023    cancer    SKIN CANCER EXCISION      SKIN CANCER EXCISION N/A     TONSILLECTOMY      TOTAL HIP ARTHROPLASTY REVISION      US GUIDED CYST ASPIRATION BREAST N/A 02/19/2024       Family History:   family history includes Alzheimer's disease in her mother; Cancer in her mother and another family member; Colon polyps in her father; Coronary artery disease in an other family member; Dementia in an other family member; Diabetes in an other family member; Heart attack in her father; Hypertension in an other family member; Stroke in an other family member.   Otherwise pertinent FHx was reviewed and not pertinent to current issue.    Social History:    reports that she has never smoked. She has never been exposed to tobacco smoke.  She has never used smokeless tobacco. She reports that she does not drink alcohol and does not use drugs.    Medications:     Current Outpatient Medications:     acetaminophen (TYLENOL) 500 MG tablet, Take 1 tablet by mouth Every 6 (Six) Hours As Needed., Disp: , Rfl:     albuterol sulfate  (90 Base) MCG/ACT inhaler, Inhale 2 puffs by mouth every 4 (Four) Hours As Needed, Disp: 8.5 g, Rfl: 2    aspirin 81 MG tablet, Take 1 tablet by mouth Daily. Taken at night. Last dose 9-18-21, Disp: , Rfl:     azelastine (ASTELIN) 0.1 % nasal spray, Administer 1 spray in the nostrils twice a day, Disp: 100 mL, Rfl: 2    Azelastine HCl 137 MCG/SPRAY solution, Use 1 spray in each nostril twice daily, Disp: 30 mL, Rfl: 11    baclofen (LIORESAL) 10 MG tablet, Take 1 tablet by mouth Every Night., Disp: 90 tablet, Rfl: 3    betamethasone dipropionate (DIPROLENE) 0.05 % ointment, Apply topically to the appropriate area as directed once daily as needed, Disp: 15 g, Rfl: 0    butalbital-acetaminophen-caffeine (FIORICET, ESGIC) -40 MG per tablet, Take 1 tablet by mouth Daily As Needed for headache, Disp: 30 tablet, Rfl: 0    Cholecalciferol 25 MCG (1000 UT) tablet, Take 1 tablet by mouth Daily., Disp: , Rfl:     clidinium-chlordiazePOXIDE (LIBRAX) 5-2.5 MG per capsule, Take 1 capsule by mouth 2 (Two) Times a Day as needed., Disp: 60 capsule, Rfl: 0    clidinium-chlordiazePOXIDE (LIBRAX) 5-2.5 MG per capsule, Take 1 capsule by mouth 2 (Two) Times a Day., Disp: 60 capsule, Rfl: 0    clindamycin (CLEOCIN) 300 MG capsule, Take 1 capsule by mouth 3 times a day., Disp: 42 capsule, Rfl: 0    clonazePAM (KlonoPIN) 0.5 MG tablet, As Needed., Disp: , Rfl:     Continuous Glucose  (FreeStyle Colin 3 [place]) device, Use As Directed, Disp: 1 each, Rfl: 0    digoxin (LANOXIN) 250 MCG tablet, Take 1 tablet by mouth Daily., Disp: 90 tablet, Rfl: 3    fluconazole (DIFLUCAN) 150 MG tablet, Take 1 tablet by mouth Every 72 (Seventy-Two)  Hours., Disp: 2 tablet, Rfl: 0    fludrocortisone 0.1 MG tablet, Take 1 tablet by mouth Daily., Disp: 90 tablet, Rfl: 3    fluticasone (FLONASE) 50 MCG/ACT nasal spray, Instill 2 sprays in each nostril 2 (Two) Times A Day, Disp: 120 g, Rfl: 1    furosemide (LASIX) 40 MG tablet, Take 1 tablet by mouth Daily. May have extra 1/2 to 1 tablet daily if needed for edema, Disp: 135 tablet, Rfl: 3    gabapentin (NEURONTIN) 800 MG tablet, Take 1 tablet by mouth Every Night., Disp: 90 tablet, Rfl: 1    glucose blood (OneTouch Verio) test strip, Use to test blood glucose 3 times a day as directed, Disp: 300 each, Rfl: 3    guaiFENesin (Mucinex) 600 MG 12 hr tablet, Take 1 tablet by mouth twice a day, Disp: 14 tablet, Rfl: 0    HYDROcodone-acetaminophen (NORCO) 7.5-325 MG per tablet, Take 1 tablet by mouth 3 times a day., Disp: 90 tablet, Rfl: 0    hydrocortisone (ANUSOL-HC) 25 MG suppository, Insert 1 suppository rectally twice a day as needed (remove wrapper and moisten with water), Disp: 12 suppository, Rfl: 3    Influenza Vac High-Dose Quad (Fluzone High-Dose Quadrivalent) 0.7 ML suspension prefilled syringe injection, Inject  into the appropriate muscle as directed by prescriber., Disp: 0.7 mL, Rfl: 0    insulin aspart (NovoLOG FlexPen) 100 UNIT/ML solution pen-injector sc pen, Inject 10 Units under the skin into the appropriate area as directed 3 (Three) Times a Day With Meals., Disp: 30 mL, Rfl: 3    Insulin Glargine (BASAGLAR KWIKPEN) 100 UNIT/ML injection pen, Inject 50 units subcutaneously once in the morning and 75 units at night, Disp: 135 mL, Rfl: 3    Insulin Pen Needle (B-D UF III MINI PEN NEEDLES) 31G X 5 MM misc, Use as directed twice a day, Disp: 180 each, Rfl: 0    Insulin Syringe-Needle U-100 29G 0.5 ML misc, Inject 0.3 mL as directed Daily., Disp: , Rfl:     losartan (COZAAR) 50 MG tablet, Take 1 tablet by mouth once Daily., Disp: 90 tablet, Rfl: 3    Magnesium Chloride 15 %, Muscle relaxant gel apply 1-2  grams pain get to affected area 3 to 4 times a day  Indications: magnesium chloride 15%, guaifenesin 10%, baclofen 5%, cyclobenzaprine 4%, Disp: 420 g, Rfl: 11    meclizine (ANTIVERT) 25 MG tablet, Take 1 tablet by mouth 3 (Three) Times a Day As Needed for Dizziness., Disp: 90 tablet, Rfl: 3    metoprolol succinate XL (TOPROL-XL) 100 MG 24 hr tablet, Take 1 tablet by mouth Daily., Disp: 90 tablet, Rfl: 3    metroNIDAZOLE (METROCREAM) 0.75 % cream, Apply to the face once every night, Disp: 45 g, Rfl: 0    mupirocin (BACTROBAN) 2 % ointment, Apply 1 application  topically to the appropriate area as directed 2 (Two) Times a Day., Disp: 22 g, Rfl: 0    neomycin-colistin-hydrocortisone-thonzonium (Cortisporin-TC) 3.3-3-10-0.5 MG/ML otic suspension, Instill 5 drops into affected ear 3 (Three) times a day for 10 days, Disp: 10 mL, Rfl: 0    nitroglycerin (Nitrostat) 0.4 MG SL tablet, Place 1 tablet under the tongue Every 5 Minutes As Needed for Chest Pain for up to 3 total doses. Call 911 if pain remains after 1 dose., Disp: 25 tablet, Rfl: 6    Nurtec 75 MG dispersible tablet, Take 1 tablet by mouth Daily As Needed (migraine)., Disp: 8 tablet, Rfl: 11    ofloxacin (FLOXIN) 0.3 % otic solution, Instill 5 drops into affected ear once a day for 7 days, Disp: 10 mL, Rfl: 0    ondansetron ODT (ZOFRAN-ODT) 4 MG disintegrating tablet, Take 1 tablet by mouth Every 8 (Eight) Hours As Needed for Nausea or Vomiting., Disp: 12 tablet, Rfl: 0    pancrelipase, Lip-Prot-Amyl, (Pancreaze) 36048-26688 units capsule delayed-release particles capsule, Take 1 capsule with each meal and with each snack, Disp: 100 capsule, Rfl: 1    potassium chloride (KLOR-CON) 10 MEQ CR tablet, Take 1 tablet by mouth Daily as directed, Disp: 90 tablet, Rfl: 0    prednisoLONE acetate (PRED FORTE) 1 % ophthalmic suspension, Instill 1 drop in both eyes twice a day; Shake Well Before Use, Disp: 5 mL, Rfl: 0    promethazine (PHENERGAN) 25 MG tablet, Take 1 tablet  by mouth Every 6 (Six) Hours As Needed for Nausea or Vomiting., Disp: 30 tablet, Rfl: 1    RABEprazole (ACIPHEX) 20 MG EC tablet, Take 1 tablet by mouth Daily., Disp: 90 tablet, Rfl: 2    Rimegepant Sulfate (Nurtec) 75 MG tablet dispersible tablet, Take 1 tablet by mouth As Needed (Place one tablet on tongue at onset of migraine, may not repeat for 24 hours)., Disp: 10 tablet, Rfl: 0    rosuvastatin (CRESTOR) 10 MG tablet, Take 1 tablet by mouth every night at bedtime., Disp: 90 tablet, Rfl: 3    topiramate (TOPAMAX) 25 MG tablet, Take 1 tablet by mouth Every Night., Disp: 90 tablet, Rfl: 3    triamcinolone (KENALOG) 0.1 % cream, Apply topically to the affected area twice a day, Disp: 30 g, Rfl: 0    valACYclovir (Valtrex) 1000 MG tablet, Take 1 tablet by mouth Daily. Increase to 1 tablet 3 times a day with flare up, Disp: 80 tablet, Rfl: 0    venlafaxine XR (EFFEXOR-XR) 75 MG 24 hr capsule, Take 1 capsule by mouth Daily., Disp: 90 capsule, Rfl: 3    clidinium-chlordiazePOXIDE (LIBRAX) 5-2.5 MG per capsule, Take 1 capsule by mouth 2 (Two) Times a Day as needed. (Patient not taking: Reported on 11/13/2024), Disp: 60 capsule, Rfl: 0    clidinium-chlordiazePOXIDE (LIBRAX) 5-2.5 MG per capsule, Take 1 capsule by mouth 2 (Two) Times a Day. (Patient not taking: Reported on 11/13/2024), Disp: 60 capsule, Rfl: 0    mupirocin (BACTROBAN) 2 % ointment, APPLY SPARINGLY TO THE AFFECTED AREA(S) TWICE DAILY (Patient not taking: Reported on 11/13/2024), Disp: 22 g, Rfl: 4    Allergies:   Allergies   Allergen Reactions    Ampicillin GI Intolerance     Diarrhea    Beta lactam allergy details  Antibiotic reaction: unknown  Age at reaction: unknown  Dose to reaction time: unknown  Reason for antibiotic: unknown  Epinephrine required for reaction?: unknown  Tolerated antibiotics: unknown        Atorvastatin Swelling    Tetanus Toxoid Hives    Acyclovir Seizure    Cefprozil Other (See Comments) and Diarrhea     diarrhea and vomiting     "Clindamycin/Lincomycin Nausea And Vomiting and Diarrhea     patient requested this be added to her allergy list as she does not wish to take again.    Lansoprazole Nausea Only and Nausea And Vomiting    Mestinon [Pyridostigmine] Itching and Other (See Comments)     Leg cramps, shooting vaginal pains, frequent urination    Ranexa [Ranolazine Er] Nausea And Vomiting       Bladder & Bowel Symptom Questionnaire    How often do you usually urinate during the day ?   4 - At least once an hour    2.   How many timed do you urinate at night?   2 - 3 times at night   3.   What is the reason that you usually urinate?   2 - Moderate urge (can delay 10-60 min)   4.   Once you get the urge to go, how long can you     comfortably delay?   0 - More than 60 min   5.   How often do you get a sudden urge that makes you rush to the bathroom?   4 - At least once a day   6.   How often does a sudden urge to urinate result in you leaking urine or wetting pads?   3 - A few times a week   7.  In your opinion, how good is your bladder control?   2 - Good   8.  Do you have accidental bowel leakage?   no   9.  Do you have difficulty fully emptying your bladder?   no   10.  Do you experience accidental leakage when performing some physical activity such as coughing, sneezing, laughing or exercise?   yes   11. Have you tried medications to help improve your symptoms?   no   12. Would you be interested in learning about a long-lasting option that may help you with your symptoms?   no                                                                             Total Score   17     0-7 (Mild) 8-16 (Moderate) 17-28 (Severe)         Objective     Physical Exam:   Vital Signs:   Vitals:    11/13/24 1004   Weight: 77.1 kg (169 lb 15.6 oz)   Height: 170.2 cm (67.01\")     Body mass index is 26.61 kg/m².     Physical Exam  GEN: NAD  HEENT: NCAT, EOMI  PULM: No respiratory distress  CV: Appears well perfused  ABD: Non-distended  : Deferred  EXT: No " "obvious deformities  MSK: Normal ROM BL UE  NEURO: No focal deficits, AOx3  PSYCH: Appropriate mood and affect      Labs:   Brief Urine Lab Results  (Last result in the past 365 days)        Color   Clarity   Blood   Leuk Est   Nitrite   Protein   CREAT   Urine HCG        10/30/24 0908             82.9                      Lab Results   Component Value Date    GLUCOSE 99 10/30/2024    CALCIUM 9.5 10/30/2024     10/30/2024    K 4.6 10/30/2024    CO2 29.5 (H) 10/30/2024     10/30/2024    BUN 16 10/30/2024    CREATININE 0.81 10/30/2024    EGFRIFNONA 73 12/17/2021    BCR 19.8 10/30/2024    ANIONGAP 7.5 10/30/2024       Lab Results   Component Value Date    WBC 9.41 09/12/2024    HGB 14.9 09/12/2024    HCT 45.2 09/12/2024    MCV 89.3 09/12/2024     09/12/2024       No results found for: \"PSA\"    Images:   CT Abdomen Pelvis With Contrast    Result Date: 9/12/2024  IMPRESSION: 1. No cause for patient's symptoms identified. No bowel obstruction. 2. Appendix is not seen. No secondary evidence of appendicitis. Authenticated and Electronically Signed by Marlon Coffman MD on 09/12/2024 06:09:04 PM      Measures:   Tobacco:   Leela Muller  reports that she has never smoked. She has never been exposed to tobacco smoke. She has never used smokeless tobacco. I have educated her on the risk of diseases from using tobacco products.    Urine Incontinence: Patient reports that she is not currently experiencing any symptoms of urinary incontinence.         Assessment / Plan      Assessment/Plan:   74 y.o. female who presented today for follow up of renal cysts.  She has subcentimeter cysts which appear to be simple and are stable on most recent imaging 9/12/24.  She has undergone dilations previously.  She is doing well today.  She does reports some straining at end of void but feels like she's emptying and it is not bothersome at this time.     We will plan for follow-up in 1 year. She can reach out sooner if she has " urinary symptoms and we can consider dilation at that time. We will also continue to follow patient's renal cysts through her liver surveillance imaging.     Diagnoses and all orders for this visit:    1. Renal cyst (Primary)    2. Straining during urination         Follow Up:   RTC 1 year    I spent approximately 20 minutes providing clinical care for this patient; including review of patient's chart and provider documentation, face to face time spent with patient in examination room (obtaining history, performing physical exam, discussing diagnosis and management options), placing orders, and completing patient documentation.     Zuleyka Rodriguez MD  [facility] [place] [facility] [place]

## 2024-12-17 ENCOUNTER — HOSPITAL ENCOUNTER (OUTPATIENT)
Dept: MAMMOGRAPHY | Facility: HOSPITAL | Age: 74
Discharge: HOME | End: 2024-12-17
Admitting: INTERNAL MEDICINE
Payer: MEDICARE

## 2024-12-17 DIAGNOSIS — Z12.31 SCREENING MAMMOGRAM FOR BREAST CANCER: ICD-10-CM

## 2024-12-17 PROCEDURE — 77063 BREAST TOMOSYNTHESIS BI: CPT

## 2024-12-17 PROCEDURE — 77067 SCR MAMMO BI INCL CAD: CPT

## 2024-12-19 ENCOUNTER — OFFICE VISIT (OUTPATIENT)
Dept: GASTROENTEROLOGY | Facility: CLINIC | Age: 74
End: 2024-12-19
Payer: MEDICARE

## 2024-12-19 VITALS
HEART RATE: 97 BPM | DIASTOLIC BLOOD PRESSURE: 90 MMHG | HEIGHT: 67 IN | OXYGEN SATURATION: 97 % | SYSTOLIC BLOOD PRESSURE: 140 MMHG | WEIGHT: 165 LBS | BODY MASS INDEX: 25.9 KG/M2 | TEMPERATURE: 93 F

## 2024-12-19 DIAGNOSIS — K59.01 SLOW TRANSIT CONSTIPATION: Primary | ICD-10-CM

## 2024-12-19 PROCEDURE — 77063 BREAST TOMOSYNTHESIS BI: CPT | Performed by: RADIOLOGY

## 2024-12-19 PROCEDURE — 77067 SCR MAMMO BI INCL CAD: CPT | Performed by: RADIOLOGY

## 2024-12-19 NOTE — PROGRESS NOTES
Patient Name: Leela Muller  YOB: 1950   Medical Record number: [id number]     PCP: Connor Ritter MD    Chief Complaint   Patient presents with    Follow-up   Follow-up from constipation and diarrhea.    History of Present Illness:   HPI  Mrs. Muller presents to the office for follow-up.  The patient states that she had a colonoscopy by Dr. Mahmood just over 2 weeks ago.  She reportedly had  polyps removed.  The record was not available at time of this office visit.  Mrs. Muller states that the colonoscopy was difficult and she did not have any sedation.  She denies any abdominal pain at this time.  The patient does still have some periods of constipation with days in between bowel movements.  Mrs. Muller denies any gross blood in the stool.  There is no history of difficult or painful swallowing.  She denies any heartburn.  Past Medical History:   Diagnosis Date    Anxiety     Arm pain     Arthritis     Breast injury     fell 6/2019     Cancer     Chronic pancreatitis     COVID-19     Depression     Diabetes mellitus     History of rectal surgery     Hyperlipidemia     Hypertension     Liver mass     Medication monitoring encounter 07/24/2018    Neck pain on left side     Palpitations 04/18/2018    Pancreatitis     Pulmonary embolism     Stroke syndrome 09/14/2016    · Assessed By: Devaughn Lewis (Neurology); Last Assessed: 16 Jun 2015  Right cerebrovascular accident in July or August 2010, evaluated at [facility].  Admission to [facility] on 09/28/2010 with headache and stuttering, consistent with TIA.  Chronic Coumadin therapy, initiated following bilateral pulmonary emboli in 2007 after fall and hip replacement.  She was already on 81 mg of aspirin as well, 09/2010.  MRI of the brain on 09/28/2010 revealing old right parietal cerebrovascular accident.  MRA revealing normal carotids, middle anterior and posterior cerebral arteries with minimal ostial stenosis  of both vertebral arteries.  GENARO by Dr. Oliver Mobley on 09/28/2010:  patent foramen ovale with a small amount of right to left shunting.  Normal LVEF and normal valvular structures.   Patent foramen ovale closure using a 25 mm. Amplatzer cribriform occluder, 10/05/2010.   Echocardiogram, 06/23/2014:  LVEF (55% to 60%) with and Amplatzer device noted to be well-seated in     Thrombocytopenia     Transient cerebral ischemia     Type 2 diabetes mellitus        Past Surgical History:   Procedure Laterality Date    APPENDECTOMY      BREAST BIOPSY Left 2012    benign    BREAST BIOPSY      BREAST EXCISIONAL BIOPSY Left     benign    BREAST EXCISIONAL BIOPSY Right     benign    BREAST EXCISIONAL BIOPSY Right 2020    benign    BREAST SURGERY      CAUTERIZATION NASAL BLEEDERS  2022    CHOLECYSTECTOMY      COLONOSCOPY      HYSTERECTOMY      LIVER BIOPSY      OOPHORECTOMY      RECTAL SURGERY  11/20/2023    cancer    SKIN CANCER EXCISION      SKIN CANCER EXCISION N/A     TONSILLECTOMY      TOTAL HIP ARTHROPLASTY REVISION      US GUIDED CYST ASPIRATION BREAST N/A 02/19/2024         Current Outpatient Medications:     acetaminophen (TYLENOL) 500 MG tablet, Take 1 tablet by mouth Every 6 (Six) Hours As Needed., Disp: , Rfl:     albuterol sulfate  (90 Base) MCG/ACT inhaler, Inhale 2 puffs by mouth every 4 (Four) Hours As Needed, Disp: 8.5 g, Rfl: 2    aspirin 81 MG tablet, Take 1 tablet by mouth Daily. Taken at night. Last dose 9-18-21, Disp: , Rfl:     azelastine (ASTELIN) 0.1 % nasal spray, Administer 1 spray in the nostrils twice a day, Disp: 100 mL, Rfl: 2    Azelastine HCl 137 MCG/SPRAY solution, Use 1 spray in each nostril twice daily, Disp: 30 mL, Rfl: 11    baclofen (LIORESAL) 10 MG tablet, Take 1 tablet by mouth Every Night., Disp: 90 tablet, Rfl: 3    betamethasone dipropionate (DIPROLENE) 0.05 % ointment, Apply topically to the appropriate area as directed once daily as needed, Disp: 15 g, Rfl: 0     butalbital-acetaminophen-caffeine (FIORICET, ESGIC) -40 MG per tablet, Take 1 tablet by mouth Daily As Needed for headache, Disp: 30 tablet, Rfl: 0    Cholecalciferol 25 MCG (1000 UT) tablet, Take 1 tablet by mouth Daily., Disp: , Rfl:     clidinium-chlordiazePOXIDE (LIBRAX) 5-2.5 MG per capsule, Take 1 capsule by mouth 2 (Two) Times a Day as needed., Disp: 60 capsule, Rfl: 0    clidinium-chlordiazePOXIDE (LIBRAX) 5-2.5 MG per capsule, Take 1 capsule by mouth 2 (Two) Times a Day., Disp: 60 capsule, Rfl: 0    clidinium-chlordiazePOXIDE (LIBRAX) 5-2.5 MG per capsule, Take 1 capsule by mouth twice a day as needed, Disp: 60 capsule, Rfl: 0    clindamycin (CLEOCIN) 300 MG capsule, Take 1 capsule by mouth 3 times a day., Disp: 42 capsule, Rfl: 0    clonazePAM (KlonoPIN) 0.5 MG tablet, As Needed., Disp: , Rfl:     Continuous Glucose  (FreeStyle Colin 3 Nogal) device, Use As Directed, Disp: 1 each, Rfl: 0    digoxin (LANOXIN) 250 MCG tablet, Take 1 tablet by mouth Daily., Disp: 90 tablet, Rfl: 3    fluconazole (DIFLUCAN) 150 MG tablet, Take 1 tablet by mouth Every 72 (Seventy-Two) Hours., Disp: 2 tablet, Rfl: 0    fludrocortisone 0.1 MG tablet, Take 1 tablet by mouth Daily., Disp: 90 tablet, Rfl: 3    fluticasone (FLONASE) 50 MCG/ACT nasal spray, Instill 2 sprays in each nostril 2 (Two) Times A Day, Disp: 120 g, Rfl: 1    furosemide (LASIX) 40 MG tablet, Take 1 tablet by mouth Daily. May have extra 1/2 to 1 tablet daily if needed for edema, Disp: 135 tablet, Rfl: 3    gabapentin (NEURONTIN) 800 MG tablet, Take 1 tablet by mouth Every Night., Disp: 90 tablet, Rfl: 1    glucose blood (OneTouch Verio) test strip, Use to test blood glucose 3 times a day as directed, Disp: 300 each, Rfl: 3    guaiFENesin (Mucinex) 600 MG 12 hr tablet, Take 1 tablet by mouth twice a day, Disp: 14 tablet, Rfl: 0    HYDROcodone-acetaminophen (NORCO) 7.5-325 MG per tablet, Take 1 tablet by mouth three times a day, Disp: 90 tablet,  Rfl: 0    hydrocortisone (ANUSOL-HC) 25 MG suppository, Insert 1 suppository rectally twice a day as needed (remove wrapper and moisten with water), Disp: 12 suppository, Rfl: 3    Influenza Vac High-Dose Quad (Fluzone High-Dose Quadrivalent) 0.7 ML suspension prefilled syringe injection, Inject  into the appropriate muscle as directed by prescriber., Disp: 0.7 mL, Rfl: 0    insulin aspart (NovoLOG FlexPen) 100 UNIT/ML solution pen-injector sc pen, Inject 10 Units under the skin into the appropriate area as directed 3 (Three) Times a Day With Meals., Disp: 30 mL, Rfl: 3    Insulin Glargine (BASAGLAR KWIKPEN) 100 UNIT/ML injection pen, Inject 50 units subcutaneously once in the morning and 75 units at night, Disp: 135 mL, Rfl: 3    Insulin Pen Needle (B-D UF III MINI PEN NEEDLES) 31G X 5 MM misc, Use as directed twice a day, Disp: 180 each, Rfl: 0    Insulin Syringe-Needle U-100 29G 0.5 ML misc, Inject 0.3 mL as directed Daily., Disp: , Rfl:     losartan (COZAAR) 50 MG tablet, Take 1 tablet by mouth once Daily., Disp: 90 tablet, Rfl: 3    Magnesium Chloride 15 %, Muscle relaxant gel apply 1-2 grams pain get to affected area 3 to 4 times a day  Indications: magnesium chloride 15%, guaifenesin 10%, baclofen 5%, cyclobenzaprine 4%, Disp: 420 g, Rfl: 11    meclizine (ANTIVERT) 25 MG tablet, Take 1 tablet by mouth 3 (Three) Times a Day As Needed for Dizziness., Disp: 90 tablet, Rfl: 3    metoprolol succinate XL (TOPROL-XL) 100 MG 24 hr tablet, Take 1 tablet by mouth Daily., Disp: 90 tablet, Rfl: 3    metroNIDAZOLE (METROCREAM) 0.75 % cream, Apply to the face once every night, Disp: 45 g, Rfl: 0    mupirocin (BACTROBAN) 2 % ointment, Apply 1 application  topically to the appropriate area as directed 2 (Two) Times a Day., Disp: 22 g, Rfl: 0    neomycin-colistin-hydrocortisone-thonzonium (Cortisporin-TC) 3.3-3-10-0.5 MG/ML otic suspension, Instill 5 drops into affected ear 3 (Three) times a day for 10 days, Disp: 10 mL, Rfl:  0    nitroglycerin (Nitrostat) 0.4 MG SL tablet, Place 1 tablet under the tongue Every 5 Minutes As Needed for Chest Pain for up to 3 total doses. Call 911 if pain remains after 1 dose., Disp: 25 tablet, Rfl: 6    Nurtec 75 MG dispersible tablet, Take 1 tablet by mouth Daily As Needed (migraine)., Disp: 8 tablet, Rfl: 11    ofloxacin (FLOXIN) 0.3 % otic solution, Instill 5 drops into affected ear once a day for 7 days, Disp: 10 mL, Rfl: 0    ondansetron ODT (ZOFRAN-ODT) 4 MG disintegrating tablet, Take 1 tablet by mouth Every 8 (Eight) Hours As Needed for Nausea or Vomiting., Disp: 12 tablet, Rfl: 0    pancrelipase, Lip-Prot-Amyl, (Pancreaze) 65615-69875 units capsule delayed-release particles capsule, Take 1 capsule with each meal and with each snack, Disp: 100 capsule, Rfl: 1    potassium chloride (KLOR-CON) 10 MEQ CR tablet, Take 1 tablet by mouth Daily as directed, Disp: 90 tablet, Rfl: 0    prednisoLONE acetate (PRED FORTE) 1 % ophthalmic suspension, Instill 1 drop in both eyes twice a day; Shake Well Before Use, Disp: 5 mL, Rfl: 0    promethazine (PHENERGAN) 25 MG tablet, Take 1 tablet by mouth Every 6 (Six) Hours As Needed for Nausea or Vomiting., Disp: 30 tablet, Rfl: 1    RABEprazole (ACIPHEX) 20 MG EC tablet, Take 1 tablet by mouth Daily., Disp: 90 tablet, Rfl: 2    Rimegepant Sulfate (Nurtec) 75 MG tablet dispersible tablet, Take 1 tablet by mouth As Needed (Place one tablet on tongue at onset of migraine, may not repeat for 24 hours)., Disp: 10 tablet, Rfl: 0    rosuvastatin (CRESTOR) 10 MG tablet, Take 1 tablet by mouth every night at bedtime., Disp: 90 tablet, Rfl: 3    topiramate (TOPAMAX) 25 MG tablet, Take 1 tablet by mouth Every Night., Disp: 90 tablet, Rfl: 3    triamcinolone (KENALOG) 0.1 % cream, Apply topically to the affected area twice a day, Disp: 30 g, Rfl: 0    valACYclovir (Valtrex) 1000 MG tablet, Take 1 tablet by mouth Daily. Increase to 1 tablet 3 times a day with flare up, Disp: 80  tablet, Rfl: 0    venlafaxine XR (EFFEXOR-XR) 75 MG 24 hr capsule, Take 1 capsule by mouth Daily., Disp: 90 capsule, Rfl: 3    clidinium-chlordiazePOXIDE (LIBRAX) 5-2.5 MG per capsule, Take 1 capsule by mouth 2 (Two) Times a Day as needed. (Patient not taking: Reported on 10/30/2024), Disp: 60 capsule, Rfl: 0    clidinium-chlordiazePOXIDE (LIBRAX) 5-2.5 MG per capsule, Take 1 capsule by mouth 2 (Two) Times a Day. (Patient not taking: Reported on 10/30/2024), Disp: 60 capsule, Rfl: 0    mupirocin (BACTROBAN) 2 % ointment, APPLY SPARINGLY TO THE AFFECTED AREA(S) TWICE DAILY (Patient not taking: Reported on 10/30/2024), Disp: 22 g, Rfl: 4    Allergies   Allergen Reactions    Ampicillin GI Intolerance     Diarrhea    Beta lactam allergy details  Antibiotic reaction: unknown  Age at reaction: unknown  Dose to reaction time: unknown  Reason for antibiotic: unknown  Epinephrine required for reaction?: unknown  Tolerated antibiotics: unknown        Atorvastatin Swelling    Tetanus Toxoid Hives    Acyclovir Seizure    Cefprozil Other (See Comments) and Diarrhea     diarrhea and vomiting    Clindamycin/Lincomycin Nausea And Vomiting and Diarrhea     patient requested this be added to her allergy list as she does not wish to take again.    Lansoprazole Nausea Only and Nausea And Vomiting    Mestinon [Pyridostigmine] Itching and Other (See Comments)     Leg cramps, shooting vaginal pains, frequent urination    Ranexa [Ranolazine Er] Nausea And Vomiting       Family History   Problem Relation Age of Onset    Alzheimer's disease Mother     Cancer Mother     Coronary artery disease Other     Diabetes Other     Hypertension Other     Stroke Other     Cancer Other     Dementia Other     Heart attack Father     Colon polyps Father     Breast cancer Neg Hx     Ovarian cancer Neg Hx     Colon cancer Neg Hx     Esophageal cancer Neg Hx        Social History     Socioeconomic History    Marital status:    Tobacco Use    Smoking  status: Never     Passive exposure: Never    Smokeless tobacco: Never   Vaping Use    Vaping status: Never Used   Substance and Sexual Activity    Alcohol use: No    Drug use: No    Sexual activity: Yes       Review of Systems   Constitutional:  Negative for activity change, appetite change, fatigue, fever and unexpected weight change.   HENT:  Negative for dental problem, hearing loss, mouth sores, postnasal drip, sneezing, trouble swallowing and voice change.    Eyes:  Negative for pain, redness, itching and visual disturbance.   Respiratory:  Negative for cough, choking, chest tightness, shortness of breath and wheezing.    Cardiovascular:  Negative for chest pain, palpitations and leg swelling.   Gastrointestinal:  Positive for diarrhea. Negative for abdominal distention, abdominal pain, anal bleeding, blood in stool, constipation, nausea, rectal pain and vomiting.   Endocrine: Negative for cold intolerance, heat intolerance, polydipsia, polyphagia and polyuria.   Genitourinary: Negative.  Negative for dysuria, enuresis, flank pain, hematuria and urgency.   Musculoskeletal:  Negative for arthralgias, back pain, gait problem, joint swelling and myalgias.   Skin:  Negative for color change, pallor and rash.   Allergic/Immunologic: Negative for environmental allergies, food allergies and immunocompromised state.   Neurological:  Negative for dizziness, tremors, seizures, facial asymmetry, speech difficulty, numbness and headaches.   Hematological:  Negative for adenopathy.   Psychiatric/Behavioral:  Negative for behavioral problems, confusion, dysphoric mood, hallucinations and self-injury.        Vitals:    12/19/24 1425   BP: 140/90   Pulse: 97   Temp: 93 °F (33.9 °C)   SpO2: 97%       Physical Exam  Vitals reviewed.   Constitutional:       General: She is not in acute distress.     Appearance: Normal appearance.   HENT:      Head: Normocephalic and atraumatic.      Nose: Nose normal.      Mouth/Throat:       Mouth: Mucous membranes are moist.      Pharynx: Oropharynx is clear. No posterior oropharyngeal erythema.   Eyes:      General: No scleral icterus.     Extraocular Movements: Extraocular movements intact.   Cardiovascular:      Rate and Rhythm: Normal rate and regular rhythm.      Heart sounds: No murmur heard.  Pulmonary:      Breath sounds: Normal breath sounds. No wheezing or rales.   Abdominal:      General: Bowel sounds are normal.      Palpations: Abdomen is soft.      Tenderness: There is no abdominal tenderness. There is no guarding.      Comments: Right lobe liver felt below costal margin smooth   Musculoskeletal:         General: No swelling or tenderness.      Cervical back: Normal range of motion and neck supple.      Comments: Decreased range of motion due to hip soreness.   Skin:     General: Skin is dry.      Coloration: Skin is not jaundiced.   Neurological:      Mental Status: She is alert and oriented to person, place, and time.   Psychiatric:         Mood and Affect: Mood normal.         Thought Content: Thought content normal.         Judgment: Judgment normal.         Diagnoses and all orders for this visit:    1. Slow transit constipation (Primary)    The patient has a history of irritable bowel syndrome with constipation.  There is also an element of slow transit constipation.  He reports recent colonoscopy but the pathology was not available at the time of this office visit.      Plan: Will ask for a copy of colonoscopy and path report from [facility].           Will plan on follow-up in the office in 6 months.

## 2024-12-26 ENCOUNTER — HOSPITAL ENCOUNTER (OUTPATIENT)
Dept: MAMMOGRAPHY | Facility: HOSPITAL | Age: 74
Discharge: HOME | End: 2024-12-26
Payer: MEDICARE

## 2024-12-26 ENCOUNTER — HOSPITAL ENCOUNTER (OUTPATIENT)
Dept: ULTRASOUND IMAGING | Facility: HOSPITAL | Age: 74
Discharge: HOME | End: 2024-12-26
Payer: MEDICARE

## 2024-12-26 DIAGNOSIS — R92.8 ABNORMAL MAMMOGRAM: ICD-10-CM

## 2024-12-26 PROCEDURE — G0279 TOMOSYNTHESIS, MAMMO: HCPCS

## 2024-12-26 PROCEDURE — 77065 DX MAMMO INCL CAD UNI: CPT

## 2024-12-26 PROCEDURE — 76642 ULTRASOUND BREAST LIMITED: CPT

## 2025-01-14 ENCOUNTER — TELEPHONE (OUTPATIENT)
Dept: ENDOCRINOLOGY | Facility: CLINIC | Age: 75
End: 2025-01-14
Payer: MEDICARE

## 2025-01-14 RX ORDER — FLUCONAZOLE 150 MG/1
150 TABLET ORAL
Qty: 2 TABLET | Refills: 0 | Status: SHIPPED | OUTPATIENT
Start: 2025-01-14

## 2025-01-14 NOTE — TELEPHONE ENCOUNTER
Pt called stated had an Epidural done in back 01/03/25. Pt blood sugar has been running high around 300. Pt had infection in urine. Pt requesting Diflucan. Pt would like to be notified and sent to [facility]

## 2025-01-14 NOTE — TELEPHONE ENCOUNTER
Spoke with pt, she is taking 50U AM/75U PM of long-acting, 10U novolog with meals. Pt reports bedtime glucose was 341 last night, fasting today was 215, glucose at time of call was 271.  Pt states she will have an additional steroid injection on Monday for her arm. She is very concerned about elevated glucose and would like to know how much to increase insulin. Also requested diflucan script, per pt Dr. Santiago has written this for her before.

## 2025-01-15 DIAGNOSIS — I25.10 CORONARY ARTERY DISEASE INVOLVING NATIVE HEART WITHOUT ANGINA PECTORIS, UNSPECIFIED VESSEL OR LESION TYPE: Primary | ICD-10-CM

## 2025-01-15 RX ORDER — FLUDROCORTISONE ACETATE 0.1 MG/1
TABLET ORAL DAILY
Qty: 90 TABLET | Refills: 3 | Status: SHIPPED | OUTPATIENT
Start: 2025-01-15

## 2025-01-16 RX ORDER — METOPROLOL SUCCINATE 100 MG/1
100 TABLET, EXTENDED RELEASE ORAL DAILY
Qty: 90 TABLET | Refills: 3 | Status: SHIPPED | OUTPATIENT
Start: 2025-01-16

## 2025-01-16 RX ORDER — DIGOXIN 250 MCG
250 TABLET ORAL DAILY
Qty: 90 TABLET | Refills: 0 | Status: SHIPPED | OUTPATIENT
Start: 2025-01-16

## 2025-01-17 RX ORDER — NITROGLYCERIN 0.4 MG/1
0.4 TABLET SUBLINGUAL
Qty: 25 TABLET | Refills: 2 | Status: SHIPPED | OUTPATIENT
Start: 2025-01-17

## 2025-01-22 ENCOUNTER — TELEPHONE (OUTPATIENT)
Dept: NEUROLOGY | Facility: CLINIC | Age: 75
End: 2025-01-22

## 2025-01-22 NOTE — TELEPHONE ENCOUNTER
Caller:  ZADA PRICE    Relationship: PATIENT    Callback number:     Is it ok to leave a message: [x] Yes [] No    Requested medication for samples:  NURTEC    How much medication does the patient currently have left:   NONE     Who will be picking up the samples: PATIENT    Do you need information about patient financial assistance for this medication: [] Yes [x] No    Additional details provided:

## 2025-01-27 ENCOUNTER — SPECIALTY PHARMACY (OUTPATIENT)
Dept: GENERAL RADIOLOGY | Facility: HOSPITAL | Age: 75
End: 2025-01-27
Payer: MEDICARE

## 2025-01-27 NOTE — PROGRESS NOTES
Specialty Pharmacy Patient Management Program  One-Time Clinical Outreach     Leela Muller is a 74 y.o. female seen by a Neurology provider for Migraines and not enrolled in the Neurology Patient Management program offered by [facility].      I sent a new Nurtec rx + 11 refills to [facility] for Ms. Muller to . She does not want her medications mailed to her home due to previous mailing issues with UPS and [employer]. The patient previously received Nurtec samples from the provider due to the prescription being too expensive to afford. Now for 2025 the Nurtec is $35 copay and she can afford the medication as a prescription. I advised her that if the copay increases over the course of the year to let us know and we can help her apply for Medicare Extra Help. Pt acknowledged and was very thankful.     Nurtec Key: [id number], Exp: 1/22/26    Sanford Holt, PharmD  Clinic Specialty Pharmacist, Neurology  1/27/2025  15:09 EST

## 2025-01-30 DIAGNOSIS — G43.009 MIGRAINE WITHOUT AURA AND WITHOUT STATUS MIGRAINOSUS, NOT INTRACTABLE: ICD-10-CM

## 2025-01-30 DIAGNOSIS — M48.061 DEGENERATIVE LUMBAR SPINAL STENOSIS: ICD-10-CM

## 2025-01-30 DIAGNOSIS — E08.42 DIABETIC POLYNEUROPATHY ASSOCIATED WITH DIABETES MELLITUS DUE TO UNDERLYING CONDITION: ICD-10-CM

## 2025-01-30 DIAGNOSIS — M50.30 DDD (DEGENERATIVE DISC DISEASE), CERVICAL: ICD-10-CM

## 2025-01-30 RX ORDER — GABAPENTIN 800 MG/1
800 TABLET ORAL NIGHTLY
Qty: 90 TABLET | Refills: 1 | Status: SHIPPED | OUTPATIENT
Start: 2025-01-30

## 2025-01-30 NOTE — TELEPHONE ENCOUNTER
Rx Refill Note  Requested Prescriptions     Pending Prescriptions Disp Refills    gabapentin (NEURONTIN) 800 MG tablet 90 tablet 1     Sig: Take 1 tablet by mouth Every Night.      Last office visit with prescribing clinician: 7/1/2024   Last telemedicine visit with prescribing clinician: Visit date not found   Next office visit with prescribing clinician: 2/10/2025                         Would you like a call back once the refill request has been completed: [] Yes [] No    If the office needs to give you a call back, can they leave a voicemail: [] Yes [] No    Anjali Mcgraw CMA  01/30/25, 11:16 EST

## 2025-02-04 NOTE — PROGRESS NOTES
[facility] Cardiology    Patient ID: Leela Muller is a 75 y.o. female.  : 1950   Contact: [phone]    Encounter date: 2025    PCP: Connor Ritter MD      Chief complaint:   Chief Complaint   Patient presents with    Orthostatic hypotension    Dizziness    Edema     Bilateral legs       Problem List:  Cerebrovascular accident:  Right CVA in July or 2010, evaluated at [facility].   Admission to [facility], 2010 with headache and stuttering, c/w TIA.   Chronic Coumadin therapy, initiated following bilateral PE in  after fall and hip replacement. She was already on 81 mg of aspirin as well, 2010.   MRI brain, 2010: Old right parietal CVA.   MRA, 2010: Normal carotids, middle anterior and posterior cerebral arteries with minimal ostial stenosis of both vertebral arteries.   GENARO, 2010, Dr. Mobley: PFO with a small amount of right to left shunting. Normal LVEF and normal valves.  PFO closure, 10/05/2010: 25 mm Amplatzer cribriform occluder.    Echocardiogram, 2014: Amplatzer device is well-seated in the interatrial septum. EF 55-60%. Trace MR and mild TR.  Echocardiogram, 2019: Intact Amplatzer septal occluder with no evidence of flow across the device. EF 60%. Trace MR/TR.   Chest pain/abnormal myocardial perfusion study:    Patient reports having a MI in . Documentation only supports bilateral PE. She did not have a LHC or stents at time of alleged MI.   Cardiolite GXT, 2010, Dr. Monteiro: Small area of ischemia in the mid anteroseptal, mid inferior, and apical lateral regions. EF 68%.  LHC, 2010, Dr. Monteiro: Normal coronary arteries with normal LVEF.  Cardiolite stress test, 2021: EF 58%. No CP at baseline but had CP with infusion located in the center of her chest, which continued into recovery, but was less intense. Abnormal baseline EKG with 0.5 to 1 mm of ST segment depression which did not  change appreciably with the infusion. No evidence of inducible ischemia. Low risk study.   MPS 2/14/2023: No evidence of inducible ischemia by scintigraphic criteria.   DM2.   Bilateral pulmonary emboli:  Following hip replacement in 2007.   No evidence of prior anti-coagulable workup.   Chronic Coumadin therapy.   No evidence of DVT on bilateral lower extremity duplex.  The decision was made to discontinue the patient's warfarin therapy due to her fall risk.  Palpitations:  2 week Holter, 04/23/2018: Normal study.  14-day Holter, 1/19/2022: SR/SB/ST. 3 pauses longest 2.2 secs. Brief a tach. Rare PVCs and PACs. Average HR: 73. Min HR: 50. Max HR: 131.  30 day MCOT, 06/13/2024; Sinus bradycardia, sinus rhythm and sinus tachycardia noted. Frequent PACs and PVCs. One 5 beat run of nocturnal VT. Diary entries correlate with sinus rhythm and mild sinus tachycardia and occasionally with PACs.   Hypertension.   Dyslipidemia.   Pancreatitis:  Recent episode  in March 2013.  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    Rectal cancer; surgically removed by Dr Martinez.   Surgical history:  Hip replacement.  Hysterectomy.  Tonsillectomy  Appendectomy.  Cholecystectomy.    Allergies   Allergen Reactions    Ampicillin GI Intolerance     Diarrhea    Beta lactam allergy details  Antibiotic reaction: unknown  Age at reaction: unknown  Dose to reaction time: unknown  Reason for antibiotic: unknown  Epinephrine required for reaction?: unknown  Tolerated antibiotics: unknown        Atorvastatin Swelling    Tetanus Toxoid Hives    Acyclovir Seizure    Cefprozil Other (See Comments) and Diarrhea     diarrhea and vomiting    Clindamycin/Lincomycin Nausea And Vomiting and Diarrhea     patient requested this be added to her allergy list as she does not wish to take again.    Lansoprazole Nausea Only and Nausea And Vomiting    Mestinon [Pyridostigmine] Itching and Other (See Comments)     Leg cramps, shooting vaginal pains, frequent  urination    Ranexa [Ranolazine Er] Nausea And Vomiting       Current Medications:    Current Outpatient Medications:     acetaminophen (TYLENOL) 500 MG tablet, Take 1 tablet by mouth Every 6 (Six) Hours As Needed., Disp: , Rfl:     albuterol sulfate  (90 Base) MCG/ACT inhaler, Inhale 2 puffs by mouth every 4 (Four) Hours As Needed, Disp: 8.5 g, Rfl: 2    aspirin 81 MG tablet, Take 1 tablet by mouth Daily. Taken at night. Last dose 9-18-21, Disp: , Rfl:     azelastine (ASTELIN) 0.1 % nasal spray, Instill 1 spray in each nostril twice a day, Disp: 30 mL, Rfl: 3    baclofen (LIORESAL) 10 MG tablet, Take 1 tablet by mouth Every Night., Disp: 90 tablet, Rfl: 3    butalbital-acetaminophen-caffeine (FIORICET, ESGIC) -40 MG per tablet, Take 1 tablet by mouth Daily As Needed for headache, Disp: 30 tablet, Rfl: 0    clidinium-chlordiazePOXIDE (LIBRAX) 5-2.5 MG per capsule, Take 1 capsule by mouth 2 (Two) Times a Day as needed., Disp: 60 capsule, Rfl: 0    clidinium-chlordiazePOXIDE (LIBRAX) 5-2.5 MG per capsule, Take 1 capsule by mouth 2 (Two) Times a Day as needed., Disp: 60 capsule, Rfl: 0    clonazePAM (KlonoPIN) 0.5 MG tablet, As Needed., Disp: , Rfl:     Continuous Glucose  (FreeStyle Colin 3 [place]) device, Use As Directed, Disp: 1 each, Rfl: 0    digoxin (LANOXIN) 250 MCG tablet, Take 1 tablet by mouth Daily., Disp: 90 tablet, Rfl: 0    fludrocortisone 0.1 MG tablet, Take 1 tablet by mouth Daily., Disp: 90 tablet, Rfl: 3    fluticasone (FLONASE) 50 MCG/ACT nasal spray, Instill 2 sprays each nostril twice daily, Disp: 16 g, Rfl: 11    furosemide (LASIX) 40 MG tablet, Take 1 tablet by mouth Daily. May have extra 1/2 to 1 tablet daily if needed for edema (Patient taking differently: Take 1 tablet by mouth As Needed.), Disp: 135 tablet, Rfl: 3    gabapentin (NEURONTIN) 800 MG tablet, Take 1 tablet by mouth Every Night., Disp: 90 tablet, Rfl: 1    glucose blood (OneTouch Verio) test strip, Use to test  "blood glucose 3 times a day as directed, Disp: 300 each, Rfl: 3    guaiFENesin (Mucinex) 600 MG 12 hr tablet, Take 1 tablet by mouth twice a day, Disp: 14 tablet, Rfl: 0    HYDROcodone-acetaminophen (NORCO) 7.5-325 MG per tablet, Take 1 tablet by mouth 3 times a day for 30 days. (Patient taking differently: Take 1 tablet by mouth 3 (Three) Times a Day As Needed.), Disp: 90 tablet, Rfl: 0    hydrocortisone (ANUSOL-HC) 25 MG suppository, Insert 1 suppository rectally twice a day as needed (remove wrapper and moisten with water), Disp: 12 suppository, Rfl: 3    insulin aspart (NovoLOG FlexPen) 100 UNIT/ML solution pen-injector sc pen, Inject 10 Units under the skin into the appropriate area as directed 3 (Three) Times a Day With Meals., Disp: 30 mL, Rfl: 3    Insulin Glargine (BASAGLAR KWIKPEN) 100 UNIT/ML injection pen, Inject 50 units subcutaneously once in the morning and 75 units at night (Patient taking differently: Inject 52 units subcutaneously once in the morning and 77 units at night), Disp: 135 mL, Rfl: 3    Insulin Pen Needle (Pen Needles 3/16\") 31G X 5 MM misc, Use twice a day as directed, Disp: 100 each, Rfl: 1    Insulin Syringe-Needle U-100 29G 0.5 ML misc, Inject 0.3 mL as directed Daily., Disp: , Rfl:     losartan (COZAAR) 50 MG tablet, Take 1 tablet by mouth once Daily., Disp: 90 tablet, Rfl: 3    Magnesium Chloride 15 %, Muscle relaxant gel apply 1-2 grams pain get to affected area 3 to 4 times a day  Indications: magnesium chloride 15%, guaifenesin 10%, baclofen 5%, cyclobenzaprine 4% (Patient taking differently: Apply 1 Application topically to the appropriate area as directed As Needed. Muscle relaxant gel apply 1-2 grams pain get to affected area 3 to 4 times a day), Disp: 420 g, Rfl: 11    meclizine (ANTIVERT) 25 MG tablet, Take 1 tablet by mouth 3 (Three) Times a Day As Needed for Dizziness., Disp: 90 tablet, Rfl: 3    metoprolol succinate XL (TOPROL-XL) 100 MG 24 hr tablet, Take 1 tablet by " mouth every morning, and Take 1/2 tablet by mouth every evening, Disp: 135 tablet, Rfl: 3    nitroglycerin (Nitrostat) 0.4 MG SL tablet, Place 1 tablet under the tongue Every 5 Minutes As Needed for Chest Pain for up to 3 total doses. Call 911 if pain remains after 1 dose., Disp: 25 tablet, Rfl: 2    ondansetron ODT (ZOFRAN-ODT) 4 MG disintegrating tablet, Take 1 tablet by mouth Every 8 (Eight) Hours As Needed for Nausea or Vomiting., Disp: 12 tablet, Rfl: 0    pancrelipase, Lip-Prot-Amyl, (Pancreaze) 33131-65488 units capsule delayed-release particles capsule, Take 1 capsule with each meal and with each snack, Disp: 100 capsule, Rfl: 1    potassium chloride (KLOR-CON) 10 MEQ CR tablet, Take 1 tablet by mouth Daily as directed, Disp: 90 tablet, Rfl: 0    promethazine (PHENERGAN) 25 MG tablet, Take 1 tablet by mouth Daily As Needed., Disp: 30 tablet, Rfl: 1    RABEprazole (ACIPHEX) 20 MG EC tablet, Take 1 tablet by mouth Daily., Disp: 90 tablet, Rfl: 1    rimegepant sulfate (Nurtec) 75 MG tablet, Place 1 tablet under the tongue 1 Time As Needed at the onset of headache, Max of 1 tab/24 hours, Max of 18 tabs/30 days., Disp: 16 tablet, Rfl: 11    rosuvastatin (CRESTOR) 10 MG tablet, Take 1 tablet by mouth every night at bedtime., Disp: 90 tablet, Rfl: 3    topiramate (TOPAMAX) 25 MG tablet, Take 1 tablet by mouth Every Night., Disp: 90 tablet, Rfl: 3    valACYclovir (Valtrex) 1000 MG tablet, Take 1 tablet by mouth Daily. Increase to 1 tablet 3 times a day with flare up, Disp: 80 tablet, Rfl: 0    venlafaxine XR (EFFEXOR-XR) 75 MG 24 hr capsule, Take 1 capsule by mouth Daily., Disp: 90 capsule, Rfl: 3    HPI    Leela Muller is a 75 y.o. female who presents today for an annual follow up of cerebrovascular accident, patent foramen ovale and cardiac risk factors. Since last visit, patient has been doing well from a cardiac perspective.  She has been struggling with back pain and she thinks that that causes her blood  "pressure to be elevated at times.  Most of the time her blood pressure is low and she has been taking fludrocortisone however patient was recently started on losartan by her PCP due to elevated blood pressure.  She also has neuropathy and is being seen by neurology for headaches.  While she does struggle with chronic pain she has remained active and lifestyle.  Patient is supposed to have a sleep study.  She did have nonsustained VT on a Holter monitor.  I have discussed with the patient I think it still important to continue with investigation of that.      The following portions of the patient's history were reviewed and updated as appropriate: allergies, current medications and problem list.    Pertinent positives as listed in the HPI.  All other systems reviewed are negative.         Vitals:    02/12/25 1317   BP: 110/56   BP Location: Left arm   Patient Position: Sitting   Pulse: 74   SpO2: 94%   Weight: 73.5 kg (162 lb)   Height: 170.2 cm (67\")       Physical Exam:  General: Alert and oriented.  Neck: Jugular venous pressure is within normal limits. Carotids have normal upstrokes without bruits.   Cardiovascular: Heart has a nondisplaced focal PMI. Regular rate and rhythm. No murmur, gallop or rub.  Lungs: Clear, no rales or wheezes. Equal expansion is noted.   Extremities: Show no edema.  Skin: Warm and dry.  Neurologic: Nonfocal.     Diagnostic Data (reviewed with patient):  Lab Results   Component Value Date    GLUCOSE 99 10/30/2024    BUN 16 10/30/2024    CREATININE 0.81 10/30/2024    EGFR 75.3 10/30/2024    BCR 19.8 10/30/2024     10/30/2024    K 4.6 10/30/2024     10/30/2024    CO2 29.5 (H) 10/30/2024    CALCIUM 9.5 10/30/2024    ALBUMIN 3.7 10/30/2024    ALKPHOS 63 10/30/2024    AST 21 10/30/2024    ALT 22 10/30/2024     Lab Results   Component Value Date    CHOL 100 10/30/2024    TRIG 267 (H) 10/30/2024    HDL 32 (L) 10/30/2024    LDL 28 10/30/2024      Lab Results   Component Value Date    " WBC 9.41 09/12/2024    RBC 5.06 09/12/2024    HGB 14.9 09/12/2024    HCT 45.2 09/12/2024    MCV 89.3 09/12/2024     09/12/2024      Lab Results   Component Value Date    TSH 3.340 10/30/2024        Advance Care Planning   ACP discussion was declined by the patient. Patient does not have an advance directive, declines further assistance.       Procedures      Assessment:    ICD-10-CM ICD-9-CM   1. Patent foramen ovale  Q21.12 745.5   2. Primary hypertension  I10 401.9   3. Dyslipidemia  E78.5 272.4   4. Palpitations  R00.2 785.1   5. NSVT (nonsustained ventricular tachycardia)  I47.29 427.1         Plan:  I have encouraged the patient to follow-up with a sleep study given her nonsustained ventricular tachycardia.  I discussed with the patient to stop taking her fludrocortisone given her elevated blood pressure and see if she still needs to take losartan 50 mg daily.  Encourage patient to increase fluid intake.  Continue on aspirin 81 mg daily for antiplatelet therapy.   Continue on digoxin 250 micrograms, patient still needs to get digoxin level.  Continue on losartan 50 mg daily for hypertension for now, can cut in half.  Continue on metoprolol 100 mg daily for rate control  Continue on rosuvastatin 10 mg nightly for hyperlipidemia.      Continue all other current medications.  F/up in 3 months, sooner if needed.    Namrata Wolf PA-C

## 2025-02-10 ENCOUNTER — OFFICE VISIT (OUTPATIENT)
Dept: NEUROLOGY | Facility: CLINIC | Age: 75
End: 2025-02-10
Payer: MEDICARE

## 2025-02-10 ENCOUNTER — LAB (OUTPATIENT)
Age: 75
End: 2025-02-10
Payer: MEDICARE

## 2025-02-10 ENCOUNTER — SPECIALTY PHARMACY (OUTPATIENT)
Dept: NEUROLOGY | Facility: CLINIC | Age: 75
End: 2025-02-10
Payer: MEDICARE

## 2025-02-10 VITALS
HEIGHT: 67 IN | DIASTOLIC BLOOD PRESSURE: 80 MMHG | BODY MASS INDEX: 25.58 KG/M2 | WEIGHT: 163 LBS | OXYGEN SATURATION: 98 % | HEART RATE: 84 BPM | SYSTOLIC BLOOD PRESSURE: 120 MMHG

## 2025-02-10 DIAGNOSIS — M48.061 DEGENERATIVE LUMBAR SPINAL STENOSIS: ICD-10-CM

## 2025-02-10 DIAGNOSIS — E08.42 DIABETIC POLYNEUROPATHY ASSOCIATED WITH DIABETES MELLITUS DUE TO UNDERLYING CONDITION: ICD-10-CM

## 2025-02-10 DIAGNOSIS — F41.1 GENERALIZED ANXIETY DISORDER: ICD-10-CM

## 2025-02-10 DIAGNOSIS — R41.9 COGNITIVE COMPLAINTS: ICD-10-CM

## 2025-02-10 DIAGNOSIS — G43.009 MIGRAINE WITHOUT AURA AND WITHOUT STATUS MIGRAINOSUS, NOT INTRACTABLE: Primary | ICD-10-CM

## 2025-02-10 DIAGNOSIS — M50.30 DDD (DEGENERATIVE DISC DISEASE), CERVICAL: ICD-10-CM

## 2025-02-10 PROBLEM — M22.2X1 DISORDER OF RIGHT PATELLOFEMORAL JOINT: Status: ACTIVE | Noted: 2024-05-23

## 2025-02-10 PROBLEM — G54.9: Status: ACTIVE | Noted: 2023-06-29

## 2025-02-10 PROBLEM — K86.1 IDIOPATHIC CHRONIC PANCREATITIS: Status: ACTIVE | Noted: 2023-10-18

## 2025-02-10 PROBLEM — K75.81 NONALCOHOLIC STEATOHEPATITIS: Status: ACTIVE | Noted: 2023-06-29

## 2025-02-10 PROBLEM — E55.9 VITAMIN D DEFICIENCY: Status: ACTIVE | Noted: 2024-03-22

## 2025-02-10 PROCEDURE — 3079F DIAST BP 80-89 MM HG: CPT | Performed by: NURSE PRACTITIONER

## 2025-02-10 PROCEDURE — 3074F SYST BP LT 130 MM HG: CPT | Performed by: NURSE PRACTITIONER

## 2025-02-10 PROCEDURE — 99214 OFFICE O/P EST MOD 30 MIN: CPT | Performed by: NURSE PRACTITIONER

## 2025-02-10 PROCEDURE — 82607 VITAMIN B-12: CPT | Performed by: NURSE PRACTITIONER

## 2025-02-10 PROCEDURE — 1159F MED LIST DOCD IN RCRD: CPT | Performed by: NURSE PRACTITIONER

## 2025-02-10 PROCEDURE — 1160F RVW MEDS BY RX/DR IN RCRD: CPT | Performed by: NURSE PRACTITIONER

## 2025-02-10 PROCEDURE — 83921 ORGANIC ACID SINGLE QUANT: CPT | Performed by: NURSE PRACTITIONER

## 2025-02-10 PROCEDURE — 82746 ASSAY OF FOLIC ACID SERUM: CPT | Performed by: NURSE PRACTITIONER

## 2025-02-10 RX ORDER — VENLAFAXINE HYDROCHLORIDE 75 MG/1
75 CAPSULE, EXTENDED RELEASE ORAL DAILY
Qty: 90 CAPSULE | Refills: 3 | Status: SHIPPED | OUTPATIENT
Start: 2025-02-10

## 2025-02-10 RX ORDER — BACLOFEN 10 MG/1
10 TABLET ORAL NIGHTLY
Qty: 90 TABLET | Refills: 3 | Status: SHIPPED | OUTPATIENT
Start: 2025-02-10

## 2025-02-10 RX ORDER — BUTALBITAL, ACETAMINOPHEN AND CAFFEINE 50; 325; 40 MG/1; MG/1; MG/1
1 TABLET ORAL DAILY PRN
Qty: 30 TABLET | Refills: 0 | Status: SHIPPED | OUTPATIENT
Start: 2025-02-10

## 2025-02-10 RX ORDER — GABAPENTIN 800 MG/1
800 TABLET ORAL NIGHTLY
Qty: 90 TABLET | Refills: 1 | Status: SHIPPED | OUTPATIENT
Start: 2025-02-10

## 2025-02-10 NOTE — LETTER
February 10, 2025     Connor Ritter MD  [address]  [address]  [place] [address]    Patient: Leela Muller   YOB: 1950   Date of Visit: 2/10/2025       Dear Connor Ritter MD    Leela Muller was in my office today. Below is a copy of my note.    If you have questions, please do not hesitate to call me. I look forward to following Leela along with you.         Sincerely,        [facility]        CC: Gregory M Woolfolk, MD Wendell Rance Miers, MD Paula W Hollingsworth, MD Amul Bhalodi, MD Hameed I Koury, MD Cassaundra H Collett, APRN    Subjective:     Patient ID: Leela Muller is a 75 y.o. female.    CC:   Chief Complaint   Patient presents with   • Peripheral Neuropathy   • Migraine   • Gait Problem     Longstanding chronic migraine headaches, chronic neck pain, chronic intermittent vertigo, anxiety, prior strokes, and diabetic peripheral neuropathy.      HPI:   History of Present Illness  This is a very pleasant 75-year-old female presenting for a 6-month neurology follow-up on diabetic peripheral neuropathy, migraines, known cervical and lumbar degenerative disc changes, and generalized anxiety disorder. She is currently taking gabapentin 800 mg nightly for management of her neuropathy. She is currently taking topiramate 25 mg daily for management of migraines. She is on Effexor XR 75 mg daily for management of her anxiety. She takes baclofen 10 mg as needed for muscle spasms. She has promethazine if needed. Triptans are contraindicated, and we have provided Nurtec samples to use as needed for her migraines.     She follows with cardiology and several other specialists.    She has been experiencing headaches and has been prescribed Nurtec, which she finds beneficial. She inquires about the possibility of combining or alternating this medication with butalbital with acetaminophen and caffeine, a drug she has not used recently. She is on topiramate low dose long term for  migraines. Having about 5-6 days a month.     She reports no significant concerns regarding her short-term memory, although she occasionally struggles with name recall. She continues to drive and manages her own medications, meal preparation, dressing, and finances without difficulty. 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 She has a high school education and has completed college courses. Her mother had Alzheimer's. She lives with her  and they have been  over 35 years.     Supplemental Information  She has been through a lot since her last visit. She had ablations done on her back. She received 12 injections, 6 on each side of her back, from Dr. Mendoza, an orthopedic surgeon, for her lumbar spine. She has been getting these injections every 2 weeks for a long time but has stopped because they were not helping. Her neck is very bad, and she has 2 pinched nerves on each side of her back. She was told her arthritis is severe in her back and upper neck. Last week, she had a steroid shot in her shoulder so she could lift her arm due to a frozen shoulder. She is trying to avoid rotator cuff surgery. She has poor balance due to her back and hip issues and has experienced more than 2 falls in the past year. She has undergone physical therapy to address her balance issues. She uses a cane.    SOCIAL HISTORY  She lives with her .  Completed high school education plus College courses. Retired.    FAMILY HISTORY  Her mother had Alzheimer's disease.    Prior neurology workup and history:  Chronic migraine headaches, chronic neck pain, chronic intermittent vertigo, anxiety, prior strokes, and diabetic peripheral neuropathy.      Chronic migraine headaches present for many years along with chronic neck pain, chronic and intermittent vertigo, anxiety, and history of prior strokes in  06/2015 and in 2010. Previously was followed long term in our clinic by Dr. Devaughn Lewis, with neurology.      She states that she is taking Topamax every night and it has been helping her headaches. Migraines for years. She has a few migraines a month. She states that she has been trying to take extra strength Tylenol as needed. Headaches have nausea, vomiting, photophobia and phonophobia, begin in occipital region, radiate to bilateral frontal region with moderate throbbing pain. She has these about 2 days per month currently. She is already on a beta blocker. Triptans contraindicated with CV risk factors. She has taken venlafaxine with no benefit. Has not tried ubrelvy. Finds nurtec helpful.     Recurrent falls continue.     MRI of the brain and cervical spine with and without contrast. She did have both of these completed on 06/6/2022 at [facility]. MRI of the brain with and without contrast showed age-related changes of mild generalized volume loss with some encephalomalacia and gliotic changes in the right occipital lobe with the appearance of a prior stroke. No acute intracranial abnormalities and no abnormal contrast enhancement were seen. Her MRI of the cervical spine with and without contrast did show some mild cervical spondylosis similar to 2019 imaging with no focal cord signal abnormalities and no significant spinal stenosis.     6/27/2019 MRA head and neck unremarkable.     She does take aspirin and rosuvastatin. No recurrent strokes.     She does follow with cardiology for erratic orthostatic blood pressure and dizziness and was previously prescribed pyridostigmine, but was unable to tolerate it.      Muscle relaxer cream too costly and stopped.     She follows with cardiology, pulmonology, endocrinology, and gastroenterology, pain management- all with [facility].     Takes Lortab as needed for pain. Known lumbar and cervical spinal stenosis and DDD.     She confirms having severe neck,  shoulder, left worse than right, and low back pain and it is so bad intermittently that she cannot get up. Gets injections.     She has a history of breast cancer. Dr. Martinez removed the breast cancer under her nipple. She did not require chemotherapy or radiation. She had a benign breast excision in 2020 and one in the left breast in 2012 removed by Dr. Hoffmann. The growths in 2020 and 2012 were not cancerous, but the growth under her nipple was.    She does follow with multiple specialists. She does have a history of breast cancer.     She is still seeing endocrinology, gastroenterology, urology, cardiology, and general surgery.     The following portions of the patient's history were reviewed and updated as appropriate: allergies, current medications, past family history, past medical history, past social history, past surgical history, and problem list.    Past Medical History:   Diagnosis Date   • Anxiety    • Arm pain    • Arthritis    • Breast injury     fell 6/2019    • Cancer    • Chronic pancreatitis    • COVID-19    • Depression    • Diabetes mellitus    • History of rectal surgery    • Hyperlipidemia    • Hypertension    • Liver mass    • Medication monitoring encounter 07/24/2018   • Neck pain on left side    • Palpitations 04/18/2018   • Pancreatitis    • Pulmonary embolism    • Stroke syndrome 09/14/2016    · Assessed By: Devaughn Lewis (Neurology); Last Assessed: 16 Jun 2015  Right cerebrovascular accident in July or August 2010, evaluated at [facility].  Admission to [facility] on 09/28/2010 with headache and stuttering, consistent with TIA.  Chronic Coumadin therapy, initiated following bilateral pulmonary emboli in 2007 after fall and hip replacement.  She was already on 81 mg of aspirin as well, 09/2010.  MRI of the brain on 09/28/2010 revealing old right parietal cerebrovascular accident.  MRA revealing normal carotids, middle anterior and posterior cerebral  arteries with minimal ostial stenosis of both vertebral arteries.  GENARO by Dr. Oliver Mobley on 09/28/2010:  patent foramen ovale with a small amount of right to left shunting.  Normal LVEF and normal valvular structures.   Patent foramen ovale closure using a 25 mm. Amplatzer cribriform occluder, 10/05/2010.   Echocardiogram, 06/23/2014:  LVEF (55% to 60%) with and Amplatzer device noted to be well-seated in    • Thrombocytopenia    • Transient cerebral ischemia    • Type 2 diabetes mellitus        Past Surgical History:   Procedure Laterality Date   • APPENDECTOMY     • BACK SURGERY  01/03/2025    ablations   • BREAST BIOPSY Left 2012    benign   • BREAST BIOPSY     • BREAST EXCISIONAL BIOPSY Left     benign   • BREAST EXCISIONAL BIOPSY Right     benign   • BREAST EXCISIONAL BIOPSY Right 2020    benign   • BREAST SURGERY     • CAUTERIZATION NASAL BLEEDERS  2022   • CHOLECYSTECTOMY     • COLONOSCOPY     • HYSTERECTOMY     • LIVER BIOPSY     • OOPHORECTOMY     • RECTAL SURGERY  11/20/2023    cancer   • SKIN CANCER EXCISION     • SKIN CANCER EXCISION N/A    • TONSILLECTOMY     • TOTAL HIP ARTHROPLASTY REVISION     • US GUIDED CYST ASPIRATION BREAST N/A 02/19/2024       Social History     Socioeconomic History   • Marital status:    Tobacco Use   • Smoking status: Never     Passive exposure: Never   • Smokeless tobacco: Never   Vaping Use   • Vaping status: Never Used   Substance and Sexual Activity   • Alcohol use: No   • Drug use: No   • Sexual activity: Yes       Family History   Problem Relation Age of Onset   • Alzheimer's disease Mother    • Cancer Mother    • Coronary artery disease Other    • Diabetes Other    • Hypertension Other    • Stroke Other    • Cancer Other    • Dementia Other    • Heart attack Father    • Colon polyps Father    • Breast cancer Neg Hx    • Ovarian cancer Neg Hx    • Colon cancer Neg Hx    • Esophageal cancer Neg Hx           Current Outpatient Medications:   •  acetaminophen  (TYLENOL) 500 MG tablet, Take 1 tablet by mouth Every 6 (Six) Hours As Needed., Disp: , Rfl:   •  albuterol sulfate  (90 Base) MCG/ACT inhaler, Inhale 2 puffs by mouth every 4 (Four) Hours As Needed, Disp: 8.5 g, Rfl: 2  •  aspirin 81 MG tablet, Take 1 tablet by mouth Daily. Taken at night. Last dose 9-18-21, Disp: , Rfl:   •  azelastine (ASTELIN) 0.1 % nasal spray, Instill 1 spray in each nostril twice a day, Disp: 30 mL, Rfl: 3  •  baclofen (LIORESAL) 10 MG tablet, Take 1 tablet by mouth Every Night., Disp: 90 tablet, Rfl: 3  •  butalbital-acetaminophen-caffeine (FIORICET, ESGIC) -40 MG per tablet, Take 1 tablet by mouth Daily As Needed for headache, Disp: 30 tablet, Rfl: 0  •  clidinium-chlordiazePOXIDE (LIBRAX) 5-2.5 MG per capsule, Take 1 capsule by mouth 2 (Two) Times a Day as needed., Disp: 60 capsule, Rfl: 0  •  clonazePAM (KlonoPIN) 0.5 MG tablet, As Needed., Disp: , Rfl:   •  Continuous Glucose  (FreeStyle Colin 3 [place]) device, Use As Directed, Disp: 1 each, Rfl: 0  •  digoxin (LANOXIN) 250 MCG tablet, Take 1 tablet by mouth Daily., Disp: 90 tablet, Rfl: 0  •  fluconazole (DIFLUCAN) 150 MG tablet, Take 1 tablet by mouth Every 72 (Seventy-Two) Hours., Disp: 2 tablet, Rfl: 0  •  fludrocortisone 0.1 MG tablet, Take 1 tablet by mouth Daily., Disp: 90 tablet, Rfl: 3  •  fluticasone (FLONASE) 50 MCG/ACT nasal spray, Instill 2 sprays each nostril twice daily, Disp: 16 g, Rfl: 11  •  furosemide (LASIX) 40 MG tablet, Take 1 tablet by mouth Daily. May have extra 1/2 to 1 tablet daily if needed for edema, Disp: 135 tablet, Rfl: 3  •  gabapentin (NEURONTIN) 800 MG tablet, Take 1 tablet by mouth Every Night., Disp: 90 tablet, Rfl: 1  •  glucose blood (OneTouch Verio) test strip, Use to test blood glucose 3 times a day as directed, Disp: 300 each, Rfl: 3  •  guaiFENesin (Mucinex) 600 MG 12 hr tablet, Take 1 tablet by mouth twice a day, Disp: 14 tablet, Rfl: 0  •  HYDROcodone-acetaminophen (NORCO)  "7.5-325 MG per tablet, Take 1 tablet by mouth 3 times a day for 30 days., Disp: 90 tablet, Rfl: 0  •  hydrocortisone (ANUSOL-HC) 25 MG suppository, Insert 1 suppository rectally twice a day as needed (remove wrapper and moisten with water), Disp: 12 suppository, Rfl: 3  •  insulin aspart (NovoLOG FlexPen) 100 UNIT/ML solution pen-injector sc pen, Inject 10 Units under the skin into the appropriate area as directed 3 (Three) Times a Day With Meals., Disp: 30 mL, Rfl: 3  •  Insulin Glargine (BASAGLAR KWIKPEN) 100 UNIT/ML injection pen, Inject 50 units subcutaneously once in the morning and 75 units at night, Disp: 135 mL, Rfl: 3  •  Insulin Pen Needle (Pen Needles 3/16\") 31G X 5 MM misc, Use twice a day as directed, Disp: 100 each, Rfl: 1  •  Insulin Syringe-Needle U-100 29G 0.5 ML misc, Inject 0.3 mL as directed Daily., Disp: , Rfl:   •  losartan (COZAAR) 50 MG tablet, Take 1 tablet by mouth once Daily., Disp: 90 tablet, Rfl: 3  •  meclizine (ANTIVERT) 25 MG tablet, Take 1 tablet by mouth 3 (Three) Times a Day As Needed for Dizziness., Disp: 90 tablet, Rfl: 3  •  metoprolol succinate XL (TOPROL-XL) 100 MG 24 hr tablet, Take 1 tablet by mouth Daily., Disp: 90 tablet, Rfl: 3  •  neomycin-colistin-hydrocortisone-thonzonium (Cortisporin-TC) 3.3-3-10-0.5 MG/ML otic suspension, Instill 5 drops into affected ear 3 (Three) times a day for 10 days, Disp: 10 mL, Rfl: 0  •  nitroglycerin (Nitrostat) 0.4 MG SL tablet, Place 1 tablet under the tongue Every 5 Minutes As Needed for Chest Pain for up to 3 total doses. Call 911 if pain remains after 1 dose., Disp: 25 tablet, Rfl: 2  •  pancrelipase, Lip-Prot-Amyl, (Pancreaze) 68180-13495 units capsule delayed-release particles capsule, Take 1 capsule with each meal and with each snack, Disp: 100 capsule, Rfl: 1  •  potassium chloride (KLOR-CON) 10 MEQ CR tablet, Take 1 tablet by mouth Daily as directed, Disp: 90 tablet, Rfl: 0  •  promethazine (PHENERGAN) 25 MG tablet, Take 1 tablet " by mouth Daily As Needed., Disp: 30 tablet, Rfl: 1  •  RABEprazole (ACIPHEX) 20 MG EC tablet, Take 1 tablet by mouth Daily., Disp: 90 tablet, Rfl: 1  •  rimegepant sulfate (Nurtec) 75 MG tablet, Place 1 tablet under the tongue 1 Time As Needed at the onset of headache, Max of 1 tab/24 hours, Max of 18 tabs/30 days., Disp: 16 tablet, Rfl: 11  •  rosuvastatin (CRESTOR) 10 MG tablet, Take 1 tablet by mouth every night at bedtime., Disp: 90 tablet, Rfl: 3  •  topiramate (TOPAMAX) 25 MG tablet, Take 1 tablet by mouth Every Night., Disp: 90 tablet, Rfl: 3  •  valACYclovir (Valtrex) 1000 MG tablet, Take 1 tablet by mouth Daily. Increase to 1 tablet 3 times a day with flare up, Disp: 80 tablet, Rfl: 0  •  venlafaxine XR (EFFEXOR-XR) 75 MG 24 hr capsule, Take 1 capsule by mouth Daily., Disp: 90 capsule, Rfl: 3  •  Azelastine HCl 137 MCG/SPRAY solution, Use 1 spray in each nostril twice daily (Patient not taking: Reported on 2/10/2025), Disp: 30 mL, Rfl: 11  •  betamethasone dipropionate (DIPROLENE) 0.05 % ointment, Apply topically to the appropriate area as directed once daily as needed (Patient not taking: Reported on 2/10/2025), Disp: 15 g, Rfl: 0  •  Cholecalciferol 25 MCG (1000 UT) tablet, Take 1 tablet by mouth Daily. (Patient not taking: Reported on 2/10/2025), Disp: , Rfl:   •  clidinium-chlordiazePOXIDE (LIBRAX) 5-2.5 MG per capsule, Take 1 capsule by mouth 2 (Two) Times a Day as needed. (Patient not taking: Reported on 2/10/2025), Disp: 60 capsule, Rfl: 0  •  clidinium-chlordiazePOXIDE (LIBRAX) 5-2.5 MG per capsule, Take 1 capsule by mouth 2 (Two) Times a Day. (Patient not taking: Reported on 2/10/2025), Disp: 60 capsule, Rfl: 0  •  clidinium-chlordiazePOXIDE (LIBRAX) 5-2.5 MG per capsule, Take 1 capsule by mouth 2 (Two) Times a Day. (Patient not taking: Reported on 2/10/2025), Disp: 60 capsule, Rfl: 0  •  clidinium-chlordiazePOXIDE (LIBRAX) 5-2.5 MG per capsule, Take 1 capsule by mouth twice a day as needed  (Patient not taking: Reported on 2/10/2025), Disp: 60 capsule, Rfl: 0  •  clidinium-chlordiazePOXIDE (LIBRAX) 5-2.5 MG per capsule, Take 1 capsule by mouth Every 12 (Twelve) Hours (Patient not taking: Reported on 2/10/2025), Disp: 60 capsule, Rfl: 0  •  clindamycin (CLEOCIN) 300 MG capsule, Take 1 capsule by mouth 3 times a day. (Patient not taking: Reported on 2/10/2025), Disp: 42 capsule, Rfl: 0  •  digoxin (LANOXIN) 250 MCG tablet, Take 1 tablet by mouth Daily. (Patient not taking: Reported on 2/10/2025), Disp: 90 tablet, Rfl: 1  •  fludrocortisone 0.1 MG tablet, Take 1 tablet by mouth Daily. (Patient not taking: Reported on 2/10/2025), Disp: 90 tablet, Rfl: 1  •  fluticasone (FLONASE) 50 MCG/ACT nasal spray, Instill 2 sprays in each nostril twice daily (Patient not taking: Reported on 2/10/2025), Disp: 16 g, Rfl: 11  •  Influenza Vac High-Dose Quad (Fluzone High-Dose Quadrivalent) 0.7 ML suspension prefilled syringe injection, Inject  into the appropriate muscle as directed by prescriber., Disp: 0.7 mL, Rfl: 0  •  losartan (COZAAR) 50 MG tablet, Take 1 tablet by mouth Daily. (Patient not taking: Reported on 2/10/2025), Disp: 90 tablet, Rfl: 1  •  Magnesium Chloride 15 %, Muscle relaxant gel apply 1-2 grams pain get to affected area 3 to 4 times a day  Indications: magnesium chloride 15%, guaifenesin 10%, baclofen 5%, cyclobenzaprine 4% (Patient not taking: Reported on 2/10/2025), Disp: 420 g, Rfl: 11  •  meclizine (ANTIVERT) 25 MG tablet, Take 1 tablet by mouth 3 times a day as needed (Patient not taking: Reported on 2/10/2025), Disp: 90 tablet, Rfl: 3  •  meclizine (ANTIVERT) 25 MG tablet, Take 1 tablet by mouth 3 (Three) Times a Day As Needed. (Patient not taking: Reported on 2/10/2025), Disp: 90 tablet, Rfl: 1  •  metoprolol succinate XL (TOPROL-XL) 100 MG 24 hr tablet, Take 1 tablet by mouth Daily for 90 days. (Patient not taking: Reported on 2/10/2025), Disp: 90 tablet, Rfl: 1  •  metroNIDAZOLE (METROCREAM)  "0.75 % cream, Apply to the face once every night (Patient not taking: Reported on 2/10/2025), Disp: 45 g, Rfl: 0  •  mupirocin (BACTROBAN) 2 % ointment, Apply 1 application  topically to the appropriate area as directed 2 (Two) Times a Day. (Patient not taking: Reported on 2/10/2025), Disp: 22 g, Rfl: 0  •  mupirocin (BACTROBAN) 2 % ointment, APPLY SPARINGLY TO THE AFFECTED AREA(S) TWICE DAILY (Patient not taking: Reported on 2/10/2025), Disp: 22 g, Rfl: 4  •  ofloxacin (FLOXIN) 0.3 % otic solution, Instill 5 drops into affected ear once a day for 7 days (Patient not taking: Reported on 2/10/2025), Disp: 10 mL, Rfl: 0  •  ondansetron ODT (ZOFRAN-ODT) 4 MG disintegrating tablet, Take 1 tablet by mouth Every 8 (Eight) Hours As Needed for Nausea or Vomiting. (Patient not taking: Reported on 2/10/2025), Disp: 12 tablet, Rfl: 0  •  prednisoLONE acetate (PRED FORTE) 1 % ophthalmic suspension, Instill 1 drop in both eyes twice a day; Shake Well Before Use (Patient not taking: Reported on 2/10/2025), Disp: 5 mL, Rfl: 0  •  RABEprazole (ACIPHEX) 20 MG EC tablet, Take 1 tablet by mouth Daily. (Patient not taking: Reported on 2/10/2025), Disp: 90 tablet, Rfl: 1  •  RABEprazole (ACIPHEX) 20 MG EC tablet, Take 1 tablet by mouth Daily. (Patient not taking: Reported on 2/10/2025), Disp: 90 tablet, Rfl: 1  •  rosuvastatin (CRESTOR) 10 MG tablet, Take 1 tablet by mouth Daily. (Patient not taking: Reported on 2/10/2025), Disp: 90 tablet, Rfl: 1  •  triamcinolone (KENALOG) 0.1 % cream, Apply topically to the affected area twice a day (Patient not taking: Reported on 2/10/2025), Disp: 30 g, Rfl: 0     Review of Systems   Musculoskeletal:  Positive for arthralgias and gait problem.   Neurological:  Positive for numbness and headaches.   Psychiatric/Behavioral:  Positive for decreased concentration.    All other systems reviewed and are negative.       Objective:  /80   Pulse 84   Ht 170.2 cm (67\")   Wt 73.9 kg (163 lb)   SpO2 " 98%   BMI 25.53 kg/m²     Neurological Exam  Mental Status  Alert. (1/5) Unable to copy figure. Clock drawing is normal. Speech is normal. Language is fluent with no aphasia. Attention and concentration are normal. Fund of knowledge is abnormal. Apraxia absent.    Cranial Nerves  CN II: Visual acuity is normal. Visual fields full to confrontation.  CN III, IV, VI: Extraocular movements intact bilaterally. Normal lids and orbits bilaterally. Pupils equal round and reactive to light bilaterally.  CN V: Facial sensation is normal.  CN VII: Full and symmetric facial movement.  CN IX, X: Palate elevates symmetrically. Normal gag reflex.  CN XI: Shoulder shrug strength is normal.  CN XII: Tongue midline without atrophy or fasciculations.    Motor  Normal muscle bulk throughout. No fasciculations present. Normal muscle tone. No abnormal involuntary movements. Strength is 5/5 in all four extremities except as noted.  Chronic pain and arthritis multiple joints. Uses cane for ambulation.      .    Sensory  Light touch abnormality: Pinprick abnormality: Temperature abnormality: Vibration abnormality: Sensation: Right more than Left decreased sensation-stocking distribution-chronic. Prior surgery to right ankle..     Reflexes                                            Right                      Left  Brachioradialis                    2+                         2+  Biceps                                 2+                         2+  Patellar                                1+                         1+  Achilles                                1+                         1+    Coordination    Finger-to-nose, rapid alternating movements and heel-to-shin normal bilaterally without dysmetria.    Gait  Casual gait: Wide stance. Reduced stride length. Antalgic gait. Romberg is absent. Unable to rise from chair without using arms.  Using cane for mobility.        Physical Exam  Constitutional:       Appearance: Normal appearance.    Eyes:      General: Lids are normal.      Extraocular Movements: Extraocular movements intact.      Pupils: Pupils are equal, round, and reactive to light.   Neurological:      Mental Status: She is alert.      Coordination: Coordination is intact. Romberg sign negative.      Deep Tendon Reflexes:      Reflex Scores:       Bicep reflexes are 2+ on the right side and 2+ on the left side.       Brachioradialis reflexes are 2+ on the right side and 2+ on the left side.       Patellar reflexes are 1+ on the right side and 1+ on the left side.       Achilles reflexes are 1+ on the right side and 1+ on the left side.  Psychiatric:         Mood and Affect: Mood is anxious.         Speech: Speech normal.         Behavior: Behavior normal.         Thought Content: Thought content normal.         Cognition and Memory: She exhibits impaired recent memory.         Judgment: Judgment normal.         Results:  Results  Laboratory Studies  Hemoglobin A1c is 7.0% on 10/30/2024. Comprehensive metabolic panel is CO2 29.5, otherwise unremarkable. Lipid panel, triglycerides 267, HDL 32, LDL 28, total cholesterol 100. TSH normal at 3.340.    Testing  Lalit cognitive assessment score is a 23 out of 30, 1 out of 5 for word recall, un-cued, 3 out of 5 for word recall cued. Missed points 4 cube copy.    Assessment/Plan:     Diagnoses and all orders for this visit:    1. Migraine without aura and without status migrainosus, not intractable (Primary)  -     butalbital-acetaminophen-caffeine (FIORICET, ESGIC) -40 MG per tablet; Take 1 tablet by mouth Daily As Needed for headache  Dispense: 30 tablet; Refill: 0  -     baclofen (LIORESAL) 10 MG tablet; Take 1 tablet by mouth Every Night.  Dispense: 90 tablet; Refill: 3  -     gabapentin (NEURONTIN) 800 MG tablet; Take 1 tablet by mouth Every Night.  Dispense: 90 tablet; Refill: 1  -     venlafaxine XR (EFFEXOR-XR) 75 MG 24 hr capsule; Take 1 capsule by mouth Daily.  Dispense: 90  capsule; Refill: 3    2. Diabetic polyneuropathy associated with diabetes mellitus due to underlying condition  -     gabapentin (NEURONTIN) 800 MG tablet; Take 1 tablet by mouth Every Night.  Dispense: 90 tablet; Refill: 1  -     Vitamin B12 & Folate; Future  -     Methylmalonic Acid, Serum; Future    3. DDD (degenerative disc disease), cervical  -     baclofen (LIORESAL) 10 MG tablet; Take 1 tablet by mouth Every Night.  Dispense: 90 tablet; Refill: 3  -     gabapentin (NEURONTIN) 800 MG tablet; Take 1 tablet by mouth Every Night.  Dispense: 90 tablet; Refill: 1    4. Degenerative lumbar spinal stenosis  -     baclofen (LIORESAL) 10 MG tablet; Take 1 tablet by mouth Every Night.  Dispense: 90 tablet; Refill: 3  -     gabapentin (NEURONTIN) 800 MG tablet; Take 1 tablet by mouth Every Night.  Dispense: 90 tablet; Refill: 1    5. Generalized anxiety disorder  -     venlafaxine XR (EFFEXOR-XR) 75 MG 24 hr capsule; Take 1 capsule by mouth Daily.  Dispense: 90 capsule; Refill: 3    6. Cognitive complaints  -     Vitamin B12 & Folate; Future  -     Methylmalonic Acid, Serum; Future           Assessment & Plan  1. Diabetic Peripheral Neuropathy.  She is currently taking gabapentin 800 mg nightly for management of her neuropathy. Continue with pain management and all specialists.    2. Migraines.  She is currently taking topiramate 25 mg daily for management of migraines. Triptans are contraindicated, and Nurtec samples have been provided for use as needed. She is advised to continue with her current regimen and use butalbital with acetaminophen and caffeine sparingly if Nurtec is ineffective. Nurtec now $35 copay with her insurance and recently mailed to her. Consider weaning off topiramate in the future.    3. Generalized Anxiety Disorder.  She is on Effexor XR 75 mg daily for management of her anxiety. Continue with current management.    4. Cervical and Lumbar Degenerative Disc Disease.  She takes baclofen 10 mg as  needed for muscle spasms. Continue with current management and follow up with her orthopedic specialist as needed.    5. Mild Cognitive Impairment.  Her [place] cognitive assessment score is 23 out of 30, indicating mild cognitive impairment. This could be exacerbated by her chronic pain and current medication regimen.  We will monitor closely for now since her mother did have Alzheimer's, but I do not have that concern at this present time. Recommended vitamin B12 and folate screenings today. Requested she bring her  to next appointment. She verbalized understanding and agrees with plan.    Follow-up  The patient will follow up in 6 to 7 months.    As part of this patient's treatment plan I am prescribing controlled substances. The patient has been made aware of appropriate use of such medications, including potential risk of somnolence, limited ability to drive and/or work safely, and potential for dependence or overdose. It has also been made clear that these medications are for use by the patient only, without concomitant use of alcohol or other substances unless prescribed. Keep out of reach of children.  Diogenes report has been reviewed. If this is going to be prescribed long term, [facility] [place] has also been [place] and signed by patient and myself.    Reviewed medications, potential side effects and signs and symptoms to report. Discussed risk versus benefits of treatment plan with patient and/or family-including medications, labs and radiology that may be ordered. Addressed questions and concerns during visit. Patient and/or family verbalized understanding and agree with plan.    Of Note: Medication Reconciliation Done to the best of our ability. Multiple duplicates from Specialists and PCP with Pharmacy. Wrote down the medications we prescribe and reason for her reference and printed off with After visit summary.    Prescriptions from  Neurology:    Gabapentin-neuropathy.    Baclofen-neck and low back pain.    Venlafaxine-anxiety    Topiramate (topamax)- headaches    Butalbital-acetaminophen-caffeine-headaches if needed, sparingly    Nurtec-as needed migraines    Discussion Today:  Mild Cognitive Impairment-Mild memory changes. We want to monitor this closely over time. Can be affected by your pain medicine and the gabapentin and topiramate. And also pain.    Labs today: folic acid, vitamin b12 today in basement with [facility]    Bring your  next time in person.  During this visit the following were done:  Labs Reviewed [x]    Labs Ordered []    Radiology Reports Reviewed [x]    Radiology Ordered []    PCP Records Reviewed []    Referring Provider Records Reviewed []    ER Records Reviewed []    Hospital Records Reviewed []    History Obtained From Family []    Radiology Images Reviewed []    Other Reviewed [x]    Records Requested []      02/10/25   13:41 EST    Patient or patient representative verbalized consent for the use of Ambient Listening during the visit with  [facility] for chart documentation. 2/10/2025  17:08 EST    Note to patient: The 21st Century Cures Act makes medical notes like these available to patients in the interest of transparency. However, be advised this is a medical document. It is intended as peer to peer communication. It is written in medical language and may contain abbreviations or verbiage that are unfamiliar. It may appear blunt or direct. Medical documents are intended to carry relevant information, facts as evident, and the clinical opinion of the provider.

## 2025-02-10 NOTE — PROGRESS NOTES
Subjective:     Patient ID: Leela Muller is a 75 y.o. female.    CC:   Chief Complaint   Patient presents with    Peripheral Neuropathy    Migraine    Gait Problem     Longstanding chronic migraine headaches, chronic neck pain, chronic intermittent vertigo, anxiety, prior strokes, and diabetic peripheral neuropathy.      HPI:   History of Present Illness  This is a very pleasant 75-year-old female presenting for a 6-month neurology follow-up on diabetic peripheral neuropathy, migraines, known cervical and lumbar degenerative disc changes, and generalized anxiety disorder. She is currently taking gabapentin 800 mg nightly for management of her neuropathy. She is currently taking topiramate 25 mg daily for management of migraines. She is on Effexor XR 75 mg daily for management of her anxiety. She takes baclofen 10 mg as needed for muscle spasms. She has promethazine if needed. Triptans are contraindicated, and we have provided Nurtec samples to use as needed for her migraines.     She follows with cardiology and several other specialists.    She has been experiencing headaches and has been prescribed Nurtec, which she finds beneficial. She inquires about the possibility of combining or alternating this medication with butalbital with acetaminophen and caffeine, a drug she has not used recently. She is on topiramate low dose long term for migraines. Having about 5-6 days a month.     She reports no significant concerns regarding her short-term memory, although she occasionally struggles with name recall. She continues to drive and manages her own medications, meal preparation, dressing, and finances without difficulty. 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 She has a high school education and has completed college courses. Her mother had Alzheimer's. She lives with her  and they have  been  over 35 years.     Supplemental Information  She has been through a lot since her last visit. She had ablations done on her back. She received 12 injections, 6 on each side of her back, from Dr. Mendoza, an orthopedic surgeon, for her lumbar spine. She has been getting these injections every 2 weeks for a long time but has stopped because they were not helping. Her neck is very bad, and she has 2 pinched nerves on each side of her back. She was told her arthritis is severe in her back and upper neck. Last week, she had a steroid shot in her shoulder so she could lift her arm due to a frozen shoulder. She is trying to avoid rotator cuff surgery. She has poor balance due to her back and hip issues and has experienced more than 2 falls in the past year. She has undergone physical therapy to address her balance issues. She uses a cane.    SOCIAL HISTORY  She lives with her .  Completed high school education plus College courses. Retired.    FAMILY HISTORY  Her mother had Alzheimer's disease.    Prior neurology workup and history:  Chronic migraine headaches, chronic neck pain, chronic intermittent vertigo, anxiety, prior strokes, and diabetic peripheral neuropathy.      Chronic migraine headaches present for many years along with chronic neck pain, chronic and intermittent vertigo, anxiety, and history of prior strokes in 06/2015 and in 2010. Previously was followed long term in our clinic by Dr. Devaughn Lewis, with neurology.      She states that she is taking Topamax every night and it has been helping her headaches. Migraines for years. She has a few migraines a month. She states that she has been trying to take extra strength Tylenol as needed. Headaches have nausea, vomiting, photophobia and phonophobia, begin in occipital region, radiate to bilateral frontal region with moderate throbbing pain. She has these about 2 days per month currently. She is already on a beta blocker. Triptans  contraindicated with CV risk factors. She has taken venlafaxine with no benefit. Has not tried ubrelvy. Finds nurtec helpful.     Recurrent falls continue.     MRI of the brain and cervical spine with and without contrast. She did have both of these completed on 06/6/2022 at [facility]. MRI of the brain with and without contrast showed age-related changes of mild generalized volume loss with some encephalomalacia and gliotic changes in the right occipital lobe with the appearance of a prior stroke. No acute intracranial abnormalities and no abnormal contrast enhancement were seen. Her MRI of the cervical spine with and without contrast did show some mild cervical spondylosis similar to 2019 imaging with no focal cord signal abnormalities and no significant spinal stenosis.     6/27/2019 MRA head and neck unremarkable.     She does take aspirin and rosuvastatin. No recurrent strokes.     She does follow with cardiology for erratic orthostatic blood pressure and dizziness and was previously prescribed pyridostigmine, but was unable to tolerate it.      Muscle relaxer cream too costly and stopped.     She follows with cardiology, pulmonology, endocrinology, and gastroenterology, pain management- all with [facility].     Takes Lortab as needed for pain. Known lumbar and cervical spinal stenosis and DDD.     She confirms having severe neck, shoulder, left worse than right, and low back pain and it is so bad intermittently that she cannot get up. Gets injections.     She has a history of breast cancer. Dr. Martinez removed the breast cancer under her nipple. She did not require chemotherapy or radiation. She had a benign breast excision in 2020 and one in the left breast in 2012 removed by Dr. Hoffmann. The growths in 2020 and 2012 were not cancerous, but the growth under her nipple was.    She does follow with multiple specialists. She does have a history of breast cancer.     She is still seeing endocrinology,  gastroenterology, urology, cardiology, and general surgery.     The following portions of the patient's history were reviewed and updated as appropriate: allergies, current medications, past family history, past medical history, past social history, past surgical history, and problem list.    Past Medical History:   Diagnosis Date    Anxiety     Arm pain     Arthritis     Breast injury     fell 6/2019     Cancer     Chronic pancreatitis     COVID-19     Depression     Diabetes mellitus     History of rectal surgery     Hyperlipidemia     Hypertension     Liver mass     Medication monitoring encounter 07/24/2018    Neck pain on left side     Palpitations 04/18/2018    Pancreatitis     Pulmonary embolism     Stroke syndrome 09/14/2016    · Assessed By: Devaughn Lewis (Neurology); Last Assessed: 16 Jun 2015  Right cerebrovascular accident in July or August 2010, evaluated at [facility].  Admission to [facility] on 09/28/2010 with headache and stuttering, consistent with TIA.  Chronic Coumadin therapy, initiated following bilateral pulmonary emboli in 2007 after fall and hip replacement.  She was already on 81 mg of aspirin as well, 09/2010.  MRI of the brain on 09/28/2010 revealing old right parietal cerebrovascular accident.  MRA revealing normal carotids, middle anterior and posterior cerebral arteries with minimal ostial stenosis of both vertebral arteries.  GENARO by Dr. Oliver Mobley on 09/28/2010:  patent foramen ovale with a small amount of right to left shunting.  Normal LVEF and normal valvular structures.   Patent foramen ovale closure using a 25 mm. Amplatzer cribriform occluder, 10/05/2010.   Echocardiogram, 06/23/2014:  LVEF (55% to 60%) with and Amplatzer device noted to be well-seated in     Thrombocytopenia     Transient cerebral ischemia     Type 2 diabetes mellitus        Past Surgical History:   Procedure Laterality Date    APPENDECTOMY      BACK SURGERY  01/03/2025     ablations    BREAST BIOPSY Left 2012    benign    BREAST BIOPSY      BREAST EXCISIONAL BIOPSY Left     benign    BREAST EXCISIONAL BIOPSY Right     benign    BREAST EXCISIONAL BIOPSY Right 2020    benign    BREAST SURGERY      CAUTERIZATION NASAL BLEEDERS  2022    CHOLECYSTECTOMY      COLONOSCOPY      HYSTERECTOMY      LIVER BIOPSY      OOPHORECTOMY      RECTAL SURGERY  11/20/2023    cancer    SKIN CANCER EXCISION      SKIN CANCER EXCISION N/A     TONSILLECTOMY      TOTAL HIP ARTHROPLASTY REVISION      US GUIDED CYST ASPIRATION BREAST N/A 02/19/2024       Social History     Socioeconomic History    Marital status:    Tobacco Use    Smoking status: Never     Passive exposure: Never    Smokeless tobacco: Never   Vaping Use    Vaping status: Never Used   Substance and Sexual Activity    Alcohol use: No    Drug use: No    Sexual activity: Yes       Family History   Problem Relation Age of Onset    Alzheimer's disease Mother     Cancer Mother     Coronary artery disease Other     Diabetes Other     Hypertension Other     Stroke Other     Cancer Other     Dementia Other     Heart attack Father     Colon polyps Father     Breast cancer Neg Hx     Ovarian cancer Neg Hx     Colon cancer Neg Hx     Esophageal cancer Neg Hx           Current Outpatient Medications:     acetaminophen (TYLENOL) 500 MG tablet, Take 1 tablet by mouth Every 6 (Six) Hours As Needed., Disp: , Rfl:     albuterol sulfate  (90 Base) MCG/ACT inhaler, Inhale 2 puffs by mouth every 4 (Four) Hours As Needed, Disp: 8.5 g, Rfl: 2    aspirin 81 MG tablet, Take 1 tablet by mouth Daily. Taken at night. Last dose 9-18-21, Disp: , Rfl:     azelastine (ASTELIN) 0.1 % nasal spray, Instill 1 spray in each nostril twice a day, Disp: 30 mL, Rfl: 3    baclofen (LIORESAL) 10 MG tablet, Take 1 tablet by mouth Every Night., Disp: 90 tablet, Rfl: 3    butalbital-acetaminophen-caffeine (FIORICET, ESGIC) -40 MG per tablet, Take 1 tablet by mouth Daily  As Needed for headache, Disp: 30 tablet, Rfl: 0    clidinium-chlordiazePOXIDE (LIBRAX) 5-2.5 MG per capsule, Take 1 capsule by mouth 2 (Two) Times a Day as needed., Disp: 60 capsule, Rfl: 0    clonazePAM (KlonoPIN) 0.5 MG tablet, As Needed., Disp: , Rfl:     Continuous Glucose  (FreeStyle [place] 3 [place]) device, Use As Directed, Disp: 1 each, Rfl: 0    digoxin (LANOXIN) 250 MCG tablet, Take 1 tablet by mouth Daily., Disp: 90 tablet, Rfl: 0    fluconazole (DIFLUCAN) 150 MG tablet, Take 1 tablet by mouth Every 72 (Seventy-Two) Hours., Disp: 2 tablet, Rfl: 0    fludrocortisone 0.1 MG tablet, Take 1 tablet by mouth Daily., Disp: 90 tablet, Rfl: 3    fluticasone (FLONASE) 50 MCG/ACT nasal spray, Instill 2 sprays each nostril twice daily, Disp: 16 g, Rfl: 11    furosemide (LASIX) 40 MG tablet, Take 1 tablet by mouth Daily. May have extra 1/2 to 1 tablet daily if needed for edema, Disp: 135 tablet, Rfl: 3    gabapentin (NEURONTIN) 800 MG tablet, Take 1 tablet by mouth Every Night., Disp: 90 tablet, Rfl: 1    glucose blood (OneTouch Verio) test strip, Use to test blood glucose 3 times a day as directed, Disp: 300 each, Rfl: 3    guaiFENesin (Mucinex) 600 MG 12 hr tablet, Take 1 tablet by mouth twice a day, Disp: 14 tablet, Rfl: 0    HYDROcodone-acetaminophen (NORCO) 7.5-325 MG per tablet, Take 1 tablet by mouth 3 times a day for 30 days., Disp: 90 tablet, Rfl: 0    hydrocortisone (ANUSOL-HC) 25 MG suppository, Insert 1 suppository rectally twice a day as needed (remove wrapper and moisten with water), Disp: 12 suppository, Rfl: 3    insulin aspart (NovoLOG FlexPen) 100 UNIT/ML solution pen-injector sc pen, Inject 10 Units under the skin into the appropriate area as directed 3 (Three) Times a Day With Meals., Disp: 30 mL, Rfl: 3    Insulin Glargine (BASAGLAR KWIKPEN) 100 UNIT/ML injection pen, Inject 50 units subcutaneously once in the morning and 75 units at night, Disp: 135 mL, Rfl: 3    Insulin Pen Needle (Pen  "Needles 3/16\") 31G X 5 MM misc, Use twice a day as directed, Disp: 100 each, Rfl: 1    Insulin Syringe-Needle U-100 29G 0.5 ML misc, Inject 0.3 mL as directed Daily., Disp: , Rfl:     losartan (COZAAR) 50 MG tablet, Take 1 tablet by mouth once Daily., Disp: 90 tablet, Rfl: 3    meclizine (ANTIVERT) 25 MG tablet, Take 1 tablet by mouth 3 (Three) Times a Day As Needed for Dizziness., Disp: 90 tablet, Rfl: 3    metoprolol succinate XL (TOPROL-XL) 100 MG 24 hr tablet, Take 1 tablet by mouth Daily., Disp: 90 tablet, Rfl: 3    neomycin-colistin-hydrocortisone-thonzonium (Cortisporin-TC) 3.3-3-10-0.5 MG/ML otic suspension, Instill 5 drops into affected ear 3 (Three) times a day for 10 days, Disp: 10 mL, Rfl: 0    nitroglycerin (Nitrostat) 0.4 MG SL tablet, Place 1 tablet under the tongue Every 5 Minutes As Needed for Chest Pain for up to 3 total doses. Call 911 if pain remains after 1 dose., Disp: 25 tablet, Rfl: 2    pancrelipase, Lip-Prot-Amyl, (Pancreaze) 34947-97910 units capsule delayed-release particles capsule, Take 1 capsule with each meal and with each snack, Disp: 100 capsule, Rfl: 1    potassium chloride (KLOR-CON) 10 MEQ CR tablet, Take 1 tablet by mouth Daily as directed, Disp: 90 tablet, Rfl: 0    promethazine (PHENERGAN) 25 MG tablet, Take 1 tablet by mouth Daily As Needed., Disp: 30 tablet, Rfl: 1    RABEprazole (ACIPHEX) 20 MG EC tablet, Take 1 tablet by mouth Daily., Disp: 90 tablet, Rfl: 1    rimegepant sulfate (Nurtec) 75 MG tablet, Place 1 tablet under the tongue 1 Time As Needed at the onset of headache, Max of 1 tab/24 hours, Max of 18 tabs/30 days., Disp: 16 tablet, Rfl: 11    rosuvastatin (CRESTOR) 10 MG tablet, Take 1 tablet by mouth every night at bedtime., Disp: 90 tablet, Rfl: 3    topiramate (TOPAMAX) 25 MG tablet, Take 1 tablet by mouth Every Night., Disp: 90 tablet, Rfl: 3    valACYclovir (Valtrex) 1000 MG tablet, Take 1 tablet by mouth Daily. Increase to 1 tablet 3 times a day with flare " up, Disp: 80 tablet, Rfl: 0    venlafaxine XR (EFFEXOR-XR) 75 MG 24 hr capsule, Take 1 capsule by mouth Daily., Disp: 90 capsule, Rfl: 3    Azelastine HCl 137 MCG/SPRAY solution, Use 1 spray in each nostril twice daily (Patient not taking: Reported on 2/10/2025), Disp: 30 mL, Rfl: 11    betamethasone dipropionate (DIPROLENE) 0.05 % ointment, Apply topically to the appropriate area as directed once daily as needed (Patient not taking: Reported on 2/10/2025), Disp: 15 g, Rfl: 0    Cholecalciferol 25 MCG (1000 UT) tablet, Take 1 tablet by mouth Daily. (Patient not taking: Reported on 2/10/2025), Disp: , Rfl:     clidinium-chlordiazePOXIDE (LIBRAX) 5-2.5 MG per capsule, Take 1 capsule by mouth 2 (Two) Times a Day as needed. (Patient not taking: Reported on 2/10/2025), Disp: 60 capsule, Rfl: 0    clidinium-chlordiazePOXIDE (LIBRAX) 5-2.5 MG per capsule, Take 1 capsule by mouth 2 (Two) Times a Day. (Patient not taking: Reported on 2/10/2025), Disp: 60 capsule, Rfl: 0    clidinium-chlordiazePOXIDE (LIBRAX) 5-2.5 MG per capsule, Take 1 capsule by mouth 2 (Two) Times a Day. (Patient not taking: Reported on 2/10/2025), Disp: 60 capsule, Rfl: 0    clidinium-chlordiazePOXIDE (LIBRAX) 5-2.5 MG per capsule, Take 1 capsule by mouth twice a day as needed (Patient not taking: Reported on 2/10/2025), Disp: 60 capsule, Rfl: 0    clidinium-chlordiazePOXIDE (LIBRAX) 5-2.5 MG per capsule, Take 1 capsule by mouth Every 12 (Twelve) Hours (Patient not taking: Reported on 2/10/2025), Disp: 60 capsule, Rfl: 0    clindamycin (CLEOCIN) 300 MG capsule, Take 1 capsule by mouth 3 times a day. (Patient not taking: Reported on 2/10/2025), Disp: 42 capsule, Rfl: 0    digoxin (LANOXIN) 250 MCG tablet, Take 1 tablet by mouth Daily. (Patient not taking: Reported on 2/10/2025), Disp: 90 tablet, Rfl: 1    fludrocortisone 0.1 MG tablet, Take 1 tablet by mouth Daily. (Patient not taking: Reported on 2/10/2025), Disp: 90 tablet, Rfl: 1    fluticasone  (FLONASE) 50 MCG/ACT nasal spray, Instill 2 sprays in each nostril twice daily (Patient not taking: Reported on 2/10/2025), Disp: 16 g, Rfl: 11    Influenza Vac High-Dose Quad (Fluzone High-Dose Quadrivalent) 0.7 ML suspension prefilled syringe injection, Inject  into the appropriate muscle as directed by prescriber., Disp: 0.7 mL, Rfl: 0    losartan (COZAAR) 50 MG tablet, Take 1 tablet by mouth Daily. (Patient not taking: Reported on 2/10/2025), Disp: 90 tablet, Rfl: 1    Magnesium Chloride 15 %, Muscle relaxant gel apply 1-2 grams pain get to affected area 3 to 4 times a day  Indications: magnesium chloride 15%, guaifenesin 10%, baclofen 5%, cyclobenzaprine 4% (Patient not taking: Reported on 2/10/2025), Disp: 420 g, Rfl: 11    meclizine (ANTIVERT) 25 MG tablet, Take 1 tablet by mouth 3 times a day as needed (Patient not taking: Reported on 2/10/2025), Disp: 90 tablet, Rfl: 3    meclizine (ANTIVERT) 25 MG tablet, Take 1 tablet by mouth 3 (Three) Times a Day As Needed. (Patient not taking: Reported on 2/10/2025), Disp: 90 tablet, Rfl: 1    metoprolol succinate XL (TOPROL-XL) 100 MG 24 hr tablet, Take 1 tablet by mouth Daily for 90 days. (Patient not taking: Reported on 2/10/2025), Disp: 90 tablet, Rfl: 1    metroNIDAZOLE (METROCREAM) 0.75 % cream, Apply to the face once every night (Patient not taking: Reported on 2/10/2025), Disp: 45 g, Rfl: 0    mupirocin (BACTROBAN) 2 % ointment, Apply 1 application  topically to the appropriate area as directed 2 (Two) Times a Day. (Patient not taking: Reported on 2/10/2025), Disp: 22 g, Rfl: 0    mupirocin (BACTROBAN) 2 % ointment, APPLY SPARINGLY TO THE AFFECTED AREA(S) TWICE DAILY (Patient not taking: Reported on 2/10/2025), Disp: 22 g, Rfl: 4    ofloxacin (FLOXIN) 0.3 % otic solution, Instill 5 drops into affected ear once a day for 7 days (Patient not taking: Reported on 2/10/2025), Disp: 10 mL, Rfl: 0    ondansetron ODT (ZOFRAN-ODT) 4 MG disintegrating tablet, Take 1  "tablet by mouth Every 8 (Eight) Hours As Needed for Nausea or Vomiting. (Patient not taking: Reported on 2/10/2025), Disp: 12 tablet, Rfl: 0    prednisoLONE acetate (PRED FORTE) 1 % ophthalmic suspension, Instill 1 drop in both eyes twice a day; Shake Well Before Use (Patient not taking: Reported on 2/10/2025), Disp: 5 mL, Rfl: 0    RABEprazole (ACIPHEX) 20 MG EC tablet, Take 1 tablet by mouth Daily. (Patient not taking: Reported on 2/10/2025), Disp: 90 tablet, Rfl: 1    RABEprazole (ACIPHEX) 20 MG EC tablet, Take 1 tablet by mouth Daily. (Patient not taking: Reported on 2/10/2025), Disp: 90 tablet, Rfl: 1    rosuvastatin (CRESTOR) 10 MG tablet, Take 1 tablet by mouth Daily. (Patient not taking: Reported on 2/10/2025), Disp: 90 tablet, Rfl: 1    triamcinolone (KENALOG) 0.1 % cream, Apply topically to the affected area twice a day (Patient not taking: Reported on 2/10/2025), Disp: 30 g, Rfl: 0     Review of Systems   Musculoskeletal:  Positive for arthralgias and gait problem.   Neurological:  Positive for numbness and headaches.   Psychiatric/Behavioral:  Positive for decreased concentration.    All other systems reviewed and are negative.       Objective:  /80   Pulse 84   Ht 170.2 cm (67\")   Wt 73.9 kg (163 lb)   SpO2 98%   BMI 25.53 kg/m²     Neurological Exam  Mental Status  Alert. (1/5) Unable to copy figure. Clock drawing is normal. Speech is normal. Language is fluent with no aphasia. Attention and concentration are normal. Fund of knowledge is abnormal. Apraxia absent.    Cranial Nerves  CN II: Visual acuity is normal. Visual fields full to confrontation.  CN III, IV, VI: Extraocular movements intact bilaterally. Normal lids and orbits bilaterally. Pupils equal round and reactive to light bilaterally.  CN V: Facial sensation is normal.  CN VII: Full and symmetric facial movement.  CN IX, X: Palate elevates symmetrically. Normal gag reflex.  CN XI: Shoulder shrug strength is normal.  CN XII: Tongue " midline without atrophy or fasciculations.    Motor  Normal muscle bulk throughout. No fasciculations present. Normal muscle tone. No abnormal involuntary movements. Strength is 5/5 in all four extremities except as noted.  Chronic pain and arthritis multiple joints. Uses cane for ambulation.      .    Sensory  Light touch abnormality: Pinprick abnormality: Temperature abnormality: Vibration abnormality: Sensation: Right more than Left decreased sensation-stocking distribution-chronic. Prior surgery to right ankle..     Reflexes                                            Right                      Left  Brachioradialis                    2+                         2+  Biceps                                 2+                         2+  Patellar                                1+                         1+  Achilles                                1+                         1+    Coordination    Finger-to-nose, rapid alternating movements and heel-to-shin normal bilaterally without dysmetria.    Gait  Casual gait: Wide stance. Reduced stride length. Antalgic gait. Romberg is absent. Unable to rise from chair without using arms.  Using cane for mobility.        Physical Exam  Constitutional:       Appearance: Normal appearance.   Eyes:      General: Lids are normal.      Extraocular Movements: Extraocular movements intact.      Pupils: Pupils are equal, round, and reactive to light.   Neurological:      Mental Status: She is alert.      Coordination: Coordination is intact. Romberg sign negative.      Deep Tendon Reflexes:      Reflex Scores:       Bicep reflexes are 2+ on the right side and 2+ on the left side.       Brachioradialis reflexes are 2+ on the right side and 2+ on the left side.       Patellar reflexes are 1+ on the right side and 1+ on the left side.       Achilles reflexes are 1+ on the right side and 1+ on the left side.  Psychiatric:         Mood and Affect: Mood is anxious.         Speech: Speech  normal.         Behavior: Behavior normal.         Thought Content: Thought content normal.         Cognition and Memory: She exhibits impaired recent memory.         Judgment: Judgment normal.         Results:  Results  Laboratory Studies  Hemoglobin A1c is 7.0% on 10/30/2024. Comprehensive metabolic panel is CO2 29.5, otherwise unremarkable. Lipid panel, triglycerides 267, HDL 32, LDL 28, total cholesterol 100. TSH normal at 3.340.    Testing  Lalit cognitive assessment score is a 23 out of 30, 1 out of 5 for word recall, un-cued, 3 out of 5 for word recall cued. Missed points 4 cube copy.    Assessment/Plan:     Diagnoses and all orders for this visit:    1. Migraine without aura and without status migrainosus, not intractable (Primary)  -     butalbital-acetaminophen-caffeine (FIORICET, ESGIC) -40 MG per tablet; Take 1 tablet by mouth Daily As Needed for headache  Dispense: 30 tablet; Refill: 0  -     baclofen (LIORESAL) 10 MG tablet; Take 1 tablet by mouth Every Night.  Dispense: 90 tablet; Refill: 3  -     gabapentin (NEURONTIN) 800 MG tablet; Take 1 tablet by mouth Every Night.  Dispense: 90 tablet; Refill: 1  -     venlafaxine XR (EFFEXOR-XR) 75 MG 24 hr capsule; Take 1 capsule by mouth Daily.  Dispense: 90 capsule; Refill: 3    2. Diabetic polyneuropathy associated with diabetes mellitus due to underlying condition  -     gabapentin (NEURONTIN) 800 MG tablet; Take 1 tablet by mouth Every Night.  Dispense: 90 tablet; Refill: 1  -     Vitamin B12 & Folate; Future  -     Methylmalonic Acid, Serum; Future    3. DDD (degenerative disc disease), cervical  -     baclofen (LIORESAL) 10 MG tablet; Take 1 tablet by mouth Every Night.  Dispense: 90 tablet; Refill: 3  -     gabapentin (NEURONTIN) 800 MG tablet; Take 1 tablet by mouth Every Night.  Dispense: 90 tablet; Refill: 1    4. Degenerative lumbar spinal stenosis  -     baclofen (LIORESAL) 10 MG tablet; Take 1 tablet by mouth Every Night.  Dispense: 90  tablet; Refill: 3  -     gabapentin (NEURONTIN) 800 MG tablet; Take 1 tablet by mouth Every Night.  Dispense: 90 tablet; Refill: 1    5. Generalized anxiety disorder  -     venlafaxine XR (EFFEXOR-XR) 75 MG 24 hr capsule; Take 1 capsule by mouth Daily.  Dispense: 90 capsule; Refill: 3    6. Cognitive complaints  -     Vitamin B12 & Folate; Future  -     Methylmalonic Acid, Serum; Future           Assessment & Plan  1. Diabetic Peripheral Neuropathy.  She is currently taking gabapentin 800 mg nightly for management of her neuropathy. Continue with pain management and all specialists.    2. Migraines.  She is currently taking topiramate 25 mg daily for management of migraines. Triptans are contraindicated, and Nurtec samples have been provided for use as needed. She is advised to continue with her current regimen and use butalbital with acetaminophen and caffeine sparingly if Nurtec is ineffective. Nurtec now $35 copay with her insurance and recently mailed to her. Consider weaning off topiramate in the future.    3. Generalized Anxiety Disorder.  She is on Effexor XR 75 mg daily for management of her anxiety. Continue with current management.    4. Cervical and Lumbar Degenerative Disc Disease.  She takes baclofen 10 mg as needed for muscle spasms. Continue with current management and follow up with her orthopedic specialist as needed.    5. Mild Cognitive Impairment.  Her Lalit cognitive assessment score is 23 out of 30, indicating mild cognitive impairment. This could be exacerbated by her chronic pain and current medication regimen.  We will monitor closely for now since her mother did have Alzheimer's, but I do not have that concern at this present time. Recommended vitamin B12 and folate screenings today. Requested she bring her  to next appointment. She verbalized understanding and agrees with plan.    Follow-up  The patient will follow up in 6 to 7 months.    As part of this patient's treatment plan  I am prescribing controlled substances. The patient has been made aware of appropriate use of such medications, including potential risk of somnolence, limited ability to drive and/or work safely, and potential for dependence or overdose. It has also been made clear that these medications are for use by the patient only, without concomitant use of alcohol or other substances unless prescribed. Keep out of reach of children.  Diogenes report has been reviewed. If this is going to be prescribed long term, [facility] [place] Contract has also been read and signed by patient and myself.    Reviewed medications, potential side effects and signs and symptoms to report. Discussed risk versus benefits of treatment plan with patient and/or family-including medications, labs and radiology that may be ordered. Addressed questions and concerns during visit. Patient and/or family verbalized understanding and agree with plan.    Of Note: Medication Reconciliation Done to the best of our ability. Multiple duplicates from Specialists and PCP with Pharmacy. Wrote down the medications we prescribe and reason for her reference and printed off with After visit summary.    Prescriptions from Neurology:    Gabapentin-neuropathy.    Baclofen-neck and low back pain.    Venlafaxine-anxiety    Topiramate (topamax)- headaches    Butalbital-acetaminophen-caffeine-headaches if needed, sparingly    Nurtec-as needed migraines    Discussion Today:  Mild Cognitive Impairment-Mild memory changes. We want to monitor this closely over time. Can be affected by your pain medicine and the gabapentin and topiramate. And also pain.    Labs today: folic acid, vitamin b12 today in basement with [facility]    Bring your  next time in person.  During this visit the following were done:  Labs Reviewed [x]    Labs Ordered []    Radiology Reports Reviewed [x]    Radiology Ordered []    PCP Records Reviewed []    Referring Provider  Records Reviewed []    ER Records Reviewed []    Hospital Records Reviewed []    History Obtained From Family []    Radiology Images Reviewed []    Other Reviewed [x]    Records Requested []      02/10/25   13:41 EST    Patient or patient representative verbalized consent for the use of Ambient Listening during the visit with  [facility] for chart documentation. 2/10/2025  17:08 EST    Note to patient: The 21st Century Cures Act makes medical notes like these available to patients in the interest of transparency. However, be advised this is a medical document. It is intended as peer to peer communication. It is written in medical language and may contain abbreviations or verbiage that are unfamiliar. It may appear blunt or direct. Medical documents are intended to carry relevant information, facts as evident, and the clinical opinion of the provider.

## 2025-02-10 NOTE — PATIENT INSTRUCTIONS
Prescriptions from Neurology:    Gabapentin-neuropathy.    Baclofen-neck and low back pain.    Venlafaxine-anxiety    Topiramate (topamax)- headaches    Butalbital-acetaminophen-caffeine-headaches if needed, sparingly    Nurtec-as needed migraines    Discussion Today:  Mild Cognitive Impairment-Mild memory changes. We want to monitor this closely over time. Can be affected by your pain medicine and the gabapentin and topiramate. And also pain.    Labs today: folic acid, vitamin b12 today in basement with [facility]    Bring your  next time in person.

## 2025-02-11 LAB
FOLATE SERPL-MCNC: 9.28 NG/ML (ref 4.78–24.2)
VIT B12 BLD-MCNC: 188 PG/ML (ref 211–946)

## 2025-02-12 ENCOUNTER — OFFICE VISIT (OUTPATIENT)
Dept: CARDIOLOGY | Facility: CLINIC | Age: 75
End: 2025-02-12
Payer: MEDICARE

## 2025-02-12 VITALS
WEIGHT: 162 LBS | BODY MASS INDEX: 25.43 KG/M2 | HEART RATE: 74 BPM | SYSTOLIC BLOOD PRESSURE: 110 MMHG | OXYGEN SATURATION: 94 % | HEIGHT: 67 IN | DIASTOLIC BLOOD PRESSURE: 56 MMHG

## 2025-02-12 DIAGNOSIS — I47.29 NSVT (NONSUSTAINED VENTRICULAR TACHYCARDIA): ICD-10-CM

## 2025-02-12 DIAGNOSIS — Q21.12 PATENT FORAMEN OVALE: Primary | ICD-10-CM

## 2025-02-12 DIAGNOSIS — I10 PRIMARY HYPERTENSION: ICD-10-CM

## 2025-02-12 DIAGNOSIS — R00.2 PALPITATIONS: ICD-10-CM

## 2025-02-12 DIAGNOSIS — E78.5 DYSLIPIDEMIA: ICD-10-CM

## 2025-02-12 PROCEDURE — 3078F DIAST BP <80 MM HG: CPT | Performed by: PHYSICIAN ASSISTANT

## 2025-02-12 PROCEDURE — 3074F SYST BP LT 130 MM HG: CPT | Performed by: PHYSICIAN ASSISTANT

## 2025-02-12 PROCEDURE — 99214 OFFICE O/P EST MOD 30 MIN: CPT | Performed by: PHYSICIAN ASSISTANT

## 2025-02-12 RX ORDER — METOPROLOL SUCCINATE 100 MG/1
100 TABLET, EXTENDED RELEASE ORAL DAILY
Qty: 135 TABLET | Refills: 3 | Status: SHIPPED | OUTPATIENT
Start: 2025-02-12

## 2025-02-13 LAB — METHYLMALONATE SERPL-SCNC: 530 NMOL/L (ref 0–378)

## 2025-02-13 NOTE — PROGRESS NOTES
Please contact PCP clinic and notify them that Leela has a true Vitamin B12 deficiency and we would like to see if they can start Leela on Vitamin B12 injections. We would recommend Vitamin B12 1000mcg via IM or SQ injection once a week for 4 weeks, then once every other week for 2 injections, then monthly for a total of 6 months. We would then follow up asn recheck her levels when she comes back to our clinic on 9/3/2025. Please contact their medical team and see if PCP and their clinic can provide this. You will need to fax the labs too. If they can handle this, then please notify Leela of the plan and make sure she schedules the injections with her PCP. Thanks, [facility]

## 2025-02-14 ENCOUNTER — TELEPHONE (OUTPATIENT)
Dept: NEUROLOGY | Facility: CLINIC | Age: 75
End: 2025-02-14
Payer: MEDICARE

## 2025-02-14 NOTE — TELEPHONE ENCOUNTER
----- Message from Jelly Lombardi sent at 2/13/2025  3:37 PM EST -----  Please contact PCP clinic and notify them that Leela has a true Vitamin B12 deficiency and we would like to see if they can start Leela on Vitamin B12 injections. We would recommend Vitamin B12 1000mcg via IM or SQ injection once a week for 4 weeks, then once every other week for 2 injections, then monthly for a total of 6 months. We would then follow up asn recheck her levels when she comes back to our clinic on 9/3/2025. Please contact their medical team and see if PCP and their clinic can provide this. You will need to fax the labs too. If they can handle this, then please notify Leela of the plan and make sure she schedules the injections with her PCP. Thanks, [facility]

## 2025-02-14 NOTE — TELEPHONE ENCOUNTER
I CALLED AND SPOKE WITH DR. PERALTA'S OFFICE AND HAD TO LVM WITH HIS STAFF REQUESTING A CALL AS TO WHETHER OR NOT HE WOULD ADMINISTER THE B-12 INJECTIONS.

## 2025-02-14 NOTE — TELEPHONE ENCOUNTER
----- Message from Jelly Lombardi sent at 2/14/2025  7:50 AM EST -----  Fax methylmalonic acid labs to PCP with Other labs and my recommendations for B12 injections. Thanks, [facility]

## 2025-02-14 NOTE — PROGRESS NOTES
Fax methylmalonic acid labs to PCP with Other labs and my recommendations for B12 injections. Thanks, [facility]

## 2025-02-18 ENCOUNTER — TELEPHONE (OUTPATIENT)
Dept: NEUROLOGY | Facility: CLINIC | Age: 75
End: 2025-02-18
Payer: MEDICARE

## 2025-02-18 NOTE — TELEPHONE ENCOUNTER
I called PCPfor pt and requested they schedule pt for the B-12 injections, she is scheduled for a f/u on 2/21/2025 and they have it noted to do it at that time, faxed the results and let pt know of the lab results and b-12 injections with PCP

## 2025-02-19 NOTE — PROGRESS NOTES
Patient: Leela Muller  YOB: 1950    Date: 02/24/2025    Primary Care Provider: Connor Ritter MD    Chief Complaint   Patient presents with    Follow-up     Breast        History: The patient is in the office today in follow up on the  right breast.  Mammogram last year was normal.  She was seen 2 years ago and felt lumps in both breasts.  Patient now has a suspicious mass on the left breast and not locked position consistent with a intraductal papilloma.  She had 1 removed in the right breast in the past.  Otherwise no significant findings.      The following portions of the patient's history were reviewed and updated as appropriate: allergies, current medications, past family history, past medical history, past social history, past surgical history and problem list.    Review of Systems   Constitutional:  Negative for chills, fever and unexpected weight change.   HENT:  Negative for hearing loss, trouble swallowing and voice change.    Eyes:  Negative for visual disturbance.   Respiratory:  Negative for apnea, cough, chest tightness, shortness of breath and wheezing.    Cardiovascular:  Negative for chest pain, palpitations and leg swelling.   Gastrointestinal:  Negative for abdominal distention, abdominal pain, anal bleeding, blood in stool, constipation, diarrhea, nausea, rectal pain and vomiting.   Endocrine: Negative for cold intolerance and heat intolerance.   Genitourinary:  Negative for difficulty urinating, dysuria and flank pain.   Musculoskeletal:  Negative for back pain and gait problem.   Skin:  Negative for color change, rash and wound.   Neurological:  Negative for dizziness, syncope, speech difficulty, weakness, light-headedness, numbness and headaches.   Hematological:  Negative for adenopathy. Does not bruise/bleed easily.   Psychiatric/Behavioral:  Negative for confusion. The patient is not nervous/anxious.        Vital Signs  Vitals:    02/24/25 1358   BP: 124/60   Pulse:  "81   Temp: 98 °F (36.7 °C)   SpO2: 98%   Weight: 77.1 kg (170 lb)   Height: 170.2 cm (67.01\")       Allergies:  Allergies   Allergen Reactions    Ampicillin GI Intolerance     Beta lactam allergy details  Antibiotic reaction: diarrhea   Age at reaction: unknown  Dose to reaction time: unknown  Reason for antibiotic: unknown  Epinephrine required for reaction?: unknown  Tolerated antibiotics: unknown        Atorvastatin Swelling    Tetanus Toxoid Hives    Acyclovir Seizure    Cefprozil Other (See Comments) and Diarrhea     diarrhea and vomiting    Clindamycin/Lincomycin Nausea And Vomiting and Diarrhea     patient requested this be added to her allergy list as she does not wish to take again.    Doxepin Unknown - Low Severity    Lansoprazole Nausea Only and Nausea And Vomiting    Mestinon [Pyridostigmine] Itching and Other (See Comments)     Leg cramps, shooting vaginal pains, frequent urination    Omega-3-Acid Ethyl Esters Unknown - Low Severity    Prednisone Unknown - Low Severity    Ranexa [Ranolazine Er] Nausea And Vomiting       Medications:    Current Outpatient Medications:     acetaminophen (TYLENOL) 500 MG tablet, Take 1 tablet by mouth Every 6 (Six) Hours As Needed., Disp: , Rfl:     albuterol sulfate  (90 Base) MCG/ACT inhaler, Inhale 2 puffs by mouth every 4 (Four) Hours As Needed, Disp: 8.5 g, Rfl: 2    aspirin 81 MG tablet, Take 1 tablet by mouth Daily. Taken at night. Last dose 9-18-21, Disp: , Rfl:     azelastine (ASTELIN) 0.1 % nasal spray, Instill 1 spray in each nostril twice a day, Disp: 30 mL, Rfl: 3    baclofen (LIORESAL) 10 MG tablet, Take 1 tablet by mouth Every Night., Disp: 90 tablet, Rfl: 3    butalbital-acetaminophen-caffeine (FIORICET, ESGIC) -40 MG per tablet, Take 1 tablet by mouth Daily As Needed for headache, Disp: 30 tablet, Rfl: 0    clidinium-chlordiazePOXIDE (LIBRAX) 5-2.5 MG per capsule, Take 1 capsule by mouth 2 (Two) Times a Day as needed., Disp: 60 capsule, Rfl: " "0    clidinium-chlordiazePOXIDE (LIBRAX) 5-2.5 MG per capsule, Take 1 capsule by mouth 2 (Two) Times a Day as needed., Disp: 60 capsule, Rfl: 0    clidinium-chlordiazePOXIDE (LIBRAX) 5-2.5 MG per capsule, Take 1 capsule by mouth Every 12 (Twelve) Hours for 30 days., Disp: 60 capsule, Rfl: 0    Continuous Glucose  (FreeStyle Colin 3 [place]) device, Use As Directed, Disp: 1 each, Rfl: 0    digoxin (LANOXIN) 250 MCG tablet, Take 1 tablet by mouth Daily., Disp: 90 tablet, Rfl: 0    fluticasone (FLONASE) 50 MCG/ACT nasal spray, Instill 2 sprays each nostril twice daily, Disp: 16 g, Rfl: 11    furosemide (LASIX) 40 MG tablet, Take 1 tablet by mouth Daily. May have extra 1/2 to 1 tablet daily if needed for edema (Patient taking differently: Take 1 tablet by mouth As Needed.), Disp: 135 tablet, Rfl: 3    gabapentin (NEURONTIN) 800 MG tablet, Take 1 tablet by mouth Every Night., Disp: 90 tablet, Rfl: 1    glucose blood (OneTouch Verio) test strip, Use to test blood glucose 3 times a day as directed, Disp: 300 each, Rfl: 3    HYDROcodone-acetaminophen (NORCO) 7.5-325 MG per tablet, Take 1 tablet by mouth 3 times a day for 30 days., Disp: 90 tablet, Rfl: 0    hydrocortisone (ANUSOL-HC) 25 MG suppository, Insert 1 suppository rectally twice a day as needed (remove wrapper and moisten with water), Disp: 12 suppository, Rfl: 3    insulin aspart (NovoLOG FlexPen) 100 UNIT/ML solution pen-injector sc pen, Inject 10 Units under the skin into the appropriate area as directed 3 (Three) Times a Day With Meals., Disp: 30 mL, Rfl: 3    Insulin Glargine (BASAGLAR KWIKPEN) 100 UNIT/ML injection pen, Inject 50 units subcutaneously once in the morning and 75 units at night (Patient taking differently: Inject 52 units subcutaneously once in the morning and 77 units at night), Disp: 135 mL, Rfl: 3    Insulin Pen Needle (Pen Needles 3/16\") 31G X 5 MM misc, Use twice a day as directed, Disp: 100 each, Rfl: 1    Insulin Syringe-Needle " U-100 29G 0.5 ML misc, Inject 0.3 mL as directed Daily., Disp: , Rfl:     Magnesium Chloride 15 %, Muscle relaxant gel apply 1-2 grams pain get to affected area 3 to 4 times a day  Indications: magnesium chloride 15%, guaifenesin 10%, baclofen 5%, cyclobenzaprine 4% (Patient taking differently: Apply 1 Application topically to the appropriate area as directed As Needed. Muscle relaxant gel apply 1-2 grams pain get to affected area 3 to 4 times a day), Disp: 420 g, Rfl: 11    meclizine (ANTIVERT) 25 MG tablet, Take 1 tablet by mouth 3 (Three) Times a Day As Needed for Dizziness., Disp: 90 tablet, Rfl: 3    metoprolol succinate XL (TOPROL-XL) 100 MG 24 hr tablet, Take 1 tablet by mouth every morning, and Take 1/2 tablet by mouth every evening, Disp: 135 tablet, Rfl: 3    nitroglycerin (Nitrostat) 0.4 MG SL tablet, Place 1 tablet under the tongue Every 5 Minutes As Needed for Chest Pain for up to 3 total doses. Call 911 if pain remains after 1 dose., Disp: 25 tablet, Rfl: 2    ondansetron ODT (ZOFRAN-ODT) 4 MG disintegrating tablet, Take 1 tablet by mouth Every 8 (Eight) Hours As Needed for Nausea or Vomiting., Disp: 12 tablet, Rfl: 0    pancrelipase, Lip-Prot-Amyl, (Pancreaze) 48933-79330 units capsule delayed-release particles capsule, Take 1 capsule with each meal and with each snack, Disp: 100 capsule, Rfl: 1    potassium chloride (KLOR-CON) 10 MEQ CR tablet, Take 1 tablet by mouth Daily as directed, Disp: 90 tablet, Rfl: 0    promethazine (PHENERGAN) 25 MG tablet, Take 1 tablet by mouth Daily As Needed., Disp: 30 tablet, Rfl: 1    RABEprazole (ACIPHEX) 20 MG EC tablet, Take 1 tablet by mouth Daily., Disp: 90 tablet, Rfl: 1    rimegepant sulfate (Nurtec) 75 MG tablet, Place 1 tablet under the tongue 1 Time As Needed at the onset of headache, Max of 1 tab/24 hours, Max of 18 tabs/30 days., Disp: 16 tablet, Rfl: 11    rosuvastatin (CRESTOR) 10 MG tablet, Take 1 tablet by mouth every night at bedtime., Disp: 90  tablet, Rfl: 3    topiramate (TOPAMAX) 25 MG tablet, Take 1 tablet by mouth Every Night., Disp: 90 tablet, Rfl: 3    valACYclovir (Valtrex) 1000 MG tablet, Take 1 tablet by mouth Daily. Increase to 1 tablet 3 times a day with flare up, Disp: 80 tablet, Rfl: 0    venlafaxine XR (EFFEXOR-XR) 75 MG 24 hr capsule, Take 1 capsule by mouth Daily., Disp: 90 capsule, Rfl: 3    clonazePAM (KlonoPIN) 0.5 MG tablet, As Needed. (Patient not taking: Reported on 2/24/2025), Disp: , Rfl:     guaiFENesin (Mucinex) 600 MG 12 hr tablet, Take 1 tablet by mouth twice a day (Patient not taking: Reported on 2/24/2025), Disp: 14 tablet, Rfl: 0    losartan (COZAAR) 50 MG tablet, Take 1 tablet by mouth once Daily. (Patient not taking: Reported on 2/24/2025), Disp: 90 tablet, Rfl: 3    Physical Exam:    Lungs-clear  Heart-regular rate  Breast-left breast mass 9 o'clock position, mobile and under the areola.     Results Review:   I reviewed the patient's new clinical results.     Review of Systems was reviewed and confirmed as accurate as documented by the MA.    ASSESSMENT/PLAN:    1. History of breast lump/mass excision    2. Other abnormal and inconclusive findings on diagnostic imaging of breast    3. Intraductal papilloma of left breast       Patient scheduled for a left breast biopsy versus limited lumpectomy.  Risk of bleeding infection discussed with patient agreeable.  She has no further questions to proceed with surgery.    Electronically signed by Benji Martinez MD  02/24/25

## 2025-02-20 RX ORDER — DIGOXIN 250 MCG
250 TABLET ORAL DAILY
Qty: 90 TABLET | Refills: 0 | Status: SHIPPED | OUTPATIENT
Start: 2025-02-20

## 2025-02-24 ENCOUNTER — OFFICE VISIT (OUTPATIENT)
Dept: SURGERY | Facility: CLINIC | Age: 75
End: 2025-02-24
Payer: MEDICARE

## 2025-02-24 VITALS
WEIGHT: 170 LBS | BODY MASS INDEX: 26.68 KG/M2 | HEART RATE: 81 BPM | OXYGEN SATURATION: 98 % | HEIGHT: 67 IN | SYSTOLIC BLOOD PRESSURE: 124 MMHG | TEMPERATURE: 98 F | DIASTOLIC BLOOD PRESSURE: 60 MMHG

## 2025-02-24 DIAGNOSIS — R92.8 OTHER ABNORMAL AND INCONCLUSIVE FINDINGS ON DIAGNOSTIC IMAGING OF BREAST: ICD-10-CM

## 2025-02-24 DIAGNOSIS — Z98.890 HISTORY OF BREAST LUMP/MASS EXCISION: Primary | ICD-10-CM

## 2025-02-24 DIAGNOSIS — D24.2 INTRADUCTAL PAPILLOMA OF LEFT BREAST: ICD-10-CM

## 2025-02-24 PROCEDURE — 3078F DIAST BP <80 MM HG: CPT | Performed by: SURGERY

## 2025-02-24 PROCEDURE — 3074F SYST BP LT 130 MM HG: CPT | Performed by: SURGERY

## 2025-02-24 PROCEDURE — 1159F MED LIST DOCD IN RCRD: CPT | Performed by: SURGERY

## 2025-02-24 PROCEDURE — 1160F RVW MEDS BY RX/DR IN RCRD: CPT | Performed by: SURGERY

## 2025-02-24 PROCEDURE — 99213 OFFICE O/P EST LOW 20 MIN: CPT | Performed by: SURGERY

## 2025-02-24 RX ORDER — SODIUM CHLORIDE 0.9 % (FLUSH) 0.9 %
10 SYRINGE (ML) INJECTION AS NEEDED
OUTPATIENT
Start: 2025-02-24

## 2025-02-24 RX ORDER — SODIUM CHLORIDE 0.9 % (FLUSH) 0.9 %
10 SYRINGE (ML) INJECTION EVERY 12 HOURS SCHEDULED
OUTPATIENT
Start: 2025-02-24

## 2025-02-24 RX ORDER — SODIUM CHLORIDE 9 MG/ML
40 INJECTION, SOLUTION INTRAVENOUS AS NEEDED
OUTPATIENT
Start: 2025-02-24

## 2025-03-04 ENCOUNTER — OUTSIDE FACILITY SERVICE (OUTPATIENT)
Dept: SURGERY | Facility: CLINIC | Age: 75
End: 2025-03-04
Payer: MEDICARE

## 2025-03-12 NOTE — PROGRESS NOTES
"Patient: Leela Muller    YOB: 1950    Date: 03/17/2025    Primary Care Provider: Connor Ritter MD    Chief Complaint   Patient presents with    Post-op Follow-up     L breast bx        History of present illness:  I saw the patient in the office today as a followup from their recent left breast excisional biopsy.  Pathology was reviewed and indicated intraductal papilloma.   They state that they have done well post-operatively and are having no complaints.    Vital Signs:   Vitals:    03/17/25 1338   Pulse: 79   Temp: 98.4 °F (36.9 °C)   SpO2: 97%   Weight: 77.1 kg (170 lb)   Height: 170.2 cm (67.01\")       Physical Exam:    Breast-wound well-healed, no bleeding or redness     Assessment / Plan :    1. Postoperative visit       intraductal papilloma, no malignancy.  Patient reassured will follow-up in 1 year for new baseline ultrasound.    Electronically signed by Benji Martinez MD  03/17/25                "

## 2025-03-17 ENCOUNTER — OFFICE VISIT (OUTPATIENT)
Dept: SURGERY | Facility: CLINIC | Age: 75
End: 2025-03-17
Payer: MEDICARE

## 2025-03-17 VITALS
HEIGHT: 67 IN | OXYGEN SATURATION: 97 % | TEMPERATURE: 98.4 F | BODY MASS INDEX: 26.68 KG/M2 | HEART RATE: 79 BPM | WEIGHT: 170 LBS

## 2025-03-17 DIAGNOSIS — Z48.89 POSTOPERATIVE VISIT: Primary | ICD-10-CM

## 2025-03-17 PROCEDURE — 1159F MED LIST DOCD IN RCRD: CPT | Performed by: SURGERY

## 2025-03-17 PROCEDURE — 1160F RVW MEDS BY RX/DR IN RCRD: CPT | Performed by: SURGERY

## 2025-03-17 PROCEDURE — 99024 POSTOP FOLLOW-UP VISIT: CPT | Performed by: SURGERY

## 2025-03-17 RX ORDER — CYANOCOBALAMIN 1000 UG/ML
INJECTION, SOLUTION INTRAMUSCULAR; SUBCUTANEOUS
COMMUNITY
Start: 2025-03-04

## 2025-04-23 ENCOUNTER — TELEPHONE (OUTPATIENT)
Dept: ENDOCRINOLOGY | Facility: CLINIC | Age: 75
End: 2025-04-23

## 2025-04-23 NOTE — TELEPHONE ENCOUNTER
Spoke with patient.  She got a steroid injection in her shoulder yesterday.  This morning fasting blood sugar was 284.  Took 52u of Basaglar and has been taking Novolog 12u with each meal.  When she originally called blood sugar was 385.  When I called her back, meter is reading high.

## 2025-04-23 NOTE — TELEPHONE ENCOUNTER
Hub staff attempted to follow warm transfer process and was unsuccessful     Caller: Leela Muller    Relationship to patient: Self    Best call back number: [phone]    Patient is needing: PT  RECEIVED A STEROID SHOT YESTERDAY AND IT CALLED HER SUGAR TO RISE  PT NEEDS A CALL BACK ASAP

## 2025-04-28 RX ORDER — DIGOXIN 250 MCG
250 TABLET ORAL DAILY
Qty: 30 TABLET | Refills: 0 | Status: SHIPPED | OUTPATIENT
Start: 2025-04-28

## 2025-05-02 ENCOUNTER — TELEPHONE (OUTPATIENT)
Dept: CARDIOLOGY | Facility: CLINIC | Age: 75
End: 2025-05-02

## 2025-05-02 NOTE — TELEPHONE ENCOUNTER
The [facility] received a fax that requires your attention. The document has been indexed to the patient’s chart for your review.      Reason for sending: EXTERNAL MEDICAL RECORD NOTIFICATION     Documents Description: [facility]-4.29.25    Name of Sender: [facility]    Date Indexed: 4.29.25

## 2025-05-14 ENCOUNTER — LAB (OUTPATIENT)
Dept: LAB | Facility: HOSPITAL | Age: 75
End: 2025-05-14
Payer: MEDICARE

## 2025-05-14 ENCOUNTER — OFFICE VISIT (OUTPATIENT)
Dept: CARDIOLOGY | Facility: CLINIC | Age: 75
End: 2025-05-14
Payer: MEDICARE

## 2025-05-14 VITALS
OXYGEN SATURATION: 95 % | SYSTOLIC BLOOD PRESSURE: 116 MMHG | BODY MASS INDEX: 24.86 KG/M2 | HEART RATE: 73 BPM | WEIGHT: 158.4 LBS | DIASTOLIC BLOOD PRESSURE: 70 MMHG | HEIGHT: 67 IN

## 2025-05-14 DIAGNOSIS — Q21.12 PATENT FORAMEN OVALE: ICD-10-CM

## 2025-05-14 DIAGNOSIS — Z79.899 LONG TERM CURRENT USE OF ANTIARRHYTHMIC DRUG: ICD-10-CM

## 2025-05-14 DIAGNOSIS — I47.29 NSVT (NONSUSTAINED VENTRICULAR TACHYCARDIA): ICD-10-CM

## 2025-05-14 DIAGNOSIS — I49.1 PREMATURE ATRIAL CONTRACTIONS: ICD-10-CM

## 2025-05-14 DIAGNOSIS — I10 PRIMARY HYPERTENSION: Primary | ICD-10-CM

## 2025-05-14 LAB — DIGOXIN SERPL-MCNC: 1.5 NG/ML (ref 0.6–1.2)

## 2025-05-14 PROCEDURE — 80162 ASSAY OF DIGOXIN TOTAL: CPT

## 2025-05-14 PROCEDURE — 36415 COLL VENOUS BLD VENIPUNCTURE: CPT

## 2025-05-14 RX ORDER — DIGOXIN 250 MCG
250 TABLET ORAL DAILY
Qty: 90 TABLET | Refills: 3 | Status: SHIPPED | OUTPATIENT
Start: 2025-05-14 | End: 2025-05-16 | Stop reason: SDUPTHER

## 2025-05-14 RX ORDER — METOPROLOL SUCCINATE 100 MG/1
100 TABLET, EXTENDED RELEASE ORAL DAILY
Qty: 135 TABLET | Refills: 3 | Status: SHIPPED | OUTPATIENT
Start: 2025-05-14

## 2025-05-14 NOTE — PROGRESS NOTES
[facility] Cardiology    Date: 2025    Patient ID: Leela Muller is a 75 y.o. female   : 1950   Contact: [phone]         Chief Complaint:    Chief Complaint   Patient presents with    Orthostatic hypotension       Problem List:  Cerebrovascular accident:  Right CVA in July or 2010, evaluated at [facility].   Admission to [facility], 2010 with headache and stuttering, c/w TIA.   Chronic Coumadin therapy, initiated following bilateral PE in  after fall and hip replacement. She was already on 81 mg of aspirin as well, 2010.   MRI brain, 2010: Old right parietal CVA.   MRA, 2010: Normal carotids, middle anterior and posterior cerebral arteries with minimal ostial stenosis of both vertebral arteries.   GENARO, 2010, Dr. Mobley: PFO with a small amount of right to left shunting. Normal LVEF and normal valves.  PFO closure, 10/05/2010: 25 mm Amplatzer cribriform occluder.    Echocardiogram, 2014: Amplatzer device is well-seated in the interatrial septum. EF 55-60%. Trace MR and mild TR.  Echocardiogram, 2019: Intact Amplatzer septal occluder with no evidence of flow across the device. EF 60%. Trace MR/TR.   Chest pain/abnormal myocardial perfusion study:    Patient reports having a MI in . Documentation only supports bilateral PE. She did not have a LHC or stents at time of alleged MI.   Cardiolite GXT, 2010, Dr. Monteiro: Small area of ischemia in the mid anteroseptal, mid inferior, and apical lateral regions. EF 68%.  LHC, 2010, Dr. Monteiro: Normal coronary arteries with normal LVEF.  Cardiolite stress test, 2021: EF 58%. No CP at baseline but had CP with infusion located in the center of her chest, which continued into recovery, but was less intense. Abnormal baseline EKG with 0.5 to 1 mm of ST segment depression which did not change appreciably with the infusion. No evidence of inducible ischemia. Low  risk study.   MPS 2/14/2023: No evidence of inducible ischemia by scintigraphic criteria.   DM2.   Bilateral pulmonary emboli:  Following hip replacement in 2007.   No evidence of prior anti-coagulable workup.   Chronic Coumadin therapy.   No evidence of DVT on bilateral lower extremity duplex.  The decision was made to discontinue the patient's warfarin therapy due to her fall risk.  Palpitations:  2 week Holter, 04/23/2018: Normal study.  14-day Holter, 1/19/2022: SR/SB/ST. 3 pauses longest 2.2 secs. Brief a tach. Rare PVCs and PACs. Average HR: 73. Min HR: 50. Max HR: 131.  30 day MCOT, 06/13/2024; Sinus bradycardia, sinus rhythm and sinus tachycardia noted. Frequent PACs and PVCs. One 5 beat run of nocturnal VT. Diary entries correlate with sinus rhythm and mild sinus tachycardia and occasionally with PACs.   Hypertension.   Dyslipidemia.   Pancreatitis:  Recent episode  in March 2013.  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    Rectal cancer; surgically removed by Dr Martinez.   Surgical history:  Hip replacement.  Hysterectomy.  Tonsillectomy  Appendectomy.  Cholecystectomy.      Allergies   Allergen Reactions    Ampicillin GI Intolerance     Beta lactam allergy details  Antibiotic reaction: diarrhea   Age at reaction: unknown  Dose to reaction time: unknown  Reason for antibiotic: unknown  Epinephrine required for reaction?: unknown  Tolerated antibiotics: unknown        Atorvastatin Swelling    Tetanus Toxoid Hives    Acyclovir Seizure    Cefprozil Other (See Comments) and Diarrhea     diarrhea and vomiting    Clindamycin/Lincomycin Nausea And Vomiting and Diarrhea     patient requested this be added to her allergy list as she does not wish to take again.    Doxepin Unknown - Low Severity    Lansoprazole Nausea Only and Nausea And Vomiting    Mestinon [Pyridostigmine] Itching and Other (See Comments)     Leg cramps, shooting vaginal pains, frequent urination    Omega-3-Acid Ethyl Esters Unknown - Low  Severity    Prednisone Unknown - Low Severity    Ranexa [Ranolazine Er] Nausea And Vomiting         Current Outpatient Medications:     acetaminophen (TYLENOL) 500 MG tablet, Take 1 tablet by mouth Every 6 (Six) Hours As Needed., Disp: , Rfl:     albuterol sulfate  (90 Base) MCG/ACT inhaler, Inhale 2 puffs by mouth every 4 (Four) Hours As Needed, Disp: 8.5 g, Rfl: 2    albuterol sulfate  (90 Base) MCG/ACT inhaler, Inhale 2 puffs Every 4 (Four) to 6 (Six) Hours As Needed., Disp: 8.5 g, Rfl: 0    amoxicillin (AMOXIL) 875 MG tablet, Take 1 tablet every 12 hours by oral route for 10 days., Disp: 20 tablet, Rfl: 0    aspirin 81 MG tablet, Take 1 tablet by mouth Daily. Taken at night. Last dose 9-18-21, Disp: , Rfl:     azelastine (ASTELIN) 0.1 % nasal spray, Instill 1 spray in each nostril twice a day, Disp: 30 mL, Rfl: 3    baclofen (LIORESAL) 10 MG tablet, Take 1 tablet by mouth Every Night., Disp: 90 tablet, Rfl: 3    butalbital-acetaminophen-caffeine (FIORICET, ESGIC) -40 MG per tablet, Take 1 tablet by mouth Daily As Needed for headache, Disp: 30 tablet, Rfl: 0    clonazePAM (KlonoPIN) 0.5 MG tablet, As Needed., Disp: , Rfl:     Continuous Glucose  (FreeStyle Colin 3 [place]) device, Use As Directed, Disp: 1 each, Rfl: 0    digoxin (LANOXIN) 250 MCG tablet, Take 1 tablet by mouth Daily., Disp: 90 tablet, Rfl: 3    fluticasone (FLONASE) 50 MCG/ACT nasal spray, Instill 2 sprays each nostril twice daily, Disp: 16 g, Rfl: 11    furosemide (LASIX) 40 MG tablet, Take 1 tablet by mouth Daily. May have extra 1/2 to 1 tablet daily if needed for edema (Patient taking differently: Take 1 tablet by mouth As Needed.), Disp: 135 tablet, Rfl: 3    gabapentin (NEURONTIN) 800 MG tablet, Take 1 tablet by mouth Every Night., Disp: 90 tablet, Rfl: 1    glucose blood (OneTouch Verio) test strip, Use to test blood glucose 3 times a day as directed, Disp: 300 each, Rfl: 3    HYDROcodone-acetaminophen (NORCO)  "7.5-325 MG per tablet, Take 1 tablet by mouth 3 times a day for 30 days., Disp: 90 tablet, Rfl: 0    hydrocortisone (ANUSOL-HC) 25 MG suppository, Insert 1 suppository rectally twice a day as needed (remove wrapper and moisten with water), Disp: 12 suppository, Rfl: 3    insulin aspart (NovoLOG FlexPen) 100 UNIT/ML solution pen-injector sc pen, Inject 10 Units under the skin into the appropriate area as directed 3 (Three) Times a Day With Meals., Disp: 30 mL, Rfl: 3    Insulin Glargine (BASAGLAR KWIKPEN) 100 UNIT/ML injection pen, Inject 50 units subcutaneously once in the morning and 75 units at night (Patient taking differently: Inject 52 units subcutaneously once in the morning and 77 units at night), Disp: 135 mL, Rfl: 3    Insulin Pen Needle (B-D UF III MINI PEN NEEDLES) 31G X 5 MM misc, Use 2 (Two) Times a Day as directed., Disp: 100 each, Rfl: 1    Insulin Pen Needle (Pen Needles 3/16\") 31G X 5 MM misc, Use twice a day as directed, Disp: 100 each, Rfl: 1    Insulin Syringe-Needle U-100 29G 0.5 ML misc, Inject 0.3 mL as directed Daily., Disp: , Rfl:     Magnesium Chloride 15 %, Muscle relaxant gel apply 1-2 grams pain get to affected area 3 to 4 times a day  Indications: magnesium chloride 15%, guaifenesin 10%, baclofen 5%, cyclobenzaprine 4% (Patient taking differently: Apply 1 Application topically to the appropriate area as directed As Needed. Muscle relaxant gel apply 1-2 grams pain get to affected area 3 to 4 times a day), Disp: 420 g, Rfl: 11    meclizine (ANTIVERT) 25 MG tablet, Take 1 tablet by mouth 3 (Three) Times a Day As Needed for Dizziness., Disp: 90 tablet, Rfl: 3    metoprolol succinate XL (TOPROL-XL) 100 MG 24 hr tablet, Take 1 tablet by mouth every morning, and Take 1/2 tablet by mouth every evening, Disp: 135 tablet, Rfl: 3    metroNIDAZOLE (METROGEL) 0.75 % gel, Apply a thin layer topically to the affected areas twice daily, in the morning and evening, Disp: 45 g, Rfl: 3    nitroglycerin " (Nitrostat) 0.4 MG SL tablet, Place 1 tablet under the tongue Every 5 Minutes As Needed for Chest Pain for up to 3 total doses. Call 911 if pain remains after 1 dose., Disp: 25 tablet, Rfl: 2    ondansetron ODT (ZOFRAN-ODT) 4 MG disintegrating tablet, Take 1 tablet by mouth Every 8 (Eight) Hours As Needed for Nausea or Vomiting., Disp: 12 tablet, Rfl: 0    pancrelipase, Lip-Prot-Amyl, (Pancreaze) 25664-84462 units capsule delayed-release particles capsule, Take 1 capsule with each meal and with each snack, Disp: 100 capsule, Rfl: 1    potassium chloride (KLOR-CON) 10 MEQ CR tablet, Take 1 tablet by mouth Daily as directed, Disp: 90 tablet, Rfl: 0    promethazine (PHENERGAN) 25 MG tablet, Take 1 tablet by mouth Daily As Needed., Disp: 30 tablet, Rfl: 1    RABEprazole (ACIPHEX) 20 MG EC tablet, Take 1 tablet by mouth Daily., Disp: 90 tablet, Rfl: 1    rimegepant sulfate (Nurtec) 75 MG tablet, Place 1 tablet under the tongue 1 Time As Needed at the onset of headache, Max of 1 tab/24 hours, Max of 18 tabs/30 days., Disp: 16 tablet, Rfl: 11    rosuvastatin (CRESTOR) 10 MG tablet, Take 1 tablet by mouth every night at bedtime., Disp: 90 tablet, Rfl: 3    topiramate (TOPAMAX) 25 MG tablet, Take 1 tablet by mouth Every Night., Disp: 90 tablet, Rfl: 3    triamcinolone (KENALOG) 0.1 % cream, APPLY A THIN LAYER TOPICALLY TO THE AFFECTED AREA(S) 2 (TWO) TIMES A DAY, Disp: 80 g, Rfl: 0    valACYclovir (Valtrex) 1000 MG tablet, Take 1 tablet by mouth Daily. Increase to 1 tablet 3 times a day with flare up, Disp: 80 tablet, Rfl: 0    venlafaxine XR (EFFEXOR-XR) 75 MG 24 hr capsule, Take 1 capsule by mouth Daily., Disp: 90 capsule, Rfl: 3    methylPREDNISolone (MEDROL) 4 MG dose pack, take as directed on package (Patient not taking: Reported on 5/14/2025), Disp: 21 tablet, Rfl: 0     History of Present Illness: Leela Muller is a 75 y.o. female who is here today for 3-month follow-up for her orthostasis, history of chest pain as  "well as history of pulmonary emboli.  Patient states overall she is doing well, she did have oral surgery since she was last seen.  She states her blood pressure still fluctuating somewhat but has been stable.  She does not recall having a conversation about sleep study the last appointment for her history of nonsustained ventricular tachycardia.  She states that she has had multiple sleep studies but not 1 recently.  Patient denies any current chest pain, shortness of breath, dizziness or syncope.      The following portions of the patient's history were reviewed and updated as appropriate: allergies, current medications and problem list.     Pertinent positives as listed in the HPI.  All other systems reviewed are negative.            Vitals:    05/14/25 1046   BP: 116/70   BP Location: Left arm   Patient Position: Sitting   Cuff Size: Adult   Pulse: 73   SpO2: 95%   Weight: 71.8 kg (158 lb 6.4 oz)   Height: 170.2 cm (67\")     Body mass index is 24.81 kg/m².    Physical Exam:  General: Alert and oriented.  Neck: Jugular venous pressure is within normal limits. Carotids have normal upstrokes without bruits.   Cardiovascular: Regular rate and rhythm. No murmur, gallop or rub.  Lungs: Clear, no rales or wheezes. Equal expansion is noted.   Extremities: No peripheral edema  Skin: Warm and dry.  Neurologic: Nonfocal.     Diagnostic data (reviewed with patient):  CMP:   Lab Results   Component Value Date    GLUCOSE 99 10/30/2024    BUN 16 10/30/2024    CREATININE 0.81 10/30/2024    EGFRIFNONA 73 12/17/2021    BCR 19.8 10/30/2024    K 4.6 10/30/2024    CO2 29.5 (H) 10/30/2024    CALCIUM 9.5 10/30/2024    ALBUMIN 3.7 10/30/2024    AST 21 10/30/2024    ALT 22 10/30/2024       CBC:    Lab Results   Component Value Date    WBC 9.41 09/12/2024    HGB 14.9 09/12/2024    HCT 45.2 09/12/2024    MCV 89.3 09/12/2024     09/12/2024       LIPIDS:    Lab Results   Component Value Date    CHOL 100 10/30/2024    TRIG 267 (H) " 10/30/2024    HDL 32 (L) 10/30/2024    LDL 28 10/30/2024       HgbA1c:    Lab Results   Component Value Date    HGBA1C 7.0 (A) 10/30/2024         Advance Care Planning   ACP discussion was declined by the patient. Patient does not have an advance directive, declines further assistance.            Assessment:  1. Primary hypertension    2. NSVT (nonsustained ventricular tachycardia)    3. Patent foramen ovale    4. Long term current use of antiarrhythmic drug    5. Premature atrial contractions             Plan:  Stable cardiac status.  Patient no longer is on fludrocortisone for her orthostasis and her blood pressure is stable off of losartan.  I will order a sleep study again as the patient had nocturnal nonsustained ventricular tachycardia last year and I am concerned about obstructive sleep apnea.  Continue aspirin 81 mg for antiplatelet therapy.  Continue digoxin 250 mcg daily and metoprolol succinate 100 mg in the morning and 50 mg at night for rate control.  Continue Crestor 10 mg nightly for hyperlipidemia.  I discussed with the patient she is to go upstairs today to get a digoxin level as she has not had one in quite some time.  Continue all other current medications.  F/up in 6 months, sooner if needed.      Namrata Wolf PA-C

## 2025-05-16 ENCOUNTER — OFFICE VISIT (OUTPATIENT)
Dept: ENDOCRINOLOGY | Facility: CLINIC | Age: 75
End: 2025-05-16
Payer: MEDICARE

## 2025-05-16 VITALS
HEIGHT: 67 IN | OXYGEN SATURATION: 98 % | DIASTOLIC BLOOD PRESSURE: 62 MMHG | HEART RATE: 70 BPM | BODY MASS INDEX: 24.89 KG/M2 | WEIGHT: 158.6 LBS | SYSTOLIC BLOOD PRESSURE: 128 MMHG

## 2025-05-16 DIAGNOSIS — I10 PRIMARY HYPERTENSION: ICD-10-CM

## 2025-05-16 DIAGNOSIS — Z79.4 TYPE 2 DIABETES MELLITUS WITH HYPERGLYCEMIA, WITH LONG-TERM CURRENT USE OF INSULIN: Primary | ICD-10-CM

## 2025-05-16 DIAGNOSIS — E11.65 TYPE 2 DIABETES MELLITUS WITH HYPERGLYCEMIA, WITH LONG-TERM CURRENT USE OF INSULIN: Primary | ICD-10-CM

## 2025-05-16 DIAGNOSIS — E78.5 DYSLIPIDEMIA: ICD-10-CM

## 2025-05-16 DIAGNOSIS — E08.42 DIABETIC POLYNEUROPATHY ASSOCIATED WITH DIABETES MELLITUS DUE TO UNDERLYING CONDITION: ICD-10-CM

## 2025-05-16 LAB
EXPIRATION DATE: ABNORMAL
EXPIRATION DATE: ABNORMAL
GLUCOSE BLDC GLUCOMTR-MCNC: 144 MG/DL (ref 70–130)
HBA1C MFR BLD: 7.1 % (ref 4.5–5.7)
Lab: ABNORMAL
Lab: ABNORMAL

## 2025-05-16 RX ORDER — INSULIN ASPART 100 [IU]/ML
15 INJECTION, SOLUTION INTRAVENOUS; SUBCUTANEOUS
Qty: 45 ML | Refills: 3 | Status: SHIPPED | OUTPATIENT
Start: 2025-05-16

## 2025-05-16 RX ORDER — DIGOXIN 125 MCG
125 TABLET ORAL DAILY
Qty: 90 TABLET | Refills: 1 | Status: SHIPPED | OUTPATIENT
Start: 2025-05-16

## 2025-05-16 NOTE — PROGRESS NOTES
"     Office Note      Date: 2025  Patient Name: Leela Muller  MRN: [id number]  : 1950    Chief Complaint   Patient presents with    Diabetes     Type II Diabetes    Hypertension       History of Present Illness:   Leela Muller is a 75 y.o. female who presents for Diabetes type 2. Diagnosed in: . Treated in past with oral agents. She didn't tolerate farxiga due to yeast infections.  Current treatments: basal-bolus insulin. Number of insulin shots per day: 4. Checks blood sugar 288 times a day - on FreeStyle Colin. She is making frequent adjustments in insulin based on glucose readings.  Has low blood sugar: occ. Aspirin use: Yes. Statin use: Yes. ACE-I/ARB use: No. Changes in health since last visit: dental surgery. Last eye exam 2025.     Subjective      Diabetic Complications:  Eyes: No  Kidneys: No  Feet: Yes -    Heart: Yes -      Diet and Exercise:  Meals per day: 2  Minutes of exercise per week: 0 mins.    Review of Systems:   Review of Systems   Constitutional: Negative.    Cardiovascular: Negative.    Gastrointestinal: Negative.    Endocrine: Negative.        The following portions of the patient's history were reviewed and updated as appropriate: allergies, current medications, past family history, past medical history, past social history, past surgical history, and problem list.    Objective     Visit Vitals  /62 (BP Location: Left arm, Patient Position: Sitting)   Pulse 70   Ht 170.2 cm (67.01\")   Wt 71.9 kg (158 lb 9.6 oz)   SpO2 98%   BMI 24.83 kg/m²       Physical Exam:  Physical Exam  Constitutional:       Appearance: Normal appearance.   Neurological:      Mental Status: She is alert.         Labs:    HbA1c  Lab Results   Component Value Date    HGBA1C 7.1 (A) 2025       CMP  Lab Results   Component Value Date    GLUCOSE 99 10/30/2024    BUN 16 10/30/2024    CREATININE 0.81 10/30/2024    EGFRIFNONA 73 2021    BCR 19.8 10/30/2024    K 4.6 10/30/2024    CO2 29.5 " "(H) 10/30/2024    CALCIUM 9.5 10/30/2024    AST 21 10/30/2024    ALT 22 10/30/2024        Lipid Panel  Lab Results   Component Value Date    HDL Cholesterol 32 (L) 10/30/2024    LDL Cholesterol  28 10/30/2024    LDL/HDL Ratio 0.46 10/30/2024    Triglycerides 267 (H) 10/30/2024        TSH  Lab Results   Component Value Date    TSH 3.340 10/30/2024        Hemoglobin A1C  No components found for: \"HGBA1C\"     Microalbumin/Creatinine  Lab Results   Component Value Date    MALBCRERATIO  10/30/2024      Comment:      Unable to calculate    MICROALBUR <1.2 10/30/2024           Assessment / Plan      Assessment & Plan:  Diagnoses and all orders for this visit:    1. Type 2 diabetes mellitus with hyperglycemia, with long-term current use of insulin (Primary)  Assessment & Plan:  Diabetes is stable.  A1c acceptable, especially considering recent steroid use.  Continue current treatment regimen.  Diabetes will be reassessed in 6 months.    FreeStyle Colin 2 CGM was downloaded today.  Data was reviewed from 5/3/25 to 5/16/25.  This showed fairly good overnight glucose and occ postprandial spike but no patterns for insulin adjustments.  Time in range was 60%.    Orders:  -     POC Glucose, Blood  -     POC Glycosylated Hemoglobin (Hb A1C)    2. Primary hypertension  Assessment & Plan:  Hypertension is stable and controlled.  Continue current treatment regimen.  Blood pressure will be reassessed in 6 months.      3. Dyslipidemia  Assessment & Plan:  Continue statin.  Plan to check lipids next visit.      4. Diabetic polyneuropathy associated with diabetes mellitus due to underlying condition    Other orders  -     insulin aspart (NovoLOG FlexPen) 100 UNIT/ML solution pen-injector sc pen; Inject 15 Units under the skin into the appropriate area as directed 3 (Three) Times a Day With Meals.  Dispense: 42 mL; Refill: 3      Current Outpatient Medications   Medication Instructions    acetaminophen (TYLENOL) 500 mg, Every 6 Hours PRN " "   albuterol sulfate  (90 Base) MCG/ACT inhaler 2 puffs, Inhalation, Every 4 to 6 Hours PRN    amoxicillin (AMOXIL) 875 MG tablet Take 1 tablet every 12 hours by oral route for 10 days.    aspirin 81 mg, Daily    azelastine (ASTELIN) 0.1 % nasal spray Instill 1 spray in each nostril twice a day    baclofen (LIORESAL) 10 mg, Oral, Nightly    butalbital-acetaminophen-caffeine (FIORICET, ESGIC) -40 MG per tablet Take 1 tablet by mouth Daily As Needed for headache    clonazePAM (KlonoPIN) 0.5 MG tablet As Needed    Continuous Glucose  (FreeStyle Colin 3 [place]) device Use As Directed    digoxin (LANOXIN) 250 mcg, Oral, Daily    fluticasone (FLONASE) 50 MCG/ACT nasal spray Instill 2 sprays each nostril twice daily    furosemide (LASIX) 40 MG tablet Take 1 tablet by mouth Daily. May have extra 1/2 to 1 tablet daily if needed for edema    gabapentin (NEURONTIN) 800 mg, Oral, Nightly    glucose blood (OneTouch Verio) test strip Use to test blood glucose 3 times a day as directed    HYDROcodone-acetaminophen (NORCO) 7.5-325 MG per tablet 1 tablet, Oral, 3 times daily    hydrocortisone (ANUSOL-HC) 25 MG suppository Insert 1 suppository rectally twice a day as needed (remove wrapper and moisten with water)    Insulin Glargine (BASAGLAR KWIKPEN) 100 UNIT/ML injection pen Inject 50 units subcutaneously once in the morning and 75 units at night    Insulin Pen Needle (B-D UF III MINI PEN NEEDLES) 31G X 5 MM misc Use 2 (Two) Times a Day as directed.    Insulin Pen Needle (Pen Needles 3/16\") 31G X 5 MM misc Use twice a day as directed    Insulin Syringe-Needle U-100 29G 0.5 ML misc 0.3 mL, Daily    Magnesium Chloride 15 % Muscle relaxant gel apply 1-2 grams pain get to affected area 3 to 4 times a day    meclizine (ANTIVERT) 25 mg, Oral, 3 Times Daily PRN    methylPREDNISolone (MEDROL) 4 MG dose pack take as directed on package    metoprolol succinate XL (TOPROL-XL) 100 MG 24 hr tablet Take 1 tablet by mouth " every morning, and Take 1/2 tablet by mouth every evening    metroNIDAZOLE (METROGEL) 0.75 % gel Apply a thin layer topically to the affected areas twice daily, in the morning and evening    nitroglycerin (Nitrostat) 0.4 MG SL tablet Place 1 tablet under the tongue Every 5 Minutes As Needed for Chest Pain for up to 3 total doses. Call 911 if pain remains after 1 dose.    NovoLOG FlexPen 15 Units, Subcutaneous, 3 Times Daily With Meals    ondansetron ODT (ZOFRAN-ODT) 4 mg, Oral, Every 8 Hours PRN    pancrelipase, Lip-Prot-Amyl, (Pancreaze) 50510-81730 units capsule delayed-release particles capsule Take 1 capsule with each meal and with each snack    potassium chloride (KLOR-CON) 10 MEQ CR tablet Take 1 tablet by mouth Daily as directed    promethazine (PHENERGAN) 25 mg, Oral, Daily PRN    RABEprazole (ACIPHEX) 20 mg, Oral, Daily    rimegepant sulfate (Nurtec) 75 MG tablet Place 1 tablet under the tongue 1 Time As Needed at the onset of headache, Max of 1 tab/24 hours, Max of 18 tabs/30 days.    rosuvastatin (CRESTOR) 10 mg, Oral, Every Night at Bedtime    topiramate (TOPAMAX) 25 mg, Oral, Nightly    triamcinolone (KENALOG) 0.1 % cream APPLY A THIN LAYER TOPICALLY TO THE AFFECTED AREA(S) 2 (TWO) TIMES A DAY    valACYclovir (VALTREX) 1,000 mg, Oral, Daily, Increase to 1 tablet 3 times a day with flare up    venlafaxine XR (EFFEXOR-XR) 75 mg, Oral, Daily      Return in about 6 months (around 11/16/2025) for Recheck with A1c.    Electronically signed by: Jac Santiago MD  05/16/2025

## 2025-05-16 NOTE — ASSESSMENT & PLAN NOTE
Diabetes is stable.  A1c acceptable, especially considering recent steroid use.  Continue current treatment regimen.  Diabetes will be reassessed in 6 months.    FreeStyle Colin 2 CGM was downloaded today.  Data was reviewed from 5/3/25 to 5/16/25.  This showed fairly good overnight glucose and occ postprandial spike but no patterns for insulin adjustments.  Time in range was 60%.

## 2025-05-21 ENCOUNTER — TELEPHONE (OUTPATIENT)
Dept: CARDIOLOGY | Facility: CLINIC | Age: 75
End: 2025-05-21
Payer: MEDICARE

## 2025-05-21 NOTE — TELEPHONE ENCOUNTER
Caller: Leela Muller    Relationship: Self    Best call back number: [phone]    What is the best time to reach you: ANY    Who are you requesting to speak with (clinical staff, provider,  specific staff member): ANY    What was the call regarding: REFERRAL TO SLEEP MEDS NEEDS TO BE SENT TO [facility]    Is it okay if the provider responds through MyChart: NO

## 2025-05-21 NOTE — TELEPHONE ENCOUNTER
Spoke with patient.  She is encouraged that I will send referral to [facility].  She verbalizes understanding.

## 2025-05-23 ENCOUNTER — TELEPHONE (OUTPATIENT)
Dept: CARDIOLOGY | Facility: CLINIC | Age: 75
End: 2025-05-23
Payer: MEDICARE

## 2025-05-23 NOTE — TELEPHONE ENCOUNTER
Per [facility] - This patient needs a sleep study and another monitor. Dizzy w heart racing. Saw Dr. Ritter today and he called me. If her symptoms are daily then a 2 week monitor. If a few times a week then a 30 day monitor. I know that she just saw Namrata, but I need to see her after monitor is done. Had VT on monitor last year..   Namrata ordered sleep referral at [facility] 5/14/25.  LM for patient to return call.

## 2025-05-29 ENCOUNTER — TELEPHONE (OUTPATIENT)
Dept: ENDOCRINOLOGY | Facility: CLINIC | Age: 75
End: 2025-05-29

## 2025-05-29 NOTE — TELEPHONE ENCOUNTER
Caller: Leela Muller    Relationship: Self    Best call back number:   Telephone Information:   Mobile [phone]     What was the call regarding: PT CALLED WANTING A CALL BACK FROM CLINICAL STAFF. PLEASE ADVISE.

## 2025-06-13 ENCOUNTER — TRANSCRIBE ORDERS (OUTPATIENT)
Dept: ADMINISTRATIVE | Facility: HOSPITAL | Age: 75
End: 2025-06-13
Payer: MEDICARE

## 2025-06-13 DIAGNOSIS — Z78.0 ASYMPTOMATIC MENOPAUSAL STATE: Primary | ICD-10-CM

## 2025-06-25 ENCOUNTER — OFFICE VISIT (OUTPATIENT)
Dept: GASTROENTEROLOGY | Facility: CLINIC | Age: 75
End: 2025-06-25
Payer: MEDICARE

## 2025-06-25 VITALS
OXYGEN SATURATION: 94 % | SYSTOLIC BLOOD PRESSURE: 110 MMHG | HEART RATE: 88 BPM | TEMPERATURE: 97.2 F | BODY MASS INDEX: 25.43 KG/M2 | WEIGHT: 162 LBS | DIASTOLIC BLOOD PRESSURE: 52 MMHG | HEIGHT: 67 IN

## 2025-06-25 DIAGNOSIS — K59.01 SLOW TRANSIT CONSTIPATION: Primary | ICD-10-CM

## 2025-06-25 DIAGNOSIS — R42 VERTIGO: ICD-10-CM

## 2025-06-25 PROCEDURE — 3078F DIAST BP <80 MM HG: CPT | Performed by: INTERNAL MEDICINE

## 2025-06-25 PROCEDURE — 1159F MED LIST DOCD IN RCRD: CPT | Performed by: INTERNAL MEDICINE

## 2025-06-25 PROCEDURE — 1160F RVW MEDS BY RX/DR IN RCRD: CPT | Performed by: INTERNAL MEDICINE

## 2025-06-25 PROCEDURE — 99212 OFFICE O/P EST SF 10 MIN: CPT | Performed by: INTERNAL MEDICINE

## 2025-06-25 PROCEDURE — 3074F SYST BP LT 130 MM HG: CPT | Performed by: INTERNAL MEDICINE

## 2025-06-25 NOTE — PROGRESS NOTES
Patient Name: Leela Muller  YOB: 1950   Medical Record number: [id number]     PCP: Connor Ritter MD    Chief Complaint   Patient presents with    Constipation       History of Present Illness:   HPI  Mrs. Muller comes to the office for follow-up.  She is doing well with regard to bowel habits.  Patient denies any persistent problems with constipation at this time.  Mrs. Muller denies any gross blood in the stool.  There is no history of localized abdominal pain.  She has no issue with unexplained weight loss.  Mrs. Muller recently has experienced some issues with vertigo and had a Holter monitor placed around this time.  The patient states the vertigo has improved.  Past Medical History:   Diagnosis Date    Anxiety     Arm pain     Arthritis     Breast injury     fell 6/2019     Cancer     Chronic pancreatitis     COVID-19     Depression     Diabetes mellitus     Diverticulitis of colon     History of rectal surgery     Hyperlipidemia     Hypertension     Irritable bowel syndrome     Liver mass     Medication monitoring encounter 07/24/2018    Neck pain on left side     Palpitations 04/18/2018    Pancreatitis     Pulmonary embolism     Stroke syndrome 09/14/2016    · Assessed By: Devaughn Lewis (Neurology); Last Assessed: 16 Jun 2015  Right cerebrovascular accident in July or August 2010, evaluated at [facility].  Admission to [facility] on 09/28/2010 with headache and stuttering, consistent with TIA.  Chronic Coumadin therapy, initiated following bilateral pulmonary emboli in 2007 after fall and hip replacement.  She was already on 81 mg of aspirin as well, 09/2010.  MRI of the brain on 09/28/2010 revealing old right parietal cerebrovascular accident.  MRA revealing normal carotids, middle anterior and posterior cerebral arteries with minimal ostial stenosis of both vertebral arteries.  GENARO by Dr. Oliver Mobley on 09/28/2010:  patent foramen ovale with a small  amount of right to left shunting.  Normal LVEF and normal valvular structures.   Patent foramen ovale closure using a 25 mm. Amplatzer cribriform occluder, 10/05/2010.   Echocardiogram, 06/23/2014:  LVEF (55% to 60%) with and Amplatzer device noted to be well-seated in     Thrombocytopenia     Transient cerebral ischemia     Type 2 diabetes mellitus        Past Surgical History:   Procedure Laterality Date    APPENDECTOMY      BACK SURGERY  01/03/2025    ablations    BREAST BIOPSY Left 2012    benign    BREAST BIOPSY      BREAST EXCISIONAL BIOPSY Left     benign    BREAST EXCISIONAL BIOPSY Right     benign    BREAST EXCISIONAL BIOPSY Right 2020    benign    BREAST SURGERY      CAUTERIZATION NASAL BLEEDERS  2022    CHOLECYSTECTOMY      COLONOSCOPY      DENTAL PROCEDURE Left 05/2025    HYSTERECTOMY      LIVER BIOPSY      OOPHORECTOMY      RECTAL SURGERY  11/20/2023    cancer    SKIN CANCER EXCISION      SKIN CANCER EXCISION N/A     TONSILLECTOMY      TOTAL HIP ARTHROPLASTY REVISION      UPPER GASTROINTESTINAL ENDOSCOPY      US GUIDED CYST ASPIRATION BREAST N/A 02/19/2024         Current Outpatient Medications:     acetaminophen (TYLENOL) 500 MG tablet, Take 1 tablet by mouth Every 6 (Six) Hours As Needed., Disp: , Rfl:     albuterol sulfate  (90 Base) MCG/ACT inhaler, Inhale 2 puffs Every 4 (Four) to 6 (Six) Hours As Needed., Disp: 8.5 g, Rfl: 0    aspirin 81 MG tablet, Take 1 tablet by mouth Daily. Taken at night. Last dose 9-18-21, Disp: , Rfl:     azelastine (ASTELIN) 0.1 % nasal spray, Instill 1 spray in each nostril twice a day, Disp: 30 mL, Rfl: 3    baclofen (LIORESAL) 10 MG tablet, Take 1 tablet by mouth Every Night., Disp: 90 tablet, Rfl: 3    butalbital-acetaminophen-caffeine (FIORICET, ESGIC) -40 MG per tablet, Take 1 tablet by mouth Daily As Needed for headache, Disp: 30 tablet, Rfl: 0    clidinium-chlordiazePOXIDE (LIBRAX) 5-2.5 MG per capsule, Take 1 capsule by mouth Every 12 (Twelve)  "Hours., Disp: 60 capsule, Rfl: 0    clonazePAM (KlonoPIN) 0.5 MG tablet, As Needed., Disp: , Rfl:     Continuous Glucose  (FreeStyle Colin [employer]) device, Use As Directed, Disp: 1 each, Rfl: 0    digoxin (LANOXIN) 125 MCG tablet, Take 1 tablet by mouth Daily., Disp: 90 tablet, Rfl: 1    fluticasone (FLONASE) 50 MCG/ACT nasal spray, Instill 2 sprays each nostril twice daily, Disp: 16 g, Rfl: 11    furosemide (LASIX) 40 MG tablet, Take 1 tablet by mouth Daily. May have extra 1/2 to 1 tablet daily if needed for edema (Patient taking differently: Take 1 tablet by mouth As Needed.), Disp: 135 tablet, Rfl: 3    gabapentin (NEURONTIN) 800 MG tablet, Take 1 tablet by mouth Every Night., Disp: 90 tablet, Rfl: 1    gabapentin (NEURONTIN) 800 MG tablet, Take 1 tablet by mouth Every Night., Disp: 30 tablet, Rfl: 2    glucose blood (OneTouch Verio) test strip, Use to test blood glucose 3 times a day as directed, Disp: 300 each, Rfl: 3    HYDROcodone-acetaminophen (NORCO) 7.5-325 MG per tablet, Take 1 tablet by mouth 3 times a day for 30 days., Disp: 90 tablet, Rfl: 0    hydrocortisone (ANUSOL-HC) 25 MG suppository, Insert 1 suppository rectally twice a day as needed (remove wrapper and moisten with water), Disp: 12 suppository, Rfl: 3    insulin aspart (NovoLOG FlexPen) 100 UNIT/ML solution pen-injector sc pen, Inject 15 Units under the skin into the appropriate area as directed 3 (Three) Times a Day With Meals., Disp: 45 mL, Rfl: 3    Insulin Glargine (BASAGLAR KWIKPEN) 100 UNIT/ML injection pen, Inject 50 units subcutaneously once in the morning and 75 units at night (Patient taking differently: Inject 52 units subcutaneously once in the morning and 77 units at night), Disp: 135 mL, Rfl: 3    Insulin Pen Needle (B-D UF III MINI PEN NEEDLES) 31G X 5 MM misc, Use 2 (Two) Times a Day as directed., Disp: 100 each, Rfl: 1    Insulin Pen Needle (Pen Needles 3/16\") 31G X 5 MM misc, Use twice a day as directed, Disp: 100 " each, Rfl: 1    Insulin Syringe-Needle U-100 29G 0.5 ML misc, Inject 0.3 mL as directed Daily., Disp: , Rfl:     Lactobacillus (PROBIOTIC ACIDOPHILUS PO), Take  by mouth Daily., Disp: , Rfl:     Magnesium Chloride 15 %, Muscle relaxant gel apply 1-2 grams pain get to affected area 3 to 4 times a day  Indications: magnesium chloride 15%, guaifenesin 10%, baclofen 5%, cyclobenzaprine 4% (Patient taking differently: Apply 1 Application topically to the appropriate area as directed As Needed. Muscle relaxant gel apply 1-2 grams pain get to affected area 3 to 4 times a day), Disp: 420 g, Rfl: 11    meclizine (ANTIVERT) 25 MG tablet, Take 1 tablet by mouth 3 (Three) Times a Day As Needed for Dizziness., Disp: 90 tablet, Rfl: 3    metoprolol succinate XL (TOPROL-XL) 100 MG 24 hr tablet, Take 1 tablet by mouth every morning, and Take 1/2 tablet by mouth every evening, Disp: 135 tablet, Rfl: 3    metroNIDAZOLE (METROGEL) 0.75 % gel, Apply a thin layer topically to the affected areas twice daily, in the morning and evening, Disp: 45 g, Rfl: 3    nitroglycerin (Nitrostat) 0.4 MG SL tablet, Place 1 tablet under the tongue Every 5 Minutes As Needed for Chest Pain for up to 3 total doses. Call 911 if pain remains after 1 dose., Disp: 25 tablet, Rfl: 2    ofloxacin (FLOXIN) 0.3 % otic solution, INSTILL 10 DROPS INTO AFFECTED EAR(S) BY OTIC ROUTE ONCE DAILY FOR 7 DAYS, Disp: 10 mL, Rfl: 0    ondansetron ODT (ZOFRAN-ODT) 4 MG disintegrating tablet, Take 1 tablet by mouth Every 8 (Eight) Hours As Needed for Nausea or Vomiting., Disp: 12 tablet, Rfl: 0    pancrelipase, Lip-Prot-Amyl, (Pancreaze) 31287-23492 units capsule delayed-release particles capsule, Take 1 capsule with each meal and with each snack, Disp: 100 capsule, Rfl: 1    potassium chloride (KLOR-CON) 10 MEQ CR tablet, Take 1 tablet by mouth Daily as directed, Disp: 90 tablet, Rfl: 0    promethazine (PHENERGAN) 25 MG tablet, Take 1 tablet by mouth Daily As Needed., Disp:  30 tablet, Rfl: 1    RABEprazole (ACIPHEX) 20 MG EC tablet, Take 1 tablet by mouth Daily., Disp: 90 tablet, Rfl: 1    rimegepant sulfate ODT (Nurtec) 75 MG disintegrating tablet, Place 1 tablet under the tongue 1 Time As Needed at the onset of headache, Max of 1 tab/24 hours, Max of 18 tabs/30 days., Disp: 16 tablet, Rfl: 11    rosuvastatin (CRESTOR) 10 MG tablet, Take 1 tablet by mouth every night at bedtime., Disp: 90 tablet, Rfl: 3    topiramate (TOPAMAX) 25 MG tablet, Take 1 tablet by mouth Every Night., Disp: 90 tablet, Rfl: 3    triamcinolone (KENALOG) 0.1 % cream, APPLY A THIN LAYER TOPICALLY TO THE AFFECTED AREA(S) 2 (TWO) TIMES A DAY, Disp: 80 g, Rfl: 0    valACYclovir (Valtrex) 1000 MG tablet, Take 1 tablet by mouth Daily. Increase to 1 tablet 3 times a day with flare up, Disp: 80 tablet, Rfl: 0    venlafaxine XR (EFFEXOR-XR) 75 MG 24 hr capsule, Take 1 capsule by mouth Daily., Disp: 90 capsule, Rfl: 3    venlafaxine XR (EFFEXOR-XR) 75 MG 24 hr capsule, Take 1 capsule by mouth Daily., Disp: 90 capsule, Rfl: 1    Allergies   Allergen Reactions    Ampicillin GI Intolerance     Beta lactam allergy details  Antibiotic reaction: diarrhea   Age at reaction: unknown  Dose to reaction time: unknown  Reason for antibiotic: unknown  Epinephrine required for reaction?: unknown  Tolerated antibiotics: unknown        Atorvastatin Swelling    Tetanus Toxoid Hives    Acyclovir Seizure    Cefprozil Other (See Comments) and Diarrhea     diarrhea and vomiting    Clindamycin/Lincomycin Nausea And Vomiting and Diarrhea     patient requested this be added to her allergy list as she does not wish to take again.    Doxepin Unknown - Low Severity    Lansoprazole Nausea Only and Nausea And Vomiting    Mestinon [Pyridostigmine] Itching and Other (See Comments)     Leg cramps, shooting vaginal pains, frequent urination    Omega-3-Acid Ethyl Esters Unknown - Low Severity    Ranexa [Ranolazine Er] Nausea And Vomiting       Family  History   Problem Relation Age of Onset    Alzheimer's disease Mother     Cancer Mother     Coronary artery disease Other     Diabetes Other     Hypertension Other     Stroke Other     Cancer Other     Dementia Other     Heart attack Father     Colon polyps Father     Colon cancer Father     Breast cancer Neg Hx     Ovarian cancer Neg Hx     Esophageal cancer Neg Hx        Social History     Socioeconomic History    Marital status:    Tobacco Use    Smoking status: Never     Passive exposure: Never    Smokeless tobacco: Never   Vaping Use    Vaping status: Never Used   Substance and Sexual Activity    Alcohol use: Never    Drug use: Never    Sexual activity: Yes     Partners: Male     Birth control/protection: Hysterectomy       Review of Systems   Constitutional: Negative.  Negative for appetite change, fatigue, fever and unexpected weight change.   HENT: Negative.  Negative for dental problem, mouth sores, postnasal drip, sneezing, trouble swallowing and voice change.    Eyes: Negative.  Negative for pain, redness and itching.   Respiratory: Negative.  Negative for cough, shortness of breath and wheezing.    Cardiovascular: Negative.  Negative for chest pain, palpitations and leg swelling.   Gastrointestinal:  Positive for constipation. Negative for abdominal distention, abdominal pain, anal bleeding, blood in stool, diarrhea, nausea, rectal pain and vomiting.   Endocrine: Negative.  Negative for cold intolerance, heat intolerance, polydipsia and polyuria.   Genitourinary: Negative.  Negative for dysuria, enuresis, flank pain, hematuria and urgency.   Musculoskeletal: Negative.  Negative for arthralgias, back pain, joint swelling and myalgias.   Skin: Negative.  Negative for color change, pallor and rash.   Allergic/Immunologic: Negative.  Negative for environmental allergies, food allergies and immunocompromised state.   Neurological: Negative.  Negative for dizziness, tremors, seizures, facial asymmetry,  numbness and headaches.   Hematological: Negative.    Psychiatric/Behavioral: Negative.  Negative for behavioral problems, dysphoric mood, hallucinations and self-injury.        Vitals:    06/25/25 1416   BP: 110/52   Pulse: 88   Temp: 97.2 °F (36.2 °C)   SpO2: 94%       Physical Exam  Vitals reviewed.   Constitutional:       General: She is not in acute distress.     Appearance: Normal appearance.   HENT:      Head: Normocephalic and atraumatic.      Nose: Nose normal.      Mouth/Throat:      Mouth: Mucous membranes are moist.      Pharynx: Oropharynx is clear. No posterior oropharyngeal erythema.   Eyes:      General: No scleral icterus.     Extraocular Movements: Extraocular movements intact.   Cardiovascular:      Rate and Rhythm: Normal rate and regular rhythm.      Heart sounds: No murmur heard.  Pulmonary:      Breath sounds: Normal breath sounds. No wheezing or rales.   Abdominal:      General: Bowel sounds are normal.      Palpations: Abdomen is soft.      Tenderness: There is no abdominal tenderness. There is no guarding.      Comments: Right lobe of liver felt below costal margin   Musculoskeletal:         General: No swelling. Normal range of motion.      Cervical back: Normal range of motion and neck supple.   Skin:     General: Skin is dry.      Coloration: Skin is not jaundiced.   Neurological:      Mental Status: She is alert and oriented to person, place, and time.   Psychiatric:         Mood and Affect: Mood normal.         Thought Content: Thought content normal.         Judgment: Judgment normal.         Diagnoses and all orders for this visit:    1. Slow transit constipation (Primary)    2. Vertigo    The patient has a history of slow transit constipation.  However, she is doing well with dietary changes and over-the-counter medication as needed.  She is primarily taking a probiotic that has helped regulate her bowel function.  I  reviewed labs that included transaminases, albumin and bilirubin  from late April 2025 that are all normal.  She is not experiencing vertigo at this time.  However, at the time of the Holter monitor she was experiencing symptoms.    Plan: Continue probiotic daily.           Patient will follow-up in the office in 7 months.

## 2025-07-29 DIAGNOSIS — G43.009 MIGRAINE WITHOUT AURA AND WITHOUT STATUS MIGRAINOSUS, NOT INTRACTABLE: ICD-10-CM

## 2025-07-29 RX ORDER — TOPIRAMATE 25 MG/1
25 TABLET, FILM COATED ORAL NIGHTLY
Qty: 90 TABLET | Refills: 3 | Status: CANCELLED | OUTPATIENT
Start: 2025-07-29

## 2025-07-30 ENCOUNTER — RESULTS FOLLOW-UP (OUTPATIENT)
Dept: CARDIOLOGY | Facility: CLINIC | Age: 75
End: 2025-07-30
Payer: MEDICARE

## 2025-07-30 RX ORDER — TOPIRAMATE 25 MG/1
25 TABLET, FILM COATED ORAL NIGHTLY
Qty: 90 TABLET | Refills: 3 | Status: SHIPPED | OUTPATIENT
Start: 2025-07-30

## 2025-07-30 NOTE — TELEPHONE ENCOUNTER
Called and spoke with pt regarding heart monitor results. Per Dr. Sullivan, no changes to plan of care at this time. Pt mentioned she experienced vertigo during the monitoring period and was not very active or up and about.

## 2025-08-06 ENCOUNTER — TELEPHONE (OUTPATIENT)
Dept: ENDOCRINOLOGY | Facility: CLINIC | Age: 75
End: 2025-08-06
Payer: MEDICARE

## 2025-08-25 ENCOUNTER — OFFICE VISIT (OUTPATIENT)
Dept: ENDOCRINOLOGY | Facility: CLINIC | Age: 75
End: 2025-08-25
Payer: MEDICARE

## 2025-08-25 VITALS
OXYGEN SATURATION: 99 % | WEIGHT: 166 LBS | SYSTOLIC BLOOD PRESSURE: 114 MMHG | HEART RATE: 81 BPM | HEIGHT: 67 IN | DIASTOLIC BLOOD PRESSURE: 73 MMHG | BODY MASS INDEX: 26.06 KG/M2

## 2025-08-25 DIAGNOSIS — I25.10 CORONARY ARTERY DISEASE INVOLVING NATIVE HEART WITHOUT ANGINA PECTORIS, UNSPECIFIED VESSEL OR LESION TYPE: ICD-10-CM

## 2025-08-25 DIAGNOSIS — I10 PRIMARY HYPERTENSION: ICD-10-CM

## 2025-08-25 DIAGNOSIS — E08.42 DIABETIC POLYNEUROPATHY ASSOCIATED WITH DIABETES MELLITUS DUE TO UNDERLYING CONDITION: ICD-10-CM

## 2025-08-25 DIAGNOSIS — E78.5 DYSLIPIDEMIA: ICD-10-CM

## 2025-08-25 DIAGNOSIS — E11.65 TYPE 2 DIABETES MELLITUS WITH HYPERGLYCEMIA, WITH LONG-TERM CURRENT USE OF INSULIN: Primary | ICD-10-CM

## 2025-08-25 DIAGNOSIS — Z79.4 TYPE 2 DIABETES MELLITUS WITH HYPERGLYCEMIA, WITH LONG-TERM CURRENT USE OF INSULIN: Primary | ICD-10-CM

## 2025-08-25 LAB
EXPIRATION DATE: ABNORMAL
EXPIRATION DATE: ABNORMAL
GFR SERPL CREATININE-BSD FRML MDRD: 75 ML/MIN/1.73M*2 (ref 60–?)
GLUCOSE BLDC GLUCOMTR-MCNC: 161 MG/DL (ref 70–130)
HBA1C MFR BLD: 7.4 % (ref 4.5–5.7)
Lab: ABNORMAL
Lab: ABNORMAL

## 2025-08-25 PROCEDURE — 95251 CONT GLUC MNTR ANALYSIS I&R: CPT | Performed by: INTERNAL MEDICINE

## 2025-08-25 PROCEDURE — 80061 LIPID PANEL: CPT | Performed by: INTERNAL MEDICINE

## 2025-08-25 PROCEDURE — 83036 HEMOGLOBIN GLYCOSYLATED A1C: CPT | Performed by: INTERNAL MEDICINE

## 2025-08-25 PROCEDURE — 3051F HG A1C>EQUAL 7.0%<8.0%: CPT | Performed by: INTERNAL MEDICINE

## 2025-08-25 PROCEDURE — 80053 COMPREHEN METABOLIC PANEL: CPT | Performed by: INTERNAL MEDICINE

## 2025-08-25 PROCEDURE — 3074F SYST BP LT 130 MM HG: CPT | Performed by: INTERNAL MEDICINE

## 2025-08-25 PROCEDURE — 84443 ASSAY THYROID STIM HORMONE: CPT | Performed by: INTERNAL MEDICINE

## 2025-08-25 PROCEDURE — 1159F MED LIST DOCD IN RCRD: CPT | Performed by: INTERNAL MEDICINE

## 2025-08-25 PROCEDURE — 1160F RVW MEDS BY RX/DR IN RCRD: CPT | Performed by: INTERNAL MEDICINE

## 2025-08-25 PROCEDURE — 82043 UR ALBUMIN QUANTITATIVE: CPT | Performed by: INTERNAL MEDICINE

## 2025-08-25 PROCEDURE — 36415 COLL VENOUS BLD VENIPUNCTURE: CPT | Performed by: INTERNAL MEDICINE

## 2025-08-25 PROCEDURE — 99214 OFFICE O/P EST MOD 30 MIN: CPT | Performed by: INTERNAL MEDICINE

## 2025-08-25 PROCEDURE — 82570 ASSAY OF URINE CREATININE: CPT | Performed by: INTERNAL MEDICINE

## 2025-08-25 PROCEDURE — 3078F DIAST BP <80 MM HG: CPT | Performed by: INTERNAL MEDICINE

## 2025-08-26 ENCOUNTER — RESULTS FOLLOW-UP (OUTPATIENT)
Dept: ENDOCRINOLOGY | Facility: CLINIC | Age: 75
End: 2025-08-26
Payer: MEDICARE

## 2025-08-26 LAB
ALBUMIN SERPL-MCNC: 4.1 G/DL (ref 3.5–5.2)
ALBUMIN UR-MCNC: <1.2 MG/DL
ALBUMIN/GLOB SERPL: 1.5 G/DL
ALP SERPL-CCNC: 57 U/L (ref 39–117)
ALT SERPL W P-5'-P-CCNC: 17 U/L (ref 1–33)
ANION GAP SERPL CALCULATED.3IONS-SCNC: 8 MMOL/L (ref 5–15)
AST SERPL-CCNC: 26 U/L (ref 1–32)
BILIRUB SERPL-MCNC: 0.3 MG/DL (ref 0–1.2)
BUN SERPL-MCNC: 19 MG/DL (ref 8–23)
BUN/CREAT SERPL: 22.1 (ref 7–25)
CALCIUM SPEC-SCNC: 10 MG/DL (ref 8.6–10.5)
CHLORIDE SERPL-SCNC: 105 MMOL/L (ref 98–107)
CHOLEST SERPL-MCNC: 79 MG/DL (ref 0–200)
CO2 SERPL-SCNC: 26 MMOL/L (ref 22–29)
CREAT SERPL-MCNC: 0.86 MG/DL (ref 0.57–1)
CREAT UR-MCNC: 147 MG/DL
EGFRCR SERPLBLD CKD-EPI 2021: 69.7 ML/MIN/1.73
GLOBULIN UR ELPH-MCNC: 2.7 GM/DL
GLUCOSE SERPL-MCNC: 123 MG/DL (ref 65–99)
HDLC SERPL-MCNC: 27 MG/DL (ref 40–60)
LDLC SERPL CALC-MCNC: 24 MG/DL (ref 0–100)
LDLC/HDLC SERPL: 0.67 {RATIO}
MICROALBUMIN/CREAT UR: NORMAL MG/G{CREAT}
POTASSIUM SERPL-SCNC: 4.4 MMOL/L (ref 3.5–5.2)
PROT SERPL-MCNC: 6.8 G/DL (ref 6–8.5)
SODIUM SERPL-SCNC: 139 MMOL/L (ref 136–145)
TRIGL SERPL-MCNC: 170 MG/DL (ref 0–150)
TSH SERPL DL<=0.05 MIU/L-ACNC: 2.29 UIU/ML (ref 0.27–4.2)
VLDLC SERPL-MCNC: 28 MG/DL (ref 5–40)